# Patient Record
Sex: FEMALE | Race: WHITE | NOT HISPANIC OR LATINO | Employment: OTHER | URBAN - METROPOLITAN AREA
[De-identification: names, ages, dates, MRNs, and addresses within clinical notes are randomized per-mention and may not be internally consistent; named-entity substitution may affect disease eponyms.]

---

## 2017-01-04 ENCOUNTER — OFFICE VISIT - HEALTHEAST (OUTPATIENT)
Dept: FAMILY MEDICINE | Facility: CLINIC | Age: 67
End: 2017-01-04

## 2017-01-04 DIAGNOSIS — Z78.0 POST-MENOPAUSE: ICD-10-CM

## 2017-01-04 DIAGNOSIS — F41.1 ANXIETY, GENERALIZED: ICD-10-CM

## 2017-01-04 DIAGNOSIS — F31.9 BIPOLAR 1 DISORDER (H): ICD-10-CM

## 2017-01-04 DIAGNOSIS — I10 ESSENTIAL HYPERTENSION WITH GOAL BLOOD PRESSURE LESS THAN 130/85: ICD-10-CM

## 2017-01-04 DIAGNOSIS — R21 RASH AND NONSPECIFIC SKIN ERUPTION: ICD-10-CM

## 2017-01-04 DIAGNOSIS — Z12.11 ENCOUNTER FOR SCREENING COLONOSCOPY: ICD-10-CM

## 2017-01-04 DIAGNOSIS — R20.0 NUMBNESS OF RIGHT HAND: ICD-10-CM

## 2017-01-04 DIAGNOSIS — Z00.00 ROUTINE GENERAL MEDICAL EXAMINATION AT A HEALTH CARE FACILITY: ICD-10-CM

## 2017-01-04 DIAGNOSIS — Z12.4 CERVICAL CANCER SCREENING: ICD-10-CM

## 2017-01-04 DIAGNOSIS — E78.2 MIXED HYPERLIPIDEMIA: ICD-10-CM

## 2017-01-04 DIAGNOSIS — Z11.59 NEED FOR HEPATITIS C SCREENING TEST: ICD-10-CM

## 2017-01-04 DIAGNOSIS — F06.30 MOOD DISORDER IN CONDITIONS CLASSIFIED ELSEWHERE: ICD-10-CM

## 2017-01-04 DIAGNOSIS — I25.10 CORONARY ARTERY DISEASE: ICD-10-CM

## 2017-01-04 LAB — HBA1C MFR BLD: 11 % (ref 3.5–6)

## 2017-01-04 ASSESSMENT — MIFFLIN-ST. JEOR: SCORE: 1252.51

## 2017-01-05 LAB
CHOLEST SERPL-MCNC: 201 MG/DL
FASTING STATUS PATIENT QL REPORTED: ABNORMAL
HDLC SERPL-MCNC: 48 MG/DL
LDLC SERPL CALC-MCNC: 118 MG/DL
TRIGL SERPL-MCNC: 173 MG/DL

## 2017-01-06 ENCOUNTER — COMMUNICATION - HEALTHEAST (OUTPATIENT)
Dept: CARDIOLOGY | Facility: CLINIC | Age: 67
End: 2017-01-06

## 2017-01-06 ENCOUNTER — COMMUNICATION - HEALTHEAST (OUTPATIENT)
Dept: FAMILY MEDICINE | Facility: CLINIC | Age: 67
End: 2017-01-06

## 2017-01-06 DIAGNOSIS — Z79.4 TYPE 2 DIABETES MELLITUS WITH HYPERGLYCEMIA, WITH LONG-TERM CURRENT USE OF INSULIN (H): ICD-10-CM

## 2017-01-06 DIAGNOSIS — E11.65 TYPE 2 DIABETES MELLITUS WITH HYPERGLYCEMIA, WITH LONG-TERM CURRENT USE OF INSULIN (H): ICD-10-CM

## 2017-01-06 DIAGNOSIS — R21 RASH AND NONSPECIFIC SKIN ERUPTION: ICD-10-CM

## 2017-01-06 LAB — HCV AB SERPL QL IA: NEGATIVE

## 2017-01-10 LAB
HPV INTERPRETATION - HISTORICAL: NORMAL
HPV INTERPRETER - HISTORICAL: NORMAL

## 2017-01-11 ENCOUNTER — OFFICE VISIT - HEALTHEAST (OUTPATIENT)
Dept: FAMILY MEDICINE | Facility: CLINIC | Age: 67
End: 2017-01-11

## 2017-01-11 DIAGNOSIS — E11.65 TYPE 2 DIABETES MELLITUS WITH HYPERGLYCEMIA, WITH LONG-TERM CURRENT USE OF INSULIN (H): ICD-10-CM

## 2017-01-11 DIAGNOSIS — Z23 NEED FOR IMMUNIZATION AGAINST INFLUENZA: ICD-10-CM

## 2017-01-11 DIAGNOSIS — I25.10 CORONARY ARTERY DISEASE INVOLVING NATIVE CORONARY ARTERY OF NATIVE HEART WITHOUT ANGINA PECTORIS: ICD-10-CM

## 2017-01-11 DIAGNOSIS — E78.2 MIXED HYPERLIPIDEMIA: ICD-10-CM

## 2017-01-11 DIAGNOSIS — Z79.4 TYPE 2 DIABETES MELLITUS WITH HYPERGLYCEMIA, WITH LONG-TERM CURRENT USE OF INSULIN (H): ICD-10-CM

## 2017-01-11 DIAGNOSIS — I10 ESSENTIAL HYPERTENSION WITH GOAL BLOOD PRESSURE LESS THAN 130/85: ICD-10-CM

## 2017-01-15 ENCOUNTER — COMMUNICATION - HEALTHEAST (OUTPATIENT)
Dept: FAMILY MEDICINE | Facility: CLINIC | Age: 67
End: 2017-01-15

## 2017-01-15 DIAGNOSIS — Z79.4 TYPE 2 DIABETES MELLITUS WITH HYPERGLYCEMIA, WITH LONG-TERM CURRENT USE OF INSULIN (H): ICD-10-CM

## 2017-01-15 DIAGNOSIS — E11.65 TYPE 2 DIABETES MELLITUS WITH HYPERGLYCEMIA, WITH LONG-TERM CURRENT USE OF INSULIN (H): ICD-10-CM

## 2017-01-20 ENCOUNTER — RECORDS - HEALTHEAST (OUTPATIENT)
Dept: ADMINISTRATIVE | Facility: OTHER | Age: 67
End: 2017-01-20

## 2017-01-25 ENCOUNTER — RECORDS - HEALTHEAST (OUTPATIENT)
Dept: ADMINISTRATIVE | Facility: OTHER | Age: 67
End: 2017-01-25

## 2017-01-30 ENCOUNTER — RECORDS - HEALTHEAST (OUTPATIENT)
Dept: ADMINISTRATIVE | Facility: OTHER | Age: 67
End: 2017-01-30

## 2017-02-01 ENCOUNTER — HOSPITAL ENCOUNTER (OUTPATIENT)
Dept: MRI IMAGING | Facility: CLINIC | Age: 67
Discharge: HOME | End: 2017-02-01

## 2017-02-01 DIAGNOSIS — M79.641 PAIN IN RIGHT HAND: ICD-10-CM

## 2017-02-01 DIAGNOSIS — R20.0 NUMBNESS OF RIGHT HAND: ICD-10-CM

## 2017-02-01 DIAGNOSIS — I63.9 STROKE (H): ICD-10-CM

## 2017-02-01 DIAGNOSIS — R29.898 RIGHT HAND WEAKNESS: ICD-10-CM

## 2017-02-04 ENCOUNTER — COMMUNICATION - HEALTHEAST (OUTPATIENT)
Dept: FAMILY MEDICINE | Facility: CLINIC | Age: 67
End: 2017-02-04

## 2017-02-16 ENCOUNTER — HOSPITAL ENCOUNTER (OUTPATIENT)
Dept: NEUROLOGY | Facility: CLINIC | Age: 67
Setting detail: THERAPIES SERIES
Discharge: STILL ADMITTED OR ADMINISTRATIVE | End: 2017-02-16
Attending: NURSE PRACTITIONER

## 2017-02-16 DIAGNOSIS — G31.84 MCI (MILD COGNITIVE IMPAIRMENT): ICD-10-CM

## 2017-02-16 DIAGNOSIS — F41.9 ANXIETY DISORDER: ICD-10-CM

## 2017-02-17 ENCOUNTER — COMMUNICATION - HEALTHEAST (OUTPATIENT)
Dept: NURSING | Facility: CLINIC | Age: 67
End: 2017-02-17

## 2017-03-17 ENCOUNTER — AMBULATORY - HEALTHEAST (OUTPATIENT)
Dept: CARDIOLOGY | Facility: CLINIC | Age: 67
End: 2017-03-17

## 2017-03-17 DIAGNOSIS — Z00.6 RESEARCH EXAM: ICD-10-CM

## 2017-03-17 ASSESSMENT — MIFFLIN-ST. JEOR: SCORE: 1252.51

## 2017-03-20 ENCOUNTER — COMMUNICATION - HEALTHEAST (OUTPATIENT)
Dept: CARDIOLOGY | Facility: CLINIC | Age: 67
End: 2017-03-20

## 2017-03-20 ENCOUNTER — AMBULATORY - HEALTHEAST (OUTPATIENT)
Dept: CARDIOLOGY | Facility: CLINIC | Age: 67
End: 2017-03-20

## 2017-03-20 DIAGNOSIS — Z00.6 RESEARCH EXAM: ICD-10-CM

## 2017-03-27 ENCOUNTER — OFFICE VISIT - HEALTHEAST (OUTPATIENT)
Dept: ENDOCRINOLOGY | Facility: CLINIC | Age: 67
End: 2017-03-27

## 2017-03-27 DIAGNOSIS — E11.65 TYPE 2 DIABETES MELLITUS WITH HYPERGLYCEMIA, WITH LONG-TERM CURRENT USE OF INSULIN (H): ICD-10-CM

## 2017-03-27 DIAGNOSIS — Z79.4 TYPE 2 DIABETES MELLITUS WITH HYPERGLYCEMIA, WITH LONG-TERM CURRENT USE OF INSULIN (H): ICD-10-CM

## 2017-03-28 ENCOUNTER — RECORDS - HEALTHEAST (OUTPATIENT)
Dept: ADMINISTRATIVE | Facility: OTHER | Age: 67
End: 2017-03-28

## 2017-04-10 ENCOUNTER — COMMUNICATION - HEALTHEAST (OUTPATIENT)
Dept: FAMILY MEDICINE | Facility: CLINIC | Age: 67
End: 2017-04-10

## 2017-05-01 ENCOUNTER — COMMUNICATION - HEALTHEAST (OUTPATIENT)
Dept: FAMILY MEDICINE | Facility: CLINIC | Age: 67
End: 2017-05-01

## 2017-05-01 DIAGNOSIS — E11.65 TYPE 2 DIABETES MELLITUS WITH HYPERGLYCEMIA, WITH LONG-TERM CURRENT USE OF INSULIN (H): ICD-10-CM

## 2017-05-01 DIAGNOSIS — Z79.4 TYPE 2 DIABETES MELLITUS WITH HYPERGLYCEMIA, WITH LONG-TERM CURRENT USE OF INSULIN (H): ICD-10-CM

## 2017-05-02 ENCOUNTER — COMMUNICATION - HEALTHEAST (OUTPATIENT)
Dept: FAMILY MEDICINE | Facility: CLINIC | Age: 67
End: 2017-05-02

## 2017-05-02 DIAGNOSIS — M79.643: ICD-10-CM

## 2017-05-02 DIAGNOSIS — M79.673: ICD-10-CM

## 2017-05-04 ENCOUNTER — COMMUNICATION - HEALTHEAST (OUTPATIENT)
Dept: FAMILY MEDICINE | Facility: CLINIC | Age: 67
End: 2017-05-04

## 2017-05-04 ENCOUNTER — COMMUNICATION - HEALTHEAST (OUTPATIENT)
Dept: ENDOCRINOLOGY | Facility: CLINIC | Age: 67
End: 2017-05-04

## 2017-05-04 DIAGNOSIS — Z79.4 TYPE 2 DIABETES MELLITUS WITH HYPERGLYCEMIA, WITH LONG-TERM CURRENT USE OF INSULIN (H): ICD-10-CM

## 2017-05-04 DIAGNOSIS — M79.673: ICD-10-CM

## 2017-05-04 DIAGNOSIS — E11.65 TYPE 2 DIABETES MELLITUS WITH HYPERGLYCEMIA, WITH LONG-TERM CURRENT USE OF INSULIN (H): ICD-10-CM

## 2017-05-04 DIAGNOSIS — M79.643: ICD-10-CM

## 2017-05-11 ENCOUNTER — OFFICE VISIT - HEALTHEAST (OUTPATIENT)
Dept: FAMILY MEDICINE | Facility: CLINIC | Age: 67
End: 2017-05-11

## 2017-05-11 ENCOUNTER — COMMUNICATION - HEALTHEAST (OUTPATIENT)
Dept: NURSING | Facility: CLINIC | Age: 67
End: 2017-05-11

## 2017-05-11 DIAGNOSIS — Z01.818 PREOP EXAMINATION: ICD-10-CM

## 2017-05-11 DIAGNOSIS — F31.81 BIPOLAR 2 DISORDER (H): ICD-10-CM

## 2017-05-11 DIAGNOSIS — Z79.4 TYPE 2 DIABETES MELLITUS WITH HYPERGLYCEMIA, WITH LONG-TERM CURRENT USE OF INSULIN (H): ICD-10-CM

## 2017-05-11 DIAGNOSIS — H26.9 CATARACTS, BILATERAL: ICD-10-CM

## 2017-05-11 DIAGNOSIS — E11.65 TYPE 2 DIABETES MELLITUS WITH HYPERGLYCEMIA, WITH LONG-TERM CURRENT USE OF INSULIN (H): ICD-10-CM

## 2017-05-11 DIAGNOSIS — E78.2 MIXED HYPERLIPIDEMIA: ICD-10-CM

## 2017-05-11 DIAGNOSIS — I10 ESSENTIAL HYPERTENSION WITH GOAL BLOOD PRESSURE LESS THAN 130/85: ICD-10-CM

## 2017-05-11 DIAGNOSIS — I25.10 CORONARY ARTERY DISEASE INVOLVING NATIVE CORONARY ARTERY OF NATIVE HEART WITHOUT ANGINA PECTORIS: ICD-10-CM

## 2017-05-11 DIAGNOSIS — Z86.79 HISTORY OF ATRIAL FIBRILLATION: ICD-10-CM

## 2017-05-11 LAB
ATRIAL RATE - MUSE: 62 BPM
CHOLEST SERPL-MCNC: 193 MG/DL
DIASTOLIC BLOOD PRESSURE - MUSE: NORMAL MMHG
FASTING STATUS PATIENT QL REPORTED: NO
HBA1C MFR BLD: 8.8 % (ref 3.5–6)
HDLC SERPL-MCNC: 48 MG/DL
INTERPRETATION ECG - MUSE: NORMAL
LDLC SERPL CALC-MCNC: 112 MG/DL
P AXIS - MUSE: 1 DEGREES
PR INTERVAL - MUSE: 156 MS
QRS DURATION - MUSE: 90 MS
QT - MUSE: 392 MS
QTC - MUSE: 397 MS
R AXIS - MUSE: 54 DEGREES
SYSTOLIC BLOOD PRESSURE - MUSE: NORMAL MMHG
T AXIS - MUSE: 68 DEGREES
TRIGL SERPL-MCNC: 165 MG/DL
VENTRICULAR RATE- MUSE: 62 BPM

## 2017-05-11 ASSESSMENT — MIFFLIN-ST. JEOR: SCORE: 1259.03

## 2017-05-15 ENCOUNTER — COMMUNICATION - HEALTHEAST (OUTPATIENT)
Dept: FAMILY MEDICINE | Facility: CLINIC | Age: 67
End: 2017-05-15

## 2017-05-24 ENCOUNTER — HOSPITAL ENCOUNTER (OUTPATIENT)
Dept: NEUROLOGY | Facility: CLINIC | Age: 67
Setting detail: THERAPIES SERIES
Discharge: STILL ADMITTED OR ADMINISTRATIVE | End: 2017-05-24
Attending: NURSE PRACTITIONER

## 2017-05-24 DIAGNOSIS — F31.81 BIPOLAR 2 DISORDER (H): ICD-10-CM

## 2017-05-24 DIAGNOSIS — F41.9 ANXIETY DISORDER: ICD-10-CM

## 2017-05-24 DIAGNOSIS — F32.A CLINICAL DEPRESSION: ICD-10-CM

## 2017-05-24 DIAGNOSIS — G31.84 MCI (MILD COGNITIVE IMPAIRMENT): ICD-10-CM

## 2017-05-26 ENCOUNTER — COMMUNICATION - HEALTHEAST (OUTPATIENT)
Dept: ADMINISTRATIVE | Facility: CLINIC | Age: 67
End: 2017-05-26

## 2017-06-05 ENCOUNTER — COMMUNICATION - HEALTHEAST (OUTPATIENT)
Dept: SCHEDULING | Facility: CLINIC | Age: 67
End: 2017-06-05

## 2017-06-05 ENCOUNTER — OFFICE VISIT - HEALTHEAST (OUTPATIENT)
Dept: FAMILY MEDICINE | Facility: CLINIC | Age: 67
End: 2017-06-05

## 2017-06-05 DIAGNOSIS — R07.0 THROAT PAIN: ICD-10-CM

## 2017-06-05 DIAGNOSIS — R06.2 WHEEZING: ICD-10-CM

## 2017-06-05 DIAGNOSIS — R05.9 COUGH: ICD-10-CM

## 2017-06-05 DIAGNOSIS — J06.9 VIRAL URI: ICD-10-CM

## 2017-06-06 ENCOUNTER — AMBULATORY - HEALTHEAST (OUTPATIENT)
Dept: NEUROLOGY | Facility: CLINIC | Age: 67
End: 2017-06-06

## 2017-06-12 ENCOUNTER — AMBULATORY - HEALTHEAST (OUTPATIENT)
Dept: NEUROLOGY | Facility: CLINIC | Age: 67
End: 2017-06-12

## 2017-06-14 ENCOUNTER — OFFICE VISIT - HEALTHEAST (OUTPATIENT)
Dept: FAMILY MEDICINE | Facility: CLINIC | Age: 67
End: 2017-06-14

## 2017-06-14 ENCOUNTER — COMMUNICATION - HEALTHEAST (OUTPATIENT)
Dept: FAMILY MEDICINE | Facility: CLINIC | Age: 67
End: 2017-06-14

## 2017-06-14 DIAGNOSIS — E11.65 TYPE 2 DIABETES MELLITUS WITH HYPERGLYCEMIA (H): ICD-10-CM

## 2017-06-14 DIAGNOSIS — Z01.810 PREOP CARDIOVASCULAR EXAM: ICD-10-CM

## 2017-06-14 DIAGNOSIS — I25.10 CORONARY ARTERY DISEASE: ICD-10-CM

## 2017-06-14 DIAGNOSIS — H26.9 CATARACT: ICD-10-CM

## 2017-06-14 DIAGNOSIS — E78.2 MIXED HYPERLIPIDEMIA: ICD-10-CM

## 2017-06-14 DIAGNOSIS — I10 ESSENTIAL HYPERTENSION: ICD-10-CM

## 2017-06-14 LAB — HBA1C MFR BLD: 8.8 % (ref 3.5–6)

## 2017-06-14 ASSESSMENT — MIFFLIN-ST. JEOR: SCORE: 1275.42

## 2017-06-19 ENCOUNTER — RECORDS - HEALTHEAST (OUTPATIENT)
Dept: ADMINISTRATIVE | Facility: OTHER | Age: 67
End: 2017-06-19

## 2017-06-22 ENCOUNTER — COMMUNICATION - HEALTHEAST (OUTPATIENT)
Dept: CARDIOLOGY | Facility: CLINIC | Age: 67
End: 2017-06-22

## 2017-06-22 DIAGNOSIS — I25.10 CAD (CORONARY ARTERY DISEASE): ICD-10-CM

## 2017-07-11 ENCOUNTER — OFFICE VISIT - HEALTHEAST (OUTPATIENT)
Dept: CARDIOLOGY | Facility: CLINIC | Age: 67
End: 2017-07-11

## 2017-07-11 DIAGNOSIS — E78.5 HYPERLIPIDEMIA LDL GOAL <70: ICD-10-CM

## 2017-07-11 DIAGNOSIS — Z95.1 S/P CABG (CORONARY ARTERY BYPASS GRAFT): ICD-10-CM

## 2017-07-11 DIAGNOSIS — Z91.148 NONCOMPLIANCE WITH MEDICATIONS: ICD-10-CM

## 2017-07-11 DIAGNOSIS — I25.10 CORONARY ARTERY DISEASE INVOLVING NATIVE CORONARY ARTERY OF NATIVE HEART WITHOUT ANGINA PECTORIS: ICD-10-CM

## 2017-07-11 ASSESSMENT — MIFFLIN-ST. JEOR: SCORE: 1284.94

## 2017-07-18 ENCOUNTER — AMBULATORY - HEALTHEAST (OUTPATIENT)
Dept: LAB | Facility: CLINIC | Age: 67
End: 2017-07-18

## 2017-07-18 DIAGNOSIS — E78.5 HYPERLIPIDEMIA LDL GOAL <70: ICD-10-CM

## 2017-07-18 LAB
CHOLEST SERPL-MCNC: 150 MG/DL
FASTING STATUS PATIENT QL REPORTED: YES
HDLC SERPL-MCNC: 44 MG/DL
LDLC SERPL CALC-MCNC: 81 MG/DL
LDLC SERPL CALC-MCNC: 83 MG/DL
TRIGL SERPL-MCNC: 123 MG/DL

## 2017-07-27 ENCOUNTER — COMMUNICATION - HEALTHEAST (OUTPATIENT)
Dept: CARDIOLOGY | Facility: CLINIC | Age: 67
End: 2017-07-27

## 2017-07-28 ENCOUNTER — AMBULATORY - HEALTHEAST (OUTPATIENT)
Dept: CARDIOLOGY | Facility: CLINIC | Age: 67
End: 2017-07-28

## 2017-07-28 ENCOUNTER — COMMUNICATION - HEALTHEAST (OUTPATIENT)
Dept: CARDIOLOGY | Facility: CLINIC | Age: 67
End: 2017-07-28

## 2017-07-28 DIAGNOSIS — E78.5 HYPERLIPIDEMIA: ICD-10-CM

## 2017-08-03 ENCOUNTER — OFFICE VISIT - HEALTHEAST (OUTPATIENT)
Dept: CARDIOLOGY | Facility: CLINIC | Age: 67
End: 2017-08-03

## 2017-08-03 ENCOUNTER — AMBULATORY - HEALTHEAST (OUTPATIENT)
Dept: CARDIOLOGY | Facility: CLINIC | Age: 67
End: 2017-08-03

## 2017-08-03 DIAGNOSIS — Z79.4 TYPE 2 DIABETES MELLITUS WITH HYPERGLYCEMIA, WITH LONG-TERM CURRENT USE OF INSULIN (H): ICD-10-CM

## 2017-08-03 DIAGNOSIS — Z00.6 RESEARCH EXAM: ICD-10-CM

## 2017-08-03 DIAGNOSIS — E11.65 TYPE 2 DIABETES MELLITUS WITH HYPERGLYCEMIA, WITH LONG-TERM CURRENT USE OF INSULIN (H): ICD-10-CM

## 2017-08-03 ASSESSMENT — MIFFLIN-ST. JEOR: SCORE: 1270.65

## 2017-08-08 ENCOUNTER — AMBULATORY - HEALTHEAST (OUTPATIENT)
Dept: CARDIOLOGY | Facility: CLINIC | Age: 67
End: 2017-08-08

## 2017-08-08 DIAGNOSIS — Z00.6 RESEARCH EXAM: ICD-10-CM

## 2017-08-09 ENCOUNTER — COMMUNICATION - HEALTHEAST (OUTPATIENT)
Dept: CARDIOLOGY | Facility: CLINIC | Age: 67
End: 2017-08-09

## 2017-08-10 ENCOUNTER — AMBULATORY - HEALTHEAST (OUTPATIENT)
Dept: CARDIOLOGY | Facility: CLINIC | Age: 67
End: 2017-08-10

## 2017-08-30 ENCOUNTER — HOSPITAL ENCOUNTER (OUTPATIENT)
Dept: NEUROLOGY | Facility: CLINIC | Age: 67
Setting detail: THERAPIES SERIES
Discharge: STILL ADMITTED OR ADMINISTRATIVE | End: 2017-08-30
Attending: NURSE PRACTITIONER

## 2017-08-30 DIAGNOSIS — F41.9 ANXIETY DISORDER: ICD-10-CM

## 2017-08-30 DIAGNOSIS — F31.9 BIPOLAR 1 DISORDER (H): ICD-10-CM

## 2017-09-12 ENCOUNTER — COMMUNICATION - HEALTHEAST (OUTPATIENT)
Dept: FAMILY MEDICINE | Facility: CLINIC | Age: 67
End: 2017-09-12

## 2017-09-12 DIAGNOSIS — F41.1 ANXIETY, GENERALIZED: ICD-10-CM

## 2017-09-13 ENCOUNTER — COMMUNICATION - HEALTHEAST (OUTPATIENT)
Dept: NEUROLOGY | Facility: CLINIC | Age: 67
End: 2017-09-13

## 2017-09-13 DIAGNOSIS — F41.1 ANXIETY, GENERALIZED: ICD-10-CM

## 2017-10-26 ENCOUNTER — COMMUNICATION - HEALTHEAST (OUTPATIENT)
Dept: CARDIOLOGY | Facility: CLINIC | Age: 67
End: 2017-10-26

## 2017-10-26 ENCOUNTER — COMMUNICATION - HEALTHEAST (OUTPATIENT)
Dept: FAMILY MEDICINE | Facility: CLINIC | Age: 67
End: 2017-10-26

## 2017-10-26 DIAGNOSIS — E78.2 MIXED HYPERLIPIDEMIA: ICD-10-CM

## 2017-10-26 DIAGNOSIS — I25.10 CAD (CORONARY ARTERY DISEASE): ICD-10-CM

## 2017-10-27 ENCOUNTER — COMMUNICATION - HEALTHEAST (OUTPATIENT)
Dept: FAMILY MEDICINE | Facility: CLINIC | Age: 67
End: 2017-10-27

## 2017-12-01 ENCOUNTER — AMBULATORY - HEALTHEAST (OUTPATIENT)
Dept: CARDIOLOGY | Facility: CLINIC | Age: 67
End: 2017-12-01

## 2017-12-01 ENCOUNTER — OFFICE VISIT - HEALTHEAST (OUTPATIENT)
Dept: CARDIOLOGY | Facility: CLINIC | Age: 67
End: 2017-12-01

## 2017-12-01 DIAGNOSIS — Z00.6 RESEARCH EXAM: ICD-10-CM

## 2017-12-01 DIAGNOSIS — I25.10 CORONARY ARTERY DISEASE INVOLVING NATIVE CORONARY ARTERY OF NATIVE HEART WITHOUT ANGINA PECTORIS: ICD-10-CM

## 2017-12-01 DIAGNOSIS — Z79.4 TYPE 2 DIABETES MELLITUS WITH HYPERGLYCEMIA, WITH LONG-TERM CURRENT USE OF INSULIN (H): ICD-10-CM

## 2017-12-01 DIAGNOSIS — E11.65 TYPE 2 DIABETES MELLITUS WITH HYPERGLYCEMIA, WITH LONG-TERM CURRENT USE OF INSULIN (H): ICD-10-CM

## 2017-12-01 ASSESSMENT — MIFFLIN-ST. JEOR: SCORE: 1266.12

## 2017-12-06 ENCOUNTER — AMBULATORY - HEALTHEAST (OUTPATIENT)
Dept: CARDIOLOGY | Facility: CLINIC | Age: 67
End: 2017-12-06

## 2017-12-06 DIAGNOSIS — Z00.6 RESEARCH EXAM: ICD-10-CM

## 2017-12-13 ENCOUNTER — COMMUNICATION - HEALTHEAST (OUTPATIENT)
Dept: CARDIOLOGY | Facility: CLINIC | Age: 67
End: 2017-12-13

## 2017-12-14 ENCOUNTER — HOSPITAL ENCOUNTER (OUTPATIENT)
Dept: NEUROLOGY | Facility: CLINIC | Age: 67
Setting detail: THERAPIES SERIES
Discharge: STILL ADMITTED OR ADMINISTRATIVE | End: 2017-12-14
Attending: NURSE PRACTITIONER

## 2017-12-14 DIAGNOSIS — F41.1 ANXIETY, GENERALIZED: ICD-10-CM

## 2017-12-14 DIAGNOSIS — F31.9 BIPOLAR 1 DISORDER (H): ICD-10-CM

## 2017-12-14 DIAGNOSIS — F06.30 MOOD DISORDER IN CONDITIONS CLASSIFIED ELSEWHERE: ICD-10-CM

## 2017-12-14 DIAGNOSIS — F41.9 ANXIETY DISORDER: ICD-10-CM

## 2017-12-15 ENCOUNTER — COMMUNICATION - HEALTHEAST (OUTPATIENT)
Dept: CARDIOLOGY | Facility: CLINIC | Age: 67
End: 2017-12-15

## 2017-12-18 ENCOUNTER — OFFICE VISIT - HEALTHEAST (OUTPATIENT)
Dept: FAMILY MEDICINE | Facility: CLINIC | Age: 67
End: 2017-12-18

## 2017-12-18 DIAGNOSIS — E11.65 TYPE 2 DIABETES MELLITUS WITH HYPERGLYCEMIA (H): ICD-10-CM

## 2017-12-18 DIAGNOSIS — E78.2 MIXED HYPERLIPIDEMIA: ICD-10-CM

## 2017-12-18 DIAGNOSIS — I25.10 CORONARY ARTERY DISEASE: ICD-10-CM

## 2017-12-18 DIAGNOSIS — M25.559 HIP PAIN: ICD-10-CM

## 2017-12-18 DIAGNOSIS — I10 ESSENTIAL HYPERTENSION: ICD-10-CM

## 2017-12-18 LAB — HBA1C MFR BLD: 9 % (ref 3.5–6)

## 2017-12-18 ASSESSMENT — MIFFLIN-ST. JEOR: SCORE: 1275.19

## 2017-12-19 LAB — LDLC SERPL CALC-MCNC: 144 MG/DL

## 2017-12-20 ENCOUNTER — COMMUNICATION - HEALTHEAST (OUTPATIENT)
Dept: FAMILY MEDICINE | Facility: CLINIC | Age: 67
End: 2017-12-20

## 2018-01-08 ENCOUNTER — OFFICE VISIT - HEALTHEAST (OUTPATIENT)
Dept: FAMILY MEDICINE | Facility: CLINIC | Age: 68
End: 2018-01-08

## 2018-01-08 DIAGNOSIS — E11.65 TYPE 2 DIABETES MELLITUS WITH HYPERGLYCEMIA, WITH LONG-TERM CURRENT USE OF INSULIN (H): ICD-10-CM

## 2018-01-08 DIAGNOSIS — R51.9 HEADACHE: ICD-10-CM

## 2018-01-08 DIAGNOSIS — I10 ESSENTIAL HYPERTENSION: ICD-10-CM

## 2018-01-08 DIAGNOSIS — I25.10 CORONARY ARTERY DISEASE INVOLVING NATIVE CORONARY ARTERY OF NATIVE HEART WITHOUT ANGINA PECTORIS: ICD-10-CM

## 2018-01-08 DIAGNOSIS — R21 RASH AND NONSPECIFIC SKIN ERUPTION: ICD-10-CM

## 2018-01-08 DIAGNOSIS — F31.81 BIPOLAR 2 DISORDER (H): ICD-10-CM

## 2018-01-08 DIAGNOSIS — Z79.4 TYPE 2 DIABETES MELLITUS WITH HYPERGLYCEMIA, WITH LONG-TERM CURRENT USE OF INSULIN (H): ICD-10-CM

## 2018-01-08 ASSESSMENT — MIFFLIN-ST. JEOR: SCORE: 1271.79

## 2018-01-09 ENCOUNTER — COMMUNICATION - HEALTHEAST (OUTPATIENT)
Dept: FAMILY MEDICINE | Facility: CLINIC | Age: 68
End: 2018-01-09

## 2018-01-17 ENCOUNTER — AMBULATORY - HEALTHEAST (OUTPATIENT)
Dept: CARDIOLOGY | Facility: CLINIC | Age: 68
End: 2018-01-17

## 2018-01-22 ENCOUNTER — COMMUNICATION - HEALTHEAST (OUTPATIENT)
Dept: FAMILY MEDICINE | Facility: CLINIC | Age: 68
End: 2018-01-22

## 2018-02-19 ENCOUNTER — COMMUNICATION - HEALTHEAST (OUTPATIENT)
Dept: FAMILY MEDICINE | Facility: CLINIC | Age: 68
End: 2018-02-19

## 2018-02-19 ENCOUNTER — OFFICE VISIT - HEALTHEAST (OUTPATIENT)
Dept: FAMILY MEDICINE | Facility: CLINIC | Age: 68
End: 2018-02-19

## 2018-02-19 DIAGNOSIS — H10.9 BACTERIAL CONJUNCTIVITIS OF BOTH EYES: ICD-10-CM

## 2018-02-19 DIAGNOSIS — J01.90 SINUSITIS, ACUTE: ICD-10-CM

## 2018-02-19 DIAGNOSIS — M25.559 HIP PAIN: ICD-10-CM

## 2018-02-19 DIAGNOSIS — B96.89 BACTERIAL CONJUNCTIVITIS OF BOTH EYES: ICD-10-CM

## 2018-02-21 ENCOUNTER — COMMUNICATION - HEALTHEAST (OUTPATIENT)
Dept: FAMILY MEDICINE | Facility: CLINIC | Age: 68
End: 2018-02-21

## 2018-02-21 DIAGNOSIS — M79.643: ICD-10-CM

## 2018-02-21 DIAGNOSIS — M79.673: ICD-10-CM

## 2018-04-19 ENCOUNTER — OFFICE VISIT - HEALTHEAST (OUTPATIENT)
Dept: CARDIOLOGY | Facility: CLINIC | Age: 68
End: 2018-04-19

## 2018-04-19 ENCOUNTER — HOSPITAL ENCOUNTER (OUTPATIENT)
Dept: NEUROLOGY | Facility: CLINIC | Age: 68
Setting detail: THERAPIES SERIES
Discharge: STILL ADMITTED OR ADMINISTRATIVE | End: 2018-04-19
Attending: NURSE PRACTITIONER

## 2018-04-19 DIAGNOSIS — I25.10 CAD (CORONARY ARTERY DISEASE): ICD-10-CM

## 2018-04-19 DIAGNOSIS — F31.81 BIPOLAR 2 DISORDER (H): ICD-10-CM

## 2018-04-19 DIAGNOSIS — I25.10 CORONARY ARTERY DISEASE: ICD-10-CM

## 2018-04-19 DIAGNOSIS — R00.2 PALPITATIONS: ICD-10-CM

## 2018-04-19 DIAGNOSIS — F41.1 ANXIETY, GENERALIZED: ICD-10-CM

## 2018-04-19 DIAGNOSIS — F41.9 ANXIETY DISORDER: ICD-10-CM

## 2018-04-19 ASSESSMENT — MIFFLIN-ST. JEOR: SCORE: 1275.19

## 2018-04-24 ENCOUNTER — RECORDS - HEALTHEAST (OUTPATIENT)
Dept: ADMINISTRATIVE | Facility: OTHER | Age: 68
End: 2018-04-24

## 2018-04-24 ENCOUNTER — OFFICE VISIT - HEALTHEAST (OUTPATIENT)
Dept: FAMILY MEDICINE | Facility: CLINIC | Age: 68
End: 2018-04-24

## 2018-04-24 ENCOUNTER — COMMUNICATION - HEALTHEAST (OUTPATIENT)
Dept: FAMILY MEDICINE | Facility: CLINIC | Age: 68
End: 2018-04-24

## 2018-04-24 DIAGNOSIS — E78.2 MIXED HYPERLIPIDEMIA: ICD-10-CM

## 2018-04-24 DIAGNOSIS — J01.00 ACUTE MAXILLARY SINUSITIS: ICD-10-CM

## 2018-04-24 DIAGNOSIS — Z79.4 TYPE 2 DIABETES MELLITUS WITH HYPERGLYCEMIA, WITH LONG-TERM CURRENT USE OF INSULIN (H): ICD-10-CM

## 2018-04-24 DIAGNOSIS — Z12.11 SCREEN FOR COLON CANCER: ICD-10-CM

## 2018-04-24 DIAGNOSIS — E11.65 TYPE 2 DIABETES MELLITUS WITH HYPERGLYCEMIA, WITH LONG-TERM CURRENT USE OF INSULIN (H): ICD-10-CM

## 2018-04-24 ASSESSMENT — MIFFLIN-ST. JEOR: SCORE: 1270.65

## 2018-04-25 ENCOUNTER — COMMUNICATION - HEALTHEAST (OUTPATIENT)
Dept: SCHEDULING | Facility: CLINIC | Age: 68
End: 2018-04-25

## 2018-04-27 ENCOUNTER — AMBULATORY - HEALTHEAST (OUTPATIENT)
Dept: LAB | Facility: CLINIC | Age: 68
End: 2018-04-27

## 2018-04-27 DIAGNOSIS — E11.65 TYPE 2 DIABETES MELLITUS WITH HYPERGLYCEMIA, WITH LONG-TERM CURRENT USE OF INSULIN (H): ICD-10-CM

## 2018-04-27 DIAGNOSIS — I25.10 CAD (CORONARY ARTERY DISEASE): ICD-10-CM

## 2018-04-27 DIAGNOSIS — E78.2 MIXED HYPERLIPIDEMIA: ICD-10-CM

## 2018-04-27 DIAGNOSIS — Z79.4 TYPE 2 DIABETES MELLITUS WITH HYPERGLYCEMIA, WITH LONG-TERM CURRENT USE OF INSULIN (H): ICD-10-CM

## 2018-04-27 LAB
ALBUMIN SERPL-MCNC: 3.2 G/DL (ref 3.5–5)
ALP SERPL-CCNC: 108 U/L (ref 45–120)
ALT SERPL W P-5'-P-CCNC: 14 U/L (ref 0–45)
ANION GAP SERPL CALCULATED.3IONS-SCNC: 11 MMOL/L (ref 5–18)
AST SERPL W P-5'-P-CCNC: 13 U/L (ref 0–40)
BILIRUB SERPL-MCNC: 0.5 MG/DL (ref 0–1)
BUN SERPL-MCNC: 15 MG/DL (ref 8–22)
CALCIUM SERPL-MCNC: 9.3 MG/DL (ref 8.5–10.5)
CHLORIDE BLD-SCNC: 104 MMOL/L (ref 98–107)
CHOLEST SERPL-MCNC: 133 MG/DL
CHOLEST SERPL-MCNC: 133 MG/DL
CO2 SERPL-SCNC: 27 MMOL/L (ref 22–31)
CREAT SERPL-MCNC: 0.79 MG/DL (ref 0.6–1.1)
FASTING STATUS PATIENT QL REPORTED: YES
FASTING STATUS PATIENT QL REPORTED: YES
GFR SERPL CREATININE-BSD FRML MDRD: >60 ML/MIN/1.73M2
GLUCOSE BLD-MCNC: 160 MG/DL (ref 70–125)
HBA1C MFR BLD: 9.1 % (ref 3.5–6)
HDLC SERPL-MCNC: 41 MG/DL
HDLC SERPL-MCNC: 41 MG/DL
LDLC SERPL CALC-MCNC: 72 MG/DL
LDLC SERPL CALC-MCNC: 72 MG/DL
POTASSIUM BLD-SCNC: 4.6 MMOL/L (ref 3.5–5)
PROT SERPL-MCNC: 6.6 G/DL (ref 6–8)
SODIUM SERPL-SCNC: 142 MMOL/L (ref 136–145)
TRIGL SERPL-MCNC: 101 MG/DL
TRIGL SERPL-MCNC: 101 MG/DL

## 2018-05-04 ENCOUNTER — COMMUNICATION - HEALTHEAST (OUTPATIENT)
Dept: FAMILY MEDICINE | Facility: CLINIC | Age: 68
End: 2018-05-04

## 2018-05-10 ENCOUNTER — RECORDS - HEALTHEAST (OUTPATIENT)
Dept: ADMINISTRATIVE | Facility: OTHER | Age: 68
End: 2018-05-10

## 2018-05-11 ENCOUNTER — AMBULATORY - HEALTHEAST (OUTPATIENT)
Dept: FAMILY MEDICINE | Facility: CLINIC | Age: 68
End: 2018-05-11

## 2018-05-11 DIAGNOSIS — E11.65 TYPE 2 DIABETES MELLITUS WITH HYPERGLYCEMIA, WITH LONG-TERM CURRENT USE OF INSULIN (H): ICD-10-CM

## 2018-05-11 DIAGNOSIS — Z79.4 TYPE 2 DIABETES MELLITUS WITH HYPERGLYCEMIA, WITH LONG-TERM CURRENT USE OF INSULIN (H): ICD-10-CM

## 2018-05-17 ENCOUNTER — COMMUNICATION - HEALTHEAST (OUTPATIENT)
Dept: CARDIOLOGY | Facility: CLINIC | Age: 68
End: 2018-05-17

## 2018-05-17 ENCOUNTER — COMMUNICATION - HEALTHEAST (OUTPATIENT)
Dept: FAMILY MEDICINE | Facility: CLINIC | Age: 68
End: 2018-05-17

## 2018-05-17 DIAGNOSIS — R19.5 POSITIVE COLORECTAL CANCER SCREENING USING DNA-BASED STOOL TEST: ICD-10-CM

## 2018-05-17 DIAGNOSIS — Z12.11 SCREENING FOR COLON CANCER: ICD-10-CM

## 2018-05-17 DIAGNOSIS — D12.6 BENIGN ADENOMATOUS POLYP OF LARGE INTESTINE: ICD-10-CM

## 2018-05-18 ENCOUNTER — HOSPITAL ENCOUNTER (OUTPATIENT)
Dept: CARDIOLOGY | Facility: CLINIC | Age: 68
Discharge: HOME | End: 2018-05-18
Attending: INTERNAL MEDICINE

## 2018-05-18 DIAGNOSIS — I25.10 CAD (CORONARY ARTERY DISEASE): ICD-10-CM

## 2018-05-18 DIAGNOSIS — R00.2 PALPITATIONS: ICD-10-CM

## 2018-05-18 LAB
CV STRESS CURRENT BP HE: NORMAL
CV STRESS CURRENT HR HE: 106
CV STRESS CURRENT HR HE: 107
CV STRESS CURRENT HR HE: 109
CV STRESS CURRENT HR HE: 110
CV STRESS CURRENT HR HE: 116
CV STRESS CURRENT HR HE: 127
CV STRESS CURRENT HR HE: 127
CV STRESS CURRENT HR HE: 128
CV STRESS CURRENT HR HE: 129
CV STRESS CURRENT HR HE: 129
CV STRESS CURRENT HR HE: 68
CV STRESS CURRENT HR HE: 73
CV STRESS CURRENT HR HE: 74
CV STRESS CURRENT HR HE: 78
CV STRESS CURRENT HR HE: 79
CV STRESS CURRENT HR HE: 81
CV STRESS CURRENT HR HE: 82
CV STRESS CURRENT HR HE: 82
CV STRESS CURRENT HR HE: 84
CV STRESS CURRENT HR HE: 86
CV STRESS CURRENT HR HE: 86
CV STRESS CURRENT HR HE: 88
CV STRESS CURRENT HR HE: 94
CV STRESS CURRENT HR HE: 96
CV STRESS CURRENT HR HE: 97
CV STRESS DEVIATION TIME HE: NORMAL
CV STRESS ECHO PERCENT HR HE: NORMAL
CV STRESS EXERCISE STAGE HE: NORMAL
CV STRESS FINAL RESTING BP HE: NORMAL
CV STRESS FINAL RESTING HR HE: 84
CV STRESS MAX HR HE: 129
CV STRESS MAX TREADMILL GRADE HE: 12
CV STRESS MAX TREADMILL SPEED HE: 2.5
CV STRESS PEAK DIA BP HE: NORMAL
CV STRESS PEAK SYS BP HE: NORMAL
CV STRESS PHASE HE: NORMAL
CV STRESS PROTOCOL HE: NORMAL
CV STRESS RESTING PT POSITION HE: NORMAL
CV STRESS RESTING PT POSITION HE: NORMAL
CV STRESS ST DEVIATION AMOUNT HE: NORMAL
CV STRESS ST DEVIATION ELEVATION HE: NORMAL
CV STRESS ST EVELATION AMOUNT HE: NORMAL
CV STRESS TEST TYPE HE: NORMAL
CV STRESS TOTAL STAGE TIME MIN 1 HE: NORMAL
STRESS ECHO BASELINE BP: NORMAL
STRESS ECHO BASELINE HR: 88
STRESS ECHO CALCULATED PERCENT HR: 85 %
STRESS ECHO LAST STRESS BP: NORMAL
STRESS ECHO LAST STRESS HR: 129
STRESS ECHO POST ESTIMATED WORKLOAD: 7.1
STRESS ECHO POST EXERCISE DUR MIN: 5
STRESS ECHO POST EXERCISE DUR SEC: 50
STRESS ECHO TARGET HR: 128

## 2018-05-21 ENCOUNTER — COMMUNICATION - HEALTHEAST (OUTPATIENT)
Dept: FAMILY MEDICINE | Facility: CLINIC | Age: 68
End: 2018-05-21

## 2018-05-21 DIAGNOSIS — M25.559 HIP PAIN: ICD-10-CM

## 2018-05-22 ENCOUNTER — COMMUNICATION - HEALTHEAST (OUTPATIENT)
Dept: CARDIOLOGY | Facility: CLINIC | Age: 68
End: 2018-05-22

## 2018-05-23 ENCOUNTER — COMMUNICATION - HEALTHEAST (OUTPATIENT)
Dept: CARDIOLOGY | Facility: CLINIC | Age: 68
End: 2018-05-23

## 2018-05-29 ENCOUNTER — OFFICE VISIT - HEALTHEAST (OUTPATIENT)
Dept: CARDIOLOGY | Facility: CLINIC | Age: 68
End: 2018-05-29

## 2018-05-29 ENCOUNTER — COMMUNICATION - HEALTHEAST (OUTPATIENT)
Dept: ADMINISTRATIVE | Facility: CLINIC | Age: 68
End: 2018-05-29

## 2018-05-29 DIAGNOSIS — I25.10 CORONARY ARTERY DISEASE INVOLVING NATIVE CORONARY ARTERY OF NATIVE HEART WITHOUT ANGINA PECTORIS: ICD-10-CM

## 2018-05-29 DIAGNOSIS — I25.10 CAD (CORONARY ARTERY DISEASE): ICD-10-CM

## 2018-05-29 DIAGNOSIS — I10 ESSENTIAL HYPERTENSION: ICD-10-CM

## 2018-05-29 DIAGNOSIS — Z95.1 S/P CABG (CORONARY ARTERY BYPASS GRAFT): ICD-10-CM

## 2018-05-29 ASSESSMENT — MIFFLIN-ST. JEOR: SCORE: 1277

## 2018-06-25 ENCOUNTER — COMMUNICATION - HEALTHEAST (OUTPATIENT)
Dept: FAMILY MEDICINE | Facility: CLINIC | Age: 68
End: 2018-06-25

## 2018-06-26 ENCOUNTER — RECORDS - HEALTHEAST (OUTPATIENT)
Dept: ADMINISTRATIVE | Facility: OTHER | Age: 68
End: 2018-06-26

## 2018-07-03 ENCOUNTER — RECORDS - HEALTHEAST (OUTPATIENT)
Dept: ADMINISTRATIVE | Facility: OTHER | Age: 68
End: 2018-07-03

## 2018-07-05 ENCOUNTER — RECORDS - HEALTHEAST (OUTPATIENT)
Dept: ADMINISTRATIVE | Facility: OTHER | Age: 68
End: 2018-07-05

## 2018-07-10 ENCOUNTER — AMBULATORY - HEALTHEAST (OUTPATIENT)
Dept: EDUCATION SERVICES | Facility: CLINIC | Age: 68
End: 2018-07-10

## 2018-07-10 DIAGNOSIS — E11.65 TYPE 2 DIABETES MELLITUS WITH HYPERGLYCEMIA, WITH LONG-TERM CURRENT USE OF INSULIN (H): ICD-10-CM

## 2018-07-10 DIAGNOSIS — H91.93 BILATERAL HEARING LOSS: ICD-10-CM

## 2018-07-10 DIAGNOSIS — Z79.4 TYPE 2 DIABETES MELLITUS WITH HYPERGLYCEMIA, WITH LONG-TERM CURRENT USE OF INSULIN (H): ICD-10-CM

## 2018-07-11 ENCOUNTER — COMMUNICATION - HEALTHEAST (OUTPATIENT)
Dept: FAMILY MEDICINE | Facility: CLINIC | Age: 68
End: 2018-07-11

## 2018-07-11 DIAGNOSIS — M25.559 HIP PAIN: ICD-10-CM

## 2018-07-22 ENCOUNTER — COMMUNICATION - HEALTHEAST (OUTPATIENT)
Dept: CARDIOLOGY | Facility: CLINIC | Age: 68
End: 2018-07-22

## 2018-07-22 ENCOUNTER — COMMUNICATION - HEALTHEAST (OUTPATIENT)
Dept: FAMILY MEDICINE | Facility: CLINIC | Age: 68
End: 2018-07-22

## 2018-07-22 DIAGNOSIS — E78.2 MIXED HYPERLIPIDEMIA: ICD-10-CM

## 2018-07-22 DIAGNOSIS — I25.10 CAD (CORONARY ARTERY DISEASE): ICD-10-CM

## 2018-07-26 ENCOUNTER — OFFICE VISIT - HEALTHEAST (OUTPATIENT)
Dept: FAMILY MEDICINE | Facility: CLINIC | Age: 68
End: 2018-07-26

## 2018-07-26 DIAGNOSIS — M79.673: ICD-10-CM

## 2018-07-26 DIAGNOSIS — M79.643: ICD-10-CM

## 2018-07-26 DIAGNOSIS — Z79.4 TYPE 2 DIABETES MELLITUS WITH HYPERGLYCEMIA, WITH LONG-TERM CURRENT USE OF INSULIN (H): ICD-10-CM

## 2018-07-26 DIAGNOSIS — E78.2 MIXED HYPERLIPIDEMIA: ICD-10-CM

## 2018-07-26 DIAGNOSIS — I10 ESSENTIAL HYPERTENSION: ICD-10-CM

## 2018-07-26 DIAGNOSIS — E11.42: ICD-10-CM

## 2018-07-26 DIAGNOSIS — I25.10 CORONARY ARTERY DISEASE INVOLVING NATIVE CORONARY ARTERY OF NATIVE HEART WITHOUT ANGINA PECTORIS: ICD-10-CM

## 2018-07-26 DIAGNOSIS — E11.65 TYPE 2 DIABETES MELLITUS WITH HYPERGLYCEMIA, WITH LONG-TERM CURRENT USE OF INSULIN (H): ICD-10-CM

## 2018-07-26 LAB — HBA1C MFR BLD: 8.8 % (ref 3.5–6)

## 2018-07-26 ASSESSMENT — MIFFLIN-ST. JEOR: SCORE: 1284.26

## 2018-07-27 LAB
ALBUMIN SERPL-MCNC: 3.6 G/DL (ref 3.5–5)
ALP SERPL-CCNC: 95 U/L (ref 45–120)
ALT SERPL W P-5'-P-CCNC: 16 U/L (ref 0–45)
ANION GAP SERPL CALCULATED.3IONS-SCNC: 12 MMOL/L (ref 5–18)
AST SERPL W P-5'-P-CCNC: 17 U/L (ref 0–40)
BILIRUB SERPL-MCNC: 0.3 MG/DL (ref 0–1)
BUN SERPL-MCNC: 18 MG/DL (ref 8–22)
CALCIUM SERPL-MCNC: 9.1 MG/DL (ref 8.5–10.5)
CHLORIDE BLD-SCNC: 103 MMOL/L (ref 98–107)
CHOLEST SERPL-MCNC: 146 MG/DL
CO2 SERPL-SCNC: 22 MMOL/L (ref 22–31)
CREAT SERPL-MCNC: 0.85 MG/DL (ref 0.6–1.1)
FASTING STATUS PATIENT QL REPORTED: NO
GFR SERPL CREATININE-BSD FRML MDRD: >60 ML/MIN/1.73M2
GLUCOSE BLD-MCNC: 282 MG/DL (ref 70–125)
HDLC SERPL-MCNC: 45 MG/DL
LDLC SERPL CALC-MCNC: 63 MG/DL
POTASSIUM BLD-SCNC: 3.9 MMOL/L (ref 3.5–5)
PROT SERPL-MCNC: 6.6 G/DL (ref 6–8)
SODIUM SERPL-SCNC: 137 MMOL/L (ref 136–145)
TRIGL SERPL-MCNC: 191 MG/DL

## 2018-07-30 ENCOUNTER — HOSPITAL ENCOUNTER (OUTPATIENT)
Dept: NUCLEAR MEDICINE | Facility: CLINIC | Age: 68
Discharge: HOME | End: 2018-07-30
Attending: INTERNAL MEDICINE

## 2018-07-30 ENCOUNTER — AMBULATORY - HEALTHEAST (OUTPATIENT)
Dept: CARDIOLOGY | Facility: CLINIC | Age: 68
End: 2018-07-30

## 2018-07-30 ENCOUNTER — HOSPITAL ENCOUNTER (OUTPATIENT)
Dept: CARDIOLOGY | Facility: CLINIC | Age: 68
Discharge: HOME | End: 2018-07-30
Attending: INTERNAL MEDICINE

## 2018-07-30 DIAGNOSIS — I25.10 CAD (CORONARY ARTERY DISEASE): ICD-10-CM

## 2018-07-30 LAB
CV STRESS CURRENT BP HE: NORMAL
CV STRESS CURRENT HR HE: 101
CV STRESS CURRENT HR HE: 103
CV STRESS CURRENT HR HE: 106
CV STRESS CURRENT HR HE: 113
CV STRESS CURRENT HR HE: 113
CV STRESS CURRENT HR HE: 125
CV STRESS CURRENT HR HE: 127
CV STRESS CURRENT HR HE: 127
CV STRESS CURRENT HR HE: 128
CV STRESS CURRENT HR HE: 64
CV STRESS CURRENT HR HE: 64
CV STRESS CURRENT HR HE: 66
CV STRESS CURRENT HR HE: 78
CV STRESS CURRENT HR HE: 78
CV STRESS CURRENT HR HE: 79
CV STRESS CURRENT HR HE: 80
CV STRESS CURRENT HR HE: 82
CV STRESS CURRENT HR HE: 82
CV STRESS CURRENT HR HE: 86
CV STRESS CURRENT HR HE: 88
CV STRESS CURRENT HR HE: 91
CV STRESS CURRENT HR HE: 93
CV STRESS CURRENT HR HE: 94
CV STRESS CURRENT HR HE: 95
CV STRESS CURRENT HR HE: 97
CV STRESS DEVIATION TIME HE: NORMAL
CV STRESS ECHO PERCENT HR HE: NORMAL
CV STRESS EXERCISE STAGE HE: NORMAL
CV STRESS FINAL RESTING BP HE: NORMAL
CV STRESS FINAL RESTING HR HE: 79
CV STRESS MAX HR HE: 128
CV STRESS MAX TREADMILL GRADE HE: 12
CV STRESS MAX TREADMILL SPEED HE: 2.5
CV STRESS PEAK DIA BP HE: NORMAL
CV STRESS PEAK SYS BP HE: NORMAL
CV STRESS PHASE HE: NORMAL
CV STRESS PROTOCOL HE: NORMAL
CV STRESS RESTING PT POSITION HE: NORMAL
CV STRESS RESTING PT POSITION HE: NORMAL
CV STRESS ST DEVIATION AMOUNT HE: NORMAL
CV STRESS ST DEVIATION ELEVATION HE: NORMAL
CV STRESS ST EVELATION AMOUNT HE: NORMAL
CV STRESS TEST TYPE HE: NORMAL
CV STRESS TOTAL STAGE TIME MIN 1 HE: NORMAL
NUC STRESS EJECTION FRACTION: 59 %
STRESS ECHO BASELINE BP: NORMAL
STRESS ECHO BASELINE HR: 63
STRESS ECHO CALCULATED PERCENT HR: 85 %
STRESS ECHO LAST STRESS BP: NORMAL
STRESS ECHO LAST STRESS HR: 127
STRESS ECHO POST ESTIMATED WORKLOAD: 7.1
STRESS ECHO POST EXERCISE DUR MIN: 6
STRESS ECHO POST EXERCISE DUR SEC: 0
STRESS ECHO TARGET HR: 128

## 2018-07-31 ENCOUNTER — COMMUNICATION - HEALTHEAST (OUTPATIENT)
Dept: CARDIOLOGY | Facility: CLINIC | Age: 68
End: 2018-07-31

## 2018-07-31 ENCOUNTER — AMBULATORY - HEALTHEAST (OUTPATIENT)
Dept: CARDIOLOGY | Facility: CLINIC | Age: 68
End: 2018-07-31

## 2018-08-01 ENCOUNTER — COMMUNICATION - HEALTHEAST (OUTPATIENT)
Dept: FAMILY MEDICINE | Facility: CLINIC | Age: 68
End: 2018-08-01

## 2018-08-01 ENCOUNTER — SURGERY - HEALTHEAST (OUTPATIENT)
Dept: CARDIOLOGY | Facility: CLINIC | Age: 68
End: 2018-08-01

## 2018-08-01 ASSESSMENT — MIFFLIN-ST. JEOR: SCORE: 1294.69

## 2018-08-02 ENCOUNTER — COMMUNICATION - HEALTHEAST (OUTPATIENT)
Dept: CARE COORDINATION | Facility: CLINIC | Age: 68
End: 2018-08-02

## 2018-08-03 ENCOUNTER — AMBULATORY - HEALTHEAST (OUTPATIENT)
Dept: EDUCATION SERVICES | Facility: CLINIC | Age: 68
End: 2018-08-03

## 2018-08-03 ENCOUNTER — COMMUNICATION - HEALTHEAST (OUTPATIENT)
Dept: CARDIOLOGY | Facility: CLINIC | Age: 68
End: 2018-08-03

## 2018-08-03 DIAGNOSIS — I25.10 CAD (CORONARY ARTERY DISEASE): ICD-10-CM

## 2018-08-03 DIAGNOSIS — E11.65 TYPE 2 DIABETES MELLITUS WITH HYPERGLYCEMIA, WITH LONG-TERM CURRENT USE OF INSULIN (H): ICD-10-CM

## 2018-08-03 DIAGNOSIS — Z79.4 TYPE 2 DIABETES MELLITUS WITH HYPERGLYCEMIA, WITH LONG-TERM CURRENT USE OF INSULIN (H): ICD-10-CM

## 2018-08-09 ENCOUNTER — COMMUNICATION - HEALTHEAST (OUTPATIENT)
Dept: CARDIOLOGY | Facility: CLINIC | Age: 68
End: 2018-08-09

## 2018-08-13 ENCOUNTER — SURGERY - HEALTHEAST (OUTPATIENT)
Dept: CARDIOLOGY | Facility: CLINIC | Age: 68
End: 2018-08-13

## 2018-08-13 ASSESSMENT — MIFFLIN-ST. JEOR: SCORE: 1275.19

## 2018-08-14 ASSESSMENT — MIFFLIN-ST. JEOR: SCORE: 1268.38

## 2018-08-20 ENCOUNTER — OFFICE VISIT - HEALTHEAST (OUTPATIENT)
Dept: FAMILY MEDICINE | Facility: CLINIC | Age: 68
End: 2018-08-20

## 2018-08-20 DIAGNOSIS — I25.118 CORONARY ARTERY DISEASE OF NATIVE ARTERY OF NATIVE HEART WITH STABLE ANGINA PECTORIS (H): ICD-10-CM

## 2018-08-20 DIAGNOSIS — I10 ESSENTIAL HYPERTENSION: ICD-10-CM

## 2018-08-20 DIAGNOSIS — E11.65 TYPE 2 DIABETES MELLITUS WITH HYPERGLYCEMIA, WITH LONG-TERM CURRENT USE OF INSULIN (H): ICD-10-CM

## 2018-08-20 DIAGNOSIS — Z79.4 TYPE 2 DIABETES MELLITUS WITH HYPERGLYCEMIA, WITH LONG-TERM CURRENT USE OF INSULIN (H): ICD-10-CM

## 2018-08-20 DIAGNOSIS — F31.81 BIPOLAR 2 DISORDER (H): ICD-10-CM

## 2018-08-20 DIAGNOSIS — E11.42 DIABETIC POLYNEUROPATHY ASSOCIATED WITH TYPE 2 DIABETES MELLITUS (H): ICD-10-CM

## 2018-08-20 DIAGNOSIS — E78.2 MIXED HYPERLIPIDEMIA: ICD-10-CM

## 2018-08-20 ASSESSMENT — MIFFLIN-ST. JEOR: SCORE: 1281.14

## 2018-08-21 ENCOUNTER — AMBULATORY - HEALTHEAST (OUTPATIENT)
Dept: CARDIAC REHAB | Facility: CLINIC | Age: 68
End: 2018-08-21

## 2018-08-21 DIAGNOSIS — Z95.5 S/P CORONARY ARTERY STENT PLACEMENT: ICD-10-CM

## 2018-08-21 DIAGNOSIS — I25.10 CAD (CORONARY ARTERY DISEASE): ICD-10-CM

## 2018-08-27 ENCOUNTER — OFFICE VISIT - HEALTHEAST (OUTPATIENT)
Dept: CARDIOLOGY | Facility: CLINIC | Age: 68
End: 2018-08-27

## 2018-08-27 DIAGNOSIS — E78.2 MIXED HYPERLIPIDEMIA: ICD-10-CM

## 2018-08-27 DIAGNOSIS — I25.118 CORONARY ARTERY DISEASE OF NATIVE ARTERY OF NATIVE HEART WITH STABLE ANGINA PECTORIS (H): ICD-10-CM

## 2018-08-27 DIAGNOSIS — E11.65 TYPE 2 DIABETES MELLITUS WITH HYPERGLYCEMIA, WITH LONG-TERM CURRENT USE OF INSULIN (H): ICD-10-CM

## 2018-08-27 DIAGNOSIS — I10 ESSENTIAL HYPERTENSION: ICD-10-CM

## 2018-08-27 DIAGNOSIS — Z79.4 TYPE 2 DIABETES MELLITUS WITH HYPERGLYCEMIA, WITH LONG-TERM CURRENT USE OF INSULIN (H): ICD-10-CM

## 2018-08-27 DIAGNOSIS — Z91.148 NONCOMPLIANCE WITH MEDICATIONS: ICD-10-CM

## 2018-08-27 DIAGNOSIS — Z95.1 S/P CABG (CORONARY ARTERY BYPASS GRAFT): ICD-10-CM

## 2018-08-27 ASSESSMENT — MIFFLIN-ST. JEOR: SCORE: 1279.72

## 2018-08-30 ENCOUNTER — AMBULATORY - HEALTHEAST (OUTPATIENT)
Dept: CARDIAC REHAB | Facility: CLINIC | Age: 68
End: 2018-08-30

## 2018-08-30 DIAGNOSIS — Z95.5 S/P CORONARY ARTERY STENT PLACEMENT: ICD-10-CM

## 2018-09-04 ENCOUNTER — AMBULATORY - HEALTHEAST (OUTPATIENT)
Dept: CARDIAC REHAB | Facility: CLINIC | Age: 68
End: 2018-09-04

## 2018-09-04 DIAGNOSIS — Z95.5 S/P CORONARY ARTERY STENT PLACEMENT: ICD-10-CM

## 2018-09-11 ENCOUNTER — AMBULATORY - HEALTHEAST (OUTPATIENT)
Dept: CARDIAC REHAB | Facility: CLINIC | Age: 68
End: 2018-09-11

## 2018-09-11 DIAGNOSIS — Z95.5 S/P CORONARY ARTERY STENT PLACEMENT: ICD-10-CM

## 2018-09-13 ENCOUNTER — AMBULATORY - HEALTHEAST (OUTPATIENT)
Dept: CARDIAC REHAB | Facility: CLINIC | Age: 68
End: 2018-09-13

## 2018-09-13 DIAGNOSIS — Z95.5 S/P CORONARY ARTERY STENT PLACEMENT: ICD-10-CM

## 2018-09-18 ENCOUNTER — AMBULATORY - HEALTHEAST (OUTPATIENT)
Dept: CARDIAC REHAB | Facility: CLINIC | Age: 68
End: 2018-09-18

## 2018-09-18 DIAGNOSIS — Z95.5 S/P CORONARY ARTERY STENT PLACEMENT: ICD-10-CM

## 2018-09-20 ENCOUNTER — AMBULATORY - HEALTHEAST (OUTPATIENT)
Dept: CARDIAC REHAB | Facility: CLINIC | Age: 68
End: 2018-09-20

## 2018-09-20 DIAGNOSIS — Z95.5 S/P CORONARY ARTERY STENT PLACEMENT: ICD-10-CM

## 2018-09-25 ENCOUNTER — AMBULATORY - HEALTHEAST (OUTPATIENT)
Dept: CARDIAC REHAB | Facility: CLINIC | Age: 68
End: 2018-09-25

## 2018-09-25 DIAGNOSIS — Z95.5 S/P CORONARY ARTERY STENT PLACEMENT: ICD-10-CM

## 2018-09-27 ENCOUNTER — AMBULATORY - HEALTHEAST (OUTPATIENT)
Dept: CARDIAC REHAB | Facility: CLINIC | Age: 68
End: 2018-09-27

## 2018-09-27 DIAGNOSIS — Z95.5 S/P CORONARY ARTERY STENT PLACEMENT: ICD-10-CM

## 2018-10-04 ENCOUNTER — AMBULATORY - HEALTHEAST (OUTPATIENT)
Dept: CARDIAC REHAB | Facility: CLINIC | Age: 68
End: 2018-10-04

## 2018-10-04 DIAGNOSIS — Z95.5 S/P CORONARY ARTERY STENT PLACEMENT: ICD-10-CM

## 2018-10-09 ENCOUNTER — OFFICE VISIT - HEALTHEAST (OUTPATIENT)
Dept: CARDIOLOGY | Facility: CLINIC | Age: 68
End: 2018-10-09

## 2018-10-09 ENCOUNTER — AMBULATORY - HEALTHEAST (OUTPATIENT)
Dept: CARDIAC REHAB | Facility: CLINIC | Age: 68
End: 2018-10-09

## 2018-10-09 DIAGNOSIS — I10 ESSENTIAL HYPERTENSION: ICD-10-CM

## 2018-10-09 DIAGNOSIS — Z86.73 HISTORY OF TIA (TRANSIENT ISCHEMIC ATTACK) AND STROKE: ICD-10-CM

## 2018-10-09 DIAGNOSIS — E78.2 MIXED HYPERLIPIDEMIA: ICD-10-CM

## 2018-10-09 DIAGNOSIS — I25.10 CORONARY ARTERY DISEASE INVOLVING NATIVE CORONARY ARTERY OF NATIVE HEART WITHOUT ANGINA PECTORIS: ICD-10-CM

## 2018-10-09 DIAGNOSIS — Z95.5 S/P CORONARY ARTERY STENT PLACEMENT: ICD-10-CM

## 2018-10-09 DIAGNOSIS — M79.604 PAIN IN BOTH LOWER EXTREMITIES: ICD-10-CM

## 2018-10-09 DIAGNOSIS — M79.605 PAIN IN BOTH LOWER EXTREMITIES: ICD-10-CM

## 2018-10-09 ASSESSMENT — MIFFLIN-ST. JEOR: SCORE: 1302.4

## 2018-10-10 ENCOUNTER — COMMUNICATION - HEALTHEAST (OUTPATIENT)
Dept: FAMILY MEDICINE | Facility: CLINIC | Age: 68
End: 2018-10-10

## 2018-10-10 ENCOUNTER — COMMUNICATION - HEALTHEAST (OUTPATIENT)
Dept: CARDIOLOGY | Facility: CLINIC | Age: 68
End: 2018-10-10

## 2018-10-10 DIAGNOSIS — M25.559 HIP PAIN: ICD-10-CM

## 2018-10-12 ENCOUNTER — OFFICE VISIT - HEALTHEAST (OUTPATIENT)
Dept: EDUCATION SERVICES | Facility: CLINIC | Age: 68
End: 2018-10-12

## 2018-10-12 ENCOUNTER — AMBULATORY - HEALTHEAST (OUTPATIENT)
Dept: CARDIOLOGY | Facility: CLINIC | Age: 68
End: 2018-10-12

## 2018-10-12 DIAGNOSIS — Z79.4 TYPE 2 DIABETES MELLITUS WITH HYPERGLYCEMIA, WITH LONG-TERM CURRENT USE OF INSULIN (H): ICD-10-CM

## 2018-10-12 DIAGNOSIS — E11.65 TYPE 2 DIABETES MELLITUS WITH HYPERGLYCEMIA, WITH LONG-TERM CURRENT USE OF INSULIN (H): ICD-10-CM

## 2018-10-12 DIAGNOSIS — R09.89 OTHER SPECIFIED SYMPTOMS AND SIGNS INVOLVING THE CIRCULATORY AND RESPIRATORY SYSTEMS: ICD-10-CM

## 2018-10-12 DIAGNOSIS — R20.0 NUMBNESS OF LOWER EXTREMITY: ICD-10-CM

## 2018-10-18 ENCOUNTER — AMBULATORY - HEALTHEAST (OUTPATIENT)
Dept: CARDIAC REHAB | Facility: CLINIC | Age: 68
End: 2018-10-18

## 2018-10-18 DIAGNOSIS — Z95.5 S/P CORONARY ARTERY STENT PLACEMENT: ICD-10-CM

## 2018-10-23 ENCOUNTER — OFFICE VISIT - HEALTHEAST (OUTPATIENT)
Dept: FAMILY MEDICINE | Facility: CLINIC | Age: 68
End: 2018-10-23

## 2018-10-23 DIAGNOSIS — E78.2 MIXED HYPERLIPIDEMIA: ICD-10-CM

## 2018-10-23 DIAGNOSIS — Z12.31 VISIT FOR SCREENING MAMMOGRAM: ICD-10-CM

## 2018-10-23 DIAGNOSIS — G62.9 PERIPHERAL NEUROPATHY: ICD-10-CM

## 2018-10-23 DIAGNOSIS — I10 ESSENTIAL HYPERTENSION: ICD-10-CM

## 2018-10-23 LAB
ALBUMIN SERPL-MCNC: 3.5 G/DL (ref 3.5–5)
ALP SERPL-CCNC: 95 U/L (ref 45–120)
ALT SERPL W P-5'-P-CCNC: 12 U/L (ref 0–45)
ANION GAP SERPL CALCULATED.3IONS-SCNC: 11 MMOL/L (ref 5–18)
AST SERPL W P-5'-P-CCNC: 13 U/L (ref 0–40)
BILIRUB SERPL-MCNC: 0.4 MG/DL (ref 0–1)
BUN SERPL-MCNC: 20 MG/DL (ref 8–22)
CALCIUM SERPL-MCNC: 9.5 MG/DL (ref 8.5–10.5)
CHLORIDE BLD-SCNC: 103 MMOL/L (ref 98–107)
CO2 SERPL-SCNC: 26 MMOL/L (ref 22–31)
CREAT SERPL-MCNC: 0.94 MG/DL (ref 0.6–1.1)
GFR SERPL CREATININE-BSD FRML MDRD: 59 ML/MIN/1.73M2
GLUCOSE BLD-MCNC: 266 MG/DL (ref 70–125)
HBA1C MFR BLD: 9 % (ref 3.5–6)
POTASSIUM BLD-SCNC: 4.8 MMOL/L (ref 3.5–5)
PROT SERPL-MCNC: 6.3 G/DL (ref 6–8)
SODIUM SERPL-SCNC: 140 MMOL/L (ref 136–145)

## 2018-10-23 ASSESSMENT — MIFFLIN-ST. JEOR: SCORE: 1285.11

## 2018-10-25 ENCOUNTER — AMBULATORY - HEALTHEAST (OUTPATIENT)
Dept: CARDIAC REHAB | Facility: CLINIC | Age: 68
End: 2018-10-25

## 2018-10-25 ENCOUNTER — HOSPITAL ENCOUNTER (OUTPATIENT)
Dept: ULTRASOUND IMAGING | Facility: CLINIC | Age: 68
Discharge: HOME | End: 2018-10-25
Attending: INTERNAL MEDICINE

## 2018-10-25 DIAGNOSIS — R20.0 NUMBNESS OF LOWER EXTREMITY: ICD-10-CM

## 2018-10-25 DIAGNOSIS — R09.89 OTHER SPECIFIED SYMPTOMS AND SIGNS INVOLVING THE CIRCULATORY AND RESPIRATORY SYSTEMS: ICD-10-CM

## 2018-10-25 DIAGNOSIS — Z95.5 S/P CORONARY ARTERY STENT PLACEMENT: ICD-10-CM

## 2018-10-26 ENCOUNTER — COMMUNICATION - HEALTHEAST (OUTPATIENT)
Dept: FAMILY MEDICINE | Facility: CLINIC | Age: 68
End: 2018-10-26

## 2018-10-29 ENCOUNTER — AMBULATORY - HEALTHEAST (OUTPATIENT)
Dept: CARDIOLOGY | Facility: CLINIC | Age: 68
End: 2018-10-29

## 2018-10-29 DIAGNOSIS — R93.89 ABNORMAL FINDINGS ON IMAGING TEST: ICD-10-CM

## 2018-10-29 DIAGNOSIS — R20.0 NUMBNESS OF BOTH LOWER EXTREMITIES: ICD-10-CM

## 2018-10-30 ENCOUNTER — AMBULATORY - HEALTHEAST (OUTPATIENT)
Dept: CARDIAC REHAB | Facility: CLINIC | Age: 68
End: 2018-10-30

## 2018-10-30 DIAGNOSIS — Z95.5 S/P CORONARY ARTERY STENT PLACEMENT: ICD-10-CM

## 2018-10-31 ENCOUNTER — OFFICE VISIT - HEALTHEAST (OUTPATIENT)
Dept: VASCULAR SURGERY | Facility: CLINIC | Age: 68
End: 2018-10-31

## 2018-10-31 DIAGNOSIS — R93.89 ABNORMAL FINDINGS ON IMAGING TEST: ICD-10-CM

## 2018-10-31 DIAGNOSIS — R20.0 NUMBNESS OF BOTH LOWER EXTREMITIES: ICD-10-CM

## 2018-11-06 ENCOUNTER — AMBULATORY - HEALTHEAST (OUTPATIENT)
Dept: CARDIAC REHAB | Facility: CLINIC | Age: 68
End: 2018-11-06

## 2018-11-06 DIAGNOSIS — Z95.5 S/P CORONARY ARTERY STENT PLACEMENT: ICD-10-CM

## 2018-11-06 LAB
GLUCOSE BLDC GLUCOMTR-MCNC: 186 MG/DL
GLUCOSE BLDC GLUCOMTR-MCNC: 204 MG/DL

## 2018-11-08 ENCOUNTER — AMBULATORY - HEALTHEAST (OUTPATIENT)
Dept: CARDIAC REHAB | Facility: CLINIC | Age: 68
End: 2018-11-08

## 2018-11-08 DIAGNOSIS — Z95.5 S/P CORONARY ARTERY STENT PLACEMENT: ICD-10-CM

## 2018-11-08 LAB
GLUCOSE BLDC GLUCOMTR-MCNC: 150 MG/DL
GLUCOSE BLDC GLUCOMTR-MCNC: 205 MG/DL

## 2018-11-13 ENCOUNTER — AMBULATORY - HEALTHEAST (OUTPATIENT)
Dept: CARDIAC REHAB | Facility: CLINIC | Age: 68
End: 2018-11-13

## 2018-11-13 DIAGNOSIS — Z95.5 S/P CORONARY ARTERY STENT PLACEMENT: ICD-10-CM

## 2018-11-13 LAB
GLUCOSE BLDC GLUCOMTR-MCNC: 257 MG/DL
GLUCOSE BLDC GLUCOMTR-MCNC: 327 MG/DL

## 2018-11-15 ENCOUNTER — AMBULATORY - HEALTHEAST (OUTPATIENT)
Dept: CARDIAC REHAB | Facility: CLINIC | Age: 68
End: 2018-11-15

## 2018-11-15 DIAGNOSIS — Z95.5 S/P CORONARY ARTERY STENT PLACEMENT: ICD-10-CM

## 2018-11-15 LAB
GLUCOSE BLDC GLUCOMTR-MCNC: 144 MG/DL
GLUCOSE BLDC GLUCOMTR-MCNC: 187 MG/DL

## 2018-11-27 ENCOUNTER — AMBULATORY - HEALTHEAST (OUTPATIENT)
Dept: CARDIAC REHAB | Facility: CLINIC | Age: 68
End: 2018-11-27

## 2018-11-27 DIAGNOSIS — Z95.5 S/P CORONARY ARTERY STENT PLACEMENT: ICD-10-CM

## 2018-12-04 ENCOUNTER — AMBULATORY - HEALTHEAST (OUTPATIENT)
Dept: CARDIAC REHAB | Facility: CLINIC | Age: 68
End: 2018-12-04

## 2018-12-04 DIAGNOSIS — Z95.5 S/P CORONARY ARTERY STENT PLACEMENT: ICD-10-CM

## 2018-12-06 ENCOUNTER — AMBULATORY - HEALTHEAST (OUTPATIENT)
Dept: CARDIAC REHAB | Facility: CLINIC | Age: 68
End: 2018-12-06

## 2018-12-06 ENCOUNTER — RECORDS - HEALTHEAST (OUTPATIENT)
Dept: VASCULAR ULTRASOUND | Facility: CLINIC | Age: 68
End: 2018-12-06

## 2018-12-06 DIAGNOSIS — Z95.5 S/P CORONARY ARTERY STENT PLACEMENT: ICD-10-CM

## 2018-12-06 DIAGNOSIS — R20.0 ANESTHESIA OF SKIN: ICD-10-CM

## 2018-12-06 DIAGNOSIS — R93.89 ABNORMAL FINDINGS ON DIAGNOSTIC IMAGING OF OTHER SPECIFIED BODY STRUCTURES: ICD-10-CM

## 2018-12-06 LAB
GLUCOSE BLDC GLUCOMTR-MCNC: 113 MG/DL
GLUCOSE BLDC GLUCOMTR-MCNC: 152 MG/DL

## 2018-12-11 ENCOUNTER — AMBULATORY - HEALTHEAST (OUTPATIENT)
Dept: CARDIAC REHAB | Facility: CLINIC | Age: 68
End: 2018-12-11

## 2018-12-11 DIAGNOSIS — Z95.5 S/P CORONARY ARTERY STENT PLACEMENT: ICD-10-CM

## 2018-12-17 ENCOUNTER — COMMUNICATION - HEALTHEAST (OUTPATIENT)
Dept: FAMILY MEDICINE | Facility: CLINIC | Age: 68
End: 2018-12-17

## 2018-12-17 DIAGNOSIS — M25.559 HIP PAIN: ICD-10-CM

## 2019-02-01 ENCOUNTER — OFFICE VISIT - HEALTHEAST (OUTPATIENT)
Dept: FAMILY MEDICINE | Facility: CLINIC | Age: 69
End: 2019-02-01

## 2019-02-01 DIAGNOSIS — S60.221A PAROXYSMAL HEMATOMA OF RIGHT HAND, INITIAL ENCOUNTER: ICD-10-CM

## 2019-02-01 DIAGNOSIS — E11.65 TYPE 2 DIABETES MELLITUS WITH HYPERGLYCEMIA, WITH LONG-TERM CURRENT USE OF INSULIN (H): ICD-10-CM

## 2019-02-01 DIAGNOSIS — Z79.4 TYPE 2 DIABETES MELLITUS WITH HYPERGLYCEMIA, WITH LONG-TERM CURRENT USE OF INSULIN (H): ICD-10-CM

## 2019-02-01 DIAGNOSIS — I25.118 CORONARY ARTERY DISEASE OF NATIVE ARTERY OF NATIVE HEART WITH STABLE ANGINA PECTORIS (H): ICD-10-CM

## 2019-02-01 LAB
CREAT UR-MCNC: 69.8 MG/DL
HBA1C MFR BLD: 8.7 % (ref 3.5–6)
MICROALBUMIN UR-MCNC: 6.29 MG/DL (ref 0–1.99)
MICROALBUMIN/CREAT UR: 90.1 MG/G

## 2019-02-04 ENCOUNTER — COMMUNICATION - HEALTHEAST (OUTPATIENT)
Dept: FAMILY MEDICINE | Facility: CLINIC | Age: 69
End: 2019-02-04

## 2019-02-05 ENCOUNTER — COMMUNICATION - HEALTHEAST (OUTPATIENT)
Dept: FAMILY MEDICINE | Facility: CLINIC | Age: 69
End: 2019-02-05

## 2019-02-05 ENCOUNTER — AMBULATORY - HEALTHEAST (OUTPATIENT)
Dept: FAMILY MEDICINE | Facility: CLINIC | Age: 69
End: 2019-02-05

## 2019-02-05 DIAGNOSIS — Z79.4 TYPE 2 DIABETES MELLITUS WITH HYPERGLYCEMIA, WITH LONG-TERM CURRENT USE OF INSULIN (H): ICD-10-CM

## 2019-02-05 DIAGNOSIS — E11.65 TYPE 2 DIABETES MELLITUS WITH HYPERGLYCEMIA, WITH LONG-TERM CURRENT USE OF INSULIN (H): ICD-10-CM

## 2019-02-06 ENCOUNTER — OFFICE VISIT - HEALTHEAST (OUTPATIENT)
Dept: CARDIOLOGY | Facility: CLINIC | Age: 69
End: 2019-02-06

## 2019-02-06 DIAGNOSIS — E78.5 HYPERLIPIDEMIA WITH TARGET LDL LESS THAN 70: ICD-10-CM

## 2019-02-06 DIAGNOSIS — I25.118 CORONARY ARTERY DISEASE OF NATIVE ARTERY OF NATIVE HEART WITH STABLE ANGINA PECTORIS (H): ICD-10-CM

## 2019-02-06 DIAGNOSIS — I10 ESSENTIAL HYPERTENSION: ICD-10-CM

## 2019-02-06 ASSESSMENT — MIFFLIN-ST. JEOR: SCORE: 1275.19

## 2019-02-20 ENCOUNTER — COMMUNICATION - HEALTHEAST (OUTPATIENT)
Dept: FAMILY MEDICINE | Facility: CLINIC | Age: 69
End: 2019-02-20

## 2019-02-20 DIAGNOSIS — M25.559 HIP PAIN: ICD-10-CM

## 2019-03-07 ENCOUNTER — COMMUNICATION - HEALTHEAST (OUTPATIENT)
Dept: CARDIOLOGY | Facility: CLINIC | Age: 69
End: 2019-03-07

## 2019-03-07 ENCOUNTER — COMMUNICATION - HEALTHEAST (OUTPATIENT)
Dept: NEUROLOGY | Facility: CLINIC | Age: 69
End: 2019-03-07

## 2019-03-07 DIAGNOSIS — I25.10 CAD (CORONARY ARTERY DISEASE): ICD-10-CM

## 2019-03-07 DIAGNOSIS — F06.30 MOOD DISORDER IN CONDITIONS CLASSIFIED ELSEWHERE: ICD-10-CM

## 2019-03-12 ENCOUNTER — COMMUNICATION - HEALTHEAST (OUTPATIENT)
Dept: NEUROLOGY | Facility: CLINIC | Age: 69
End: 2019-03-12

## 2019-03-12 DIAGNOSIS — F06.30 MOOD DISORDER IN CONDITIONS CLASSIFIED ELSEWHERE: ICD-10-CM

## 2019-03-14 ENCOUNTER — COMMUNICATION - HEALTHEAST (OUTPATIENT)
Dept: FAMILY MEDICINE | Facility: CLINIC | Age: 69
End: 2019-03-14

## 2019-03-14 DIAGNOSIS — F41.9 ANXIETY DISORDER, UNSPECIFIED TYPE: ICD-10-CM

## 2019-03-27 ENCOUNTER — AMBULATORY - HEALTHEAST (OUTPATIENT)
Dept: CARDIOLOGY | Facility: CLINIC | Age: 69
End: 2019-03-27

## 2019-04-09 ENCOUNTER — COMMUNICATION - HEALTHEAST (OUTPATIENT)
Dept: FAMILY MEDICINE | Facility: CLINIC | Age: 69
End: 2019-04-09

## 2019-04-16 ENCOUNTER — AMBULATORY - HEALTHEAST (OUTPATIENT)
Dept: NURSING | Facility: CLINIC | Age: 69
End: 2019-04-16

## 2019-04-16 DIAGNOSIS — I10 ESSENTIAL HYPERTENSION: ICD-10-CM

## 2019-04-30 ENCOUNTER — HOSPITAL ENCOUNTER (OUTPATIENT)
Dept: NEUROLOGY | Facility: CLINIC | Age: 69
Setting detail: THERAPIES SERIES
Discharge: STILL ADMITTED OR ADMINISTRATIVE | End: 2019-04-30
Attending: PSYCHIATRY & NEUROLOGY

## 2019-04-30 DIAGNOSIS — F31.81 BIPOLAR 2 DISORDER (H): ICD-10-CM

## 2019-05-03 ENCOUNTER — COMMUNICATION - HEALTHEAST (OUTPATIENT)
Dept: FAMILY MEDICINE | Facility: CLINIC | Age: 69
End: 2019-05-03

## 2019-05-03 ENCOUNTER — COMMUNICATION - HEALTHEAST (OUTPATIENT)
Dept: NEUROLOGY | Facility: CLINIC | Age: 69
End: 2019-05-03

## 2019-05-03 DIAGNOSIS — F41.1 ANXIETY, GENERALIZED: ICD-10-CM

## 2019-05-03 DIAGNOSIS — F41.9 ANXIETY DISORDER, UNSPECIFIED TYPE: ICD-10-CM

## 2019-05-06 ENCOUNTER — COMMUNICATION - HEALTHEAST (OUTPATIENT)
Dept: FAMILY MEDICINE | Facility: CLINIC | Age: 69
End: 2019-05-06

## 2019-05-06 ENCOUNTER — OFFICE VISIT - HEALTHEAST (OUTPATIENT)
Dept: FAMILY MEDICINE | Facility: CLINIC | Age: 69
End: 2019-05-06

## 2019-05-06 ENCOUNTER — COMMUNICATION - HEALTHEAST (OUTPATIENT)
Dept: NEUROLOGY | Facility: CLINIC | Age: 69
End: 2019-05-06

## 2019-05-06 DIAGNOSIS — E11.9 DIABETES MELLITUS TYPE 2, INSULIN DEPENDENT (H): ICD-10-CM

## 2019-05-06 DIAGNOSIS — F41.1 ANXIETY, GENERALIZED: ICD-10-CM

## 2019-05-06 DIAGNOSIS — M79.642 PAIN IN BOTH HANDS: ICD-10-CM

## 2019-05-06 DIAGNOSIS — F41.9 ANXIETY DISORDER, UNSPECIFIED TYPE: ICD-10-CM

## 2019-05-06 DIAGNOSIS — E78.2 MIXED HYPERLIPIDEMIA: ICD-10-CM

## 2019-05-06 DIAGNOSIS — Z79.4 TYPE 2 DIABETES MELLITUS WITH HYPERGLYCEMIA, WITH LONG-TERM CURRENT USE OF INSULIN (H): ICD-10-CM

## 2019-05-06 DIAGNOSIS — E11.65 TYPE 2 DIABETES MELLITUS WITH HYPERGLYCEMIA, WITH LONG-TERM CURRENT USE OF INSULIN (H): ICD-10-CM

## 2019-05-06 DIAGNOSIS — I10 ESSENTIAL HYPERTENSION: ICD-10-CM

## 2019-05-06 DIAGNOSIS — M25.559 HIP PAIN: ICD-10-CM

## 2019-05-06 DIAGNOSIS — R20.0 BILATERAL HAND NUMBNESS: ICD-10-CM

## 2019-05-06 DIAGNOSIS — I25.10 CORONARY ARTERY DISEASE: ICD-10-CM

## 2019-05-06 DIAGNOSIS — Z79.4 DIABETES MELLITUS TYPE 2, INSULIN DEPENDENT (H): ICD-10-CM

## 2019-05-06 DIAGNOSIS — R51.9 CHRONIC INTRACTABLE HEADACHE, UNSPECIFIED HEADACHE TYPE: ICD-10-CM

## 2019-05-06 DIAGNOSIS — G89.29 CHRONIC INTRACTABLE HEADACHE, UNSPECIFIED HEADACHE TYPE: ICD-10-CM

## 2019-05-06 DIAGNOSIS — M79.641 PAIN IN BOTH HANDS: ICD-10-CM

## 2019-05-06 DIAGNOSIS — M79.673: ICD-10-CM

## 2019-05-06 DIAGNOSIS — M79.643: ICD-10-CM

## 2019-05-06 DIAGNOSIS — L40.4 PSORIASIS, GUTTATE: ICD-10-CM

## 2019-05-06 LAB
ALBUMIN SERPL-MCNC: 3.6 G/DL (ref 3.5–5)
ALP SERPL-CCNC: 96 U/L (ref 45–120)
ALT SERPL W P-5'-P-CCNC: 12 U/L (ref 0–45)
ANION GAP SERPL CALCULATED.3IONS-SCNC: 11 MMOL/L (ref 5–18)
AST SERPL W P-5'-P-CCNC: 10 U/L (ref 0–40)
BILIRUB DIRECT SERPL-MCNC: 0.1 MG/DL
BILIRUB SERPL-MCNC: 0.4 MG/DL (ref 0–1)
BUN SERPL-MCNC: 17 MG/DL (ref 8–28)
CALCIUM SERPL-MCNC: 9.7 MG/DL (ref 8.5–10.5)
CHLORIDE BLD-SCNC: 102 MMOL/L (ref 98–107)
CO2 SERPL-SCNC: 27 MMOL/L (ref 22–31)
CREAT SERPL-MCNC: 0.84 MG/DL (ref 0.6–1.1)
ERYTHROCYTE [DISTWIDTH] IN BLOOD BY AUTOMATED COUNT: 12.7 % (ref 11–14.5)
GFR SERPL CREATININE-BSD FRML MDRD: >60 ML/MIN/1.73M2
GLUCOSE BLD-MCNC: 241 MG/DL (ref 70–125)
HBA1C MFR BLD: 9.1 % (ref 3.5–6)
HCT VFR BLD AUTO: 39.4 % (ref 35–47)
HGB BLD-MCNC: 13.2 G/DL (ref 12–16)
LDLC SERPL CALC-MCNC: 97 MG/DL
MCH RBC QN AUTO: 30 PG (ref 27–34)
MCHC RBC AUTO-ENTMCNC: 33.5 G/DL (ref 32–36)
MCV RBC AUTO: 90 FL (ref 80–100)
PLATELET # BLD AUTO: 390 THOU/UL (ref 140–440)
PMV BLD AUTO: 7.9 FL (ref 7–10)
POTASSIUM BLD-SCNC: 4.5 MMOL/L (ref 3.5–5)
PROT SERPL-MCNC: 6.6 G/DL (ref 6–8)
RBC # BLD AUTO: 4.4 MILL/UL (ref 3.8–5.4)
SODIUM SERPL-SCNC: 140 MMOL/L (ref 136–145)
WBC: 6.4 THOU/UL (ref 4–11)

## 2019-05-06 ASSESSMENT — MIFFLIN-ST. JEOR: SCORE: 1279.16

## 2019-05-08 ENCOUNTER — COMMUNICATION - HEALTHEAST (OUTPATIENT)
Dept: FAMILY MEDICINE | Facility: CLINIC | Age: 69
End: 2019-05-08

## 2019-05-13 ENCOUNTER — COMMUNICATION - HEALTHEAST (OUTPATIENT)
Dept: FAMILY MEDICINE | Facility: CLINIC | Age: 69
End: 2019-05-13

## 2019-05-22 ENCOUNTER — HOSPITAL ENCOUNTER (OUTPATIENT)
Dept: PHYSICAL MEDICINE AND REHAB | Facility: CLINIC | Age: 69
Discharge: HOME | End: 2019-05-22
Attending: FAMILY MEDICINE

## 2019-05-22 DIAGNOSIS — M79.642 PAIN IN BOTH HANDS: ICD-10-CM

## 2019-05-22 DIAGNOSIS — M79.641 PAIN IN BOTH HANDS: ICD-10-CM

## 2019-05-22 DIAGNOSIS — R20.0 BILATERAL HAND NUMBNESS: ICD-10-CM

## 2019-05-23 ENCOUNTER — RECORDS - HEALTHEAST (OUTPATIENT)
Dept: ADMINISTRATIVE | Facility: OTHER | Age: 69
End: 2019-05-23

## 2019-05-24 ENCOUNTER — RECORDS - HEALTHEAST (OUTPATIENT)
Dept: ADMINISTRATIVE | Facility: OTHER | Age: 69
End: 2019-05-24

## 2019-05-29 ENCOUNTER — COMMUNICATION - HEALTHEAST (OUTPATIENT)
Dept: FAMILY MEDICINE | Facility: CLINIC | Age: 69
End: 2019-05-29

## 2019-06-03 ENCOUNTER — OFFICE VISIT - HEALTHEAST (OUTPATIENT)
Dept: FAMILY MEDICINE | Facility: CLINIC | Age: 69
End: 2019-06-03

## 2019-06-03 DIAGNOSIS — G89.29 CHRONIC INTRACTABLE HEADACHE, UNSPECIFIED HEADACHE TYPE: ICD-10-CM

## 2019-06-03 DIAGNOSIS — R51.9 NONINTRACTABLE HEADACHE, UNSPECIFIED CHRONICITY PATTERN, UNSPECIFIED HEADACHE TYPE: ICD-10-CM

## 2019-06-03 DIAGNOSIS — M25.559 HIP PAIN: ICD-10-CM

## 2019-06-03 DIAGNOSIS — R51.9 CHRONIC INTRACTABLE HEADACHE, UNSPECIFIED HEADACHE TYPE: ICD-10-CM

## 2019-06-26 ENCOUNTER — OFFICE VISIT - HEALTHEAST (OUTPATIENT)
Dept: OTOLARYNGOLOGY | Facility: CLINIC | Age: 69
End: 2019-06-26

## 2019-06-26 DIAGNOSIS — G50.1 ATYPICAL FACIAL PAIN: ICD-10-CM

## 2019-06-26 DIAGNOSIS — G44.229 CHRONIC TENSION-TYPE HEADACHE, NOT INTRACTABLE: ICD-10-CM

## 2019-06-26 DIAGNOSIS — J32.0 CHRONIC MAXILLARY SINUSITIS: ICD-10-CM

## 2019-06-27 ENCOUNTER — COMMUNICATION - HEALTHEAST (OUTPATIENT)
Dept: FAMILY MEDICINE | Facility: CLINIC | Age: 69
End: 2019-06-27

## 2019-07-01 ENCOUNTER — OFFICE VISIT - HEALTHEAST (OUTPATIENT)
Dept: FAMILY MEDICINE | Facility: CLINIC | Age: 69
End: 2019-07-01

## 2019-07-01 DIAGNOSIS — I25.10 CAD (CORONARY ARTERY DISEASE): ICD-10-CM

## 2019-07-01 DIAGNOSIS — Z79.4 TYPE 2 DIABETES MELLITUS WITH HYPERGLYCEMIA, WITH LONG-TERM CURRENT USE OF INSULIN (H): ICD-10-CM

## 2019-07-01 DIAGNOSIS — M79.645 PAIN OF FINGER OF LEFT HAND: ICD-10-CM

## 2019-07-01 DIAGNOSIS — Z86.73 HISTORY OF TIA (TRANSIENT ISCHEMIC ATTACK) AND STROKE: ICD-10-CM

## 2019-07-01 DIAGNOSIS — E11.65 TYPE 2 DIABETES MELLITUS WITH HYPERGLYCEMIA, WITH LONG-TERM CURRENT USE OF INSULIN (H): ICD-10-CM

## 2019-07-01 DIAGNOSIS — G89.29 CHRONIC INTRACTABLE HEADACHE, UNSPECIFIED HEADACHE TYPE: ICD-10-CM

## 2019-07-01 DIAGNOSIS — M25.559 HIP PAIN: ICD-10-CM

## 2019-07-01 DIAGNOSIS — E78.2 MIXED HYPERLIPIDEMIA: ICD-10-CM

## 2019-07-01 DIAGNOSIS — I10 ESSENTIAL HYPERTENSION: ICD-10-CM

## 2019-07-01 DIAGNOSIS — R51.9 CHRONIC INTRACTABLE HEADACHE, UNSPECIFIED HEADACHE TYPE: ICD-10-CM

## 2019-07-01 LAB
CHOLEST SERPL-MCNC: 170 MG/DL
FASTING STATUS PATIENT QL REPORTED: YES
HBA1C MFR BLD: 8.9 % (ref 3.5–6)
HDLC SERPL-MCNC: 47 MG/DL
LDLC SERPL CALC-MCNC: 95 MG/DL
TRIGL SERPL-MCNC: 142 MG/DL

## 2019-07-03 ENCOUNTER — COMMUNICATION - HEALTHEAST (OUTPATIENT)
Dept: FAMILY MEDICINE | Facility: CLINIC | Age: 69
End: 2019-07-03

## 2019-07-10 ENCOUNTER — COMMUNICATION - HEALTHEAST (OUTPATIENT)
Dept: OTOLARYNGOLOGY | Facility: CLINIC | Age: 69
End: 2019-07-10

## 2019-07-11 ENCOUNTER — RECORDS - HEALTHEAST (OUTPATIENT)
Dept: ADMINISTRATIVE | Facility: OTHER | Age: 69
End: 2019-07-11

## 2019-07-24 ENCOUNTER — COMMUNICATION - HEALTHEAST (OUTPATIENT)
Dept: OTOLARYNGOLOGY | Facility: CLINIC | Age: 69
End: 2019-07-24

## 2019-07-30 ENCOUNTER — HOSPITAL ENCOUNTER (OUTPATIENT)
Dept: NEUROLOGY | Facility: CLINIC | Age: 69
Setting detail: THERAPIES SERIES
Discharge: STILL ADMITTED OR ADMINISTRATIVE | End: 2019-07-30
Attending: PSYCHIATRY & NEUROLOGY

## 2019-07-30 DIAGNOSIS — F41.1 ANXIETY, GENERALIZED: ICD-10-CM

## 2019-07-31 ENCOUNTER — COMMUNICATION - HEALTHEAST (OUTPATIENT)
Dept: OTOLARYNGOLOGY | Facility: CLINIC | Age: 69
End: 2019-07-31

## 2019-08-05 ENCOUNTER — COMMUNICATION - HEALTHEAST (OUTPATIENT)
Dept: FAMILY MEDICINE | Facility: CLINIC | Age: 69
End: 2019-08-05

## 2019-08-12 ENCOUNTER — OFFICE VISIT - HEALTHEAST (OUTPATIENT)
Dept: FAMILY MEDICINE | Facility: CLINIC | Age: 69
End: 2019-08-12

## 2019-08-12 DIAGNOSIS — Z79.4 TYPE 2 DIABETES MELLITUS WITH HYPERGLYCEMIA, WITH LONG-TERM CURRENT USE OF INSULIN (H): ICD-10-CM

## 2019-08-12 DIAGNOSIS — E11.65 TYPE 2 DIABETES MELLITUS WITH HYPERGLYCEMIA, WITH LONG-TERM CURRENT USE OF INSULIN (H): ICD-10-CM

## 2019-08-12 LAB — HBA1C MFR BLD: 8.6 % (ref 3.5–6)

## 2019-08-26 ENCOUNTER — RECORDS - HEALTHEAST (OUTPATIENT)
Dept: ADMINISTRATIVE | Facility: OTHER | Age: 69
End: 2019-08-26

## 2019-09-16 ENCOUNTER — COMMUNICATION - HEALTHEAST (OUTPATIENT)
Dept: FAMILY MEDICINE | Facility: CLINIC | Age: 69
End: 2019-09-16

## 2019-09-16 DIAGNOSIS — G89.29 CHRONIC INTRACTABLE HEADACHE, UNSPECIFIED HEADACHE TYPE: ICD-10-CM

## 2019-09-16 DIAGNOSIS — M25.559 HIP PAIN: ICD-10-CM

## 2019-09-16 DIAGNOSIS — R51.9 CHRONIC INTRACTABLE HEADACHE, UNSPECIFIED HEADACHE TYPE: ICD-10-CM

## 2019-09-22 ENCOUNTER — COMMUNICATION - HEALTHEAST (OUTPATIENT)
Dept: FAMILY MEDICINE | Facility: CLINIC | Age: 69
End: 2019-09-22

## 2019-09-22 DIAGNOSIS — E11.65 TYPE 2 DIABETES MELLITUS WITH HYPERGLYCEMIA, WITH LONG-TERM CURRENT USE OF INSULIN (H): ICD-10-CM

## 2019-09-22 DIAGNOSIS — Z79.4 TYPE 2 DIABETES MELLITUS WITH HYPERGLYCEMIA, WITH LONG-TERM CURRENT USE OF INSULIN (H): ICD-10-CM

## 2019-11-06 ENCOUNTER — COMMUNICATION - HEALTHEAST (OUTPATIENT)
Dept: FAMILY MEDICINE | Facility: CLINIC | Age: 69
End: 2019-11-06

## 2019-11-06 DIAGNOSIS — G89.29 CHRONIC INTRACTABLE HEADACHE, UNSPECIFIED HEADACHE TYPE: ICD-10-CM

## 2019-11-06 DIAGNOSIS — R51.9 CHRONIC INTRACTABLE HEADACHE, UNSPECIFIED HEADACHE TYPE: ICD-10-CM

## 2019-11-06 DIAGNOSIS — M25.559 HIP PAIN: ICD-10-CM

## 2019-11-15 ENCOUNTER — OFFICE VISIT - HEALTHEAST (OUTPATIENT)
Dept: FAMILY MEDICINE | Facility: CLINIC | Age: 69
End: 2019-11-15

## 2019-11-15 DIAGNOSIS — R51.9 CHRONIC INTRACTABLE HEADACHE, UNSPECIFIED HEADACHE TYPE: ICD-10-CM

## 2019-11-15 DIAGNOSIS — Z12.31 VISIT FOR SCREENING MAMMOGRAM: ICD-10-CM

## 2019-11-15 DIAGNOSIS — E11.65 TYPE 2 DIABETES MELLITUS WITH HYPERGLYCEMIA, WITH LONG-TERM CURRENT USE OF INSULIN (H): ICD-10-CM

## 2019-11-15 DIAGNOSIS — Z79.4 TYPE 2 DIABETES MELLITUS WITH HYPERGLYCEMIA, WITH LONG-TERM CURRENT USE OF INSULIN (H): ICD-10-CM

## 2019-11-15 DIAGNOSIS — Z95.1 S/P CABG (CORONARY ARTERY BYPASS GRAFT): ICD-10-CM

## 2019-11-15 DIAGNOSIS — G89.29 CHRONIC INTRACTABLE HEADACHE, UNSPECIFIED HEADACHE TYPE: ICD-10-CM

## 2019-11-15 LAB
ALBUMIN SERPL-MCNC: 3.5 G/DL (ref 3.5–5)
ALP SERPL-CCNC: 105 U/L (ref 45–120)
ALT SERPL W P-5'-P-CCNC: 13 U/L (ref 0–45)
ANION GAP SERPL CALCULATED.3IONS-SCNC: 11 MMOL/L (ref 5–18)
AST SERPL W P-5'-P-CCNC: 13 U/L (ref 0–40)
BILIRUB SERPL-MCNC: 0.5 MG/DL (ref 0–1)
BUN SERPL-MCNC: 22 MG/DL (ref 8–28)
CALCIUM SERPL-MCNC: 9.1 MG/DL (ref 8.5–10.5)
CHLORIDE BLD-SCNC: 102 MMOL/L (ref 98–107)
CO2 SERPL-SCNC: 25 MMOL/L (ref 22–31)
CREAT SERPL-MCNC: 1.03 MG/DL (ref 0.6–1.1)
GFR SERPL CREATININE-BSD FRML MDRD: 53 ML/MIN/1.73M2
GLUCOSE BLD-MCNC: 173 MG/DL (ref 70–125)
HBA1C MFR BLD: 8.5 % (ref 3.5–6)
POTASSIUM BLD-SCNC: 4.3 MMOL/L (ref 3.5–5)
PROT SERPL-MCNC: 6.4 G/DL (ref 6–8)
SODIUM SERPL-SCNC: 138 MMOL/L (ref 136–145)

## 2019-11-15 ASSESSMENT — MIFFLIN-ST. JEOR: SCORE: 1279.16

## 2019-11-19 ENCOUNTER — COMMUNICATION - HEALTHEAST (OUTPATIENT)
Dept: FAMILY MEDICINE | Facility: CLINIC | Age: 69
End: 2019-11-19

## 2019-12-02 ENCOUNTER — COMMUNICATION - HEALTHEAST (OUTPATIENT)
Dept: ADMINISTRATIVE | Facility: CLINIC | Age: 69
End: 2019-12-02

## 2019-12-03 ENCOUNTER — HOSPITAL ENCOUNTER (OUTPATIENT)
Dept: NEUROLOGY | Facility: CLINIC | Age: 69
Setting detail: THERAPIES SERIES
Discharge: STILL ADMITTED OR ADMINISTRATIVE | End: 2019-12-03
Attending: PSYCHIATRY & NEUROLOGY

## 2019-12-03 DIAGNOSIS — F41.1 ANXIETY, GENERALIZED: ICD-10-CM

## 2019-12-12 ENCOUNTER — AMBULATORY - HEALTHEAST (OUTPATIENT)
Dept: EDUCATION SERVICES | Facility: CLINIC | Age: 69
End: 2019-12-12

## 2019-12-12 DIAGNOSIS — Z79.4 DIABETES MELLITUS TYPE 2, INSULIN DEPENDENT (H): ICD-10-CM

## 2019-12-12 DIAGNOSIS — E11.65 TYPE 2 DIABETES MELLITUS WITH HYPERGLYCEMIA, WITH LONG-TERM CURRENT USE OF INSULIN (H): ICD-10-CM

## 2019-12-12 DIAGNOSIS — E11.9 DIABETES MELLITUS TYPE 2, INSULIN DEPENDENT (H): ICD-10-CM

## 2019-12-12 DIAGNOSIS — Z79.4 TYPE 2 DIABETES MELLITUS WITH HYPERGLYCEMIA, WITH LONG-TERM CURRENT USE OF INSULIN (H): ICD-10-CM

## 2019-12-13 ENCOUNTER — COMMUNICATION - HEALTHEAST (OUTPATIENT)
Dept: FAMILY MEDICINE | Facility: CLINIC | Age: 69
End: 2019-12-13

## 2019-12-13 DIAGNOSIS — G89.29 CHRONIC INTRACTABLE HEADACHE, UNSPECIFIED HEADACHE TYPE: ICD-10-CM

## 2019-12-13 DIAGNOSIS — M25.559 HIP PAIN: ICD-10-CM

## 2019-12-13 DIAGNOSIS — R51.9 CHRONIC INTRACTABLE HEADACHE, UNSPECIFIED HEADACHE TYPE: ICD-10-CM

## 2019-12-23 ENCOUNTER — COMMUNICATION - HEALTHEAST (OUTPATIENT)
Dept: SCHEDULING | Facility: CLINIC | Age: 69
End: 2019-12-23

## 2019-12-23 DIAGNOSIS — E11.65 TYPE 2 DIABETES MELLITUS WITH HYPERGLYCEMIA, WITH LONG-TERM CURRENT USE OF INSULIN (H): ICD-10-CM

## 2019-12-23 DIAGNOSIS — Z79.4 TYPE 2 DIABETES MELLITUS WITH HYPERGLYCEMIA, WITH LONG-TERM CURRENT USE OF INSULIN (H): ICD-10-CM

## 2020-01-08 ENCOUNTER — COMMUNICATION - HEALTHEAST (OUTPATIENT)
Dept: FAMILY MEDICINE | Facility: CLINIC | Age: 70
End: 2020-01-08

## 2020-01-09 ENCOUNTER — OFFICE VISIT - HEALTHEAST (OUTPATIENT)
Dept: FAMILY MEDICINE | Facility: CLINIC | Age: 70
End: 2020-01-09

## 2020-01-09 ENCOUNTER — COMMUNICATION - HEALTHEAST (OUTPATIENT)
Dept: ADMINISTRATIVE | Facility: CLINIC | Age: 70
End: 2020-01-09

## 2020-01-09 DIAGNOSIS — I25.10 CORONARY ARTERY DISEASE INVOLVING NATIVE CORONARY ARTERY OF NATIVE HEART WITHOUT ANGINA PECTORIS: ICD-10-CM

## 2020-01-09 DIAGNOSIS — J01.90 ACUTE NON-RECURRENT SINUSITIS, UNSPECIFIED LOCATION: ICD-10-CM

## 2020-01-09 DIAGNOSIS — E78.2 MIXED HYPERLIPIDEMIA: ICD-10-CM

## 2020-01-09 DIAGNOSIS — I10 ESSENTIAL HYPERTENSION: ICD-10-CM

## 2020-01-09 DIAGNOSIS — Z86.73 HISTORY OF TIA (TRANSIENT ISCHEMIC ATTACK) AND STROKE: ICD-10-CM

## 2020-01-12 ENCOUNTER — COMMUNICATION - HEALTHEAST (OUTPATIENT)
Dept: FAMILY MEDICINE | Facility: CLINIC | Age: 70
End: 2020-01-12

## 2020-01-12 DIAGNOSIS — F41.9 ANXIETY DISORDER, UNSPECIFIED TYPE: ICD-10-CM

## 2020-01-22 ENCOUNTER — OFFICE VISIT - HEALTHEAST (OUTPATIENT)
Dept: FAMILY MEDICINE | Facility: CLINIC | Age: 70
End: 2020-01-22

## 2020-01-22 DIAGNOSIS — Z79.4 TYPE 2 DIABETES MELLITUS WITH HYPERGLYCEMIA, WITH LONG-TERM CURRENT USE OF INSULIN (H): ICD-10-CM

## 2020-01-22 DIAGNOSIS — I10 ESSENTIAL HYPERTENSION: ICD-10-CM

## 2020-01-22 DIAGNOSIS — I25.118 CORONARY ARTERY DISEASE OF NATIVE ARTERY OF NATIVE HEART WITH STABLE ANGINA PECTORIS (H): ICD-10-CM

## 2020-01-22 DIAGNOSIS — E11.65 TYPE 2 DIABETES MELLITUS WITH HYPERGLYCEMIA, WITH LONG-TERM CURRENT USE OF INSULIN (H): ICD-10-CM

## 2020-01-22 LAB — HBA1C MFR BLD: 9.1 % (ref 3.5–6)

## 2020-01-23 ENCOUNTER — COMMUNICATION - HEALTHEAST (OUTPATIENT)
Dept: SCHEDULING | Facility: CLINIC | Age: 70
End: 2020-01-23

## 2020-01-23 LAB
ALBUMIN SERPL-MCNC: 3.5 G/DL (ref 3.5–5)
ALP SERPL-CCNC: 102 U/L (ref 45–120)
ALT SERPL W P-5'-P-CCNC: 14 U/L (ref 0–45)
ANION GAP SERPL CALCULATED.3IONS-SCNC: 8 MMOL/L (ref 5–18)
AST SERPL W P-5'-P-CCNC: 13 U/L (ref 0–40)
BILIRUB DIRECT SERPL-MCNC: 0.1 MG/DL
BILIRUB SERPL-MCNC: 0.4 MG/DL (ref 0–1)
BUN SERPL-MCNC: 20 MG/DL (ref 8–28)
CALCIUM SERPL-MCNC: 9.4 MG/DL (ref 8.5–10.5)
CHLORIDE BLD-SCNC: 99 MMOL/L (ref 98–107)
CO2 SERPL-SCNC: 30 MMOL/L (ref 22–31)
CREAT SERPL-MCNC: 1.2 MG/DL (ref 0.6–1.1)
GFR SERPL CREATININE-BSD FRML MDRD: 44 ML/MIN/1.73M2
GLUCOSE BLD-MCNC: 518 MG/DL (ref 70–125)
POTASSIUM BLD-SCNC: 4.5 MMOL/L (ref 3.5–5)
PROT SERPL-MCNC: 6.2 G/DL (ref 6–8)
SODIUM SERPL-SCNC: 137 MMOL/L (ref 136–145)

## 2020-01-24 ENCOUNTER — COMMUNICATION - HEALTHEAST (OUTPATIENT)
Dept: FAMILY MEDICINE | Facility: CLINIC | Age: 70
End: 2020-01-24

## 2020-01-26 ENCOUNTER — COMMUNICATION - HEALTHEAST (OUTPATIENT)
Dept: FAMILY MEDICINE | Facility: CLINIC | Age: 70
End: 2020-01-26

## 2020-01-26 DIAGNOSIS — M79.673: ICD-10-CM

## 2020-01-26 DIAGNOSIS — R51.9 CHRONIC INTRACTABLE HEADACHE, UNSPECIFIED HEADACHE TYPE: ICD-10-CM

## 2020-01-26 DIAGNOSIS — M25.559 HIP PAIN: ICD-10-CM

## 2020-01-26 DIAGNOSIS — G89.29 CHRONIC INTRACTABLE HEADACHE, UNSPECIFIED HEADACHE TYPE: ICD-10-CM

## 2020-01-26 DIAGNOSIS — M79.643: ICD-10-CM

## 2020-01-30 ENCOUNTER — AMBULATORY - HEALTHEAST (OUTPATIENT)
Dept: EDUCATION SERVICES | Facility: CLINIC | Age: 70
End: 2020-01-30

## 2020-01-30 ENCOUNTER — COMMUNICATION - HEALTHEAST (OUTPATIENT)
Dept: ADMINISTRATIVE | Facility: CLINIC | Age: 70
End: 2020-01-30

## 2020-01-30 DIAGNOSIS — Z79.4 TYPE 2 DIABETES MELLITUS WITH HYPERGLYCEMIA, WITH LONG-TERM CURRENT USE OF INSULIN (H): ICD-10-CM

## 2020-01-30 DIAGNOSIS — E11.65 TYPE 2 DIABETES MELLITUS WITH HYPERGLYCEMIA, WITH LONG-TERM CURRENT USE OF INSULIN (H): ICD-10-CM

## 2020-01-31 ENCOUNTER — COMMUNICATION - HEALTHEAST (OUTPATIENT)
Dept: FAMILY MEDICINE | Facility: CLINIC | Age: 70
End: 2020-01-31

## 2020-02-06 ENCOUNTER — COMMUNICATION - HEALTHEAST (OUTPATIENT)
Dept: ADMINISTRATIVE | Facility: CLINIC | Age: 70
End: 2020-02-06

## 2020-02-06 ENCOUNTER — COMMUNICATION - HEALTHEAST (OUTPATIENT)
Dept: FAMILY MEDICINE | Facility: CLINIC | Age: 70
End: 2020-02-06

## 2020-02-06 DIAGNOSIS — E11.9 DIABETES MELLITUS, TYPE 2 (H): ICD-10-CM

## 2020-03-03 ENCOUNTER — COMMUNICATION - HEALTHEAST (OUTPATIENT)
Dept: FAMILY MEDICINE | Facility: CLINIC | Age: 70
End: 2020-03-03

## 2020-03-03 DIAGNOSIS — I10 ESSENTIAL HYPERTENSION: ICD-10-CM

## 2020-03-03 DIAGNOSIS — M25.559 HIP PAIN: ICD-10-CM

## 2020-03-03 DIAGNOSIS — G89.29 CHRONIC INTRACTABLE HEADACHE, UNSPECIFIED HEADACHE TYPE: ICD-10-CM

## 2020-03-03 DIAGNOSIS — E78.2 MIXED HYPERLIPIDEMIA: ICD-10-CM

## 2020-03-03 DIAGNOSIS — Z86.73 HISTORY OF TIA (TRANSIENT ISCHEMIC ATTACK) AND STROKE: ICD-10-CM

## 2020-03-03 DIAGNOSIS — R51.9 CHRONIC INTRACTABLE HEADACHE, UNSPECIFIED HEADACHE TYPE: ICD-10-CM

## 2020-03-03 DIAGNOSIS — I25.10 CORONARY ARTERY DISEASE INVOLVING NATIVE CORONARY ARTERY OF NATIVE HEART WITHOUT ANGINA PECTORIS: ICD-10-CM

## 2020-03-16 ENCOUNTER — OFFICE VISIT - HEALTHEAST (OUTPATIENT)
Dept: FAMILY MEDICINE | Facility: CLINIC | Age: 70
End: 2020-03-16

## 2020-03-16 DIAGNOSIS — I10 ESSENTIAL HYPERTENSION: ICD-10-CM

## 2020-03-16 DIAGNOSIS — E11.65 TYPE 2 DIABETES MELLITUS WITH HYPERGLYCEMIA, WITH LONG-TERM CURRENT USE OF INSULIN (H): ICD-10-CM

## 2020-03-16 DIAGNOSIS — R94.4 ABNORMAL RENAL FUNCTION TEST: ICD-10-CM

## 2020-03-16 DIAGNOSIS — Z79.4 TYPE 2 DIABETES MELLITUS WITH HYPERGLYCEMIA, WITH LONG-TERM CURRENT USE OF INSULIN (H): ICD-10-CM

## 2020-03-16 LAB
ANION GAP SERPL CALCULATED.3IONS-SCNC: 11 MMOL/L (ref 5–18)
BUN SERPL-MCNC: 17 MG/DL (ref 8–28)
CALCIUM SERPL-MCNC: 9.4 MG/DL (ref 8.5–10.5)
CHLORIDE BLD-SCNC: 102 MMOL/L (ref 98–107)
CO2 SERPL-SCNC: 27 MMOL/L (ref 22–31)
CREAT SERPL-MCNC: 0.82 MG/DL (ref 0.6–1.1)
GFR SERPL CREATININE-BSD FRML MDRD: >60 ML/MIN/1.73M2
GLUCOSE BLD-MCNC: 211 MG/DL (ref 70–125)
HBA1C MFR BLD: 8.7 %
POTASSIUM BLD-SCNC: 4.8 MMOL/L (ref 3.5–5)
SODIUM SERPL-SCNC: 140 MMOL/L (ref 136–145)

## 2020-03-19 ENCOUNTER — COMMUNICATION - HEALTHEAST (OUTPATIENT)
Dept: CARDIOLOGY | Facility: CLINIC | Age: 70
End: 2020-03-19

## 2020-03-19 ENCOUNTER — OFFICE VISIT - HEALTHEAST (OUTPATIENT)
Dept: CARDIOLOGY | Facility: CLINIC | Age: 70
End: 2020-03-19

## 2020-03-19 DIAGNOSIS — E78.5 HYPERLIPIDEMIA WITH TARGET LDL LESS THAN 70: ICD-10-CM

## 2020-03-19 DIAGNOSIS — E78.2 MIXED HYPERLIPIDEMIA: ICD-10-CM

## 2020-03-19 DIAGNOSIS — I25.118 CORONARY ARTERY DISEASE OF NATIVE ARTERY OF NATIVE HEART WITH STABLE ANGINA PECTORIS (H): ICD-10-CM

## 2020-03-19 DIAGNOSIS — Z95.1 S/P CABG (CORONARY ARTERY BYPASS GRAFT): ICD-10-CM

## 2020-04-22 ENCOUNTER — COMMUNICATION - HEALTHEAST (OUTPATIENT)
Dept: FAMILY MEDICINE | Facility: CLINIC | Age: 70
End: 2020-04-22

## 2020-04-22 DIAGNOSIS — M25.559 HIP PAIN: ICD-10-CM

## 2020-04-22 DIAGNOSIS — R51.9 CHRONIC INTRACTABLE HEADACHE, UNSPECIFIED HEADACHE TYPE: ICD-10-CM

## 2020-04-22 DIAGNOSIS — G89.29 CHRONIC INTRACTABLE HEADACHE, UNSPECIFIED HEADACHE TYPE: ICD-10-CM

## 2020-05-13 ENCOUNTER — COMMUNICATION - HEALTHEAST (OUTPATIENT)
Dept: FAMILY MEDICINE | Facility: CLINIC | Age: 70
End: 2020-05-13

## 2020-05-13 DIAGNOSIS — E78.2 MIXED HYPERLIPIDEMIA: ICD-10-CM

## 2020-06-02 ENCOUNTER — COMMUNICATION - HEALTHEAST (OUTPATIENT)
Dept: FAMILY MEDICINE | Facility: CLINIC | Age: 70
End: 2020-06-02

## 2020-06-02 DIAGNOSIS — R51.9 CHRONIC INTRACTABLE HEADACHE, UNSPECIFIED HEADACHE TYPE: ICD-10-CM

## 2020-06-02 DIAGNOSIS — G89.29 CHRONIC INTRACTABLE HEADACHE, UNSPECIFIED HEADACHE TYPE: ICD-10-CM

## 2020-06-02 DIAGNOSIS — M25.559 HIP PAIN: ICD-10-CM

## 2020-06-10 ENCOUNTER — OFFICE VISIT - HEALTHEAST (OUTPATIENT)
Dept: FAMILY MEDICINE | Facility: CLINIC | Age: 70
End: 2020-06-10

## 2020-06-10 DIAGNOSIS — I10 ESSENTIAL HYPERTENSION: ICD-10-CM

## 2020-06-10 DIAGNOSIS — E78.2 MIXED HYPERLIPIDEMIA: ICD-10-CM

## 2020-06-10 DIAGNOSIS — Z95.1 S/P CABG (CORONARY ARTERY BYPASS GRAFT): ICD-10-CM

## 2020-06-10 DIAGNOSIS — Z91.148 NONCOMPLIANCE WITH MEDICATIONS: ICD-10-CM

## 2020-06-10 DIAGNOSIS — Z79.4 TYPE 2 DIABETES MELLITUS WITH HYPERGLYCEMIA, WITH LONG-TERM CURRENT USE OF INSULIN (H): ICD-10-CM

## 2020-06-10 DIAGNOSIS — E78.5 HYPERLIPIDEMIA WITH TARGET LDL LESS THAN 70: ICD-10-CM

## 2020-06-10 DIAGNOSIS — I25.118 CORONARY ARTERY DISEASE OF NATIVE ARTERY OF NATIVE HEART WITH STABLE ANGINA PECTORIS (H): ICD-10-CM

## 2020-06-10 DIAGNOSIS — E11.65 TYPE 2 DIABETES MELLITUS WITH HYPERGLYCEMIA, WITH LONG-TERM CURRENT USE OF INSULIN (H): ICD-10-CM

## 2020-06-23 ENCOUNTER — COMMUNICATION - HEALTHEAST (OUTPATIENT)
Dept: FAMILY MEDICINE | Facility: CLINIC | Age: 70
End: 2020-06-23

## 2020-06-23 DIAGNOSIS — R51.9 CHRONIC INTRACTABLE HEADACHE, UNSPECIFIED HEADACHE TYPE: ICD-10-CM

## 2020-06-23 DIAGNOSIS — G89.29 CHRONIC INTRACTABLE HEADACHE, UNSPECIFIED HEADACHE TYPE: ICD-10-CM

## 2020-06-23 DIAGNOSIS — M25.559 HIP PAIN: ICD-10-CM

## 2020-07-23 ENCOUNTER — AMBULATORY - HEALTHEAST (OUTPATIENT)
Dept: LAB | Facility: CLINIC | Age: 70
End: 2020-07-23

## 2020-07-23 DIAGNOSIS — E78.2 MIXED HYPERLIPIDEMIA: ICD-10-CM

## 2020-07-23 DIAGNOSIS — Z79.4 TYPE 2 DIABETES MELLITUS WITH HYPERGLYCEMIA, WITH LONG-TERM CURRENT USE OF INSULIN (H): ICD-10-CM

## 2020-07-23 DIAGNOSIS — I10 ESSENTIAL HYPERTENSION: ICD-10-CM

## 2020-07-23 DIAGNOSIS — E11.65 TYPE 2 DIABETES MELLITUS WITH HYPERGLYCEMIA, WITH LONG-TERM CURRENT USE OF INSULIN (H): ICD-10-CM

## 2020-07-23 DIAGNOSIS — E78.5 HYPERLIPIDEMIA WITH TARGET LDL LESS THAN 70: ICD-10-CM

## 2020-07-23 LAB
ALBUMIN SERPL-MCNC: 3.7 G/DL (ref 3.5–5)
ALP SERPL-CCNC: 88 U/L (ref 45–120)
ALT SERPL W P-5'-P-CCNC: 20 U/L (ref 0–45)
ANION GAP SERPL CALCULATED.3IONS-SCNC: 9 MMOL/L (ref 5–18)
AST SERPL W P-5'-P-CCNC: 14 U/L (ref 0–40)
BILIRUB DIRECT SERPL-MCNC: <0.1 MG/DL
BILIRUB SERPL-MCNC: 0.3 MG/DL (ref 0–1)
BUN SERPL-MCNC: 16 MG/DL (ref 8–28)
CALCIUM SERPL-MCNC: 9.1 MG/DL (ref 8.5–10.5)
CHLORIDE BLD-SCNC: 104 MMOL/L (ref 98–107)
CHOLEST SERPL-MCNC: 134 MG/DL
CO2 SERPL-SCNC: 26 MMOL/L (ref 22–31)
CREAT SERPL-MCNC: 0.78 MG/DL (ref 0.6–1.1)
FASTING STATUS PATIENT QL REPORTED: NO
GFR SERPL CREATININE-BSD FRML MDRD: >60 ML/MIN/1.73M2
GLUCOSE BLD-MCNC: 83 MG/DL (ref 70–125)
HBA1C MFR BLD: 8.3 % (ref 3.5–6)
HDLC SERPL-MCNC: 37 MG/DL
LDLC SERPL CALC-MCNC: 72 MG/DL
POTASSIUM BLD-SCNC: 3.8 MMOL/L (ref 3.5–5)
PROT SERPL-MCNC: 6.5 G/DL (ref 6–8)
SODIUM SERPL-SCNC: 139 MMOL/L (ref 136–145)
TRIGL SERPL-MCNC: 124 MG/DL

## 2020-08-10 ENCOUNTER — COMMUNICATION - HEALTHEAST (OUTPATIENT)
Dept: TELEHEALTH | Facility: CLINIC | Age: 70
End: 2020-08-10

## 2020-08-10 ENCOUNTER — OFFICE VISIT - HEALTHEAST (OUTPATIENT)
Dept: FAMILY MEDICINE | Facility: CLINIC | Age: 70
End: 2020-08-10

## 2020-08-10 DIAGNOSIS — G62.9 PERIPHERAL POLYNEUROPATHY: ICD-10-CM

## 2020-08-10 DIAGNOSIS — H91.93 BILATERAL HEARING LOSS, UNSPECIFIED HEARING LOSS TYPE: ICD-10-CM

## 2020-08-10 DIAGNOSIS — Z79.4 TYPE 2 DIABETES MELLITUS WITH HYPERGLYCEMIA, WITH LONG-TERM CURRENT USE OF INSULIN (H): ICD-10-CM

## 2020-08-10 DIAGNOSIS — E11.65 TYPE 2 DIABETES MELLITUS WITH HYPERGLYCEMIA, WITH LONG-TERM CURRENT USE OF INSULIN (H): ICD-10-CM

## 2020-08-10 DIAGNOSIS — Z23 NEED FOR VACCINATION: ICD-10-CM

## 2020-08-10 DIAGNOSIS — I25.10 CORONARY ARTERY DISEASE INVOLVING NATIVE CORONARY ARTERY OF NATIVE HEART WITHOUT ANGINA PECTORIS: ICD-10-CM

## 2020-08-10 DIAGNOSIS — I10 ESSENTIAL HYPERTENSION: ICD-10-CM

## 2020-08-10 DIAGNOSIS — Z12.31 ENCOUNTER FOR SCREENING MAMMOGRAM FOR BREAST CANCER: ICD-10-CM

## 2020-08-10 DIAGNOSIS — I25.10 CAD (CORONARY ARTERY DISEASE): ICD-10-CM

## 2020-08-10 DIAGNOSIS — L28.0 NEURODERMATITIS: ICD-10-CM

## 2020-08-10 DIAGNOSIS — E78.2 MIXED HYPERLIPIDEMIA: ICD-10-CM

## 2020-08-10 LAB
CREAT UR-MCNC: 99.7 MG/DL
MICROALBUMIN UR-MCNC: 7.9 MG/DL (ref 0–1.99)
MICROALBUMIN/CREAT UR: 79.2 MG/G

## 2020-08-10 ASSESSMENT — MIFFLIN-ST. JEOR: SCORE: 1263.28

## 2020-08-13 ENCOUNTER — COMMUNICATION - HEALTHEAST (OUTPATIENT)
Dept: FAMILY MEDICINE | Facility: CLINIC | Age: 70
End: 2020-08-13

## 2020-08-14 ENCOUNTER — COMMUNICATION - HEALTHEAST (OUTPATIENT)
Dept: FAMILY MEDICINE | Facility: CLINIC | Age: 70
End: 2020-08-14

## 2020-08-14 DIAGNOSIS — R51.9 CHRONIC INTRACTABLE HEADACHE, UNSPECIFIED HEADACHE TYPE: ICD-10-CM

## 2020-08-14 DIAGNOSIS — G89.29 CHRONIC INTRACTABLE HEADACHE, UNSPECIFIED HEADACHE TYPE: ICD-10-CM

## 2020-08-14 DIAGNOSIS — M25.559 HIP PAIN: ICD-10-CM

## 2020-09-04 ENCOUNTER — COMMUNICATION - HEALTHEAST (OUTPATIENT)
Dept: NEUROLOGY | Facility: CLINIC | Age: 70
End: 2020-09-04

## 2020-09-04 DIAGNOSIS — F41.1 ANXIETY, GENERALIZED: ICD-10-CM

## 2020-10-23 ENCOUNTER — COMMUNICATION - HEALTHEAST (OUTPATIENT)
Dept: FAMILY MEDICINE | Facility: CLINIC | Age: 70
End: 2020-10-23

## 2020-10-23 ENCOUNTER — COMMUNICATION - HEALTHEAST (OUTPATIENT)
Dept: NEUROLOGY | Facility: CLINIC | Age: 70
End: 2020-10-23

## 2020-10-23 DIAGNOSIS — I10 ESSENTIAL HYPERTENSION: ICD-10-CM

## 2020-10-23 DIAGNOSIS — F41.1 ANXIETY, GENERALIZED: ICD-10-CM

## 2020-10-26 ENCOUNTER — COMMUNICATION - HEALTHEAST (OUTPATIENT)
Dept: FAMILY MEDICINE | Facility: CLINIC | Age: 70
End: 2020-10-26

## 2020-10-26 DIAGNOSIS — G89.29 CHRONIC INTRACTABLE HEADACHE, UNSPECIFIED HEADACHE TYPE: ICD-10-CM

## 2020-10-26 DIAGNOSIS — M25.559 HIP PAIN: ICD-10-CM

## 2020-10-26 DIAGNOSIS — R51.9 CHRONIC INTRACTABLE HEADACHE, UNSPECIFIED HEADACHE TYPE: ICD-10-CM

## 2020-11-04 ENCOUNTER — OFFICE VISIT - HEALTHEAST (OUTPATIENT)
Dept: CARDIOLOGY | Facility: CLINIC | Age: 70
End: 2020-11-04

## 2020-11-04 DIAGNOSIS — Z86.79 HISTORY OF ATRIAL FIBRILLATION: ICD-10-CM

## 2020-11-04 DIAGNOSIS — I25.10 CORONARY ARTERY DISEASE: ICD-10-CM

## 2020-11-04 DIAGNOSIS — R00.0 TACHYCARDIA: ICD-10-CM

## 2020-11-04 DIAGNOSIS — E78.2 MIXED HYPERLIPIDEMIA: ICD-10-CM

## 2020-11-04 DIAGNOSIS — I25.118 CORONARY ARTERY DISEASE OF NATIVE ARTERY OF NATIVE HEART WITH STABLE ANGINA PECTORIS (H): ICD-10-CM

## 2020-11-04 DIAGNOSIS — I10 ESSENTIAL HYPERTENSION: ICD-10-CM

## 2020-11-04 LAB
ATRIAL RATE - MUSE: 302 BPM
DIASTOLIC BLOOD PRESSURE - MUSE: NORMAL
INTERPRETATION ECG - MUSE: NORMAL
P AXIS - MUSE: 254 DEGREES
PR INTERVAL - MUSE: NORMAL
QRS DURATION - MUSE: 88 MS
QT - MUSE: 330 MS
QTC - MUSE: 523 MS
R AXIS - MUSE: 43 DEGREES
SYSTOLIC BLOOD PRESSURE - MUSE: NORMAL
T AXIS - MUSE: 248 DEGREES
VENTRICULAR RATE- MUSE: 151 BPM

## 2020-11-04 ASSESSMENT — MIFFLIN-ST. JEOR: SCORE: 1256.48

## 2020-11-06 ENCOUNTER — COMMUNICATION - HEALTHEAST (OUTPATIENT)
Dept: SCHEDULING | Facility: CLINIC | Age: 70
End: 2020-11-06

## 2020-11-09 ENCOUNTER — COMMUNICATION - HEALTHEAST (OUTPATIENT)
Dept: CARDIOLOGY | Facility: CLINIC | Age: 70
End: 2020-11-09

## 2020-11-09 ENCOUNTER — COMMUNICATION - HEALTHEAST (OUTPATIENT)
Dept: CARE COORDINATION | Facility: CLINIC | Age: 70
End: 2020-11-09

## 2020-11-09 ENCOUNTER — AMBULATORY - HEALTHEAST (OUTPATIENT)
Dept: CARE COORDINATION | Facility: CLINIC | Age: 70
End: 2020-11-09

## 2020-11-09 ENCOUNTER — AMBULATORY - HEALTHEAST (OUTPATIENT)
Dept: CARDIOLOGY | Facility: CLINIC | Age: 70
End: 2020-11-09

## 2020-11-09 DIAGNOSIS — I48.92 ATRIAL FLUTTER (H): ICD-10-CM

## 2020-11-09 DIAGNOSIS — Z86.79 HISTORY OF ATRIAL FIBRILLATION: ICD-10-CM

## 2020-11-09 LAB
ATRIAL RATE - MUSE: 58 BPM
DIASTOLIC BLOOD PRESSURE - MUSE: NORMAL
INTERPRETATION ECG - MUSE: NORMAL
P AXIS - MUSE: 4 DEGREES
PR INTERVAL - MUSE: 162 MS
QRS DURATION - MUSE: 92 MS
QT - MUSE: 428 MS
QTC - MUSE: 420 MS
R AXIS - MUSE: 52 DEGREES
SYSTOLIC BLOOD PRESSURE - MUSE: NORMAL
T AXIS - MUSE: 82 DEGREES
VENTRICULAR RATE- MUSE: 58 BPM

## 2020-11-09 ASSESSMENT — MIFFLIN-ST. JEOR: SCORE: 1257.04

## 2020-11-10 ENCOUNTER — OFFICE VISIT - HEALTHEAST (OUTPATIENT)
Dept: CARDIOLOGY | Facility: CLINIC | Age: 70
End: 2020-11-10

## 2020-11-10 DIAGNOSIS — I48.3 TYPICAL ATRIAL FLUTTER (H): ICD-10-CM

## 2020-11-13 ENCOUNTER — OFFICE VISIT - HEALTHEAST (OUTPATIENT)
Dept: FAMILY MEDICINE | Facility: CLINIC | Age: 70
End: 2020-11-13

## 2020-11-13 DIAGNOSIS — I25.10 CORONARY ARTERY DISEASE INVOLVING NATIVE CORONARY ARTERY OF NATIVE HEART WITHOUT ANGINA PECTORIS: ICD-10-CM

## 2020-11-13 DIAGNOSIS — E11.65 TYPE 2 DIABETES MELLITUS WITH HYPERGLYCEMIA, WITH LONG-TERM CURRENT USE OF INSULIN (H): ICD-10-CM

## 2020-11-13 DIAGNOSIS — Z79.4 TYPE 2 DIABETES MELLITUS WITH HYPERGLYCEMIA, WITH LONG-TERM CURRENT USE OF INSULIN (H): ICD-10-CM

## 2020-11-13 DIAGNOSIS — F41.1 ANXIETY, GENERALIZED: ICD-10-CM

## 2020-11-13 DIAGNOSIS — I48.3 TYPICAL ATRIAL FLUTTER (H): ICD-10-CM

## 2020-11-13 DIAGNOSIS — I10 ESSENTIAL HYPERTENSION: ICD-10-CM

## 2020-11-15 ENCOUNTER — COMMUNICATION - HEALTHEAST (OUTPATIENT)
Dept: FAMILY MEDICINE | Facility: CLINIC | Age: 70
End: 2020-11-15

## 2020-11-27 ENCOUNTER — COMMUNICATION - HEALTHEAST (OUTPATIENT)
Dept: FAMILY MEDICINE | Facility: CLINIC | Age: 70
End: 2020-11-27

## 2020-11-27 DIAGNOSIS — E11.65 TYPE 2 DIABETES MELLITUS WITH HYPERGLYCEMIA, WITH LONG-TERM CURRENT USE OF INSULIN (H): ICD-10-CM

## 2020-11-27 DIAGNOSIS — Z79.4 TYPE 2 DIABETES MELLITUS WITH HYPERGLYCEMIA, WITH LONG-TERM CURRENT USE OF INSULIN (H): ICD-10-CM

## 2020-12-08 ENCOUNTER — COMMUNICATION - HEALTHEAST (OUTPATIENT)
Dept: SCHEDULING | Facility: CLINIC | Age: 70
End: 2020-12-08

## 2020-12-08 DIAGNOSIS — I10 ESSENTIAL HYPERTENSION, BENIGN: ICD-10-CM

## 2021-01-07 ENCOUNTER — COMMUNICATION - HEALTHEAST (OUTPATIENT)
Dept: FAMILY MEDICINE | Facility: CLINIC | Age: 71
End: 2021-01-07

## 2021-01-07 DIAGNOSIS — E11.65 TYPE 2 DIABETES MELLITUS WITH HYPERGLYCEMIA, WITH LONG-TERM CURRENT USE OF INSULIN (H): ICD-10-CM

## 2021-01-07 DIAGNOSIS — I25.10 CORONARY ARTERY DISEASE INVOLVING NATIVE CORONARY ARTERY OF NATIVE HEART WITHOUT ANGINA PECTORIS: ICD-10-CM

## 2021-01-07 DIAGNOSIS — Z79.4 TYPE 2 DIABETES MELLITUS WITH HYPERGLYCEMIA, WITH LONG-TERM CURRENT USE OF INSULIN (H): ICD-10-CM

## 2021-04-11 ENCOUNTER — COMMUNICATION - HEALTHEAST (OUTPATIENT)
Dept: FAMILY MEDICINE | Facility: CLINIC | Age: 71
End: 2021-04-11

## 2021-04-11 DIAGNOSIS — G89.29 CHRONIC INTRACTABLE HEADACHE, UNSPECIFIED HEADACHE TYPE: ICD-10-CM

## 2021-04-11 DIAGNOSIS — F41.1 ANXIETY, GENERALIZED: ICD-10-CM

## 2021-04-11 DIAGNOSIS — R51.9 CHRONIC INTRACTABLE HEADACHE, UNSPECIFIED HEADACHE TYPE: ICD-10-CM

## 2021-04-13 ENCOUNTER — COMMUNICATION - HEALTHEAST (OUTPATIENT)
Dept: FAMILY MEDICINE | Facility: CLINIC | Age: 71
End: 2021-04-13

## 2021-04-13 DIAGNOSIS — F41.1 ANXIETY, GENERALIZED: ICD-10-CM

## 2021-04-14 ENCOUNTER — COMMUNICATION - HEALTHEAST (OUTPATIENT)
Dept: FAMILY MEDICINE | Facility: CLINIC | Age: 71
End: 2021-04-14

## 2021-04-14 DIAGNOSIS — F41.1 ANXIETY, GENERALIZED: ICD-10-CM

## 2021-04-14 DIAGNOSIS — I48.3 TYPICAL ATRIAL FLUTTER (H): ICD-10-CM

## 2021-04-16 ENCOUNTER — AMBULATORY - HEALTHEAST (OUTPATIENT)
Dept: FAMILY MEDICINE | Facility: CLINIC | Age: 71
End: 2021-04-16

## 2021-04-16 ENCOUNTER — COMMUNICATION - HEALTHEAST (OUTPATIENT)
Dept: FAMILY MEDICINE | Facility: CLINIC | Age: 71
End: 2021-04-16

## 2021-04-16 DIAGNOSIS — F41.1 ANXIETY, GENERALIZED: ICD-10-CM

## 2021-04-16 DIAGNOSIS — G62.9 PERIPHERAL POLYNEUROPATHY: ICD-10-CM

## 2021-04-27 ENCOUNTER — COMMUNICATION - HEALTHEAST (OUTPATIENT)
Dept: FAMILY MEDICINE | Facility: CLINIC | Age: 71
End: 2021-04-27

## 2021-04-27 DIAGNOSIS — I25.10 CORONARY ARTERY DISEASE INVOLVING NATIVE CORONARY ARTERY OF NATIVE HEART WITHOUT ANGINA PECTORIS: ICD-10-CM

## 2021-04-27 DIAGNOSIS — E11.65 TYPE 2 DIABETES MELLITUS WITH HYPERGLYCEMIA, WITH LONG-TERM CURRENT USE OF INSULIN (H): ICD-10-CM

## 2021-04-27 DIAGNOSIS — Z79.4 TYPE 2 DIABETES MELLITUS WITH HYPERGLYCEMIA, WITH LONG-TERM CURRENT USE OF INSULIN (H): ICD-10-CM

## 2021-05-05 ENCOUNTER — OFFICE VISIT - HEALTHEAST (OUTPATIENT)
Dept: FAMILY MEDICINE | Facility: CLINIC | Age: 71
End: 2021-05-05

## 2021-05-05 DIAGNOSIS — I48.3 TYPICAL ATRIAL FLUTTER (H): ICD-10-CM

## 2021-05-05 DIAGNOSIS — Z12.11 SCREEN FOR COLON CANCER: ICD-10-CM

## 2021-05-05 DIAGNOSIS — M25.512 ACUTE PAIN OF LEFT SHOULDER: ICD-10-CM

## 2021-05-05 DIAGNOSIS — E08.40 DIABETES MELLITUS DUE TO UNDERLYING CONDITION WITH DIABETIC NEUROPATHY, WITH LONG-TERM CURRENT USE OF INSULIN (H): ICD-10-CM

## 2021-05-05 DIAGNOSIS — E78.2 MIXED HYPERLIPIDEMIA: ICD-10-CM

## 2021-05-05 DIAGNOSIS — Z12.31 ENCOUNTER FOR SCREENING MAMMOGRAM FOR BREAST CANCER: ICD-10-CM

## 2021-05-05 DIAGNOSIS — Z79.4 TYPE 2 DIABETES MELLITUS WITH HYPERGLYCEMIA, WITH LONG-TERM CURRENT USE OF INSULIN (H): ICD-10-CM

## 2021-05-05 DIAGNOSIS — Z79.4 DIABETES MELLITUS DUE TO UNDERLYING CONDITION WITH DIABETIC NEUROPATHY, WITH LONG-TERM CURRENT USE OF INSULIN (H): ICD-10-CM

## 2021-05-05 DIAGNOSIS — Z79.01 CHRONIC ANTICOAGULATION: ICD-10-CM

## 2021-05-05 DIAGNOSIS — I25.10 CORONARY ARTERY DISEASE INVOLVING NATIVE CORONARY ARTERY OF NATIVE HEART WITHOUT ANGINA PECTORIS: ICD-10-CM

## 2021-05-05 DIAGNOSIS — E11.65 TYPE 2 DIABETES MELLITUS WITH HYPERGLYCEMIA, WITH LONG-TERM CURRENT USE OF INSULIN (H): ICD-10-CM

## 2021-05-05 LAB
ALBUMIN SERPL-MCNC: 3.6 G/DL (ref 3.5–5)
ALP SERPL-CCNC: 96 U/L (ref 45–120)
ALT SERPL W P-5'-P-CCNC: 20 U/L (ref 0–45)
ANION GAP SERPL CALCULATED.3IONS-SCNC: 8 MMOL/L (ref 5–18)
AST SERPL W P-5'-P-CCNC: 18 U/L (ref 0–40)
BASOPHILS # BLD AUTO: 0 THOU/UL (ref 0–0.2)
BASOPHILS NFR BLD AUTO: 0 % (ref 0–2)
BILIRUB SERPL-MCNC: 0.6 MG/DL (ref 0–1)
BUN SERPL-MCNC: 19 MG/DL (ref 8–28)
CALCIUM SERPL-MCNC: 9.3 MG/DL (ref 8.5–10.5)
CHLORIDE BLD-SCNC: 101 MMOL/L (ref 98–107)
CHOLEST SERPL-MCNC: 174 MG/DL
CO2 SERPL-SCNC: 30 MMOL/L (ref 22–31)
CREAT SERPL-MCNC: 0.93 MG/DL (ref 0.6–1.1)
EOSINOPHIL # BLD AUTO: 0.1 THOU/UL (ref 0–0.4)
EOSINOPHIL NFR BLD AUTO: 1 % (ref 0–6)
ERYTHROCYTE [DISTWIDTH] IN BLOOD BY AUTOMATED COUNT: 12.3 % (ref 11–14.5)
FASTING STATUS PATIENT QL REPORTED: NO
GFR SERPL CREATININE-BSD FRML MDRD: 59 ML/MIN/1.73M2
GLUCOSE BLD-MCNC: 236 MG/DL (ref 70–125)
HCT VFR BLD AUTO: 40.2 % (ref 35–47)
HDLC SERPL-MCNC: 48 MG/DL
HGB BLD-MCNC: 13 G/DL (ref 12–16)
IMM GRANULOCYTES # BLD: 0 THOU/UL
IMM GRANULOCYTES NFR BLD: 0 %
LDLC SERPL CALC-MCNC: 79 MG/DL
LYMPHOCYTES # BLD AUTO: 1.7 THOU/UL (ref 0.8–4.4)
LYMPHOCYTES NFR BLD AUTO: 22 % (ref 20–40)
MCH RBC QN AUTO: 29.5 PG (ref 27–34)
MCHC RBC AUTO-ENTMCNC: 32.3 G/DL (ref 32–36)
MCV RBC AUTO: 91 FL (ref 80–100)
MONOCYTES # BLD AUTO: 0.4 THOU/UL (ref 0–0.9)
MONOCYTES NFR BLD AUTO: 5 % (ref 2–10)
NEUTROPHILS # BLD AUTO: 5.7 THOU/UL (ref 2–7.7)
NEUTROPHILS NFR BLD AUTO: 72 % (ref 50–70)
PLATELET # BLD AUTO: 357 THOU/UL (ref 140–440)
PMV BLD AUTO: 9.4 FL (ref 7–10)
POTASSIUM BLD-SCNC: 4.7 MMOL/L (ref 3.5–5)
PROT SERPL-MCNC: 6.6 G/DL (ref 6–8)
RBC # BLD AUTO: 4.4 MILL/UL (ref 3.8–5.4)
SODIUM SERPL-SCNC: 139 MMOL/L (ref 136–145)
TRIGL SERPL-MCNC: 235 MG/DL
TSH SERPL DL<=0.005 MIU/L-ACNC: 0.77 UIU/ML (ref 0.3–5)
WBC: 7.9 THOU/UL (ref 4–11)

## 2021-05-05 ASSESSMENT — MIFFLIN-ST. JEOR: SCORE: 1238.9

## 2021-05-07 ENCOUNTER — COMMUNICATION - HEALTHEAST (OUTPATIENT)
Dept: FAMILY MEDICINE | Facility: CLINIC | Age: 71
End: 2021-05-07

## 2021-05-12 ENCOUNTER — AMBULATORY - HEALTHEAST (OUTPATIENT)
Dept: FAMILY MEDICINE | Facility: CLINIC | Age: 71
End: 2021-05-12

## 2021-05-12 DIAGNOSIS — M75.42 IMPINGEMENT SYNDROME OF SHOULDER REGION, LEFT: ICD-10-CM

## 2021-05-19 ENCOUNTER — RECORDS - HEALTHEAST (OUTPATIENT)
Dept: ADMINISTRATIVE | Facility: OTHER | Age: 71
End: 2021-05-19

## 2021-05-19 ENCOUNTER — OFFICE VISIT - HEALTHEAST (OUTPATIENT)
Dept: CARDIOLOGY | Facility: CLINIC | Age: 71
End: 2021-05-19

## 2021-05-19 DIAGNOSIS — I25.10 CORONARY ARTERY DISEASE INVOLVING NATIVE CORONARY ARTERY OF NATIVE HEART WITHOUT ANGINA PECTORIS: ICD-10-CM

## 2021-05-19 DIAGNOSIS — I48.0 PAROXYSMAL ATRIAL FIBRILLATION (H): ICD-10-CM

## 2021-05-19 DIAGNOSIS — I48.91 ATRIAL FIBRILLATION WITH RVR (H): ICD-10-CM

## 2021-05-19 DIAGNOSIS — I48.3 TYPICAL ATRIAL FLUTTER (H): ICD-10-CM

## 2021-05-19 DIAGNOSIS — Z79.01 CHRONIC ANTICOAGULATION: ICD-10-CM

## 2021-05-19 DIAGNOSIS — E78.2 MIXED HYPERLIPIDEMIA: ICD-10-CM

## 2021-05-19 LAB
ATRIAL RATE - MUSE: 69 BPM
DIASTOLIC BLOOD PRESSURE - MUSE: NORMAL
INTERPRETATION ECG - MUSE: NORMAL
P AXIS - MUSE: 0 DEGREES
PR INTERVAL - MUSE: 162 MS
QRS DURATION - MUSE: 90 MS
QT - MUSE: 390 MS
QTC - MUSE: 417 MS
R AXIS - MUSE: 41 DEGREES
SYSTOLIC BLOOD PRESSURE - MUSE: NORMAL
T AXIS - MUSE: 99 DEGREES
VENTRICULAR RATE- MUSE: 69 BPM

## 2021-05-27 VITALS — SYSTOLIC BLOOD PRESSURE: 122 MMHG | DIASTOLIC BLOOD PRESSURE: 72 MMHG | OXYGEN SATURATION: 98 % | HEART RATE: 60 BPM

## 2021-05-27 VITALS — BODY MASS INDEX: 31.09 KG/M2 | WEIGHT: 170 LBS

## 2021-05-27 VITALS
HEART RATE: 71 BPM | RESPIRATION RATE: 16 BRPM | DIASTOLIC BLOOD PRESSURE: 54 MMHG | OXYGEN SATURATION: 97 % | SYSTOLIC BLOOD PRESSURE: 134 MMHG

## 2021-05-27 VITALS
RESPIRATION RATE: 16 BRPM | OXYGEN SATURATION: 97 % | WEIGHT: 175.5 LBS | HEART RATE: 71 BPM | DIASTOLIC BLOOD PRESSURE: 62 MMHG | SYSTOLIC BLOOD PRESSURE: 130 MMHG | BODY MASS INDEX: 32.3 KG/M2 | HEIGHT: 62 IN

## 2021-05-27 VITALS
SYSTOLIC BLOOD PRESSURE: 138 MMHG | HEIGHT: 62 IN | HEART RATE: 64 BPM | OXYGEN SATURATION: 98 % | WEIGHT: 171 LBS | DIASTOLIC BLOOD PRESSURE: 74 MMHG | BODY MASS INDEX: 31.47 KG/M2 | RESPIRATION RATE: 19 BRPM

## 2021-05-27 NOTE — TELEPHONE ENCOUNTER
Due for BP check. BP elevated at last visit per quality outreach report.     Pt contacted and scheduled for nurse only BP check next week. F/u in May for DM as requested at 2/1/19 visit. Both appts scheduled Pt looking to establish with DR Olivas.

## 2021-05-27 NOTE — PROGRESS NOTES
I met with Kemi Soto at the request of Dr. Hampton to recheck her blood pressure.  Blood pressure medications on the MAR were reviewed with patient.    Patient has taken all medications as per usual regimen: Yes  Patient reports tolerating them without any issues or concerns: Yes    Vitals:    04/16/19 1410 04/16/19 1420   BP: 144/60 150/62       After 5 minutes, the patient's blood pressure remained greater than or equal to 140/90.    Is the patient currently having any chest pain?  No  Does the patient currently have a headache?   No  Does the patient currently have any vision changes? No  Does the patient currently have any nausea? No  Does the patient currently have any abdominal pain? No    Patient was instructed to continue current plan.  Has appointment in May..

## 2021-05-28 NOTE — TELEPHONE ENCOUNTER
----- Message from Wander Olivas MD sent at 5/7/2019  4:17 PM CDT -----  Reasonable control of LDL cholesterol, normal kidney and liver function, blood sugar elevated. Do check blood sugars 3 times per day and get me readings from the next week.

## 2021-05-28 NOTE — TELEPHONE ENCOUNTER
Refill Approved    Rx renewed per Medication Renewal Policy. Medication was last renewed on 3/14/19, last OV 2/1/19.    Kianna Hurtado, [facility] Triage/Med Refill 5/4/2019     Requested Prescriptions   Pending Prescriptions Disp Refills     busPIRone (BUSPAR) 15 MG tablet [Pharmacy Med Name: BusPIRone 15MG      TAB] 50 tablet 0     Sig: TAKE 1 TABLET BY MOUTH ONCE DAILY       Tricyclics/Misc Antidepressant/Antianxiety Meds Refill Protocol Passed - 5/3/2019  2:07 PM        Passed - PCP or prescribing provider visit in last year     Last office visit with prescriber/PCP: 2/1/2019 Mayco Zapata MD OR same dept: 2/1/2019 Mayco Zapata MD OR same specialty: 2/1/2019 Mayco Zapata MD  Last physical: Visit date not found Last MTM visit: Visit date not found   Next visit within 3 mo: Visit date not found  Next physical within 3 mo: Visit date not found  Prescriber OR PCP: Mayco Zapata MD  Last diagnosis associated with med order: 1. Anxiety disorder, unspecified type  - busPIRone (BUSPAR) 15 MG tablet [Pharmacy Med Name: BusPIRone 15MG      TAB]; TAKE 1 TABLET BY MOUTH ONCE DAILY  Dispense: 50 tablet; Refill: 0    If protocol passes may refill for 12 months if within 3 months of last provider visit (or a total of 15 months).

## 2021-05-28 NOTE — TELEPHONE ENCOUNTER
Patient Returning Call  Reason for call:  Message from clinic  Information relayed to patient:  Message from Wander Olivas MD sent at 5/7/2019  4:17 PM CDT -----  Reasonable control of LDL cholesterol, normal kidney and liver function, blood sugar elevated. Do check blood sugars 3 times per day and get me readings from the next week.      Patient has additional questions:  No  If YES, what are your questions/concerns:  n/a  Okay to leave a detailed message?: No call back needed

## 2021-05-28 NOTE — TELEPHONE ENCOUNTER
Who is calling:  PATIENT  Reason for Call:  STATES WRONG TEST STRIPS WERE SENT TO PHARMACY. NEEDS THE CONTOUR NEXT TEST STRIPS NOT OTHERS  Date of last appointment with primary care: 05/06  Okay to leave a detailed message: Yes

## 2021-05-28 NOTE — TELEPHONE ENCOUNTER
Form signed.  She uses the Lantus Solostar pen.  She does not use infusion pump.  Please fax back.

## 2021-05-28 NOTE — TELEPHONE ENCOUNTER
Fax came in on patient from [facility] about her insulin how its administered form in Dr Adkins inbox.

## 2021-05-28 NOTE — TELEPHONE ENCOUNTER
Pt in clinic now. Asking for clarification on what's going on with her DM supplies and why she can't get a call back directly from clinic staff.     Pharmacist contacted and notified that pt got a new meter from clinic during her appt 5/6/19 and she needs supplies to match- Contour Next. She hd used accuchek in the past which is what the pharmacist gave her.  Since she opened the box already it usually can't be returned but the pharmacist stated that they would work with her. Pt redirected back to the pharmacy. H.DeGree, CMA

## 2021-05-28 NOTE — PROGRESS NOTES
Outpatient Followup Psychiatric Evaluation      Pertinent History:   Patient is known to me from distant contact.  I last saw her in this clinic in 2016.  I saw the patient when she was an inpatient at [facility] where I was a psychiatric consultant.  She carries a diagnosis of bipolar affective disorder type II by history with possible major depressive disorder.  When I first saw her she was encephalopathic. I saw her for an intake on October 28 of 2015 and I will refer the reader to that note for additional details.  The patient had been admitted medically for UTI with a CT of the pelvis showing a fluid collection requiring  More extensive management.  She developed an epidural abscess that travel to the cervical spine.  She underwent a bilateral L1-L5 laminectomy with decompression and I and D.  During the time I saw her she was struggling with depression as well as anxiety.  She apparently had been on BuSpar and Seroquel for quite some time prior to my contact with the patient.at the time of this visit she is on BuSpar 15 mg twice a day and Seroquel.  When I saw the patient back in 2016 we change the patient's Seroquel 200 mg twice a day.  The patient had been off Zoloft for a number of months at that point and we were considering starting Cymbalta for her chronic pain.  She also had been on Klonopin but we did not know her current dosage.  We did order a chemical dependency evaluation.  The patient was scheduled for a re-appointment with us 3 months after that.  There apparently was not follow-up at that time.  Patient apparently followed up with the nurse practitioner a later time who followed the patient after that point.  I do see that the nurse practitioner saw the patient approximately 1 year ago and added low-dose Seroquel at that time and refilled Zoloft which apparently had been restarted by another provider.  The patient reestablish contact with me in April 2019.    Current Symptoms:   On my interview  with patient she was somewhat vague and disorganized.  She tended to ramble a bit in her speech.  She stated that her primary MD had refilled her medications because nobody called her back from this clinic.  There is had a long history of poor compliance and apparently has not had much contact with us in quite some time in any case she states she is doing fairly well.  She states she never followed up on her chemical dependency assessment request that we made because she has not drank since 2016.  She reports her mood is relatively good but states she has some difficulty with her dogs and cats being sick as well as losing a dear friend recently.  Apparently her sister  a couple years ago as well.  Despite this the patient denies having any suicidality.  She states she still has some chronic anxiety that gets worse with stress.  She denied any racing thoughts but then told me that her family would disagree and believes she does have racing thoughts.  She reports she often stays up late at night reading books.  Typically till 2 or 3 in the morning.  She is not been on Seroquel for a couple of years despite the fact that it appears the nurse practitioner had restarted that last year and states that she did better when she was on that medication.  She denies having any psychosis.  She denies change in her cognition.  No change in appetite.  She states she often has to schedule events later in the day because she does not get up in the morning because she stays up too late at night.  We did discuss sleep hygiene.  No other new medical issues.  She denies side effects to the medication.    Current Medications:  Current medications were reviewed.  Please see the chart for additional information.    Medication Compliance: Chronically has had difficulty with medication compliance    Side Effects to Medications: None apparent or reported      Vitals:  Wt Readings from Last 3 Encounters:   19 179 lb (81.2 kg)    02/01/19 180 lb 5 oz (81.8 kg)   12/11/18 182 lb (82.6 kg)     Temp Readings from Last 3 Encounters:   08/27/18 98.2  F (36.8  C)   08/14/18 98.2  F (36.8  C) (Oral)   08/01/18 97.7  F (36.5  C) (Oral)     BP Readings from Last 3 Encounters:   04/16/19 150/62   02/06/19 148/70   02/01/19 130/70     Pulse Readings from Last 3 Encounters:   02/06/19 80   02/01/19 73   10/31/18 74       Problem List (Please see medical records):  Active Problems:    * No active hospital problems. *        Mental Status Exam:   Appearance:  The patient was alert, comfortable and calm. No agitation. Not currently in any pain. No evidence of any shortness of breath.  Behavior:  The patient is calm, cooperative, with no agitation or obvious distress. The patient does participate. No restlessness. No reports of any recent behavioral dyscontrol.  Speech: Patient is quite talkative.  Somewhat rapid.  She is a bit disorganized and at other times perseverative.  She needs cues for redirection back to the topic.  Mood/Affect:  Patient appears alert. No obvious depression or anxiety.  She is smiling.  She does describe chronic anxiety.  No irritability. No lability.  Thought Content:  No evidence of any psychosis. No reports of any recent psychosis.  Suicidal or Homicidal Thoughts:  None apparent or reported. The patient denies any suicidal or homicidal ideation.   Thought Process/Formulation:  Able to track and follow.  The patient does need prompts and structure.  Thoughts are a bit rapid.  Vague.  Somewhat concrete.  Somewhat vague.  Associations:  Fair.  No recent change.  Able to process information.  Fund of Knowledge: She is quite vague.  No reports of any significant recent change by the patient.   Attention/Concentration: A bit distractible.  A bit perseverative.  Concentration appears somewhat impaired.  She is a bit disorganized.  Insight: Fair.  Judgement: Fair.  No reports of any significant behavioral dyscontrol.  Memory:  Grossly  fair.  Needing prompts and structure.    Motor Status:  No recent change reported. No current tremor.   Orientation: No reports of any recent change.  Grossly oriented.    Diagnosis managed and treated at today's visit :    Bipolar affective disorder, type II by history      Plan:  Medication Adjustment:  I am going to add back Seroquel at 25 to 50 mg nightly as needed.  We discussed sleep hygiene.  We discussed the importance of compliance.    Other:   Patient will return to this clinic in 3 months for medication check.  She agrees to call or return sooner with any questions or concerns.    Continue with the support of the clinic, reassurance, and redirection. Staff monitoring and ongoing assessments per team plan. Current psychotropic medication appears to represent the minimum effective dosage and appears medically necessary. We will continue to monitor and reassess. This team will utilize appropriate emergency services if necessary. I will make myself available if concerns or problems arise.    Pro Huynh MD

## 2021-05-28 NOTE — PROGRESS NOTES
Assessment:  1.  Chronic intractable headaches.  2.  Bilateral hand numbness, right greater than left.  3.  Bilateral hand pain, at least an element of osteoarthritis.  4.  Non-insulin-dependent diabetes mellitus, on insulin for control, last A1c showed it was not controlled.  5.  Hypertension with prolonged control.  6.  Hyperlipidemia checking status.  7.  Guttate psoriasis.    Plan: Because of the worsening hand numbness and the headaches, she needs an MRI of the brain to rule out any structural defect contributing to her symptoms.  Also recommend bilateral upper extremity EMG to better evaluate potential carpal tunnel syndrome versus brachial plexopathy versus central nervous system etc.  Check A1c, basic and hepatic profiles, CBC and direct LDL for the above.  Prescribed fluocinonide ointment 0.05% - 60 g-apply twice daily as needed to the areas of psoriasis.  Renewed clopidogrel 75 mg-#90-refill x1-1 p.o. daily, renew gabapentin 300 mg- #2 70-3 p.o. nightly refillable x1, renewed tramadol 50 mg-1 p.o. every 6 hours as needed #30 and she signed controlled substance agreement, follow-up in 1 month for recheck on everything, prescribe new glucometer for her, asked her to be checking blood sugars at least twice a day, explained that Medicare should cover 3 times a day given that she has need for insulin for control of her diabetes.  Asked her to bring in record of her blood sugars for us to review and look at options for better control.  Renewed atorvastatin 80 mg-1 p.o. every afternoon-#90 refillable x1.  She follows with Dr. Huynh for her psychiatric issues.  Spent in excess of 40 minutes with at least 50% in counseling.    Subjective: 69-year-old female presenting for evaluation and follow-up on multiple issues.  She has had persisting headaches actually for over 3 years.  While she did have a MRI in early 2017, she has had worsening and increased frequency of headaches since then and also now he is having  difficulty with bilateral hand numbness worse on the right than on the left.  She notes she had a history of a stroke in the past.  She denies any fever or any current nausea or vomiting but describes the pain as being on the top of the head, somewhat bilateral, but is intractable and is there when she wakes up in the morning and they are throughout the day despite taking medication etc.  In the past she taken higher dose of gabapentin but that had not helped and she is currently taking 900 mg at bedtime every day.  She needs refill on her atorvastatin, ointment for her psoriasis, clopidogrel, gabapentin, and tramadol.  She has history of heart disease and has had stents as well as bypass grafting.  Patient Active Problem List   Diagnosis     History of TIA (transient ischemic attack) and stroke     Essential hypertension     Mixed hyperlipidemia     Chalastodermia     Type 2 diabetes mellitus with hyperglycemia, with long-term current use of insulin (H)     S/P CABG (coronary artery bypass graft)     Bipolar 2 disorder (H)     Anxiety disorder     BMI 36.0-36.9,adult     Noncompliance with medications     Psoriasis, guttate     History of atrial fibrillation     Benign adenomatous polyp of large intestine     Bilateral hearing loss     Coronary artery disease of native artery of native heart with stable angina pectoris (H)     Peripheral neuropathy     Hyperlipidemia with target LDL less than 70     Past Medical History:   Diagnosis Date     Coronary artery disease      Diabetes mellitus (H)      Discitis of thoracolumbar region 10/10/2015     Encephalopathy 10/14/2015     Epidural abscess 10/10/2015     Hip pain, right      Hyperlipidemia      Sepsis (H) 10/8/2015     Stroke (H) 06/23/2015    Neurology felt that episode was C/W subacute ischemic stroke     TIA (transient ischemic attack) 6/23/2015     UTI (urinary tract infection)     admitted to [facility] with UTI     Allergies   Allergen Reactions      Oxycontin [Oxycodone] Nausea Only     Current Outpatient Medications   Medication Sig Dispense Refill     ascorbic acid, vitamin C, (ASCORBIC ACID WITH BARBIE HIPS) 500 MG tablet Take 500 mg by mouth daily.       aspirin 81 mg chewable tablet Chew 1 tablet (81 mg total) daily.  0     atorvastatin (LIPITOR) 80 MG tablet Take 1 tablet (80 mg total) by mouth at bedtime. 90 tablet 1     b complex vitamins tablet Take 1 tablet by mouth daily.       busPIRone (BUSPAR) 15 MG tablet Take 1 tablet (15 mg total) by mouth daily. 90 tablet 1     clobetasol (TEMOVATE) 0.05 % ointment Apply topically 2 (two) times a day. 30 g 5     clopidogrel (PLAVIX) 75 mg tablet Take 1 tablet (75 mg total) by mouth daily. 90 tablet 1     gabapentin (NEURONTIN) 300 MG capsule Take 3 capsules (900 mg total) by mouth at bedtime. 270 capsule 1     ibuprofen (ADVIL,MOTRIN) 200 MG tablet Take 600 mg by mouth every 8 (eight) hours as needed for pain.       isosorbide mononitrate (IMDUR) 60 MG 24 hr tablet Take 1 tablet (60 mg total) by mouth daily. 90 tablet 3     LANTUS SOLOSTAR U-100 INSULIN 100 unit/mL (3 mL) pen INJECT 28 UNITS SUBCUTANEOUSLY TWICE DAILY 15 mL 5     metFORMIN (GLUCOPHAGE XR) 500 MG 24 hr tablet Take 2 tablets (1,000 mg total) by mouth 2 (two) times a day with meals. 120 tablet 3     metoprolol succinate (TOPROL-XL) 25 MG TAKE 1 TABLET BY MOUTH ONCE DAILY 90 tablet 2     QUEtiapine (SEROQUEL) 25 MG tablet Take 1-2 tablets (25-50 mg total) by mouth at bedtime as needed. 60 tablet 3     sertraline (ZOLOFT) 100 MG tablet TAKE 1 TABLET BY MOUTH ONCE DAILY 90 tablet 3     blood glucose test (ACCU-CHEK SMARTVIEW TEST STRIP) strips Check blood sugar 3 times per day. Dispense brand per patient's insurance at pharmacy discretion. 100 strip 3     fluocinonide (LIDEX) 0.05 % ointment Apply twice daily as needed to psoriasis 60 g 2     generic lancets (ACCU-CHEK FASTCLIX) Check blood sugar 3 times per day. Dispense brand per patient's insurance  "at pharmacy discretion. 102 each 3     multivitamin with minerals tablet Take by mouth daily.        nitroglycerin (NITROSTAT) 0.4 MG SL tablet Place 1 tablet (0.4 mg total) under the tongue every 5 (five) minutes as needed for chest pain. 25 tablet 12     pen needle, diabetic (ULTICARE PEN NEEDLE) 32 gauge x /32\" Ndle Use 1 Device As Directed 2 (two) times a day. 200 each 3     traMADol (ULTRAM) 50 mg tablet Take 1 tablet (50 mg total) by mouth every 6 (six) hours as needed for pain. 30 tablet 0     No current facility-administered medications for this visit.      She notes that her glucometer got run over and she needs a new one.  She notes her sister  in 2017.  Her friend Aydee Loyola  earlier this year and both of them had bleeding troubles and and both of them were on blood thinning medication.  She is not currently checking blood sugars because of not having the glucometer.    Objective:/84   Pulse 74   Ht 5' 2.25\" (1.581 m)   Wt 179 lb (81.2 kg)   SpO2 98%   BMI 32.48 kg/m    HEENT examination shows external ears and TMs normal.  Eyes show pupils equal round and reactive.  Nose no longo negative.  Neck supple without adenopathy or thyromegaly.  Lungs clear.  Heart regular rate and rhythm and no murmur heard.  Abdomen shows no masses tenderness or hepatospleno megaly.  No pedal edema.  Romberg test is negative.  Gait appears within normal range.  Her speech is clear.  Her skin does show scattered areas of psoriasis.  "

## 2021-05-28 NOTE — TELEPHONE ENCOUNTER
pharmacist contacted. Pt has picked up her supplies. No further action needed.     Tried to call patient.   Left message to call back for: patient  Information to relay to patient:  Does she still need assistance with diabetic testing supplies?

## 2021-05-28 NOTE — TELEPHONE ENCOUNTER
Medication Question or Clarification  Who is calling: Patient  What medication are you calling about? (include dose and sig)   blood glucose test strips 100 strip 5 5/6/2019     Sig - Route: Use 1 each As Directed 3 (three) times a day. Dispense brand per patient's insurance at pharmacy discretion. - Miscellaneous        Who prescribed the medication?: Dr Olivas  What is your question/concern?: Patient states this is the wrong test strips. She needs the Contour Next test strips to match her meter she has now.  Please send new Rx to pharmacy as she is out.  Pharmacy: [facility].  Okay to leave a detailed message?: Yes  Site CMT - Please call the pharmacy to obtain any additional needed information.

## 2021-05-28 NOTE — TELEPHONE ENCOUNTER
Left message to call back for: pt  Information to relay to patient:  Per earlier message from today pt picked up her supplies. CMT called pharmacy & verified. Is there still an issue we need to look into? CMT tried to contact pt

## 2021-05-28 NOTE — TELEPHONE ENCOUNTER
Please check with the pharmacy that filled the prescription.  I sent the prescription as generic blood glucose test strips and should have been able to be filled as the contour next strips for the meter she got.  I can send another prescription but that should not be needed in this situation.

## 2021-05-29 NOTE — PROGRESS NOTES
Patient presents at the request of Dr. Wander Olivas for bilateral upper extremity EMG.  She has bilateral hand pain all fingers involved with some catching of the ring fingers bilaterally right typically equal to left but it varies from side to side.  History of carpal tunnel release bilaterally.    EMG/NCS  results: Please see scanned full report    Comment NCS: Normal study  1.  Normal nerve conduction studies bilateral upper extremities.    Comment EMG: Normal study  1.  Normal needle EMG bilateral upper extremities.    Interpretation: Normal study    1. There is no electrodiagnostic evidence of cervical radiculopathy, brachial plexopathy, or focal neuropathy in the bilateral upper extremities.  Specifically, there is no electrodiagnostic evidence of median neuropathy at the wrist in the bilateral upper extremities.    Note: She does describe some symptoms of catching or locking in her ring fingers and this could be related to a trigger finger.  Consideration for orthopedic hand evaluation versus hand therapy if clinically indicated.    The testing was completed in its entirety by the physician.       It was our pleasure caring for your patient today, if there any questions or concerns please do not hesitate to contact us.

## 2021-05-29 NOTE — PROGRESS NOTES
Assessment:  1.  Chronic headaches.  With MRI showing complete opacification of the right maxillary sinus.   2.  Diabetes mellitus, on insulin, overall poor control.  3.  Bilateral hand pain and numbness but negative EMG bilateral recently.    Plan: Refer to ENT for consideration of options with regard to the right maxillary sinus.  Given her diabetes need to make sure there is not any chronic infection in that area.  Asked her to start keeping track of her blood sugars by recording and a log book with account for all the morning readings, account for the readings before noon, before supper, and at bedtime etc. and follow-up in 1 month and we will recheck A1c at that time.  Renewed prescription for the tramadol 50 mg-#30-1 every 6 hours as needed.  She can use Tylenol 1000 mg up to 3 times a day as needed.  Explained that best control of the diabetes has a chance of helping the hand numbness if there is any element of diabetic neuropathy there.  For her probable element of degenerative arthritis, she can use heat, use the Tylenol, she has the tramadol for back-up as needed etc.    Subjective: 69-year-old female presenting for follow-up on the above.  She had the MRI of the brain.  She has the diabetes and does bring in her glucometer which is an Accu-Chek Sadia type.  Her reading this morning was 97.  On May 31 her morning reading was 79.  Then in the evening she can have higher readings such as 248 at 8:30 PM on May 31.  She notes that sometimes she stays up late at night reading and she will then also snack at times.  She continues to have daily headaches that are variable.  She has a dull one right now.  She denies any fever or vomiting.  She has the bilateral hand pain and states she has numbness, but states that the doctor who did the EMG told her that she was fine.  I explained that they were just saying that the EMG did not show any specific abnormality.  Past Medical History:   Diagnosis Date     Coronary  artery disease      Diabetes mellitus (H)      Discitis of thoracolumbar region 10/10/2015     Encephalopathy 10/14/2015     Epidural abscess 10/10/2015     Hip pain, right      Hyperlipidemia      Sepsis (H) 10/8/2015     Stroke (H) 06/23/2015    Neurology felt that episode was C/W subacute ischemic stroke     TIA (transient ischemic attack) 6/23/2015     UTI (urinary tract infection)     admitted to [facility] with UTI     Patient Active Problem List   Diagnosis     History of TIA (transient ischemic attack) and stroke     Essential hypertension     Mixed hyperlipidemia     Chalastodermia     Type 2 diabetes mellitus with hyperglycemia, with long-term current use of insulin (H)     S/P CABG (coronary artery bypass graft)     Bipolar 2 disorder (H)     Anxiety disorder     BMI 36.0-36.9,adult     Noncompliance with medications     Psoriasis, guttate     History of atrial fibrillation     Benign adenomatous polyp of large intestine     Bilateral hearing loss     Coronary artery disease of native artery of native heart with stable angina pectoris (H)     Peripheral neuropathy     Hyperlipidemia with target LDL less than 70     Allergies   Allergen Reactions     Oxycontin [Oxycodone] Nausea Only     Current Outpatient Medications   Medication Sig Dispense Refill     acetaminophen (TYLENOL) 500 MG tablet Take 500 mg by mouth every 6 (six) hours as needed for pain.       ascorbic acid, vitamin C, (ASCORBIC ACID WITH BARBIE HIPS) 500 MG tablet Take 500 mg by mouth daily.       aspirin 81 mg chewable tablet Chew 1 tablet (81 mg total) daily.  0     atorvastatin (LIPITOR) 80 MG tablet Take 1 tablet (80 mg total) by mouth at bedtime. 90 tablet 1     b complex vitamins tablet Take 1 tablet by mouth daily.       blood glucose test strips Use 1 each As Directed 3 (three) times a day. Dispense brand per patient's insurance at pharmacy discretion. 100 strip 5     busPIRone (BUSPAR) 15 MG tablet Take 1 tablet (15 mg total)  "by mouth daily. 90 tablet 1     clobetasol (TEMOVATE) 0.05 % ointment Apply topically 2 (two) times a day. 30 g 5     clopidogrel (PLAVIX) 75 mg tablet Take 1 tablet (75 mg total) by mouth daily. 90 tablet 1     fluocinonide (LIDEX) 0.05 % ointment Apply twice daily as needed to psoriasis 60 g 2     gabapentin (NEURONTIN) 300 MG capsule Take 3 capsules (900 mg total) by mouth at bedtime. 270 capsule 1     generic lancets (FINGERSTIX LANCETS) Dispense brand per patient's insurance at pharmacy discretion. Use to test blood sugar 3 x daily 100 each 5     isosorbide mononitrate (IMDUR) 60 MG 24 hr tablet Take 1 tablet (60 mg total) by mouth daily. 90 tablet 3     LANTUS SOLOSTAR U-100 INSULIN 100 unit/mL (3 mL) pen INJECT 28 UNITS SUBCUTANEOUSLY TWICE DAILY 15 mL 5     metFORMIN (GLUCOPHAGE XR) 500 MG 24 hr tablet Take 2 tablets (1,000 mg total) by mouth 2 (two) times a day with meals. 120 tablet 3     metoprolol succinate (TOPROL-XL) 25 MG TAKE 1 TABLET BY MOUTH ONCE DAILY 90 tablet 2     pen needle, diabetic (ULTICARE PEN NEEDLE) 32 gauge x 5/32\" Ndle Use 1 Device As Directed 2 (two) times a day. 200 each 3     QUEtiapine (SEROQUEL) 25 MG tablet Take 1-2 tablets (25-50 mg total) by mouth at bedtime as needed. 60 tablet 3     sertraline (ZOLOFT) 100 MG tablet TAKE 1 TABLET BY MOUTH ONCE DAILY 90 tablet 3     traMADol (ULTRAM) 50 mg tablet Take 1 tablet (50 mg total) by mouth every 6 (six) hours as needed for pain. 30 tablet 0     ibuprofen (ADVIL,MOTRIN) 200 MG tablet Take 600 mg by mouth every 8 (eight) hours as needed for pain.       multivitamin with minerals tablet Take by mouth daily.        nitroglycerin (NITROSTAT) 0.4 MG SL tablet Place 1 tablet (0.4 mg total) under the tongue every 5 (five) minutes as needed for chest pain. 25 tablet 12     No current facility-administered medications for this visit.      Objective:/60   Pulse 77   Resp 22   Wt 178 lb (80.7 kg)   SpO2 98%   BMI 32.30 kg/m    HEENT " shows no acute change.  Bilateral hand exam shows some degenerative changes but no specific synovial thickening that I can see.  No erythema of any of the joints.    Reviewed with her the MRI report showing the complete maxillary opacification of the right maxillary sinus on the MRI of the brain.

## 2021-05-29 NOTE — PATIENT INSTRUCTIONS - HE
Thank you for choosing the [facility] for your EMG testing.    The ordering provider will receive your final EMG results within the next few days.  Please follow up with your provider for the results and further treatment recommendations.

## 2021-05-29 NOTE — TELEPHONE ENCOUNTER
MRI of the brain did not show any sign of brain cancer etc., but the right maxillary sinus does not have a normal air in it but filled with fluid and thickened lining.  Do follow-up on Jessica 3 as scheduled to discuss options as well as bringing in glucometer readings to look at options for improving diabetic control.

## 2021-05-30 VITALS — HEIGHT: 62 IN | BODY MASS INDEX: 32.02 KG/M2 | WEIGHT: 174 LBS

## 2021-05-30 VITALS — BODY MASS INDEX: 32.29 KG/M2 | WEIGHT: 175.44 LBS | HEIGHT: 62 IN

## 2021-05-30 VITALS — WEIGHT: 174 LBS | HEIGHT: 62 IN | BODY MASS INDEX: 32.02 KG/M2

## 2021-05-30 VITALS — BODY MASS INDEX: 32.01 KG/M2 | WEIGHT: 175 LBS

## 2021-05-30 NOTE — TELEPHONE ENCOUNTER
Pts Care Provider: Dr. Danielle    Caller: Ciarra    Phone Number: [phone]  OK to leave message: Yes    Reason for Call: Pt would like to discuss CT Results with Dr. Danielle only

## 2021-05-30 NOTE — PROGRESS NOTES
Assessment:  1.  Diabetes mellitus, not controlled well.  2.  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  3.  Hyperlipidemia.  4.  Hypertension.  5.  Chronic right maxillary sinusitis.  Scans and appearing to have significant osteitis from right second molar, question here of infection given that she is diabetic  6.  Chronic recurrent headaches not felt to be related to the maxillary sinus per Dr. Danielle and possibly related to TMJ.    Plan: Recommend that she take the metformin extended release in the dose of the 2000 mg before her main meal of the day to see if she can be more compliant in taking it regular and then she may need to reduce the dose of Lantus insulin and initially can drop that by 10 units in the evening and follow her blood sugars.  And explained we may need to add a short acting insulin to be taken before her main meal of the day to try to get her better control since the A1c today is still at 8.9% despite the blood sugar readings that she has.  Did refill her metoprolol and tramadol.  Recommend follow-up in 1 month.  Refer to orthopedics regarding possibility of injections in the left hand as she requests.  Check fasting lipid profile today as well.  Did spend 25 minutes release 50% in counseling.    Subjective: 69-year-old female presenting for follow-up and/or evaluation of the above.  See consultation by Dr. Danielle.  The patient did have a CT of the sinuses that was done on June 26 and she notes she has not yet heard that result.  She brings in blood sugar readings and is checking mostly twice a day.  Many of the morning readings are in the 90s to 110 but some are in the upper 100s.  Later in the day especially in the evening the readings can be in the low to mid 200 range.  But sometimes they are in the 110 range.  She is not having any pain in the face.  She did not have a headache this morning but she notes she is getting one now which she attributes to being fasting.  She is seen in the  late morning.  Patient Active Problem List   Diagnosis     History of TIA (transient ischemic attack) and stroke     Essential hypertension     Mixed hyperlipidemia     Chalastodermia     Type 2 diabetes mellitus with hyperglycemia, with long-term current use of insulin (H)     S/P CABG (coronary artery bypass graft)     Bipolar 2 disorder (H)     Anxiety disorder     BMI 36.0-36.9,adult     Noncompliance with medications     Psoriasis, guttate     History of atrial fibrillation     Benign adenomatous polyp of large intestine     Bilateral hearing loss     Coronary artery disease of native artery of native heart with stable angina pectoris (H)     Peripheral neuropathy     Hyperlipidemia with target LDL less than 70     Past Medical History:   Diagnosis Date     Coronary artery disease      Diabetes mellitus (H)      Discitis of thoracolumbar region 10/10/2015     Encephalopathy 10/14/2015     Epidural abscess 10/10/2015     Hip pain, right      Hyperlipidemia      Sepsis (H) 10/8/2015     Stroke (H) 06/23/2015    Neurology felt that episode was C/W subacute ischemic stroke     TIA (transient ischemic attack) 6/23/2015     UTI (urinary tract infection)     admitted to [facility] with UTI     Allergies   Allergen Reactions     Oxycontin [Oxycodone] Nausea Only     Current Outpatient Medications   Medication Sig Dispense Refill     acetaminophen (TYLENOL) 500 MG tablet Take 500 mg by mouth every 6 (six) hours as needed for pain.       ascorbic acid, vitamin C, (ASCORBIC ACID WITH BARBIE HIPS) 500 MG tablet Take 500 mg by mouth daily.       aspirin 81 mg chewable tablet Chew 1 tablet (81 mg total) daily.  0     atorvastatin (LIPITOR) 80 MG tablet Take 1 tablet (80 mg total) by mouth at bedtime. 90 tablet 1     b complex vitamins tablet Take 1 tablet by mouth daily.       blood glucose test strips Use 1 each As Directed 3 (three) times a day. Dispense brand per patient's insurance at pharmacy discretion. 100 strip  "5     busPIRone (BUSPAR) 15 MG tablet Take 1 tablet (15 mg total) by mouth daily. 90 tablet 1     clopidogrel (PLAVIX) 75 mg tablet Take 1 tablet (75 mg total) by mouth daily. 90 tablet 1     fluocinonide (LIDEX) 0.05 % ointment Apply twice daily as needed to psoriasis 60 g 2     gabapentin (NEURONTIN) 300 MG capsule Take 3 capsules (900 mg total) by mouth at bedtime. 270 capsule 1     generic lancets (FINGERSTIX LANCETS) Dispense brand per patient's insurance at pharmacy discretion. Use to test blood sugar 3 x daily 100 each 5     ibuprofen (ADVIL,MOTRIN) 200 MG tablet Take 600 mg by mouth every 8 (eight) hours as needed for pain.       isosorbide mononitrate (IMDUR) 60 MG 24 hr tablet Take 1 tablet (60 mg total) by mouth daily. 90 tablet 3     LANTUS SOLOSTAR U-100 INSULIN 100 unit/mL (3 mL) pen INJECT 28 UNITS SUBCUTANEOUSLY TWICE DAILY 15 mL 5     metoprolol succinate (TOPROL-XL) 25 MG Take 1 tablet (25 mg total) by mouth daily. 90 tablet 1     nitroglycerin (NITROSTAT) 0.4 MG SL tablet Place 1 tablet (0.4 mg total) under the tongue every 5 (five) minutes as needed for chest pain. 25 tablet 12     pen needle, diabetic (ULTICARE PEN NEEDLE) 32 gauge x 5/32\" Ndle Use 1 Device As Directed 2 (two) times a day. 200 each 3     QUEtiapine (SEROQUEL) 25 MG tablet Take 1-2 tablets (25-50 mg total) by mouth at bedtime as needed. 60 tablet 3     sertraline (ZOLOFT) 100 MG tablet TAKE 1 TABLET BY MOUTH ONCE DAILY 90 tablet 3     traMADol (ULTRAM) 50 mg tablet Take 1 tablet (50 mg total) by mouth every 6 (six) hours as needed for pain. 30 tablet 0     metFORMIN (GLUCOPHAGE XR) 500 MG 24 hr tablet Take 2 tablets (1,000 mg total) by mouth 2 (two) times a day with meals. 120 tablet 3     multivitamin with minerals tablet Take by mouth daily.        No current facility-administered medications for this visit.      She notes she is actually taking her metformin only 2 pills i.e. 1000 mg before supper and often forgets to take it " in the morning.  She is concerned that if she takes the full dose that she will have trouble with her blood sugar going too low in the morning.  She is having pain in the left hand in various joints and wonders about the possibility of cortisone injections because of the pain that she is having.  She does use the tramadol for the various pains and occasional Tylenol.    Objective:/80   Pulse 66   Wt 181 lb (82.1 kg)   SpO2 98%   BMI 32.84 kg/m    Recent Results (from the past 240 hour(s))   Glycosylated Hemoglobin A1c   Result Value Ref Range    Hemoglobin A1c 8.9 (H) 3.5 - 6.0 %   See the report of the CT scan of the sinuses from June 26.    Left hand shows arthritic change in multiple joints in that left hand.  I see no definite inflammatory arthritis.  She is alert with clear speech.

## 2021-05-30 NOTE — PATIENT INSTRUCTIONS - HE
Do contact Dr. Danielle regarding her interpretation of the CT of the sinus.  It looks to me like you need to see dentist and/or oral surgeon regarding the probability of the right second maxillary molar causing the apparent chronic inflammation in that right maxillary sinus and the possibility of chronic infection there.  Even though your headaches are not felt to be related to the maxillary sinus according to Dr. Danielle, if there is infection there, it can certainly be affecting her diabetic control and needs to be addressed.  Recommend you obtain a copy of the CT sinus scan so that you will be able to take that to your dental providers.  Referral to orthopedics has been done for consideration of injection in the left hand.  Do take the Metformin extended release total of 2000 mg just before supper and then reduce dose of Lantus as we discussed, initially by 10 units and adjust as needed.  Then depending on blood sugar results as well as what is recommended regarding the above, we can look at whether to start short acting insulin to use prior to your main meal of the day.  Do plan on following up in 1 month due to the multiple issues going on here.

## 2021-05-30 NOTE — TELEPHONE ENCOUNTER
Left a message for Ciarra with information regarding her CT scan. The sinus issue on the right is odontogenic and will not be solved by addressing the sinus. Her unilateral maxillary sinus lining thickening is also not responsible for her bilateral headaches as discussed in clinic.

## 2021-05-30 NOTE — PROGRESS NOTES
Patient's impression of how medication is working? Pt is doing okay.     Compliant with Medication? Yes     Side Effects: None    Current Symptoms : No    Pain (0-10) No  Appetite change No  Sleep disturbance Yes  Change in energy Yes  Change in interest No  Change in concentration No  Psychosis/Hallucinations No  Negative thoughts No  Mood swings No  Alcohol use No  Drug use No  Anxiety minimal  Sad/depressed mood minimal

## 2021-05-30 NOTE — TELEPHONE ENCOUNTER
----- Message from Wander Olivas MD sent at 7/2/2019  2:17 PM CDT -----  Adequate control of cholesterol although not as good a last check- do continue atorvastatin 80 mg on daily basis- this is maximum dose.

## 2021-05-30 NOTE — PROGRESS NOTES
HPI: This patient is a 70yo F who presents for evaluation of the sinuses at the request of Dr. Olivas. The patient reports headaches. She had a recent MRI that showed opacification of right maxillary sinus so was sent here for evaluation.  History of sinus infections in her 30's, but hasn't had one in many years.  There have not really been episodes of high fever, localized sinus pain, tooth pain, and pururlent drainage. Has a prescription for nasal steroids, but not using them on a routine basis; not using any nasal saline. Denies dental grinding/clenching.    Past medical history, surgical history, social history, family history, medications, and allergies have been reviewed with the patient and are documented above.    Review of Systems: a 10-system review was performed. Pertinent positives are noted in the HPI and on a separate scanned document in the chart.    PHYSICAL EXAMINATION:  GEN: no acute distress, normocephalic  EYES: extraocular movements are intact, pupils are equal and round. Sclera clear.   EARS: auricles are normally formed. The external auditory canals are clear with minimal to no cerumen. Tympanic membranes are intact bilaterally with no signs of infection, effusion, retractions, or perforations.  NOSE: anterior nares are patent. There are no masses or lesions. The septum is non-obstructing. No purulence, polyps, or percussive tenderness.  OC/OP: clear, dentition is in good repair but with some flattening of the occlusal surfaces c/w grinding. The tongue and palate are fully mobile and symmetric. No masses or lesions.  NECK: soft and supple. No lymphadenopathy or masses. Airway is midline.  Bilateral tenderness over TMJ.  No clicking or crepitus.  NEURO: CN VII and XII symmetric. alert and oriented. No spontaneous nystagmus. Gait is normal.  PULM: breathing comfortably on room air, normal chest expansion with respiration  CARDS: no cyanosis or clubbing. Normal carotid pulses.    MRI Brain:  opacification of the right maxillary sinus    MEDICAL DECISION-MAKING: This patient is a 70yo with bilateral tension-type headaches. This is likely from from TMD or headache, not the unilateral finding of opacification in her maxillary sinus only. Will refer to the TMJ clinic to pursue this as a probable cause for her headaches in addition to getting a sinus CT scan to further evaluate if something needs to be done for that as a separate issue from her headache, though sinus thickening may be odontogenic. Will call her with the results.

## 2021-05-30 NOTE — PROGRESS NOTES
Outpatient Followup Psychiatric Evaluation      Pertinent History:   Patient is known to me from distant contact.  I last saw her in this clinic in 2016.  I saw the patient when she was an inpatient at [facility] where I was a psychiatric consultant.  She carries a diagnosis of bipolar affective disorder type II by history with possible major depressive disorder.  When I first saw her she was encephalopathic. I saw her for an intake on October 28 of 2015 and I will refer the reader to that note for additional details.  The patient had been admitted medically for UTI with a CT of the pelvis showing a fluid collection requiring  More extensive management.  She developed an epidural abscess that travel to the cervical spine.  She underwent a bilateral L1-L5 laminectomy with decompression and I and D.  During the time I saw her she was struggling with depression as well as anxiety.  She apparently had been on BuSpar and Seroquel for quite some time prior to my contact with the patient.at the time of this visit she is on BuSpar 15 mg twice a day and Seroquel.  When I saw the patient back in 2016 we change the patient's Seroquel 200 mg twice a day.  The patient had been off Zoloft for a number of months at that point and we were considering starting Cymbalta for her chronic pain.  She also had been on Klonopin but we did not know her current dosage.  We did order a chemical dependency evaluation.  The patient was scheduled for a re-appointment with us 3 months after that.  There apparently was not follow-up at that time.  Patient apparently followed up with the nurse practitioner a later time who followed the patient after that point.  I do see that the nurse practitioner saw the patient approximately 1 year ago and added low-dose Seroquel at that time and refilled Zoloft which apparently had been restarted by another provider.  The patient reestablish contact with me in April 2019.      At the patient's reestablishment of  clinic contact in April 2019 she was vague and disorganized.  She was still struggling with some depression and anxiety as well as insomnia and we did add back the low-dose as needed Seroquel at night.    Current Symptoms:   Again is a vague historian and tends to ramble a bit.  She tells me however her mood is pretty good.  She denies having any panic attacks but describes ongoing generalized anxiety.  She went into quite a bit of detail about how she is refinancing her house so she can get a mobile home and may move down to [place].  She apparently has a granddaughter that is at the [facility] and she is going down there to visit her and then will visit some friends at [facility].  Apparently that is where she is looking at moving.  She discussed various options with her mobile home as far as either buying the land or renting the land.  All this decision making has been somewhat stressful.    Patient denies having any desire to be dead or thoughts of suicide or psychosis.  She reports no change in her cognition.  Her sleep is a bit better with the Seroquel although she often chooses to stay up late at night and does not take the medication.  We did talk about utilizing the Seroquel during the day if she is anxious but she reports that it makes her too sleepy.  We did talk about the possibility of increasing the Zoloft and she would like to do that.    Patient denies having any new medical issues or diagnoses.  No new allergies.  No side effects to the medication.  She has had ongoing sinus issues related to a tooth root which apparently is lodged in the sinus.  This apparently is an old issue but they may try different treatment strategies to address this.    She offers no other questions or concerns at this time.    Current Medications:  Current medications were reviewed.  Please see the chart for additional information.    Medication Compliance: Chronically has had difficulty with medication  compliance    Side Effects to Medications: None apparent or reported      Vitals:  Wt Readings from Last 3 Encounters:   07/01/19 181 lb (82.1 kg)   06/03/19 178 lb (80.7 kg)   05/06/19 179 lb (81.2 kg)     Temp Readings from Last 3 Encounters:   08/27/18 98.2  F (36.8  C)   08/14/18 98.2  F (36.8  C) (Oral)   08/01/18 97.7  F (36.5  C) (Oral)     BP Readings from Last 3 Encounters:   07/01/19 164/80   06/03/19 136/60   05/06/19 146/84     Pulse Readings from Last 3 Encounters:   07/01/19 66   06/03/19 77   05/06/19 74       Problem List (Please see medical records):  Active Problems:    * No active hospital problems. *        Mental Status Exam:   Appearance: No obvious pain or distress.  No agitation.  No shortness of breath.  Patient appears relatively comfortable.  Adequately groomed and dressed.  Behavior: The patient is under fairly good behavioral control during the interview.  No agitation.  No reports of any significant recent behavioral dyscontrol.  Speech: She tends to ramble.  Sentence structure is intact.  A bit superficial and at times overly inclusive.  She is however able to dialogue and is redirectable.  Mood/Affect: Not significantly depressed or anxious.  Smiling and bright.  No irritability.  No lability.  No evidence of any mathew.  Thought Content: No obvious apparent psychosis.  No reports of any psychotic symptoms recently.  Suicidal or Homicidal Thoughts: No evidence or reports of any suicidal or homicidal ideation  Thought Process/Formulation: Somewhat concrete.  Somewhat vague.  Does needs some structure and occasional prompts.  Associations: No obvious recent change.  Patient is somewhat concrete but is processing some information.  Fund of Knowledge: Continues impaired with no recent change.  Attention/Concentration: Concentration and attention continue to be somewhat impaired.  She is a bit disorganized and overly inclusive.  No obvious significant change.  Insight: Continues fair.  No  recent change.  Judgement:  Grossly unchanged.  Continues fair.  Memory: No obvious recent change.  Needing prompts and structure.    Motor Status:  No recent change reported. No current tremor.   Orientation: Mostly oriented.  No reports of any recent change..    Diagnosis managed and treated at today's visit :    Bipolar affective disorder, type II by history      Plan:  Medication Adjustment:  I am going to increase the patient's Zoloft to 150 mg a day.  Patient will monitor for any increase in anxiety or irritability.  Risks and benefits were discussed.    Other:   Patient will return to this clinic in 4 months for medication check.  She agrees to call or return sooner with any questions or concerns.    Continue with the support of the clinic, reassurance, and redirection. Staff monitoring and ongoing assessments per team plan. Current psychotropic medication appears to represent the minimum effective dosage and appears medically necessary. We will continue to monitor and reassess. This team will utilize appropriate emergency services if necessary. I will make myself available if concerns or problems arise.    Pro Huynh MD

## 2021-05-31 VITALS — HEIGHT: 62 IN | WEIGHT: 178.25 LBS | BODY MASS INDEX: 32.8 KG/M2

## 2021-05-31 VITALS — WEIGHT: 179 LBS | HEIGHT: 62 IN | BODY MASS INDEX: 32.94 KG/M2

## 2021-05-31 VITALS — HEIGHT: 63 IN | WEIGHT: 177.3 LBS | BODY MASS INDEX: 31.41 KG/M2

## 2021-05-31 VITALS — BODY MASS INDEX: 32.57 KG/M2 | HEIGHT: 62 IN | WEIGHT: 177 LBS

## 2021-05-31 VITALS — BODY MASS INDEX: 31.54 KG/M2 | WEIGHT: 178 LBS | HEIGHT: 63 IN

## 2021-05-31 VITALS — WEIGHT: 178 LBS | HEIGHT: 62 IN | BODY MASS INDEX: 32.76 KG/M2

## 2021-05-31 VITALS — BODY MASS INDEX: 32.25 KG/M2 | WEIGHT: 176.3 LBS

## 2021-05-31 NOTE — TELEPHONE ENCOUNTER
Called Kemi to try to discuss next steps as she requested. No answer on her phone and the mailbox is full.     When she calls back, the net step for her is to see an oral surgeon. The sinus issue is odontogenic and cleaning out the sinus without addressing the dental issue causing it does not improve the overall situation. Also, as discussed in clinic, her unilateral odontogenic sinus issue is not causing her bilateral tension-type headaches.

## 2021-05-31 NOTE — PROGRESS NOTES
Assessment:  1.  Diabetes mellitus, with hyperglycemia, checking status.  2.  Right maxillary chronic sinusitis felt to be odontogenic in origin.  3.  Chronic headaches that are more like tension headaches that could be from eyestrain.    Plan: Check A1c today, refilled metformin 500 mg extended release-#120-refill x3-and she does take the 4 pills once a day when she remembers instead of the 2 pills twice a day.  Encouraged her to be as faithful as possible.  Continue checking blood sugars twice daily but recommend that when she checks in the evening after supper that she checked 2 hours after supper and not earlier.  Recommend she get copy of the CD of the CAT scan of the sinuses for her dentist to review and likely needs to see an oral surgeon for consideration of removal of the right second molar tooth.  Explained that even though she does not have any fever or localized pain, it is possible that chronic infection there could be affecting her diabetic control and in any event given the severity of the CAT scan findings it is appropriate to have dental consultation on that issue.  Encouraged her to be careful with diet, be as faithful as possible with her medication etc. and asked her to check blood sugars regularly and record them and bring those then every time that she comes in to see me.  She certainly could pursue seeing ophthalmology and getting prescription eyeglasses to see if that would give her better control of headaches.  Spent in excess of 25 minutes release 50% counseling.  Also reminded her to get scheduled for cardiology follow-up with Dr. Hernandez which is overdue.    Subjective: 69-year-old female presenting for follow-up on the above.  See the notes.  She states she has not heard from Dr. Danielle.  I explained that Dr. Danielle did try to call her and did not get an answer and that her voice box on her cell phone was full.  See the documented notes.  I explained regarding her need to see her dentist  regarding this issue of the right second molar tooth etc.  She denies any fever or localized right cheek pain.  Regarding diabetic control she states she is checking blood sugars twice a day but again did not bring any readings in with her.  She thinks that her readings are better than they were.  She states the morning is often 100 115 but that 2 hours after supper can be 200.  She states that she does not always check 2 hours after but it can be sooner than that.  Patient Active Problem List   Diagnosis     History of TIA (transient ischemic attack) and stroke     Essential hypertension     Mixed hyperlipidemia     Chalastodermia     Type 2 diabetes mellitus with hyperglycemia, with long-term current use of insulin (H)     S/P CABG (coronary artery bypass graft)     Bipolar 2 disorder (H)     Anxiety disorder     BMI 36.0-36.9,adult     Noncompliance with medications     Psoriasis, guttate     History of atrial fibrillation     Benign adenomatous polyp of large intestine     Bilateral hearing loss     Coronary artery disease of native artery of native heart with stable angina pectoris (H)     Peripheral neuropathy     Hyperlipidemia with target LDL less than 70     Past Medical History:   Diagnosis Date     Coronary artery disease      Diabetes mellitus (H)      Discitis of thoracolumbar region 10/10/2015     Encephalopathy 10/14/2015     Epidural abscess 10/10/2015     Hip pain, right      Hyperlipidemia      Sepsis (H) 10/8/2015     Stroke (H) 06/23/2015    Neurology felt that episode was C/W subacute ischemic stroke     TIA (transient ischemic attack) 6/23/2015     UTI (urinary tract infection)     admitted to [facility] with UTI     Allergies   Allergen Reactions     Oxycontin [Oxycodone] Nausea Only     Current Outpatient Medications   Medication Sig Dispense Refill     acetaminophen (TYLENOL) 500 MG tablet Take 500 mg by mouth every 6 (six) hours as needed for pain.       ascorbic acid, vitamin C,  "(ASCORBIC ACID WITH BARBIE HIPS) 500 MG tablet Take 500 mg by mouth daily.       aspirin 81 mg chewable tablet Chew 1 tablet (81 mg total) daily.  0     atorvastatin (LIPITOR) 80 MG tablet Take 1 tablet (80 mg total) by mouth at bedtime. 90 tablet 1     b complex vitamins tablet Take 1 tablet by mouth daily.       blood glucose test strips Use 1 each As Directed 3 (three) times a day. Dispense brand per patient's insurance at pharmacy discretion. 100 strip 5     busPIRone (BUSPAR) 15 MG tablet Take 1 tablet (15 mg total) by mouth daily. 90 tablet 1     clopidogrel (PLAVIX) 75 mg tablet Take 1 tablet (75 mg total) by mouth daily. 90 tablet 1     fluocinonide (LIDEX) 0.05 % ointment Apply twice daily as needed to psoriasis 60 g 2     gabapentin (NEURONTIN) 300 MG capsule Take 3 capsules (900 mg total) by mouth at bedtime. 270 capsule 1     generic lancets (FINGERSTIX LANCETS) Dispense brand per patient's insurance at pharmacy discretion. Use to test blood sugar 3 x daily 100 each 5     ibuprofen (ADVIL,MOTRIN) 200 MG tablet Take 600 mg by mouth every 8 (eight) hours as needed for pain.       isosorbide mononitrate (IMDUR) 60 MG 24 hr tablet Take 1 tablet (60 mg total) by mouth daily. 90 tablet 3     LANTUS SOLOSTAR U-100 INSULIN 100 unit/mL (3 mL) pen INJECT 28 UNITS SUBCUTANEOUSLY TWICE DAILY 15 mL 5     metoprolol succinate (TOPROL-XL) 25 MG Take 1 tablet (25 mg total) by mouth daily. 90 tablet 1     multivitamin with minerals tablet Take by mouth daily.        nitroglycerin (NITROSTAT) 0.4 MG SL tablet Place 1 tablet (0.4 mg total) under the tongue every 5 (five) minutes as needed for chest pain. 25 tablet 12     pen needle, diabetic (ULTICARE PEN NEEDLE) 32 gauge x 5/32\" Ndle Use 1 Device As Directed 2 (two) times a day. 200 each 3     QUEtiapine (SEROQUEL) 25 MG tablet Take 1-2 tablets (25-50 mg total) by mouth at bedtime as needed. 60 tablet 3     sertraline (ZOLOFT) 100 MG tablet Take 1.5 tablets (150 mg total) " by mouth daily. TAKE 1 TABLET BY MOUTH ONCE DAILY 45 tablet 6     traMADol (ULTRAM) 50 mg tablet Take 1 tablet (50 mg total) by mouth every 6 (six) hours as needed for pain. 30 tablet 0     metFORMIN (GLUCOPHAGE XR) 500 MG 24 hr tablet Take 2 tablets (1,000 mg total) by mouth 2 (two) times a day with meals. 120 tablet 3     No current facility-administered medications for this visit.      She has the ongoing bilateral headaches but does state that she wonders if it could be related to her reading glasses noting that she only uses an expensive cheaters and are not prescription glasses.    Objective:/74   Pulse 70   Wt 178 lb (80.7 kg)   SpO2 98%   BMI 32.30 kg/m    Examination is unchanged from before.  She is alert with clear speech.  She is in no acute distress at this time.  She states that she is going to [place] soon and that it may be a while before she gets in to see the dentist.  I explained that it is worthwhile for her to pursue the dental consultation on this tooth issue in a timely fashion.  I printed off a copy of the CT report of the sinuses for her and explained that she would need to get a copy of the actual scan from [facility] who did the scan for her.  Return to continue efforts at diabetic control at at minimum follow-up in no later than 3 months.

## 2021-06-01 VITALS — HEIGHT: 62 IN | BODY MASS INDEX: 33.31 KG/M2 | WEIGHT: 181 LBS

## 2021-06-01 VITALS — BODY MASS INDEX: 33.18 KG/M2 | HEIGHT: 62 IN | WEIGHT: 180.31 LBS

## 2021-06-01 VITALS — WEIGHT: 178 LBS | BODY MASS INDEX: 32.76 KG/M2 | HEIGHT: 62 IN

## 2021-06-01 VITALS — BODY MASS INDEX: 33.07 KG/M2 | WEIGHT: 180.8 LBS

## 2021-06-01 VITALS — HEIGHT: 62 IN | WEIGHT: 179 LBS | BODY MASS INDEX: 32.94 KG/M2

## 2021-06-01 VITALS — BODY MASS INDEX: 32.66 KG/M2 | WEIGHT: 177.5 LBS | HEIGHT: 62 IN

## 2021-06-01 VITALS — HEIGHT: 62 IN | BODY MASS INDEX: 33.73 KG/M2 | WEIGHT: 183.3 LBS

## 2021-06-01 VITALS — WEIGHT: 180 LBS | HEIGHT: 62 IN | BODY MASS INDEX: 33.13 KG/M2

## 2021-06-01 VITALS — WEIGHT: 179.4 LBS | BODY MASS INDEX: 32.81 KG/M2

## 2021-06-01 VITALS — WEIGHT: 180 LBS | BODY MASS INDEX: 32.92 KG/M2

## 2021-06-01 VITALS — BODY MASS INDEX: 32.74 KG/M2 | WEIGHT: 179 LBS

## 2021-06-01 VITALS — WEIGHT: 179.4 LBS | BODY MASS INDEX: 33.01 KG/M2 | HEIGHT: 62 IN

## 2021-06-01 VITALS — WEIGHT: 180.6 LBS | BODY MASS INDEX: 33.03 KG/M2

## 2021-06-01 NOTE — TELEPHONE ENCOUNTER
Controlled Substance Refill Request  Medication:   Requested Prescriptions     Pending Prescriptions Disp Refills     traMADol (ULTRAM) 50 mg tablet [Pharmacy Med Name: TRAMADOL 50MG TAB] 30 tablet 0     Sig: TAKE 1 TABLET BY MOUTH EVERY 6 HOURS AS NEEDED FOR PAIN     Date Last Fill: 7/1/19  Pharmacy: [id number]   Submit electronically to pharmacy  Controlled Substance Agreement on File:   Encounter-Level CSA Scan Date:    There are no encounter-level csa scan date.       Last office visit: Last office visit pertaining to requested medication was 8/12/19.

## 2021-06-02 VITALS — WEIGHT: 181 LBS | BODY MASS INDEX: 33.11 KG/M2

## 2021-06-02 VITALS — WEIGHT: 182 LBS | BODY MASS INDEX: 33.29 KG/M2

## 2021-06-02 VITALS — WEIGHT: 185 LBS | HEIGHT: 62 IN | BODY MASS INDEX: 34.04 KG/M2

## 2021-06-02 VITALS — BODY MASS INDEX: 33.29 KG/M2 | WEIGHT: 182 LBS

## 2021-06-02 VITALS — WEIGHT: 181.4 LBS | BODY MASS INDEX: 33.18 KG/M2

## 2021-06-02 VITALS — BODY MASS INDEX: 33.11 KG/M2 | WEIGHT: 181 LBS

## 2021-06-02 VITALS — HEIGHT: 62 IN | BODY MASS INDEX: 33.34 KG/M2 | WEIGHT: 181.19 LBS

## 2021-06-02 VITALS — BODY MASS INDEX: 33.47 KG/M2 | WEIGHT: 183 LBS

## 2021-06-02 VITALS — WEIGHT: 180.31 LBS | BODY MASS INDEX: 32.98 KG/M2

## 2021-06-02 VITALS — BODY MASS INDEX: 33.22 KG/M2 | WEIGHT: 181.6 LBS

## 2021-06-02 VITALS — BODY MASS INDEX: 32.94 KG/M2 | HEIGHT: 62 IN | WEIGHT: 179 LBS

## 2021-06-03 VITALS
OXYGEN SATURATION: 98 % | TEMPERATURE: 98 F | HEART RATE: 72 BPM | DIASTOLIC BLOOD PRESSURE: 56 MMHG | HEIGHT: 62 IN | BODY MASS INDEX: 32.94 KG/M2 | WEIGHT: 179 LBS | SYSTOLIC BLOOD PRESSURE: 106 MMHG

## 2021-06-03 VITALS — BODY MASS INDEX: 32.3 KG/M2 | WEIGHT: 178 LBS

## 2021-06-03 VITALS — BODY MASS INDEX: 32.94 KG/M2 | HEIGHT: 62 IN | WEIGHT: 179 LBS

## 2021-06-03 VITALS — WEIGHT: 181 LBS | BODY MASS INDEX: 32.84 KG/M2

## 2021-06-03 VITALS — WEIGHT: 178 LBS | BODY MASS INDEX: 32.3 KG/M2

## 2021-06-03 NOTE — TELEPHONE ENCOUNTER
Controlled Substance Refill Request  Medication:   Requested Prescriptions     Pending Prescriptions Disp Refills     traMADol (ULTRAM) 50 mg tablet [Pharmacy Med Name: TRAMADOL 50MG TAB] 30 tablet 0     Sig: TAKE 1 TABLET BY MOUTH EVERY 6 HOURS AS NEEDED FOR PAIN     Date Last Fill: 9/16/19  Pharmacy: [id number]   Submit electronically to pharmacy  Controlled Substance Agreement on File:   Encounter-Level CSA Scan Date:    There are no encounter-level csa scan date.       Last office visit: Last office visit pertaining to requested medication was 8/12/19.

## 2021-06-03 NOTE — TELEPHONE ENCOUNTER
She is overdue for a recheck on diabetes as recommended.  Her A1c was elevated in August and had asked her to check blood sugars more often and follow-up within 6 weeks to look at options for improving diabetic control.  Have her get scheduled, and then I can do refill on the tramadol.

## 2021-06-03 NOTE — TELEPHONE ENCOUNTER
Patient calling in to retrieve a message. Patient was given a partial message from the provider. She was upset and did not want to hear the rest of the message. She wanted to schedule an appointment and RN attempted to warm transfer to scheduling and she disconnected the call.     Joann Marrufo RN, BSN [facility] Triage Nurse

## 2021-06-03 NOTE — PROGRESS NOTES
Outpatient Followup Psychiatric Evaluation      Pertinent History:   Patient is known to me from distant contact.  I last saw her in this clinic in 2016.  I saw the patient when she was an inpatient at [facility] where I was a psychiatric consultant.  She carries a diagnosis of bipolar affective disorder type II by history with possible major depressive disorder.  When I first saw her she was encephalopathic. I saw her for an intake on October 28 of 2015 and I will refer the reader to that note for additional details.  The patient had been admitted medically for UTI with a CT of the pelvis showing a fluid collection requiring  More extensive management.  She developed an epidural abscess that travel to the cervical spine.  She underwent a bilateral L1-L5 laminectomy with decompression and I and D.  During the time I saw her she was struggling with depression as well as anxiety.  She apparently had been on BuSpar and Seroquel for quite some time prior to my contact with the patient.at the time of this visit she is on BuSpar 15 mg twice a day and Seroquel.  When I saw the patient back in 2016 we change the patient's Seroquel 200 mg twice a day.  The patient had been off Zoloft for a number of months at that point and we were considering starting Cymbalta for her chronic pain.  She also had been on Klonopin but we did not know her current dosage.  We did order a chemical dependency evaluation.  The patient was scheduled for a re-appointment with us 3 months after that.  There apparently was not follow-up at that time.  Patient apparently followed up with the nurse practitioner a later time who followed the patient after that point.  I do see that the nurse practitioner saw the patient approximately 1 year ago and added low-dose Seroquel at that time and refilled Zoloft which apparently had been restarted by another provider.  The patient reestablish contact with me in April 2019.      At the patient's reestablishment of  "clinic contact in April 2019 she was vague and disorganized.  She was still struggling with some depression and anxiety as well as insomnia and we did add back the low-dose as needed Seroquel at night.    I saw the patient in July 2019.  The patient was a vague historian and however did describe some ongoing anxiety.  She was talking about possibly moving down to [place].  We did increase her Zoloft to 150 mg a day at that time.    Current Symptoms:   Patient again is a vague historian frequently rambling and laughing throughout the interview.  She wants to talk about collateral issues but she tells me she is doing \"pretty good all in all\".  She states however that she feels she needs to talk to her therapist because she does not spend enough time with her daughter who is out east and has a high-powered job and she would like to talk about that\".  She also states \"the state of the world is a mess\".  She states that she never increased the Zoloft.  She reports the pharmacy stated they never received an order.  It is unclear why she did not call here to clarify that stating she just forgot.  She reports she is still often anxious but she was extremely vague on details even the frequency as to whether it happened 5 times a day or once a month she could not tell me she states \"it is just if I am stressed out\".  She admits she is somewhat depressed but denies having any desire to be dead or thoughts of suicide.  She states she is having no hallucinations or delusions.  Regarding her sleep she chooses to stay up till 3 in the morning.  She states she occasionally uses the as needed Seroquel when she knows she has to fall asleep earlier and get up earlier for an appointment.  We talked about the possibility of using a small dose of Seroquel during the day for anxiety but she was not interested in that stating she thought it might make her tired.  She denies having any new medical issues or diagnoses.  No new allergies.  No " side effects to the medications.    Current Medications:  Current medications were reviewed.  Please see the chart for additional information.    Medication Compliance: Chronically has had difficulty with medication compliance    Side Effects to Medications: None apparent or reported      Vitals:  Wt Readings from Last 3 Encounters:   11/15/19 179 lb (81.2 kg)   08/12/19 178 lb (80.7 kg)   07/01/19 181 lb (82.1 kg)     Temp Readings from Last 3 Encounters:   11/15/19 98  F (36.7  C) (Oral)   08/27/18 98.2  F (36.8  C)   08/14/18 98.2  F (36.8  C) (Oral)     BP Readings from Last 3 Encounters:   11/15/19 106/56   08/12/19 152/74   07/01/19 164/80     Pulse Readings from Last 3 Encounters:   11/15/19 72   08/12/19 70   07/01/19 66       Problem List (Please see medical records):  Active Problems:    * No active hospital problems. *        Mental Status Exam:   Appearance: Patient presents appearing relatively comfortable.  No obvious distress.  Not short of breath.  No obvious pain.  Behavior: Patient maintains good behavioral control.  She is bright and smiling but she is controlling in the interview rambling in her speech.  Frequently laughing.  He is currently not restless or agitated.  Speech: No obvious recent change.  Simple answers.  Fairly concrete.  He continues to need structure and prompts.  Mood/Affect: Not significantly depressed or anxious.  He does not appear to be anxious but rather a bit disinhibited.  She does not appear to be obviously manic.  No obvious change in her presentation.  No lability or agitation.  Thought Content: No reports of any recent psychosis.  No evidence of any psychosis.  Suicidal or Homicidal Thoughts:  None apparent or reported. The patient denies any suicidal or homicidal ideation.   Thought Process/Formulation: Slow.  Somewhat concrete and vague.  He is able to track and follow some conversation.  Does need occasional redirection and prompting.  No racing  thoughts.  Associations: No obvious recent change.  Somewhat concrete.  Able to process some simple information.  Fund of Knowledge: No significant recent change.  He continues somewhat impaired.  Attention/Concentration: Slow.  Somewhat concrete.  Still with impaired concentration.  Insight:  Grossly unchanged.  Continues impaired  Judgement:  Grossly unchanged.  Continues impaired  Memory:  Grossly fair.  Needing prompts and structure.    Motor Status:  No recent change reported. No current tremor.   Orientation: No reports of any recent change.    Diagnosis managed and treated at today's visit :    Bipolar affective disorder, type II by history      Plan:  Medication Adjustment:  I have reordered the Zoloft and increase that to 150 mg a day.  Again the patient was educated on monitoring for any side effects to the medication including emergence of a mathew.    Other:   Patient will return to this clinic in 4 months for medication check.  She agrees to call or return sooner with any questions or concerns.    Continue with the support of the clinic, reassurance, and redirection. Staff monitoring and ongoing assessments per team plan. Current psychotropic medication appears to represent the minimum effective dosage and appears medically necessary. We will continue to monitor and reassess. This team will utilize appropriate emergency services if necessary. I will make myself available if concerns or problems arise.    Pro Huynh MD

## 2021-06-03 NOTE — PROGRESS NOTES
Chief Complaint   Patient presents with     Diabetes     Headache     She states that she still has constant headaches.         HPI: Patient presents for a diabetic check and also to follow-up on a chronic headache for which she has been experiencing the last several months.  Regarding the headache, this was discussed with her PCP earlier this year and an MRI was performed.  MRI was essentially unremarkable outside of a complete opacification of the right maxillary sinus.  Patient followed up with ENT who did not think this was the cause of the headache but rather TMJ and so a referral to the TMJ clinic was placed.  Unfortunately the patient does not answer her phone and the voicemail is chronically full and so she is not gotten in with them yet.  Today she reports that she did not even know that she was referred to the TMJ clinic.    Patient has planned dental work coming up in January of this year which will also hopefully help with her sinus issues.  She is holding off due to cost limitations.    Regarding the patient's diabetes, she is not checking her blood sugars.  She says that the meter that she received just randomly stopped working.  She thinks that the batteries may have diet but she has also not tried new batteries.  No hyper/hypo-symptoms.  Previous A1c was uncontrolled at 8.6%.  Her diet is indiscriminate.  She continues to struggle with obesity.  It appears also that she is overdue for an eye exam.    Patient is also status post CABG and is overdue for follow-up with cardiology.  No cardiac symptoms.    ROS:Review of Systems - negative except for what's listed in the HPI    SH: The Patient's  reports that she quit smoking about 12 years ago. She has never used smokeless tobacco. She reports that she does not drink alcohol or use drugs.      FH: The Patient's family history includes Diabetes in her brother, father, and sister; Stroke in her brother and mother; Stroke (age of onset: 81) in her sister.      Meds:    Current Outpatient Medications on File Prior to Visit   Medication Sig Dispense Refill     acetaminophen (TYLENOL) 500 MG tablet Take 500 mg by mouth every 6 (six) hours as needed for pain.       ascorbic acid, vitamin C, (ASCORBIC ACID WITH BARBIE HIPS) 500 MG tablet Take 500 mg by mouth daily.       aspirin 81 mg chewable tablet Chew 1 tablet (81 mg total) daily.  0     atorvastatin (LIPITOR) 80 MG tablet Take 1 tablet (80 mg total) by mouth at bedtime. 90 tablet 1     b complex vitamins tablet Take 1 tablet by mouth daily.       busPIRone (BUSPAR) 15 MG tablet Take 1 tablet (15 mg total) by mouth daily. 90 tablet 1     clopidogrel (PLAVIX) 75 mg tablet Take 1 tablet (75 mg total) by mouth daily. 90 tablet 1     gabapentin (NEURONTIN) 300 MG capsule Take 3 capsules (900 mg total) by mouth at bedtime. 270 capsule 1     ibuprofen (ADVIL,MOTRIN) 200 MG tablet Take 600 mg by mouth every 8 (eight) hours as needed for pain.       isosorbide mononitrate (IMDUR) 60 MG 24 hr tablet Take 1 tablet (60 mg total) by mouth daily. 90 tablet 3     LANTUS SOLOSTAR U-100 INSULIN 100 unit/mL (3 mL) pen INJECT 28 UNITS SUBCUTANEOUSLY TWICE DAILY 15 adj dose pen 5     metFORMIN (GLUCOPHAGE XR) 500 MG 24 hr tablet Take 2 tablets (1,000 mg total) by mouth 2 (two) times a day with meals. 120 tablet 3     metoprolol succinate (TOPROL-XL) 25 MG Take 1 tablet (25 mg total) by mouth daily. 90 tablet 1     multivitamin with minerals tablet Take by mouth daily.        QUEtiapine (SEROQUEL) 25 MG tablet Take 1-2 tablets (25-50 mg total) by mouth at bedtime as needed. 60 tablet 3     sertraline (ZOLOFT) 100 MG tablet Take 1.5 tablets (150 mg total) by mouth daily. TAKE 1 TABLET BY MOUTH ONCE DAILY 45 tablet 6     traMADol (ULTRAM) 50 mg tablet TAKE 1 TABLET BY MOUTH EVERY 6 HOURS AS NEEDED FOR PAIN 30 tablet 0     blood glucose test strips Use 1 each As Directed 3 (three) times a day. Dispense brand per patient's insurance at pharmacy  "discretion. 100 strip 5     fluocinonide (LIDEX) 0.05 % ointment Apply twice daily as needed to psoriasis 60 g 2     generic lancets (FINGERSTIX LANCETS) Dispense brand per patient's insurance at pharmacy discretion. Use to test blood sugar 3 x daily 100 each 5     nitroglycerin (NITROSTAT) 0.4 MG SL tablet Place 1 tablet (0.4 mg total) under the tongue every 5 (five) minutes as needed for chest pain. 25 tablet 12     pen needle, diabetic (ULTICARE PEN NEEDLE) 32 gauge x 5/32\" Ndle Use 1 Device As Directed 2 (two) times a day. 200 each 3     No current facility-administered medications on file prior to visit.        O:  /56   Pulse 72   Temp 98  F (36.7  C) (Oral)   Ht 5' 2.25\" (1.581 m)   Wt 179 lb (81.2 kg)   SpO2 98%   BMI 32.48 kg/m      Physical Examination:   General appearance - alert, well appearing, and in no distress  Mental status - alert, oriented to person, place, and time  Eyes -PERRLA, conjunctiva pink  Mouth -poor dentition.  Membranes moist.  Neck - no significant adenopathy  Chest - clear to auscultation, no wheezes, rales or rhonchi, symmetric air entry  Heart - normal rate and regular rhythm, S1 and S2 normal, no murmurs noted  Abdomen - soft, nontender, nondistended, no masses or organomegaly  Neurological -feet grossly intact to touch.  Extremities -+1 bilateral lower extremity edema.  Skin - normal coloration and turgor.      A/P:     Problem List Items Addressed This Visit        ENT/CARD/PULM/ENDO Problems    Type 2 diabetes mellitus with hyperglycemia, with long-term current use of insulin (H)    Relevant Orders    Comprehensive Metabolic Panel (Completed)    Glycosylated Hemoglobin A1c (Completed)    Ambulatory referral to Ophthalmology       Other    S/P CABG (coronary artery bypass graft)      Other Visit Diagnoses     Visit for screening mammogram    -  Primary    Relevant Orders    Mammo Screening Bilateral    Chronic intractable headache, unspecified headache type        "         1. Type 2 diabetes mellitus with hyperglycemia, with long-term current use of insulin (H)  Diabetic labs ordered.  Time to update her diabetic eye exam 2.    - Comprehensive Metabolic Panel  - Glycosylated Hemoglobin A1c  - Ambulatory referral to Ophthalmology    2. Chronic intractable headache, unspecified headache type  ENT thought this was not related to her sinuses, but rather TMJ.  Unfortunately patient started to become upset after her appointment due to some communication issues with staff and left prior to meeting with our specialty .  Specialty  did reach out to the patient who was not interested in scheduling with the TMJ clinic at this time. Referral by ENT still in the chart.    3. S/P CABG (coronary artery bypass graft)  Encouraged follow-up with cardiology for surveillance.    4. Visit for screening mammogram  - Mammo Screening Bilateral; Future        Jefferson Martel, CNP

## 2021-06-03 NOTE — TELEPHONE ENCOUNTER
Patient Returning Call  Reason for call:  Retuning VM   Information relayed to patient:  Relayed message below   Patient has additional questions:  No  If YES, what are your questions/concerns:  no  Okay to leave a detailed message?: No call back needed

## 2021-06-03 NOTE — PATIENT INSTRUCTIONS - HE
Try changing out the batteries on the meter to make sure it's working! If not, let me know.    You're overdue for following up with cardiology.    Flu shot today for the season.    We'll help connect you with that TMJ clinic.    I usually comment on labs and imaging after they are all resulted within 2 business days. If you haven't heard your results within a week, please contact the office.    Thank you for coming in today!    If you receive a survey from [employer] about your experience today, it would be very helpful if you could fill it out to let us know what went well and what we can improve!    General Information:    Today you had your appointment with Jefferson Martel NP    My hours are:    Monday : Out of clinic  Tuesday : 8:00AM - 5:00 PM  Wednesday: 8:00AM - 5:00 PM  Thursday: 8:00AM - 5:00 PM  Friday: 8:00AM - 5:00 PM    I am not in the office Mondays. Therefore non-urgent calls and medical messages received on Monday will be addressed when I am back in the office on Tuesday. Urgent matters will be reviewed and addressed by one of my partners in the office as needed.    If lab work was done today as part of your evaluation you will generally be contacted via [email or web address], mail, or phone with the results within 1-5 days. If there is an alarming result we will contact you by phone. Lab results come back at varying times, I generally wait until all lab results are available before making comments on the results.     If you need refills please contact your pharmacy. They will send a refill request to me to review. Please allow 3-5 business days for us to process all refill requests.     My Clinical Assistant is Esther. Please call us at [phone] or send a medical message with any questions or concerns.

## 2021-06-03 NOTE — TELEPHONE ENCOUNTER
Last  Office Visit: 8/12/19.    Last Med Check: 7/1/19.    CSA on File: 5/6/19.    Future Appointment: Due 11/2019.    Esther Wang [facility]

## 2021-06-03 NOTE — TELEPHONE ENCOUNTER
Refill done.  Have her schedule for appointment, she is overdue for diabetic recheck.  Be fasting.

## 2021-06-03 NOTE — PROGRESS NOTES
Patient's impression of how medication is working? Doing pretty good, not too bad.     Compliant with Medication? Yes     Side Effects: None    Pain (0-10) No  Appetite change No  Sleep disturbance No  Change in energy No  Change in interest No  Change in concentration No  Psychosis/Hallucinations No  Negative thoughts Yes  Mood swings Yes

## 2021-06-04 NOTE — TELEPHONE ENCOUNTER
RN Assessment/Reason for Call:   Okay to leave Detailed Message  Ciarra calling in, She is in [place]; visiting forgot 2 pens of insulin, not leaving until 12/26/19  [facility].They are closed now.  She will check vacation override.    RN Action/Disposition:  She will call tomorrow.  Agrees to plan.     Pricilla Crane RN    Care Connection Triage/med refill  12/23/2019  8:09 PM

## 2021-06-04 NOTE — TELEPHONE ENCOUNTER
Who is calling:  patient  Reason for Call:  Med refill  Date of last appointment with primary care: 12/03/19  Okay to leave a detailed message: Yes    Patient states PCP wasn't available so she saw another provider on his care team.  She is going to [place] for holiday and is in a lot of pain and feels she been complaint with cares.  Requesting a reconsideration of medication due to recent visit 12/03/19.  Please send Rx and advise patient of outcome.

## 2021-06-04 NOTE — TELEPHONE ENCOUNTER
She should know the rules of the controlled substance agreement.  She is responsible for making the regular visits with her primary care physician for that issue.  She is responsible for making the visits ahead of time.  While I am not under any obligation to provide a refill, I will do so on this 1 occasion but she must be seen then within the next month.  Otherwise she can pursue an alternative primary care for the issue of the controlled substance.

## 2021-06-04 NOTE — TELEPHONE ENCOUNTER
Left message to call back for: patient  Information to relay to patient:  See below and help schedule

## 2021-06-04 NOTE — PATIENT INSTRUCTIONS - HE
1. Check blood sugar 3 times per day (before breakfast, before dinner and bedtime).  2. Continue current diabetes medications.  3. Try adding 2 teaspoons of cinnamon into your food every day.  4. When blood sugar is above 200 drink a lot of water.  5. Bring meter to next visit in January 2020.    Blood sugar goals:   Before meals:   1-2 hours after meals: less 200  Bedtime: 100-160    A1c goal of less than 8.0%

## 2021-06-04 NOTE — PROGRESS NOTES
DIABETES EDUCATION CARE PLAN    Assessment/Plan:     Initial visit for diabetes education and insulin adjustment. Ciarra is not checking her blood sugars. She did not buy new batteries yet. Provided 2 batteries for the Accu-Chek Sadia meter. Discussed blood sugar and A1c goals. Mentioned her A1c has been above target for at least 3 years. Assessed eating and exercise habits. Ciarra does not seem willing to improve her lifestyle to manage her diabetes and weight. She is currently in [employer] and does not want to add in any medications . In January her supplemental health insurance will change (to [employer] [id number]). Called [employer] to check coverage on diabetes medications for 2020. If her blood sugars are elevated recommend adding Novolog before meals and decreasing Lantus.    Current diabetes medications:   Lantus Solostar 28-0-0-28 (forgets evening dose of Lantus often)  Metformin Extended Release 8098-7-5050-0 (forgets evening dose often)    Plan:  1. Check blood sugar 3 times per day (before breakfast, before dinner and bedtime).  2. Continue current diabetes medications.  3. Try adding 2 teaspoons of cinnamon into your food every day.  4. When blood sugar is above 200 drink a lot of water.  5. Bring meter to next visit in January 2020.    Subjective/Objective:      Kemi Soto is a 69 y.o. female referred by Wander Olivas MD.  Accompanied by unaccompaniedLives with dogs and cats. Diagnosed with diabetes about 20 years ago  In the past was in a study where she got Tanzeum and her BG were at goal    Declined weight today  Wt Readings from Last 3 Encounters:   11/15/19 179 lb (81.2 kg)   08/12/19 178 lb (80.7 kg)   07/01/19 181 lb (82.1 kg)     Lab Results   Component Value Date    HGBA1C 8.5 (H) 11/15/2019     Diet/Eating Habits: 2 meals per day and lots of snacks  10:00 am- noon Breakfast: peanut butter toast on 2 wheat bread and banana and coffee with milk  5:00 or after starts  snacking  6-8:00 pm Chicken, vegetable, white rice or noodles or Mac and cheese with tuna  Snacks:candy, monster trail mix, pretzels with white chocolate  Goes to bed at 2-3:00 am. Watches TV and reads into the night    Physical Activity: sedentary    SMBG pattern/BG ranges: Does not test BG  Uses Accu-Chek Sadia meter    Hypoglycemia: none    EDUCATION RECORD     Monitoring   Meter (per above goals): Competent  Monitoring: Competent  BG goals: Assessed, Discussed and Competent. Modified BG goals due to heart history. A1c goal less 8.0%    Nutrition Management  Nutrition Management: Assessed and Discussed  Weight: Discussed  Portions/Balance: Assessed  Carb ID/Count: Not addressed  Label Reading: Not addressed  Heart Healthy Fats: Not addressed  Physical Activity: Assessed and Discussed    Medication  2020 coverage for medications: Novolog flexpen, Fargixa and Invokana. If GFR falls below 45 SGLT 2 inhibitors are contraindicated  Orals: Assessed and Discussed  Injected Medications: Assessed, Discussed and Literature provided   Storage/Exp:Not addressed   Site Rotation: Not addressed   Disposal of Sharps: Not addressed    Diabetes Disease Process Discussed    Acute Complications: Prevent, Detect, Treat:  Hypoglycemia: Assessed, Discussed and Competent  Hyperglycemia: Assessed, Discussed and Competent  Sick Days: Not addressed    Chronic Complications  Foot Care:Not addressed  Skin Care: Not addressed  Eye: Not addressed  ABC: Assessed, Discussed and Competent  Teeth:Not addressed  Goal Setting and Problem Solving: Assessed and Discussed  Barriers: Assessed and 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 Memory issues per patient  Psychosocial Adjustments: Assessed      Time spent with the patient: 60 minutes   Previous Education: yes  Visit Type:Diabetes Self-Management Training ()  Hours Remaining: DSMT 1 and MNT 2 for 2019  Diagnosis per  referral:Type 2 diabetes with hyperglycemia with long-term use of insulin (E11.65, Z79.4)        Liv Contreras, BERNABE, LD, CDE  Diabetes Educator  12/12/2019

## 2021-06-04 NOTE — TELEPHONE ENCOUNTER
She does have controlled substance agreement with me that was signed in May 2019.  However she has not followed up to see me since August.  So I cannot renew the tramadol prescription today.

## 2021-06-04 NOTE — TELEPHONE ENCOUNTER
Controlled Substance Refill Request  Medication:   Requested Prescriptions     Pending Prescriptions Disp Refills     traMADol (ULTRAM) 50 mg tablet [Pharmacy Med Name: TRAMADOL 50MG TAB] 30 tablet 0     Sig: TAKE 1 TABLET BY MOUTH EVERY 6 HOURS AS NEEDED FOR PAIN     Date Last Fill: 11/11/19  [id number]   Submit electronically to pharmacy  Controlled Substance Agreement on File:   Encounter-Level CSA Scan Date:    There are no encounter-level csa scan date.       Last office visit: Last office visit pertaining to requested medication was 11/15/19.

## 2021-06-05 NOTE — TELEPHONE ENCOUNTER
Liv-patient called. She said that she checked with My Chart support and it is showing that her My Chart is not active.   She's not sure why you are seeing it as active when you look at her My Chart status.   She doesn't want My Chart. She wants to be able to get hard copies of her test results.    Ciarra @ [phone]

## 2021-06-05 NOTE — TELEPHONE ENCOUNTER
Medication Question or Clarification  Who is calling:   Patient  What medication are you calling about (include dose and sig)?:   insulin aspart U-100 (NOVOLOG FLEXPEN U-100 INSULIN) 100 unit/mL (3 mL) injection pen 15 mL 3 1/30/2020     Sig: Inject 5 units before breakfast, 10 units before lunch and dinner. Total daily dose including priming the pen=30 units/day    Sent to pharmacy as: insulin aspart (U-100) 100 unit/mL (3 mL) subcutaneous pen (NovoLOG Flexpen U-100 Insulin)    Cosign for Ordering: Accepted by Wander Olivas MD on 1/31/2020  8:06 AM    E-Prescribing Status: Receipt confirmed by pharmacy (1/30/2020  2:54 PM CST)        Who prescribed the medication?:   Wander Olivas MD  What is your question/concern?:   Patient states [facility] will be contacting Provider by phone or fax regarding above medication and why patient does not need to go thru the step plan for above medication.  Requested Pharmacy: [id number]  Okay to leave a detailed message?: Yes

## 2021-06-05 NOTE — PROGRESS NOTES
DIABETES EDUCATION CARE PLAN    Assessment/Plan:     Follow-up visit for diabetes education and insulin adjustment. Ciarra is checking blood sugar twice per day. The 30 day blood sugar average is 200. The fasting blood sugars are within target more than 50% of the time. Blood sugars after dinner are 200-300. Patient continues to eat a lot of sweets at night and does not want to increase activity. Total daily dose of Lantus 56 units which is above 0.6 units/kg. Per medication protocol started insulin at meals. Doses calculated based on typical carbohydrate intake or 2 units per 15 gm carb. Instructed how rapid acting works in the body. Provided a daily schedule detailing when to test blood sugar, eat and take Lantus and Novolog with doses. Reviewed symptoms and treatment of high and low blood sugar. Reviewed the importance of rotating insulin injection sites, sharps disposal and storage of insulin. Currently Ciarra uses the same pen needle for the entire Lantus pen.     Current diabetes medications:   Metformin Extended Release 2000 mg/day  Lantus Solostar 28 units twice per day    Plan:  1. Check blood sugar 3 times per day (before breakfast, before dinner and bedtime).  2. Decrease Lantus to 30 units once per day. Friday skip the morning Lantus dose. Start 30 units of Lantus in the evening once daily.  3. Saturday start Novolog before meals. Inject 5 units before breakfast; 10 units before lunch and dinner. If you skip a meal do NOT take Novolog.  4. Continue Metformin Extended Release 1000 mg twice per day.  5. Only use the insulin pen needle once. Do not store the insulin pen with the needle on it. That increases the pressure inside the pen and you may not be able to push in the plunger.  6. Always carry glucose tablets or a form of sugar with you and in your car to treat a low blood sugar (less 70).  7. When blood sugar is above 200 drink water and move more.  8. Bring meter to next visit in 2  "weeks.    Subjective/Objective:      Kemi Soto is a 70 y.o. female referred by Wander Olivas MD.  Accompanied by unaccompanied   Lives with 3 dogs and cats. Diagnosed with diabetes about 20 years ago.     Wt Readings from Last 3 Encounters:   01/30/20 181 lb (82.1 kg)   11/15/19 179 lb (81.2 kg)   08/12/19 178 lb (80.7 kg)     Lab Results   Component Value Date    HGBA1C 9.1 (H) 01/22/2020     Diet/Eating Habits: 2-3 meals per day and sweets as snacks  Wakes up at 10:00-12:00 noon  10-12:00 Breakfast: 2 slices of peanut butter toast, coffee with 2% milk or a banana (30 gm carbohydrate)  Only eats \"brunch\" if dining out. Larger meal  5-7:00 Dinner: chicken, rice or potato or pasta, 12 oz milk or Chicken pot pie (60-75 gm carb)  Evening snacking: candy, yogurt covered pretzels, lots of Trail Mix with M&M in it  Middle of the night: may eat a banana  Goes to bed at 2:00-3:00 am. Watches TV and reads into the night    Physical Activity: sedentary    SMBG pattern/BG ranges: Uses Accu-Chek Sadia meter  Fasting 86, 123, 82, 126, 121, 90, 105, 181, 191, 105, 223, 122  Postprandial 229, 302, 309, 242, 252, 274, 382, 316    Hypoglycemia: none    EDUCATION RECORD     Monitoring   Meter (per above goals): Competent  Monitoring: Competent  BG goals: Assessed, Discussed and Competent    Nutrition Management  Nutrition Management: Discussed  Weight: Discussed  Portions/Balance: Competent  Carb ID/Count: Competent  Label Reading: Competent  Heart Healthy Fats: Competent  Physical Activity: Competent    Medication  Orals: Competent  Injected Medications: Assessed, Discussed, Literature provided and Competent   Storage/Exp:Discussed, Literature provided and Competent   Site Rotation: Assessed, Discussed, Literature provided and Competent   Disposal of Sharps: Assessed, Discussed and Competent    Diabetes Disease Process Competent    Acute Complications: Prevent, Detect, Treat:  Hypoglycemia: Assessed, Discussed and " Competent  Hyperglycemia: Assessed, Discussed and Competent  Sick Days: Not addressed    Chronic Complications  Foot Care:Not addressed  Skin Care: Not addressed  Eye: Not addressed  ABC: Competent  Teeth:Not addressed  Goal Setting and Problem Solving: Assessed, Discussed and Literature provided  Barriers: Assessed and 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, history of anxiety, bipolar disease, alcohol abuse, history of stroke and TIA, memory issues per patient  Psychosocial Adjustments: Assessed      Time spent with the patient: 60 minutes   Previous Education: yes  Visit Type:Diabetes Self-Management Training ()  Hours Remaining: DSMT none and MNT 2 hours for 2020  Diagnosis per referral:Type 2 Diabetes with hyperglycemia E11.65 with long-term use of insulin (Z79.4)        Liv Contreras RD, LD, CDE  Diabetes Educator  1/30/2020    Any diabetes medication dose changes were made via the CDE Protocol and Collaborative Practice Agreement with the patient's provider. A copy of this encounter was shared with the provider for co-signing.

## 2021-06-05 NOTE — PROGRESS NOTES
Chief Complaint   Patient presents with     Cough     sore throat, headache, nasal congestion, fatigue sx x 1.5 weeks       HPI: Complicated 69-year-old female complains of sore throat for 10 days in duration with cough and green mucus.  Review of old records shows that on 6/26/2019 she had a CT scan of the sinuses which showed market sinus disease.  She notes that she has had no fever vomiting or diarrhea but has copious amounts of thick green mucus from mother and throat and nares.    In addition she would like a refill of her isosorbide.  She has not been able to get into see Dr. Olivas.    ROS: No fever.  No rashes.  No dyspnea.    SH:    reports that she quit smoking about 12 years ago. She has never used smokeless tobacco. She reports that she does not drink alcohol or use drugs.      FH: The Patient's family history includes Diabetes in her brother, father, and sister; Stroke in her brother and mother; Stroke (age of onset: 81) in her sister.     Meds: Kemi has a current medication list which includes the following prescription(s): acetaminophen, ascorbic acid (vitamin c), aspirin, atorvastatin, b complex vitamins, buspirone, clopidogrel, fluocinonide, gabapentin, ibuprofen, insulin glargine, isosorbide mononitrate, metoprolol succinate, multivitamin with minerals, quetiapine, sertraline, tramadol, amoxicillin-clavulanate, blood glucose test, generic lancets, metformin, nitroglycerin, and pen needle, diabetic.    O:  /78   Pulse 76   Temp 98.2  F (36.8  C) (Oral)   Resp 20   SpO2 98%     Constitutional:    --Vitals as above  --No acute distress  Eyes-  --Sclera noninjected  --Lids and conjunctiva normal  ENT-  --TMs clear  --Sclera noninjected  --Pharynx moderate erythematous with mild sinus pain to palpation  Neck-  --Neck supple    Lymph-  --No cervical lymphadenopathy  Lungs-  --Clear to Auscultation  Heart-  --Regular rate and rhythm  Skin-  --Pink and dry    A/P:   1. Acute non-recurrent  sinusitis, unspecified location  Patient is high risk sinus infection recurrence due to her sinus disease in the past.  She also has multiple comorbidities including diabetes and hypertension and obesity.  Will treat with Augmentin, recommended sinus rinses and follow-up with Dr. Olivas  - amoxicillin-clavulanate (AUGMENTIN) 875-125 mg per tablet; Take 1 tablet by mouth 2 (two) times a day for 14 days.  Dispense: 28 tablet; Refill: 0    2. Coronary artery disease involving native coronary artery of native heart without angina pectoris  Follow-up with Dr. Olivas.  Refilled isosorbide    3.  Essential hypertension  Continue isosorbide and see Dr. Olivas within 30 days

## 2021-06-05 NOTE — TELEPHONE ENCOUNTER
Who is calling:  Patient   Reason for Call:  Patient was calling to check on her message from last week regarding new prescription for insulin.   Patient states she was not able to get this medication as her co-pay was over $200. Patient then states she received a letter from [employer] regarding coverage exception or PA stating the request was denied as the insurance did not receive supporting documentation from provider's office.    Patient was upset and frustrated that she has not hear back from providers office and would like to appeal the denial of this medication  Date of last appointment with primary care: 02/22/20  Okay to leave a detailed message: No - please speak with patient on issue

## 2021-06-05 NOTE — TELEPHONE ENCOUNTER
Novolog is likely not preferred. PA has not been started.     Humalog sent in replace of Novolog. Called pt to inform. No answer. LM informing and asked her to call back if she is not able to  humalog.

## 2021-06-05 NOTE — TELEPHONE ENCOUNTER
----- Message from Wander Olivas MD sent at 1/24/2020  8:21 AM CST -----  A1C at 9.1%- higher than last check. Do check blood sugars 3 x daily- in am, and 2 hours after lunch and supper. Do restart metformin as we discussed.  Then I think you should schedule appointment with diabetic nurse educator as you will likely need to start short acting insulin to cover your meals as well  As continuing the metformin and using the long acting insulin for baseline needs.   Then follow up to see me in 6 weeks.

## 2021-06-05 NOTE — TELEPHONE ENCOUNTER
Controlled Substance Refill Request  Medication Name:   Requested Prescriptions     Pending Prescriptions Disp Refills     traMADol (ULTRAM) 50 mg tablet [Pharmacy Med Name: traMADol HCl 50 MG Oral Tablet] 30 tablet 0     Sig: TAKE 1 TABLET BY MOUTH EVERY 6 HOURS AS NEEDED FOR PAIN     Signed Prescriptions Disp Refills     gabapentin (NEURONTIN) 300 MG capsule 270 capsule 3     Sig: TAKE 3 CAPSULES BY MOUTH AT BEDTIME     Authorizing Provider: RISHI SCHMITT     Ordering User: DARCY CASTREJON     Date Last Fill: 12/16/19  Requested Pharmacy: [facility]  Submit electronically to pharmacy  Controlled Substance Agreement on file:   Encounter-Level CSA Scan Date:    There are no encounter-level csa scan date.        Last office visit:  1/22/20

## 2021-06-05 NOTE — TELEPHONE ENCOUNTER
Refill Approved    Rx renewed per Medication Renewal Policy. Medication was last renewed on 5/6/19.    Josie Argueta, [facility] Triage/Med Refill 1/14/2020     Requested Prescriptions   Pending Prescriptions Disp Refills     busPIRone (BUSPAR) 15 MG tablet [Pharmacy Med Name: busPIRone HCl 15 MG Oral Tablet] 90 tablet 0     Sig: TAKE 1 TABLET BY MOUTH ONCE DAILY       Tricyclics/Misc Antidepressant/Antianxiety Meds Refill Protocol Passed - 1/12/2020  3:29 PM        Passed - PCP or prescribing provider visit in last year     Last office visit with prescriber/PCP: 2/1/2019 Mayco Zapata MD OR same dept: 1/9/2020 Kwesi Tovar MD OR same specialty: 1/9/2020 Kwesi Tovar MD  Last physical: Visit date not found Last MTM visit: Visit date not found   Next visit within 3 mo: Visit date not found  Next physical within 3 mo: Visit date not found  Prescriber OR PCP: Mayco Zapata MD  Last diagnosis associated with med order: 1. Anxiety disorder, unspecified type  - busPIRone (BUSPAR) 15 MG tablet [Pharmacy Med Name: busPIRone HCl 15 MG Oral Tablet]; TAKE 1 TABLET BY MOUTH ONCE DAILY  Dispense: 90 tablet; Refill: 0    If protocol passes may refill for 12 months if within 3 months of last provider visit (or a total of 15 months).

## 2021-06-05 NOTE — TELEPHONE ENCOUNTER
Prior Authorization Request  Who s requesting:  Patient  Pharmacy Name and Location: [facility]  Medication Name: Novolog  Insurance Plan:   Medicare [id number]   [employer] [id number]  Insurance Member ID Number:  See above  CoverMyMeds Key:   Informed patient that prior authorizations can take up to 10 business days for response:     Okay to leave a detailed message:         Pt reports she has been prescribed novolog because she has tried oral medication and Lantus without success in lowering her glucose readings.

## 2021-06-05 NOTE — TELEPHONE ENCOUNTER
Spoke with patient. She will drop off some paperwork tomorrow that she has from the insurance company.     PA started today. Hopefully paperwork she has will help find out what is covered for her.

## 2021-06-05 NOTE — PATIENT INSTRUCTIONS - HE
1. Check blood sugar 3 times per day (before breakfast, before dinner and bedtime).  2. Decrease Lantus to 30 units once per day. Friday skip the morning Lantus dose and start the 30 units of Lantus in the evening.  3. Saturday start Novolog before meals. Inject 5 units before breakfast; 10 units before lunch and dinner. If you skip a meal do NOT take Novolog.  4. Continue Metformin Extended Release 1000 mg twice per day.  5. Only use the insulin pen needle once. Do not store the insulin pen with the needle on it. That increases the pressure inside the pen and you may not be able to push in the plunger.  6. Always carry glucose tablets or a form of sugar with you and in your car to treat a low blood sugar (less 70).  7. When blood sugar is above 200 drink water and move more.  8. Bring meter to next visit in 2 weeks.    Blood sugar goals:   Before meals: 100-150  1-2 hours after meals: less 200  Bedtime: 100-160    A1c goal of less than 8.0%

## 2021-06-05 NOTE — TELEPHONE ENCOUNTER
RN Triage:    alix Barrios is calling this afternoon with critical lab value.  Glucose: 518 from yesterday  A1C: >9.1    Triage nurse tried to call patient, but received voice mail.  Did not leave message.    Sophie Zacarias, RN  [facility]

## 2021-06-05 NOTE — TELEPHONE ENCOUNTER
Patient cancelled her appt because the Novolog isn't covered by her insurance.  She is working in a appeal.

## 2021-06-05 NOTE — TELEPHONE ENCOUNTER
Called pt and notified on message below. Pt states that she has not started metformin yet. She believes her sugar was high from yesterday due to not have taken her insulin. Current glucose is at 252.     FYI: Best time to reach pt would be in the afternoon.     Esther Wang, CMA

## 2021-06-05 NOTE — TELEPHONE ENCOUNTER
Refill Approved--Gabapentin    Rx renewed per Medication Renewal Policy. Medication was last renewed on 5/6/19.    Last OV: 1/22/20    Beverly Alvarez, [facility] Triage/Med Refill 1/26/2020     Requested Prescriptions   Pending Prescriptions Disp Refills     traMADol (ULTRAM) 50 mg tablet [Pharmacy Med Name: traMADol HCl 50 MG Oral Tablet] 30 tablet 0     Sig: TAKE 1 TABLET BY MOUTH EVERY 6 HOURS AS NEEDED FOR PAIN       Controlled Substances Refill Protocol Failed - 1/26/2020  3:10 PM        Failed - Route all Controlled Substance Requests to Provider        Passed - Patient has controlled substance agreement in past 12 months     Encounter-Level CSA Scan Date:    There are no encounter-level csa scan date.               Passed - Visit with PCP or prescribing provider visit in past 12 months      Last office visit with prescriber/PCP: 1/22/2020 Wander Olivas MD OR same dept: 1/22/2020 Wander Olivas MD OR same specialty: 1/22/2020 Wander Olivas MD Last physical: 6/14/2017 Last MTM visit: Visit date not found    Next visit within 3 mo: Visit date not found  Next physical within 3 mo: Visit date not found  Prescriber OR PCP: Wander Olivas MD  Last diagnosis associated with med order: 1. Hip pain  - traMADol (ULTRAM) 50 mg tablet [Pharmacy Med Name: traMADol HCl 50 MG Oral Tablet]; TAKE 1 TABLET BY MOUTH EVERY 6 HOURS AS NEEDED FOR PAIN  Dispense: 30 tablet; Refill: 0    2. Chronic intractable headache, unspecified headache type  - traMADol (ULTRAM) 50 mg tablet [Pharmacy Med Name: traMADol HCl 50 MG Oral Tablet]; TAKE 1 TABLET BY MOUTH EVERY 6 HOURS AS NEEDED FOR PAIN  Dispense: 30 tablet; Refill: 0    3. Hand and foot pain  - gabapentin (NEURONTIN) 300 MG capsule [Pharmacy Med Name: Gabapentin 300 MG Oral Capsule]; TAKE 3 CAPSULES BY MOUTH AT BEDTIME  Dispense: 270 capsule; Refill: 0               gabapentin (NEURONTIN) 300 MG capsule [Pharmacy Med Name: Gabapentin 300 MG Oral Capsule] 270  capsule 0     Sig: TAKE 3 CAPSULES BY MOUTH AT BEDTIME       Gabapentin/Levetiracetam/Tiagabine Refill Protocol  Passed - 1/26/2020  3:10 PM        Passed - PCP or prescribing provider visit in past 12 months or next 3 months     Last office visit with prescriber/PCP: 1/22/2020 Wander Olivas MD OR same dept: 1/22/2020 Wander Olivas MD OR same specialty: 1/22/2020 Wander Olivas MD  Last physical: 6/14/2017 Last MTM visit: Visit date not found   Next visit within 3 mo: Visit date not found  Next physical within 3 mo: Visit date not found  Prescriber OR PCP: Wander Olivas MD  Last diagnosis associated with med order: 1. Hip pain  - traMADol (ULTRAM) 50 mg tablet [Pharmacy Med Name: traMADol HCl 50 MG Oral Tablet]; TAKE 1 TABLET BY MOUTH EVERY 6 HOURS AS NEEDED FOR PAIN  Dispense: 30 tablet; Refill: 0    2. Chronic intractable headache, unspecified headache type  - traMADol (ULTRAM) 50 mg tablet [Pharmacy Med Name: traMADol HCl 50 MG Oral Tablet]; TAKE 1 TABLET BY MOUTH EVERY 6 HOURS AS NEEDED FOR PAIN  Dispense: 30 tablet; Refill: 0    3. Hand and foot pain  - gabapentin (NEURONTIN) 300 MG capsule [Pharmacy Med Name: Gabapentin 300 MG Oral Capsule]; TAKE 3 CAPSULES BY MOUTH AT BEDTIME  Dispense: 270 capsule; Refill: 0    If protocol passes may refill for 12 months if within 3 months of last provider visit (or a total of 15 months).

## 2021-06-05 NOTE — TELEPHONE ENCOUNTER
Please call patient. Her sugars are too high. Make sure she restarted her metformin. Please have her check her sugar while on the phone and tell us the #'s. Tell her to drink ample water.      Jefferson Martel, CNP

## 2021-06-05 NOTE — PROGRESS NOTES
Assessment:  1.  Non-insulin-dependent diabetes mellitus, on insulin, likely poor control.  2.  Intermittent nausea.  3.  Hypertension, borderline but not at goal.  4.  Recent sinusitis, substantially improved.  5.  Coronary artery disease history of coronary artery bypass grafting.    Plan: Check A1c, basic and hepatic profiles which are not fasting.  Recommend she restart metformin and prescribed the 500 mg extended release-2 p.o. twice daily with meals-#120 refillable x3.  Regarding the intermittent nausea, recommend that she try holding the aspirin and using liquid antacid when she gets that symptoms i.e. 1 ounce of liquid antacid.  Follow-up if that is not improving.  I explained that naproxen also could be aggravating the stomach and if she gets adequate control with Tylenol she would be better off avoiding the naproxen but she thinks that Tylenol does not work for her.  Recommend she try to check blood sugars 3 times a day, including morning and then 2 hours after lunch and 2 hours after supper as we still need to know what her pattern he has and trying to look at improving control.  By having the sinus infection cleared up and by getting back on metformin I would expect that she has improvement in her blood sugars.  If her A1c is significantly over 8% then it would be better to see her back within 6 weeks or so but if that is improved then we could see her back in 3 months.  Then at follow-up can decide whether or not to further adjust medicine for blood pressure.    Subjective: 69-year-old female presenting for follow-up on the above.  Regarding diabetes, she checks blood sugars occasionally, does not have her glucometer with her, states that she is frustrated because she had left a message for the diabetic educator to look at her glucometer readings and had not heard back.  She states her blood sugar in the morning is usually 91-1 10, she states that usually high at night and occasionally over 200.  She is  actually not been on the metformin in the last 3 to 4 weeks, she states she just does not like to take it.  She has not had any specific side effects from taking it.  She had the sinus infection and is just finishing the amoxicillin clavulanic acid.  She does have occasional headaches and occasional other joint pains which she feels are likely related to arthritis and she does tend to take naproxen many times at night.  She does have intermittent nausea.  She states it often happens in the morning after she has had something to eat.  No vomiting.  No fever.  No blood in the stool.  Patient Active Problem List   Diagnosis     History of TIA (transient ischemic attack) and stroke     Essential hypertension     Mixed hyperlipidemia     Chalastodermia     Type 2 diabetes mellitus with hyperglycemia, with long-term current use of insulin (H)     S/P CABG (coronary artery bypass graft)     Bipolar 2 disorder (H)     Anxiety disorder     BMI 36.0-36.9,adult     Noncompliance with medications     Psoriasis, guttate     History of atrial fibrillation     Benign adenomatous polyp of large intestine     Bilateral hearing loss     Coronary artery disease of native artery of native heart with stable angina pectoris (H)     Peripheral neuropathy     Hyperlipidemia with target LDL less than 70     Past Medical History:   Diagnosis Date     Coronary artery disease      Diabetes mellitus (H)      Discitis of thoracolumbar region 10/10/2015     Encephalopathy 10/14/2015     Epidural abscess 10/10/2015     Hip pain, right      Hyperlipidemia      Sepsis (H) 10/8/2015     Stroke (H) 06/23/2015    Neurology felt that episode was C/W subacute ischemic stroke     TIA (transient ischemic attack) 6/23/2015     UTI (urinary tract infection)     admitted to [facility] with UTI     Allergies   Allergen Reactions     Oxycontin [Oxycodone] Nausea Only     Current Outpatient Medications   Medication Sig Dispense Refill     acetaminophen  (TYLENOL) 500 MG tablet Take 500 mg by mouth every 6 (six) hours as needed for pain.       amoxicillin-clavulanate (AUGMENTIN) 875-125 mg per tablet Take 1 tablet by mouth 2 (two) times a day for 14 days. 28 tablet 0     ascorbic acid, vitamin C, (ASCORBIC ACID WITH BARBIE HIPS) 500 MG tablet Take 500 mg by mouth daily.       aspirin 81 mg chewable tablet Chew 1 tablet (81 mg total) daily.  0     atorvastatin (LIPITOR) 80 MG tablet Take 1 tablet (80 mg total) by mouth at bedtime. 90 tablet 1     b complex vitamins tablet Take 1 tablet by mouth daily.       busPIRone (BUSPAR) 15 MG tablet TAKE 1 TABLET BY MOUTH ONCE DAILY 90 tablet 3     clopidogrel (PLAVIX) 75 mg tablet Take 1 tablet (75 mg total) by mouth daily. 90 tablet 1     fluocinonide (LIDEX) 0.05 % ointment Apply twice daily as needed to psoriasis 60 g 2     gabapentin (NEURONTIN) 300 MG capsule Take 3 capsules (900 mg total) by mouth at bedtime. 270 capsule 1     ibuprofen (ADVIL,MOTRIN) 200 MG tablet Take 600 mg by mouth every 8 (eight) hours as needed for pain.       insulin glargine (LANTUS SOLOSTAR U-100 INSULIN) 100 unit/mL (3 mL) pen INJECT 28 UNITS SUBCUTANEOUSLY TWICE DAILY 60 adj dose pen 0     isosorbide mononitrate (IMDUR) 60 MG 24 hr tablet Take 1 tablet (60 mg total) by mouth daily. 30 tablet 0     metoprolol succinate (TOPROL-XL) 25 MG Take 1 tablet (25 mg total) by mouth daily. 90 tablet 1     multivitamin with minerals tablet Take by mouth daily.        QUEtiapine (SEROQUEL) 25 MG tablet Take 1-2 tablets (25-50 mg total) by mouth at bedtime as needed. 60 tablet 3     sertraline (ZOLOFT) 100 MG tablet Take 1.5 tablets (150 mg total) by mouth daily. TAKE 1 TABLET BY MOUTH ONCE DAILY 45 tablet 6     traMADol (ULTRAM) 50 mg tablet Take 1 tablet (50 mg total) by mouth every 6 (six) hours as needed for pain. 30 tablet 0     blood glucose test strips Smartview test strips. Check blood sugar 3 times per day. 100 strip 5     generic lancets (FINGERSTIX  "LANCETS) Check blood sugar 3 times per day. Fastclix lancets. 100 each 5     metFORMIN (GLUCOPHAGE XR) 500 MG 24 hr tablet Take 2 tablets (1,000 mg total) by mouth 2 (two) times a day with meals. 120 tablet 3     nitroglycerin (NITROSTAT) 0.4 MG SL tablet Place 1 tablet (0.4 mg total) under the tongue every 5 (five) minutes as needed for chest pain. 25 tablet 12     pen needle, diabetic (ULTICARE PEN NEEDLE) 32 gauge x 5/32\" Ndle Use 1 Device As Directed 2 (two) times a day. 200 each 3     No current facility-administered medications for this visit.      All other review of systems are negative for any other changes.    Objective:/78   Pulse 89   Resp 22   SpO2 98%   HEENT examination is negative.  Neck supple without adenopathy.  Lungs clear.  Heart regular rate and rhythm without murmur.  Abdomen rotund but no masses tenderness or hepatosplenomegaly.  No pedal edema.  She is alert with clear speech.  "

## 2021-06-06 NOTE — TELEPHONE ENCOUNTER
Last Med Check: 7/1/19.    Next med check due on: 4/2020.    CSA on File: 5/6/19.    Future Appointment Scheduled ? Yes.     Esther Wang, CMA

## 2021-06-06 NOTE — TELEPHONE ENCOUNTER
Central PA team  [phone]  Pool: HE PA MED ([id number])          PA has been initiated.       PA form completed and faxed insurance via Cover My Meds     Key:  TIM HOLLINGSWORTH ([place]: [id number])        Medication:  NovoLOG FlexPen 100UNIT/ML pen-injectors      Insurance:  [employer]        Response will be received via fax and may take up to 5-10 business days depending on plan

## 2021-06-06 NOTE — TELEPHONE ENCOUNTER
Controlled Substance Refill Request  Medication Name:   Requested Prescriptions     Pending Prescriptions Disp Refills     traMADoL (ULTRAM) 50 mg tablet [Pharmacy Med Name: traMADol HCl 50 MG Oral Tablet] 30 tablet 0     Sig: TAKE 1 TABLET BY MOUTH EVERY 6 HOURS AS NEEDED FOR PAIN     Date Last Fill: 1/27/20  Requested Pharmacy: [facility]  Submit electronically to pharmacy  Controlled Substance Agreement on file:   Encounter-Level CSA Scan Date:    There are no encounter-level csa scan date.        Last office visit:  1/22/20

## 2021-06-06 NOTE — PROGRESS NOTES
Assessment:  1.  Diabetes mellitus, on insulin, appearing to have improved control.  2.  Short-term memory issues but no dementia.  3.  Frequent headaches, chronic.  4.  Follow-up abnormal kidney function    Plan: Check A1c and basic profile.  She is not fasting.  Renewed metformin 500 mg extended release-2 p.o. twice daily-#360 refillable x1.  Renewed Lantus insulin-inject subcutaneously 28 units twice a day-60 mL- refill x1.  I.e. both of those are 3-month supplies.  If the local [facility] pharmacy cannot do 3-month supplies because of insurance restriction, then can do refills to cover till she is seen in 3 months.  Explained she should not take ibuprofen to protect the kidney function.  Okay to use Tylenol.  She uses the tramadol infrequently for the headache pain.  She received refill on that earlier this month.  Encouraged her regarding further dietary efforts and regular exercise especially for helping the elevated blood sugar in the afternoon or evening.  Continue to monitor blood sugars as she is doing.  I did explain that she would be at higher risk of complications from the coronavirus if she was to contract that.  I spent in excess of 25 minutes release 50% counseling.    Subjective: 70-year-old female presenting for follow-up on multiple issues.  See note in January.  She did restart the metformin and is taking that 2 pills twice a day most of the time.  She states she is tolerating that.  She is using the Lantus insulin currently by using 28 units twice a day.  She states she went back to the regimen she had done previously.  Although she got a month supply of the NovoLog insulin, her insurance will not cover that and it was expensive if she was to stay on that and so she has not been using that.  Her blood sugars in the morning have mostly been about 100.  The readings later in the day have been anywhere from 1 40-2 78.  She wonders whether or not it would be okay to take the over-the-counter substance  Prevagen for short-term memory issues.  She wonders if there are any other over-the-counter supplements that would help with that.  Patient Active Problem List   Diagnosis     History of TIA (transient ischemic attack) and stroke     Essential hypertension     Mixed hyperlipidemia     Chalastodermia     Type 2 diabetes mellitus with hyperglycemia, with long-term current use of insulin (H)     S/P CABG (coronary artery bypass graft)     Bipolar 2 disorder (H)     Anxiety disorder     BMI 36.0-36.9,adult     Noncompliance with medications     Psoriasis, guttate     History of atrial fibrillation     Benign adenomatous polyp of large intestine     Bilateral hearing loss     Coronary artery disease of native artery of native heart with stable angina pectoris (H)     Peripheral neuropathy     Hyperlipidemia with target LDL less than 70     Past Medical History:   Diagnosis Date     Coronary artery disease      Diabetes mellitus (H)      Discitis of thoracolumbar region 10/10/2015     Encephalopathy 10/14/2015     Epidural abscess 10/10/2015     Hip pain, right      Hyperlipidemia      Sepsis (H) 10/8/2015     Stroke (H) 06/23/2015    Neurology felt that episode was C/W subacute ischemic stroke     TIA (transient ischemic attack) 6/23/2015     UTI (urinary tract infection)     admitted to [facility] with UTI     Allergies   Allergen Reactions     Oxycontin [Oxycodone] Nausea Only     Current Outpatient Medications   Medication Sig Dispense Refill     acetaminophen (TYLENOL) 500 MG tablet Take 500 mg by mouth every 6 (six) hours as needed for pain.       ascorbic acid, vitamin C, (ASCORBIC ACID WITH BARBIE HIPS) 500 MG tablet Take 500 mg by mouth daily.       aspirin 81 mg chewable tablet Chew 1 tablet (81 mg total) daily.  0     atorvastatin (LIPITOR) 80 MG tablet Take 1 tablet (80 mg total) by mouth at bedtime. 90 tablet 1     b complex vitamins tablet Take 1 tablet by mouth daily.       busPIRone (BUSPAR) 15 MG  tablet TAKE 1 TABLET BY MOUTH ONCE DAILY 90 tablet 3     clopidogrel (PLAVIX) 75 mg tablet Take 1 tablet (75 mg total) by mouth daily. 90 tablet 1     fluocinonide (LIDEX) 0.05 % ointment Apply twice daily as needed to psoriasis 60 g 2     gabapentin (NEURONTIN) 300 MG capsule TAKE 3 CAPSULES BY MOUTH AT BEDTIME 270 capsule 3     generic lancets (FINGERSTIX LANCETS) Check blood sugar 3 times per day. Fastclix lancets. 100 each 5     insulin aspart U-100 (NOVOLOG FLEXPEN U-100 INSULIN) 100 unit/mL (3 mL) injection pen Inject 5 units before breakfast, 10 units before lunch and dinner. Total daily dose including priming the pen=30 units/day 15 mL 3     insulin glargine (LANTUS SOLOSTAR U-100 INSULIN) 100 unit/mL (3 mL) pen Inject 28 Units under the skin 2 (two) times a day. 60 mL 1     insulin lispro (HUMALOG KWIKPEN INSULIN) 100 unit/mL pen Inject 5 units before breakfast, 10 units before lunch and dinner. Total daily dose including priming the pen=30 units/day 5 adj dose pen 1     isosorbide mononitrate (IMDUR) 60 MG 24 hr tablet TAKE 1 TABLET BY MOUTH ONCE DAILY .  FOR  FURTHER  REFILLS  SEE  DR. SCHMITT 90 tablet 1     metFORMIN (GLUCOPHAGE XR) 500 MG 24 hr tablet Take 2 tablets (1,000 mg total) by mouth 2 (two) times a day with meals. 360 tablet 1     metoprolol succinate (TOPROL-XL) 25 MG Take 1 tablet (25 mg total) by mouth daily. 90 tablet 1     multivitamin with minerals tablet Take by mouth daily.        nitroglycerin (NITROSTAT) 0.4 MG SL tablet Place 1 tablet (0.4 mg total) under the tongue every 5 (five) minutes as needed for chest pain. 25 tablet 12     QUEtiapine (SEROQUEL) 25 MG tablet Take 1-2 tablets (25-50 mg total) by mouth at bedtime as needed. 60 tablet 3     sertraline (ZOLOFT) 100 MG tablet Take 1.5 tablets (150 mg total) by mouth daily. TAKE 1 TABLET BY MOUTH ONCE DAILY 45 tablet 6     traMADoL (ULTRAM) 50 mg tablet TAKE 1 TABLET BY MOUTH EVERY 6 HOURS AS NEEDED FOR PAIN 30 tablet 0     blood  "glucose test strips Smartview test strips. Check blood sugar 3 times per day. 100 strip 5     pen needle, diabetic (ULTICARE PEN NEEDLE) 32 gauge x 5/32\" Ndle Use 1 each As Directed 4 (four) times a day. 200 each 3     No current facility-administered medications for this visit.      She does not smoke.  She does not drink any alcohol.  All other review of systems are currently negative.  She wonders if it is okay for her to work as a .    Objective:/70   Pulse 88   Wt 183 lb (83 kg)   SpO2 98%   BMI 33.20 kg/m    Examination is unchanged from January.  She is alert with clear speech.  She relays her history well and shows no sign of any dementia on exam.  "

## 2021-06-06 NOTE — TELEPHONE ENCOUNTER
Refill Approved    Rx renewed per Medication Renewal Policy. Medication was last renewed on 1/9/20.    Josie Argueta, [facility] Triage/Med Refill 3/4/2020     Requested Prescriptions   Pending Prescriptions Disp Refills     isosorbide mononitrate (IMDUR) 60 MG 24 hr tablet [Pharmacy Med Name: Isosorbide Mononitrate ER 60 MG Oral Tablet Extended Release 24 Hour] 30 tablet 0     Sig: TAKE 1 TABLET BY MOUTH ONCE DAILY .  FOR  FURTHER  REFILLS  SEE  DR. OLIVAS       Isosorbide Refill Protocol Passed - 3/3/2020  7:45 PM        Passed - Visit with PCP or prescribing provider visit in last 6 months or next 3 months     Last office visit with prescriber/PCP: 1/9/2020 OR same dept: 1/22/2020 Wander Olivas MD OR same specialty: 1/22/2020 Wander Olivas MD Last physical: Visit date not found Last MTM visit: Visit date not found     Next appt within 3 mo: Visit date not found  Next physical within 3 mo: Visit date not found  Prescriber OR PCP: Kwesi Tovar MD  Last diagnosis associated with med order: 1. Coronary artery disease involving native coronary artery of native heart without angina pectoris  - isosorbide mononitrate (IMDUR) 60 MG 24 hr tablet [Pharmacy Med Name: Isosorbide Mononitrate ER 60 MG Oral Tablet Extended Release 24 Hour]; TAKE 1 TABLET BY MOUTH ONCE DAILY .  FOR  FURTHER  REFILLS  SEE  DR. OLIVAS  Dispense: 30 tablet; Refill: 0    2. History of TIA (transient ischemic attack) and stroke  - isosorbide mononitrate (IMDUR) 60 MG 24 hr tablet [Pharmacy Med Name: Isosorbide Mononitrate ER 60 MG Oral Tablet Extended Release 24 Hour]; TAKE 1 TABLET BY MOUTH ONCE DAILY .  FOR  FURTHER  REFILLS  SEE  DR. OLIVAS  Dispense: 30 tablet; Refill: 0    3. Essential hypertension  - isosorbide mononitrate (IMDUR) 60 MG 24 hr tablet [Pharmacy Med Name: Isosorbide Mononitrate ER 60 MG Oral Tablet Extended Release 24 Hour]; TAKE 1 TABLET BY MOUTH ONCE DAILY .  FOR  FURTHER  REFILLS  SEE  DR. OLIVAS   Dispense: 30 tablet; Refill: 0    4. Mixed hyperlipidemia  - isosorbide mononitrate (IMDUR) 60 MG 24 hr tablet [Pharmacy Med Name: Isosorbide Mononitrate ER 60 MG Oral Tablet Extended Release 24 Hour]; TAKE 1 TABLET BY MOUTH ONCE DAILY .  FOR  FURTHER  REFILLS  SEE  DR. SCHMITT  Dispense: 30 tablet; Refill: 0    If protocol passes may refill for 6 months if within 3 months of last provider visit (or a total of 9 months).              Passed - Blood pressure filed in past 12 months     BP Readings from Last 1 Encounters:   01/22/20 146/78

## 2021-06-07 ENCOUNTER — HOSPITAL ENCOUNTER (OUTPATIENT)
Dept: CARDIOLOGY | Facility: CLINIC | Age: 71
Discharge: HOME | End: 2021-06-07
Attending: INTERNAL MEDICINE
Payer: COMMERCIAL

## 2021-06-07 DIAGNOSIS — I48.0 PAROXYSMAL ATRIAL FIBRILLATION (H): ICD-10-CM

## 2021-06-07 NOTE — TELEPHONE ENCOUNTER
Controlled Substance Refill Request  Medication Name:   Requested Prescriptions     Pending Prescriptions Disp Refills     traMADoL (ULTRAM) 50 mg tablet [Pharmacy Med Name: traMADol HCl 50 MG Oral Tablet] 30 tablet 0     Sig: TAKE 1 TABLET BY MOUTH EVERY 6 HOURS AS NEEDED FOR PAIN     Date Last Fill: 3/5/20  Requested Pharmacy: [facility]  Submit electronically to pharmacy  Controlled Substance Agreement on file:   Encounter-Level CSA Scan Date:    There are no encounter-level csa scan date.        Last office visit:  3/16/20

## 2021-06-07 NOTE — TELEPHONE ENCOUNTER
Last Med Check: 3/16/20    Next med check due on: 6/16/20    CSA on File: 5/7/19    Future Appointment Scheduled ?no    Last Med Refill? 3/5/20    Rhona Mathew, [facility]

## 2021-06-07 NOTE — PATIENT INSTRUCTIONS - HE
It was nice to visit with you today on the telephone.  You told me that you were feeling well without complaints of chest pain, shortness of breath or other heart related symptoms.  We talked about the multiple stents that you had and whether we would discontinue Plavix but your preference was to continue the current combination of aspirin and Plavix and then follow-up with me once the virus runs its course.  Please call my nurse Priscila with any questions or concerns as a relates to your heart.  We do want to follow-up on your cholesterol numbers as when I reviewed your chart record the numbers were a little higher in July 2019 than they were in July 2018. Her number is [phone]

## 2021-06-08 NOTE — PROGRESS NOTES
".  Assessment / Impression     Bipolar affective disorder; type II, stable.  Cognitive disorder; NOS.    Sleep disorder.  Follow-up medication evaluation.  Patient's impression of how medication is working? good  Compliant with medication? yes    Side effects:None       Plan:     Reviewed medications.  No changes made today.  We'll have patient follow up in 3 months.    Subjective:      HPI: Kemi Soto is a 67 y.o. female with  bipolar affective disorder and cognitive disorder.  She is here for follow-up medication evaluation.  Brianna states everything is stable.  She denies any depressive symptoms.  She just got back from a trip to [place] along with her family and that went very well.  She was a little fatigued after going to [employer].  No new medical issues or change in medications.  Mood is been stable, she continues with her Bible study and she has her friends as well as her dogs.  Her daughter will be moving in the next few months and that she states \"we'll be hard\".    Kemi  has a past medical history of Discitis of thoracolumbar region (10/10/2015); Encephalopathy (10/14/2015); Epidural abscess (10/10/2015); Hip pain, right; Sepsis (10/8/2015); Stroke (06/23/2015); TIA (transient ischemic attack) (6/23/2015); and UTI (urinary tract infection).  Kemi  has a past surgical history that includes Lumbar discectomy (N/A, 10/11/2015); PICC (10/19/2015); Insert Midline (10/31/2015); Coronary artery bypass graft (12/11/2007); Coronary angioplasty with stent (03/30/2016); Coronary stent placement (2008); and Carpal tunnel release (Bilateral).  Oxycontin [oxycodone]  Current Outpatient Prescriptions   Medication Sig Dispense Refill     acetaminophen (TYLENOL EXTRA STRENGTH) 500 MG tablet Take 500 mg by mouth every 6 (six) hours as needed for pain.       aspirin 81 MG EC tablet Take 81 mg by mouth daily.       atorvastatin (LIPITOR) 80 MG tablet Take 1 tablet (80 mg total) by mouth bedtime. 90 tablet 1 " "    blood glucose test (CONTOUR NEXT STRIPS) strips Use 1 each As Directed 3 (three) times a day. 100 each 2     busPIRone (BUSPAR) 15 MG tablet Take 1 tablet (15 mg total) by mouth 2 (two) times a day. 180 tablet 1     clopidogrel (PLAVIX) 75 mg tablet Take 1 tablet (75 mg total) by mouth daily. 90 tablet 3     fluocinonide (LIDEX) 0.05 % ointment Apply twice a day sparingly to sores as needed for itching. 30 g 2     gabapentin (NEURONTIN) 300 MG capsule Take 3 capsules (900 mg total) by mouth bedtime. 270 capsule 3     generic lancets (MICROLET LANCET) Use 1 each As Directed 3 (three) times a day. 100 each 2     LANTUS SOLOSTAR 100 unit/mL (3 mL) pen Inject 28 Units under the skin 2 (two) times a day. 11.65 Type 2 with hyperglycemia 3 mL PRN     metFORMIN (GLUCOPHAGE) 500 MG tablet Take 1 tablet (500 mg total) by mouth 2 (two) times a day with meals. 180 tablet 3     metoprolol succinate (TOPROL XL) 25 MG Take 1 tablet (25 mg total) by mouth daily. 90 tablet 5     nitroglycerin (NITROSTAT) 0.4 MG SL tablet Place 1 tablet (0.4 mg total) under the tongue every 5 (five) minutes as needed for chest pain. 25 tablet 12     pen needle, diabetic (ULTICARE PEN NEEDLE) 32 gauge x 5/32\" Ndle Use 1 Device As Directed 2 (two) times a day. 200 each 3     sertraline (ZOLOFT) 100 MG tablet Take 1 tablet (100 mg total) by mouth daily. 90 tablet 1     Current Facility-Administered Medications   Medication Dose Route Frequency Provider Last Rate Last Dose     Study Drug albiglutide 30 mg/placebo injector pen 1 Syringe (HARMONY)  1 Syringe Subcutaneous Q7 Days Terri Christine MD         Family History   Problem Relation Age of Onset     Stroke Mother      Diabetes Father      Stroke Sister      Diabetes Sister      Stroke Brother      Diabetes Brother      Social History     Social History     Marital status: Single     Spouse name: N/A     Number of children: 1     Years of education: N/A     Occupational History     Retired      " Former [employer]      Social History Main Topics     Smoking status: Former Smoker     Quit date: 6/23/2007     Smokeless tobacco: Never Used     Alcohol use No      Comment: stopped drinking 9/2015     Drug use: No     Sexual activity: No     Other Topics Concern     Not on file     Social History Narrative    Lives alone with cats and dogs. , one daughter, Tory Lofton.        The following portions of the patient's history were reviewed and updated as appropriate: allergies, current medications, past family history, past medical history, past social history, past surgical history and problem list.    Review of Systems  Constitutional: negative  Neurological: negative for memory problems  Behavioral/Psych: negative for anxiety, depression, fatigue, irritability, loss of interest in favorite activities, mood swings, sleep disturbance and racing thoughts       Objective:   @VS    Mental Status Exam:     Appearance: Patient is well groomed, pleasant and cooperative.  Good eye contact, no    Speech / Language : Rapid and Loud    Associations: appropriate    Alert and oriented X 3:yes    Mood: Euthymic  Affect: Congruent w/content of speech    Suicidal / Homicidal ideation:none  If yes, document safety plan:    Fund of knowledge: good    Thought process:Within normal    Judgement / insight: No Evidence of Impairment    Recent and remote memory: WNL    Attention: Within normal  Concentration: Within normal    Motor status: Gait normal, no involuntary movements.    Scores: NA    Change in medication? No    Education    Reviewed risks/benefits of medication      Physical Exam: General appearance: alert, appears stated age, cooperative and no distress  Neurologic: Mental status: Alert, oriented, thought content appropriate, affect: mood-congruent, thought content exhibits logical connections, when questioned about suicide, the patient expresses no suicidal ideation, no homicidal ideation

## 2021-06-08 NOTE — PROGRESS NOTES
ASSESSMENT:     Healthy female exam.     Kemi Soto presents at my request because she is markedly overdue for follow-up of her diabetes and other medical conditions.  Today's visit was scheduled as a routine annual well woman screening exam.  It has been more than 5 years since her last Pap and she needs to get caught up on other screening measures.  She wanted to discuss all the issues related to her medical problems but there was simply not sufficient time today to review everything in addition to doing her screening exam.  We nevertheless did spend at least 20 minutes discussing her other medical problems and I ordered labs and medications.  Note that I gave her limited refills of her psychiatric medications but these have been prescribed and managed by a psychiatric provider and I do not intend to continue to prescribe long-term and advised her of that.  I have requested that she schedule a separate appointment to review her uncontrolled diabetes and other concerns.       1. Routine general medical examination at a health care facility     2. Diabetes type 2, uncontrolled  Glycosylated Hemoglobin A1c    Microalbumin, Random Urine    DISCONTINUED: LANTUS 100 unit/mL injection   3. Essential hypertension with goal blood pressure less than 130/85  Comprehensive Metabolic Panel   4. Mixed hyperlipidemia  Lipid Cascade    atorvastatin (LIPITOR) 80 MG tablet   5. Encounter for screening colonoscopy  Ambulatory referral for Colonoscopy   6. Post-menopause  DXA Bone Density Scan   7. Need for hepatitis C screening test  Hepatitis C Antibody (Anti-HCV)   8. Cervical cancer screening  Gynecologic Cytology (PAP Smear)    HPV Cascade (PCR)   9. Numbness of right hand  Ambulatory referral to Neurology   10. Anxiety, generalized  sertraline (ZOLOFT) 100 MG tablet   11. Bipolar 1 disorder     12. Mood disorder in conditions classified elsewhere  busPIRone (BUSPAR) 15 MG tablet   13. Coronary artery disease  clopidogrel  (PLAVIX) 75 mg tablet   14. Rash and nonspecific skin eruption  DISCONTINUED: fluocinonide (LIDEX) 0.05 % ointment        PLAN:    Pap and HPV were done today.  Per current guidelines, she should have annual Pap and HPV tests ×3 and if all are normal she could discontinue Paps.  She has a history of an abnormal Pap were than 30 years ago but it was not high grade.  I have referred her to neurology for evaluation of leg numbness and weakness in her right hand.  She did get a mammogram on December 14.  I have referred her for colonoscopy.  According to caregiver everywhere, she had an attempted colonoscopy on 6/28/12 but an extremely poor prep preventing inadequate exam.  She was supposed to return the following day after a double prep but I do not see any record that she did so.  They did note numerous small polyps so it is very important that she get in for colonoscopy now.  She is up-to-date on pneumonia and Tdap immunizations but refused a flu shot today though I explained that diabetics are more susceptible to complications of influenza which sometimes even causes death.  They've referred her for a bone density test.  I have recommended that she get a diabetic eye exam.  Encouraged her to return in 1 week for evaluation and management of her poorly controlled diabetes and other medical problems.    I have had an Advance Directives discussion with the patient.  The following high BMI interventions were performed this visit: encouragement to exercise and lifestyle education regarding diet    SUBJECTIVE:      Kemi Soto is a 67 y.o. female who presents for an annual exam.  The patient is not sexually active. The patient reports that there is not domestic violence in her life.     Healthy Habits:   Regular Exercise: No  Sunscreen Use: Yes  Healthy Diet: Yes  Seat Belt: Yes  Self Breast Exam Monthly:No  Colonoscopy: Yes  Lipid Profile: Yes  Glucose Screen: Yes  Prevention of Osteoporosis: No  Last Dexa: No  Guns  at Home:  No     Gynecologic History  LMP: post menopausal  Contraception: post menopausal status  Last Pap: over 5 years . Results were: normal  Last mammogram: 12/14/2016. Results were: normal  History of abnormal Pap: +30 years ago, presumed low grade since it was treated with cryotherapy.    Current Outpatient Prescriptions   Medication Sig Dispense Refill     acetaminophen (TYLENOL EXTRA STRENGTH) 500 MG tablet Take 500 mg by mouth every 6 (six) hours as needed for pain.       aspirin 81 MG EC tablet Take 81 mg by mouth daily.       atorvastatin (LIPITOR) 80 MG tablet Take 1 tablet (80 mg total) by mouth bedtime. 90 tablet 1     blood glucose test (CONTOUR NEXT STRIPS) strips Use 1 each As Directed 3 (three) times a day. 100 each 2     busPIRone (BUSPAR) 15 MG tablet Take 1 tablet (15 mg total) by mouth 2 (two) times a day. 180 tablet 1     clopidogrel (PLAVIX) 75 mg tablet Take 1 tablet (75 mg total) by mouth daily. 90 tablet 3     generic lancets (MICROLET LANCET) Use 1 each As Directed 3 (three) times a day. 100 each 2     metoprolol succinate (TOPROL XL) 25 MG Take 1 tablet (25 mg total) by mouth daily. 90 tablet 5     nitroglycerin (NITROSTAT) 0.4 MG SL tablet Place 1 tablet (0.4 mg total) under the tongue every 5 (five) minutes as needed for chest pain. 25 tablet 12     sertraline (ZOLOFT) 100 MG tablet Take 1 tablet (100 mg total) by mouth daily. 90 tablet 1     aspirin 325 MG tablet 1/2 tablet daily       fluocinonide (LIDEX) 0.05 % ointment Apply twice a day sparingly to sores as needed for itching. 30 g 2     gabapentin (NEURONTIN) 300 MG capsule Take 3 capsules (900 mg total) by mouth bedtime. 270 capsule 3     LANTUS SOLOSTAR 100 unit/mL (3 mL) pen Inject 28 Units under the skin 2 (two) times a day. 11.65 Type 2 with hyperglycemia 3 mL PRN     metFORMIN (GLUCOPHAGE) 500 MG tablet Take 1 tablet (500 mg total) by mouth 2 (two) times a day with meals. 180 tablet 3     pen needle, diabetic (ULTICARE PEN  "NEEDLE) 32 gauge x 5/32\" Ndle Use 1 Device As Directed 2 (two) times a day. 200 each 3     Current Facility-Administered Medications   Medication Dose Route Frequency Provider Last Rate Last Dose     Study Drug albiglutide 30 mg/placebo injector pen 1 Syringe (HARMONY)  1 Syringe Subcutaneous Q7 Days Terri Christine MD         Allergies:  Oxycontin [oxycodone]    Patient Active Problem List   Diagnosis     History of TIA (transient ischemic attack) and stroke     Essential hypertension     Mixed hyperlipidemia     Coronary artery disease     Back pain     Clinical depression     Chalastodermia     Diabetes mellitus with polyneuropathy     Type 2 diabetes mellitus with hyperglycemia     Bipolar 1 disorder     S/P CABG (coronary artery bypass graft)     Bipolar II disorder in partial or unspecified remission     Anxiety disorder     BMI 36.0-36.9,adult     MCI (mild cognitive impairment)     Physical deconditioning     Constipation     Alcohol abuse, episodic     Noncompliance with medications     Psoriasis, guttate       Past Medical History:   Diagnosis Date     Discitis of thoracolumbar region 10/10/2015     Encephalopathy 10/14/2015     Epidural abscess 10/10/2015     Hip pain, right      Sepsis 10/8/2015     Stroke 2015    Neurology felt that episode was C/W subacute ischemic stroke     TIA (transient ischemic attack) 2015     UTI (urinary tract infection)     admitted to [facility] with UTI       OB History      Para Term  AB TAB SAB Ectopic Multiple Living    1 1 1       1          Past Surgical History:   Procedure Laterality Date     CARPAL TUNNEL RELEASE Bilateral      CORONARY ANGIOPLASTY WITH STENT PLACEMENT  2016    Drug eluting stent mid LAD, cutting balloon to 1st diagonal     CORONARY ARTERY BYPASS GRAFT  12/11/2007    X 3 vessels, LIMA to LAD, saph vein graft to distal RCA, saphvein graft to marginal, mini circuit bypass.  [facility].     CORONARY STENT " PLACEMENT  2008    Left main, left circumflex, RCA     INSERT MIDLINE HE  10/31/2015          LUMBAR DISCECTOMY N/A 10/11/2015    Procedure: BILATERAL L1-L5 DECOMPRESSIVE LAMINECTOMY WITH EVACUATION OF EPIDURAL ABSCESS IRRIGATION & DEBRIDEMENT;  Surgeon: Luli Chan MD;  Location: [facility];  Service:      [facility]  10/19/2015            Family History   Problem Relation Age of Onset     Stroke Mother      Diabetes Father      Stroke Sister      Diabetes Sister      Stroke Brother      Diabetes Brother        Social History     Social History     Marital status: Single     Spouse name: N/A     Number of children: 1     Years of education: N/A     Occupational History     Retired      Former [employer]      Social History Main Topics     Smoking status: Former Smoker     Quit date: 6/23/2007     Smokeless tobacco: Never Used     Alcohol use No      Comment: stopped drinking 9/2015     Drug use: No     Sexual activity: No     Other Topics Concern     Not on file     Social History Narrative    Lives alone with cats and dogs. , one daughter, Tory Lofton.        Review of Systems  A 12 point comprehensive review of systems was negative except as noted:    Her primary concern, and mine, is her poorly controlled diabetes.  I will summarize that history in more detail when she returns next week.  She has been on Levemir insulin for a little less than a year but does not like it and does not feel it works as well for her.  She consulted with an insurance expert and believes that her new insurance will cover the cost of Lantus insulin.  She has been enrolled in the [employer] Outcomes Trial and is either on the trial drug, albiglutide (Tanzeum), or placebo.  She does believes that she is in the placebo arm of the study because she does not feel that her diabetes has shown any improvement at all since starting the drug at the end of November.  She also complains of numbness in her  "right hand and has been dropping things.  She has a history of carpal tunnel surgeries bilaterally.  When she had her stroke in June 2015 it gave her numbness in her right hand and right face.       OBJECTIVE:        Visit Vitals     /66 (Patient Site: Left Arm, Patient Position: Sitting, Cuff Size: Adult Large)     Pulse 68     Temp 98.4  F (36.9  C) (Oral)     Ht 5' 2\" (1.575 m)     Wt 174 lb (78.9 kg)     LMP  (Within Years)     BMI 31.83 kg/m2     History   Smoking Status     Former Smoker     Quit date: 6/23/2007   Smokeless Tobacco     Never Used       Physical Exam:  General appearance - alert, well appearing, and in no distress  Eyes - pupils equal and reactive, extraocular eye movements intact  Ears - bilateral TM's and external ear canals normal  Nose - normal and patent, no erythema, discharge or polyps  Mouth - mucous membranes moist, pharynx normal without lesions  Neck - supple, no significant adenopathy. Thyroid normal and without palpable nodules or masses  Lymphatics - no palpable lymphadenopathy  Chest - clear to auscultation, no wheezes, rales or rhonchi, symmetric air entry  Heart - normal rate, regular rhythm, normal S1, S2, no murmurs, rubs, clicks or gallops  Abdomen - soft, nontender, nondistended, no masses or organomegaly  Breasts - breasts appear normal, no suspicious masses, no skin or nipple changes or axillary nodes  Pelvic - normal external genitalia, vulva, vagina, cervix, uterus and adnexa.    Back exam - full range of motion, no tenderness, palpable spasm or pain on motion  Neurological - alert, oriented, normal speech, no focal findings or movement disorder noted, cranial nerves II through XII intact  Musculoskeletal - no joint tenderness, deformity or swelling, full range of motion without pain  Extremities - peripheral pulses normal, no pedal edema, no clubbing or cyanosis  Skin - normal coloration and turgor, no rashes, no suspicious skin lesions noted           Results " for orders placed or performed in visit on 01/04/17   Glycosylated Hemoglobin A1c   Result Value Ref Range    Hemoglobin A1c 11.0 (H) 3.5 - 6.0 %   Hepatitis C Antibody (Anti-HCV)   Result Value Ref Range    Hepatitis C Ab Negative Negative   Comprehensive Metabolic Panel   Result Value Ref Range    Sodium 139 136 - 145 mmol/L    Potassium 4.3 3.5 - 5.0 mmol/L    Chloride 103 98 - 107 mmol/L    CO2 26 22 - 31 mmol/L    Anion Gap, Calculation 10 5 - 18 mmol/L    Glucose 212 (H) 70 - 125 mg/dL    BUN 16 8 - 22 mg/dL    Creatinine 0.82 0.60 - 1.10 mg/dL    GFR MDRD Af Amer >60 >60 mL/min/1.73m2    GFR MDRD Non Af Amer >60 >60 mL/min/1.73m2    Bilirubin, Total 0.5 0.0 - 1.0 mg/dL    Calcium 9.3 8.5 - 10.5 mg/dL    Protein, Total 6.6 6.0 - 8.0 g/dL    Albumin 3.7 3.5 - 5.0 g/dL    Alkaline Phosphatase 128 (H) 45 - 120 U/L    AST 13 0 - 40 U/L    ALT 10 0 - 45 U/L   Lipid Cascade   Result Value Ref Range    Cholesterol 201 (H) <=199 mg/dL    Triglycerides 173 (H) <=149 mg/dL    HDL Cholesterol 48 (L) >=50 mg/dL    LDL Calculated 118 <=129 mg/dL    Patient Fasting > 8hrs? Unknown    Microalbumin, Random Urine   Result Value Ref Range    Microalbumin, Random Urine 8.90 (H) 0.00 - 1.99 mg/dL    Creatinine, Urine 118.8 mg/dL    Microalbumin/Creatinine Ratio Random Urine 74.9 (H) <=19.9 mg/g   Gynecologic Cytology (PAP Smear)   Result Value Ref Range    Case Report       Gynecologic Cytology Report                       Case: [id number]                                   Authorizing Provider:  Parvin Tovar MD          Collected:           01/04/2017 1535              Ordering Location:     [facility]       Received:            01/05/2017 0718                                     Family Medicine/OB                                                           First Screen:          HYUN Carrillo                                                                            (ASCP)                                                                        Specimen:    SUREPATH PAP, SCREENING, Endocervical/cervical                                             Interpretation       Negative for squamous intraepithelial lesion or malignancy    Result Flag Normal Normal    Specimen Adequacy       Satisfactory for evaluation, endocervical/transformation zone component present    HPV Reflex? Yes regardless of result     HIGH RISK No     LMP/Menopause Date Postmenopausal     Abnormal Bleeding No     Pt Status Not applicable     Birth Control/Hormones None     Previous Normal/Date More than 5 years ago     Prev Abn Date/Dx Cervical cryotherapy more than 30 years ago     Cervical Appearance Normal    HPV Cascade (PCR)   Result Value Ref Range    Interpretation No HPV Type(s) Detected No HPV Type(s) Detected, No High Risk HPV Type(s) Detected, DNA Quantity Not Sufficient     Flakito Thapa MD, [facility]          Parvin Tovar M.D.

## 2021-06-08 NOTE — TELEPHONE ENCOUNTER
Refill Approved    Rx renewed per Medication Renewal Policy. Medication was last renewed on 5/6/19.    Josie Argueta, [facility] Triage/Med Refill 5/14/2020     Requested Prescriptions   Pending Prescriptions Disp Refills     atorvastatin (LIPITOR) 80 MG tablet [Pharmacy Med Name: Atorvastatin Calcium 80 MG Oral Tablet] 90 tablet 0     Sig: TAKE 1 TABLET BY MOUTH AT BEDTIME       Statins Refill Protocol (Hmg CoA Reductase Inhibitors) Passed - 5/13/2020  1:10 PM        Passed - PCP or prescribing provider visit in past 12 months      Last office visit with prescriber/PCP: 3/16/2020 Wander Olivas MD OR same dept: 3/16/2020 Wander Olivas MD OR same specialty: 3/16/2020 Wander Olivas MD  Last physical: 6/14/2017 Last MTM visit: Visit date not found   Next visit within 3 mo: Visit date not found  Next physical within 3 mo: Visit date not found  Prescriber OR PCP: Wander Olivas MD  Last diagnosis associated with med order: 1. Mixed hyperlipidemia  - atorvastatin (LIPITOR) 80 MG tablet [Pharmacy Med Name: Atorvastatin Calcium 80 MG Oral Tablet]; TAKE 1 TABLET BY MOUTH AT BEDTIME  Dispense: 90 tablet; Refill: 0    If protocol passes may refill for 12 months if within 3 months of last provider visit (or a total of 15 months).

## 2021-06-08 NOTE — PROGRESS NOTES
Patient's impression of how medication is working? ok    Compliant with Medication? yes    Side Effects: None    Current Symptoms : Yes    Pain (0-10) No  Appetite change No  Negative thoughts No  Alcohol use No  Drug use No  Mood swings No  Sleep disturbance No  Change in interest No  Change in energy No  Change in concentration No  Psychosis/Hallucinations No  Anxiety low  Sad/depressed mood minimal

## 2021-06-08 NOTE — PROGRESS NOTES
"Kemi Soto is a 70 y.o. female who is being evaluated via a billable telephone visit.      The patient has been notified of following:     \"This telephone visit will be conducted via a call between you and your physician/provider. We have found that certain health care needs can be provided without the need for a physical exam.  This service lets us provide the care you need with a short phone conversation.  If a prescription is necessary we can send it directly to your pharmacy.  If lab work is needed we can place an order for that and you can then stop by our lab to have the test done at a later time.    Telephone visits are billed at different rates depending on your insurance coverage. During this emergency period, for some insurers they may be billed the same as an in-person visit.  Please reach out to your insurance provider with any questions.    If during the course of the call the physician/provider feels a telephone visit is not appropriate, you will not be charged for this service.\"    Patient has given verbal consent to a Telephone visit? Yes    What phone number would you like to be contacted at? [phone]    Patient would like to receive their AVS by AVS Preference: Ashley.    Additional provider notes:     Assessment/Plan:  1. Type 2 diabetes mellitus with hyperglycemia, with long-term current use of insulin (H)  Glycosylated Hemoglobin A1c    Basic Metabolic Panel    Hepatic Profile   2. Noncompliance with medications     3. S/P CABG (coronary artery bypass graft)     4. Coronary artery disease of native artery of native heart with stable angina pectoris (H)     5. Hyperlipidemia with target LDL less than 70  Lipid Cascade    Hepatic Profile   6. Essential hypertension  losartan (COZAAR) 50 MG tablet    Basic Metabolic Panel   7. Mixed hyperlipidemia  Lipid Cascade       Plan: I recommend she start losartan 50 mg daily for treatment of the high blood pressure.  She has been on that in the " past and tolerated it.  I also recommend that she schedule lab and preferably be fasting to check the above A1c, lipid basic and hepatic profiles.  Then if the lab is doing okay and her blood pressure is improved, she can follow-up to see Janee in September or October for getting established and ongoing care.  I explained that I was retiring later this summer and she understands and agrees.    Subjective: 70-year-old female presenting for follow-up on multiple issues as above.  This is a phone visit because of the COVID-19 pandemic and with her elevated risk for complication should she acquire that infection etc.  She states that she is feeling okay.  She notes that she did have improvement in her headaches after she had the tooth pulled earlier this year.  Regarding diabetes she admits that she is not checking blood sugars.  She has been encouraged to do that in the past.  Regarding the heart she denies any chest pain or trouble breathing or leg swelling.  She denies any diarrhea constipation or muscle aching or muscle weakness but does state that over the years she just has more aching in her body than what was the case when she was younger.    She also notes that she bought a [employer] in [place] and may be spending a significant part of the year down there but at this time she is still unsure because of the coronavirus pandemic etc.    Patient Active Problem List   Diagnosis     History of TIA (transient ischemic attack) and stroke     Essential hypertension     Mixed hyperlipidemia     Chalastodermia     Type 2 diabetes mellitus with hyperglycemia, with long-term current use of insulin (H)     S/P CABG (coronary artery bypass graft)     Bipolar 2 disorder (H)     Anxiety disorder     BMI 36.0-36.9,adult     Noncompliance with medications     Psoriasis, guttate     History of atrial fibrillation     Benign adenomatous polyp of large intestine     Bilateral hearing loss     Coronary artery disease of native  artery of native heart with stable angina pectoris (H)     Peripheral neuropathy     Hyperlipidemia with target LDL less than 70     Past Medical History:   Diagnosis Date     Coronary artery disease      Diabetes mellitus (H)      Discitis of thoracolumbar region 10/10/2015     Encephalopathy 10/14/2015     Epidural abscess 10/10/2015     Hip pain, right      Hyperlipidemia      Sepsis (H) 10/8/2015     Stroke (H) 06/23/2015    Neurology felt that episode was C/W subacute ischemic stroke     TIA (transient ischemic attack) 6/23/2015     UTI (urinary tract infection)     admitted to [facility] with UTI     Allergies   Allergen Reactions     Oxycontin [Oxycodone] Nausea Only     Current Outpatient Medications on File Prior to Visit   Medication Sig Dispense Refill     acetaminophen (TYLENOL) 500 MG tablet Take 500 mg by mouth every 6 (six) hours as needed for pain.       ascorbic acid, vitamin C, (ASCORBIC ACID WITH BARBIE HIPS) 500 MG tablet Take 500 mg by mouth daily.       aspirin 81 mg chewable tablet Chew 1 tablet (81 mg total) daily.  0     atorvastatin (LIPITOR) 80 MG tablet TAKE 1 TABLET BY MOUTH AT BEDTIME 90 tablet 3     b complex vitamins tablet Take 1 tablet by mouth daily.       B,C/folic/zinc/copper ox/vit E (STRESS B-COMPLEX ORAL) Take 1 tablet by mouth daily.       busPIRone (BUSPAR) 15 MG tablet TAKE 1 TABLET BY MOUTH ONCE DAILY 90 tablet 3     clopidogrel (PLAVIX) 75 mg tablet Take 1 tablet (75 mg total) by mouth daily. 90 tablet 1     fluocinonide (LIDEX) 0.05 % ointment Apply twice daily as needed to psoriasis 60 g 2     gabapentin (NEURONTIN) 300 MG capsule TAKE 3 CAPSULES BY MOUTH AT BEDTIME 270 capsule 3     generic lancets (FINGERSTIX LANCETS) Check blood sugar 3 times per day. Fastclix lancets. 100 each 5     insulin glargine (LANTUS SOLOSTAR U-100 INSULIN) 100 unit/mL (3 mL) pen Inject 28 Units under the skin 2 (two) times a day. 60 mL 1     isosorbide mononitrate (IMDUR) 60 MG 24 hr  "tablet TAKE 1 TABLET BY MOUTH ONCE DAILY .  FOR  FURTHER  REFILLS  SEE  DR. SCHMITT 90 tablet 1     metFORMIN (GLUCOPHAGE XR) 500 MG 24 hr tablet Take 2 tablets (1,000 mg total) by mouth 2 (two) times a day with meals. 360 tablet 1     metoprolol succinate (TOPROL-XL) 25 MG Take 1 tablet (25 mg total) by mouth daily. 90 tablet 1     pen needle, diabetic (ULTICARE PEN NEEDLE) 32 gauge x 5/32\" Ndle Use 1 each As Directed 4 (four) times a day. 200 each 3     QUEtiapine (SEROQUEL) 25 MG tablet Take 1-2 tablets (25-50 mg total) by mouth at bedtime as needed. 60 tablet 3     sertraline (ZOLOFT) 100 MG tablet Take 1.5 tablets (150 mg total) by mouth daily. TAKE 1 TABLET BY MOUTH ONCE DAILY (Patient taking differently: Take 100 mg by mouth daily. TAKE 1 TABLET BY MOUTH ONCE DAILY) 45 tablet 6     traMADoL (ULTRAM) 50 mg tablet Take 1 tablet (50 mg total) by mouth every 6 (six) hours as needed for pain. 30 tablet 0     blood glucose test strips Smartview test strips. Check blood sugar 3 times per day. 100 strip 5     cyanocobalamin, vitamin B-12, (VITAMIN B-12) 500 mcg Lozg Take 1,000 mcg by mouth daily.       insulin aspart U-100 (NOVOLOG FLEXPEN U-100 INSULIN) 100 unit/mL (3 mL) injection pen Inject 5 units before breakfast, 10 units before lunch and dinner. Total daily dose including priming the pen=30 units/day 15 mL 3     insulin lispro (HUMALOG KWIKPEN INSULIN) 100 unit/mL pen Inject 5 units before breakfast, 10 units before lunch and dinner. Total daily dose including priming the pen=30 units/day 5 adj dose pen 1     multivitamin with minerals tablet Take by mouth daily.        nitroglycerin (NITROSTAT) 0.4 MG SL tablet Place 1 tablet (0.4 mg total) under the tongue every 5 (five) minutes as needed for chest pain. 25 tablet 12     omega-3s/dha/epa/fish oil (OMEGA 3 ORAL) Take 1 capsule by mouth daily.       No current facility-administered medications on file prior to visit.      She does not smoke and she does not " drink any alcohol.    Objective:/80     Phone call duration:  21 minutes    Wander Olivas MD

## 2021-06-08 NOTE — PROGRESS NOTES
Assessment:  This is a 67 y.o.female who presents at my request to address to her poorly controlled diabetes and other chronic medical problems.      1. Type 2 diabetes mellitus with hyperglycemia, with long-term current use of insulin  metFORMIN (GLUCOPHAGE) 500 MG tablet    Ambulatory referral to Endocrinology   2. Need for immunization against influenza  Influenza High Dose, Seasonal 65+ yrs   3. Essential hypertension with goal blood pressure less than 130/85     4. Coronary artery disease involving native coronary artery of native heart without angina pectoris     5. Mixed hyperlipidemia           Plan:   I have discontinued the Levemir at her request and restarted the Lantus.  She has been taking 28 units twice a day, at 11 AM and 5 or 6 PM.  Since Levemir and Lantus are essentially equivalent in terms of efficacy, I think she will need to restart the NovoLog that she has used in the past to gain better control of her blood sugars.  I have referred her to endocrinology to help her adjust her medications and she agreed to this plan.  I also advised her to restart her metformin 1000 mg twice a day (two 500 mg tablets twice a day) since she has normal renal function.  However, she cannot expect this to get her hemoglobin A1c all the way down to goal from its current 11%.  She does not exercise at all and needs to get some kind of regular exercise such as walking.  She can make circuits inside her house if she is afraid of slipping outside, or join the [employer].  She has been referred last week to neurology for the numbness and weakness of her right hand.  We cannot prescribe any GLP-1 receptor agonist medications, which includes Byetta, Trulicity, Bydureon, and Victoza, because she is enrolled in the [place] Outcomes Trial in which participants are either on albiglutide or placebo.  She is considering dropping out of the trial because she does not believe she is on the trial medication.  However, we are not  limited in what other medications we can use to get her diabetes under better control.  Note that while she was hospitalized a year and a half ago they had given her sliding scale guidelines for using NovoLog.  They advised her to check her blood sugars 3 times a day and give 1 unit for blood sugars between 141 and 180, 2 units between 181 and 220, 3 units between 221 and 260, 4 units between 261 and 300, and 5 units between 301 and 340.  Since last week she had a friend who got influenza and, having refused the shot last week she is now requesting it, so it was given today.  We should see her back in no longer than 3 months for reevaluation and adjustment of her medication.         Subjective:  This is a 67 y.o.female who presents for follow-up of her poorly controlled diabetes and multiple other medications.  In addition to the poorly controlled type 2 insulin-dependent diabetes she has hypertension, hyperlipidemia, significant coronary artery disease post coronary artery bypass graft surgery and 6 stents, the most recent a drug-eluting stent placed on 3/30/2016.  She had a subacute mild ischemic stroke in June 2015.  She has bipolar 2 disorder and anxiety.  She has obesity and a history of alcohol abuse.  She also has a history of medication noncompliance.  Until last week I had seen her only twice before.  The first visit was to follow-up a very prolonged inpatient hospitalization for a stroke in June 2015 which affected her right hand and face, and subsequently an epidural abscess at the base of her spine which they concluded was due to scratching at her psoriasis with dirty fingernails.  She had to undergo a lumbar fusion and decompression surgery in October 2015 to drain the abscess, then underwent a prolonged rehabilitation.  I then met her for the first time 11 months ago after that admission.  Following that visit she had her sixth coronary stent placed on March 30, 2016.  She met with diabetes education  on April 6 per my referral.  They had wanted her to get some more blood sugar data and then she never followed up with them.  About a year ago she was changed from Lantus to Levemir and she does not like it and does not feel that it treats her diabetes as well.  In the past she had been on mealtime NovoLog but has not been on it for over a year.  She also was on metformin in the past and has not been on that for over a year either.  She had had a hemoglobin A1c of 9.9% on April 6, 2016 and then was lost to follow-up until I received a fax dated 11/7/16 showing a hemoglobin A1c of 10.4%.  I initially had no idea who had checked it or whether she was receiving any diabetic care.  I subsequently received information to the effect that she had enrolled in the [place] Outcomes Trial, a trial of patients with type 2 diabetes who were placed on either albiglutide (Tanzeum) or placebo to see whether cardiovascular events were reduced.  She did not start the trial medication until November 30.  It is a once a week injection.  She has not noticed any change in her blood sugars and does not believe that she is on the trial medication.  She says her blood sugars have all been quite poorly controlled, in the 200s to 300s.  She met with an  who recommended her current insurance which she says should cover the cost of Lantus instead of Levemir.  When we reviewed her medications today she said the tramadol had been prescribed for a headache after Thanksgiving but is not a chronic medication.  She has not seen her psychiatric provider for some time, and I agreed to refill her medications last week short-term until she can get back in with her provider.  She is on atorvastatin 80 mg daily for hyperlipidemia.  She previously was on Crestor but could not afford it.  Her labs from last week are scanned into the note below.  Note that her A1c is now up to 11.0%.  She has significant microalbuminuria.    Objective:       Visit Vitals     /70     Pulse 76     Temp 98  F (36.7  C)     LMP  (Within Years)     History   Smoking Status     Former Smoker     Quit date: 6/23/2007   Smokeless Tobacco     Never Used       Physical Exam:   She is alert, oriented ×2, a little irritable but cooperative with the interview.  Thyroid is normal, not enlarged, and without palpable masses.  No other cervical masses or adenopathy, no carotid bruits.  Heart has a regular rate and rhythm without murmur, rub, or gallop.  Lungs are clear with good airflow, no wheezing, rales, or rhonchi.  Abdomen is obese, soft, nondistended, nontender, without palpable masses or hepatosplenomegaly.  Extremities are without edema, normal peripheral pulses.  Monofilament testing of sensation in her feet is intact bilaterally.  Pedal pulses are reduced but palpable.    Labs:  Results for orders placed or performed in visit on 01/04/17   Glycosylated Hemoglobin A1c   Result Value Ref Range    Hemoglobin A1c 11.0 (H) 3.5 - 6.0 %   Hepatitis C Antibody (Anti-HCV)   Result Value Ref Range    Hepatitis C Ab Negative Negative   Comprehensive Metabolic Panel   Result Value Ref Range    Sodium 139 136 - 145 mmol/L    Potassium 4.3 3.5 - 5.0 mmol/L    Chloride 103 98 - 107 mmol/L    CO2 26 22 - 31 mmol/L    Anion Gap, Calculation 10 5 - 18 mmol/L    Glucose 212 (H) 70 - 125 mg/dL    BUN 16 8 - 22 mg/dL    Creatinine 0.82 0.60 - 1.10 mg/dL    GFR MDRD Af Amer >60 >60 mL/min/1.73m2    GFR MDRD Non Af Amer >60 >60 mL/min/1.73m2    Bilirubin, Total 0.5 0.0 - 1.0 mg/dL    Calcium 9.3 8.5 - 10.5 mg/dL    Protein, Total 6.6 6.0 - 8.0 g/dL    Albumin 3.7 3.5 - 5.0 g/dL    Alkaline Phosphatase 128 (H) 45 - 120 U/L    AST 13 0 - 40 U/L    ALT 10 0 - 45 U/L   Lipid Cascade   Result Value Ref Range    Cholesterol 201 (H) <=199 mg/dL    Triglycerides 173 (H) <=149 mg/dL    HDL Cholesterol 48 (L) >=50 mg/dL    LDL Calculated 118 <=129 mg/dL    Patient Fasting > 8hrs? Unknown     Microalbumin, Random Urine   Result Value Ref Range    Microalbumin, Random Urine 8.90 (H) 0.00 - 1.99 mg/dL    Creatinine, Urine 118.8 mg/dL    Microalbumin/Creatinine Ratio Random Urine 74.9 (H) <=19.9 mg/g         Current Outpatient Prescriptions on File Prior to Visit   Medication Sig Dispense Refill     acetaminophen (TYLENOL EXTRA STRENGTH) 500 MG tablet Take 500 mg by mouth every 6 (six) hours as needed for pain.       aspirin 325 MG tablet 1/2 tablet daily       aspirin 81 MG EC tablet Take 81 mg by mouth daily.       atorvastatin (LIPITOR) 80 MG tablet Take 1 tablet (80 mg total) by mouth bedtime. 90 tablet 1     blood glucose test (CONTOUR NEXT STRIPS) strips Use 1 each As Directed 3 (three) times a day. 100 each 2     busPIRone (BUSPAR) 15 MG tablet Take 1 tablet (15 mg total) by mouth 2 (two) times a day. 180 tablet 1     clopidogrel (PLAVIX) 75 mg tablet Take 1 tablet (75 mg total) by mouth daily. 90 tablet 3     gabapentin (NEURONTIN) 300 MG capsule Take 3 capsules (900 mg total) by mouth bedtime. 270 capsule 3     generic lancets (MICROLET LANCET) Use 1 each As Directed 3 (three) times a day. 100 each 2     metoprolol succinate (TOPROL XL) 25 MG Take 1 tablet (25 mg total) by mouth daily. 90 tablet 5     nitroglycerin (NITROSTAT) 0.4 MG SL tablet Place 1 tablet (0.4 mg total) under the tongue every 5 (five) minutes as needed for chest pain. 25 tablet 12     sertraline (ZOLOFT) 100 MG tablet Take 1 tablet (100 mg total) by mouth daily. 90 tablet 1     fluocinonide (LIDEX) 0.05 % ointment Apply twice a day sparingly to sores as needed for itching. 30 g 2     Current Facility-Administered Medications on File Prior to Visit   Medication Dose Route Frequency Provider Last Rate Last Dose     Study Drug albiglutide 30 mg/placebo injector pen 1 Syringe (HARMONY)  1 Syringe Subcutaneous Q7 Days MD Parvin Mendoza M.D.      This note has been dictated using voice recognition  software.  Any grammatical, typographical, or context distortions are unintentional and inherent to the software.

## 2021-06-08 NOTE — TELEPHONE ENCOUNTER
Controlled Substance Refill Request  Medication Name:   Requested Prescriptions     Pending Prescriptions Disp Refills     traMADoL (ULTRAM) 50 mg tablet 30 tablet 0     Sig: Take 1 tablet (50 mg total) by mouth every 6 (six) hours as needed for pain.     Date Last Fill: 3/16/20  Requested Pharmacy: [facility]  Submit electronically to pharmacy  Controlled Substance Agreement on file:   Encounter-Level CSA Scan Date:    There are no encounter-level csa scan date.        Last office visit:  3/16/20    CSA 5/7/2019  No future appt scheduled.

## 2021-06-09 NOTE — TELEPHONE ENCOUNTER
I called [facility] pharmacy as we sent this rx electronically on June 2nd. I spoke with the pharmacy tech & pharmacist, [facility] did not receive the rx electronically. The last rx [facility] has on file was from March. I called in the refill per script that was sent in on 6/2/20 that [facility] did not receive on their end. Please cancel prepped script.

## 2021-06-09 NOTE — TELEPHONE ENCOUNTER
The pharmacy is going to replace her current supply however they didn't tell Ciarra if her bottles were specifically effected or not- instead they're just replacing all metformin with a different .. She would like to know if she was taking medication that was apart of this recall. I will call the pharmacy when they open.

## 2021-06-09 NOTE — TELEPHONE ENCOUNTER
Medication Question or Clarification  Who is calling:   Patient  What medication are you calling about (include dose and sig)?:   metFORMIN (GLUCOPHAGE XR) 500 MG 24 hr tablet  360 tablet  1  3/16/2020  7/14/2020     Sig - Route: Take 2 tablets (1,000 mg total) by mouth 2 (two) times a day with meals. - Oral     Sent to pharmacy as: metFORMIN  mg tablet,extended release 24 hr (Glucophage XR)     E-Prescribing Status: Receipt confirmed by pharmacy (3/16/2020  2:24 PM CDT)         Who prescribed the medication?:   Wander Olivas MD  What is your question/concern?:   Patient received a letter from Pharmacy stating above medication is on recall.  Patient is requesting an alternate for above medication.  Patient is questioning if Provider knows where she can get above medication tested for contaminates. This nurse let patient know that the Pharmacy or  would know that answer not the Provider.  Patient got very angry and requested to leave this message with Provider.  Please reach out to patient and advise.  Requested Pharmacy: [id number]  Okay to leave a detailed message?: Yes

## 2021-06-09 NOTE — TELEPHONE ENCOUNTER
Pharmacist notified. All lot numbers for brand Saran is recalled. Patient's medication that was dispensed was that brand so she received a letter. She will recieve an alternative brand. This was for Metformin extended release ONLY.

## 2021-06-09 NOTE — TELEPHONE ENCOUNTER
Controlled Substance Refill Request  Medication Name:   Requested Prescriptions     Pending Prescriptions Disp Refills     traMADoL (ULTRAM) 50 mg tablet [Pharmacy Med Name: traMADol HCl 50 MG Oral Tablet] 30 tablet 0     Sig: TAKE 1 TABLET BY MOUTH EVERY 6 HOURS AS NEEDED FOR PAIN     Date Last Fill: 6/2/20  Requested Pharmacy: [facility]  Submit electronically to pharmacy  Controlled Substance Agreement on file:   Encounter-Level CSA Scan Date:    There are no encounter-level csa scan date.        Last office visit:  6/10/20

## 2021-06-09 NOTE — PROGRESS NOTES
Progress Note    Reason for Visit:  Chief Complaint     Diabetes Mellitus          Progress Note:    HPI:     This patient is increased sedation at the request of the primary care physician because of uncontrolled type 2 diabetes.    Thank you for referring this pleasant 67-year-old female patient who has been diabetic for 16 years.    She is currently on Lantus 28 units twice a day metformin 500 mg once a day    A1c is 11.  She has participated in a study taking G the 1 and a low one injection once a week and her last A1c is 8.8.    She does have peripheral neuropathy.  Detailed food exam showing intact skin    Review of Systems:    Nervous System: No headache, dizziness, fainting or memory loss. No tingling sensation of hand or feet.  Ears: No hearing loss or ringing in the ears  Eyes: No blurring of vision, redness, itching or dryness.  Nose: No nosebleed or loss of smell  Mouth: No mouth sores or loss of taste  Throat: No hoarseness or difficulty swallowing  Neck: No enlarged thyroid or lymph nodes.  Heart: No chest pain, palpitation or irregular heartbeat. No swelling of hands or feet  Lungs: No shortness of breath, cough, night sweats, wheezing or hemoptysis.  Gastrointestinal: No nausea or vomiting, constipation or diarrhea.  No acid reflux, abdominal pain or blood in stools.  Kidney/Bladdr: No polyuria, polydipsia, nocturia or hematuria.  Genital/Sexual: No loss of libido  Skin: No rash, hair loss or hirsutism.  No abnormal striae  Muscles/Joints/Bones: No morning stiffness, muscle aches and pain or loss of height.    Current Medications:  Current Outpatient Prescriptions   Medication Sig     acetaminophen (TYLENOL EXTRA STRENGTH) 500 MG tablet Take 500 mg by mouth every 6 (six) hours as needed for pain.     aspirin 81 MG EC tablet Take 81 mg by mouth daily.     atorvastatin (LIPITOR) 80 MG tablet Take 1 tablet (80 mg total) by mouth bedtime.     blood glucose test (CONTOUR NEXT STRIPS) strips Use 1 each As  "Directed 3 (three) times a day.     busPIRone (BUSPAR) 15 MG tablet Take 1 tablet (15 mg total) by mouth 2 (two) times a day. (Patient taking differently: Take 15 mg by mouth daily. )     clopidogrel (PLAVIX) 75 mg tablet Take 1 tablet (75 mg total) by mouth daily.     fluocinonide (LIDEX) 0.05 % ointment Apply twice a day sparingly to sores as needed for itching.     gabapentin (NEURONTIN) 300 MG capsule Take 3 capsules (900 mg total) by mouth bedtime.     generic lancets (MICROLET LANCET) Use 1 each As Directed 3 (three) times a day.     LANTUS SOLOSTAR 100 unit/mL (3 mL) pen Inject 28 Units under the skin 2 (two) times a day. 11.65 Type 2 with hyperglycemia     metFORMIN (GLUCOPHAGE) 500 MG tablet Take 1 tablet (500 mg total) by mouth 2 (two) times a day with meals. (Patient taking differently: Take 500 mg by mouth daily. )     metoprolol succinate (TOPROL XL) 25 MG Take 1 tablet (25 mg total) by mouth daily.     nitroglycerin (NITROSTAT) 0.4 MG SL tablet Place 1 tablet (0.4 mg total) under the tongue every 5 (five) minutes as needed for chest pain.     pen needle, diabetic (ULTICARE PEN NEEDLE) 32 gauge x 5/32\" Ndle Use 1 Device As Directed 2 (two) times a day.     sertraline (ZOLOFT) 100 MG tablet Take 1 tablet (100 mg total) by mouth daily.       Patients Active Problems:  Patient Active Problem List   Diagnosis     History of TIA (transient ischemic attack) and stroke     Essential hypertension     Mixed hyperlipidemia     Coronary artery disease     Back pain     Clinical depression     Chalastodermia     Diabetes mellitus with polyneuropathy     Type 2 diabetes mellitus with hyperglycemia     Bipolar 1 disorder     S/P CABG (coronary artery bypass graft)     Bipolar II disorder in partial or unspecified remission     Anxiety disorder     BMI 36.0-36.9,adult     MCI (mild cognitive impairment)     Physical deconditioning     Constipation     Alcohol abuse, episodic     Noncompliance with medications     " Psoriasis, guttate       History:   reports that she quit smoking about 9 years ago. She has never used smokeless tobacco. She reports that she does not drink alcohol or use illicit drugs.   reports that she quit smoking about 9 years ago. She has never used smokeless tobacco. She reports that she does not drink alcohol or use illicit drugs.  History   Smoking Status     Former Smoker     Quit date: 6/23/2007   Smokeless Tobacco     Never Used      reports that she quit smoking about 9 years ago. She has never used smokeless tobacco. She reports that she does not drink alcohol or use illicit drugs.  History   Sexual Activity     Sexual activity: No     Past Medical History:   Diagnosis Date     Discitis of thoracolumbar region 10/10/2015     Encephalopathy 10/14/2015     Epidural abscess 10/10/2015     Hip pain, right      Sepsis 10/8/2015     Stroke 06/23/2015    Neurology felt that episode was C/W subacute ischemic stroke     TIA (transient ischemic attack) 6/23/2015     UTI (urinary tract infection)     admitted to [facility] with UTI     Family History   Problem Relation Age of Onset     Stroke Mother      Diabetes Father      Stroke Sister      Diabetes Sister      Stroke Brother      Diabetes Brother      Past Medical History:   Diagnosis Date     Discitis of thoracolumbar region 10/10/2015     Encephalopathy 10/14/2015     Epidural abscess 10/10/2015     Hip pain, right      Sepsis 10/8/2015     Stroke 06/23/2015    Neurology felt that episode was C/W subacute ischemic stroke     TIA (transient ischemic attack) 6/23/2015     UTI (urinary tract infection)     admitted to [facility] with UTI     Past Surgical History:   Procedure Laterality Date     CARPAL TUNNEL RELEASE Bilateral      CORONARY ANGIOPLASTY WITH STENT PLACEMENT  03/30/2016    Drug eluting stent mid LAD, cutting balloon to 1st diagonal     CORONARY ARTERY BYPASS GRAFT  12/11/2007    X 3 vessels, LIMA to LAD, saph vein graft to  distal RCA, saphvein graft to marginal, mini circuit bypass.  [facility].     CORONARY STENT PLACEMENT  2008    Left main, left circumflex, RCA     INSERT MIDLINE HE  10/31/2015          LUMBAR DISCECTOMY N/A 10/11/2015    Procedure: BILATERAL L1-L5 DECOMPRESSIVE LAMINECTOMY WITH EVACUATION OF EPIDURAL ABSCESS IRRIGATION & DEBRIDEMENT;  Surgeon: Luli Chan MD;  Location: [facility];  Service:      PICC  10/19/2015            Vitals   weight is 175 lb (79.4 kg). Her blood pressure is 134/72 and her pulse is 80.         Exam  General appearance: The patient looked well, not in acute distress.  Eyes: no evidence of thyroid eye disease.   Retinal exam: No evidence of diabetic retinopathy.  Mouth and Throat: Normal  Neck: No evidence of thyromegaly, enlarged lymph node or tenderness  Chest: Trachea is central. Chest is clear to auscultation and percussion. Breat sounds are normal.  Cardiovascular exam: JVP is not raised. Heart sounds are normal, no murmurs or rub  Peripheral pulses are palpable.   Abdomen: No masses or tenderness.    Back: No vertebral tenderness or kyphosis.  Extremities: No evidence of leg edema.   Skin: Normal to touch.  No abnormal striae  Neurologic exam:  Visual fields are intact by confrontation, grossly intact. No evidence of peripheral neuropathy.  Detailed foot exam normal.        Diagnosis:  No diagnosis found.    Orders:   No orders of the defined types were placed in this encounter.        Assessment and Plan:  Uncontrolled type 2 diabetes.    She has not been checking her blood sugar I will give him a glucometer on a house together to check her blood sugar regularly at least 3 times a day.    We will give her metformin extended release and I auscultated her 20s at gradually 500 mg until she is taking 2 twice a day.    I will also put her on invokana 100 mg once a day.    She does have microalbuminuria would put her on losartan 25 mg once a day and creatinine is  normal at 0.82.    Hyperlipidemia she is taking Lipitor 80 mg once a day.    She has strong for history of coronary artery disease she had coronary artery bypass graft +6 stents.    She is taking BuSpar and Plavix 75 mg.    She also takes Zoloft 100 mg metoprolol 25 mg.    Blood pressure is 134/72 pulse is 80.    Patient will return to clinic in 6 months ID spent 60 minutes with the patient more than 50% was spent on counseling and managing her care.

## 2021-06-09 NOTE — TELEPHONE ENCOUNTER
Unfortunately I am not aware of any where that she can get her individual pills tested for any contaminants.    Please check with pharmacy to find out whether the current problem is with all of the generic manufacturers of metformin extended release or whether it is a short-term issue with one .  (I tried to call the pharmacy but they are closed till later today.)    There is no simple substitution for Metformin in her current medication regimen.  The metformin helps her be more sensitive to the insulin that she is using.  However if that is not available for now, then she can be very careful with diet and monitor her blood sugars and if they are not being adequately controlled, she can let us know the readings and we can advise her on adjusting the dose of her insulin.

## 2021-06-10 NOTE — TELEPHONE ENCOUNTER
----- Message from Mayco Zapata MD sent at 8/13/2020  4:14 PM CDT -----  Please inform the patient that her urine test showed some improvement in her kidneys.  There is less microalbumin in her urine but it still elevated.  This improvement is compared to a year ago.  What helps prevent the kidney function from getting worse would be good blood pressure control, good blood sugar control which would mean getting the A1c down below 8.  Also a low-salt diet and making sure that she will avoid nonsteroidal anti-inflammatory medications such as ibuprofen and naproxen which are commonly used over-the-counter for pain.  Acetaminophen is fine to use for pain is that does not harm the kidneys.    Work hard to try to get your blood sugars down and will need to see you back in the clinic in about 3 months.    Mayco Zapata MD

## 2021-06-10 NOTE — PROGRESS NOTES
Assessment/Plan:      Visit for Preoperative Exam.  1. Preop examination    2. Cataracts, bilateral    3. Type 2 diabetes mellitus with hyperglycemia, with long-term current use of insulin    4. Coronary artery disease involving native coronary artery of native heart without angina pectoris    5. Essential hypertension with goal blood pressure less than 130/85    6. Mixed hyperlipidemia    7. History of atrial fibrillation    8. Bipolar 2 disorder        Patient approved for surgery with general or local anesthesia. Labs drawn as indicated below. EKG shows sinus, rate 62 without acute changes - formal reading noted below. We reviewed holding NSAIDs at least 7 days prior to surgery and she will take the metoprolol and losartan with a sip of water the morning of surgery. She will hold her lantus the morning of surgery and take 1/2 dose the evening prior. Copy of the pre-op will be given to the patient to bring along on the day of surgery. We reviewed indications for re-evaluation, and she will follow up as needed.    On review of her medications, I encouraged her to resume the losartan and consider the metformin for better glycemic control. We reviewed the importance of good glycemic control, BP and lipid control in decreasing risk of further CV events, and the importance of regular monitoring. She should return to clinic for repeat labs and med check in 3-4 mos.    Patient is approved for surgery and is considered to be low-mod anesthetic risk.      >50% of 40 min visit spent on counseling and coordination of care.       Subjective:     Scheduled Procedure: Cataract Surgery Left Eye  Surgery Date:  05/17/17  Surgery Location:  [facility]   Surgeon:  Dr Sherron Werner    History of Present Illness     Kemi Soto is a 67 y.o. year-old female who presents for a pre-op exam prior to undergoing cataract surgery.     Recent Health  Fever: no  Chills: no  Fatigue: no  Chest Pain: no. No  recent NTG use.  Cough: no  Dyspnea: no  Urinary Frequency: no  Nausea: no  Vomiting: no  Diarrhea: no  Abdominal Pain: no  Easy Bruising: yes, on plavix long-term  Lower Extremity Swelling: no  Poor Exercise Tolerance: no    Pertinent History  Prior Anesthesia: yes  Previous Anesthesia Reaction:  no  Diabetes: yes, Type 2 Diabetes with poor, but improving control. A1c today 8.8. On study medication weekly.     Not taking metformin or invokana as prescribed. Taking lantus bid.  Cardiovascular Disease: yes, s/p CABG , multiple stents - last 2016. Hx Atrial Fib.      Pulmonary Disease: no  Renal Disease: no  GI Disease: no  Neurologic: Hx TIA, CVA on plavix long-term. Hx epidural abscess, discitis .   Sleep Apnea: no  Thromboembolic Problems: no  Clotting Disorder: no  Bleeding Disorder: no  Transfusion Reaction: n/a  Impaired Immunity: no  Steroid use in the last 6 months: no  Frequent Aspirin use: yes, daily asa    Family history negative for anesthesia reaction, MI, Aneurysm, sudden death, clotting disorder and bleeding disorder    Mother, brother had CVA. Father  age 56 from diabetes complications.    Social history of patient does not wear denture or partial plates, there is no transfusion refusal, NSAID use and there are no concerns regarding care after surgery    Baby ASA, naproxen 1-2x daily.    After surgery, the patient plans to recover at home with family.    Current Outpatient Prescriptions   Medication Sig Dispense Refill     aspirin 81 MG EC tablet Take 81 mg by mouth daily.       atorvastatin (LIPITOR) 80 MG tablet Take 1 tablet (80 mg total) by mouth bedtime. 90 tablet 1     blood glucose meter (GLUCOMETER) Use 1 each As Directed as needed. Dispense glucometer brand per patient's insurance at pharmacy discretion. 1 each 0     blood glucose test strips Use 1 each As Directed as needed. Dispense brand per patient's insurance at pharmacy discretion. 200 each 2     busPIRone (BUSPAR) 15 MG  "tablet Take 1 tablet (15 mg total) by mouth 2 (two) times a day. (Patient taking differently: Take 15 mg by mouth daily. ) 180 tablet 1     clopidogrel (PLAVIX) 75 mg tablet Take 1 tablet (75 mg total) by mouth daily. 90 tablet 3     fluocinonide (LIDEX) 0.05 % ointment Apply twice a day sparingly to sores as needed for itching. 30 g 2     gabapentin (NEURONTIN) 300 MG capsule Take 3 capsules (900 mg total) by mouth at bedtime. 270 capsule 3     generic lancets (MICROLET LANCET) Use 1 each As Directed 3 (three) times a day. 100 each 2     LANTUS SOLOSTAR 100 unit/mL (3 mL) pen Inject 28 Units under the skin 2 (two) times a day. 11.65 Type 2 with hyperglycemia 3 mL PRN     metoprolol succinate (TOPROL XL) 25 MG Take 1 tablet (25 mg total) by mouth daily. 90 tablet 5     nitroglycerin (NITROSTAT) 0.4 MG SL tablet Place 1 tablet (0.4 mg total) under the tongue every 5 (five) minutes as needed for chest pain. 25 tablet 12     pen needle, diabetic (ULTICARE PEN NEEDLE) 32 gauge x 5/32\" Ndle Use 1 Device As Directed 2 (two) times a day. 200 each 3     sertraline (ZOLOFT) 100 MG tablet Take 1 tablet (100 mg total) by mouth daily. 90 tablet 1     losartan (COZAAR) 25 MG tablet Take 1 tablet (25 mg total) by mouth daily. 90 tablet 1     metFORMIN (GLUCOPHAGE XR) 500 MG 24 hr tablet Take 2 tablets twice daily 360 tablet 1     Current Facility-Administered Medications   Medication Dose Route Frequency Provider Last Rate Last Dose     Study Drug albiglutide 30 mg/placebo injector pen 1 Syringe (HARMONY)  1 Syringe Subcutaneous Q7 Days Terri Christine MD         Study Drug albiglutide 30 mg/placebo injector pen 1 Syringe (HARMONY)  1 Syringe Subcutaneous Q7 Days Terri Christine MD           Allergies   Allergen Reactions     Oxycontin [Oxycodone] Nausea Only       Immunization History   Administered Date(s) Administered     Hep B, historic 12/13/1999, 01/11/2000, 06/26/2000     Influenza high dose, seasonal 10/25/2015, " 01/11/2017     Influenza, inj, historic 10/18/1990, 10/29/1999, 10/13/2003, 11/17/2004, 12/19/2005, 10/03/2007, 09/03/2009, 10/18/2010, 10/26/2012, 09/20/2013, 12/05/2014     Pneumo Conj 13-V (2010&after) 05/10/2016     Pneumo Polysac 23-V 05/09/2011     Td, historic 05/11/1988, 12/11/1998     Tdap 03/23/2010       Patient Active Problem List   Diagnosis     History of TIA (transient ischemic attack) and stroke     Essential hypertension     Mixed hyperlipidemia     Coronary artery disease     Back pain     Clinical depression     Chalastodermia     Diabetes mellitus with polyneuropathy     Type 2 diabetes mellitus with hyperglycemia     Bipolar 1 disorder     S/P CABG (coronary artery bypass graft)     Bipolar 2 disorder     Anxiety disorder     BMI 36.0-36.9,adult     MCI (mild cognitive impairment)     Constipation     Alcohol abuse, episodic     Noncompliance with medications     Psoriasis, guttate     History of atrial fibrillation       Past Medical History:   Diagnosis Date     Discitis of thoracolumbar region 10/10/2015     Encephalopathy 10/14/2015     Epidural abscess 10/10/2015     Hip pain, right      Sepsis 10/8/2015     Stroke 06/23/2015    Neurology felt that episode was C/W subacute ischemic stroke     TIA (transient ischemic attack) 6/23/2015     UTI (urinary tract infection)     admitted to [facility] with UTI       Past Surgical History:   Procedure Laterality Date     CARPAL TUNNEL RELEASE Bilateral      CORONARY ANGIOPLASTY WITH STENT PLACEMENT  03/30/2016    Drug eluting stent mid LAD, cutting balloon to 1st diagonal     CORONARY ARTERY BYPASS GRAFT  12/11/2007    X 3 vessels, LIMA to LAD, saph vein graft to distal RCA, saphvein graft to marginal, mini circuit bypass.  [facility].     CORONARY STENT PLACEMENT  2008    Left main, left circumflex, RCA     INSERT MIDLINE HE  10/31/2015          LUMBAR DISCECTOMY N/A 10/11/2015    Procedure: BILATERAL L1-L5 DECOMPRESSIVE LAMINECTOMY  "WITH EVACUATION OF EPIDURAL ABSCESS IRRIGATION & DEBRIDEMENT;  Surgeon: Luli Chan MD;  Location: [facility] OR;  Service:      PICC  10/19/2015            Family History   Problem Relation Age of Onset     Stroke Mother      Diabetes Father      Stroke Sister      Diabetes Sister      Stroke Brother      Diabetes Brother        Social History     Social History     Marital status: Single     Spouse name: N/A     Number of children: 1     Years of education: N/A     Occupational History     Retired      Former [employer]      Social History Main Topics     Smoking status: Former Smoker     Quit date: 6/23/2007     Smokeless tobacco: Never Used     Alcohol use No      Comment: stopped drinking 9/2015     Drug use: No     Sexual activity: No     Other Topics Concern     Not on file     Social History Narrative    Lives alone with cats and dogs. , one daughter, Tory Lofton.      Review of Systems  A 12 point comprehensive review of systems was negative except as noted  On study medication for diabetes - A1c has been improving - not taking metformin or invokana.   Off novolog > 1yr since TCU discharge.   Recently returned from cruise to [facility]    .   Objective:       Vitals:    05/11/17 0916   BP: 140/70   Pulse: 64   Weight: 175 lb 7 oz (79.6 kg)   Height: 5' 2\" (1.575 m)     Physical Exam:  General: Alert, cooperative, no distress  Head: Normocephalic, without obvious abnormality, atraumatic  Eyes: PERRL, conjunctiva/corneas clear, EOM's intact  Ears: Normal TM's and external ear canals, both ears  Nose: Nares normal, septum midline,mucosa normal, no drainage  Throat: Lips, mucosa, and tongue normal; teeth and gums normal  Neck: Supple, symmetrical, trachea midline, no adenopathy;  thyroid normal  Back: Symmetric, no curvature, ROM normal, no CVA tenderness  Lungs: Clear to auscultation bilaterally, respirations unlabored  Heart: Regular rate and rhythm, S1 and " S2 normal, no murmur, rub, or gallop  Abdomen: Soft, non-tender, no masses, no organomegaly  Extremities: Extremities normal, atraumatic, no cyanosis or edema  Skin: Skin color, texture, turgor normal, no rashes or lesions  Lymph nodes: Cervical, supraclavicular, and axillary nodes normal  Neurologic: Normal        Results for orders placed or performed in visit on 05/11/17   Comprehensive Metabolic Panel   Result Value Ref Range    Sodium 141 136 - 145 mmol/L    Potassium 4.6 3.5 - 5.0 mmol/L    Chloride 104 98 - 107 mmol/L    CO2 27 22 - 31 mmol/L    Anion Gap, Calculation 10 5 - 18 mmol/L    Glucose 202 (H) 70 - 125 mg/dL    BUN 16 8 - 22 mg/dL    Creatinine 0.84 0.60 - 1.10 mg/dL    GFR MDRD Af Amer >60 >60 mL/min/1.73m2    GFR MDRD Non Af Amer >60 >60 mL/min/1.73m2    Bilirubin, Total 0.5 0.0 - 1.0 mg/dL    Calcium 9.3 8.5 - 10.5 mg/dL    Protein, Total 6.3 6.0 - 8.0 g/dL    Albumin 3.5 3.5 - 5.0 g/dL    Alkaline Phosphatase 99 45 - 120 U/L    AST 15 0 - 40 U/L    ALT 15 0 - 45 U/L   Glycosylated Hemoglobin A1c   Result Value Ref Range    Hemoglobin A1c 8.8 (H) 3.5 - 6.0 %   Hemoglobin   Result Value Ref Range    Hemoglobin 12.8 12.0 - 16.0 g/dL   Lipid Cascade   Result Value Ref Range    Cholesterol 193 <=199 mg/dL    Triglycerides 165 (H) <=149 mg/dL    HDL Cholesterol 48 (L) >=50 mg/dL    LDL Calculated 112 <=129 mg/dL    Patient Fasting > 8hrs? No    Electrocardiogram Perform - Clinic   Result Value Ref Range    SYSTOLIC BLOOD PRESSURE  mmHg    DIASTOLIC BLOOD PRESSURE  mmHg    VENTRICULAR RATE 62 BPM    ATRIAL RATE 62 BPM    P-R INTERVAL 156 ms    QRS DURATION 90 ms    Q-T INTERVAL 392 ms    QTC CALCULATION (BEZET) 397 ms    P Axis 1 degrees    R AXIS 54 degrees    T AXIS 68 degrees    MUSE DIAGNOSIS       ** Poor data quality, interpretation may be adversely affected  Sinus rhythm  Possible Septal infarct , age undetermined  Abnormal ECG  When compared with ECG of 21-JUL-2016 11:47,  No significant change  was found  Confirmed by SERINA GALINDO MD LOC:[facility] ([id number]) on 5/11/2017 12:28:03 PM

## 2021-06-10 NOTE — PROGRESS NOTES
Briefly discussed CCC with patient, she was taking her sick dog to the vet but is interested in enrollment. Will call her back on 5/12 to schedule appointments.

## 2021-06-10 NOTE — PROGRESS NOTES
Please inform the patient that her urine test showed some improvement in her kidneys.  There is less microalbumin in her urine but it still elevated.  This improvement is compared to a year ago.  What helps prevent the kidney function from getting worse would be good blood pressure control, good blood sugar control which would mean getting the A1c down below 8.  Also a low-salt diet and making sure that she will avoid nonsteroidal anti-inflammatory medications such as ibuprofen and naproxen which are commonly used over-the-counter for pain.  Acetaminophen is fine to use for pain is that does not harm the kidneys.    Work hard to try to get your blood sugars down and will need to see you back in the clinic in about 3 months.    Mayco Zapata MD

## 2021-06-10 NOTE — TELEPHONE ENCOUNTER
Controlled Substance Refill Request  Medication Name:   Requested Prescriptions     Pending Prescriptions Disp Refills     traMADoL (ULTRAM) 50 mg tablet [Pharmacy Med Name: traMADol HCl 50 MG Oral Tablet] 30 tablet 0     Sig: TAKE 1 TABLET BY MOUTH EVERY 6 HOURS AS NEEDED FOR PAIN     Date Last Fill: 6/2/20  Requested Pharmacy: [facility]  Submit electronically to pharmacy  Controlled Substance Agreement on file:   Encounter-Level CSA Scan Date:    There are no encounter-level csa scan date.        Last office visit:  8/10/20

## 2021-06-10 NOTE — PROGRESS NOTES
".  Assessment / Impression     Bipolar affective disorder; type II.  Cognitive disorder; NOS.  Grief reaction; recent loss of her dog.  Follow-up medication evaluation.  Patient's impression of how medication is working? good  Compliant with medication? yes    Side effects:None       Plan:     Psychology consult.  OT cognitive evaluation.  Discussed neuropsych testing, patient had refused in the past and she still does not want to complete that.  Reviewed medications, no changes made at this time.  We will patient follow-up in 3 months.    Subjective:      HPI: Kemi Soto is a 67 y.o. female with bipolar disorder, cognitive disorder and history of sleep disturbance.  She is here for follow-up medication evaluation.  Overall, Brianna states her mood is been stable, she feels the medications are working.  No racing thoughts or sleep disturbance.  She just got back from a trip to [place] with her daughter and son-in-law.  She said it was exhausting but very enjoyable.  Unfortunately when she got back, her dog Tracy was not doing well.  She took her into the vet, they found out she was anemic and gave her a blood transfusion.  During the blood transfusion, her dog had a stroke.  She need to be put down.  Brianna is had her dog for many years and it was a traumatic loss.  It has only been a few days and she continues to grieve.  Her daughter also will be moving soon and she has been contemplating that loss as well.  No new medical issues.  He just had cataract surgery on one eye and that went well.  Patient states her memory is still bad, she remembers having neuropsych ordered but did not want it completed for our tests.  She states she \"passed the test from the [employer] at the state fair\".  She is interested in getting cognitive screen through OT feels she can handle 1 hour assessment.    Kemi  has a past medical history of Discitis of thoracolumbar region (10/10/2015); Encephalopathy " (10/14/2015); Epidural abscess (10/10/2015); Hip pain, right; Sepsis (10/8/2015); Stroke (06/23/2015); TIA (transient ischemic attack) (6/23/2015); and UTI (urinary tract infection).  Kemi  has a past surgical history that includes Lumbar discectomy (N/A, 10/11/2015); PICC (10/19/2015); Insert Midline (10/31/2015); Coronary artery bypass graft (12/11/2007); Coronary angioplasty with stent (03/30/2016); Coronary stent placement (2008); and Carpal tunnel release (Bilateral).  Oxycontin [oxycodone]  Current Outpatient Prescriptions   Medication Sig Dispense Refill     aspirin 81 MG EC tablet Take 81 mg by mouth daily.       atorvastatin (LIPITOR) 80 MG tablet Take 1 tablet (80 mg total) by mouth bedtime. 90 tablet 1     blood glucose meter (GLUCOMETER) Use 1 each As Directed as needed. Dispense glucometer brand per patient's insurance at pharmacy discretion. 1 each 0     blood glucose test strips Use 1 each As Directed as needed. Dispense brand per patient's insurance at pharmacy discretion. 200 each 2     busPIRone (BUSPAR) 15 MG tablet Take 1 tablet (15 mg total) by mouth 2 (two) times a day. (Patient taking differently: Take 15 mg by mouth daily. ) 180 tablet 1     clopidogrel (PLAVIX) 75 mg tablet Take 1 tablet (75 mg total) by mouth daily. 90 tablet 3     fluocinonide (LIDEX) 0.05 % ointment Apply twice a day sparingly to sores as needed for itching. 30 g 2     gabapentin (NEURONTIN) 300 MG capsule Take 3 capsules (900 mg total) by mouth at bedtime. 270 capsule 3     generic lancets (MICROLET LANCET) Use 1 each As Directed 3 (three) times a day. 100 each 2     LANTUS SOLOSTAR 100 unit/mL (3 mL) pen Inject 28 Units under the skin 2 (two) times a day. 11.65 Type 2 with hyperglycemia 3 mL PRN     losartan (COZAAR) 25 MG tablet Take 1 tablet (25 mg total) by mouth daily. 90 tablet 1     metFORMIN (GLUCOPHAGE XR) 500 MG 24 hr tablet Take 2 tablets twice daily 360 tablet 1     metoprolol succinate (TOPROL XL) 25 MG  "Take 1 tablet (25 mg total) by mouth daily. 90 tablet 5     nitroglycerin (NITROSTAT) 0.4 MG SL tablet Place 1 tablet (0.4 mg total) under the tongue every 5 (five) minutes as needed for chest pain. 25 tablet 12     pen needle, diabetic (ULTICARE PEN NEEDLE) 32 gauge x 5/32\" Ndle Use 1 Device As Directed 2 (two) times a day. 200 each 3     sertraline (ZOLOFT) 100 MG tablet Take 1 tablet (100 mg total) by mouth daily. 90 tablet 1     Current Facility-Administered Medications   Medication Dose Route Frequency Provider Last Rate Last Dose     Study Drug albiglutide 30 mg/placebo injector pen 1 Syringe (HARMONY)  1 Syringe Subcutaneous Q7 Days Terri Christine MD         Study Drug albiglutide 30 mg/placebo injector pen 1 Syringe (HARMONY)  1 Syringe Subcutaneous Q7 Days Terri Christine MD         Family History   Problem Relation Age of Onset     Stroke Mother      Diabetes Father      Stroke Sister      Diabetes Sister      Stroke Brother      Diabetes Brother      Social History     Social History     Marital status: Single     Spouse name: N/A     Number of children: 1     Years of education: N/A     Occupational History     Retired      Former [employer]      Social History Main Topics     Smoking status: Former Smoker     Quit date: 6/23/2007     Smokeless tobacco: Never Used     Alcohol use No      Comment: stopped drinking 9/2015     Drug use: No     Sexual activity: No     Other Topics Concern     Not on file     Social History Narrative    Lives alone with cats and dogs. , one daughter, Tory Lofton.        The following portions of the patient's history were reviewed and updated as appropriate: allergies, current medications, past family history, past medical history, past social history, past surgical history and problem list.    Review of Systems  Constitutional: negative  Neurological: positive for memory problems  Behavioral/Psych: positive for anxiety and depression, " negative for irritability, mood swings and sleep disturbance       Objective:   @VS    Mental Status Exam:     Appearance: Patient is well-groomed, pleasant and cooperative.  Good eye contact.  Patient highly animated per her usual demeanor.    Speech / Language : Excessive, Rapid and non pressured    Associations: appropriate    Alert and oriented X 3:yes    Mood: Euthymic  Affect: Congruent w/content of speech    Suicidal / Homicidal ideation:none  If yes, document safety plan:    Fund of knowledge: good    Thought process:Circumstantial and Flight of ideas    Judgement / insight: No Evidence of Impairment    Recent and remote memory: Baseline impaired.    Attention: Within normal  Concentration: Within normal    Motor status: Gait normal, no involuntary movements.    Scores: NA    Change in medication? No    Education    Reviewed risks/benefits of medication      Physical Exam: General appearance: alert, appears stated age, cooperative and no distress  Neurologic: Mental status: Alert, oriented, thought content appropriate, affect: mood-congruent, thought content exhibits logical connections, when questioned about suicide, the patient expresses no suicidal ideation, no homicidal ideation

## 2021-06-10 NOTE — PROGRESS NOTES
ASSESSMENT/PLAN:       1. Type 2 diabetes mellitus with hyperglycemia, with long-term current use of insulin (H)  Encouraged the patient to get a eye exam and to have the optometrist share the results with this  - Microalbumin, Random Urine    2. Essential hypertension  Blood pressure is well controlled and will continue with losartan 50 mg daily and metoprolol XL 25 mg daily    3. Mixed hyperlipidemia  The patient will continue on atorvastatin 80 mg daily    4. Need for vaccination     - Td, Preservative Free (green label)  - Pneumococcal polysaccharide vaccine 23-valent 1 yo or older, subq/IM    5. Encounter for screening mammogram for breast cancer     - Mammo Screening Bilateral; Future    6. Bilateral hearing loss, unspecified hearing loss type     - Ambulatory referral to Audiology    7. CAD (coronary artery disease)  Patient will continue on aspirin 81 mg daily and Plavix 75 mg daily    - metoprolol succinate (TOPROL-XL) 25 MG; Take 1 tablet (25 mg total) by mouth daily.  Dispense: 90 tablet; Refill: 1     - isosorbide mononitrate (IMDUR) 60 MG 24 hr tablet; TAKE 1 TABLET BY MOUTH ONCE DAILY .  Dispense: 90 tablet; Refill: 1    8. Neurodermatitis  Suggested the use of good moisturizing cream and lotion such as CeraVe  or VanaCream.  Along with that could use a over-the-counter antihistamine such as Zyrtec 10 mg daily for itching.  What I see today certainly does not look like psoriasis    9. Peripheral polyneuropathy  Renewed tramadol and CSA completed  The patient will also continue on gabapentin 900 mg at bedtime  The patient does take vitamin B12 thousand micrograms daily    Future goals would include DEXA scan  3-year follow-up for colon cancer screening needed in 2021  Note weight down 8 pounds  Call patient with test results        Mayco Zapata MD      PROGRESS NOTE   8/10/2020    SUBJECTIVE:  Keim Soto is a 70 y.o. female  who presents for   Chief Complaint   Patient presents with      Establish Care     Pain     hands, shoulders, feet and hips     Skin issue     itching, red patches on back and arms     Referral     Audiologist     The patient was seen today to establish care.  The patient is troubled with chronic pain in her hands and feet as well as hips and shoulders.  The patient needs a follow-up on her diabetes  The patient has a chronic skin condition this been called  psoriasis.    1. Type 2 diabetes mellitus with hyperglycemia, with long-term current use of insulin (H)  The patient needs to have her feet examined and also a microalbumin.  The patient's last hemoglobin A1c was 8.3 in July 2020.  The patient is on Metformin XR thousand milligrams twice a day but has some difficulty with adhering to that regimen.  Lantus insulin 28 units twice daily  NovoLog 5-10-10- 0    2. Essential hypertension  The patient's blood pressure is satisfactory.  Blood pressure medication includes losartan 50 mg daily as well as metoprolol XL 25 mg daily    3. Mixed hyperlipidemia  The patient takes atorvastatin 80 mg daily    4. Need for vaccination  The patient is due for a Pneumovax booster as well as a tetanus booster    5. Encounter for screening mammogram for breast cancer  Patient is overdue on breast cancer screening    6. Bilateral hearing loss, unspecified hearing loss type  Patient has noticed increasing difficulty with hearing which she feels is both ears and would like to have that evaluated to see if she would be a candidate for hearing aids.    7. CAD (coronary artery disease)  The patient has had a history of coronary artery bypass graft and is on aspirin 81 mg daily along with Plavix 75 mg daily and Imdur 60 mg daily    8. Peripheral polyneuropathy  The peripheral neuropathy involves primarily her hands and feet.  The patient takes gabapentin 900 mg at bedtime along with tramadol on average about 1 tablet - 4 tablets weekly    9. Neurodermatitis  The patient has a rash primarily on her upper  back that itches and she tends to scratch it.    Patient Active Problem List   Diagnosis     History of TIA (transient ischemic attack) and stroke     Essential hypertension     Mixed hyperlipidemia     Chalastodermia     Type 2 diabetes mellitus with hyperglycemia, with long-term current use of insulin (H)     S/P CABG (coronary artery bypass graft)     Bipolar 2 disorder (H)     Anxiety disorder     BMI 36.0-36.9,adult     Noncompliance with medications     Psoriasis, guttate     History of atrial fibrillation     Benign adenomatous polyp of large intestine     Bilateral hearing loss     Coronary artery disease of native artery of native heart with stable angina pectoris (H)     Peripheral neuropathy     Hyperlipidemia with target LDL less than 70       Current Outpatient Medications   Medication Sig Dispense Refill     acetaminophen (TYLENOL) 500 MG tablet Take 500 mg by mouth every 6 (six) hours as needed for pain.       ascorbic acid, vitamin C, (ASCORBIC ACID WITH BARBIE HIPS) 500 MG tablet Take 500 mg by mouth daily.       aspirin 81 mg chewable tablet Chew 1 tablet (81 mg total) daily.  0     atorvastatin (LIPITOR) 80 MG tablet TAKE 1 TABLET BY MOUTH AT BEDTIME 90 tablet 3     b complex vitamins tablet Take 1 tablet by mouth daily.       B,C/folic/zinc/copper ox/vit E (STRESS B-COMPLEX ORAL) Take 1 tablet by mouth daily.       blood glucose test strips Smartview test strips. Check blood sugar 3 times per day. 100 strip 5     busPIRone (BUSPAR) 15 MG tablet TAKE 1 TABLET BY MOUTH ONCE DAILY 90 tablet 3     clopidogrel (PLAVIX) 75 mg tablet Take 1 tablet (75 mg total) by mouth daily. 90 tablet 1     cyanocobalamin, vitamin B-12, (VITAMIN B-12) 500 mcg Lozg Take 1,000 mcg by mouth daily.       fluocinonide (LIDEX) 0.05 % ointment Apply twice daily as needed to psoriasis 60 g 2     gabapentin (NEURONTIN) 300 MG capsule TAKE 3 CAPSULES BY MOUTH AT BEDTIME 270 capsule 3     generic lancets (FINGERSTIX LANCETS) Check  "blood sugar 3 times per day. Fastclix lancets. 100 each 5     insulin aspart U-100 (NOVOLOG FLEXPEN U-100 INSULIN) 100 unit/mL (3 mL) injection pen Inject 5 units before breakfast, 10 units before lunch and dinner. Total daily dose including priming the pen=30 units/day 15 mL 3     insulin glargine (LANTUS SOLOSTAR U-100 INSULIN) 100 unit/mL (3 mL) pen Inject 28 Units under the skin 2 (two) times a day. 60 mL 1     insulin lispro (HUMALOG KWIKPEN INSULIN) 100 unit/mL pen Inject 5 units before breakfast, 10 units before lunch and dinner. Total daily dose including priming the pen=30 units/day 5 adj dose pen 1     isosorbide mononitrate (IMDUR) 60 MG 24 hr tablet TAKE 1 TABLET BY MOUTH ONCE DAILY . 90 tablet 1     losartan (COZAAR) 50 MG tablet Take 1 tablet (50 mg total) by mouth daily. 90 tablet 0     metFORMIN (GLUCOPHAGE XR) 500 MG 24 hr tablet Take 2 tablets (1,000 mg total) by mouth 2 (two) times a day with meals. 360 tablet 1     metoprolol succinate (TOPROL-XL) 25 MG Take 1 tablet (25 mg total) by mouth daily. 90 tablet 1     multivitamin with minerals tablet Take by mouth daily.        nitroglycerin (NITROSTAT) 0.4 MG SL tablet Place 1 tablet (0.4 mg total) under the tongue every 5 (five) minutes as needed for chest pain. 25 tablet 12     omega-3s/dha/epa/fish oil (OMEGA 3 ORAL) Take 1 capsule by mouth daily.       pen needle, diabetic (ULTICARE PEN NEEDLE) 32 gauge x 5/32\" Ndle Use 1 each As Directed 4 (four) times a day. 200 each 3     QUEtiapine (SEROQUEL) 25 MG tablet Take 1-2 tablets (25-50 mg total) by mouth at bedtime as needed. 60 tablet 3     sertraline (ZOLOFT) 100 MG tablet Take 1.5 tablets (150 mg total) by mouth daily. TAKE 1 TABLET BY MOUTH ONCE DAILY (Patient taking differently: Take 100 mg by mouth daily. TAKE 1 TABLET BY MOUTH ONCE DAILY) 45 tablet 6     traMADoL (ULTRAM) 50 mg tablet Take 1 tablet (50 mg total) by mouth every 6 (six) hours as needed for pain. 30 tablet 0     No current " "facility-administered medications for this visit.        Social History     Tobacco Use   Smoking Status Former Smoker     Last attempt to quit: 2007     Years since quittin.1   Smokeless Tobacco Never Used           OBJECTIVE:        Recent Results (from the past 240 hour(s))   Microalbumin, Random Urine   Result Value Ref Range    Microalbumin, Random Urine 7.90 (H) 0.00 - 1.99 mg/dL    Creatinine, Urine 99.7 mg/dL    Microalbumin/Creatinine Ratio Random Urine 79.2 (H) <=19.9 mg/g       Vitals:    08/10/20 1442   BP: 130/62   Pulse: 71   Resp: 16   SpO2: 97%   Weight: 175 lb 8 oz (79.6 kg)   Height: 5' 2.25\" (1.581 m)     Weight: 175 lb 8 oz (79.6 kg)          Physical Exam:  GENERAL APPEARANCE: 70-year-old female very pleasant, NAD, well hydrated, well nourished  SKIN:  Normal skin turgor, the patient has multiple excoriated areas on her upper back and chest none of which look infected.  There is no thick plaques or significant erythema to the rash.  HEENT: moist mucous membranes, no rhinorrhea  NECK: Normal without adenopathy or masses  CV: RRR, no M/G/R   LUNGS: CTAB  ABDOMEN: S&NT, no masses or enlarged organs   EXTREMITY: no edema and full ROM of all joints  NEURO: no focal findings, 5.0 7 monofilament used as a sensation is 4 out of 4 on the dorsal and plantar surface of both feet.    "

## 2021-06-10 NOTE — PROGRESS NOTES
Patient's impression of how medication is working? Pt feels that they are going okay.  She's been going through a lot of stressors lately and she just lost her dog last week which made her feel sad.     Compliant with Medication? yes    Side Effects: None    Current Symptoms : Yes    Pain (0-10) Yes  Appetite change No  Negative thoughts Yes  Alcohol use No  Drug use No  Mood swings Yes  Sleep disturbance Yes  Change in interest No  Change in energy No  Change in concentration Yes  Psychosis/Hallucinations No  Anxiety high  Sad/depressed mood high

## 2021-06-10 NOTE — PROGRESS NOTES
Patient was identified as a potential candidate for the [facility] Care Coordination program and has declined participating. I have reached out to this patient multiple times over the last year and the patient has continued to decline participation. I will discontinue outreach to the patient at this time. I have notified the patient s physician by task. If the provider feels this patient is a good fit in the future I have asked them to resend to the [facility] referral bucket. I have also provided the patient with my contact information should they change their mind.

## 2021-06-10 NOTE — TELEPHONE ENCOUNTER
Left message to call back for: No voicemail box set up  Information to relay to patient:  If patient calls back please relay message below

## 2021-06-10 NOTE — TELEPHONE ENCOUNTER
Left message to call back for: pt  Information to relay to patient:  Unable to leave a voicemail as voicemail is not set up. If pt calls back, please relay Dr. Richey message regarding her results.

## 2021-06-11 NOTE — PROGRESS NOTES
Patient did not show for scheduled initial psychology consultation on this date. Patient will be contacted to assess her interest in rescheduling appointment.      Provider Name: Jen Dennison PsyD, NIKOS  Date: 6/6/2017  Time: 3:22 PM

## 2021-06-11 NOTE — PROGRESS NOTES
[facility] Progress Note    Assessment:  1.  Coronary artery disease with prior bypass surgery and coronary intervention most recently March 2016 is reviewed.  We discussed her medications in detail and discussed reasons for taking her medications.  She is in agreement with resuming the Plavix on a daily basis but is not interested in resuming losartan at this time.  We discussed the potential benefits of losartan and I suggested that she establish care with Dr. Hampton and additional discussions can be had over time.    2.  Hyperlipidemia.  LDL cholesterol in March 2016 was 71 but most recently LDL was 112.  She is on atorvastatin 80 mg daily and reports taking it regularly.  We are going to plan follow-up lipid examination as well as direct LDL and talked about the potential addition of Zetia.  Further discussion pending the outcome of the laboratory data.  Carotid ultrasound June 2015 demonstrated moderate disease in the left internal carotid artery.        Plan: As outlined above with follow-up in 5 or 6 months    1. Hyperlipidemia LDL goal <70  Lipid Profile    LDL cholesterol, direct   2. Coronary artery disease involving native coronary artery of native heart without angina pectoris     3. Noncompliance with medications     4. S/P CABG (coronary artery bypass graft)           An After Visit Summary was printed and given to the patient.    Subjective:    Kemi Soto is a 67 y.o. female who returned for a planned  follow up visit.  She is overdue today for follow-up we last visited in September 2016.  He has a history of coronary artery disease with prior bypass surgery and subsequent stenting.  We met 2016 and she underwent coronary angiography to demonstrated disease in the first diagonal and mid LAD and she had cutting balloon angioplasty the first diagonal and stenting to the mid LAD with a drug-coated stent.  LIMA to the LAD was patent with moderate disease beyond the LIMA,  moderate disease in the circumflex and mild in-stent restenosis of the right coronary artery.  We pursued follow-up stress echocardiogram in July 2016 that was negative for inducible ischemia by echocardiographic criteria.  She tells me she has not had any specific complaints of chest discomfort.  She traveled recently and noted that when she walked briskly she had a little bit more breathlessness but has not been exercising as regularly.  She has not been as diligent with respect to taking her medications telling me that she takes Plavix every third day and we discussed the importance of Plavix.  I suggested that given her complex coronary artery disease that we consider continuing Plavix and aspirin to at least March 2018.  She noted some mild superficial bleeding but no significant bleeding with the combination of medications.  In addition she self discontinued the losartan because of lightheadedness.  Dr. Hampton in May 2017 told her it would be best for her to go back on losartan but she has chosen not to go back on losartan.  I discussed with her the benefits of losartan but at this time she is deciding not to reinitiate losartan.  She tells me she has been taking her atorvastatin regularly despite having a higher LDL than in March 2016.    Review of Systems:   General: WNL  Eyes: WNL  Ears/Nose/Throat: WNL  Lungs: WNL  Heart: WNL  Stomach: WNL  Bladder: WNL  Muscle/Joints: WNL  Skin: WNL  Nervous System: WNL  Mental Health: WNL     Blood: WNL      Problem List:    Patient Active Problem List   Diagnosis     History of TIA (transient ischemic attack) and stroke     Essential hypertension     Mixed hyperlipidemia     Coronary artery disease     Back pain     Chalastodermia     Diabetes mellitus with polyneuropathy     Type 2 diabetes mellitus with hyperglycemia     Bipolar 1 disorder     S/P CABG (coronary artery bypass graft)     Bipolar 2 disorder     Anxiety disorder     BMI 36.0-36.9,adult      Noncompliance with medications     Psoriasis, guttate     History of atrial fibrillation     History of alcohol abuse       Social History     Social History     Marital status: Single     Spouse name: N/A     Number of children: 1     Years of education: N/A     Occupational History     Retired      Former [employer]      Social History Main Topics     Smoking status: Former Smoker     Quit date: 6/23/2007     Smokeless tobacco: Never Used     Alcohol use No      Comment: stopped drinking 9/2015     Drug use: No     Sexual activity: No     Other Topics Concern     Not on file     Social History Narrative    Lives alone with cats and dogs. , one daughter, Tory Lofton.        Family History   Problem Relation Age of Onset     Stroke Mother      Diabetes Father      Stroke Sister      Diabetes Sister      Stroke Brother      Diabetes Brother        Current Outpatient Prescriptions   Medication Sig Dispense Refill     albuterol (PROAIR HFA;PROVENTIL HFA;VENTOLIN HFA) 90 mcg/actuation inhaler Inhale 2 puffs every 6 (six) hours as needed for wheezing. 1 each 0     atorvastatin (LIPITOR) 80 MG tablet Take 1 tablet (80 mg total) by mouth bedtime. 90 tablet 1     blood glucose meter (GLUCOMETER) Use 1 each As Directed as needed. Dispense glucometer brand per patient's insurance at pharmacy discretion. 1 each 0     blood glucose test strips Use 1 each As Directed as needed. Dispense brand per patient's insurance at pharmacy discretion. 200 each 2     busPIRone (BUSPAR) 15 MG tablet Take 1 tablet (15 mg total) by mouth 2 (two) times a day. (Patient taking differently: Take 15 mg by mouth daily. ) 180 tablet 1     clopidogrel (PLAVIX) 75 mg tablet Take 1 tablet (75 mg total) by mouth daily. (Patient taking differently: Take 75 mg by mouth every 3 (three) days. ) 90 tablet 3     fluocinonide (LIDEX) 0.05 % ointment Apply twice a day sparingly to sores as needed for itching. 30 g 2     gabapentin  "(NEURONTIN) 300 MG capsule Take 3 capsules (900 mg total) by mouth at bedtime. 270 capsule 3     generic lancets (MICROLET LANCET) Use 1 each As Directed 3 (three) times a day. 100 each 2     LANTUS SOLOSTAR 100 unit/mL (3 mL) pen Inject 28 Units under the skin 2 (two) times a day. 11.65 Type 2 with hyperglycemia 3 mL PRN     metFORMIN (GLUCOPHAGE XR) 500 MG 24 hr tablet Take 2 tablets twice daily (Patient taking differently: 500 mg daily with breakfast. Take 2 tablets twice daily) 360 tablet 1     metoprolol succinate (TOPROL-XL) 25 MG TAKE ONE TABLET BY MOUTH ONCE DAILY 90 tablet 0     nitroglycerin (NITROSTAT) 0.4 MG SL tablet Place 1 tablet (0.4 mg total) under the tongue every 5 (five) minutes as needed for chest pain. 25 tablet 12     pen needle, diabetic (ULTICARE PEN NEEDLE) 32 gauge x 5/32\" Ndle Use 1 Device As Directed 2 (two) times a day. 200 each 3     sertraline (ZOLOFT) 100 MG tablet Take 1 tablet (100 mg total) by mouth daily. 90 tablet 1     Current Facility-Administered Medications   Medication Dose Route Frequency Provider Last Rate Last Dose     Study Drug albiglutide 30 mg/placebo injector pen 1 Syringe (HARMONY)  1 Syringe Subcutaneous Q7 Days Terri Christine MD         Study Drug albiglutide 30 mg/placebo injector pen 1 Syringe (HARMONY)  1 Syringe Subcutaneous Q7 Days Terri Christine MD           Objective:     /72 (Patient Site: Right Arm, Patient Position: Sitting, Cuff Size: Adult Large)  Pulse 76  Resp 18  Ht 5' 2.9\" (1.598 m)  Wt 178 lb (80.7 kg)  LMP  (Within Years)  BMI 31.63 kg/m2  178 lb (80.7 kg)       Physical Exam:    GENERAL APPEARANCE: alert, no apparent distress  HEENT: no scleral icterus or xanthelasma  NECK: jugular venous pressure   CHEST: symmetric, the lungs are clear to auscultation  CARDIOVASCULAR: regular rhythm without murmur, click, or gallop; no carotid bruits  Abdomen: No Organomegaly, masses, bruits, or tenderness. Bowels sounds are present    "   EXTREMITIES: no cyanosis, clubbing or edema    Cardiac Testing:  Conclusions       Procedure Summary   - Dyspnea and fatigue with abnormal stress test. Diabetic with   CABG in 2007 with angiogram in 2008 reporting both SVG (PDA   and diagonal) were occluded. Extensive, complex stenting at   that time of Left main/LAD/LCx and subsequent RCA stenting.   - 6 Fr right groin access   - Co-dominant arterial systems   - Patent LAD stent   - Severe ostial first diagonal (large artery) disease followed   by severe mid LAD disease that feeds a good sized second   diagonal. The first diagonal was treated with cutting balloon   angioplasty and the mid LAD was stented with a Promus STEVENSON with   good results. There is a LIMA to the mid/distal LAD and there   is moderate LAD disease beyond the LIMA in the distal LAD.   - The circumflex has moderate proximal and distal instent   restenosis   - The RCA has mild instent restenosis in the mid section. The   distal artery is very small in caliber and is diffusely   diseased.   - LV EDP 10-12 mmHg    Procedure Date  Date: 07/27/2016 Start: 02:41 PM     Study Location: [facility]  Technical Quality: Adequate visualization     Patient Status: Routine     Height: 62 inches Weight: 171 pounds BSA: 1.79 m^2 BMI: 31.28 kg/m^2     HR: 82 bpm BP: 110/62 mmHg     Allergies    - oxycodone.     Indications  Coronary artery disease.      Conclusions       Summary   No chest pain with exercise.   Exercise capacity is good.   Stress ECG: Non-diagnostic exercise EKG due to baseline ST changes.   Normal exercise stress echocardiogram.   Recommendations   - Continue clopidogrel for 12 months without interruption   - Aspirin 81mg daily and high dose atorvastatin indefinitely   - Cardiac rehab phase II   - Diet and diabetes optimization   - FU NP [facility] in 2 weeks and Dr. Hernandez in 6 weeks   - Consider long acting nitrate for small vessel disease       Signatures       Electronically signed by ALLISON  MAXIMO RODRIGUEZ   (Interventional Physician) on 03/30/2016 at 01:22 PM    Study Result      US CAROTID BILATERAL  6/23/2015 8:56 PM     INDICATION: stroke left hemisphere  TECHNIQUE: Duplex exam performed utilizing 2D gray-scale imaging, Doppler interrogation with color-flow and spectral waveform analysis.  COMPARISON: None.     FINDINGS:  RIGHT: There is mild atheromatous plaque. Normal waveforms with no significant stenosis.     LEFT: There is moderate atheromatous plaque. Elevated internal carotid peak systolic velocity of 163 cm/s.     Both vertebral arteries and subclavian artery waveforms are normal.     VELOCITY CHART:   The following velocities were obtained in the RIGHT carotid system.  CCA: 58/10 cm/s  ICA: 103/25 cm/s  ECA: 190/14 cm/s  ICA/CCA: PS 1.8     The following velocities were obtained in the LEFT carotid system.  CCA: 69/17 cm/s  ICA: 163/23 cm/s  ECA: 198/16 cm/s  ICA/CCA: PS 2.4      IMPRESSION:   CONCLUSION:  1.  Bilateral carotid atherosclerosis.  2.  Left internal carotid 50-69% diameter stenosis.  3.  Antegrade vertebral flow bilaterally.     Evaluation based on velocities and NASCET criteria         Lab Results:    Lab Results   Component Value Date     06/14/2017    K 4.0 06/14/2017     06/14/2017    CO2 30 06/14/2017    BUN 17 06/14/2017    CREATININE 0.88 06/14/2017    CALCIUM 9.4 06/14/2017     Lab Results   Component Value Date    CHOL 193 05/11/2017    TRIG 165 (H) 05/11/2017    HDL 48 (L) 05/11/2017     No results found for: BNP  Creatinine (mg/dL)   Date Value   06/14/2017 0.88   05/11/2017 0.84   01/04/2017 0.82   03/31/2016 0.94     LDL Calculated (mg/dL)   Date Value   05/11/2017 112   01/04/2017 118   03/31/2016 71     Lab Results   Component Value Date    WBC 7.4 06/05/2017    WBC 6.1 06/23/2015    HGB 13.0 06/05/2017    HCT 38.2 06/05/2017    MCV 86 06/05/2017     06/05/2017               This note has been dictated using voice recognition software. Any  grammatical or context distortions are unintentional and inherent to the software.      Chuck Hernandez M.D., F.A.C.C.  [facility]  [phone]

## 2021-06-11 NOTE — PROGRESS NOTES
Patient did not show for scheduled initial psychology consultation on this date.  This is patient's second no-show with this writer.  Patient will be contacted to assess her interest in rescheduling appointment and reminded of 3 no-show policy.      Provider Name: Jen Dennison PsyD, LP  Date: 6/12/2017  Time: 10:31 AM

## 2021-06-11 NOTE — PROGRESS NOTES
Assessment:     1. Viral URI     2. Wheezing  albuterol (PROAIR HFA;PROVENTIL HFA;VENTOLIN HFA) 90 mcg/actuation inhaler   3. Throat pain  Rapid Strep A Screen-Throat    Group A Strep, RNA Direct Detection, Throat   4. Cough  HM1(CBC and Differential)    XR Chest PA and Lateral    HM1 (CBC with Diff)          Plan:     Symptoms consistent with a viral URI.  Strep screen negative and no evidence of pneumonia noted on chest x-ray.  Labs reviewed and are normal.  Prescription given for an albuterol inhaler to help as needed for wheezing.  Follow-up if symptoms are getting worse or not improving.    Subjective:       67 y.o. female presents for evaluation of a one-week history of nasal congestion and cough.  She is also had a bit of a sore throat.  She suspects this is likely from drainage but her granddaughter did have strep throat and wants to make sure she does not have it herself.  She has had a bit of headache.  Her cough has been very productive and at times feels some heaviness in her chest.  Sometimes she feels wheezy.  No previous history of asthma.  She has not had any fevers and generally has been feeling rundown.  She has not really tried anything for her symptoms.    The following portions of the patient's history were reviewed and updated as appropriate: allergies, current medications, past family history, past medical history, past social history, past surgical history and problem list.    Review of Systems  A 12 point comprehensive review of systems was negative except as noted.     Objective:        Vitals:    06/05/17 1653   BP: 134/52   Pulse: 74   Resp: 14   Temp: 97.3  F (36.3  C)   SpO2: 98%     General Appearance:    Alert, pleasant, cooperative, mildly ill-appearing but in no no distress, appears stated age   Head:    Normocephalic, without obvious abnormality, atraumatic   Eyes:    Conjunctiva/corneas clear   Ears:    Normal TM's without erythema or bulging. Susan external ear canals, both ears    Nose:   Nares normal, septum midline, mucosa normal, no drainage    or sinus tenderness   Throat:   Lips, mucosa, and tongue normal; teeth and gums normal.  No tonsilar hypertrophy or exudate.   Neck:    Cardiovascular:   Supple, symmetrical, trachea midline, no adenopathy;     thyroid:  no enlargement/tenderness/nodules  Regular rate and rhythm, no murmurs, rubs, or gallops.   Lungs:    rhonchorous breath sounds heard in the left lower lung field.  Few scattered wheezes heard bilaterally.  Overall good aeration noted.      X-ray ordered and personally reviewed by myself.  No evidence of infiltrate or other significant abnormality.    Recent Results (from the past 24 hour(s))   Rapid Strep A Screen-Throat   Result Value Ref Range    Rapid Strep A Antigen No Group A Strep detected, presumptive negative No Group A Strep detected, presumptive negative   HM1 (CBC with Diff)   Result Value Ref Range    WBC 7.4 4.0 - 11.0 thou/uL    RBC 4.45 3.80 - 5.40 mill/uL    Hemoglobin 13.0 12.0 - 16.0 g/dL    Hematocrit 38.2 35.0 - 47.0 %    MCV 86 80 - 100 fL    MCH 29.2 27.0 - 34.0 pg    MCHC 34.0 32.0 - 36.0 g/dL    RDW 12.3 11.0 - 14.5 %    Platelets 323 140 - 440 thou/uL    MPV 6.9 (L) 7.0 - 10.0 fL    Neutrophils % 69 50 - 70 %    Lymphocytes % 24 20 - 40 %    Monocytes % 5 2 - 10 %    Eosinophils % 2 0 - 6 %    Basophils % 0 0 - 2 %    Neutrophils Absolute 5.1 2.0 - 7.7 thou/uL    Lymphocytes Absolute 1.8 0.8 - 4.4 thou/uL    Monocytes Absolute 0.4 0.0 - 0.9 thou/uL    Eosinophils Absolute 0.1 0.0 - 0.4 thou/uL    Basophils Absolute 0.0 0.0 - 0.2 thou/uL     Xr Chest Pa And Lateral    Result Date: 6/5/2017  XR CHEST PA AND LATERAL 6/5/2017 5:17 PM INDICATION: Cough COMPARISON: 10/10/2015 FINDINGS: There are median sternotomy wires in the heart size is in the upper range of normal with mediastinal surgical clips present. The lungs are clear. The pulmonary vasculature and pleural spaces appear normal. This report was  electronically interpreted by: Dr. Moriah Bay MD ON 06/05/2017 at 18:02                              This note has been dictated using voice recognition software. Any grammatical or context distortions are unintentional and inherent to the software

## 2021-06-11 NOTE — PROGRESS NOTES
Assessment/Plan:      Visit for Preoperative Exam.    1. Preop cardiovascular exam     2. Cataract     3. Type 2 diabetes mellitus with hyperglycemia  Glycosylated Hemoglobin A1c    Basic Metabolic Panel    Hepatic Profile   4. Coronary artery disease     5. Essential hypertension  Basic Metabolic Panel   6. Mixed hyperlipidemia  Hepatic Profile       Labs will be done as indicated. Low Risk Surgery. No active cardiac conditions.   We will check A1c, basic and hepatic profiles.  The EKG from May 11 does still suffice.  She is strongly encouraged to be checking her blood sugars on regular basis both for trying to improve her diabetic control as well as being aware of if she would have the chance of low blood sugar and avoiding difficulties from that.  But if labs are okay then she would be cleared for surgery as scheduled.  She would need to hold her metformin for 24 hours before the procedure.  She could then resume it the day after the procedure.  She will need to get established with a different primary care physician at our clinic as she understands that Dr. Tovar will not be returning.    Addendum: A1c is 8.8%.  Patient left before he could return to her room with that information.  It is the same as it was in May, and she is cleared for her surgery as scheduled, but I certainly advised that she be checking her blood sugars on a regular basis both to improve her diabetic control as well as to watch for any times of low blood sugar as she improves her control.      Subjective:     Scheduled Procedure: Right Eye Cataract  Surgery Date:  6/19/17  Surgery Location:  [facility]  Surgeon:  Dr. Sherron Werner    Has some blurred vision in eye,  Had left eye cataract done about May 16th or so.  Had to delay having the right eye done due to death of her dog on May 20th.  The left eye surgery did go well.    Current Outpatient Prescriptions   Medication Sig Dispense Refill     albuterol (PROAIR  "HFA;PROVENTIL HFA;VENTOLIN HFA) 90 mcg/actuation inhaler Inhale 2 puffs every 6 (six) hours as needed for wheezing. 1 each 0     atorvastatin (LIPITOR) 80 MG tablet Take 1 tablet (80 mg total) by mouth bedtime. 90 tablet 1     blood glucose meter (GLUCOMETER) Use 1 each As Directed as needed. Dispense glucometer brand per patient's insurance at pharmacy discretion. 1 each 0     blood glucose test strips Use 1 each As Directed as needed. Dispense brand per patient's insurance at pharmacy discretion. 200 each 2     busPIRone (BUSPAR) 15 MG tablet Take 1 tablet (15 mg total) by mouth 2 (two) times a day. (Patient taking differently: Take 15 mg by mouth daily. ) 180 tablet 1     clopidogrel (PLAVIX) 75 mg tablet Take 1 tablet (75 mg total) by mouth daily. (Patient taking differently: Take 75 mg by mouth every 3 (three) days. ) 90 tablet 3     fluocinonide (LIDEX) 0.05 % ointment Apply twice a day sparingly to sores as needed for itching. 30 g 2     gabapentin (NEURONTIN) 300 MG capsule Take 3 capsules (900 mg total) by mouth at bedtime. 270 capsule 3     generic lancets (MICROLET LANCET) Use 1 each As Directed 3 (three) times a day. 100 each 2     LANTUS SOLOSTAR 100 unit/mL (3 mL) pen Inject 28 Units under the skin 2 (two) times a day. 11.65 Type 2 with hyperglycemia 3 mL PRN     metFORMIN (GLUCOPHAGE XR) 500 MG 24 hr tablet Take 2 tablets twice daily (Patient taking differently: 500 mg daily with breakfast. Take 2 tablets twice daily) 360 tablet 1     metoprolol succinate (TOPROL XL) 25 MG Take 1 tablet (25 mg total) by mouth daily. 90 tablet 5     pen needle, diabetic (ULTICARE PEN NEEDLE) 32 gauge x 5/32\" Ndle Use 1 Device As Directed 2 (two) times a day. 200 each 3     sertraline (ZOLOFT) 100 MG tablet Take 1 tablet (100 mg total) by mouth daily. 90 tablet 1     nitroglycerin (NITROSTAT) 0.4 MG SL tablet Place 1 tablet (0.4 mg total) under the tongue every 5 (five) minutes as needed for chest pain. 25 tablet 12 "     Current Facility-Administered Medications   Medication Dose Route Frequency Provider Last Rate Last Dose     Study Drug albiglutide 30 mg/placebo injector pen 1 Syringe (HARMONY)  1 Syringe Subcutaneous Q7 Days Terri Christine MD         Study Drug albiglutide 30 mg/placebo injector pen 1 Syringe (HARMONY)  1 Syringe Subcutaneous Q7 Days Terri Christine MD           Allergies   Allergen Reactions     Oxycontin [Oxycodone] Nausea Only       Immunization History   Administered Date(s) Administered     Hep B, historic 12/13/1999, 01/11/2000, 06/26/2000     Influenza high dose, seasonal 10/25/2015, 01/11/2017     Influenza, inj, historic 10/18/1990, 10/29/1999, 10/13/2003, 11/17/2004, 12/19/2005, 10/03/2007, 09/03/2009, 10/18/2010, 10/26/2012, 09/20/2013, 12/05/2014     Pneumo Conj 13-V (2010&after) 05/10/2016     Pneumo Polysac 23-V 05/09/2011     Td, historic 05/11/1988, 12/11/1998     Tdap 03/23/2010       Patient Active Problem List   Diagnosis     History of TIA (transient ischemic attack) and stroke     Essential hypertension     Mixed hyperlipidemia     Coronary artery disease     Back pain     Chalastodermia     Diabetes mellitus with polyneuropathy     Type 2 diabetes mellitus with hyperglycemia     Bipolar 1 disorder     S/P CABG (coronary artery bypass graft)     Bipolar 2 disorder     Anxiety disorder     BMI 36.0-36.9,adult     Noncompliance with medications     Psoriasis, guttate     History of atrial fibrillation     History of alcohol abuse       Past Medical History:   Diagnosis Date     Discitis of thoracolumbar region 10/10/2015     Encephalopathy 10/14/2015     Epidural abscess 10/10/2015     Hip pain, right      Sepsis 10/8/2015     Stroke 06/23/2015    Neurology felt that episode was C/W subacute ischemic stroke     TIA (transient ischemic attack) 6/23/2015     UTI (urinary tract infection)     admitted to [facility] with UTI       Social History     Social History     Marital  status: Single     Spouse name: N/A     Number of children: 1     Years of education: N/A     Occupational History     Retired      Former [employer]      Social History Main Topics     Smoking status: Former Smoker     Quit date: 6/23/2007     Smokeless tobacco: Never Used     Alcohol use No      Comment: stopped drinking 9/2015     Drug use: No     Sexual activity: No     Other Topics Concern     Not on file     Social History Narrative    Lives alone with cats and dogs. , one daughter, Tory Lofton.        Past Surgical History:   Procedure Laterality Date     CARPAL TUNNEL RELEASE Bilateral      CORONARY ANGIOPLASTY WITH STENT PLACEMENT  03/30/2016    Drug eluting stent mid LAD, cutting balloon to 1st diagonal     CORONARY ARTERY BYPASS GRAFT  12/11/2007    X 3 vessels, LIMA to LAD, saph vein graft to distal RCA, saphvein graft to marginal, mini circuit bypass.  [facility].     CORONARY STENT PLACEMENT  2008    Left main, left circumflex, RCA     INSERT MIDLINE HE  10/31/2015          LUMBAR DISCECTOMY N/A 10/11/2015    Procedure: BILATERAL L1-L5 DECOMPRESSIVE LAMINECTOMY WITH EVACUATION OF EPIDURAL ABSCESS IRRIGATION & DEBRIDEMENT;  Surgeon: Luli Chan MD;  Location: [facility];  Service:      [facility]  10/19/2015            History of Present Illness  Recent Health  Fever: had recent low grade fever, is much better than she was  Chills: no  Fatigue: yes, only since recently ill  Chest Pain: no  Cough: yes  Dyspnea: no  Urinary Frequency: yes, a long time,  Nausea: no  Vomiting: no  Diarrhea: no  Abdominal Pain: no  Easy Bruising: yes, due to med- clopidogrel  Lower Extremity Swelling: no  Poor Exercise Tolerance: yes, has been for some time- had cardio rehab last year-  Recently did walk a lot  On her trip to [place]    Most recent Health Maintenance Visit:      Pertinent History  Prior Anesthesia: yes  Previous Anesthesia Reaction:  no  Diabetes:  "yes  Cardiovascular Disease: yes  Pulmonary Disease: no  Renal Disease: no  GI Disease: no  Sleep Apnea: no  Thromboembolic Problems: no  Clotting Disorder: no  Bleeding Disorder: no  Transfusion Reaction: no  Impaired Immunity: no  Steroid use in the last 6 months: no  Frequent Aspirin use: occcasional aspirin use    Family history of no anesthetic reaction    Social history of patient does not wear denture or partial plates, there is no transfusion refusal and there are no concerns regarding care after surgery    After surgery, the patient plans to recover at home with a friend.    Review of Systems  Review of Systems   Recent cough and cold is improved- still some cough. Sputum has decreased much- now only in am and color is clear.          Objective:         Vitals:    06/14/17 1009   BP: 100/50   Pulse: 60   Temp: 99.5  F (37.5  C)   Weight: 177 lb 4.8 oz (80.4 kg)   Height: 5' 2.5\" (1.588 m)       Physical Exam:  Physical Exam     Head normocephalic.  External ears and TMs normal.  Pupils equal round and react to light and accommodation.  As the right eye cataract.  Nose and oropharynx are not remarkable.  Neck is supple without adenopathy or thyromegaly.  Lungs are clear with no wheezes rales or rhonchi.  Heart regular rate and rhythm without murmur.  Abdomen shows no masses tenderness or hepatosplenomegaly.  Breast exam deferred.  Pelvic examination deferred  Extremities show no edema.  A few scattered skin lesions consistent with treated psoriasis.  She is alert with clear speech.  No coughing during the exam.    EKG from May 11, 2017 is still sufficient, and was unchanged from her previous EKG in 2016.  "

## 2021-06-12 NOTE — TELEPHONE ENCOUNTER
Pt has apt with EAC tomorrow.  Will prep and order procedure appropriately pending this visit.  GEGE

## 2021-06-12 NOTE — PATIENT INSTRUCTIONS - HE
Please call my nurse Priscila with any heart related questions.Her number is [phone].Please call with any heart related questions.We discussed stopping the aspirin and continuing just plavix and call with any concerns.Please check your pulse at least once a day.

## 2021-06-12 NOTE — TELEPHONE ENCOUNTER
----- Message from Rachael Szymanski sent at 11/9/2020  8:11 AM CST -----  General phone call:  PLEASE ADD AN EKG ORDER, PT COMING IN TODAY' THANKS, YANDEL    Caller: PATIENT  Primary cardiologist: DR PRITCHETT  Detailed reason for call: SEE ABOVE  New or active symptoms? NO  Best phone number:  NOT NEEDED  Best time to contact: ANY TIME  Ok to leave a detailed message? NO  Device? NO    Additional Info:

## 2021-06-12 NOTE — TELEPHONE ENCOUNTER
----- Message from Debbie Boucher MD sent at 11/9/2020  7:44 AM CST -----  Chart reviewed, ECGs consistent with  typical atrial flutter.   Will ask our EP clinic staff to schedule for clinic visit and catheter ablation.  Thank you  ----- Message -----  From: Chuck Hernandez MD  Sent: 11/6/2020   2:46 PM CST  To: Debbie Boucher MD    Hi this is the woman we spoke about with a flutter that was identified in routine follow-up in the office and subsequently she converted back to sinus rhythm.  I did go ahead and place her on low-dose sotalol.  She is interested in following up to hear more about catheter ablation but she is anxious to go to [place] as well.  I kept her today to watch on the sotalol for a few doses.  I could not to be certain that this is typical flutter and wondering your thoughts.  Thank you Ron

## 2021-06-12 NOTE — PROGRESS NOTES
"Patient present to the clinic today for follow up Qt check after recent start of Sotalol. Patient was hospitalized with atrial flutter on 11/4/20. Patient has had 6 doses of sotalol prior to EKG. Her current sotalol dose is 40 mg two times a day since 11/06/20.    Patient states she is doing \"well\" and denies lightheadedness, dizziness or fatigue. She does note she is tired, but said the whole experience was emotionally draining and she will to continue to monitor.   Patient has follow up on 11/10/20 with Dr Boucher.    EKG reveals sinus bradycardia  HR 58   ms  QRS 92 ms  QT/QTc  428/420 ms    Patient was instructed provider will review and if new orders are given a return call will be made. Patient verbalizes understanding and agrees with plan. Patient will follow up as planned. No further questions or concerns.    Dr. Hernandez,  Please review EKG.   Any further recommendations or instructions?  -rah    "

## 2021-06-12 NOTE — PROGRESS NOTES
Notes Recorded by Chuck Hernandez MD on 7/18/2017 at 10:08 PM  ldl is near goal better than 2 months ago ideally would like it less than 70,suggest continue 80mg atorvasatin, options include ongoing diet and nina in 6 months or add a new medicine zetia. Usually well tolerated and if started teck lipid, ast and alt 2 months  mdg    Order placed for f/u lab work in 6 months.

## 2021-06-12 NOTE — PROGRESS NOTES
Patient's impression of how medication is working? Good    Compliant with Medication? yes    Side Effects: Dry mouth    Current Symptoms : No    Pain (0-10) No  Appetite change Yes  Negative thoughts Yes  Alcohol use No  Drug use No  Mood swings Yes  Sleep disturbance No  Change in interest No  Change in energy No  Change in concentration No  Psychosis/Hallucinations No  Anxiety mild  Sad/depressed mood none

## 2021-06-12 NOTE — TELEPHONE ENCOUNTER
Refill Approved    Rx renewed per Medication Renewal Policy. Medication was last renewed on 6/10/20, last OV 8/10/20.    Kianna Hurtado, [facility] Triage/Med Refill 10/24/2020     Requested Prescriptions   Pending Prescriptions Disp Refills     losartan (COZAAR) 50 MG tablet [Pharmacy Med Name: Losartan Potassium 50 MG Oral Tablet] 90 tablet 0     Sig: Take 1 tablet by mouth once daily       Angiotensin Receptor Blocker Protocol Passed - 10/23/2020  1:04 PM        Passed - PCP or prescribing provider visit in past 12 months       Last office visit with prescriber/PCP: 3/16/2020 Wander Olivas MD OR same dept: 8/10/2020 Mayco Zapata MD OR same specialty: 8/10/2020 Mayco Zapata MD  Last physical: 6/14/2017 Last MTM visit: Visit date not found   Next visit within 3 mo: Visit date not found  Next physical within 3 mo: Visit date not found  Prescriber OR PCP: Wander Olivas MD  Last diagnosis associated with med order: 1. Essential hypertension  - losartan (COZAAR) 50 MG tablet [Pharmacy Med Name: Losartan Potassium 50 MG Oral Tablet]; Take 1 tablet by mouth once daily  Dispense: 90 tablet; Refill: 0    If protocol passes may refill for 12 months if within 3 months of last provider visit (or a total of 15 months).             Passed - Serum potassium within the past 12 months     Lab Results   Component Value Date    Potassium 3.8 07/23/2020             Passed - Blood pressure filed in past 12 months     BP Readings from Last 1 Encounters:   08/10/20 130/62             Passed - Serum creatinine within the past 12 months     Creatinine   Date Value Ref Range Status   07/23/2020 0.78 0.60 - 1.10 mg/dL Final

## 2021-06-12 NOTE — TELEPHONE ENCOUNTER
Controlled Substance Refill Request  Medication Name:   Requested Prescriptions     Pending Prescriptions Disp Refills     traMADoL (ULTRAM) 50 mg tablet [Pharmacy Med Name: traMADol HCl 50 MG Oral Tablet] 30 tablet 0     Sig: TAKE 1 TABLET BY MOUTH EVERY 6 HOURS AS NEEDED FOR PAIN     Date Last Fill: 8/15/20  Requested Pharmacy: [facility]  Submit electronically to pharmacy  Controlled Substance Agreement on file:   Encounter-Level CSA Scan Date:    There are no encounter-level csa scan date.        Last office visit:  8/10/20

## 2021-06-12 NOTE — PROGRESS NOTES
.  Assessment / Impression     Bipolar affective disorder; type II  Cognitive disorder; NOS.  Follow-up medication evaluation.  Patient's impression of how medication is working? good  Compliant with medication? yes    Side effects:None       Plan:     Discussed medications for anxiety, at this time I would not recommend as needed Klonopin as it can worsen cognition and depression.  Recommended patient restart therapy, we gave her the number for [facility] in [place], [place].  Discussed slight increase in the Zoloft, perhaps 150 mg but we would watch for increased manic behavior.  No changes made today.  We will have patient follow-up in 3 months.    Subjective:      HPI: Kemi Soto is a 67 y.o. female with bipolar affective disorder, anxiety and cognitive disorder.  She is here for follow-up medication evaluation.  Brianna states she has been going through a lot of changes.  Her daughter  earlier this year, her daughter moved and so she sees less and less of her grandchildren.  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  Her sister is declining, it is harder to spend time with her.  She feels she is going through a lot of changes and has anxiety.  Patient states she had Klonopin when she was in the hospital and would like to use that for anxiety.  Currently not seen a therapist, we had her follow-up with psychology here and she never made the appointment.  She feels it is too difficult to get to this office.  She is trying to get more active in her [employer] and do some volunteer work.  She is also trying to get her house ready to sell and that has been stressful.    Kemi  has a past medical history of Discitis of thoracolumbar region (10/10/2015); Encephalopathy (10/14/2015); Epidural abscess (10/10/2015); Hip pain, right; Sepsis (10/8/2015); Stroke (2015); TIA (transient ischemic attack) (2015); and UTI (urinary tract  "infection).  Kemi  has a past surgical history that includes Lumbar discectomy (N/A, 10/11/2015); PICC (10/19/2015); Insert Midline (10/31/2015); Coronary artery bypass graft (12/11/2007); Coronary angioplasty with stent (03/30/2016); Coronary stent placement (2008); and Carpal tunnel release (Bilateral).  Oxycontin [oxycodone]  Current Outpatient Prescriptions   Medication Sig Dispense Refill     atorvastatin (LIPITOR) 80 MG tablet Take 1 tablet (80 mg total) by mouth bedtime. 90 tablet 1     blood glucose meter (GLUCOMETER) Use 1 each As Directed as needed. Dispense glucometer brand per patient's insurance at pharmacy discretion. 1 each 0     blood glucose test strips Use 1 each As Directed as needed. Dispense brand per patient's insurance at pharmacy discretion. 200 each 2     busPIRone (BUSPAR) 15 MG tablet Take 1 tablet (15 mg total) by mouth 2 (two) times a day. (Patient taking differently: Take 15 mg by mouth daily. ) 180 tablet 1     clopidogrel (PLAVIX) 75 mg tablet Take 1 tablet (75 mg total) by mouth daily. (Patient taking differently: Take 75 mg by mouth every other day. ) 90 tablet 3     fluocinonide (LIDEX) 0.05 % ointment Apply twice a day sparingly to sores as needed for itching. 30 g 2     gabapentin (NEURONTIN) 300 MG capsule Take 3 capsules (900 mg total) by mouth at bedtime. 270 capsule 3     generic lancets (MICROLET LANCET) Use 1 each As Directed 3 (three) times a day. 100 each 2     LANTUS SOLOSTAR 100 unit/mL (3 mL) pen Inject 28 Units under the skin 2 (two) times a day. 11.65 Type 2 with hyperglycemia 3 mL PRN     metFORMIN (GLUCOPHAGE XR) 500 MG 24 hr tablet Take 2 tablets twice daily (Patient taking differently: Take 500 mg by mouth at bedtime. ) 360 tablet 1     metoprolol succinate (TOPROL-XL) 25 MG TAKE ONE TABLET BY MOUTH ONCE DAILY 90 tablet 0     pen needle, diabetic (ULTICARE PEN NEEDLE) 32 gauge x 5/32\" Ndle Use 1 Device As Directed 2 (two) times a day. 200 each 3     sertraline " (ZOLOFT) 100 MG tablet Take 1 tablet (100 mg total) by mouth daily. 90 tablet 1     nitroglycerin (NITROSTAT) 0.4 MG SL tablet Place 1 tablet (0.4 mg total) under the tongue every 5 (five) minutes as needed for chest pain. 25 tablet 12     Current Facility-Administered Medications   Medication Dose Route Frequency Provider Last Rate Last Dose     Study Drug albiglutide 30 mg/placebo injector pen 1 Syringe (HARMONY)  1 Syringe Subcutaneous Q7 Days Terri Christine MD         Study Drug albiglutide 30 mg/placebo injector pen 1 Syringe (HARMONY)  1 Syringe Subcutaneous Q7 Days Terri Christine MD         Study Drug albiglutide 30 mg/placebo injector pen 1 Syringe (HARMONY)  1 Syringe Subcutaneous Q7 Days Terri Christine MD         Study Drug albiglutide 50 mg/placebo injector pen 1 Syringe (HARMONY)  1 Syringe Subcutaneous Q7 Days Terri Christine MD         Family History   Problem Relation Age of Onset     Stroke Mother      Diabetes Father      Stroke Sister      Diabetes Sister      Stroke Brother      Diabetes Brother      Social History     Social History     Marital status: Single     Spouse name: N/A     Number of children: 1     Years of education: N/A     Occupational History     Retired      Former [employer]     Social History Main Topics     Smoking status: Former Smoker     Quit date: 6/23/2007     Smokeless tobacco: Never Used     Alcohol use No      Comment: stopped drinking 9/2015     Drug use: No     Sexual activity: No     Other Topics Concern     Not on file     Social History Narrative    Lives alone with cats and dogs. , one daughter, Tory Lofton.        The following portions of the patient's history were reviewed and updated as appropriate: allergies, current medications, past family history, past medical history, past social history, past surgical history and problem list.    Review of Systems  Constitutional: negative  Neurological:  negative  Behavioral/Psych: positive for anxiety and depression       Objective:   @VS    Mental Status Exam:     Appearance: Patient is well-groomed, pleasant and cooperative.  Good eye contact.  No psychomotor agitation.    Speech / Language : Rapid, loud and excessive per usual.    Associations: appropriate    Alert and oriented X 3:WNL    Mood: Euthymic  Affect: Congruent w/content of speech    Suicidal / Homicidal ideation:none  If yes, document safety plan:    Fund of knowledge: good    Thought process:Flight of ideas and Tangential    Judgement / insight: No Evidence of Impairment    Recent and remote memory: WNL    Attention: Within normal  Concentration: Within normal    Motor status: Gait normal, no involuntary movements.    Scores: NA    Change in medication? No    Education    Reviewed risks/benefits of medication      Physical Exam: General appearance: alert, appears stated age, cooperative and no distress  Neurologic: Mental status: Alert, oriented, thought content appropriate, affect: mood-congruent, thought content exhibits logical connections.Thought content devoid of any delusions, suicidal, homicidal or obsesional content.

## 2021-06-12 NOTE — TELEPHONE ENCOUNTER
----- Message from Chuck Hernandez MD sent at 11/7/2020  8:32 AM CST -----  Can we please arrange an EKG for Monday starting on sotalol.  Patient needs to be seen in [facility] or with [facility] this coming week, please et me know what we can arrange.  Waiting to hear back about appointment with Dr.Charaf barker

## 2021-06-13 NOTE — TELEPHONE ENCOUNTER
Last visit: 11/13/2020    Labs: 11/87/2020    Next apt due: ?    Apt made?  no    Last refill: 3/16/2020

## 2021-06-13 NOTE — PROGRESS NOTES
ASSESSMENT/PLAN:       1. Typical atrial flutter (H)  Next week the patient plans to travel to [place] for the winter months and will be back in the spring  She will make contact with the cardiologist that a family or friend from [place] has recommended.  She signed release of medical information for us to send records to the cardiologist.  Encouraged her to contact the cardiologist as soon as possible when she gets to [place] to set up an appointment    - apixaban ANTICOAGULANT (ELIQUIS) 5 mg Tab tablet; Take 1 tablet (5 mg total) by mouth 2 (two) times a day.  Dispense: 60 tablet; Refill: 3  - sotaloL (BETAPACE) 80 MG tablet; Take 0.5 tablets (40 mg total) by mouth every 12 (twelve) hours. Do not take with seroquel.  Dispense: 90 tablet; Refill: 1  The patient uses [facility] locally and can do that in [place] as well.  Try to renew her prescription so she does not run out of that medications including the new medications that were started    2. Type 2 diabetes mellitus with hyperglycemia, with long-term current use of insulin (H)  On the discharge summary it was listed that the patient is taking 4000 mg of Metformin and that should be 2000 mg.  Tried to contact the patient today after she had left but voicemail is full.  We will try to make sure that she is taking her Metformin correctly    3. Essential hypertension  The patient will continue with losartan 100 mg daily    4. Coronary artery disease involving native coronary artery of native heart without angina pectoris  The patient will continue with Imdur 60 mg daily    5. Anxiety, generalized    - sertraline (ZOLOFT) 100 MG tablet; Take 1 tablet (100 mg total) by mouth daily.  Dispense: 90 tablet; Refill: 1  - busPIRone (BUSPAR) 15 MG tablet; Take 1 tablet (15 mg total) by mouth daily.  Dispense: 90 tablet; Refill: 1  The patient should put on hold Seroquel while using sotalol    The patient returns to [place] in the spring should have an appointment  with cardiology to consider ablation.      Post Discharge Medication Reconciliation Status: discharge medications reconciled and changed, per note/orders    Mayco Zapata MD      PROGRESS NOTE   11/15/2020    SUBJECTIVE:  Kemi Soto is a 70 y.o. female  who presents for   Chief Complaint   Patient presents with     Hospital Visit Follow Up     Feeling better since leaving [facility], EKG and follow up has been fine      The patient is seen today for a hospital follow-up.  The patient was hospitalized  through 2020 with atrial flutter.  The patient was seen for routine visit by her cardiologist and was noted to have a rapid heartbeat and had very minimal symptoms.  The patient was sent to the hospital and admitted.  Spontaneous cardioversion to sinus rhythm.    1. Typical atrial flutter (H)  [id number] Scorin  Patient was started on apixaban 5 mg twice daily  Sotalol 40 mg twice daily  Losartan was increased from 50 mg daily to 100 mg daily    Medication that was stopped:  Aspirin 81 mg daily  Plavix 75 mg daily  Metoprolol XL 25 mg daily  Seroquel 25 mg 1 to 2 tablets at bedtime for sleep and anxiety      2. Type 2 diabetes mellitus with hyperglycemia, with long-term current use of insulin (H)  The patient's last hemoglobin A1c was 8.3  Medication used is Lantus insulin 28 units twice daily morning and bedtime  Metformin XR 2000 mg daily at breakfast    3. Essential hypertension  The patient's blood pressure was elevated in the hospital thus the losartan was increased to 100 mg daily  Initially blood pressure was elevated when seen today but after sitting for a while it came down to 140/60      4. Coronary artery disease involving native coronary artery of native heart without angina pectoris  In the hospital the patient had a echocardiogram which showed an ejection fraction of 58% with a moderately enlarged left atrium  The patient also had a nuclear medicine pharmacologic stress  test which was negative for ischemia    5. Anxiety, generalized  The patient has a history of anxiety, insomnia and in her problem list bipolar disorder but has not been seen for some time by a psychiatrist.  She has been taking Seroquel 25 mg 1 to 2 tablets at bedtime which she says is primarily for sleep and anxiety.  This is had to be stopped because of the risk of prolonging the QT interval.  The patient also takes sertraline 100 mg daily and BuSpar 15 mg daily for anxiety    Patient Active Problem List   Diagnosis     History of TIA (transient ischemic attack) and stroke     Essential hypertension, benign     Mixed hyperlipidemia     Chalastodermia     Type 2 diabetes mellitus with hyperglycemia, with long-term current use of insulin (H)     S/P CABG (coronary artery bypass graft)     Bipolar 2 disorder (H)     Anxiety disorder     BMI 36.0-36.9,adult     Noncompliance with medications     Psoriasis, guttate     History of atrial fibrillation     Benign adenomatous polyp of large intestine     Bilateral hearing loss     Coronary artery disease of native artery of native heart with stable angina pectoris (H)     Peripheral neuropathy     Hyperlipidemia with target LDL less than 70     Atrial flutter (H)     Atrial fibrillation with RVR (H)     Coronary artery disease involving native coronary artery of native heart without angina pectoris     Type 2 myocardial infarction due to arrhythmia (H)       Current Outpatient Medications   Medication Sig Dispense Refill     acetaminophen (TYLENOL) 500 MG tablet Take 500 mg by mouth 2 (two) times a day.        apixaban ANTICOAGULANT (ELIQUIS) 5 mg Tab tablet Take 1 tablet (5 mg total) by mouth 2 (two) times a day. 60 tablet 3     atorvastatin (LIPITOR) 80 MG tablet Take 80 mg by mouth at bedtime.       blood glucose test strips Smartview test strips. Check blood sugar 3 times per day. 100 strip 5     busPIRone (BUSPAR) 15 MG tablet Take 1 tablet (15 mg total) by mouth  "daily. 90 tablet 1     gabapentin (NEURONTIN) 300 MG capsule Take 900 mg by mouth at bedtime.       generic lancets (FINGERSTIX LANCETS) Check blood sugar 3 times per day. Fastclix lancets. 100 each 5     insulin glargine (LANTUS SOLOSTAR U-100 INSULIN) 100 unit/mL (3 mL) pen Inject 28 Units under the skin 2 (two) times a day. 60 mL 1     isosorbide mononitrate (IMDUR) 60 MG 24 hr tablet Take 60 mg by mouth daily.       losartan (COZAAR) 100 MG tablet Take 1 tablet (100 mg total) by mouth daily. 30 tablet 0     metFORMIN (GLUCOPHAGE-XR) 500 MG 24 hr tablet Take 2,000 mg by mouth daily with breakfast.       pen needle, diabetic (ULTICARE PEN NEEDLE) 32 gauge x 5/32\" Ndle Use 1 each As Directed 4 (four) times a day. 200 each 3     sertraline (ZOLOFT) 100 MG tablet Take 1 tablet (100 mg total) by mouth daily. 90 tablet 1     sotaloL (BETAPACE) 80 MG tablet Take 0.5 tablets (40 mg total) by mouth every 12 (twelve) hours. Do not take with seroquel. 90 tablet 1     traMADoL (ULTRAM) 50 mg tablet Take 50 mg by mouth every 6 (six) hours as needed for pain.       multivitamin with minerals tablet Take by mouth daily.        nitroglycerin (NITROSTAT) 0.4 MG SL tablet Place 1 tablet (0.4 mg total) under the tongue every 5 (five) minutes as needed for chest pain. 25 tablet 12     No current facility-administered medications for this visit.        Social History     Tobacco Use   Smoking Status Former Smoker     Quit date: 2007     Years since quittin.4   Smokeless Tobacco Never Used           OBJECTIVE:        Recent Results (from the past 240 hour(s))   POCT Glucose    Specimen: Blood   Result Value Ref Range    Glucose 187 (H) 70 - 139 mg/dL   POCT Glucose    Specimen: Blood   Result Value Ref Range    Glucose 180 (H) 70 - 139 mg/dL   Magnesium   Result Value Ref Range    Magnesium 2.0 1.8 - 2.6 mg/dL   Basic Metabolic Panel   Result Value Ref Range    Sodium 144 136 - 145 mmol/L    Potassium 4.1 3.5 - 5.0 mmol/L    " Chloride 110 (H) 98 - 107 mmol/L    CO2 28 22 - 31 mmol/L    Anion Gap, Calculation 6 5 - 18 mmol/L    Glucose 107 70 - 125 mg/dL    Calcium 8.5 8.5 - 10.5 mg/dL    BUN 17 8 - 28 mg/dL    Creatinine 0.76 0.60 - 1.10 mg/dL    GFR MDRD Af Amer >60 >60 mL/min/1.73m2    GFR MDRD Non Af Amer >60 >60 mL/min/1.73m2   Platelet Count - every other day x 3   Result Value Ref Range    Platelets 309 140 - 440 thou/uL   Troponin I   Result Value Ref Range    Troponin I 0.18 0.00 - 0.29 ng/mL   POCT Glucose    Specimen: Blood   Result Value Ref Range    Glucose 96 70 - 139 mg/dL   ECG 12 lead with MUSE   Result Value Ref Range    SYSTOLIC BLOOD PRESSURE      DIASTOLIC BLOOD PRESSURE      VENTRICULAR RATE 73 BPM    ATRIAL RATE 73 BPM    P-R INTERVAL 162 ms    QRS DURATION 94 ms    Q-T INTERVAL 416 ms    QTC CALCULATION (BEZET) 458 ms    P Axis 67 degrees    R AXIS 49 degrees    T AXIS 89 degrees    MUSE DIAGNOSIS       Normal sinus rhythm  Nonspecific ST and T wave abnormality  Abnormal ECG  When compared with ECG of 04-NOV-2020 17:22,  Sinus rhythm has replaced Atrial flutter  Criteria for Septal infarct are no longer Present  Nonspecific T wave abnormality no longer evident in Inferior leads  Confirmed by MELISSA MEDEIROS MD LOC:WW ([id number]) on 11/6/2020 4:35:21 PM     NM MPI with Lexiscan   Result Value Ref Range    Pharmacologic Protocol  Lexiscan     Test Type Pharmacological     Baseline HR 64     Baseline Systolic      Baseline Diastolic BP 70     Last Stress HR 92     Last Stress Systolic      Last Stress Diastolic BP 73     Target      PERCENT HR 85%     ST Deviation Elevation II 0.2mm     Deviation Time V2 -0.5mm     ST Elevation Amount aVR 0.6mm     ST Deviation Amount he II -1.3mm     Final Resting /79     Final Resting HR 81     Max Treadmill Speed 0.0     Max Treadmill Grade 0.0     Peak Systolic /79     Peak Diastolic /79     Max HR 94     Stress Phase Resting     Stress Resting  Pt Position SUPINE     Current HR 65     Current /70     Stress Phase Resting     Stress Resting Pt Position MANUAL EVENT     Current HR 65     Current /70     Stress Phase Stress     Stage Minute EXE 00:00     Exercise Stage STAGE 2     Current HR 60     Current /70     Stress Phase Stress     Stage Minute EXE 01:00     Exercise Stage STAGE 3     Current HR 68     Current /70     Stress Phase Stress     Stage Minute EXE 01:54     Exercise Stage STAGE 3     Current HR 92     Current /64     Stress Phase Stress     Stage Minute EXE 02:00     Exercise Stage STAGE 4     Current HR 92     Current /64     Stress Phase Stress     Stage Minute EXE 03:00     Exercise Stage STAGE 5     Current HR 93     Current /64     Stress Phase Stress     Stage Minute EXE 03:16     Exercise Stage STAGE 5     Current HR 93     Current /73     Stress Phase Stress     Stage Minute EXE 04:00     Exercise Stage STAGE 6     Current HR 92     Current /73     Stress Phase Stress     Stage Minute EXE 04:00     Exercise Stage STAGE 6     Current HR 92     Current /73     Stress Phase Recovery     Stage Minute REC 00:35     Exercise Stage Recovery     Current HR 87     Current /74     Stress Phase Recovery     Stage Minute REC 00:59     Exercise Stage Recovery     Current HR 83     Current /74     Stress Phase Recovery     Stage Minute REC 01:56     Exercise Stage Recovery     Current HR 82     Current /79     Stress Phase Recovery     Stage Minute REC 01:59     Exercise Stage Recovery     Current HR 82     Current /79     Stress Phase Recovery     Stage Minute REC 02:59     Exercise Stage Recovery     Current HR 84     Current /79     Stress Phase Recovery     Stage Minute REC 03:59     Exercise Stage Recovery     Current HR 80     Current /79     Stress Phase Recovery     Stage Minute REC 04:01     Exercise Stage Recovery     Current HR 81     Current  /79     Calculated Percent HR 63 %    Rate Pressure Product 16,168.0     Nuc Rest EF 64 %   POCT Glucose    Specimen: Blood   Result Value Ref Range    Glucose 172 (H) 70 - 139 mg/dL   POCT Glucose    Specimen: Blood   Result Value Ref Range    Glucose 150 (H) 70 - 139 mg/dL   POCT Glucose    Specimen: Blood   Result Value Ref Range    Glucose 185 (H) 70 - 139 mg/dL   Potassium   Result Value Ref Range    Potassium 4.0 3.5 - 5.0 mmol/L   Magnesium   Result Value Ref Range    Magnesium 2.0 1.8 - 2.6 mg/dL   POCT Glucose    Specimen: Blood   Result Value Ref Range    Glucose 110 70 - 139 mg/dL   ECG 12 lead MUSE   Result Value Ref Range    SYSTOLIC BLOOD PRESSURE      DIASTOLIC BLOOD PRESSURE      VENTRICULAR RATE 57 BPM    ATRIAL RATE 57 BPM    P-R INTERVAL 158 ms    QRS DURATION 92 ms    Q-T INTERVAL 444 ms    QTC CALCULATION (BEZET) 432 ms    P Axis 7 degrees    R AXIS 49 degrees    T AXIS 75 degrees    MUSE DIAGNOSIS       Sinus bradycardia  Nonspecific ST and T wave abnormality  Abnormal ECG  When compared with ECG of 06-NOV-2020 09:48,  No significant change was found  Confirmed by OTIS PRITCHETT MD LOC: ([id number]) on 11/7/2020 4:56:42 PM     ECG Clinic Future   Result Value Ref Range    SYSTOLIC BLOOD PRESSURE      DIASTOLIC BLOOD PRESSURE      VENTRICULAR RATE 58 BPM    ATRIAL RATE 58 BPM    P-R INTERVAL 162 ms    QRS DURATION 92 ms    Q-T INTERVAL 428 ms    QTC CALCULATION (BEZET) 420 ms    P Axis 4 degrees    R AXIS 52 degrees    T AXIS 82 degrees    MUSE DIAGNOSIS       Sinus bradycardia  Septal infarct , age undetermined  Abnormal ECG  When compared with ECG of 07-NOV-2020 07:08,  Septal infarct is now Present  Confirmed by RADHA KNAPP MD LOC:JN ([id number]) on 11/9/2020 4:00:45 PM         Vitals:    11/13/20 1515 11/13/20 1518 11/13/20 1523 11/13/20 1556   BP: (!) 199/87 (!) 198/85 (!) 198/78 140/60   Patient Site:    Right Arm   Patient Position:    Sitting   Cuff Size:    Adult Large   Pulse: (!) 58       SpO2: 98%      Weight: 174 lb 11.2 oz (79.2 kg)        Weight: 174 lb 11.2 oz (79.2 kg)          Physical Exam:  GENERAL APPEARANCE: 70-year-old female, NAD, well hydrated, well nourished  SKIN:  Normal skin turgor, no lesions/rashes   HEENT: moist mucous membranes, no rhinorrhea  NECK: Normal without adenopathy or masses  CV: RRR, no M/G/R   LUNGS: CTAB  ABDOMEN: S&NT, no masses or enlarged organs   EXTREMITY: no edema and full ROM of all joints  NEURO: no focal findings

## 2021-06-13 NOTE — TELEPHONE ENCOUNTER
RN cannot approve Refill Request    RN can NOT refill this medication Prescriber no longer with the clinic. Needs new script from currentl provider . Last office visit: 11/13/2020 Mayco Zapata MD Last Physical: Visit date not found Last MTM visit: Visit date not found Last visit same specialty: 11/13/2020 Mayco Zapata MD.  Next visit within 3 mo: Visit date not found  Next physical within 3 mo: Visit date not found      Meggan Jules, [facility] Triage/Med Refill 11/27/2020    Requested Prescriptions   Pending Prescriptions Disp Refills     insulin glargine (LANTUS SOLOSTAR U-100 INSULIN) 100 unit/mL (3 mL) pen 60 mL 1     Sig: Inject 28 Units under the skin 2 (two) times a day.       Insulin/GLP-1 Refill Protocol Passed - 11/27/2020  3:22 PM        Passed - Visit with PCP or prescribing provider visit in last 6 months     Last office visit with prescriber/PCP: 11/13/2020 OR same dept: 11/13/2020 Mayco Zapata MD OR same specialty: 11/13/2020 Mayco Zapata MD Last physical: Visit date not found Last MTM visit: Visit date not found     Next appt within 3 mo: Visit date not found  Next physical within 3 mo: Visit date not found  Prescriber OR PCP: Mayco Zapata MD  Last diagnosis associated with med order: 1. Type 2 diabetes mellitus with hyperglycemia, with long-term current use of insulin (H)  - insulin glargine (LANTUS SOLOSTAR U-100 INSULIN) 100 unit/mL (3 mL) pen; Inject 28 Units under the skin 2 (two) times a day.  Dispense: 60 mL; Refill: 1    If protocol passes may refill for 6 months if within 3 months of last provider visit (or a total of 9 months).              Passed - A1C in last 6 months     Hemoglobin A1c   Date Value Ref Range Status   07/23/2020 8.3 (H) 3.5 - 6.0 % Final               Passed - Microalbumin in last year     Microalbumin, Random Urine   Date Value Ref Range Status   08/10/2020 7.90 (H) 0.00 - 1.99 mg/dL Final                  Passed - Blood pressure in last year      BP Readings from Last 1 Encounters:   11/13/20 140/60             Passed - Creatinine done in last year     Creatinine   Date Value Ref Range Status   11/06/2020 0.76 0.60 - 1.10 mg/dL Final

## 2021-06-13 NOTE — TELEPHONE ENCOUNTER
Patient is requesting ASAP please as has 2 days remaining, it also asking if can increase to 90 day supply please.      Refill Request  Did you contact pharmacy: Yes  Medication name:   Requested Prescriptions     Pending Prescriptions Disp Refills     losartan (COZAAR) 100 MG tablet 30 tablet 0     Sig: Take 1 tablet (100 mg total) by mouth daily.     Who prescribed the medication: Mayco Zapata MD  Requested Pharmacy: [id number]  Is patient out of medication: No.  2 days left  Patient notified refills processed in 3 business days:  yes  Okay to leave a detailed message: yes

## 2021-06-13 NOTE — PROGRESS NOTES
Second attempt to reach patient for hospital discharge follow up.  Left VM message reminding pt of appt on 11/13. RN has made two unsuccessful attempts to reach patient.   Encounter closed.

## 2021-06-13 NOTE — TELEPHONE ENCOUNTER
Refill Approved    Rx renewed per Medication Renewal Policy. Medication was last renewed on 11/8/20.    Josie Argueta, [facility] Triage/Med Refill 12/8/2020     Requested Prescriptions   Pending Prescriptions Disp Refills     losartan (COZAAR) 100 MG tablet 30 tablet 0     Sig: Take 1 tablet (100 mg total) by mouth daily.       Angiotensin Receptor Blocker Protocol Passed - 12/8/2020  3:07 PM        Passed - PCP or prescribing provider visit in past 12 months       Last office visit with prescriber/PCP: 11/13/2020 Mayco Zapata MD OR same dept: Visit date not found OR same specialty: Visit date not found  Last physical: Visit date not found Last MTM visit: Visit date not found   Next visit within 3 mo: Visit date not found  Next physical within 3 mo: Visit date not found  Prescriber OR PCP: Mayco Zapata MD  Last diagnosis associated with med order: 1. Essential hypertension, benign  - losartan (COZAAR) 100 MG tablet; Take 1 tablet (100 mg total) by mouth daily.  Dispense: 30 tablet; Refill: 0    If protocol passes may refill for 12 months if within 3 months of last provider visit (or a total of 15 months).             Passed - Serum potassium within the past 12 months     Lab Results   Component Value Date    Potassium 4.0 11/07/2020             Passed - Blood pressure filed in past 12 months     BP Readings from Last 1 Encounters:   11/13/20 140/60             Passed - Serum creatinine within the past 12 months     Creatinine   Date Value Ref Range Status   11/06/2020 0.76 0.60 - 1.10 mg/dL Final

## 2021-06-14 NOTE — PROGRESS NOTES
.  Assessment / Impression     Bipolar disorder;stable  Cognitive disorder; NOS.  Follow-up medication evaluation.  Patient's impression of how medication is working? good  Compliant with medication? yes    Side effects:Dry mouth and Headache       Plan:     Reviewed medications, no changes made today I did refill both her BuSpar and sertraline.  We will have patient follow-up in 4 months.    Subjective:      HPI: Kemi Soto is a 67 y.o. female with bipolar disorder here for follow-up medication evaluation.  Overall, patient feels the medications are working no adjustments needed.  She does need refills.  No new medical issues.  She is looking forward to spending the Christmas holiday with her daughter in [facility].  Continues to volunteer with therapy dogs and is excited because she will be volunteering at event sponsoring Dr. Cinthya Valdez.  She could not afford the tickets but now she will be able to see her for free.    Kemi  has a past medical history of Discitis of thoracolumbar region (10/10/2015); Encephalopathy (10/14/2015); Epidural abscess (10/10/2015); Hip pain, right; Sepsis (10/8/2015); Stroke (06/23/2015); TIA (transient ischemic attack) (6/23/2015); and UTI (urinary tract infection).  Kemi  has a past surgical history that includes Lumbar discectomy (N/A, 10/11/2015); PICC (10/19/2015); Insert Midline (10/31/2015); Coronary artery bypass graft (12/11/2007); Coronary angioplasty with stent (03/30/2016); Coronary stent placement (2008); and Carpal tunnel release (Bilateral).  Oxycontin [oxycodone]  Current Outpatient Prescriptions   Medication Sig Dispense Refill     atorvastatin (LIPITOR) 80 MG tablet TAKE ONE TABLET (80 MG) BY MOUTH AT BEDTIME 90 tablet 1     blood glucose meter (GLUCOMETER) Use 1 each As Directed as needed. Dispense glucometer brand per patient's insurance at pharmacy discretion. 1 each 0     blood glucose test strips Use 1 each As Directed as needed. Dispense  "brand per patient's insurance at pharmacy discretion. 200 each 2     busPIRone (BUSPAR) 15 MG tablet Take 1 tablet (15 mg total) by mouth 2 (two) times a day. (Patient taking differently: Take 15 mg by mouth daily. ) 180 tablet 1     clopidogrel (PLAVIX) 75 mg tablet Take 1 tablet (75 mg total) by mouth daily. (Patient taking differently: Take 75 mg by mouth every other day. ) 90 tablet 3     fluocinonide (LIDEX) 0.05 % ointment Apply twice a day sparingly to sores as needed for itching. 30 g 2     gabapentin (NEURONTIN) 300 MG capsule Take 3 capsules (900 mg total) by mouth at bedtime. 270 capsule 3     generic lancets (MICROLET LANCET) Use 1 each As Directed 3 (three) times a day. 100 each 2     LANTUS SOLOSTAR 100 unit/mL (3 mL) pen Inject 28 Units under the skin 2 (two) times a day. 11.65 Type 2 with hyperglycemia 3 mL PRN     metFORMIN (GLUCOPHAGE XR) 500 MG 24 hr tablet Take 2 tablets twice daily (Patient taking differently: Take 500 mg by mouth at bedtime. ) 360 tablet 1     metoprolol succinate (TOPROL-XL) 25 MG TAKE ONE TABLET BY MOUTH ONCE DAILY 90 tablet 1     nitroglycerin (NITROSTAT) 0.4 MG SL tablet Place 1 tablet (0.4 mg total) under the tongue every 5 (five) minutes as needed for chest pain. 25 tablet 12     OMEGA-3 FATTY ACIDS/FISH OIL (OMEGA 3 FISH OIL ORAL) Take by mouth as needed.       pen needle, diabetic (ULTICARE PEN NEEDLE) 32 gauge x 5/32\" Ndle Use 1 Device As Directed 2 (two) times a day. 200 each 3     sertraline (ZOLOFT) 100 MG tablet TAKE ONE TABLET (100 MG) BY MOUTH DAILY 90 tablet 0     No current facility-administered medications for this encounter.      Family History   Problem Relation Age of Onset     Stroke Mother      Diabetes Father      Stroke Sister      Diabetes Sister      Stroke Brother      Diabetes Brother      Social History     Social History     Marital status: Single     Spouse name: N/A     Number of children: 1     Years of education: N/A     Occupational History "     Retired      Former [employer]      Social History Main Topics     Smoking status: Former Smoker     Quit date: 6/23/2007     Smokeless tobacco: Never Used     Alcohol use No      Comment: stopped drinking 9/2015     Drug use: No     Sexual activity: No     Other Topics Concern     Not on file     Social History Narrative    Lives alone with cats and dogs. , one daughter, Tory Lofton.        The following portions of the patient's history were reviewed and updated as appropriate: allergies, current medications, past family history, past medical history, past social history, past surgical history and problem list.    Review of Systems  Constitutional: negative  Neurological: positive for headaches  Behavioral/Psych: positive for depression, negative for anxiety, fatigue, irritability, loss of interest in favorite activities, mood swings and sleep disturbance       Objective:   @VS    Mental Status Exam:     Appearance: Patient is well groomed, pleasant and cooperative.  Good eye contact.  She arrives to the appointment on time.  No psychomotor agitation.    Speech / Language : Within normal and Loud    Associations: appropriate    Alert and oriented X 3:yes    Mood: Euthymic  Affect: Congruent w/content of speech    Suicidal / Homicidal ideation:none  If yes, document safety plan:    Fund of knowledge: good    Thought process:Within normal    Judgement / insight: No Evidence of Impairment    Recent and remote memory: WNL    Attention: Within normal  Concentration: Within normal    Motor status: Gait normal, no involuntary movements.    Scores: NA    Change in medication? No    Education    Reviewed risks/benefits of medication      Physical Exam: General appearance: alert, appears stated age, cooperative and no distress  Neurologic: Mental status: Alert, oriented, thought content appropriate, affect: mood-congruent, thought content exhibits logical connections.Thought content devoid  of any delusions, suicidal, homicidal or obsesional content.

## 2021-06-14 NOTE — TELEPHONE ENCOUNTER
Request from [facility] pharmacy to refill patient's atorvastatin prescription.    1/7/2021

## 2021-06-14 NOTE — PROGRESS NOTES
Assessment:  1.  Type 2 diabetes, checking status.  2.  Hyperlipidemia, checking status.  3.  Hypertension controlled.  4.  Coronary artery disease, stable.  5.  Persistent headaches, hip and back pains.  6.  Some medication noncompliance.    Plan: Check A1c, hepatic and basic profiles and direct LDL.  Recommend she check blood sugars on regular basis and once she has 1 or 2 weeks worth of readings then follow-up with either myself or with Dr. Hampton or with diabetic nurse educator to look at options for further improvement in diabetic control.  Recommend she carefully taper off caffeine over the next week or so.  Did agree to prescribe small amount of tramadol to have a backup for pain management if Tylenol or nonsteroidal does not work.  Prescribed tramadol 50 mg-#30-1 p.o. every 6 hours as needed.  She is follow-up on diabetes at least every 3 months given her history of elevated A1c.  Empathized with her sister's death in September.  Recommend it would be very possible that she could have help with her chronic headaches by taking higher dose of the sertraline and asked her to check with her mental health provider who prescribes the sertraline for her and consider taking 150 mg a day.  And she told me that they actually want her to take 200 mg a day and that she only takes 100 mg a day.    Subjective: 67-year-old female presenting for follow-up on the above.  She is not fasting this afternoon.  She is not currently checking blood sugars.  She has history of noncompliance.  She states that her glucometer is not working.  I encouraged her since it is less than a year old to take it back to [facility] where she purchased it and either get a replacement etc.  Explained the importance of having blood sugar numbers in order to be able to try to improve her control.  Once we have those numbers then further options for blood sugar control can be considered.  Past Medical History:   Diagnosis Date     Discitis of  "thoracolumbar region 10/10/2015     Encephalopathy 10/14/2015     Epidural abscess 10/10/2015     Hip pain, right      Sepsis 10/8/2015     Stroke 06/23/2015    Neurology felt that episode was C/W subacute ischemic stroke     TIA (transient ischemic attack) 6/23/2015     UTI (urinary tract infection)     admitted to [facility] with UTI     Allergies   Allergen Reactions     Oxycontin [Oxycodone] Nausea Only     Current Outpatient Prescriptions   Medication Sig Dispense Refill     atorvastatin (LIPITOR) 80 MG tablet TAKE ONE TABLET (80 MG) BY MOUTH AT BEDTIME 90 tablet 1     blood glucose test strips Use 1 each As Directed as needed. Dispense brand per patient's insurance at pharmacy discretion. 200 each 2     busPIRone (BUSPAR) 15 MG tablet Take 1 tablet (15 mg total) by mouth 2 (two) times a day. 180 tablet 3     clopidogrel (PLAVIX) 75 mg tablet Take 1 tablet (75 mg total) by mouth daily. (Patient taking differently: Take 75 mg by mouth every other day. ) 90 tablet 3     gabapentin (NEURONTIN) 300 MG capsule Take 3 capsules (900 mg total) by mouth at bedtime. 270 capsule 3     generic lancets (MICROLET LANCET) Use 1 each As Directed 3 (three) times a day. 100 each 2     LANTUS SOLOSTAR 100 unit/mL (3 mL) pen Inject 28 Units under the skin 2 (two) times a day. 11.65 Type 2 with hyperglycemia 3 mL PRN     metFORMIN (GLUCOPHAGE XR) 500 MG 24 hr tablet Take 2 tablets twice daily (Patient taking differently: Take 500 mg by mouth at bedtime. ) 360 tablet 1     metoprolol succinate (TOPROL-XL) 25 MG TAKE ONE TABLET BY MOUTH ONCE DAILY 90 tablet 1     nitroglycerin (NITROSTAT) 0.4 MG SL tablet Place 1 tablet (0.4 mg total) under the tongue every 5 (five) minutes as needed for chest pain. 25 tablet 12     pen needle, diabetic (ULTICARE PEN NEEDLE) 32 gauge x 5/32\" Ndle Use 1 Device As Directed 2 (two) times a day. 200 each 3     sertraline (ZOLOFT) 100 MG tablet TAKE ONE TABLET (100 MG) BY MOUTH DAILY 90 tablet 3 " "    blood glucose meter (GLUCOMETER) Use 1 each As Directed as needed. Dispense glucometer brand per patient's insurance at pharmacy discretion. 1 each 0     fluocinonide (LIDEX) 0.05 % ointment Apply twice a day sparingly to sores as needed for itching. 30 g 2     traMADol (ULTRAM) 50 mg tablet Take 1 tablet (50 mg total) by mouth every 6 (six) hours as needed for pain. 30 tablet 0     No current facility-administered medications for this visit.      Note that the above listed medication is after the addition of the tramadol today.  She notes that she has been having frequent headaches since she was in the hospital 2 years ago.  States that they are bilateral, she can often get them daily, she does not have a headache right now.  She does have 2 cups of coffee every day.  She notes that sometimes the Tylenol or nonsteroidal does not clear the pain.  She has not had any neurologic symptoms develop.  She sees Dr. Hernandez in January.  She states she is seeing Dr. Hampton in January also.  She notes that her sister  in September and had Lantus left over and so she has been using her sister's Lantus.  Past Medical History:   Diagnosis Date     Discitis of thoracolumbar region 10/10/2015     Encephalopathy 10/14/2015     Epidural abscess 10/10/2015     Hip pain, right      Sepsis 10/8/2015     Stroke 2015    Neurology felt that episode was C/W subacute ischemic stroke     TIA (transient ischemic attack) 2015     UTI (urinary tract infection)     admitted to [facility] with UTI     All other review of systems currently negative for other changes.    Objective:/70  Pulse 78  Ht 5' 2\" (1.575 m)  Wt 179 lb (81.2 kg)  BMI 32.74 kg/m2  HEENT shows no acute change.  Neck supple.  Lungs clear.  Heart regular rate and rhythm without murmur.  Abdomen shows no masses tenderness or hepatosplenomegaly.  No pedal edema.  She is alert with clear speech.  No focal neurologic abnormalities noted.  "

## 2021-06-14 NOTE — PROGRESS NOTES
Assessment/Plan:     No changes to her medications today.  I recommended following up with her primary care provider since she is overdue. End of study visit for HARMONY today.     Subjective:     Kemi Soto is seen at [facility] today for HARMONY end of study visit.  She has a history of coronary bypass surgery, hypertension, dyslipidemia, diabetes, TIA, and bipolar disorder.  She has chronic headaches for the past 2 years.  She has been taking Tylenol with some relief.  She has not seen her primary care provider about this recently.  She continues to take Plavix every other day.  She will not increase to daily dosing as recommended.  She denies fatigue, lightheadedness, shortness of breath, dyspnea on exertion, chest pain and lower extremity edema.      Patient Active Problem List   Diagnosis     History of TIA (transient ischemic attack) and stroke     Essential hypertension     Mixed hyperlipidemia     Coronary artery disease     Back pain     Chalastodermia     Diabetes mellitus with polyneuropathy     Type 2 diabetes mellitus with hyperglycemia     Bipolar 1 disorder     S/P CABG (coronary artery bypass graft)     Bipolar 2 disorder     Anxiety disorder     BMI 36.0-36.9,adult     Noncompliance with medications     Psoriasis, guttate     History of atrial fibrillation     History of alcohol abuse       Past Medical History:   Diagnosis Date     Discitis of thoracolumbar region 10/10/2015     Encephalopathy 10/14/2015     Epidural abscess 10/10/2015     Hip pain, right      Sepsis 10/8/2015     Stroke 06/23/2015    Neurology felt that episode was C/W subacute ischemic stroke     TIA (transient ischemic attack) 6/23/2015     UTI (urinary tract infection)     admitted to [facility] with UTI       Past Surgical History:   Procedure Laterality Date     CARPAL TUNNEL RELEASE Bilateral      CORONARY ANGIOPLASTY WITH STENT PLACEMENT  03/30/2016    Drug eluting stent mid LAD, cutting balloon to  1st diagonal     CORONARY ARTERY BYPASS GRAFT  12/11/2007    X 3 vessels, LIMA to LAD, saph vein graft to distal RCA, saphvein graft to marginal, mini circuit bypass.  [facility].     CORONARY STENT PLACEMENT  2008    Left main, left circumflex, RCA     INSERT MIDLINE HE  10/31/2015          LUMBAR DISCECTOMY N/A 10/11/2015    Procedure: BILATERAL L1-L5 DECOMPRESSIVE LAMINECTOMY WITH EVACUATION OF EPIDURAL ABSCESS IRRIGATION & DEBRIDEMENT;  Surgeon: Luli Chan MD;  Location: [facility];  Service:      [facility]  10/19/2015            Family History   Problem Relation Age of Onset     Stroke Mother      Diabetes Father      Stroke Sister      Diabetes Sister      Stroke Brother      Diabetes Brother        Social History     Social History     Marital status: Single     Spouse name: N/A     Number of children: 1     Years of education: N/A     Occupational History     Retired      Former [employer]      Social History Main Topics     Smoking status: Former Smoker     Quit date: 6/23/2007     Smokeless tobacco: Never Used     Alcohol use No      Comment: stopped drinking 9/2015     Drug use: No     Sexual activity: No     Other Topics Concern     Not on file     Social History Narrative    Lives alone with cats and dogs. , one daughter, Tory Lofton.        Current Outpatient Prescriptions   Medication Sig Dispense Refill     atorvastatin (LIPITOR) 80 MG tablet TAKE ONE TABLET (80 MG) BY MOUTH AT BEDTIME 90 tablet 1     blood glucose meter (GLUCOMETER) Use 1 each As Directed as needed. Dispense glucometer brand per patient's insurance at pharmacy discretion. 1 each 0     blood glucose test strips Use 1 each As Directed as needed. Dispense brand per patient's insurance at pharmacy discretion. 200 each 2     busPIRone (BUSPAR) 15 MG tablet Take 1 tablet (15 mg total) by mouth 2 (two) times a day. (Patient taking differently: Take 15 mg by mouth daily. ) 180 tablet 1  "    clopidogrel (PLAVIX) 75 mg tablet Take 1 tablet (75 mg total) by mouth daily. (Patient taking differently: Take 75 mg by mouth every other day. ) 90 tablet 3     fluocinonide (LIDEX) 0.05 % ointment Apply twice a day sparingly to sores as needed for itching. 30 g 2     gabapentin (NEURONTIN) 300 MG capsule Take 3 capsules (900 mg total) by mouth at bedtime. 270 capsule 3     generic lancets (MICROLET LANCET) Use 1 each As Directed 3 (three) times a day. 100 each 2     LANTUS SOLOSTAR 100 unit/mL (3 mL) pen Inject 28 Units under the skin 2 (two) times a day. 11.65 Type 2 with hyperglycemia 3 mL PRN     metFORMIN (GLUCOPHAGE XR) 500 MG 24 hr tablet Take 2 tablets twice daily (Patient taking differently: Take 500 mg by mouth at bedtime. ) 360 tablet 1     metoprolol succinate (TOPROL-XL) 25 MG TAKE ONE TABLET BY MOUTH ONCE DAILY 90 tablet 1     nitroglycerin (NITROSTAT) 0.4 MG SL tablet Place 1 tablet (0.4 mg total) under the tongue every 5 (five) minutes as needed for chest pain. 25 tablet 12     OMEGA-3 FATTY ACIDS/FISH OIL (OMEGA 3 FISH OIL ORAL) Take by mouth as needed.       pen needle, diabetic (ULTICARE PEN NEEDLE) 32 gauge x 5/32\" Ndle Use 1 Device As Directed 2 (two) times a day. 200 each 3     sertraline (ZOLOFT) 100 MG tablet TAKE ONE TABLET (100 MG) BY MOUTH DAILY 90 tablet 0     No current facility-administered medications for this visit.        Allergies   Allergen Reactions     Oxycontin [Oxycodone] Nausea Only       Objective:     Vitals:    12/01/17 1341   BP: 130/60   Pulse:    Resp:      Wt Readings from Last 3 Encounters:   12/01/17 177 lb (80.3 kg)   08/03/17 178 lb (80.7 kg)   07/11/17 178 lb (80.7 kg)       General Appearance:   Alert, cooperative and in no acute distress.   HEENT:  No scleral icterus; the mucous membranes were pink and moist. Cervical lymph nodes nontender and nonpalpable.   Neck: JVP normal. No HJR.   Chest: The spine was straight. The chest was symmetric.   Lungs:   " Respirations unlabored; the lungs are clear to auscultation.   Cardiovascular:   Regular rhythm. S1 and S2 without murmur, clicks or rubs. Radial, carotid and posterior tibial pulses are intact and symmetrical.   Abdomen:  Soft, nontender, nondistended, bowel sounds present   Extremities: No cyanosis, clubbing, or edema.   Skin: No bruising or wounds   Neurologic: Mood and affect are appropriate.             Analia Tipton, CNP

## 2021-06-14 NOTE — TELEPHONE ENCOUNTER
RN cannot approve Refill Request    RN can NOT refill this medication historical medication requested. Last office visit: 11/13/2020 Mayco Zapata MD Last Physical: Visit date not found Last MTM visit: Visit date not found Last visit same specialty: 11/13/2020 Mayco Zapata MD.  Next visit within 3 mo: Visit date not found  Next physical within 3 mo: Visit date not found      Josie Argueta, [facility] Triage/Med Refill 1/8/2021    Requested Prescriptions   Pending Prescriptions Disp Refills     atorvastatin (LIPITOR) 80 MG tablet  0     Sig: Take 1 tablet (80 mg total) by mouth at bedtime.       Statins Refill Protocol (Hmg CoA Reductase Inhibitors) Passed - 1/7/2021  5:44 PM        Passed - PCP or prescribing provider visit in past 12 months      Last office visit with prescriber/PCP: 11/13/2020 Mayco Zapata MD OR same dept: 11/13/2020 Mayco Zapata MD OR same specialty: 11/13/2020 Mayco Zapata MD  Last physical: Visit date not found Last MTM visit: Visit date not found   Next visit within 3 mo: Visit date not found  Next physical within 3 mo: Visit date not found  Prescriber OR PCP: Mayco Zapata MD  Last diagnosis associated with med order: There are no diagnoses linked to this encounter.  If protocol passes may refill for 12 months if within 3 months of last provider visit (or a total of 15 months).

## 2021-06-14 NOTE — PROGRESS NOTES
Patient's impression of how medication is working?   Doing ok    Compliant with Medication? yes    Side Effects: Dry mouth and headaches    Current Symptoms : No    Any Concerns? Not really    Pain (0-10) Yes  Appetite change No  Sleep disturbance No  Change in energy No  Change in interest No  Change in concentration No  Psychosis/Hallucinations No  Negative thoughts No  Mood swings No  Alcohol use No  Drug use No  Anxiety none  Sad/depressed mood moderate

## 2021-06-15 NOTE — PROGRESS NOTES
Assessment:     1. Headache     2. Rash and nonspecific skin eruption  clobetasol (TEMOVATE) 0.05 % ointment   3. Type 2 diabetes mellitus with hyperglycemia, with long-term current use of insulin  blood glucose test strips    blood glucose meter (GLUCOMETER)    generic lancets (MICROLET LANCET)   4. Coronary artery disease involving native coronary artery of native heart without angina pectoris     5. Essential hypertension     6. Bipolar 2 disorder            Plan:         We reviewed the likely/potential etiology(ies) for her HA symptoms and she will continue her same medications at this time. We reviewed use of OTC analgesics as well as increased fluids and rest, and she will call or return to clinic with any ongoing or worsening symptoms. As far as her blood sugars, I stressed the importance of good glycemic control in reducing her CV risk as well as the risk of other long-term diabetes complications. I will send a new Rx for a monitor and she will begin more careful monitoring. We discussed that her current lantus dosing isn't controlling things adequately and I encouraged her to at least increase the metformin to 2 tabs as tolerated, but stressed the need to get her blood sugars down. She will plan to follow up in 2-3 mos for recheck of labs.              Subjective:         Yasir Soto is a 67 y.o. female who presents for follow-up of chronic headaches. Headaches are occurring several times per month. Generally, the headaches last a few hours. Home treatment has included ibuprofen, tylenol and occasionally tramadol with some improvement. She did get a new Rx for the tramadol from Dr Olivas last month.     Fasting today? No  Diabetes      Patient also presents for follow-up of Type 2 diabetes mellitus. Her last A1c was 9.0 on 12/18, up slightly from 8.8 in June. Current symptoms/problems include none. Patient denies hypoglycemia , paresthesia of the feet, polyuria, visual disturbances and  "vomiting. Home sugars: patient does not check sugars - she has been having difficulty getting her monitor to work.  Any episodes of hypoglycemia? no.  Last dilated eye exam:  6/2017.     Current diabetic medications include insulin injections: Lantus : 28 units bid and metformin 500 mg at hs. She had some lantus from when her sister passed away and is trying to use that up. Weight trend: fluctuating a bit. Prior visit with dietician: yes. Current diet: in general, a \"healthy\" diet  . Current exercise: no regular exercise.     Is She on ACE inhibitor or angiotensin II receptor blocker? No. She has a history of CAD and follows regularly with cardiology. She is s/p CABG 12/2007, s/p stent 5/2008, 3/2016     The following portions of the patient's history were reviewed and updated as appropriate: allergies, current medications, past family history, past medical history, past social history, past surgical history and problem list.    Review of Systems  Pertinent items are noted in HPI.    Travelling over holidays  Stays up too late reading frequently  Hasn't gotten glucose monitor figured out yet  Zoster vaccine - interested in getting the new vaccine  Pain in shoulders ? arthritis  Declines colonoscopy - doesn't have ride       Objective:        /76  Pulse 88  Ht 5' 2\" (1.575 m)  Wt 178 lb 4 oz (80.9 kg)  BMI 32.6 kg/m2  Physical Exam:  GEN: Alert and oriented, NAD,  well nourished  SKIN:  Normal skin turgor, no lesions/rashes   HEENT: moist mucous membranes, no rhinorrhea.    NECK: Normal.  No adenopathy or thyromegaly.  CV: Regular rate and rhythm, no murmurs.   LUNGS: Clear to auscultation bilaterally.    ABDOMEN: Soft, non-tender, non-distended, no masses   EXTREMITY: No edema, cyanosis  NEURO: Grossly normal.        "

## 2021-06-15 NOTE — PROGRESS NOTES
Late Entry; 01/08/2018.  [facility] Outcomes Trial: A long term, randomized, double-blinded, placebo-controlled study to determine the effect of albiglutide, when added to standard blood glucose lowering therapies, on major cardiovascular events in patients with Type 2 diabetes mellitus. Follow up phone call 5 weeks post End of study for the pt. No new adverse events. This will be considered the final phone call for the study.  I thanked her for her participation in the study.      Vickie Lozoya RN

## 2021-06-16 NOTE — TELEPHONE ENCOUNTER
Controlled Substance Refill Request  Medication Name:   Requested Prescriptions     Pending Prescriptions Disp Refills     traMADoL (ULTRAM) 50 mg tablet [Pharmacy Med Name: traMADol HCl 50 MG Oral Tablet] 30 tablet 0     Sig: TAKE 1 TABLET BY MOUTH EVERY 6 HOURS AS NEEDED FOR PAIN     Refused Prescriptions Disp Refills     sertraline (ZOLOFT) 100 MG tablet [Pharmacy Med Name: Sertraline HCl 100 MG Oral Tablet] 90 tablet 0     Sig: Take 1 tablet by mouth once daily     Refused By: AMBROSIO DE LA TORRE     Reason for Refusal: Patient has requested refill too soon     Date Last Fill: 11/4/20  Requested Pharmacy: [facility]  Submit electronically to pharmacy  Controlled Substance Agreement on file:   Encounter-Level CSA Scan Date:    There are no encounter-level csa scan date.        Last office visit:  11/13/20

## 2021-06-16 NOTE — TELEPHONE ENCOUNTER
Telephone Encounter by Karina Johnson at 2/10/2020  4:00 PM     Author: Karina Johnson Service: -- Author Type: --    Filed: 2/10/2020  4:05 PM Encounter Date: 2/6/2020 Status: Signed    : Karina Johnson       PRIOR AUTHORIZATION NOT NEEDED        A PA was already submitted by the patient/clinic per documentation in other telephone encounters dated: 01/31/2019 and 02/06/2020 that show the patient initiated PA. The first PA request was denied. Due to the documentation limitations and that there is a denial already on file for the requested medication. The Central PA Team will not able to send a new prior authorization request or appeal.

## 2021-06-16 NOTE — TELEPHONE ENCOUNTER
Reason for Call:  Medication or medication refill:    Do you use a [facility]?  Name of the pharmacy and phone number for the current request[facility]    Name of the medication requested: preet  States she is all out, called it in last week and is completley out and also wants to know why the sertralene was denied      Other request: please call pt when sent to [facility]- she needs today    Can we leave a detailed message on this number? Yes    Phone number patient can be reached at: Home number on file [phone] (home)    Best Time: asap    Call taken on 4/14/2021 at 4:17 PM by Jessica Argueta

## 2021-06-16 NOTE — PROGRESS NOTES
SUBJECTIVE: Kemi Soto is a 68 y.o. White or  female who presents today with a complaint of feeling weak and congested in the head with a sore throat she thinks is from postnasal drip.  Her eyes have been crusted over and with discharge for the last couple days and they hurt her.  She does not feel short of breath she does have a low-grade fever of 99.  She tells me she was in [place] a week ago at a cheerleading competition with her family.  Her 4-year-old grandkid had to go to the ER for high temperature and pinkeye.  She started feeling sick before leaving and she notes that there were lots of sick people in the plane home.  She is feeling hot at night and with chills as well.  She is just generally weak.  She has been using [place] plus.    OBJECTIVE: /54  Pulse (!) 108  Temp 99  F (37.2  C)  SpO2 99%  General: Mildly ill-appearing older female in no acute distress  HEENT: Eyes are reddened with yellowish discharge, nose without rhinorrhea, she is tender to palpation of her maxillary sinuses  Heart: Regular rhythm, mildly tacky  Lungs: Clear bilaterally  Abdomen: Soft    ASSESSMENT & PLAN:    1. Bacterial conjunctivitis of both eyes  ciprofloxacin HCl (CILOXAN) 0.3 % ophthalmic solution   2. Sinusitis, acute  azithromycin (ZITHROMAX Z-STEPHANIE) 250 MG tablet     I am going to treat her for sinusitis with Z-Stephanie.  I am also going to give her antibiotic eyedrops as this is been going on for longer than 4 days and is not improving.  She should follow-up on an as-needed basis or at her next usual interval with her primary.    Patient Active Problem List   Diagnosis     History of TIA (transient ischemic attack) and stroke     Essential hypertension     Mixed hyperlipidemia     Coronary artery disease     Back pain     Chalastodermia     Diabetes mellitus with polyneuropathy     Type 2 diabetes mellitus with hyperglycemia, with long-term current use of insulin     S/P CABG (coronary artery  "bypass graft)     Bipolar 2 disorder     Anxiety disorder     BMI 36.0-36.9,adult     Noncompliance with medications     Psoriasis, guttate     History of atrial fibrillation     History of alcohol abuse       Current Outpatient Prescriptions on File Prior to Visit   Medication Sig Dispense Refill     atorvastatin (LIPITOR) 80 MG tablet TAKE ONE TABLET (80 MG) BY MOUTH AT BEDTIME 90 tablet 1     blood glucose meter (GLUCOMETER) Use 1 each As Directed as needed. Dispense glucometer brand per patient's insurance at pharmacy discretion. Dx E11.69, Z79.4 1 each 0     clobetasol (TEMOVATE) 0.05 % ointment Apply topically 2 (two) times a day. 30 g 5     clopidogrel (PLAVIX) 75 mg tablet Take 1 tablet (75 mg total) by mouth daily. (Patient taking differently: Take 75 mg by mouth every other day. ) 90 tablet 3     generic lancets (MICROLET LANCET) Use 1 each As Directed 4 times a day at 7:30am, 11:30am, 4:30pm, and 9:00pm. Dx E11.69, Z79.4 200 each 3     insulin glargine (LANTUS SOLOSTAR) 100 unit/mL (3 mL) pen Inject 28 Units under the skin 2 (two) times a day. 11.65 Type 2 with hyperglycemia 15 mL 5     metFORMIN (GLUCOPHAGE XR) 500 MG 24 hr tablet Take 2 tablets twice daily (Patient taking differently: Take 500 mg by mouth at bedtime. ) 360 tablet 1     blood glucose test strips Use 1 each As Directed 4 times a day at 7:30am, 11:30am, 4:30pm, and 9:00pm. Brand per formulary. Dx E11.69, Z79.4 200 strip 4     busPIRone (BUSPAR) 15 MG tablet Take 1 tablet (15 mg total) by mouth 2 (two) times a day. 180 tablet 3     metoprolol succinate (TOPROL-XL) 25 MG TAKE ONE TABLET BY MOUTH ONCE DAILY 90 tablet 1     nitroglycerin (NITROSTAT) 0.4 MG SL tablet Place 1 tablet (0.4 mg total) under the tongue every 5 (five) minutes as needed for chest pain. 25 tablet 12     pen needle, diabetic (ULTICARE PEN NEEDLE) 32 gauge x 5/32\" Ndle Use 1 Device As Directed 2 (two) times a day. 200 each 3     sertraline (ZOLOFT) 100 MG tablet TAKE ONE " TABLET (100 MG) BY MOUTH DAILY 90 tablet 3     No current facility-administered medications on file prior to visit.

## 2021-06-16 NOTE — TELEPHONE ENCOUNTER
Last seen November 2020    Eliquis approved 11/13/20 . No refills remain.    Sertraline was denied because she should still have more refill at the pharmacy.    Spoke with pharmacist. Sertraline 11/13/21 wasn't received. Phoned in now as #[phone].       Pt needing Eliquis. Order pending

## 2021-06-16 NOTE — TELEPHONE ENCOUNTER
Pt notified refills sent. Patient was not in a TCU, she was out of [place]([facility]). She was reminded that she's due for f/u appointment now that she's back in [place]>

## 2021-06-16 NOTE — TELEPHONE ENCOUNTER
Patient should get a follow-up from TCU/Hosp with me next week.  40 min appt if possible.  Thanks,  Dr. Zapata

## 2021-06-16 NOTE — TELEPHONE ENCOUNTER
RN cannot approve Refill Request    RN can NOT refill this medication historical medication requested. Last office visit: 11/13/2020 Mayco Zapata MD Last Physical: Visit date not found Last MTM visit: Visit date not found Last visit same specialty: 11/13/2020 Mayco Zapata MD.  Next visit within 3 mo: Visit date not found  Next physical within 3 mo: Visit date not found      Meggan Jules, [facility] Triage/Med Refill 4/17/2021    Requested Prescriptions   Pending Prescriptions Disp Refills     gabapentin (NEURONTIN) 300 MG capsule  0     Sig: Take 3 capsules (900 mg total) by mouth at bedtime.       Gabapentin/Levetiracetam/Tiagabine Refill Protocol  Passed - 4/16/2021  8:48 AM        Passed - PCP or prescribing provider visit in past 12 months or next 3 months     Last office visit with prescriber/PCP: 11/13/2020 Mayco Zapata MD OR same dept: 11/13/2020 Mayco Zapata MD OR same specialty: 11/13/2020 Mayco Zapata MD  Last physical: Visit date not found Last MTM visit: Visit date not found   Next visit within 3 mo: Visit date not found  Next physical within 3 mo: Visit date not found  Prescriber OR PCP: Mayco Zapata MD  Last diagnosis associated with med order: There are no diagnoses linked to this encounter.  If protocol passes may refill for 12 months if within 3 months of last provider visit (or a total of 15 months).

## 2021-06-17 NOTE — PROGRESS NOTES
ASSESSMENT/PLAN:       1. Impingement syndrome of shoulder region, left     - methylPREDNISolone acetate injection 40 mg (DEPO-MEDROL)  The patient is aware that the Depo-Medrol will likely raise her blood sugar for a few days  I reviewed some exercises for her to do including the pendulum exercises and walking up the wall with her hand.  If not improving will need to arrange for physical therapy  Precautions discussed.    Mayco Zapata MD      PROGRESS NOTE   5/14/2021    SUBJECTIVE:  Kemi Soto is a 71 y.o. female  who presents for   Chief Complaint   Patient presents with     Injections     left shoulder cortisone injection      The patient is seen today for an injection in her left shoulder.  She injured it about a month ago as she was preparing to pack her things up and drive back to [place] from [place].  She spends the winter months in [place].  The shoulder is improving slightly but still has pain if she lies on her left side.  Is fairly good range of motion now but has pain with extremes of motion above her head.  She has had steroid injections in the past with no complications.  Her allergies are such that no contraindications to proceeding.  The patient is on Eliquis 5 mg twice a day    Patient Active Problem List   Diagnosis     History of TIA (transient ischemic attack) and stroke     Essential hypertension, benign     Mixed hyperlipidemia     Chalastodermia     Type 2 diabetes mellitus with hyperglycemia, with long-term current use of insulin (H)     S/P CABG (coronary artery bypass graft)     Bipolar 2 disorder (H)     Anxiety disorder     BMI 36.0-36.9,adult     Noncompliance with medications     Psoriasis, guttate     History of atrial fibrillation     Benign adenomatous polyp of large intestine     Bilateral hearing loss     Peripheral neuropathy     Hyperlipidemia with target LDL less than 70     Atrial flutter (H)     Atrial fibrillation with RVR (H)     Coronary artery disease involving  "native coronary artery of native heart without angina pectoris     Type 2 myocardial infarction due to arrhythmia (H)     Chronic anticoagulation       Current Outpatient Medications   Medication Sig Dispense Refill     acetaminophen (TYLENOL) 500 MG tablet Take 500 mg by mouth 2 (two) times a day.        apixaban ANTICOAGULANT (ELIQUIS) 5 mg Tab tablet Take 1 tablet (5 mg total) by mouth 2 (two) times a day. 60 tablet 3     atorvastatin (LIPITOR) 80 MG tablet Take 1 tablet (80 mg total) by mouth at bedtime. 90 tablet 1     blood glucose test strips Smartview test strips. Check blood sugar 3 times per day. 100 strip 5     busPIRone (BUSPAR) 15 MG tablet Take 1 tablet (15 mg total) by mouth daily. 90 tablet 1     gabapentin (NEURONTIN) 300 MG capsule Take 3 capsules (900 mg total) by mouth at bedtime. 270 capsule 3     generic lancets (FINGERSTIX LANCETS) Check blood sugar 3 times per day. Fastclix lancets. 100 each 5     insulin glargine (LANTUS SOLOSTAR U-100 INSULIN) 100 unit/mL (3 mL) pen Inject 28 Units under the skin 2 (two) times a day. 60 mL 1     isosorbide mononitrate (IMDUR) 60 MG 24 hr tablet Take 1 tablet (60 mg total) by mouth daily. 90 tablet 0     metFORMIN (GLUCOPHAGE-XR) 500 MG 24 hr tablet Take 4 tablets (2,000 mg total) by mouth daily with breakfast. 360 tablet 0     pen needle, diabetic (ULTICARE PEN NEEDLE) 32 gauge x 5/32\" Ndle Use 1 each As Directed 4 (four) times a day. 200 each 3     sertraline (ZOLOFT) 100 MG tablet Take 1 tablet (100 mg total) by mouth daily. 90 tablet 3     sotaloL (BETAPACE) 80 MG tablet Take 0.5 tablets (40 mg total) by mouth every 12 (twelve) hours. Do not take with seroquel. 90 tablet 1     traMADoL (ULTRAM) 50 mg tablet Take 1 tablet (50 mg total) by mouth every 8 (eight) hours as needed for pain or severe pain (7-10). 30 tablet 0     losartan (COZAAR) 100 MG tablet Take 1 tablet (100 mg total) by mouth daily. 90 tablet 3     multivitamin with minerals tablet Take by " mouth daily.        nitroglycerin (NITROSTAT) 0.4 MG SL tablet Place 1 tablet (0.4 mg total) under the tongue every 5 (five) minutes as needed for chest pain. 25 tablet 12     No current facility-administered medications for this visit.        Social History     Tobacco Use   Smoking Status Former Smoker     Quit date: 2007     Years since quittin.9   Smokeless Tobacco Never Used           OBJECTIVE:        Recent Results (from the past 240 hour(s))   Lipid Cascade RANDOM   Result Value Ref Range    Cholesterol 174 <=199 mg/dL    Triglycerides 235 (H) <=149 mg/dL    HDL Cholesterol 48 (L) >=50 mg/dL    LDL Calculated 79 <=129 mg/dL    Patient Fasting > 8hrs? No    Comprehensive Metabolic Panel   Result Value Ref Range    Sodium 139 136 - 145 mmol/L    Potassium 4.7 3.5 - 5.0 mmol/L    Chloride 101 98 - 107 mmol/L    CO2 30 22 - 31 mmol/L    Anion Gap, Calculation 8 5 - 18 mmol/L    Glucose 236 (H) 70 - 125 mg/dL    BUN 19 8 - 28 mg/dL    Creatinine 0.93 0.60 - 1.10 mg/dL    GFR MDRD Af Amer >60 >60 mL/min/1.73m2    GFR MDRD Non Af Amer 59 (L) >60 mL/min/1.73m2    Bilirubin, Total 0.6 0.0 - 1.0 mg/dL    Calcium 9.3 8.5 - 10.5 mg/dL    Protein, Total 6.6 6.0 - 8.0 g/dL    Albumin 3.6 3.5 - 5.0 g/dL    Alkaline Phosphatase 96 45 - 120 U/L    AST 18 0 - 40 U/L    ALT 20 0 - 45 U/L   Thyroid Stimulating Hormone (TSH)   Result Value Ref Range    TSH 0.77 0.30 - 5.00 uIU/mL   HM1 (CBC with Diff)   Result Value Ref Range    WBC 7.9 4.0 - 11.0 thou/uL    RBC 4.40 3.80 - 5.40 mill/uL    Hemoglobin 13.0 12.0 - 16.0 g/dL    Hematocrit 40.2 35.0 - 47.0 %    MCV 91 80 - 100 fL    MCH 29.5 27.0 - 34.0 pg    MCHC 32.3 32.0 - 36.0 g/dL    RDW 12.3 11.0 - 14.5 %    Platelets 357 140 - 440 thou/uL    MPV 9.4 7.0 - 10.0 fL    Neutrophils % 72 (H) 50 - 70 %    Lymphocytes % 22 20 - 40 %    Monocytes % 5 2 - 10 %    Eosinophils % 1 0 - 6 %    Basophils % 0 0 - 2 %    Immature Granulocyte % 0 <=0 %    Neutrophils Absolute 5.7  2.0 - 7.7 thou/uL    Lymphocytes Absolute 1.7 0.8 - 4.4 thou/uL    Monocytes Absolute 0.4 0.0 - 0.9 thou/uL    Eosinophils Absolute 0.1 0.0 - 0.4 thou/uL    Basophils Absolute 0.0 0.0 - 0.2 thou/uL    Immature Granulocyte Absolute 0.0 <=0.0 thou/uL       Vitals:    05/12/21 1352   BP: 122/72   Pulse: 60   SpO2: 98%     Weight: 171 lb (77.6 kg)          Physical Exam:  GENERAL APPEARANCE: 71-year-old female very pleasant NAD, well hydrated, well nourished  SKIN:  Normal skin turgor, no lesions/rashes, note that the patient has a lot of scars on her trunk and upper extremities with no evidence for acute infection.  Note on the patient's problem list is a history of guttate  psoriasis.  HEENT: moist mucous membranes, no rhinorrhea  NECK: Normal without adenopathy or masses  CV: RRR, no M/G/R   LUNGS: CTAB  ABDOMEN: S&NT, no masses or enlarged organs   EXTREMITY: Left shoulder inspection normal except for scars from previous rash and possibly a history of psoriasis  The patient has 120 degrees of forward elevation and 90 degrees of abduction with decreased internal and external rotation.  The patient has a positive Dhalwial test left shoulder.  There is no weakness with testing the rotator cuff muscles.  NEURO: no focal findings    PROCEDURE NOTE: Steroid injection left shoulder posterior approach subacromial space with diagnosis of impingement syndrome.  Injection material included Depo-Medrol 40 mg combined with 2 cc of 0.25% Marcaine injection done with a 1-1/2 inch 25-gauge needle  Patient tolerated the procedure without complication and had some improvement in her range of motion and decrease in pain with motion.

## 2021-06-17 NOTE — PROGRESS NOTES
".  Assessment / Impression     Bipolar disorder; mildly depressed  Anxiety disorder; increased  Follow up medication evaluation.  Patient's impression of how medication is working?  Feels she needs to adjust her medication.  Compliant with medication?  Yes    Side effects:None       Plan:     We will add Seroquel 25 mg nightly.  I did refill her Zoloft.  Encourage patient to talk to her cardiologist about the herbal medicine she is taking and the fact that she stopped the Plavix.  We will have patient follow-up in 3 months.    Subjective:      HPI: Kemi Soto is a 68 y.o. female with bipolar disorder and anxiety.  She is here for follow-up medication evaluation.  Patient states she has been more anxious and irritable lately.  She cannot decide whether she is depressed or not.  She has been sick most of the winter and that has been tiresome for her.  She has been traveling to see her family and that gets to be expensive as she also has 3 dogs to care for.  She had a couple instances of being irritable with her family and her daughter told her to \"check her medicine\".  Patient states she is on a new Chinese herb which is full of \"antioxidants\".  The name is ganderima.  Patient states it works for everything and she actually stopped taking her Plavix.  I only encouraged her to tell her cardiologist that and she plans to see him later today.  She denies any racing thoughts, change in sleep patterns.    Kemi  has a past medical history of Discitis of thoracolumbar region (10/10/2015); Encephalopathy (10/14/2015); Epidural abscess (10/10/2015); Hip pain, right; Sepsis (10/8/2015); Stroke (06/23/2015); TIA (transient ischemic attack) (6/23/2015); and UTI (urinary tract infection).  Kemi  has a past surgical history that includes Lumbar discectomy (N/A, 10/11/2015); PICC (10/19/2015); Insert Midline (10/31/2015); Coronary artery bypass graft (12/11/2007); Coronary angioplasty with stent (03/30/2016); Coronary " "stent placement (2008); and Carpal tunnel release (Bilateral).  Oxycontin [oxycodone]  Current Outpatient Prescriptions   Medication Sig Dispense Refill     atorvastatin (LIPITOR) 80 MG tablet TAKE ONE TABLET (80 MG) BY MOUTH AT BEDTIME 90 tablet 1     azithromycin (ZITHROMAX Z-STEPHANIE) 250 MG tablet Take 2 tabs day 1, then 1 tab for next 4 days 6 tablet 0     blood glucose meter (GLUCOMETER) Use 1 each As Directed as needed. Dispense glucometer brand per patient's insurance at pharmacy discretion. Dx E11.69, Z79.4 1 each 0     blood glucose test strips Use 1 each As Directed 4 times a day at 7:30am, 11:30am, 4:30pm, and 9:00pm. Brand per formulary. Dx E11.69, Z79.4 200 strip 4     busPIRone (BUSPAR) 15 MG tablet Take 1 tablet (15 mg total) by mouth 2 (two) times a day. 180 tablet 3     clobetasol (TEMOVATE) 0.05 % ointment Apply topically 2 (two) times a day. 30 g 5     clopidogrel (PLAVIX) 75 mg tablet Take 1 tablet (75 mg total) by mouth daily. (Patient taking differently: Take 75 mg by mouth every other day. ) 90 tablet 3     gabapentin (NEURONTIN) 300 MG capsule Take 3 capsules (900 mg total) by mouth at bedtime. 270 capsule 3     generic lancets (MICROLET LANCET) Use 1 each As Directed 4 times a day at 7:30am, 11:30am, 4:30pm, and 9:00pm. Dx E11.69, Z79.4 200 each 3     insulin glargine (LANTUS SOLOSTAR) 100 unit/mL (3 mL) pen Inject 28 Units under the skin 2 (two) times a day. 11.65 Type 2 with hyperglycemia 15 mL 5     metFORMIN (GLUCOPHAGE XR) 500 MG 24 hr tablet Take 2 tablets twice daily (Patient taking differently: Take 500 mg by mouth at bedtime. ) 360 tablet 1     metoprolol succinate (TOPROL-XL) 25 MG TAKE ONE TABLET BY MOUTH ONCE DAILY 90 tablet 1     nitroglycerin (NITROSTAT) 0.4 MG SL tablet Place 1 tablet (0.4 mg total) under the tongue every 5 (five) minutes as needed for chest pain. 25 tablet 12     pen needle, diabetic (ULTICARE PEN NEEDLE) 32 gauge x 5/32\" Ndle Use 1 Device As Directed 2 (two) " times a day. 200 each 3     sertraline (ZOLOFT) 100 MG tablet TAKE ONE TABLET (100 MG) BY MOUTH DAILY 90 tablet 3     traMADol (ULTRAM) 50 mg tablet Take 1 tablet (50 mg total) by mouth every 6 (six) hours as needed for pain. 30 tablet 0     No current facility-administered medications for this encounter.      Family History   Problem Relation Age of Onset     Stroke Mother      Diabetes Father      Diabetes Sister      Stroke Sister 81     Stroke Brother      Diabetes Brother      Social History     Social History     Marital status: Single     Spouse name: N/A     Number of children: 1     Years of education: N/A     Occupational History     Retired      Former [employer]      Social History Main Topics     Smoking status: Former Smoker     Quit date: 6/23/2007     Smokeless tobacco: Never Used     Alcohol use No      Comment: stopped drinking 9/2015     Drug use: No     Sexual activity: No     Other Topics Concern     Not on file     Social History Narrative    Lives alone with cats and dogs. , one daughter, Tory Lofton.        The following portions of the patient's history were reviewed and updated as appropriate: allergies, current medications, past family history, past medical history, past social history, past surgical history and problem list.    Review of Systems  Constitutional: negative  Respiratory: positive for cough  Neurological: negative  Behavioral/Psych: positive for anxiety and irritability       Objective:   @VS    Mental Status Exam:     Appearance: Patient is casually dressed, pleasant cooperative good eye contact.    Speech / Language : Within normal, Dramatic and Loud    Associations: appropriate    Alert and oriented X 3:yes    Mood: Sad  Affect: Congruent w/content of speech    Suicidal / Homicidal ideation:none  If yes, document safety plan:    Fund of knowledge: good    Thought process:Circumstantial and Flight of ideas    Judgement / insight: No Evidence of  Impairment    Recent and remote memory: WNL    Attention: Within normal  Concentration: Within normal    Motor status: Gait normal, no involuntary movements.    Scores: NA    Change in medication? Yes    Education    Reviewed risks/benefits of medication      Physical Exam: General appearance: alert, appears stated age, cooperative and mild distress  Neurologic: Mental status: Alert, oriented, thought content appropriate, affect: mood-congruent, thought content exhibits logical connections.Thought content devoid of any delusions, suicidal, homicidal or obsesional content.

## 2021-06-17 NOTE — TELEPHONE ENCOUNTER
Pt called and notified of message below. Pt verifies understanding and appointment scheduled for 5/5/21 with pcp. Pt states that she needs a refill of metformin. Last time she took this was December.     Esther Wang CMA

## 2021-06-17 NOTE — PROGRESS NOTES
OV   4/24/2018  Assessment:     1. Acute maxillary sinusitis  cefdinir (OMNICEF) 300 MG capsule   2. Type 2 diabetes mellitus with hyperglycemia, with long-term current use of insulin  Glycosylated Hemoglobin A1c    Comprehensive Metabolic Panel   3. Mixed hyperlipidemia  Lipid [place]    Comprehensive Metabolic Panel   4. Screen for colon cancer          Plan:      We will cover with Omnicef as directed for acute maxillary sinusitis. We reviewed use of OTC analgesics, decongestants, nasal steroids, and/or mucinex, as well as increased fluids, rest and humidity, etc. She will call or return to clinic with any ongoing or worsening symptoms. As far as her diabetes, blood sugars are poorly controlled, up slightly from last December. We reviewed options for treatment and she would like to work on diet and exercise at this time and repeat labs in 3-4 mos. I suggested that she meet with diabetes ed and she will let me know if she is willing to do that. I stressed the importance of good glycemic control and control of BP and lipids to reduce CV risk and she will call with any concerns. Again, she will return to clinic in 3-4 mos for repeat labs and med check.      Subjective:             Kemi Soto presents for evaluation of chest congestion and productive cough. Symptoms began several months ago. Symptoms have been intermittent since that time. The most recent bout began a few weeks ago. Cough is described as productive of green/yellow sputum. Symptoms are associated with facial pain, headaches and sinus pressure, and decrease in energy level. She denies chills, fever, hemoptysis and shortness of breath. She reports reclining position aggravates the cough.       Past history is significant for type 2 diabetes, psoriasis. Has dogs and a cat - one with cushings, sheds a lot.     Fasting today? No  Diabetes      Patient also presents for follow-up of Type 2 diabetes mellitus.  Current symptoms/problems include none.  "Patient denies hypoglycemia , nausea, paresthesia of the feet and visual disturbances. Home sugars: BGs are high in the morning. Current monitoring regimen: home blood tests - 2-3 times daily. Any episodes of hypoglycemia? no.  Last dilated eye exam: 2017.     Known diabetic complications: none. Current diabetic medications include metformin and lantus insulin. She has been reluctant to change to any other treatment because she got some lantus supplies form her niece after her sister  and she didn't want to waste that. She has had an A1c in the 8.5-9 range for several years, and has had some unconventional ways of taking her meds in the past. Weight trend: stable. Prior visit with dietician: yes . Current diet: in general, a \"healthy\" diet  . Current exercise: walking.     Is She on ACE inhibitor or angiotensin II receptor blocker? No           Declines colonoscopy. Willing to complete cologuard. 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 Cardiology wants her on plavix.     The following portions of the patient's history were reviewed and updated as appropriate: allergies, current medications, past family history, past medical history, past social history, past surgical history and problem list.    Review of Systems  Pertinent items are noted in HPI.        Objective:        Vitals:    18 1600   BP: 152/50   Pulse: 68   Weight: 178 lb (80.7 kg)   Height: 5' 2\" (1.575 m)      General     Alert, cooperative, no distress   Head:    Normocephalic, without obvious abnormality, atraumatic   Eyes:    PERRL, conjunctiva/corneas clear, EOM's intact   Ears:    Normal TM's and external ear canals, both ears   Nose:   Nasal mucosa normal, no drainage, and mild-mod bilateral maxillary and frontal sinus tenderness   Throat:   Oropharynx is clear with moist mucous membranes     Neck:   Supple, mild anterior cervical adenopathy and supple, symmetrical, trachea " midline    Lungs:     clear to auscultation bilaterally   Heart :    Regular rate and rhythm, no murmur, rub or gallop   Abdomen:     Soft, non-tender, no masses   Skin:   Skin color, texture, turgor normal, no rashes or lesions            Results for orders placed or performed in visit on 12/18/17   LDL Cholesterol, Direct   Result Value Ref Range    Direct  (H) <=129 mg/dl   Glycosylated Hemoglobin A1c   Result Value Ref Range    Hemoglobin A1c 9.0 (H) 3.5 - 6.0 %   Hepatic Profile   Result Value Ref Range    Bilirubin, Total 0.3 0.0 - 1.0 mg/dL    Bilirubin, Direct <0.1 <=0.5 mg/dL    Protein, Total 6.9 6.0 - 8.0 g/dL    Albumin 3.5 3.5 - 5.0 g/dL    Alkaline Phosphatase 93 45 - 120 U/L    AST 13 0 - 40 U/L    ALT 13 0 - 45 U/L   Basic Metabolic Panel   Result Value Ref Range    Sodium 142 136 - 145 mmol/L    Potassium 4.6 3.5 - 5.0 mmol/L    Chloride 106 98 - 107 mmol/L    CO2 24 22 - 31 mmol/L    Anion Gap, Calculation 12 5 - 18 mmol/L    Glucose 133 (H) 70 - 125 mg/dL    Calcium 9.4 8.5 - 10.5 mg/dL    BUN 16 8 - 22 mg/dL    Creatinine 0.78 0.60 - 1.10 mg/dL    GFR MDRD Af Amer >60 >60 mL/min/1.73m2    GFR MDRD Non Af Amer >60 >60 mL/min/1.73m2

## 2021-06-17 NOTE — PROGRESS NOTES
Patient's impression of how medication is working? PT wants to talk about the medication    Compliant with Medication? yes    Side Effects: None    Current Symptoms : No    Any Concerns? Talk about medication    Pain (0-10) No  Appetite change No  Sleep disturbance No  Change in energy No  Change in interest No  Change in concentration No  Psychosis/Hallucinations No  Negative thoughts No  Mood swings No  Alcohol use No  Drug use No  Anxiety moderate  Sad/depressed mood low

## 2021-06-17 NOTE — TELEPHONE ENCOUNTER
RN cannot approve Refill Request    RN can NOT refill this medication historical medication requested. Last office visit: 11/13/2020 Mayco Zapata MD Last Physical: Visit date not found Last MTM visit: Visit date not found Last visit same specialty: 11/13/2020 Mayco Zapata MD.  Next visit within 3 mo: Visit date not found  Next physical within 3 mo: Visit date not found      Jean-Claude Adames, [facility] Connection Triage/Med Refill 4/29/2021    Requested Prescriptions   Pending Prescriptions Disp Refills     isosorbide mononitrate (IMDUR) 60 MG 24 hr tablet  0     Sig: Take 1 tablet (60 mg total) by mouth daily.       Isosorbide Refill Protocol Passed - 4/27/2021  3:38 PM        Passed - Visit with PCP or prescribing provider visit in last 6 months or next 3 months     Last office visit with prescriber/PCP: 11/13/2020 OR same dept: 11/13/2020 Mayco Zapata MD OR same specialty: 11/13/2020 Mayco Zapata MD Last physical: Visit date not found Last MTM visit: Visit date not found     Next appt within 3 mo: Visit date not found  Next physical within 3 mo: Visit date not found  Prescriber OR PCP: Mayco Zapata MD  Last diagnosis associated with med order: There are no diagnoses linked to this encounter.  If protocol passes may refill for 6 months if within 3 months of last provider visit (or a total of 9 months).              Passed - Blood pressure filed in past 12 months     BP Readings from Last 1 Encounters:   11/13/20 140/60

## 2021-06-17 NOTE — TELEPHONE ENCOUNTER
Patient was gone for the winter and now back so needs follow-up appointment with me next month.  Thanks,  Dr. Zapata

## 2021-06-17 NOTE — PATIENT INSTRUCTIONS - HE
We are going to plan a 2 week monitor and then discuss next steps.You told me that you would like to avoid the ablation procedure unless you are having a lot of atrial fibrillation.We will decide about the blood thinners after the results of the monitor.Please call my nurse Priscila or write on my chart with any questions.Her number is [phone]

## 2021-06-17 NOTE — PROGRESS NOTES
[facility] Progress Note    Assessment:  1.  Coronary artery disease.  Infrequent episodes of chest discomfort.  Coronary angiography and prior coronary intervention as described below.  She has a history of noncompliance in taking medications unconventional manner.  We discussed the importance of aspirin and Plavix as it relates to her stenting also had been on Plavix apparently for TIA in the past based on review of the chart records.  It was my strong recommendation that she not utilize the Chinese supplement and that she utilize aspirin and Plavix.  Might be reasonable to have her just on Plavix without aspirin pending the results of an exercise stress echocardiogram and will comment once this is available for review.    2.  Remote history of atrial fibrillation.  The details are somewhat limited.  This occurred prior to my following her cardiovascular history.  She does have an elevated chads vas score.  I have asked her to utilize a two-week patch monitor to evaluate for occult atrial fibrillation.  I had recommended an event monitor in the past but she did not follow through with this recommendation.  She has had a difficult time with respect to taking medications on a scheduled basis.    3.  Hyperlipidemia.  She is on atorvastatin 80 mg daily.  She told me that back in December when she had her laboratory studies checked by her primary care physician she was not taking the atorvastatin regularly.  She tells me she is now taking it regularly and we are going to plan a follow-up fasting lipid panel.        Plan: Stress echocardiogram            Two-week event monitor             Discussed with patient the importance of taking medications as directed and avoiding the supplements that she has been taking.    Addendum: Patient tells me she saw her psychiatrist this morning.  She did appear to have what manic speech pattern today.    1. CAD (coronary artery disease)  Echo Stress Exercise    Lipid  Profile   2. Coronary artery disease  clopidogrel (PLAVIX) 75 mg tablet   3. Palpitations  One-Lead Patch Monitor         An After Visit Summary was printed and given to the patient.    Subjective:    Kemi Soto is a 68 y.o. female who returned for a planned  follow up visit.  She has a history of prior bypass surgery and subsequent stenting in 2016.  She had coronary angiography at that time to demonstrate disease in the first diagonal and mid LAD with cutting balloon angioplasty to the first diagonal and stenting to the mid LAD with drug-coated stent.  Internal mammary artery to left anterior descending was patent with moderate disease beyond the left internal mammary artery, moderate disease in the circumflex and mild in-stent restenosis of the right coronary artery.  She had a exercise stress echocardiogram July 2016 that was negative for inducible ischemia by echocardiographic criteria.    She reports that she has been utilizing a Chinese supplement in place of Plavix.  I discussed with her that I did not think that this was a prudent decision and discussed the reasons for Plavix.  She had been on aspirin and Plavix related to her coronary stenting which she has been taking intermittently.  She also had been on Plavix for a apparent TIA a number of years ago.  She had a fair amount of difficulty focusing on the discussion today.  She did indicate however that she would follow my recommendations at this time.    I went back to the records and she had an episode of atrial fibrillation a few years ago when she was admitted to hospital with acute illness.  We talked about of a chads vas score in the past and again today.  She had been on Plavix for an apparent TIA as well.  He has had a difficult time with respect to being compliant with medications.  She has had significant issue with anticoagulants.    She reports some infrequent episodes of chest discomfort that are not predictable with exertion.  Review of  Systems:   General: WNL  Eyes: WNL  Ears/Nose/Throat: Hearing Loss  Lungs: Cough  Heart: WNL  Stomach: WNL  Bladder: Frequent Urination at Night  Muscle/Joints: Joint Pain  Skin: WNL  Nervous System: WNL  Mental Health: Anxiety     Blood: WNL      Problem List:    Patient Active Problem List   Diagnosis     History of TIA (transient ischemic attack) and stroke     Essential hypertension     Mixed hyperlipidemia     Coronary artery disease     Back pain     Chalastodermia     Diabetes mellitus with polyneuropathy     Type 2 diabetes mellitus with hyperglycemia, with long-term current use of insulin     S/P CABG (coronary artery bypass graft)     Bipolar 2 disorder     Anxiety disorder     BMI 36.0-36.9,adult     Noncompliance with medications     Psoriasis, guttate     History of atrial fibrillation     History of alcohol abuse       Social History     Social History     Marital status: Single     Spouse name: N/A     Number of children: 1     Years of education: N/A     Occupational History     Retired      Former [employer]      Social History Main Topics     Smoking status: Former Smoker     Quit date: 6/23/2007     Smokeless tobacco: Never Used     Alcohol use No      Comment: stopped drinking 9/2015     Drug use: No     Sexual activity: No     Other Topics Concern     Not on file     Social History Narrative    Lives alone with cats and dogs. , one daughter, Tory Lofton.        Family History   Problem Relation Age of Onset     Stroke Mother      Diabetes Father      Diabetes Sister      Stroke Sister 81     Stroke Brother      Diabetes Brother        Current Outpatient Prescriptions   Medication Sig Dispense Refill     atorvastatin (LIPITOR) 80 MG tablet TAKE ONE TABLET (80 MG) BY MOUTH AT BEDTIME 90 tablet 1     blood glucose meter (GLUCOMETER) Use 1 each As Directed as needed. Dispense glucometer brand per patient's insurance at pharmacy discretion. Dx E11.69, Z79.4 1 each 0      "blood glucose test strips Use 1 each As Directed 4 times a day at 7:30am, 11:30am, 4:30pm, and 9:00pm. Brand per formulary. Dx E11.69, Z79.4 200 strip 4     busPIRone (BUSPAR) 15 MG tablet Take 1 tablet (15 mg total) by mouth 2 (two) times a day. 180 tablet 3     clobetasol (TEMOVATE) 0.05 % ointment Apply topically 2 (two) times a day. 30 g 5     clopidogrel (PLAVIX) 75 mg tablet Take 1 tablet (75 mg total) by mouth daily. 90 tablet 3     gabapentin (NEURONTIN) 300 MG capsule Take 3 capsules (900 mg total) by mouth at bedtime. 270 capsule 3     generic lancets (MICROLET LANCET) Use 1 each As Directed 4 times a day at 7:30am, 11:30am, 4:30pm, and 9:00pm. Dx E11.69, Z79.4 200 each 3     insulin glargine (LANTUS SOLOSTAR) 100 unit/mL (3 mL) pen Inject 28 Units under the skin 2 (two) times a day. 11.65 Type 2 with hyperglycemia 15 mL 5     metoprolol succinate (TOPROL-XL) 25 MG TAKE ONE TABLET BY MOUTH ONCE DAILY 90 tablet 1     nitroglycerin (NITROSTAT) 0.4 MG SL tablet Place 1 tablet (0.4 mg total) under the tongue every 5 (five) minutes as needed for chest pain. 25 tablet 12     pen needle, diabetic (ULTICARE PEN NEEDLE) 32 gauge x 5/32\" Ndle Use 1 Device As Directed 2 (two) times a day. 200 each 3     QUEtiapine (SEROQUEL) 25 MG tablet Take 1 tablet (25 mg total) by mouth at bedtime. 30 tablet 3     sertraline (ZOLOFT) 100 MG tablet TAKE ONE TABLET (100 MG) BY MOUTH DAILY 90 tablet 3     traMADol (ULTRAM) 50 mg tablet Take 1 tablet (50 mg total) by mouth every 6 (six) hours as needed for pain. 30 tablet 0     azithromycin (ZITHROMAX Z-STEPHANIE) 250 MG tablet Take 2 tabs day 1, then 1 tab for next 4 days 6 tablet 0     metFORMIN (GLUCOPHAGE XR) 500 MG 24 hr tablet Take 2 tablets twice daily (Patient taking differently: Take 500 mg by mouth at bedtime. ) 360 tablet 1     No current facility-administered medications for this visit.        Objective:     /70 (Patient Site: Left Arm, Patient Position: Sitting, Cuff " "Size: Adult Large)  Pulse 76  Resp 18  Ht 5' 2\" (1.575 m)  Wt 179 lb (81.2 kg)  BMI 32.74 kg/m2  179 lb (81.2 kg)       Physical Exam:    GENERAL APPEARANCE: alert, no apparent distress  HEENT: no scleral icterus or xanthelasma  NECK: jugular venous pressure within normal limits.  CHEST: symmetric, the lungs are clear to auscultation  CARDIOVASCULAR: regular rhythm without significant murmur, click, or gallop; no carotid bruits  Abdomen: No Organomegaly, masses, bruits, or tenderness. Bowels sounds are present      EXTREMITIES: no cyanosis, clubbing or edema    Cardiac Testing:  Procedure Date  Date: 07/27/2016 Start: 02:41 PM     Study Location: [facility]  Technical Quality: Adequate visualization     Patient Status: Routine     Height: 62 inches Weight: 171 pounds BSA: 1.79 m^2 BMI: 31.28 kg/m^2     HR: 82 bpm BP: 110/62 mmHg     Allergies    - oxycodone.     Indications  Coronary artery disease.      Conclusions       Summary   No chest pain with exercise.   Exercise capacity is good.   Stress ECG: Non-diagnostic exercise EKG due to baseline ST changes.   Normal exercise stress echocardiogram.       Conclusions       Procedure Summary   - Dyspnea and fatigue with abnormal stress test. Diabetic with   CABG in 2007 with angiogram in 2008 reporting both SVG (PDA   and diagonal) were occluded. Extensive, complex stenting at   that time of Left main/LAD/LCx and subsequent RCA stenting.   - 6 Fr right groin access   - Co-dominant arterial systems   - Patent LAD stent   - Severe ostial first diagonal (large artery) disease followed   by severe mid LAD disease that feeds a good sized second   diagonal. The first diagonal was treated with cutting balloon   angioplasty and the mid LAD was stented with a Promus STEVENSON with   good results. There is a LIMA to the mid/distal LAD and there   is moderate LAD disease beyond the LIMA in the distal LAD.   - The circumflex has moderate proximal and distal instent   restenosis   - " The RCA has mild instent restenosis in the mid section. The   distal artery is very small in caliber and is diffusely   diseased.   - LV EDP 10-12 mmHg       Recommendations   - Continue clopidogrel for 12 months without interruption   - Aspirin 81mg daily and high dose atorvastatin indefinitely   - Cardiac rehab phase II   - Diet and diabetes optimization   - FU NP [facility] in 2 weeks and Dr. Hernandez in 6 weeks   - Consider long acting nitrate for small vessel disease       Signatures       Electronically signed by MAXIMO COOPER MD   (Interventional Physician) on 03/30/2016 at 01:22 PM    Lab Results:    Lab Results   Component Value Date     12/18/2017    K 4.6 12/18/2017     12/18/2017    CO2 24 12/18/2017    BUN 16 12/18/2017    CREATININE 0.78 12/18/2017    CALCIUM 9.4 12/18/2017     Lab Results   Component Value Date    CHOL 150 07/18/2017    TRIG 123 07/18/2017    HDL 44 (L) 07/18/2017    LDLDIRECT 144 (H) 12/18/2017     No results found for: BNP  Creatinine (mg/dL)   Date Value   12/18/2017 0.78   06/14/2017 0.88   05/11/2017 0.84   01/04/2017 0.82     LDL Calculated (mg/dL)   Date Value   07/18/2017 81   05/11/2017 112   01/04/2017 118     Lab Results   Component Value Date    WBC 7.4 06/05/2017    WBC 6.1 06/23/2015    HGB 13.0 06/05/2017    HCT 38.2 06/05/2017    MCV 86 06/05/2017     06/05/2017               This note has been dictated using voice recognition software. Any grammatical or context distortions are unintentional and inherent to the software.      Chuck Hernandez M.D., F.A.C.C.  [facility]  [phone]

## 2021-06-17 NOTE — PROGRESS NOTES
ASSESSMENT/PLAN:       1. Typical atrial flutter (H)  The patient states that she will call Dr. Hernandez and get an appointment but I also will place a referral to make sure he gets done    - HM1(CBC and Differential), normal white blood cell, red blood cell and platelet counts    - Thyroid Stimulating Hormone (TSH), normal 0.77      2. Type 2 diabetes mellitus with hyperglycemia, with long-term current use of insulin (H)  I did not check a hemoglobin A1c today because she has not been taking her medication as prescribed and will wait 3 months to check that.    - Lipid Cascade RANDOM, elevated triglycerides 235 and LDL 79    - Comprehensive Metabolic Panel, note that random blood sugar was 236 and GFR 59  We will get back on the Metformin XR 2000 mg daily    3. Diabetes mellitus due to underlying condition with diabetic neuropathy, with long-term current use of insulin (H)   The patient will continue to take gabapentin 900 mg at bedtime    - Thyroid Stimulating Hormone (TSH), normal    4. Coronary artery disease involving native coronary artery of native heart without angina pectoris  Continue with good blood pressure control and lipid management    - Lipid Cascade RANDOM    5. Mixed hyperlipidemia  Elevated triglycerides and low HDL cholesterol    6. Acute pain of left shoulder  The patient will return next week for a corticosteroid injection left shoulder  Physical therapy would also be beneficial    7. Chronic anticoagulation  Continue with apixaban 5 mg twice daily    8. Encounter for screening mammogram for breast cancer    - Mammo Screening Bilateral; Future  Encouraged her to call to set up an appointment for the mammogram    9. Screen for colon cancer  Encouraged her to follow through with colon cancer screening but she declined at this point.    Test results by letter  Follow-up 3 months diabetic eval with A1c  Level of medical decision making moderate  1 or more chronic conditions with exacerbation or not  controlled that being diabetes  Amount of data reviewed reviewed included 3 or more unique tests that were ordered, reviewed and interpreted  Moderate risk of complications in view of her chronic conditions that are not controlled primarily diabetes              Myaco Zapata MD      PROGRESS NOTE   5/7/2021    SUBJECTIVE:  Kemi Soto is a 71 y.o. female  who presents for   Chief Complaint   Patient presents with     Follow-up     Med/DM check     The patient is seen today for a medication checkup.  She was last seen just prior to going to [place] for the winter.  At that time she had been diagnosed with atrial flutter and was to follow-up with a cardiologist in [place] which never happened.    1. Typical atrial flutter (H)  The patient occasionally has symptoms of palpitations but not very frequent or lasting very long.  She is on sotalol 40 mg twice a day.  Her cardiologist is Dr. Hernandez and she would like to follow-up with him.    2. Type 2 diabetes mellitus with hyperglycemia, with long-term current use of insulin (H)  The patient's medication includes Lantus insulin 28 units twice a day and she also was supposed to be taking Metformin XR 2000 mg daily.  She ran out of her Metformin XR while in [place] and just started that back this week.  She has not been checking her blood sugars.    3. Diabetes mellitus due to underlying condition with diabetic neuropathy, with long-term current use of insulin (H)   She has a history of diabetic neuropathy which is treated with gabapentin 900 mg at bedtime as her symptoms are primarily at bedtime    4. Coronary artery disease involving native coronary artery of native heart without angina pectoris  The patient has had three-vessel bypass surgery and had a nuclear medicine stress test that was negative in July 2018.  Her ejection fraction is 58%  She is not noticed any chest pain or shortness of breath    5. Mixed hyperlipidemia  The patient is on atorvastatin 80 mg at  bedtime  Her LDL has been close to goal at 72-75.    6. Acute pain of left shoulder  When getting back up to come back to [place] for the summer months she hurt her left shoulder and continues to bother her.  Decreased range of motion and generalized left shoulder joint pain    7. Chronic anticoagulation  The patient is on apixaban 5 mg twice a day because of the atrial flutter  No bleeding complications    8. Encounter for screening mammogram for breast cancer  The patient is overdue for breast cancer screening and agrees to proceed    9. Screen for colon cancer  The patient is overdue for colon cancer screening but does not feel that she wants to do that right now.  States no change in her bowel habits.    Patient Active Problem List   Diagnosis     History of TIA (transient ischemic attack) and stroke     Essential hypertension, benign     Mixed hyperlipidemia     Chalastodermia     Type 2 diabetes mellitus with hyperglycemia, with long-term current use of insulin (H)     S/P CABG (coronary artery bypass graft)     Bipolar 2 disorder (H)     Anxiety disorder     BMI 36.0-36.9,adult     Noncompliance with medications     Psoriasis, guttate     History of atrial fibrillation     Benign adenomatous polyp of large intestine     Bilateral hearing loss     Peripheral neuropathy     Hyperlipidemia with target LDL less than 70     Atrial flutter (H)     Atrial fibrillation with RVR (H)     Coronary artery disease involving native coronary artery of native heart without angina pectoris     Type 2 myocardial infarction due to arrhythmia (H)     Chronic anticoagulation       Current Outpatient Medications   Medication Sig Dispense Refill     acetaminophen (TYLENOL) 500 MG tablet Take 500 mg by mouth 2 (two) times a day.        apixaban ANTICOAGULANT (ELIQUIS) 5 mg Tab tablet Take 1 tablet (5 mg total) by mouth 2 (two) times a day. 60 tablet 3     atorvastatin (LIPITOR) 80 MG tablet Take 1 tablet (80 mg total) by mouth at  "bedtime. 90 tablet 1     blood glucose test strips Smartview test strips. Check blood sugar 3 times per day. 100 strip 5     busPIRone (BUSPAR) 15 MG tablet Take 1 tablet (15 mg total) by mouth daily. 90 tablet 1     gabapentin (NEURONTIN) 300 MG capsule Take 3 capsules (900 mg total) by mouth at bedtime. 270 capsule 3     generic lancets (FINGERSTIX LANCETS) Check blood sugar 3 times per day. Fastclix lancets. 100 each 5     insulin glargine (LANTUS SOLOSTAR U-100 INSULIN) 100 unit/mL (3 mL) pen Inject 28 Units under the skin 2 (two) times a day. 60 mL 1     isosorbide mononitrate (IMDUR) 60 MG 24 hr tablet Take 1 tablet (60 mg total) by mouth daily. 90 tablet 0     metFORMIN (GLUCOPHAGE-XR) 500 MG 24 hr tablet Take 4 tablets (2,000 mg total) by mouth daily with breakfast. 360 tablet 0     multivitamin with minerals tablet Take by mouth daily.        nitroglycerin (NITROSTAT) 0.4 MG SL tablet Place 1 tablet (0.4 mg total) under the tongue every 5 (five) minutes as needed for chest pain. 25 tablet 12     pen needle, diabetic (ULTICARE PEN NEEDLE) 32 gauge x 5/32\" Ndle Use 1 each As Directed 4 (four) times a day. 200 each 3     sertraline (ZOLOFT) 100 MG tablet Take 1 tablet (100 mg total) by mouth daily. 90 tablet 3     sotaloL (BETAPACE) 80 MG tablet Take 0.5 tablets (40 mg total) by mouth every 12 (twelve) hours. Do not take with seroquel. 90 tablet 1     traMADoL (ULTRAM) 50 mg tablet Take 1 tablet (50 mg total) by mouth every 8 (eight) hours as needed for pain or severe pain (7-10). 30 tablet 0     losartan (COZAAR) 100 MG tablet Take 1 tablet (100 mg total) by mouth daily. 90 tablet 3     No current facility-administered medications for this visit.        Social History     Tobacco Use   Smoking Status Former Smoker     Quit date: 2007     Years since quittin.8   Smokeless Tobacco Never Used           OBJECTIVE:        Recent Results (from the past 240 hour(s))   Lipid Cascade RANDOM   Result Value " "Ref Range    Cholesterol 174 <=199 mg/dL    Triglycerides 235 (H) <=149 mg/dL    HDL Cholesterol 48 (L) >=50 mg/dL    LDL Calculated 79 <=129 mg/dL    Patient Fasting > 8hrs? No    Comprehensive Metabolic Panel   Result Value Ref Range    Sodium 139 136 - 145 mmol/L    Potassium 4.7 3.5 - 5.0 mmol/L    Chloride 101 98 - 107 mmol/L    CO2 30 22 - 31 mmol/L    Anion Gap, Calculation 8 5 - 18 mmol/L    Glucose 236 (H) 70 - 125 mg/dL    BUN 19 8 - 28 mg/dL    Creatinine 0.93 0.60 - 1.10 mg/dL    GFR MDRD Af Amer >60 >60 mL/min/1.73m2    GFR MDRD Non Af Amer 59 (L) >60 mL/min/1.73m2    Bilirubin, Total 0.6 0.0 - 1.0 mg/dL    Calcium 9.3 8.5 - 10.5 mg/dL    Protein, Total 6.6 6.0 - 8.0 g/dL    Albumin 3.6 3.5 - 5.0 g/dL    Alkaline Phosphatase 96 45 - 120 U/L    AST 18 0 - 40 U/L    ALT 20 0 - 45 U/L   Thyroid Stimulating Hormone (TSH)   Result Value Ref Range    TSH 0.77 0.30 - 5.00 uIU/mL   HM1 (CBC with Diff)   Result Value Ref Range    WBC 7.9 4.0 - 11.0 thou/uL    RBC 4.40 3.80 - 5.40 mill/uL    Hemoglobin 13.0 12.0 - 16.0 g/dL    Hematocrit 40.2 35.0 - 47.0 %    MCV 91 80 - 100 fL    MCH 29.5 27.0 - 34.0 pg    MCHC 32.3 32.0 - 36.0 g/dL    RDW 12.3 11.0 - 14.5 %    Platelets 357 140 - 440 thou/uL    MPV 9.4 7.0 - 10.0 fL    Neutrophils % 72 (H) 50 - 70 %    Lymphocytes % 22 20 - 40 %    Monocytes % 5 2 - 10 %    Eosinophils % 1 0 - 6 %    Basophils % 0 0 - 2 %    Immature Granulocyte % 0 <=0 %    Neutrophils Absolute 5.7 2.0 - 7.7 thou/uL    Lymphocytes Absolute 1.7 0.8 - 4.4 thou/uL    Monocytes Absolute 0.4 0.0 - 0.9 thou/uL    Eosinophils Absolute 0.1 0.0 - 0.4 thou/uL    Basophils Absolute 0.0 0.0 - 0.2 thou/uL    Immature Granulocyte Absolute 0.0 <=0.0 thou/uL       Vitals:    05/05/21 1319   BP: 138/74   Pulse: 64   Resp: 19   SpO2: 98%   Weight: 171 lb (77.6 kg)   Height: 5' 2\" (1.575 m)     Weight: 171 lb (77.6 kg)          Physical Exam:  GENERAL APPEARANCE: 71-year-old female pleasant, NAD, well hydrated, " well nourished  SKIN:  Normal skin turgor, no lesions/rashes   HEENT: moist mucous membranes, no rhinorrhea  NECK: Normal without adenopathy or masses, no carotid bruits  CV: Primarily regular rate with an occasional skipped beat, no M/G/R   LUNGS: CTAB  ABDOMEN: S&NT, no masses or enlarged organs   EXTREMITY: no edema and full ROM of all joints  NEURO: no focal findings

## 2021-06-18 NOTE — LETTER
Letter by Mayco Zapata MD at      Author: Mayco Zapata MD Service: -- Author Type: --    Filed:  Encounter Date: 2/5/2019 Status: (Other)       Kemi Soto  [address] [place] [id number]             February 5, 2019         Dear Ms. Soto,    Below are the results from your recent visit:    Resulted Orders   Glycosylated Hemoglobin A1c   Result Value Ref Range    Hemoglobin A1c 8.7 (H) 3.5 - 6.0 %   Microalbumin, Random Urine   Result Value Ref Range    Microalbumin, Random Urine 6.29 (H) 0.00 - 1.99 mg/dL    Creatinine, Urine 69.8 mg/dL    Microalbumin/Creatinine Ratio Random Urine 90.1 (H) <=19.9 mg/g    Narrative    Microalbumin, Random Urine  <2.0 mg/dL . . . . . . . . Normal  3.0-30.0 mg/dL . . . . . . Microalbuminuria  >30.0 mg/dL . . . . . .  . Clinical Proteinuria    Microalbumin/Creatinine Ratio, Random Urine  <20 mg/g . . . . .. . . . Normal   mg/g . . . . . . . Microalbuminuria  >300 mg/g . . . . . . . . Clinical Proteinuria         Ciarra, it was a pleasure to have seen you in the clinic last week.  The test results are listed above and I would like to go over those results and give some recommendations.  In regard to the hemoglobin A1c your blood sugar control is still not at goal.  The value of 8.7 is higher than our target which would be closer to 7.  We definitely wanted to be less than 8.  To improve your blood sugars I would suggest that you increase your metformin to 1000 mg twice a day instead of taking 500 mg twice a day.  Thus you would need to take 2 of your metformin tablets in the morning with breakfast and 2 of them at suppertime.  I would suggest that you follow-up in 3 months with Dr. Hampton to recheck your diabetes.    The urine test showed elevated microalbumin  which means that your kidneys are showing some signs of early disease primarily related to your diabetes.  Your blood pressure was satisfactory when seen in the clinic but typically when we  start seeing some increased protein or microalbumin in your urine we start a blood pressure medication called an ACE inhibitor to try to protect the function of your kidneys.  The other important aspect of keeping her kidneys as healthy as possible is controlling your blood sugars.    I would suggest that you discuss this issue with Dr. Hampton when you see her the next time.  Certainly that can be done at your next follow-up visit in 3 months or sooner if you would like to address that issue at an earlier date.  If you are using 4 tablets of metformin a day rather than 2 your run out of her prescription early and I am certainly more than happy to send a new prescription to your pharmacy so they are aware of your change in medication.    Please call with questions or contact us using [employer].    Sincerely,        Electronically signed by Mayco Zapata MD

## 2021-06-19 NOTE — LETTER
Letter by Wander Olivas MD at      Author: Wander Olivas MD Service: -- Author Type: --    Filed:  Encounter Date: 7/3/2019 Status: (Other)         Kemi Soto  [address] [place] [id number]             July 8, 2019         Dear Ms. Soto,    Below are the results from your recent visit:    Resulted Orders   Glycosylated Hemoglobin A1c   Result Value Ref Range    Hemoglobin A1c 8.9 (H) 3.5 - 6.0 %   Lipid Cascade   Result Value Ref Range    Cholesterol 170 <=199 mg/dL    Triglycerides 142 <=149 mg/dL    HDL Cholesterol 47 (L) >=50 mg/dL    LDL Calculated 95 <=129 mg/dL    Patient Fasting > 8hrs? Yes            Please call with questions or contact us using [employer].    Sincerely,        Electronically signed by Wander Olivas MD

## 2021-06-19 NOTE — PROGRESS NOTES
Case requested.  -[facility]    Notes Recorded by Chuck Hernandez MD on 7/30/2018 at 4:14 PM  I called her and reviewed the test results with her.  She has infrequent episodes of chest discomfort.  She has a history of bypass surgery and multiple stenting.  I reviewed the findings in detail and the concerning features of the stress test.  We talked about proceeding to coronary angiography, graft angiography and coronary intervention.  She is very much aware of the procedure she has had it before and states that she is in agreement.  She had previously been taking the Plavix intermittently and I asked her to take it on a daily basis.  We discussed the risks of the coronary angiogram procedure and asked her to seek more immediate attention if symptoms are worsening or progressive.  She is asked to be cautious with respect to her physical exercise pursuits.  Can we arrange this procedure in the near future.  As soon as possible, ty mdg

## 2021-06-19 NOTE — PROGRESS NOTES
DIABETES EDUCATION CARE PLAN    Assessment/Plan:     Follow-up visit for diabetes education and insulin adjustment. Kemi Soto is checking blood sugar 1-2 times per day. Her fasting blood sugars are reasonable and sometimes too low. Before dinner and bedtime blood sugars are 200-300. Discussed with Dr. Hampton and received a verbal order to start meal insulin. Doses calculated per [facility] protocol. Total daily dose of 50 units divided by 2=25 units of each type of insulin. Reviewed how the different insulin works. Discussed symptoms and treatment of high and low blood sugar.    Current diabetes medications:   Metformin XR 2000 mg/day  Lantus or Basaglar pen 30-0-0-20    Plan:  1. Check blood sugar 3 times per day (before breakfast, before dinner and bedtime).  2. Decrease Lantus/Basaglar Kwikpen to 25 units once daily in the morning.  3. Start 8 units of Novolog before meals. Wait 15 minutes before eating.   4. If you skip lunch do not take Novolog.  5. Always carry a form of sugar with you in case of low blood sugar (less 70).  6. Bring meter to next visit 2018 at 2:00 pm.    Subjective/Objective:      Kemi Soto is a 68 y.o. female referred by Ange Hampton MD.  Accompanied by unaccompanied   Lives alone. Has 1 cat and 3 dogs. 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 Loves to read and go out with friends. Diagnosed with diabetes at age 50 (17 years)    Wt Readings from Last 3 Encounters:   18 179 lb (81.2 kg)   18 183 lb 4.8 oz (83.1 kg)   18 181 lb (82.1 kg)     Lab Results   Component Value Date    HGBA1C 8.8 (H) 2018     Diet/Eating Habits: 2-3 meals per day and evening snacks. Patient states she is eating less sweets and eating more fruit and vegetables.     Physical Activity: Some walking    SMBG pattern/BG ranges: Uses Accu-Chek Sadia meter  Blood sugar log scanned    Hypoglycemia: 2 low blood sugars before breakfast  FB, 56    EDUCATION RECORD      Monitoring   Meter (per above goals): Competent  Monitoring: Competent  BG goals: Assessed, Discussed and Competent    Nutrition Management  Nutrition Management: Discussed  Weight: Discussed  Portions/Balance: Assessed and Discussed  Carb ID/Count: Competent  Label Reading: Competent  Heart Healthy Fats: Competent  Physical Activity: Assessed, Discussed and limited due to heart issues and hip pain  Orals: Assessed and Competent  Injected Medications: Assessed, Discussed and Literature provided.  Storage/Exp:Assessed, Discussed, Patient returned demonstration and Competent   Site Rotation: Discussed, Patient returned demonstration and Competent   Disposal of Sharps: Assessed, Discussed and Literature provided. Was recapping needles in disposing in the trash    Diabetes Disease Process: Competent    Acute Complications: Prevent, Detect, Treat:  Hypoglycemia: Assessed, Discussed and Competent  Hyperglycemia: Assessed, Discussed and Competent  Sick Days: Not addressed    Chronic Complications  Foot Care:Not addressed  Skin Care: Not addressed  Eye: Not addressed  ABC: Competent  Teeth:Not addressed  Goal Setting and Problem Solving: Assessed, Discussed and Literature provided  Barriers: Assessed and Hard of hearing, histor of anxiety and bipolar disease, history of alcohol abuse, history of heart disease and stroke  Psychosocial Adjustments: Assessed      Time spent with the patient: 60 minutes   Previous Education: yes  Visit Type:Diabetes Self-Management Training ()  Hours Remaining: DSMT 1 and MNT 1 for 2018  Diagnosis per referral: Type 2 diabetes with hyperglycemia, with long-term use of insulin (E11.65, Z79.4)        Liv Contreras RD, LD, CDE  8/3/2018  2:08 PM

## 2021-06-19 NOTE — LETTER
Letter by Wander Olivas MD at      Author: Wander Olivas MD Service: -- Author Type: --    Filed:  Encounter Date: 6/27/2019 Status: (Other)         Kemijamey Soto  @Address@      Dear Kemi Soto    Breast cancer is the most common type of cancer among American women. The earlier breast cancer is detected, the better the survival rate. Your best defense against breast cancer is having a screening mammogram on a regular basis. The American Cancer Society recommends that women with an average risk of breast cancer should undergo regular screening mammography starting at age 45 years.           Women aged 45 to 54 years should have a mammogram annually.    Women 55 years and older should have a mammogram at least every other year up until the age of 74.    There may be important reasons for you to follow a more frequent screening plan or an earlier start for breast cancer screening, so please discuss your health history and your family health history with your primary care provider.       We reviewed our records and we do not see that you had a mammogram within the past two years. If you have not had a mammogram in the past two years, we strongly encourage you to call for your appointment today. For your convenience, we have included a phone number below for you to schedule your mammogram at a nearby [facility] location.      If you have had a recent mammogram or have questions about why you are receiving this letter, please contact your primary care clinic at [phone] or send a [employer] message  [email or web address]). Your clinic will answer any questions or help obtain recent mammogram reports for your chart at [facility] so we can keep record of your preventive care.       Sincerely,    [facility] Care Team        [facility]  Schedule your mammogram today at one of our 7 locations  Please call  [phone]

## 2021-06-19 NOTE — PROGRESS NOTES
Assessment:         No diagnosis found.         Plan:          Hospital records were personally reviewed. Fasting labs were reviewed, and blood sugars have not been in the desired range for some time now. We discussed the importance of good glycemic control in minimizing CV and other diabetic complications. I encouraged her to  from her medications and I encouraged her to follow up with Liv to discuss alternatives to the 70/30, and I did suggest that she contact [facility] to see whether she qualifies for help. Blood pressure is under adequate control. We reviewed her current medications and she will continue the same pending additional lab results.  We reviewed dietary recommendations, including low salt and high fiber diet, and recommendations for regular exercise/activity. She will plan to follow up in 4-6 mos for repeat fasting labs and med check, sooner if any difficulties.         Subjective:        Fasting today? No  Coronary Artery Disease      Patient presents for follow-up of CAD and recent hospital admission for Coronary angiogram and PCI with 3 stents placed last week. She had an abnormal stress test which prompted the angio, and the stress test was done for intermittent chest pain in the setting of known CAD, s/p CABG x 3 and s/p multiple PCIs in the past. She has been inconsistent in her use of blood thinners in the past. [place] and wants to be up for the walking involved with that trip. Current symptoms: occasional chest pressure/discomfort. Aggravating factors: exertion. Alleviating factors: resting.        She also has type 2 diabetes and that has been under fairly poor control recently with her last A1c at 8.8. She has been holding steady at 8.8-9.1 for the last few years, with one 11 about 1&1/2 yrs ago. She has been limited in her treatment options by cost and most recently has been lantus that she was given by her niece after her sister  "passed away. She has been taking it two times a day at 28 units each dose. We had discussed starting novolog 70/30 but she had to pay $120 out of pocket and she is not willing to continue with that. She has not started it yet. Blood sugars have been ok recently with an avg of 111 for the last 7 days, and 162 for a 14 day avg. She denies sig lows recently.    The following portions of the patient's history were reviewed and updated as appropriate: allergies, current medications, past family history, past medical history, past social history, past surgical history and problem list.    Review of Systems  A 12 point comprehensive review of systems was negative except as noted.        Objective:        Vitals:    08/20/18 1006   BP: 130/62   Pulse: 60   Weight: 180 lb 5 oz (81.8 kg)   Height: 5' 2\" (1.575 m)          General:    Alert, cooperative, no distress   Head:    Normocephalic, without obvious abnormality, atraumatic   Eyes:    PERRL, conjunctiva/corneas clear, EOM's intact    Ears:    Normal TM's and external ear canals, both ears   Nose:   Nares normal, mucosa normal, no drainage or sinus tenderness   Throat:   Lips, mucosa, and tongue normal; teeth and gums normal   Neck:   Supple, symmetrical,  no adenopathy;  thyroid:  normal   Back:     Symmetric, ROM normal, no CVA tenderness   Lungs:     Clear to auscultation bilaterally, respirations unlabored   CV:    Regular rate and rhythm   Abdomen:     Soft, non-tender, no masses, no organomegaly   Extremities:   Extremities normal, atraumatic, no cyanosis or edema   Pulses:   2+ and symmetric all extremities   Skin:   Skin color, texture, turgor normal, no rashes or lesions   Neurologic:   normal strength and tone throughout            "

## 2021-06-19 NOTE — PROGRESS NOTES
Kemi Soto  [address] [place] [id number]  [phone] (home)     Primary cardiologist:  Dr Hernandez  PCP:  Ange Hampton MD  Procedure:  CA poss PCI  H&P completed by:    Case MD:  PTK or CJP  Admit date and time:  8/1/18 6:30am  Case start time:   Ordering MD:  Dr Hernandez  Diagnosis:  CAD, abn nuclear stress test  Anticoagulation: None  CPAP: No  Bypass Grafts: Yes  Renal Issues: No  Allergies:   Allergies   Allergen Reactions     Oxycontin [Oxycodone] Nausea Only     Diabetic?: Yes  Device?: No     Angiogram Teaching    Reason for Visit:  Telephone call to discuss pre-procedure education in preparation for: CA poss PCI    Procedure Prep:  Cardiologist note dated: 5/29/18  EKG results obtained, dated: on admission  Pertinent test results obtained - Viewable in Epic, dated: Nuclear stress test 7/30/18, CA 3/30/16, CAB 12/11/2007 (in pt archive and [facility])  Hemogram results obtained: on admission  Basic Metabolic Panel results obtained: on admission  Lipid Profile results obtained: 7/26/18    Patient Education  Explained indications/risks for diagnostic evaluation, including one or more of the following:  left heart catheterization, right heart catheterization, coronary angiogram, left ventriculogram, percutaneous arteritomy closure, less than 0.1% of acute myocardial infarction and CVA or death or any of the following to the degree that it could threaten life:  allergic reaction, arrhythmia, renal failure, hemorrhage, thrombosis and infection  Explained indications/risks for therapeutic interventions, including one or more of the following: PTCA, artherectomy, stent, intravascular ultrasound, valvuloplasty, intraaortic balloon pump, 2% or less risk of MI, less than 1% risk of CVA, emergency heart surgery, death, less than 4 % risk of vascular injury requiring surgical repair or blood transfusion, 20-30% risk of restonosis with a bare metal stent, less than 10% risk of restonosis with a drug-eluting  stent, 0.5-1% chance of stent thrombosis and may need clopidogrel (Plavix) form greater than 1 year.  These risks are in addition to baseline risks associated with a Diagnostic Evaluation.  Patient states understanding of procedure and risks and agrees to proceed.    Pre-procedure instructions  Patient instructed to be NPO after midnight.  Patient instructed to arrange for transportation home following procedure.  Patient instructed to have a responsible adult with them for 24 hours post-procedure.  Post-procedure follow up process.  Conscious sedation discussed.      Pre-procedure medication instructions  Patient instructed on antiplatelet medication.  Continue medications as scheduled, with a small amount of water on the day of the procedure unless indicated.  Patient instructed to take 325 mg of Aspirin am of procedure: Yes  Other medication: instructed to hold multivitamin and metformin a.m. of the procedure.  Instructed to take half usual lantus dose 7/31/18 evening, and 8/1/18 morning.    *PATIENTS RECORDS AVAILABLE IN [facility] UNLESS OTHERWISE INDICATED*    *Order set was entered on this date: 7/31/18      Patient Active Problem List   Diagnosis     History of TIA (transient ischemic attack) and stroke     Essential hypertension     Mixed hyperlipidemia     Coronary artery disease     Back pain     Chalastodermia     Diabetes mellitus with polyneuropathy (H)     Type 2 diabetes mellitus with hyperglycemia, with long-term current use of insulin (H)     S/P CABG (coronary artery bypass graft)     Bipolar 2 disorder (H)     Anxiety disorder     BMI 36.0-36.9,adult     Noncompliance with medications     Psoriasis, guttate     History of atrial fibrillation     History of alcohol abuse     Benign adenomatous polyp of large intestine     Bilateral hearing loss     CAD (coronary artery disease)       Current Outpatient Prescriptions   Medication Sig Dispense Refill     atorvastatin (LIPITOR) 80 MG tablet Take 1 tablet  "(80 mg total) by mouth at bedtime. 90 tablet 3     blood glucose meter (GLUCOMETER) Use 1 each As Directed as needed. Dispense glucometer brand per patient's insurance at pharmacy discretion. Dx E11.69, Z79.4 1 each 0     blood glucose test (ACCU-CHEK SMARTVIEW TEST STRIP) strips Check blood sugar 3 times per day. Dispense brand per patient's insurance at pharmacy discretion. 100 strip 3     buPROPion (WELLBUTRIN) 75 MG tablet Take by mouth.       clobetasol (TEMOVATE) 0.05 % ointment Apply topically 2 (two) times a day. 30 g 5     clobetasol-emollient 0.05 % Crea        clopidogrel (PLAVIX) 75 mg tablet Take 1 tablet (75 mg total) by mouth daily. 90 tablet 3     gabapentin (NEURONTIN) 300 MG capsule Increase to 300 mg in am, 300 mg midday and 900 mg at HS as tolerated 450 capsule 3     generic lancets (ACCU-CHEK FASTCLIX) Check blood sugar 3 times per day. Dispense brand per patient's insurance at pharmacy discretion. 102 each 3     insulin glargine (LANTUS; BASAGLAR) 100 unit/mL (3 mL) pen 30 units in am and 20 units pm 5 adj dose pen 3     metFORMIN (GLUCOPHAGE XR) 500 MG 24 hr tablet Take 2 tablets twice daily (Patient taking differently: Take 500 mg by mouth at bedtime. ) 360 tablet 1     metoprolol succinate (TOPROL-XL) 25 MG TAKE ONE TABLET BY MOUTH ONCE DAILY 90 tablet 1     metoprolol succinate (TOPROL-XL) 25 MG TAKE ONE TABLET BY MOUTH ONCE DAILY 90 tablet 1     multivitamin with minerals tablet Take by mouth.       nitroglycerin (NITROSTAT) 0.4 MG SL tablet Place 1 tablet (0.4 mg total) under the tongue every 5 (five) minutes as needed for chest pain. 25 tablet 12     pen needle, diabetic (ULTICARE PEN NEEDLE) 32 gauge x 5/32\" Ndle Use 1 Device As Directed 2 (two) times a day. 200 each 3     sertraline (ZOLOFT) 100 MG tablet TAKE ONE TABLET (100 MG) BY MOUTH DAILY 90 tablet 3     traMADol (ULTRAM) 50 mg tablet TAKE 1 TABLET BY MOUTH EVERY 6 HOURS AS NEEDED FOR PAIN 30 tablet 0     No current " facility-administered medications for this visit.        Allergies   Allergen Reactions     Oxycontin [Oxycodone] Nausea Only       Plan  [place] will be drivers to and from.  Patient ready for procedure    EAMON POLK RN

## 2021-06-19 NOTE — LETTER
Letter by Wander Olivas MD at      Author: Wander Olivas MD Service: -- Author Type: --    Filed:  Encounter Date: 12/2/2019 Status: Signed         Kemi Soto  [address] [place] [id number]               December 2, 2019    Dear Kemi:    Our records indicate that you are due for a mammogram.    In the [place], one in nine women will develop breast cancer during their lifetime. While there is no way to prevent breast cancer, early detection provides the best opportunity for curing it.    For women over the age of 40, the American Cancer Society recommends a yearly clinical breast exam and a yearly mammogram. These practices have saved thousands of lives. We need your help to ensure that you are receiving optimal medical care.    Please make an appointment for a mammogram at your earliest convenience.[phone]  Sincerely,        Wander Olivas MD

## 2021-06-19 NOTE — LETTER
Letter by Wander Olivas MD at      Author: Wander Olivas MD Service: -- Author Type: --    Filed:  Encounter Date: 5/6/2019 Status: (Other)         [facility]/OB  05/06/19    Patient: Kemi Soto  YOB: 1950  Medical Record Number: [id number]                                                                  Opioid / Opioid Plus Controlled Substance Agreement    I understand that my care provider has prescribed an opioid (narcotic) controlled substance to help manage my condition(s). I am taking this medicine to help me function or work. I know this is strong medicine, and that it can cause serious side effects. Opioid medicine can be sedating, addicting and may cause a dependency on the drug. They can affect my ability to drive or think, and cause depression. They need to be taken exactly as prescribed. Combining opioids with certain medicines or chemicals (such as cocaine, sedatives and tranquilizers, sleeping pills, meth) can be dangerous or even fatal. Also, if I stop opioids suddenly, I may have severe withdrawal symptoms. Last, I understand that opioids do not work for all types of pain nor for all patients. If not helpful, I may be asked to stop them.        The risks, benefits, and side effects of these medicine(s) were explained to me. I agree that:    1. I will take part in other treatments as advised by my care team. This may be psychiatry or counseling, physical therapy, behavioral therapy, group treatment or a referral to a pain clinic. I will reduce or stop my medicine when my care team tells me to do so.  2. I will take my medicines as prescribed. I will not change the dose or schedule unless my care team tells me to. There will be no refills if I run out early.  I may be contactedwithout warning and asked to complete a urine drug test or pill count at any time.   3. I will keep all my appointments, and understand this is part of the monitoring of  opioids. My care team may require an office visit for EVERY opioid/controlled substance refill. If I miss appointments or dont follow instructions, my care team may stop my medicine.  4. I will not ask other providers to prescribe controlled substances, and I will not accept controlled substances from other people. If I need another prescribed controlled substance for a new reason, I will tell my care team within 1 business day.  5. I will use one pharmacy to fill all of my controlled substance prescriptions, and it is up to me to make sure that I do not run out of my medicines on weekends or holidays. If my care team is willing to refill my opioid prescription without a visit, I must request refills only during office hours, refills may take up to 3 days to process, and it may take up to 5 to 7 days for my medicine to be mailed and ready at my pharmacy. Prescriptions will not be mailed anywhere except my pharmacy.        [id number]  Rev 12/18         Registration to scan to EHR                             Page 1 of 2               Controlled Substance Agreement Opioid        [facility]/OB  05/06/19  Patient: Kemi Soto  YOB: 1950  Medical Record Number: [id number]                                                                  6. I am responsible for my prescriptions. If the medicine/prescription is lost or stolen, it will not be replaced. I also agree not to share controlled substance medicines with anyone.  7. I agree to not use ANY illegal or recreational drugs. This includes marijuana, cocaine, bath salts or other drugs. I agree not to use alcohol unless my care team says I may.          I agree to give urine samples whenever asked. If I dont give a urine sample, the care team may stop my medicine.    8. If I enroll in the [place] Medical Marijuana program, I will tell my care team. I will also sign an agreement to share my medical records with my care team.   9. I will  bring in my list of medicines (or my medicine bottles) each time I come to the clinic.   10. I will tell my care team right away if I become pregnant or have a new medical problem treated outside of my regular clinic.  11. I understand that this medicine can affect my thinking and judgment. It may be unsafe for me to drive, use machinery and do dangerous tasks. I will not do any of these things until I know how the medicine affects me. If my dose changes, I will wait to see how it affects me. I will contact my care team if I have concerns about medicine side effects.    I understand that if I do not follow any of the conditions above, my prescriptions or treatment may be stopped.      I agree that my provider, clinic care team, and pharmacy may work with any city, state or federal law enforcement agency that investigates the misuse, sale, or other diversion of my controlled medicine. I will allow my provider to discuss my care with or share a copy of this agreement with any other treating provider, pharmacy or emergency room where I receive care. I agree to give up (waive) any right of privacy or confidentiality with respect to these consents.     I have read this agreement and have asked questions about anything I did not understand.      ________________________________________________________________________  Patient signature - Date/Time -  Kemi Soto                                      ________________________________________________________________________  Witness signature                                                            ________________________________________________________________________  Provider signature - Wander Olivas MD      [id number]  Rev 12/18         Registration to scan to EHR                         Page 2 of 2                   Controlled Substance Agreement Opioid           Page 1 of 2  Opioid Pain Medicines (also known as Narcotics)  What You Need to Know    What are  opioids?   Opioids are pain medicines that must be prescribed by a doctor.  They are also known as narcotics.    Examples are:     morphine (MS Contin, Patsy)    oxycodone (Oxycontin)    oxycodone and acetaminophen (Percocet)    hydrocodone and acetaminophen (Vicodin, Norco)     fentanyl patch (Duragesic)     hydromorphone (Dilaudid)     methadone     What do opioids do well?   Opioids are best for short-term pain after a surgery or injury. They also work well for cancer pain. Unlike other pain medicines, they do not cause liver or kidney failure or ulcers. They may help some people with long-lasting (chronic) pain.     What do opioids NOT do well?   Opioids never get rid of pain entirely, and they do not work well for most patients with chronic pain. Opioids do not reduce swelling, one of the causes of pain. They also dont work well for nerve pain.                           For informational purposes only.  Not to replace the advice of your care provider.  Copyright [address] [facility]. All right reserved. [employer] 887203-Eyq 02/18.      Page 2 of 2    Risks and side effects   Talk to your doctor before you start or decide to keep taking one of these medicines. Side effects include:    Lowering your breathing rate enough to cause death    Overdose, including death, especially if taking higher than prescribed doses    Long-term opioid use    Worse depression symptoms; less pleasure in things you usually enjoy    Feeling tired or sluggish    Slower thoughts or cloudy thinking    Being more sensitive to pain over time; pain is harder to control    Trouble sleeping or restless sleep    Changes in hormone levels (for example, less testosterone)    Changes in sex drive or ability to have sex    Constipation    Unsafe driving    Itching and sweating    Feeling dizzy    Nausea, vomiting and dry mouth    What else should I know about opioids?  When someone takes opioids for too long or too often, they become  dependent. This means that if you stop or reduce the medicine too quickly, you will have withdrawal symptoms.    Dependence is not the same as addiction. Addiction is when people keep using a substance that harms their body, their mind or their relations with others. If you have a history of drug or alcohol abuse, taking opioids can cause a relapse.    Over time, opioids dont work as well. Most people will need higher and higher doses. The higher the dose, the more serious the side effects. We dont know the long-term effects of opioids.      Prescribed opioids aren't the best way to manage chronic pain    Other ways to manage pain include:      Ibuprofen or acetaminophen.  You should always try this first.      Treat health problems that may be causing pain.      acupuncture or massage, deep breathing, meditation, visual imagery, aromatherapy.      Use heat or ice at the pain site      Physical therapy and exercise      Stop smoking      See a counselor or therapist                                                  People who have used opioids for a long time may have a lower quality of life, worse depression, higher levels of pain and more visits to doctors.    Never share your opioids with others. Be sure to store opioids in a secure place, locked if possible.Young children can easily swallow them and overdose.     You can overdose on opioids.  Signs of overdose include decrease or loss of consciousness, slowed breathing, trouble waking and blue lips.  If someone is worried about overdose, they should call 911.    If you are at risk for overdose, you may get naloxone (Narcan, a medicine that reverses the effects of opioids.  If you overdose, a friend or family member can give you Narcan while waiting for the ambulance.  They need to know the signs of overdose and how to give Narcan.    While you're taking opioids:    Don't use alcohol or street drugs. Taking them together can cause death.    Don't take any of these  medicines unless your doctor says its okay.  Taking these with opioids can cause death.    Benzodiazepines (such as lorazepam         or diazepam)    Muscle relaxers (such as cyclobenzaprine)    sleeping pills    other opioids    Safe disposal of opioids  Find your area drug take-back program, your pharmacy mail-back program, buy a special disposal bag (such as Deterra) from your pharmacy or flush them down the toilet.  Use the guidelines at:  www.fda.gov/drugs/resourcesforyou

## 2021-06-19 NOTE — LETTER
Letter by Wander Olivas MD at      Author: Wander Olivas MD Service: -- Author Type: --    Filed:  Encounter Date: 6/27/2019 Status: (Other)         Kemi Soto  [address] [place] [id number]    June 27, 2019      Dear Kemi,    Breast cancer is the most common type of cancer among American women. The earlier breast cancer is detected, the better the survival rate. Your best defense against breast cancer is having a screening mammogram on a regular basis. The American Cancer Society recommends that women with an average risk of breast cancer should undergo regular screening mammography starting at age 45 years.           Women aged 45 to 54 years should have a mammogram annually.    Women 55 years and older should have a mammogram at least every other year up until the age of 74.    There may be important reasons for you to follow a more frequent screening plan or an earlier start for breast cancer screening, so please discuss your health history and your family health history with your primary care provider.       We reviewed our records and we do not see that you had a mammogram within the past two years. If you have not had a mammogram in the past two years, we strongly encourage you to call for your appointment today. For your convenience, we have included a phone number below for you to schedule your mammogram at a nearby [facility] location.      If you have had a recent mammogram or have questions about why you are receiving this letter, please contact your primary care clinic at [phone] or send a [employer] message  ([email or web address]). Your clinic will answer any questions or help obtain recent mammogram reports for your chart at [facility] so we can keep record of your preventive care.       Sincerely,    [facility] Care Team        [facility]  Schedule your mammogram today at one of our 7 locations  Please call  [phone]

## 2021-06-19 NOTE — LETTER
Letter by Wander Olivas MD at      Author: Wander Olivas MD Service: -- Author Type: --    Filed:  Encounter Date: 6/27/2019 Status: (Other)       Dear Kemi Soto ,    Breast cancer is the most common type of cancer among American women. The earlier breast cancer is detected, the better the survival rate. Your best defense against breast cancer is having a screening mammogram on a regular basis. The American Cancer Society recommends that women with an average risk of breast cancer should undergo regular screening mammography starting at age 45 years.           Women aged 45 to 54 years should have a mammogram annually.    Women 55 years and older should have a mammogram at least every other year up until the age of 74.    There may be important reasons for you to follow a more frequent screening plan or an earlier start for breast cancer screening, so please discuss your health history and your family health history with your primary care provider.       We reviewed our records and we do not see that you had a mammogram within the past two years. If you have not had a mammogram in the past two years, we strongly encourage you to call for your appointment today. For your convenience, we have included a phone number below for you to schedule your mammogram at a nearby [facility] location.      If you have had a recent mammogram or have questions about why you are receiving this letter, please contact your primary care clinic at [phone] or send a [employer] message  ([email or web address]). Your clinic will answer any questions or help obtain recent mammogram reports for your chart at [facility] so we can keep record of your preventive care.       Sincerely,    [facility] Care Team        [facility]  Schedule your mammogram today at one of our 7 locations  Please call  [phone]

## 2021-06-19 NOTE — PROGRESS NOTES
OV     7/26/2018  Assessment:     1. Type 2 diabetes mellitus with hyperglycemia, with long-term current use of insulin (H)  Glycosylated Hemoglobin A1c    insulin glargine (LANTUS; BASAGLAR) 100 unit/mL (3 mL) pen   2. Diabetes mellitus with polyneuropathy (H)     3. Essential hypertension  Comprehensive Metabolic Panel   4. Mixed hyperlipidemia  Comprehensive Metabolic Panel    Lipid Cascade RANDOM   5. Hand and foot pain  gabapentin (NEURONTIN) 300 MG capsule   6. Coronary artery disease involving native coronary artery of native heart without angina pectoris          Plan:         Non-fasting labs were drawn. Blood sugars are under improving, but still sub-optimal control. We reviewed her current medications and we will send a new Rx for basaglar in place of the lantus due to cost and I would like her to try changing to 30 units in am and 20 units in pm and continue the metformin 500 mg each am. She will otherwise continue the same pending additional lab results. We reviewed dietary recommendations, including low salt and high fiber diet, and recommendations for regular exercise/activity. As far as her neuropathy symptoms, I would like her to try adding neurontin 300 mg in am for several days, then either 600 mg in am or 300 am, 300 midday and 900 mg at HS. I'd like her to give me an update in a few weeks and we discussed potentially trying lyrica if the symptoms persist. As far as the tramadol, she will continue with that at HS prn. We discussed the potential for abuse or harm from misuse for both the tramadol and gabapentin, and we had her sign a treatment agreement today.    She will plan to follow up in 3-4 mos for repeat fasting labs and med check, sooner if any difficulties.        Subjective:     Diabetes      Kemi Soto is a 68 y.o. female who presents for follow-up of Type 2 diabetes mellitus. Compliance with treatment has been good, and compliance with diet has been good. Current symptoms/problems  "include hypoglycemia  and paresthesia of the feet. Patient denies increased appetite, nausea, visual disturbances and vomiting. Home sugars: recent AM sugars have been 70-90, occ 50's, BGs are high at bedtime, in 200s. Current monitoring regimen: home blood tests - 1-2 times daily. Any episodes of hypoglycemia? yes - occ symptomatic lows in ams recently. Weight trend: stable. Last dilated eye exam 6/2017.     Current diabetic medications include lantus and metformin. She has been holding her evening metformin dose due to the am lows recently. Current side effects: none. Prior visit with dietician: yes. Current diet: in general, a \"healthy\" diet  .  Current exercise: walking. Is she on ACE inhibitor or angiotensin II receptor blocker? No.          For her neuropathy and chronic leg pain, she takes neurontin 900 mg at HS. She continues to have a lot of daytime symptoms. She has not tried any daytime doses of neurontin. She also takes tramadol 1 tab at HS prn for more severe leg pain, and has been doing well with this for several mos. She denies any significant side effects from either the tramadol or neurontin.       She's had a bit of a stressful week with her granddaughter moving to [place] for school.     Fasting today? No  Hyperlipidemia & Hypertension      Patient is here for follow-up of hypertension and dyslipidemia. A repeat non-fasting lipid profile was done. Compliance with treatment has been good. Patient denies muscle pain associated with her medications.  Current symptoms: intermittent chest pain. She is scheduled for an stress test next week per Dr Hernandez. Patient denies dyspnea, exertional chest pressure/discomfort, irregular heart beat, lower extremity edema and near-syncope. The patient exercises intermittently.      Previous history of cardiac disease includes: CABG, coronary artery disease, coronary artery stent and history atrial fibrillation.        The following portions of the patient's history " "were reviewed and updated as appropriate: allergies, current medications, past family history, past medical history, past social history, past surgical history and problem list.      Review of Systems  Pertinent items are noted in HPI.          Objective:          Vitals:    07/26/18 1647 07/26/18 1700   BP: 160/66 162/62   Pulse: 80    Weight: 181 lb (82.1 kg)    Height: 5' 2\" (1.575 m)       Body mass index is 33.11 kg/(m^2).   General:    Alert, cooperative, no distress   Head:    Normocephalic, without obvious abnormality, atraumatic   Eyes:    PERRL, conjunctiva/corneas clear, EOM's intact    Ears:    Normal TM's and external ear canals, both ears   Nose:   Nares normal, mucosa normal, no drainage or sinus tenderness   Throat:   Lips, mucosa, and tongue normal; teeth and gums normal   Neck:   Supple, symmetrical,  no adenopathy;  thyroid:  normal   Back:     Symmetric, ROM normal, no CVA tenderness   Lungs:     Clear to auscultation bilaterally, respirations unlabored   CV:    Regular rate and rhythm   Abdomen:     Soft, non-tender, no masses, no organomegaly   Extremities:   Extremities normal, atraumatic, no cyanosis or edema   Pulses:   2+ and symmetric all extremities   Skin:   Skin color, texture, turgor normal, no rashes or lesions   Neurologic:   normal strength and tone throughout       Laboratory:  Results for orders placed or performed in visit on 07/26/18   Comprehensive Metabolic Panel   Result Value Ref Range    Sodium 137 136 - 145 mmol/L    Potassium 3.9 3.5 - 5.0 mmol/L    Chloride 103 98 - 107 mmol/L    CO2 22 22 - 31 mmol/L    Anion Gap, Calculation 12 5 - 18 mmol/L    Glucose 282 (H) 70 - 125 mg/dL    BUN 18 8 - 22 mg/dL    Creatinine 0.85 0.60 - 1.10 mg/dL    GFR MDRD Af Amer >60 >60 mL/min/1.73m2    GFR MDRD Non Af Amer >60 >60 mL/min/1.73m2    Bilirubin, Total 0.3 0.0 - 1.0 mg/dL    Calcium 9.1 8.5 - 10.5 mg/dL    Protein, Total 6.6 6.0 - 8.0 g/dL    Albumin 3.6 3.5 - 5.0 g/dL    Alkaline " Phosphatase 95 45 - 120 U/L    AST 17 0 - 40 U/L    ALT 16 0 - 45 U/L   Glycosylated Hemoglobin A1c   Result Value Ref Range    Hemoglobin A1c 8.8 (H) 3.5 - 6.0 %   Lipid Cascade RANDOM   Result Value Ref Range    Cholesterol 146 <=199 mg/dL    Triglycerides 191 (H) <=149 mg/dL    HDL Cholesterol 45 (L) >=50 mg/dL    LDL Calculated 63 <=129 mg/dL    Patient Fasting > 8hrs? No

## 2021-06-19 NOTE — LETTER
Letter by Wander Olivas MD at      Author: Wander Olivas MD Service: -- Author Type: --    Filed:  Encounter Date: 5/6/2019 Status: (Other)         Kemi Soto  [address] [place] [id number]             May 6, 2019         Dear Ms. Soto,    Below are the results from your recent visit:    Resulted Orders   Glycosylated Hemoglobin A1c   Result Value Ref Range    Hemoglobin A1c 9.1 (H) 3.5 - 6.0 %   HM2(CBC w/o Differential)   Result Value Ref Range    WBC 6.4 4.0 - 11.0 thou/uL    RBC 4.40 3.80 - 5.40 mill/uL    Hemoglobin 13.2 12.0 - 16.0 g/dL    Hematocrit 39.4 35.0 - 47.0 %    MCV 90 80 - 100 fL    MCH 30.0 27.0 - 34.0 pg    MCHC 33.5 32.0 - 36.0 g/dL    RDW 12.7 11.0 - 14.5 %    Platelets 390 140 - 440 thou/uL    MPV 7.9 7.0 - 10.0 fL             Blood count normal.  A1c is higher at 9.1%.  Do try to check blood sugars 3 times a day and get me information in the next week about what readings are like.          Please call with questions or contact us using [employer].    Sincerely,        Electronically signed by Wander Olivas MD

## 2021-06-19 NOTE — PROGRESS NOTES
DISCHARGE FOLLOW UP CALL    Discharge Date:  8/1/2018  Reason for hospital stay (discharge diagnosis)::  CAD, angiogram  Are you feeling better, the same or worse since your discharge?:  Patient is feeling the same  Do you feel like you have a plan in the event of a health emergency?: Yes    As part of your discharge plan, did they discuss home care with you?: No    Did you receive any new medications, or was there a change to your medications?: Yes    Are you taking those medications, or do you have any established regiment?:  Isosorbide 30 mg daily   Do you have any follow up visits scheduled with your PCP or Specialist?:  Yes, with Specialist (Pt states she is supposd to get a call from [facility] to set up a f/u appt to discuss angiogram results/plan; appt w.Dr. Hernandez was scheduled prior to angiogram.)  Who are you seeing and when is it scheduled?:  Dr. Hernandez - [facility]    8/29/2018  I'm glad to hear you're doing well and we want you to continue to do well. Your PCP would like to see you for a follow-up visit. Can we help set that up for your today?: No    (RN) Provided patient the PCP's phone number to call if they have any questions or concerns?: Yes        Pricilla Devries RN Care Manager, Population Health

## 2021-06-19 NOTE — PROGRESS NOTES
DIABETES EDUCATION CARE PLAN    Assessment/Plan:     Initial visit for diabetes education and counseling. Ciarra has not been testing her blood sugar. She misplaced her glucometer. Patient did not  meter or test strips prescribed in January. Provided an AccuChek Sadia meter at the end of the visit. Reviewed A1c, Blood sugar goals, eating and exercise habits and insulin injection technique. Instructed on symptoms and treatment of high blood sugar (above 200).    Patient does not seem motivated to change her lifestyle. She snacks on sweets regularly and does not exercise. Discussed that adding insulin with meals is the best option to control her blood sugars. Per IDC guidelines if blood sugars are not controlled with 0.6 units per kg (81.8 kg x 0.6=49 units Lantus/day) consider meal insulin. Ciarra is upset that her Lantus costs $90. Encouraged her to call her insurance to determine the lowest co-pay for insulin. I talked to the pharmacist at [facility] and they cannot tell which insulin is the lowest co-pay.    Current diabetes medications:   Metformin Extended Release 1000 mg twice per day (patient only takes 500 mg dose some of the time)  Lantus Solostar 28 units twice per day (56 units total per day)    Plan:  1. Test blood sugar 3 times per day (before breakfast, before dinner and bedtime).  2. Take Metformin as directed (2 tablets twice per day with food=1000 mg twice per day).  3. Avoid sweets. See healthy snack list.  4. Continue current Lantus dose. Only use the pen needle once.   5. Call insurance to check coverage for long acting and short acting insulin.  6. Bring meter to next visit 8/3/2018 at 2 pm.    Subjective and Objective:     Kemi Soto is a 68 y.o. female referred by Ange Hampton MD.  Accompanied by:  unaccompanied   Lives alone. Has 1 cat and 3 dogs  Diagnosed with Diabetes at age 50 (17 years)    Wt Readings from Last 3 Encounters:   05/29/18 179 lb 6.4 oz (81.4 kg)   04/24/18  178 lb (80.7 kg)   04/19/18 179 lb (81.2 kg)     Lab Results   Component Value Date    HGBA1C 9.1 (H) 04/27/2018     Physical Activity: walking. Hips hurt. 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  Breakfast: Banana, peanut butter toast (2 slices of whole wheat), coffee  Lunch: skips  Dinner: chicken, rice, potato  Snacks:cookies, trail mix, chocolate, ice cream    SMBG pattern/BG ranges: Does not test BG    Hypoglycemia: no symptoms per patient    EDUCATION RECORD    Monitoring   Meter: Assessed and Discussed and Competent  Monitoring: Discussed and Literature provided  BG goals: Discussed and Literature provided    Nutrition Management  Nutrition Management: Discussed  Behavior Change: Discussed  Weight: Assessed and Discussed  Portions/Balance: Assessed and Discussed  Carb ID/Count: Assessed  Label Reading: Not addressed  Heart Healthy Fats: Not addressed  Physical Activity: Assessed and Discussed  Oral diabetes medications: Assessed, Discussed and Literature provided  Injected Medications: Discussed   Storage/Exp:Not addressed   Site Rotation: Assessed and Discussed   Disposal of Sharps:Not addressed    Diabetes Disease Process: Discussed and Literature provided    Acute Complications: Prevent, Detect, Treat:  Hypoglycemia: Assessed and Literature provided  Hyperglycemia: Assessed and Discussed and Literature provided  Sick Days: Not addressed    Chronic Complications  Foot Care:Not addressed  Skin Care: Not addressed  Eye: Not addressed  ABC: Assessed, Discussed, Literature provided and Competent  Teeth:Not addressed  Goal Setting and Problem Solving: Discussed and Literature provided  Barriers: Assessed and Hard of hearing, history of anxiety and bipolar disease, short-term memory loss per patient, history of heart disease (stroke), history of alcohol abuse  Psychosocial  Adjustments: Assessed      Time spent with the patient: 60 minutes   Previous Education: yes  Visit Type:Medical Nutrition Therapy, Initial (71757)  Hours Remaining: DSMT 2 and MNT 1 for 2018  Diagnosis per referral:Type 2 Diabetes, uncontrolled E11.65 with long-term use of insulin Z79.4      Liv Contreras RD, LD, CDE  7/10/2018  2:05 PM

## 2021-06-19 NOTE — LETTER
Letter by Jefferson Martel CNP at      Author: Jefferson Martel CNP Service: -- Author Type: --    Filed:  Encounter Date: 11/19/2019 Status: Signed         Kemi Soto  [address] [place] [id number]             November 19, 2019         Dear Ms. Charles,    Below are the results from your recent visit:    Resulted Orders   Comprehensive Metabolic Panel   Result Value Ref Range    Sodium 138 136 - 145 mmol/L    Potassium 4.3 3.5 - 5.0 mmol/L    Chloride 102 98 - 107 mmol/L    CO2 25 22 - 31 mmol/L    Anion Gap, Calculation 11 5 - 18 mmol/L    Glucose 173 (H) 70 - 125 mg/dL    BUN 22 8 - 28 mg/dL    Creatinine 1.03 0.60 - 1.10 mg/dL    GFR MDRD Af Amer >60 >60 mL/min/1.73m2    GFR MDRD Non Af Amer 53 (L) >60 mL/min/1.73m2    Bilirubin, Total 0.5 0.0 - 1.0 mg/dL    Calcium 9.1 8.5 - 10.5 mg/dL    Protein, Total 6.4 6.0 - 8.0 g/dL    Albumin 3.5 3.5 - 5.0 g/dL    Alkaline Phosphatase 105 45 - 120 U/L    AST 13 0 - 40 U/L    ALT 13 0 - 45 U/L    Narrative    Fasting Glucose reference range is 70-99 mg/dL per  American Diabetes Association (ADA) guidelines.   Glycosylated Hemoglobin A1c   Result Value Ref Range    Hemoglobin A1c 8.5 (H) 3.5 - 6.0 %       Liver function and electrolytes look okay.  Kidney function took a mild dip and so we will want to keep an eye on this and adjust your medications as indicated.      Your A1c took a very small decrease which, while good, shows that you are still not at goal.  I am hesitant to make changes to your diabetic regimen today since you were not able to get any updated blood sugars because your meter was not working.  What I am thinking would be a good next step is for you to continue with your current diabetic medication regimen and write down your blood sugars and when you took them in a diary.  Then I would like for you to meet with our diabetic educator so that you can review those numbers and we can make medication adjustments wisely.  The diabetic educator  should be calling out to you shortly to connect.    Please call with questions or contact us using [employer].    Sincerely,         Jefferson Martel CNP

## 2021-06-20 NOTE — LETTER
Letter by Wander Olivas MD at      Author: Wander Olivas MD Service: -- Author Type: --    Filed:  Encounter Date: 12/13/2019 Status: Signed         Kemi Soto  [address] [place] [id number]      December 24, 2019      Dear Kemi,    As a valued [facility] patient, your healthcare needs are our priority.  Your health care team has determined that you are due for an appointment regarding your controlled substance medication, Tramadol..    Please schedule a visit within the next 30 days with Dr Olivas to ensure further refills of your Tramadol.      We look forward to partnering with you to achieve optimal health and wellbeing.    Sincerely,  Your care team at [facility] and [facility]

## 2021-06-20 NOTE — PROGRESS NOTES
ITP ASSESSMENT   Assessment Day: Initial  Session Number: 1  Precautions: Standard cardiac, low back pain, neuropathy, bipolar disorder  Diagnosis: Stent  Risk Stratification: High  Referring Provider: Kit Bean MD  EXERCISE  Exercise Assessment: Initial     6 Minute Walk Test   Pre   Pre Exercise HR: 68                  Pre Exercise BP: 148/62    Peak  Peak HR: 68                 Peak BP: 176/60  Peak feet: 1225  Peak O2 SAT: 97  Peak RPE: 14  Peak MPH: 2.32    Symptoms:  Peak Symptoms: 5-6/10 L hip/leg, low back pain    5 mins. Post  5 Min Post HR: 73  5 Min Post BP: 136/60                         Exercise Plan  Goals Next 30 days  ADL'S: Resume grocery shopping.  Leisure: Walk 10-20 mins, 2-3x/week.  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      Education Goals: Patient can state cardiac s/s and appropriate emergency response.  Education Goals Met: Has system for taking medication.;Medication review.    Exercise Prescription  Exercise Mode: Treadmill;Bike;Nustep;Arm Erg.;Stairs  Frequency: 2x/week  Duration: 30-45 mins  Intensity / THR: 20-30 beats above resting heart rate  RPE 11-14  Progression / Met level: 3-3.5  Resistive Training?: Yes    Current Exercise (mins/week): 5    Interventions  Home Exercise:  Mode: Walking  Frequency: 10-20 mins  Duration: 2-3x/week    Education Material : Educational videos;Provide written material;Individual education and counseling;Offer educational classes    Education Completed  Exercise Education Completed: Cardiac Anatomy;Signs and Symptoms;Medication review;RPE;Emergency Plan;Home Exercise;Warm up/cool down;FITT Principles;BP/HR Reponse to exercise;Benefits of Exercise;End point of exercise            Exercise Follow-up/Discharge  Follow up/Discharge: Encouraged pt to walk regularly at home, as she is not currently exercising. NUTRITION  Nutrition Assessment: Initial    Nutrition Risk Factors:  Nutrition Risk Factors:  "Diabetes;Dyslipidemia;Overweight  HbA1c: 8.8  Monitors blood sugar at home: Yes  Frequency: 1-2x/day  Cholesterol: 146  LDL: 63  HDL: 45  Triglycerides: 191    Nutrition Plan  Interventions  Other Nutrition Intervention: Diet Class;Therapist/Pt Discussion;Educational Videos;Provide with Written Material      Education Completed  Nutrition Education Completed: Risk factor overview    Goals  Nutrition Goals (Next 30 days): Patient can identify their risk factors for CAD;Patient will follow a low sodium diet;Patient will follow a low saturated fat diet;Patient knows appropriate portion size;Patient will lose weight    Goals Met  Nutrition Goals Met: Completed Nutritional Risk Screen;Provided Rate your Plate Survey;Reviewed Dietitian schedule    Height, Weight, and  BMI  Weight: 180 lb 12.8 oz (82 kg)  Height: 5' 2\" (1.575 m)  BMI: 33.06    Nutrition Follow-up  Follow-up/Discharge: Diet survey given.  Pt has seen DM educator regularly in the past.       Other Risk Factors  Other Risk Factor Assessment: Initial    HTN Risk Factor: Hypertension    Pre Exercise BP: 148/62  Post Exercise BP: 136/60    Hypertension Plan  Goals  HTN Goals: Follow low sodium diet;Exercises regularly    Goals Met  HTN Goals Met: Take medication as prescribed    HTN Interventions  HTN Interventions: Diet consult;Therapist/patient discussion;Provide written material;Offer educational videos;Offer educational classes    HTN Education Completed  HTN Education Completed: Medication review;Risk factor overview    Tobacco Risk Factor: NA        Risk Factor Follow-up   Follow-up/Discharge: Pt states she drinks two cups of coffee daily, and is not interested in decreasing coffee consumption.   PSYCHOSOCIAL  Psychosocial Assessment: Initial     [facility] Q of L Summary Score: 25    RUPAL-D Score: 18    Psychosocial Risk Factor: Stress    Psychosocial Plan  Interventions  MD letter sent to pt's PMD Dr. Hampton.  Interventions: Offer educational " videos and classes;Provide written material;Individual education and counseling      Goals  Goals (Next 30 days): [facility];Practicing stress management skills    Goals Met  Goals Met: Identified Support system;Oriented to stress management classes;Identify stressors    Psychosocial Follow-up  Follow-up/Discharge: Pt has bipolar disorder, and has seen a psychiatrist in the past.  She is trying to see him again soon.  Pt takes two meds for depression/anxiety.  She identifies a support system including her daughter and her Bible study group.           Patient involved in Goal setting?: Yes    Signature: _____________________________________________________________    Date: __________________    Time: __________________

## 2021-06-20 NOTE — LETTER
Letter by Mayco Zapata MD at      Author: Mayco Zapata MD Service: -- Author Type: --    Filed:  Encounter Date: 8/10/2020 Status: (Other)         [facility] FAMILY MEDICINE/OB  08/10/20    Patient: Kemi Soto  YOB: 1950  Medical Record Number: [id number]                                                                  Opioid / Opioid Plus Controlled Substance Agreement    I understand that my care provider has prescribed an opioid (narcotic) controlled substance to help manage my condition(s). I am taking this medicine to help me function or work. I know this is strong medicine, and that it can cause serious side effects. Opioid medicine can be sedating, addicting and may cause a dependency on the drug. They can affect my ability to drive or think, and cause depression. They need to be taken exactly as prescribed. Combining opioids with certain medicines or chemicals (such as cocaine, sedatives and tranquilizers, sleeping pills, meth) can be dangerous or even fatal. Also, if I stop opioids suddenly, I may have severe withdrawal symptoms. Last, I understand that opioids do not work for all types of pain nor for all patients. If not helpful, I may be asked to stop them.        The risks, benefits, and side effects of these medicine(s) were explained to me. I agree that:    1. I will take part in other treatments as advised by my care team. This may be psychiatry or counseling, physical therapy, behavioral therapy, group treatment or a referral to a pain clinic. I will reduce or stop my medicine when my care team tells me to do so.  2. I will take my medicines as prescribed. I will not change the dose or schedule unless my care team tells me to. There will be no refills if I run out early.  I may be contactedwithout warning and asked to complete a urine drug test or pill count at any time.   3. I will keep all my appointments, and understand this is part of the monitoring of opioids.  My care team may require an office visit for EVERY opioid/controlled substance refill. If I miss appointments or dont follow instructions, my care team may stop my medicine.  4. I will not ask other providers to prescribe controlled substances, and I will not accept controlled substances from other people. If I need another prescribed controlled substance for a new reason, I will tell my care team within 1 business day.  5. I will use one pharmacy to fill all of my controlled substance prescriptions, and it is up to me to make sure that I do not run out of my medicines on weekends or holidays. If my care team is willing to refill my opioid prescription without a visit, I must request refills only during office hours, refills may take up to 3 days to process, and it may take up to 5 to 7 days for my medicine to be mailed and ready at my pharmacy. Prescriptions will not be mailed anywhere except my pharmacy.        [id number]  Rev 12/18         Registration to scan to EHR                             Page 1 of 2               Controlled Substance Agreement Opioid        [facility] FAMILY MEDICINE/OB  08/10/20  Patient: Kemi Soto  YOB: 1950  Medical Record Number: [id number]                                                                  6. I am responsible for my prescriptions. If the medicine/prescription is lost or stolen, it will not be replaced. I also agree not to share controlled substance medicines with anyone.  7. I agree to not use ANY illegal or recreational drugs. This includes marijuana, cocaine, bath salts or other drugs. I agree not to use alcohol unless my care team says I may.          I agree to give urine samples whenever asked. If I dont give a urine sample, the care team may stop my medicine.    8. If I enroll in the [place] Medical Marijuana program, I will tell my care team. I will also sign an agreement to share my medical records with my care team.   9. I will bring in  my list of medicines (or my medicine bottles) each time I come to the clinic.   10. I will tell my care team right away if I become pregnant or have a new medical problem treated outside of my regular clinic.  11. I understand that this medicine can affect my thinking and judgment. It may be unsafe for me to drive, use machinery and do dangerous tasks. I will not do any of these things until I know how the medicine affects me. If my dose changes, I will wait to see how it affects me. I will contact my care team if I have concerns about medicine side effects.    I understand that if I do not follow any of the conditions above, my prescriptions or treatment may be stopped.      I agree that my provider, clinic care team, and pharmacy may work with any city, state or federal law enforcement agency that investigates the misuse, sale, or other diversion of my controlled medicine. I will allow my provider to discuss my care with or share a copy of this agreement with any other treating provider, pharmacy or emergency room where I receive care. I agree to give up (waive) any right of privacy or confidentiality with respect to these consents.     I have read this agreement and have asked questions about anything I did not understand.      ________________________________________________________________________  Patient signature - Date/Time -  Kemi Soto                                      ________________________________________________________________________  Witness signature                                                            ________________________________________________________________________  Provider signature - Mayco Zapata MD      [id number]  Rev 12/18         Registration to scan to EHR                         Page 2 of 2                   Controlled Substance Agreement Opioid           Page 1 of 2  Opioid Pain Medicines (also known as Narcotics)  What You Need to Know    What are opioids?    Opioids are pain medicines that must be prescribed by a doctor.  They are also known as narcotics.    Examples are:     morphine (MS Contin, Patsy)    oxycodone (Oxycontin)    oxycodone and acetaminophen (Percocet)    hydrocodone and acetaminophen (Vicodin, Norco)     fentanyl patch (Duragesic)     hydromorphone (Dilaudid)     methadone     What do opioids do well?   Opioids are best for short-term pain after a surgery or injury. They also work well for cancer pain. Unlike other pain medicines, they do not cause liver or kidney failure or ulcers. They may help some people with long-lasting (chronic) pain.     What do opioids NOT do well?   Opioids never get rid of pain entirely, and they do not work well for most patients with chronic pain. Opioids do not reduce swelling, one of the causes of pain. They also dont work well for nerve pain.                           For informational purposes only.  Not to replace the advice of your care provider.  Copyright 201 [facility]. All right reserved. [employer] [id number] 02/18.      Page 2 of 2    Risks and side effects   Talk to your doctor before you start or decide to keep taking one of these medicines. Side effects include:    Lowering your breathing rate enough to cause death    Overdose, including death, especially if taking higher than prescribed doses    Long-term opioid use    Worse depression symptoms; less pleasure in things you usually enjoy    Feeling tired or sluggish    Slower thoughts or cloudy thinking    Being more sensitive to pain over time; pain is harder to control    Trouble sleeping or restless sleep    Changes in hormone levels (for example, less testosterone)    Changes in sex drive or ability to have sex    Constipation    Unsafe driving    Itching and sweating    Feeling dizzy    Nausea, vomiting and dry mouth    What else should I know about opioids?  When someone takes opioids for too long or too often, they become dependent.  This means that if you stop or reduce the medicine too quickly, you will have withdrawal symptoms.    Dependence is not the same as addiction. Addiction is when people keep using a substance that harms their body, their mind or their relations with others. If you have a history of drug or alcohol abuse, taking opioids can cause a relapse.    Over time, opioids dont work as well. Most people will need higher and higher doses. The higher the dose, the more serious the side effects. We dont know the long-term effects of opioids.      Prescribed opioids aren't the best way to manage chronic pain    Other ways to manage pain include:      Ibuprofen or acetaminophen.  You should always try this first.      Treat health problems that may be causing pain.      acupuncture or massage, deep breathing, meditation, visual imagery, aromatherapy.      Use heat or ice at the pain site      Physical therapy and exercise      Stop smoking      See a counselor or therapist                                                  People who have used opioids for a long time may have a lower quality of life, worse depression, higher levels of pain and more visits to doctors.    Never share your opioids with others. Be sure to store opioids in a secure place, locked if possible.Young children can easily swallow them and overdose.     You can overdose on opioids.  Signs of overdose include decrease or loss of consciousness, slowed breathing, trouble waking and blue lips.  If someone is worried about overdose, they should call 911.    If you are at risk for overdose, you may get naloxone (Narcan, a medicine that reverses the effects of opioids.  If you overdose, a friend or family member can give you Narcan while waiting for the ambulance.  They need to know the signs of overdose and how to give Narcan.    While you're taking opioids:    Don't use alcohol or street drugs. Taking them together can cause death.    Don't take any of these medicines  unless your doctor says its okay.  Taking these with opioids can cause death.    Benzodiazepines (such as lorazepam         or diazepam)    Muscle relaxers (such as cyclobenzaprine)    sleeping pills    other opioids    Safe disposal of opioids  Find your area drug take-back program, your pharmacy mail-back program, buy a special disposal bag (such as Deterra) from your pharmacy or flush them down the toilet.  Use the guidelines at:  www.fda.gov/drugs/resourcesforyou

## 2021-06-20 NOTE — LETTER
Letter by Mayco Zapata MD at      Author: Mayco Zapata MD Service: -- Author Type: --    Filed:  Encounter Date: 8/14/2020 Status: (Other)         Kemi Soto  [address] [place] [id number]         August 24, 2020         Dear Ms. Soto,    Below are the results from your recent visit:    Resulted Orders   Microalbumin, Random Urine   Result Value Ref Range    Microalbumin, Random Urine 7.90 (H) 0.00 - 1.99 mg/dL    Creatinine, Urine 99.7 mg/dL    Microalbumin/Creatinine Ratio Random Urine 79.2 (H) <=19.9 mg/g    Narrative    Microalbumin, Random Urine  <2.0 mg/dL . . . . . . . . Normal  3.0-30.0 mg/dL . . . . . . Microalbuminuria  >30.0 mg/dL . . . . . .  . Clinical Proteinuria    Microalbumin/Creatinine Ratio, Random Urine  <20 mg/g . . . . .. . . . Normal   mg/g . . . . . . . Microalbuminuria  >300 mg/g . . . . . . . . Clinical Proteinuria                      Please inform the patient that her urine test showed some improvement in her kidneys.  There is less                microalbumin in her urine but it still elevated.  This improvement is compared to a year ago.              What helps prevent the kidney function from getting worse would be good blood pressure control, good             blood sugar control which would mean getting the A1c down below 8.  Also a low-salt diet and making               sure that she will avoid nonsteroidal anti-inflammatory medications such as ibuprofen and naproxen                 which are commonly used over-the-counter for pain.  Acetaminophen is fine to use for pain is that does             not harm the kidneys.               Work hard to try to get your blood sugars down and will need to see you back in the clinic in about 3                  months.         Please call with questions or contact us using [employer].    Sincerely,        Electronically signed by Mayco Zapata MD

## 2021-06-20 NOTE — PROGRESS NOTES
[facility] Progress Note    Assessment:  1.  Coronary artery disease as outlined below.  Most recent coronary stenting to the circumflex vessel.  She does have diffuse multivessel moderate to severe disease.  She is not experiencing significant angina.  We spent time today reviewing her medications and the importance of taking aspirin and Plavix.  I renewed her isosorbide mononitrate.  She will continue to participate in cardiac rehabilitation and update us with specific symptoms.  Pleased to hear she is been feeling well today.    2.  Remote history of atrial fibrillation.  The details are somewhat limited.  I did have her recently wore a two-week monitor that demonstrated no evidence for occult atrial fibrillation.  Her preference is to monitor for any irregular rhythm and not take additional anticoagulant agents which we reviewed again today.    3.  Dyslipidemia.  She now indicates that she is been taking the atorvastatin on a regular basis.  She had been missing medications previously.  In July 2018 her LDL was 63, triglycerides were 191 with normal liver function tests.    4.  Intermittent leg numbness.  She has some difficulty describing the symptoms.  We are going to plan an ankle-brachial index.  Carotid ultrasound in 2015 that demonstrated 50-69% left internal carotid artery stenosis.    Plan: As outlined above with plan JUDITH and follow-up in 3 months.    1. Pain in both lower extremities  US Ankle Brachial Indices Pre and Post Exercise   2. Coronary artery disease involving native coronary artery of native heart without angina pectoris  isosorbide mononitrate (IMDUR) 60 MG 24 hr tablet   3. History of TIA (transient ischemic attack) and stroke  isosorbide mononitrate (IMDUR) 60 MG 24 hr tablet   4. Essential hypertension  isosorbide mononitrate (IMDUR) 60 MG 24 hr tablet   5. Mixed hyperlipidemia  isosorbide mononitrate (IMDUR) 60 MG 24 hr tablet         An After Visit Summary was printed  and given to the patient.    Subjective:    Kemi Soto is a 68 y.o. female who returned for a planned  follow up visit.  She has a history of coronary artery disease with prior bypass surgery.  He had subsequent stenting in 2016 with a coronary angiogram at that time did demonstrate disease in the first diagonal and mid LAD with a cutting balloon angioplasty to the first diagonal and stenting to the mid LAD and drug-coated stent.  Left internal mammary artery to the LAD was patent with moderate disease in the left internal mammary artery, disease in the circumflex and mild in-stent restenosis.    She underwent subsequent stress testing with an abnormal stress echocardiogram which was technically limited and the subsequent nuclear stress test that suggested significant ischemia.  Subsequently underwent coronary angiography August 1, 2018.  Right and circumflex lesion were noted with small caliber distal vessels.  After review she subsequently underwent repeat angiography with Dr. Jimenez as outlined below with stenting to the circumflex in both the proximal and distal segments.    She reports no complaints of chest discomfort or shortness of breath.  She is participating in cardiac rehabilitation.  She does tell me she takes occasional Motrin and Aleve.  Asked her not to take nonsteroidal anti-inflammatory medications or aspirin and to utilize Tylenol.  She dooes report some intermittent numbness of her legs.  She tells me she is been trying to take the Plavix on a daily basis but at times misses a dose and I informed her of the importance of taking this combination of medications and its relationship to stent patency.  Review of Systems:   General: Weight Gain  Eyes: WNL  Ears/Nose/Throat: WNL  Lungs: WNL  Heart: WNL  Stomach: WNL  Bladder: Frequent Urination at Night  Muscle/Joints: WNL  Skin: WNL  Nervous System: WNL  Mental Health: WNL     Blood: WNL      Problem List:    Patient Active Problem List    Diagnosis     History of TIA (transient ischemic attack) and stroke     Essential hypertension     Mixed hyperlipidemia     Chalastodermia     Type 2 diabetes mellitus with hyperglycemia, with long-term current use of insulin (H)     S/P CABG (coronary artery bypass graft)     Bipolar 2 disorder (H)     Anxiety disorder     BMI 36.0-36.9,adult     Noncompliance with medications     Psoriasis, guttate     History of atrial fibrillation     Benign adenomatous polyp of large intestine     Bilateral hearing loss     Coronary artery disease of native artery of native heart with stable angina pectoris (H)       Social History     Social History     Marital status: Single     Spouse name: N/A     Number of children: 1     Years of education: N/A     Occupational History     Retired      Former [employer]      Social History Main Topics     Smoking status: Former Smoker     Quit date: 6/23/2007     Smokeless tobacco: Never Used     Alcohol use No      Comment: stopped drinking 9/2015     Drug use: No     Sexual activity: No     Other Topics Concern     Not on file     Social History Narrative    Lives alone with cats and dogs. , one daughter, Tory Lofton.        Family History   Problem Relation Age of Onset     Stroke Mother      Diabetes Father      Diabetes Sister      Stroke Sister 81     Stroke Brother      Diabetes Brother        Current Outpatient Prescriptions   Medication Sig Dispense Refill     aspirin 81 mg chewable tablet Chew 1 tablet (81 mg total) daily.  0     atorvastatin (LIPITOR) 80 MG tablet Take 1 tablet (80 mg total) by mouth at bedtime. 90 tablet 3     blood glucose meter (GLUCOMETER) Use 1 each As Directed as needed. Dispense glucometer brand per patient's insurance at pharmacy discretion. Dx E11.69, Z79.4 1 each 0     blood glucose test (ACCU-CHEK SMARTVIEW TEST STRIP) strips Check blood sugar 3 times per day. Dispense brand per patient's insurance at pharmacy  "discretion. 100 strip 3     buPROPion (WELLBUTRIN) 75 MG tablet Take 75 mg by mouth daily.        clopidogrel (PLAVIX) 75 mg tablet Take 1 tablet (75 mg total) by mouth daily. 90 tablet 3     gabapentin (NEURONTIN) 300 MG capsule Increase to 300 mg in am, 300 mg midday and 900 mg at HS as tolerated (Patient taking differently: Increase to 600 mg in am, 300 mg midday and 900 mg at HS as tolerated) 450 capsule 3     generic lancets (ACCU-CHEK FASTCLIX) Check blood sugar 3 times per day. Dispense brand per patient's insurance at pharmacy discretion. 102 each 3     ibuprofen (ADVIL,MOTRIN) 200 MG tablet Take 600 mg by mouth every 8 (eight) hours as needed for pain.       insulin glargine (LANTUS; BASAGLAR) 100 unit/mL (3 mL) pen Inject 28 Units under the skin 2 (two) times a day. Pt only took 14 units last evening. 1/2 normal dose       isosorbide mononitrate (IMDUR) 60 MG 24 hr tablet Take 1 tablet (60 mg total) by mouth daily. 90 tablet 3     metFORMIN (GLUCOPHAGE XR) 500 MG 24 hr tablet Take 1 tablet twice daily 60 tablet 0     metoprolol succinate (TOPROL-XL) 25 MG TAKE ONE TABLET BY MOUTH ONCE DAILY 90 tablet 1     nitroglycerin (NITROSTAT) 0.4 MG SL tablet Place 1 tablet (0.4 mg total) under the tongue every 5 (five) minutes as needed for chest pain. 25 tablet 12     pen needle, diabetic (ULTICARE PEN NEEDLE) 32 gauge x 5/32\" Ndle Use 1 Device As Directed 2 (two) times a day. 200 each 3     sertraline (ZOLOFT) 100 MG tablet TAKE ONE TABLET (100 MG) BY MOUTH DAILY 90 tablet 3     traMADol (ULTRAM) 50 mg tablet TAKE 1 TABLET BY MOUTH EVERY 6 HOURS AS NEEDED FOR PAIN 30 tablet 0     clobetasol (TEMOVATE) 0.05 % ointment Apply topically 2 (two) times a day. 30 g 5     multivitamin with minerals tablet Take by mouth daily.        No current facility-administered medications for this visit.        Objective:     /56 (Patient Site: Right Arm, Patient Position: Sitting, Cuff Size: Adult Large)  Pulse 60  Resp 16  " "Ht 5' 2\" (1.575 m)  Wt 185 lb (83.9 kg)  BMI 33.84 kg/m2  185 lb (83.9 kg)       Physical Exam:    GENERAL APPEARANCE: alert, no apparent distress  HEENT: no scleral icterus or xanthelasma  NECK: jugular venous pressure within normal limits  CHEST: symmetric, the lungs are clear to auscultation  CARDIOVASCULAR: regular rhythm with soft systolic murmur.; no carotid bruits  Abdomen: No Organomegaly, masses, bruits, or tenderness. Bowels sounds are present      EXTREMITIES: no cyanosis, clubbing or edema    Cardiac Testing:  Indications      CAD (coronary artery disease)       Conclusion        The exercise nuclear stress test is abnormal.    There is a large area of ischemia in the anterior, lateral, apical, anterolateral and inferolateral segment(s) of the left ventricle.    The stress electrocardiogram was abnormal with 2 mm of downslopping ST segment depression in the II, III, aVF, V4, V5 and V6 leads, beginning at 3.39 minutes of stress, and returning to baseline after 8 minutes into the recovery phase    Increased stress to rest cavity ratio of 1.5 is consistent with diffuse subendocardial ischemia.    The patient's exercise capacity is mildly impaired.    The patient reported dyspnea during exercise.    The left ventricular ejection fraction is 59%.    There is no prior study available.    The patient is at a high risk of future cardiac ischemic events.    The findings of this examination were communicated to Dr. Hernandez.         Conclusion           Estimated blood loss was <20 ml.    A STENT SYNERGY MR 2.25X28 drug eluting stent was successfully placed.    Prox Cx lesion 90% stenosed.    Dist Cx lesion 80% stenosed.    A STENT SYNERGY MR 2.25X20 drug eluting stent was successfully placed.    A STENT SYNERGY MR 2.25X8 drug eluting stent was successfully placed.    LM lesion 60% stenosed.      Manual removal of RFA sheah  Unable to access right radial artery succesfully  Overnight observation AM discharge "     Conclusion           Estimated blood loss was <20 ml.    Prox Cx to Mid Cx lesion 90% stenosed.    Lat 1st Mrg-2 lesion 80% stenosed.    Lat 1st Mrg-1 lesion 90% stenosed.    Mid Cx lesion 80% stenosed.    Ost LAD to Prox LAD lesion 40% stenosed.    A drug eluting .    Ost Cx to Prox Cx lesion 35% stenosed.    A drug eluting .    Ost 1st Diag lesion 50% stenosed.    1st Mrg lesion 70% stenosed.    Mid LAD lesion 100% stenosed.    Ost RCA to Mid RCA lesion 30% stenosed.    Mid RCA lesion 70% stenosed.    Post Atrio lesion 40% stenosed.    Ost RPDA to RPDA lesion 40% stenosed.    The left ventricular size is normal. The left ventricular systolic function is normal. LV systolic pressure is normal. LV end diastolic pressure is normal. There are no wall motion abnormalities in the left ventricle.    Dist LAD-1 lesion 50% stenosed.    Dist LAD-2 lesion 70% stenosed.      Patient has 2 previous saphenous vein grafts known to be occluded.     Both right coronary and circumflex have significant lesions present.  Both precede small caliber distal vessels.  Options would include partial revascularization by stenting right coronary and circumflex with small caliber stents.  Circumflex has a relatively long segment of disease.  Alternative would be continued medical management.              Lab Results:    Lab Results   Component Value Date     08/14/2018    K 3.9 08/14/2018     (H) 08/14/2018    CO2 26 08/14/2018    BUN 18 08/14/2018    CREATININE 0.76 08/14/2018    CALCIUM 9.1 08/14/2018     Lab Results   Component Value Date    CHOL 146 07/26/2018    TRIG 191 (H) 07/26/2018    HDL 45 (L) 07/26/2018    LDLDIRECT 144 (H) 12/18/2017     No results found for: BNP  Creatinine (mg/dL)   Date Value   08/14/2018 0.76   08/13/2018 1.15 (H)   07/26/2018 0.85   04/27/2018 0.79     LDL Calculated (mg/dL)   Date Value   07/26/2018 63   04/27/2018 72   04/27/2018 72     Lab Results   Component Value Date    WBC 8.4  08/13/2018    WBC 6.1 06/23/2015    HGB 11.8 (L) 08/14/2018    HCT 37.5 08/13/2018    MCV 88 08/13/2018     08/13/2018               This note has been dictated using voice recognition software. Any grammatical or context distortions are unintentional and inherent to the software.      Chuck Hernandez M.D., F.A.C.C.  [facility]  [phone]

## 2021-06-20 NOTE — PROGRESS NOTES
Kemi Soto has participated in 10 sessions of Phase II Cardiac Rehab.    Progress Report:   Cardiac Rehab Treatment Progress Report 9/20/2018 9/25/2018 9/27/2018 10/4/2018 10/9/2018   Weight 181 lbs 181 lbs 181 lbs 181 lbs 182 lbs   Pre Exercise  HR 76 75 68 74 79   Pre Exercise /60 128/70 128/72 110/60 136/50   Pre Blood Sugar (mg/dl) 278 158 259 181 308   Treadmill Peak HR 99 98 93 93 -   Treadmill Peak Blood Pressure - - 160/72 202/70 -   Nustep Peak Heart Rate 83 89 83 86 87   Nustep Peak Blood Pressure 168/70 150/68 - 170/72 168/70   Heart Rate 74 80 70 71 75   Post Exercise /64 114/72 126/78 126/70 130/64   Post Blood Sugar (mg/dl) 198 305 233 150 241   ECG SR SR SR SR w/upslopping in inferior leads SR   Total Exercise Minutes 32 35 35 35 25         Current Status:  Therapists Comments: Patient was hypertensive on treadmill on 10/4.    If Physician recommends change in treatment plan, please place orders.        __________________________________________________      _____________  Signature                                                                                                  Date/Time

## 2021-06-20 NOTE — PROGRESS NOTES
ITP ASSESSMENT   Assessment Day: 30 Day  Session Number: 5  Precautions: Standard cardiac, low back pain, neuropathy, bipolar disorder  Diagnosis: Stent  Risk Stratification: High  Referring Provider: Ange Hampton,*   ITP:Dr. Christine  EXERCISE  Exercise Assessment: Reassessment                         Exercise Plan  Goals Next 30 days  ADL'S: Pt tolerating home activities. Pt to continue to work on her heart healthy eating habits.  Leisure: Tolerate walking 20-30 min 3x/week  Work: Tolerate yardwork-pulling weeds without symptoms    Education Goals: All goals in this section met  Education Goals Met: Patient can state cardiac s/s and appropriate emergency response.;Has system for taking medication.;Medication review.                        Goals Met  Initial ADL's goals met: Pt tolerating grocery shopping without symptoms  Initial Leisure goals met: Pt walking 2x/week for about 20 min.  Intial Work goals met: Pt tolerating taking therapy dog to nursing homes.  Initial Progression: Pt states she feels she is progressing well. Pt has reached 2.5 MET level    Exercise Prescription  Exercise Mode: Treadmill;Bike;Nustep;Arm Erg.;Stairs  Frequency: 2x/week  Duration: 30-45  Intensity / THR: 20-30 beats above resting heart rate  RPE 11-14  Progression / Met level: 3-3.5  Resistive Training?: Yes    Current Exercise (mins/week): 90    Interventions  Home Exercise:  Mode: walking  Frequency: 20-30 min  Duration: 2-3x/week    Education Material : Educational videos;Provide written material;Individual education and counseling;Offer educational classes    Education Completed  Exercise Education Completed: Cardiac Anatomy;Signs and Symptoms;Medication review;RPE;Emergency Plan;Home Exercise;Warm up/cool down;FITT Principles;BP/HR Reponse to exercise;Benefits of Exercise;End point of exercise            Exercise Follow-up/Discharge  Follow up/Discharge: Pt is walking 2x/week on non-rehab days          "NUTRITION  Nutrition Assessment: Reassessment    Nutrition Risk Factors:  Nutrition Risk Factors: Diabetes;Dyslipidemia;Overweight  HbA1c: 8.8  Monitors blood sugar at home: Yes  Frequency: 1-2x/day  Cholesterol: 146  LDL: 63  HDL: 45  Triglycerides: 191    Nutrition Plan  Interventions    Other Nutrition Intervention: Diet Class;Therapist/Pt Discussion;Provide with Written Material    Education Completed  Nutrition Education Completed: Low Saturated fat diet;Low sodium diet    Goals  Nutrition Goals (Next 30 days): Patient can identify their risk factors for CAD    Goals Met  Nutrition Goals Met: Completed Nutritional Risk Screen;Provided Rate your Plate Survey;Reviewed Dietitian schedule;Patient follows a low sodium diet    Height, Weight, and  BMI  Weight: 182 lb (82.6 kg)  Height: 5' 2\" (1.575 m)  BMI: 33.28    Nutrition Follow-up  Follow-up/Discharge: Pt states she is working on eating more heart healthy-less fat/chol choices.     Other Risk Factors  Other Risk Factor Assessment: Reassessment    HTN Risk Factor: Hypertension    Pre Exercise BP: 138/56  Post Exercise BP: 138/66    Hypertension Plan  Goals  HTN Goals: Patient demonstrates understanding of HTN, no goals identified for the next 30 days    Goals Met  HTN Goals Met: Take medication as prescribed;Exercises regularly;Follow low sodium diet    HTN Interventions  HTN Interventions: Diet consult;Therapist/patient discussion;Provide written material;Offer educational videos;Offer educational classes    HTN Education Completed  HTN Education Completed: Low sodium diet;Medication review;Risk factor overview    Tobacco Risk Factor: NA      Risk Factor Follow-up   Follow-up/Discharge: Pt states she drinks two cups of coffee daily, and is not interested in decreasing coffee consumption.       PSYCHOSOCIAL  Psychosocial Assessment: Reassessment     [place] COOP Q of L Summary Score: 25    RUPAL-D Score: 18    Psychosocial Risk Factor: Stress    Psychosocial " Plan  Interventions  Interventions: Offer educational videos and classes;Provide written material;Individual education and counseling    Education Completed  Education Completed: Relaxation/Coping Techniques    Goals  Goals (Next 30 days): Improvement in [place] CO[place] score    Goals Met  Goals Met: Practicing stress management skills;Identified Support system;Oriented to stress management classes;Identify stressors    Psychosocial Follow-up  Follow-up/Discharge: Pt has bipolar disorder, and has seen a psychiatrist in the past.  She is trying to see him again soon.  Pt takes two meds for depression/anxiety.  She identifies a support system including her daughter and her Bible study group.         Patient involved in Goal setting?: Yes    Signature: _____________________________________________________________    Date: __________________    Time: __________________

## 2021-06-20 NOTE — PROGRESS NOTES
Cardiac Rehab  Phase II Assessment    Assessment Date: 8/21/18    Diagnosis: CAD  Date of Onset: 2007  Procedure: PCI with STEVENSON x 3  Date of Onset: 8/13/18  ICD/Pacemaker: No Parameters: NA  Post-op Complications: none  ECG History: SR, PVCs, PAF EF%:59 per stress test 7/30/18  Past Medical History:  has a past medical history of Coronary artery disease; Diabetes mellitus (H); Discitis of thoracolumbar region (10/10/2015); Encephalopathy (10/14/2015); Epidural abscess (10/10/2015); Hip pain, right; Hyperlipidemia; Sepsis (H) (10/8/2015); Stroke (H) (06/23/2015); TIA (transient ischemic attack) (6/23/2015); and UTI (urinary tract infection).; quit smoking in 2007; neuropathy; arthritis; coffee- 2 cups/day     Past Surgical History:   Procedure Laterality Date     CARPAL TUNNEL RELEASE Bilateral      CORONARY ANGIOPLASTY WITH STENT PLACEMENT  03/30/2016    Drug eluting stent mid LAD, cutting balloon to 1st diagonal     CORONARY ANGIOPLASTY WITH STENT PLACEMENT  2008     CORONARY ARTERY BYPASS GRAFT  12/11/2007    X 3 vessels, LIMA to LAD, saph vein graft to distal RCA, saphvein graft to marginal, mini circuit bypass.  [facility].     CORONARY STENT PLACEMENT  2008    Left main, left circumflex, RCA     CORONARY STENT PLACEMENT  03/2016     CV CORONARY ANGIOGRAM N/A 8/1/2018    Procedure: Coronary Angiogram;  Surgeon: Kit Bean MD;  Location: [facility] Cath Lab;  Service:      CV LEFT HEART CATHETERIZATION WITH LEFT VENTRICULOGRAM N/A 8/1/2018    Procedure: Left Heart Catheterization with Left Ventriculogram;  Surgeon: Kit Bean MD;  Location: [facility] Cath Lab;  Service:      INSERT MIDLINE HE  10/31/2015          LUMBAR DISCECTOMY N/A 10/11/2015    Procedure: BILATERAL L1-L5 DECOMPRESSIVE LAMINECTOMY WITH EVACUATION OF EPIDURAL ABSCESS IRRIGATION & DEBRIDEMENT;  Surgeon: Luli Chan MD;  Location: [facility] OR;  Service:      PICC  10/19/2015            Physical  Assessment  Precautions/ Physical Limitations: Standard cardiac sx, low back pain, L hip pain,neuropathy, bipolar disorder  Oxygen: No  O2 Sats: 97 Lung Sounds: clear Edema: none  Incisions: none  Sleeping Pattern: fair   Appetite: good   Nutrition Risk Screen: Completed.    Pain  Location: B hands, low back  Characteristics:pain/tingling  Intensity: (0-10 scale) 6  Current Pain Management: meds  Intervention: Tylenol, gabapentin  Response: decreases to 1-3/10    Psychosocial/ Emotional Health  1. In the past 12 months, have you been in a relationship where you have been abused physically, emotionally, sexually or financially? No  notified: NA  2. Who do you turn to for emotional support?: daughter, friends  3. Do you have cultural or spiritual needs? No  4. Have there been any major life changes in the past 12 months? Yes- daughter and her family moved away; granddaughter started college     Referral Information  Primary Physician: Ange Hampton MD  Cardiologist: Dr. Chuck Hernandez  Surgeon: Dr. Kit Bean    Home exercise/Equipment: none    Patient's long-term goal(s): Resume regular exercise, improve strength and stamina    1. Living Accommodations: Home Steps: Yes      Support people at home: no   2. Marital Status: single  3. 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 Recreation/Hobbies: Walking, swimming, Bible study

## 2021-06-21 NOTE — PROGRESS NOTES
ITP ASSESSMENT   Assessment Day: 90 Day    Session Number: 16  Precautions: standard, LBP, neuropathy, bipolar disorder    Diagnosis: Stent    Risk Stratification: High    Referring Provider: Ange Hampton,*   ITP sent to Dr. Christine  EXERCISE  Exercise Assessment: Reassessment                         Exercise Plan  Goals Next 30 days  ADL'S: Independent    Leisure: Start attending yoga, start weights in cardiac rehab to increase upper body strength    Work: Independent      Education Goals: All goals in this section met    Education Goals Met: Patient can state cardiac s/s and appropriate emergency response.;Has system for taking medication.;Medication review.                          Goals Met  60 day ADL'S goals met: Tolerated putting away lawn furniture    60 day Leisure goals met: Has not attended yoga    60 day Work goals met: Tolerated going through household things and packing up things to get rid of    60 Day Progression: Patient progressed to 2.2-3.1 METs      30 day ADL'S goals met: Pt states she is tolerating her regular home activities and feels she is doing better with her heart healthy diet    30 day Leisure goals met: Pt is trying to walk 2x/week for 20 min    30 day Work goals met: Pt has been doing some light weed pulling and tolerating it.    30 Day Progression: Pt has reached 2.8 MET level and is exercising more consistently at home      Initial ADL's goals met: Pt tolerating grocery shopping without symptoms    Initial Leisure goals met: Pt walking 2x/week for about 20 min.    Intial Work goals met: Pt tolerating taking therapy dog to nursing homes.    Initial Progression: Pt states she feels she is progressing well. Pt has reached 2.5 MET level      Exercise Prescription  Exercise Mode: Treadmill;Bike;Nustep;Arm Erg.;Stairs    Frequency: 2x/week    Duration: 40-45 min.    Intensity / THR: 20-30 beats above resting heart rate    RPE 11-14  Progression / Met level: 3.2-4    Resistive  "Training?: Yes      Current Exercise (mins/week): 100      Interventions  Home Exercise:  Mode: walking, staairs    Frequency: 20-30 min.    Duration: 2-3x/week      Education Material : Educational videos;Provide written material;Individual education and counseling;Offer educational classes      Education Completed  Exercise Education Completed: Cardiac Anatomy;Signs and Symptoms;Medication review;RPE;Emergency Plan;Home Exercise;Warm up/cool down;FITT Principles;BP/HR Reponse to exercise;Benefits of Exercise;End point of exercise              Exercise Follow-up/Discharge  Follow up/Discharge: Patient has progressed to 3.1 METs on treadmill   NUTRITION  Nutrition Assessment: Reassessment      Nutrition Risk Factors:  Nutrition Risk Factors: Diabetes;Dyslipidemia;Overweight  HbA1c: 8.8  Monitors blood sugar at home: Yes  Frequency: 1-2x/day  Cholesterol: 146  LDL: 63  HDL: 45  Triglycerides: 191      Nutrition Plan  Interventions  Other Nutrition Intervention: Therapist/Pt Discussion    Education Completed  Nutrition Education Completed: Low Saturated fat diet;Low sodium diet      Goals  Nutrition Goals (Next 30 days): Patient knows appropriate portion size      Goals Met  Nutrition Goals Met: Patient follows a low sodium diet;Patient states following a low saturated fat diet      Height, Weight, and  BMI  Weight: 181 lb (82.1 kg)  Height: 5' 2\" (1.575 m)  BMI: 33.1      Nutrition Follow-up  Follow-up/Discharge: Pt recently met with diabetic educator, plan to meet with dietician 11/27         Other Risk Factors  Other Risk Factor Assessment: Reassessment      HTN Risk Factor: Hypertension      Pre Exercise BP: 112/48  Post Exercise BP: 136/68      Hypertension Plan  Goals  HTN Goals: Patient demonstrates understanding of HTN, no goals identified for the next 30 days      Goals Met  HTN Goals Met: Take medication as prescribed;Exercises regularly;Follow low sodium diet      HTN Interventions  HTN Interventions: Diet " consult;Therapist/patient discussion;Provide written material;Offer educational videos;Offer educational classes      HTN Education Completed  HTN Education Completed: Low sodium diet;Medication review;Risk factor overview      Tobacco Risk Factor: NA    Risk Factor Follow-up   Follow-up/Discharge: Pt out of rehab, will address risk factors next session.     PSYCHOSOCIAL  Psychosocial Assessment: Reassessment    Psychosocial Risk Factor: Stress      Psychosocial Plan  Interventions  Interventions: Offer educational videos and classes;Provide written material;Individual education and counseling      Education Completed  Education Completed: Relaxation/Coping Techniques      Goals  Goals (Next 30 days): Improvement in [employer] CO[employer] score      Goals Met  Goals Met: Identify stressors;Practicing stress management skills      Psychosocial Follow-up  Follow-up/Discharge: Patient is using prayer, meditation, bible study and volunteeering for stress management             Patient involved in Goal setting?: Yes      Signature: _____________________________________________________________    Date: __________________    Time: __________________

## 2021-06-21 NOTE — PROGRESS NOTES
Dr. Chuck Hernandez referring directly to Dr. Castro; US done on 10/25/18 which showed RLE Mildly decreased resting ankle-brachial index with persistently decreased post exercise ankle-brachial indices. LLE Decreased resting ankle-brachial indices with abnormal response to exercise. These findings can be seen in the setting of exercise-induced claudication. Numbness of both lower extremities. She noticed it when she was driving 6 hours and she had numbess. She has occasional pain at night.  HTN, DM2. ASA, statin, Plavix, insulin, metformin.

## 2021-06-21 NOTE — PROGRESS NOTES
VASCULAR SURGERY CLINIC CONSULTATION    VASCULAR SURGEON: Shelbie Castro MD    LOCATION:  [facility]    Kemi Soto   Medical Record #:  [id number]  YOB: 1950  Age:  68 y.o.     Date of Service: 10/31/2018    PRIMARY CARE PROVIDER: Ange Hampton MD      Reason for visit: Evaluation for numbness in her legs.    IMPRESSION: Patient with numbness that developed while she was driving fairly long distance.  Occasional similar symptoms in the right leg when in bed.  Clearly absolutely no symptoms induced by activity or walking and no calf discomfort.  ABIs suggest PAD, but palpable popliteal pulses suggest that this is a tibial vessel level.  No foot wounds or other clear indication to intervene for this.  Recently underwent coronary stenting for active coronary disease.  Feels that she still occasionally gets twinges in her chest but is overall doing fairly well.  Quit smoking 11 years ago.  On Plavix statin and aspirin.  Patient also has a history of epidural abscess, prior stroke, and psychiatric disease.    RECOMMENDATION/RISKS: This patient has diabetic distribution PAD but is essentially asymptomatic from it.  Her symptoms are most consistent with some form of neurogenic discomfort, perhaps intermittent radiculopathy or compression from sitting a long time.  Follow-up with me on a as needed basis.  Agree with plan for carotid duplex given her strong family history.    HPI:  Kemi Soto is a 68 y.o. female who was seen today in consultation for leg discomfort.  Her symptoms are actually numbness in her right entire leg that she noticed most significantly on a long drive to [place].  She was able to get out of the car and walk around suggesting that this was not acute limb ischemia.  Her main concern is that she has heard of others having this problem she has not ever had it before in her past and has driven long distances including to and from [place].  She has had more  minor episodes that are fairly similar when lying in bed occasionally.  She strongly denies the symptoms being brought on by walking any distance.  When she does walk she does not really get discomfort.    The patient has a family history of stroke in her mother and brother.  She is currently scheduled for a carotid duplex but she has not yet made the appointment.    Patient has a remote history of epidural abscess in 2015.  She does not not appear to recall much of this.  She also has a diagnosis of TIA around that time as well for which she has no contributing information.  Lastly she does have some psychiatric history.      Other than the coronary stents no new health issues.    REVIEW OF SYSTEMS:    A 12 point ROS was reviewed and except for what is listed in the HPI above, all others are negative    PHH:    Past Medical History:   Diagnosis Date     Coronary artery disease      Diabetes mellitus (H)      Discitis of thoracolumbar region 10/10/2015     Encephalopathy 10/14/2015     Epidural abscess 10/10/2015     Hip pain, right      Hyperlipidemia      Sepsis (H) 10/8/2015     Stroke (H) 06/23/2015    Neurology felt that episode was C/W subacute ischemic stroke     TIA (transient ischemic attack) 6/23/2015     UTI (urinary tract infection)     admitted to [facility] with UTI        Past Surgical History:   Procedure Laterality Date     CARPAL TUNNEL RELEASE Bilateral      CORONARY ANGIOPLASTY WITH STENT PLACEMENT  03/30/2016    Drug eluting stent mid LAD, cutting balloon to 1st diagonal     CORONARY ANGIOPLASTY WITH STENT PLACEMENT  2008     CORONARY ARTERY BYPASS GRAFT  12/11/2007    X 3 vessels, LIMA to LAD, saph vein graft to distal RCA, saphvein graft to marginal, mini circuit bypass.  [facility].     CORONARY STENT PLACEMENT  2008    Left main, left circumflex, RCA     CORONARY STENT PLACEMENT  03/2016     CV CORONARY ANGIOGRAM N/A 8/1/2018    Procedure: Coronary Angiogram;  Surgeon: Kit  ADRIANA Bean MD;  Location: [facility];  Service:      CV LEFT HEART CATHETERIZATION WITH LEFT VENTRICULOGRAM N/A 8/1/2018    Procedure: Left Heart Catheterization with Left Ventriculogram;  Surgeon: Kit Bean MD;  Location: [facility];  Service:      INSERT MIDLINE HE  10/31/2015          LUMBAR DISCECTOMY N/A 10/11/2015    Procedure: BILATERAL L1-L5 DECOMPRESSIVE LAMINECTOMY WITH EVACUATION OF EPIDURAL ABSCESS IRRIGATION & DEBRIDEMENT;  Surgeon: Luli Chan MD;  Location: [facility] OR;  Service:      PICC  10/19/2015            ALLERGIES:  Oxycontin [oxycodone]    MEDS:    Current Outpatient Prescriptions:      aspirin 81 mg chewable tablet, Chew 1 tablet (81 mg total) daily., Disp: , Rfl: 0     atorvastatin (LIPITOR) 80 MG tablet, Take 1 tablet (80 mg total) by mouth at bedtime., Disp: 90 tablet, Rfl: 3     blood glucose meter (GLUCOMETER), Use 1 each As Directed as needed. Dispense glucometer brand per patient's insurance at pharmacy discretion. Dx E11.69, Z79.4, Disp: 1 each, Rfl: 0     blood glucose test (ACCU-CHEK SMARTVIEW TEST STRIP) strips, Check blood sugar 3 times per day. Dispense brand per patient's insurance at pharmacy discretion., Disp: 100 strip, Rfl: 3     buPROPion (WELLBUTRIN) 75 MG tablet, Take 75 mg by mouth daily. , Disp: , Rfl:      clobetasol (TEMOVATE) 0.05 % ointment, Apply topically 2 (two) times a day., Disp: 30 g, Rfl: 5     clopidogrel (PLAVIX) 75 mg tablet, Take 1 tablet (75 mg total) by mouth daily., Disp: 90 tablet, Rfl: 3     gabapentin (NEURONTIN) 300 MG capsule, Increase to 300 mg in am, 300 mg midday and 900 mg at HS as tolerated (Patient taking differently: Increase to 600 mg in am, 300 mg midday and 900 mg at HS as tolerated), Disp: 450 capsule, Rfl: 3     generic lancets (ACCU-CHEK FASTCLIX), Check blood sugar 3 times per day. Dispense brand per patient's insurance at pharmacy discretion., Disp: 102 each, Rfl: 3     ibuprofen  "(ADVIL,MOTRIN) 200 MG tablet, Take 600 mg by mouth every 8 (eight) hours as needed for pain., Disp: , Rfl:      insulin glargine (LANTUS; BASAGLAR) 100 unit/mL (3 mL) pen, Inject 28 Units under the skin 2 (two) times a day. Pt only took 14 units last evening. 1/2 normal dose, Disp: , Rfl:      isosorbide mononitrate (IMDUR) 60 MG 24 hr tablet, Take 1 tablet (60 mg total) by mouth daily., Disp: 90 tablet, Rfl: 3     metFORMIN (GLUCOPHAGE XR) 500 MG 24 hr tablet, Take 1 tablet twice daily, Disp: 60 tablet, Rfl: 0     metoprolol succinate (TOPROL-XL) 25 MG, TAKE ONE TABLET BY MOUTH ONCE DAILY, Disp: 90 tablet, Rfl: 1     multivitamin with minerals tablet, Take by mouth daily. , Disp: , Rfl:      nitroglycerin (NITROSTAT) 0.4 MG SL tablet, Place 1 tablet (0.4 mg total) under the tongue every 5 (five) minutes as needed for chest pain., Disp: 25 tablet, Rfl: 12     pen needle, diabetic ([employer]ICARE PEN NEEDLE) 32 gauge x 5/32\" Ndle, Use 1 Device As Directed 2 (two) times a day., Disp: 200 each, Rfl: 3     sertraline (ZOLOFT) 100 MG tablet, TAKE ONE TABLET (100 MG) BY MOUTH DAILY, Disp: 90 tablet, Rfl: 3     traMADol (ULTRAM) 50 mg tablet, TAKE 1 TABLET BY MOUTH EVERY 6 HOURS AS NEEDED FOR PAIN, Disp: 30 tablet, Rfl: 0    SOCIAL HABITS:    History   Smoking Status     Former Smoker     Quit date: 6/23/2007   Smokeless Tobacco     Never Used       History   Alcohol Use No     Comment: stopped drinking 9/2015       History   Drug Use No       FAMILY HISTORY:    Family History   Problem Relation Age of Onset     Stroke Mother      Diabetes Father      Diabetes Sister      Stroke Sister 81     Stroke Brother      Diabetes Brother            PE:  /80  Pulse 74  Resp 16  Wt Readings from Last 1 Encounters:   10/30/18 181 lb 9.6 oz (82.4 kg)     There is no height or weight on file to calculate BMI.    EXAM:  GENERAL: This is a well-developed 68 y.o. female who appears her stated age  EYES: Grossly normal.  MOUTH: Buccal " mucosa normal   MUSCULOSKELETAL: Grossly normal and both lower extremities are intact.  HEME/LYMPH: No lymphedema  NEUROLOGIC: Focally intact, Alert and oriented x 3.   PSYCH: appropriate affect  INTEGUMENT: No open lesions or ulcers  Pulse Exam:   Carotid: No Bruit    Femoral: Left +2   Right  +2    Popliteal: Left +1   Right  +1    DP: Left 0   Right  +1     PT:   Left 0   Right  0          DIAGNOSTIC STUDIES:     Images:  Us Ankle Brachial Indices Pre And Post Exercise    Result Date: 10/26/2018  [facility] DATE: 10/25/2018 4:35 PM EXAM: RESTING AND POSTEXERCISE ANKLE-BRACHIAL INDICES (ABIs) WITH SEGMENTAL ARTERIAL PRESSURES OF THE LOWER EXTREMITIES BILATERALLY INDICATION: Intermittent numbness COMPARISON: None. JUDITH FINDINGS: SEGMENTAL BP RIGHT (mmHg) Brachial: 140 High Thigh: 165; Index 1.11 Low Thigh: 136; Index 0.92 Calf: 141; Index 0.95 Ankle (PT): 92; Index 0.62 Ankle (DP): 106; Index 0.72 Digit: 67; Index 0.45 LEFT (mmHg) Brachial: 148 High Thigh: 163; Index 1.10 Low Thigh: 183; Index 1.24 Calf: 114; Index 0.77 Ankle (PT): 83; Index 0.56 Ankle (DP): 97; Index 0.66 Digit: 70; Index 0.47 The patient was exercised on a treadmill at 1 mph at a 9% incline for 5 minutes total. After 2 minutes of walking the patient complained of bilateral calf pain, left slightly worse than right. After the entire 5 minutes pain remained slightly greater on left side when compared with right. Resting and immediate postexercise ABIs are 0.72 and 0.77 on the right. Resting and immediate postexercise ABIs are 0.66 and 0.48 on the left. SEGMENTAL ARTERIAL PRESSURE FINDINGS: EXERCISE PRESSURES (mmHg) RIGHT ANKLE (DP) Rest: 106 1 minute: 115 3 minutes: 110 5 minutes: 123 7 minutes: 107 LEFT ANKLE (DP) Rest: 97 1 minute: 72 3 minutes: 74 5 minutes: 80 7 minutes: 96 LEFT BRACHIAL Rest: 148 1 minute: 150 3 minutes: 140 5 minutes: 142 7 minutes: 142 RIGHT JUDITH Rest: 0.72 1 minute: 0.77 3 minutes: 0.79 5 minutes: 0.87 7 minutes: 0.75  LEFT JUDITH Rest: 0.66 1 minute: 0.48 3 minutes: 0.53 5 minutes: 0.56 7 minutes: 0.68     1. RIGHT LOWER EXTREMITY: Mildly decreased resting ankle-brachial index with persistently decreased post exercise ankle-brachial indices. 2. LEFT LOWER EXTREMITY: Decreased resting ankle-brachial indices with abnormal response to exercise. These findings can be seen in the setting of exercise-induced claudication.      I personally reviewed the images and my interpretation is that her ABIs in the claudication range..    LABS:      Sodium   Date Value Ref Range Status   10/23/2018 140 136 - 145 mmol/L Final   08/14/2018 142 136 - 145 mmol/L Final   08/13/2018 140 136 - 145 mmol/L Final     Potassium   Date Value Ref Range Status   10/23/2018 4.8 3.5 - 5.0 mmol/L Final   08/14/2018 3.9 3.5 - 5.0 mmol/L Final   08/13/2018 4.1 3.5 - 5.0 mmol/L Final     Chloride   Date Value Ref Range Status   10/23/2018 103 98 - 107 mmol/L Final   08/14/2018 108 (H) 98 - 107 mmol/L Final   08/13/2018 104 98 - 107 mmol/L Final     BUN   Date Value Ref Range Status   10/23/2018 20 8 - 22 mg/dL Final   08/14/2018 18 8 - 22 mg/dL Final   08/13/2018 22 8 - 22 mg/dL Final     Creatinine   Date Value Ref Range Status   10/23/2018 0.94 0.60 - 1.10 mg/dL Final   08/14/2018 0.76 0.60 - 1.10 mg/dL Final   08/13/2018 1.15 (H) 0.60 - 1.10 mg/dL Final     Hemoglobin   Date Value Ref Range Status   08/14/2018 11.8 (L) 12.0 - 16.0 g/dL Final   08/13/2018 12.1 12.0 - 16.0 g/dL Final   08/01/2018 11.7 (L) 12.0 - 16.0 g/dL Final     Platelets   Date Value Ref Range Status   08/13/2018 376 140 - 440 thou/uL Final   08/01/2018 325 140 - 440 thou/uL Final   06/05/2017 323 140 - 440 thou/uL Final     INR   Date Value Ref Range Status   10/20/2015 1.27 (H) 0.90 - 1.10 Final   10/11/2015 1.16 (H) 0.90 - 1.10 Final   10/10/2015 1.29 (H) 0.90 - 1.10 Final     Shelbie Castro MD  VASCULAR SURGERY

## 2021-06-21 NOTE — PROGRESS NOTES
OV     10/23/2018  Assessment:     1. Uncontrolled diabetes mellitus (H)  Glycosylated Hemoglobin A1c    Comprehensive Metabolic Panel   2. Essential hypertension     3. Mixed hyperlipidemia     4. Peripheral neuropathy     5. Visit for screening mammogram  Mammo Screening Bilateral        Plan:         Non-fasting labs were drawn. Blood sugars are under improving control per patient's monitoring, but we are awaiting A1c results. We reviewed her current medications and she will continue the same pending additional lab results. She will continue working with Liv, our diabetes educator. Cost has been a significant factor and I have encouraged her to contact the prescription assistance program through [employer] but she has not yet and is skeptical as to whether she will qualify. She is also resistant to changing her regimen. We reviewed dietary recommendations, including low salt and high fiber diet, and recommendations for regular exercise/activity. She will continue the prn tramadol and neuronitin as she has been.  We discussed the potential for abuse or harm from misuse for the tramadol, and she has a current treatment agreement and informed consent on file. She will plan to follow up in 3 mos for repeat fasting labs and med check, sooner if any difficulties.        Subjective:     Diabetes      Kemi Soto is a 68 y.o. female who presents for follow-up of Type 2 diabetes mellitus. Compliance with treatment has been good, and compliance with diet has been good. Current symptoms/problems include paresthesia of the feet. Patient denies hypoglycemia , nausea, visual disturbances and vomiting. Home sugars: BGs are running  consistent with Hgb A1C. They seem to be coming down. She has been working with Liv recently. Current monitoring regimen: home blood tests - 1-2 times daily. Any episodes of hypoglycemia? no. Weight trend: stable. Last dilated eye exam 6/2017.    Current diabetic medications include lantus and  "metformin. Current side effects: none. Prior visit with dietician: yes - Meeting with Liv currently.    Current diet: in general, a \"healthy\" diet  . Current exercise: walking and cardiac rehab. She was going to be going to the [place] but her companions backed out so she will not be going.       Hyperlipidemia & Hypertension      Patient is here for follow-up of hypertension and dyslipidemia. A repeat fasting lipid profile was done. Compliance with treatment has been good. Patient denies muscle pain associated with her medications. Current symptoms: none. Patient denies chest pain, dyspnea, exertional chest pressure/discomfort, lower extremity edema, near-syncope and palpitations. The patient exercises intermittently.      Previous history of cardiac disease includes: CABG, coronary artery disease, coronary artery stent and history atrial fibrillation.        She has neuropathy and chronic leg pain and her leg has been falling asleep when sitting recently. She has appt coming up for evaluation. She reports pain in hands - wakes her up at night - whole hand. She has been on neurontin 900 mg at HS and tramadol 1 tab at HS prn for more severe symptoms. She denies any side effects. She wonders if lyrica might be more effective.       The following portions of the patient's history were reviewed and updated as appropriate: allergies, current medications, past family history, past medical history, past social history, past surgical history and problem list.      Review of Systems  A 12 point comprehensive review of systems was negative except as noted.          Objective:          Vitals:    10/23/18 1428   BP: 130/84   Pulse: 72   Weight: 181 lb 3 oz (82.2 kg)   Height: 5' 2\" (1.575 m)      Body mass index is 33.14 kg/(m^2).   General:    Alert, cooperative, no distress   Head:    Normocephalic, without obvious abnormality, atraumatic   Eyes:    PERRL, conjunctiva/corneas clear, EOM's intact    Ears:    Normal TM's " and external ear canals, both ears   Nose:   Nares normal, mucosa normal, no drainage or sinus tenderness   Throat:   Lips, mucosa, and tongue normal; teeth and gums normal   Neck:   Supple, symmetrical,  no adenopathy;  thyroid:  normal   Back:     Symmetric, ROM normal, no CVA tenderness   Lungs:     Clear to auscultation bilaterally, respirations unlabored   CV:    Regular rate and rhythm   Abdomen:     Soft, non-tender, no masses, no organomegaly   Extremities:   Extremities normal, atraumatic, no cyanosis or edema   Pulses:   2+ and symmetric all extremities   Skin:   Skin color, texture, turgor normal, no rashes or lesions   Neurologic:   normal strength and tone throughout       Laboratory:        Results for orders placed or performed in visit on 10/23/18   Glycosylated Hemoglobin A1c   Result Value Ref Range    Hemoglobin A1c 9.0 (H) 3.5 - 6.0 %   Comprehensive Metabolic Panel   Result Value Ref Range    Sodium 140 136 - 145 mmol/L    Potassium 4.8 3.5 - 5.0 mmol/L    Chloride 103 98 - 107 mmol/L    CO2 26 22 - 31 mmol/L    Anion Gap, Calculation 11 5 - 18 mmol/L    Glucose 266 (H) 70 - 125 mg/dL    BUN 20 8 - 22 mg/dL    Creatinine 0.94 0.60 - 1.10 mg/dL    GFR MDRD Af Amer >60 >60 mL/min/1.73m2    GFR MDRD Non Af Amer 59 (L) >60 mL/min/1.73m2    Bilirubin, Total 0.4 0.0 - 1.0 mg/dL    Calcium 9.5 8.5 - 10.5 mg/dL    Protein, Total 6.3 6.0 - 8.0 g/dL    Albumin 3.5 3.5 - 5.0 g/dL    Alkaline Phosphatase 95 45 - 120 U/L    AST 13 0 - 40 U/L    ALT 12 0 - 45 U/L

## 2021-06-21 NOTE — PROGRESS NOTES
ITP ASSESSMENT   Assessment Day: 60 Day  Session Number: 10  Precautions: Standard, LBP, neuropathy, bipolar disorder  Diagnosis: Stent  Risk Stratification: High  Referring Provider: Kit Bean MD  EXERCISE  Exercise Assessment: Reassessment                         Exercise Plan  Goals Next 30 days  ADL'S: Pt tolerating home activities. Pt to continue to work on her heart healthy eating habits.  Leisure: Tolerate walking 20-30 min 3x/week  Work: Tolerate yardwork-pulling weeds without symptoms    Education Goals: All goals in this section met  Education Goals Met: Patient can state cardiac s/s and appropriate emergency response.;Has system for taking medication.;Medication review.                        Goals Met  30 Day Progression: Pt out of rehab, will address goals next session.    Initial ADL's goals met: Pt tolerating grocery shopping without symptoms  Initial Leisure goals met: Pt walking 2x/week for about 20 min.  Intial Work goals met: Pt tolerating taking therapy dog to nursing homes.  Initial Progression: Pt states she feels she is progressing well. Pt has reached 2.5 MET level    Exercise Prescription  Exercise Mode: Treadmill;Bike;Nustep;Arm Erg.;Stairs  Frequency: 2x/week  Duration: 30-45 min  Intensity / THR: 20-30 beats above resting heart rate  RPE 11-14  Progression / Met level: 3-3.5  Resistive Training?: Yes    Current Exercise (mins/week): 90    Interventions  Home Exercise:  Mode: walking  Frequency: 20-30 min  Duration: 2-3x/week    Education Material : Educational videos;Provide written material;Individual education and counseling;Offer educational classes    Education Completed  Exercise Education Completed: Cardiac Anatomy;Signs and Symptoms;Medication review;RPE;Emergency Plan;Home Exercise;Warm up/cool down;FITT Principles;BP/HR Reponse to exercise;Benefits of Exercise;End point of exercise            Exercise Follow-up/Discharge  Follow up/Discharge: Pt has progressed to 2.7 METs  "on the treadmill. NUTRITION  Nutrition Assessment: Reassessment    Nutrition Risk Factors:  Nutrition Risk Factors: Diabetes;Dyslipidemia;Overweight  HbA1c: 8.8  Monitors blood sugar at home: Yes  Frequency: 1-2x/day  Cholesterol: 146  LDL: 63  HDL: 45  Triglycerides: 191    Nutrition Plan  Interventions  Other Nutrition Intervention: Diet Class;Therapist/Pt Discussion;Provide with Written Material    Education Completed  Nutrition Education Completed: Low Saturated fat diet;Low sodium diet    Goals  Nutrition Goals (Next 30 days): Patient can identify their risk factors for CAD    Goals Met  Nutrition Goals Met: Completed Nutritional Risk Screen;Provided Rate your Plate Survey;Reviewed Dietitian schedule;Patient follows a low sodium diet    Height, Weight, and  BMI  Weight: 182 lb (82.6 kg)  Height: 5' 2\" (1.575 m)  BMI: 33.28    Nutrition Follow-up  Follow-up/Discharge: Pt out of rehab, will address diet next session.       Other Risk Factors  Other Risk Factor Assessment: Reassessment    HTN Risk Factor: Hypertension    Pre Exercise BP: 136/50  Post Exercise BP: 130/64    Hypertension Plan  Goals  HTN Goals: Patient demonstrates understanding of HTN, no goals identified for the next 30 days    Goals Met  HTN Goals Met: Take medication as prescribed;Exercises regularly;Follow low sodium diet    HTN Interventions  HTN Interventions: Diet consult;Therapist/patient discussion;Provide written material;Offer educational videos;Offer educational classes    HTN Education Completed  HTN Education Completed: Low sodium diet;Medication review;Risk factor overview    Tobacco Risk Factor: NA    Risk Factor Follow-up   Follow-up/Discharge: Pt out of rehab, will address risk factors next session. PSYCHOSOCIAL  Psychosocial Assessment: Reassessment     Psychosocial Risk Factor: Stress    Psychosocial Plan  Interventions  Interventions: Offer educational videos and classes;Provide written material;Individual education and " counseling    Education Completed  Education Completed: Relaxation/Coping Techniques    Goals  Goals (Next 30 days): Improvement in [employer] COOP score    Goals Met  Goals Met: Practicing stress management skills;Identified Support system;Oriented to stress management classes;Identify stressors    Psychosocial Follow-up  Follow-up/Discharge: Pt out of rehab, will address stress next session.           Patient involved in Goal setting?: No     Signature: _____________________________________________________________    Date: __________________    Time: __________________

## 2021-06-21 NOTE — PROGRESS NOTES
New order entered, pt reports she is not having pain, she had numbness and medicare is requesting the correct diagnosis.  -rah    Per 10/9/18 OV with MD.  Intermittent leg numbness.  She has some difficulty describing the symptoms.  We are going to plan an ankle-brachial index.  Carotid ultrasound in  that demonstrated 50-69% left internal carotid artery stenosis.   Plan: As outlined above with plan JUDITH and follow-up in 3 months.

## 2021-06-21 NOTE — PROGRESS NOTES
ITP ASSESSMENT   Assessment Day: 60 Day  Session Number: 11  Precautions: Standard, LBP, neuropathy, bipolar disorder  Diagnosis: Stent  Risk Stratification: High  Referring Provider: Ange Hampton,*  EXERCISE  Exercise Assessment: Reassessment                         Exercise Plan  Goals Next 30 days  ADL'S: Pt to tolerate putting away lawn furniture for the winter  Leisure: Start attending Yoga 1x/week  Work: Tolerate going through household things and packing up things to get rid of.    Education Goals: All goals in this section met  Education Goals Met: Patient can state cardiac s/s and appropriate emergency response.;Has system for taking medication.;Medication review.                        Goals Met  30 day ADL'S goals met: Pt states she is tolerating her regular home activities and feels she is doing better with her heart healthy diet  30 day Leisure goals met: Pt is trying to walk 2x/week for 20 min  30 day Work goals met: Pt has been doing some light weed pulling and tolerating it.  30 Day Progression: Pt has reached 2.8 MET level and is exercising more consistently at home    Initial ADL's goals met: Pt tolerating grocery shopping without symptoms  Initial Leisure goals met: Pt walking 2x/week for about 20 min.  Intial Work goals met: Pt tolerating taking therapy dog to nursing homes.  Initial Progression: Pt states she feels she is progressing well. Pt has reached 2.5 MET level    Exercise Prescription  Exercise Mode: Treadmill;Bike;Nustep;Arm Erg.;Stairs  Frequency: 2x/week  Duration: 40-45 min  Intensity / THR: 20-30 beats above resting heart rate  RPE 11-14  Progression / Met level: 3-3.5  Resistive Training?: Yes    Current Exercise (mins/week): 100    Interventions  Home Exercise:  Mode: walking   Frequency: 20-30 min   Duration: 2-3x/week    Education Material : Educational videos;Provide written material;Individual education and counseling;Offer educational classes    Education  "Completed  Exercise Education Completed: Cardiac Anatomy;Signs and Symptoms;Medication review;RPE;Emergency Plan;Home Exercise;Warm up/cool down;FITT Principles;BP/HR Reponse to exercise;Benefits of Exercise;End point of exercise            Exercise Follow-up/Discharge  Follow up/Discharge: Pt has progressed to 2.8 MET level NUTRITION  Nutrition Assessment: Reassessment    Nutrition Risk Factors:  Nutrition Risk Factors: Diabetes;Dyslipidemia;Overweight    Monitors blood sugar at home: Yes  Frequency: 1-2x/day    Nutrition Plan  Interventions  Other Nutrition Intervention: Therapist/Pt Discussion;Provide with Written Material    Education Completed  Nutrition Education Completed: Low Saturated fat diet;Low sodium diet    Goals  Nutrition Goals (Next 30 days): Patient knows appropriate portion size;Patient will lose weight    Goals Met  Nutrition Goals Met: Patient follows a low sodium diet;Patient states following a low saturated fat diet    Height, Weight, and  BMI  Weight: 181 lb 6.4 oz (82.3 kg)  Height: 5' 2\" (1.575 m)  BMI: 33.17    Nutrition Follow-up  Follow-up/Discharge: Pt recently met with diabetic educator       Other Risk Factors  Other Risk Factor Assessment: Reassessment    HTN Risk Factor: Hypertension    Pre Exercise BP: 126/60  Post Exercise BP: 132/60    Hypertension Plan  Goals  HTN Goals: Patient demonstrates understanding of HTN, no goals identified for the next 30 days    Goals Met  HTN Goals Met: Take medication as prescribed;Exercises regularly;Follow low sodium diet    HTN Interventions  HTN Interventions: Diet consult;Therapist/patient discussion;Provide written material;Offer educational videos;Offer educational classes    HTN Education Completed  HTN Education Completed: Low sodium diet;Medication review;Risk factor overview    Tobacco Risk Factor: NA     PSYCHOSOCIAL  Psychosocial Assessment: Reassessment       Psychosocial Risk Factor: Stress    Psychosocial " Plan  Interventions  Interventions: Offer educational videos and classes;Provide written material;Individual education and counseling    Education Completed  Education Completed: Relaxation/Coping Techniques    Goals  Goals (Next 30 days): Improvement in [employer] CO[employer] score    Goals Met  Goals Met: Identify stressors;Practicing stress management skills    Psychosocial Follow-up  Follow-up/Discharge: Pt using prayer, meditation and volunteering for stress mgmt           Patient involved in Goal setting?: Yes    Signature: _____________________________________________________________    Date: __________________    Time: __________________

## 2021-06-21 NOTE — PROGRESS NOTES
ITP ASSESSMENT   Assessment Day: 60 Day  Session Number: 11  Precautions: Standard, LBP, neuropathy, bipolar disorder  Diagnosis: Stent  Risk Stratification: High  Referring Provider: Ange Hampton,*  EXERCISE  Exercise Assessment: Reassessment                         Exercise Plan  Goals Next 30 days  ADL'S: Pt to tolerate putting away lawn furniture for the winter  Leisure: Start attending Yoga 1x/week  Work: Tolerate going through household things and packing up things to get rid of.    Education Goals: All goals in this section met  Education Goals Met: Patient can state cardiac s/s and appropriate emergency response.;Has system for taking medication.;Medication review.                        Goals Met  30 day ADL'S goals met: Pt states she is tolerating her regular home activities and feels she is doing better with her heart healthy diet  30 day Leisure goals met: Pt is trying to walk 2x/week for 20 min  30 day Work goals met: Pt has been doing some light weed pulling and tolerating it.  30 Day Progression: Pt has reached 2.8 MET level and is exercising more consistently at home    Initial ADL's goals met: Pt tolerating grocery shopping without symptoms  Initial Leisure goals met: Pt walking 2x/week for about 20 min.  Intial Work goals met: Pt tolerating taking therapy dog to nursing homes.  Initial Progression: Pt states she feels she is progressing well. Pt has reached 2.5 MET level    Exercise Prescription  Exercise Mode: Treadmill;Bike;Nustep;Arm Erg.;Stairs  Frequency: 2x/week  Duration: 40-45 min  Intensity / THR: 20-30 beats above resting heart rate  RPE 11-14  Progression / Met level: 3-3.5  Resistive Training?: Yes    Current Exercise (mins/week): 100    Interventions  Home Exercise:  Mode: walking   Frequency: 20-30 min   Duration: 2-3x/week    Education Material : Educational videos;Provide written material;Individual education and counseling;Offer educational classes    Education  "Completed  Exercise Education Completed: Cardiac Anatomy;Signs and Symptoms;Medication review;RPE;Emergency Plan;Home Exercise;Warm up/cool down;FITT Principles;BP/HR Reponse to exercise;Benefits of Exercise;End point of exercise            Exercise Follow-up/Discharge  Follow up/Discharge: Pt has progressed to 2.8 MET level NUTRITION  Nutrition Assessment: Reassessment    Nutrition Risk Factors:  Nutrition Risk Factors: Diabetes;Dyslipidemia;Overweight    Monitors blood sugar at home: Yes  Frequency: 1-2x/day    Nutrition Plan  Interventions  Other Nutrition Intervention: Therapist/Pt Discussion;Provide with Written Material    Education Completed  Nutrition Education Completed: Low Saturated fat diet;Low sodium diet    Goals  Nutrition Goals (Next 30 days): Patient knows appropriate portion size;Patient will lose weight    Goals Met  Nutrition Goals Met: Patient follows a low sodium diet;Patient states following a low saturated fat diet    Height, Weight, and  BMI  Weight: 181 lb 6.4 oz (82.3 kg)  Height: 5' 2\" (1.575 m)  BMI: 33.17    Nutrition Follow-up  Follow-up/Discharge: Pt recently met with diabetic educator       Other Risk Factors  Other Risk Factor Assessment: Reassessment    HTN Risk Factor: Hypertension    Pre Exercise BP: 120/64  Post Exercise BP: 114/52    Hypertension Plan  Goals  HTN Goals: Patient demonstrates understanding of HTN, no goals identified for the next 30 days    Goals Met  HTN Goals Met: Take medication as prescribed;Exercises regularly;Follow low sodium diet    HTN Interventions  HTN Interventions: Diet consult;Therapist/patient discussion;Provide written material;Offer educational videos;Offer educational classes    HTN Education Completed  HTN Education Completed: Low sodium diet;Medication review;Risk factor overview    Tobacco Risk Factor: NA     PSYCHOSOCIAL  Psychosocial Assessment: Reassessment       Psychosocial Risk Factor: Stress    Psychosocial " Plan  Interventions  Interventions: Offer educational videos and classes;Provide written material;Individual education and counseling    Education Completed  Education Completed: Relaxation/Coping Techniques    Goals  Goals (Next 30 days): Improvement in [employer] CO[employer] score    Goals Met  Goals Met: Identify stressors;Practicing stress management skills    Psychosocial Follow-up  Follow-up/Discharge: Pt using prayer, meditation and volunteering for stress mgmt           Patient involved in Goal setting?: Yes    Signature: _____________________________________________________________    Date: __________________    Time: __________________

## 2021-06-21 NOTE — PROGRESS NOTES
DIABETES EDUCATION CARE PLAN    Assessment/Plan:     Follow-up visit for diabetes education and insulin adjustment. Kemi Soto is checking blood sugar 2-3 times per day. The 30 day blood sugar average is 194 (35 tests). Her blood sugars vary a lot depending on what she eats. Reviewed food portions and suggestions to lower carbohydrate intake. When she eats well her blood sugars are controlled.     Ciarra picked up the Novolog last summer but never started it due to cost. She may change insurance after checking the cost of her medications. Patient stated she was in a study and was given Tanzeum and her blood sugars were good. Victoza may be an option as it has heart benefits and is usually covered better.     Current diabetes medications:   Metformin Extended Release 500 mg twice per day (usually only takes 500 mg once per day-forgets second dose)  Lantus Solostar 28 units twice per day    Plan:  1. Check blood sugar 3 times per day (before breakfast, before cardiac and after cardiac rehab or bedtime.  2. Continue Lantus Solostar 28 units twice per day.  3. Take Metformin Extended Release 1000 mg per day. It is ok to take it all at once.  4. When blood sugar is above 200 drink a lot of water and move more.  5. Limit carbohydrate in diet.  6. Stay active every day.  7. Schedule a diabetes check with Dr. Hampton for the beginning of November.   8. If your next A1c is above target discuss medication options. Follow-up with me after the doctor.    Subjective/Objective:      Kemi Soto is a 68 y.o. female referred by Ange Hampton MD.  Accompanied by unaccompanied    Wt Readings from Last 3 Encounters:   10/09/18 185 lb (83.9 kg)   10/09/18 182 lb (82.6 kg)   10/04/18 181 lb (82.1 kg)     Lab Results   Component Value Date    HGBA1C 8.8 (H) 07/26/2018     Diet/Eating Habits: 3 meals per day and limited snacks  Breakfast: 2 toast with peanut butter, banana and coffee with 2% milk and creamer  Lunch:  Chicken or pork chop or fish, sometimes potato or pasta, vegetables  Dinner: last night was 2 hot dogs, baked beans and 1 slice bread    Physical Activity: cardiac rehab    SMBG pattern/BG ranges: Uses Accu-chek Sadia meter  Tests 3 times per day  blood sugar log scanned    Hypoglycemia: none    EDUCATION RECORD     Monitoring   Meter (per above goals): Competent  Monitoring: Competent  BG goals: Assessed, Discussed and Competent    Nutrition Management  Nutrition Management: Discussed  Weight: Discussed  Portions/Balance: Assessed and Discussed  Carb ID/Count: Assessed and Discussed  Label Reading: Competent  Heart Healthy Fats: Discussed  Physical Activity: Assessed and Discussed  Orals: Assessed  Injected Medications: Assessed, Discussed and Literature provided   Storage/Exp:Not addressed   Site Rotation: Not addressed   Disposal of Sharps: Not addressed    Diabetes Disease Process: Discussed and Competent    Acute Complications: Prevent, Detect, Treat:  Hypoglycemia: Assessed, Discussed and Competent  Hyperglycemia: Assessed, Discussed and Competent  Sick Days: Not addressed    Chronic Complications  Foot Care:Not addressed  Skin Care: Not addressed  Eye: Not addressed  ABC: Assessed, Discussed and Competent  Teeth:Not addressed  Goal Setting and Problem Solving: Discussed and Literature provided  Barriers: Assessed and hard of hearing, history of anxiety and bipolar disease, history of alcohol abuse, history of stroke and TIA. Financial barrier to afford cost of medication  Psychosocial Adjustments: Assessed      Time spent with the patient: 60 minutes   Previous Education: yes  Visit Type:Medical Nutrition Therapy, Re-assessment (30742)  Hours Remaining: DSMT 1 and MNT none for 2018  Diagnosis per referral:Type 2 Diabetes, uncontrolled E11.65 with long-term use of insulin (Z79.4)        Liv Contreras, RD, LD, CDE  10/12/2018  2:02 PM

## 2021-06-21 NOTE — LETTER
Letter by Mayco Zapata MD at      Author: Mayco Zapata MD Service: -- Author Type: --    Filed:  Encounter Date: 5/7/2021 Status: (Other)         Kemi Soto  [address] [place] [id number]             May 7, 2021         Dear Ms. Soto,    Below are the results from your recent visit:    Resulted Orders   Lipid Cascade RANDOM   Result Value Ref Range    Cholesterol 174 <=199 mg/dL    Triglycerides 235 (H) <=149 mg/dL    HDL Cholesterol 48 (L) >=50 mg/dL    LDL Calculated 79 <=129 mg/dL    Patient Fasting > 8hrs? No    Comprehensive Metabolic Panel   Result Value Ref Range    Sodium 139 136 - 145 mmol/L    Potassium 4.7 3.5 - 5.0 mmol/L    Chloride 101 98 - 107 mmol/L    CO2 30 22 - 31 mmol/L    Anion Gap, Calculation 8 5 - 18 mmol/L    Glucose 236 (H) 70 - 125 mg/dL    BUN 19 8 - 28 mg/dL    Creatinine 0.93 0.60 - 1.10 mg/dL    GFR MDRD Af Amer >60 >60 mL/min/1.73m2    GFR MDRD Non Af Amer 59 (L) >60 mL/min/1.73m2    Bilirubin, Total 0.6 0.0 - 1.0 mg/dL    Calcium 9.3 8.5 - 10.5 mg/dL    Protein, Total 6.6 6.0 - 8.0 g/dL    Albumin 3.6 3.5 - 5.0 g/dL    Alkaline Phosphatase 96 45 - 120 U/L    AST 18 0 - 40 U/L    ALT 20 0 - 45 U/L    Narrative    Fasting Glucose reference range is 70-99 mg/dL per  American Diabetes Association (ADA) guidelines.   Thyroid Stimulating Hormone (TSH)   Result Value Ref Range    TSH 0.77 0.30 - 5.00 uIU/mL   HM1 (CBC with Diff)   Result Value Ref Range    WBC 7.9 4.0 - 11.0 thou/uL    RBC 4.40 3.80 - 5.40 mill/uL    Hemoglobin 13.0 12.0 - 16.0 g/dL    Hematocrit 40.2 35.0 - 47.0 %    MCV 91 80 - 100 fL    MCH 29.5 27.0 - 34.0 pg    MCHC 32.3 32.0 - 36.0 g/dL    RDW 12.3 11.0 - 14.5 %    Platelets 357 140 - 440 thou/uL    MPV 9.4 7.0 - 10.0 fL    Neutrophils % 72 (H) 50 - 70 %    Lymphocytes % 22 20 - 40 %    Monocytes % 5 2 - 10 %    Eosinophils % 1 0 - 6 %    Basophils % 0 0 - 2 %    Immature Granulocyte % 0 <=0 %    Neutrophils Absolute 5.7 2.0 - 7.7 thou/uL     Lymphocytes Absolute 1.7 0.8 - 4.4 thou/uL    Monocytes Absolute 0.4 0.0 - 0.9 thou/uL    Eosinophils Absolute 0.1 0.0 - 0.4 thou/uL    Basophils Absolute 0.0 0.0 - 0.2 thou/uL    Immature Granulocyte Absolute 0.0 <=0.0 thou/uL       Ciarra,  It was nice to see you in the clinic this past week.  I have reviewed your test results and they are listed above.  Significant findings include that your blood sugar was 236 which is quite high.  Make sure that you are taking your insulin which is the Lantus insulin 28 units twice a day and the Metformin 2000 mg daily.  I would encourage you to resume checking your blood sugar at home.  I would like to see you back in the clinic in 3 months for follow-up on your diabetes.    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  Controlling the blood sugars will help prevent your kidney disease from getting worse.    I checked your thyroid function which is the TSH and that was normal.  I checked your CBC and your red blood cells, white blood cells and platelets are normal.  No signs of anemia.    Your lipids show that your triglycerides are elevated at 235 and that should be less than 150.  Getting your blood sugars under control will help your triglycerides.  Also try to limit your carbohydrates and simple sugars as well as decreasing your caloric intake and losing 10 to 20 pounds will help your blood sugars.  The HDL cholesterol is the good cholesterol and that is on the low side.  The only thing that improves the HDL cholesterol is regular cardio type exercise such as a walking program for 30 minutes at least 5 times per week.    Looks like I will see you back next week for an injection to your left shoulder.      Please call with questions or contact us using [email or web address].    Sincerely,        Electronically signed by Mayco Zapata MD

## 2021-06-21 NOTE — PROGRESS NOTES
Orders placed.  -[facility]    Notes Recorded by Chuck Hernandez MD on 10/28/2018 at 9:26 AM  Some abnormal findings on the study suggesting blood flow limitations.Id like her seen in the vascular clinic [facility] for assessment and a repeat carotid us  ty mdg

## 2021-06-22 NOTE — PROGRESS NOTES
ITP ASSESSMENT   Assessment Day: 120 Day    Session Number: 21  Precautions: standard, LBP, neuropathy, bipolar disorder    Diagnosis: Stent    Risk Stratification: High    Referring Provider: Ange Hampton,*   ITP:Dr. Christine  EXERCISE  Exercise Assessment: Discharge       6 Minute Walk Test   Pre   Pre Exercise HR: 73                    Pre Exercise BP: 120/62      Peak  Peak HR: 100                   Peak BP: 172/60    Peak feet: 1820    Peak O2 SAT: 98    Peak RPE: 13    Peak MPH: 3.45      Symptoms:  Peak Symptoms: winded, tight calf muscles-bilateral      5 mins. Post  5 Min Post HR: 72    5 Min Post BP: 122/70                           Exercise Plan      Education Goals: All goals in this section met    Education Goals Met: Patient can state cardiac s/s and appropriate emergency response.;Has system for taking medication.;Medication review.                          Goals Met  90 day ADL'S goals met: Independent    90 day Leisure goals met: Pt has not started yoga yet, has not had time. Pt did wts in rehab today.    90 day Work goals met: Independent    90 Day Progress: Pt progressed well, ready for d/c      60 day ADL'S goals met: Tolerated putting away lawn furniture    60 day Leisure goals met: Has not attended yoga    60 day Work goals met: Tolerated going through household things and packing up things to get rid of    60 Day Progression: Patient progressed to 2.2-3.1 METs      30 day ADL'S goals met: Pt states she is tolerating her regular home activities and feels she is doing better with her heart healthy diet    30 day Leisure goals met: Pt is trying to walk 2x/week for 20 min    30 day Work goals met: Pt has been doing some light weed pulling and tolerating it.    30 Day Progression: Pt has reached 2.8 MET level and is exercising more consistently at home      Initial ADL's goals met: Pt tolerating grocery shopping without symptoms    Initial Leisure goals met: Pt walking 2x/week for about 20  "min.    Intial Work goals met: Pt tolerating taking therapy dog to nursing homes.    Initial Progression: Pt states she feels she is progressing well. Pt has reached 2.5 MET level      Exercise Prescription  Exercise Mode: Treadmill;Bike;Nustep;Arm Erg.;Stairs    Frequency: 2x/week    Duration: 40-45 min.    Intensity / THR: 20-30 beats above resting heart rate    RPE 11-14  Progression / Met level: 3.2-4    Resistive Training?: Yes      Current Exercise (mins/week): 100      Interventions  Home Exercise:  Mode: walking, stairs    Frequency: 20-30 min    Duration: 3-4x/week      Education Material : Educational videos;Provide written material;Individual education and counseling;Offer educational classes      Education Completed  Exercise Education Completed: Cardiac Anatomy;Signs and Symptoms;Medication review;RPE;Emergency Plan;Home Exercise;Warm up/cool down;FITT Principles;BP/HR Reponse to exercise;Benefits of Exercise;End point of exercise              Exercise Follow-up/Discharge  Follow up/Discharge: Discharge   NUTRITION  Nutrition Assessment: Discharge      Nutrition Risk Factors:  Nutrition Risk Factors: Diabetes;Dyslipidemia;Overweight  Monitors blood sugar at home: Yes  Frequency: 1-2x/day      Nutrition Plan  Interventions  Diet Consult: Completed    Other Nutrition Intervention: Diet Class;Therapist/Pt Discussion;Provide with Written Material      Education Completed  Nutrition Education Completed: Low Saturated fat diet;Low sodium diet;Weight management;Carbohydrate counting          Goals Met  Nutrition Goals Met: Patient follows a low sodium diet;Patient able to demonstrate carbohydrate counting;Patient states following a low saturated fat diet;Patient knows appropriate portion size      Height, Weight, and  BMI  Weight: 182 lb (82.6 kg)  Height: 5' 2\" (1.575 m)  BMI: 33.28      Nutrition Follow-up  Follow-up/Discharge: Diabetic / Cardiac diet review with RD         Other Risk Factors  Other Risk " Factor Assessment: Discharge      HTN Risk Factor: Hypertension      Pre Exercise BP: 120/62  Post Exercise BP: 178/84 (158/78)      Hypertension Plan    Goals Met  HTN Goals Met: Take medication as prescribed;Exercises regularly;Follow low sodium diet      HTN Interventions  HTN Interventions: Diet consult;Therapist/patient discussion;Provide written material;Offer educational videos;Offer educational classes      HTN Education Completed  HTN Education Completed: Low sodium diet;Medication review;Risk factor overview      Tobacco Risk Factor: NA      No Data Recorded    Tobacco Plan  Tobacco Goals  No Data Recorded    Goals Met  No Data Recorded    Tobacco Interventions  No Data Recorded    Tobacco Education Completed  No Data Recorded    Risk Factor Follow-up   No Data Recorded   PSYCHOSOCIAL  Psychosocial Assessment: Discharge       [facility] Q of L Summary Score: 25      RUPAL-D Score: 6      Psychosocial Risk Factor: Stress      Psychosocial Plan  Interventions  Interventions: Offer educational videos and classes;Provide written material;Individual education and counseling      Education Completed  Education Completed: Relaxation/Coping Techniques          Goals Met  Goals Met: Identify stressors;Practicing stress management skills      Psychosocial Follow-up  Follow-up/Discharge: Patient is using prayer, meditation, bible study and volunteeering for stress management             Patient involved in Goal setting?: Yes      Signature: _____________________________________________________________    Date: __________________    Time: __________________

## 2021-06-22 NOTE — PROGRESS NOTES
[facility] Home Exercise Program/Discharge Summary  You have reached a 3.1 MET level and have completed 21 sessions of Cardiac Rehab.   Exercise Goals:   4-6x/week for aerobic exercise 20-30 minutes and 2-3x/week for strength training.   Modality Duration Intensity/  Rate of Perceived Exertion  (RPE: 11-14)   Warm-up 5 minutes 8-10   Walk 20-30 min 11-14   Cool Down 5 minutes 8-10   Strength Training *every other day 10-15 minutes 11-14   Stretching 5 minutes 8-10     Special Recommendations:    Continue to follow low fat, low salt, heart healthy diet.    Continue to follow up with your doctors/providers as recommended (e.g cholesterol).    A well rounded exercise program will included aerobic/cardiovascular exercise (e.g like walking, biking, or swimming ), strength training (e.g. free weights, exercise bands, or weight machines) and stretching program.   Stop Exercise!!! If any of the following occur:    Angina/chest pain    Dizziness    Excessive perspiration/cold sweats    Abnormal shortness of breath    Changes in heart rate (slow, fast, irregular)    Sudden fatigue or numbness    Nausea  Also...    Avoid extreme temperatures - exercise indoors if necessary:   Temp+ Humidity >160, Temp-Wind Chill <20    Wait at least 1 hour after a meal before strenuous activity    Do not exercise if you have a fever or are ill    Wear comfortable, supportive athletic clothing and shoes.  You are now on your way to a heart healthy lifestyle on your own. You can do it!

## 2021-06-23 ENCOUNTER — COMMUNICATION - HEALTHEAST (OUTPATIENT)
Dept: CARDIOLOGY | Facility: CLINIC | Age: 71
End: 2021-06-23
Payer: COMMERCIAL

## 2021-06-23 NOTE — PROGRESS NOTES
ASSESSMENT/PLAN:       1. Type 2 diabetes mellitus with hyperglycemia, with long-term current use of insulin (H)    - Glycosylated Hemoglobin A1c  - Microalbumin, Random Urine  The patient would like to get a letter with the results of her tests  We certainly have room to go up on her Metformin if need be.  The patient has concerns about increased weight especially in her midsection.  She is aware that the goal A1c would be close to 7 but especially below 8.0.  Strongly encourage the patient to make an eye exam.    2. Coronary artery disease of native artery of native heart with stable angina pectoris (H)  No change in the patient's medication and will follow up with cardiology.    3. Paroxysmal hematoma of right hand, initial encounter  Can use ice gentle compression and try not to overuse the right hand.  Continue on Plavix.    4.  Healthcare maintenance  The patient has a mammogram has been ordered and has the card to call and make that appointment and strongly recommended that.            Mayco Zapata MD      PROGRESS NOTE   2/1/2019    SUBJECTIVE:  Kemi Soto is a 69 y.o. female  who presents for   Chief Complaint   Patient presents with     Follow-up     DM, bump on rigth hand x 6 days      1. Type 2 diabetes mellitus with hyperglycemia, with long-term current use of insulin (H)  .  The patient has not been monitoring her blood sugar and recently was on a cruise.  Last hemoglobin A1c was 9.0.  She is taking metformin  mg and takes 2 daily and also is on Lantus insulin 28 units twice a day.  The patient is overdue for an eye exam and is aware of that and will set up an appointment.  The patient is agreeable to doing A1c, microalbumin and foot exam today.    2. Coronary artery disease of native artery of native heart with stable angina pectoris (H)  The patient has a history of coronary artery disease status post stent placement and is on metoprolol XL 25 mg daily.  The patient is also on Imdur 60  mg daily.  She has not been having any chest pain and denies any change in her respiratory endurance with walking.  The patient is on Plavix 75 mg daily.    3. Paroxysmal hematoma of right hand, initial encounter  The patient recently was on a cruise and she spontaneously developed a hematoma on the dorsum of her right hand without noticing any trauma.  The swelling is going down but she is concerned as to why this happened and wants it checked.  The pain is improving.  Patient has not noticed any other bleeding complications.    Patient Active Problem List   Diagnosis     History of TIA (transient ischemic attack) and stroke     Essential hypertension     Mixed hyperlipidemia     Chalastodermia     Type 2 diabetes mellitus with hyperglycemia, with long-term current use of insulin (H)     S/P CABG (coronary artery bypass graft)     Bipolar 2 disorder (H)     Anxiety disorder     BMI 36.0-36.9,adult     Noncompliance with medications     Psoriasis, guttate     History of atrial fibrillation     Benign adenomatous polyp of large intestine     Bilateral hearing loss     Coronary artery disease of native artery of native heart with stable angina pectoris (H)     Peripheral neuropathy       Current Outpatient Medications   Medication Sig Dispense Refill     aspirin 81 mg chewable tablet Chew 1 tablet (81 mg total) daily.  0     atorvastatin (LIPITOR) 80 MG tablet Take 1 tablet (80 mg total) by mouth at bedtime. 90 tablet 3     blood glucose meter (GLUCOMETER) Use 1 each As Directed as needed. Dispense glucometer brand per patient's insurance at pharmacy discretion. Dx E11.69, Z79.4 1 each 0     blood glucose test (ACCU-CHEK SMARTVIEW TEST STRIP) strips Check blood sugar 3 times per day. Dispense brand per patient's insurance at pharmacy discretion. 100 strip 3     busPIRone (BUSPAR) 15 MG tablet Take 15 mg by mouth.       clobetasol (TEMOVATE) 0.05 % ointment Apply topically 2 (two) times a day. 30 g 5     clopidogrel  "(PLAVIX) 75 mg tablet Take 1 tablet (75 mg total) by mouth daily. 90 tablet 3     gabapentin (NEURONTIN) 300 MG capsule Increase to 300 mg in am, 300 mg midday and 900 mg at HS as tolerated (Patient taking differently: Increase to 600 mg in am, 300 mg midday and 900 mg at HS as tolerated) 450 capsule 3     generic lancets (ACCU-CHEK FASTCLIX) Check blood sugar 3 times per day. Dispense brand per patient's insurance at pharmacy discretion. 102 each 3     ibuprofen (ADVIL,MOTRIN) 200 MG tablet Take 600 mg by mouth every 8 (eight) hours as needed for pain.       insulin glargine (LANTUS; BASAGLAR) 100 unit/mL (3 mL) pen Inject 28 Units under the skin 2 (two) times a day. Pt only took 14 units last evening. 1/2 normal dose       isosorbide mononitrate (IMDUR) 60 MG 24 hr tablet Take 1 tablet (60 mg total) by mouth daily. 90 tablet 3     metFORMIN (GLUCOPHAGE XR) 500 MG 24 hr tablet Take 1 tablet twice daily 60 tablet 0     metoprolol succinate (TOPROL-XL) 25 MG TAKE ONE TABLET BY MOUTH ONCE DAILY 90 tablet 1     nitroglycerin (NITROSTAT) 0.4 MG SL tablet Place 1 tablet (0.4 mg total) under the tongue every 5 (five) minutes as needed for chest pain. 25 tablet 12     pen needle, diabetic ([employer]ICARE PEN NEEDLE) 32 gauge x 5/32\" Ndle Use 1 Device As Directed 2 (two) times a day. 200 each 3     sertraline (ZOLOFT) 100 MG tablet TAKE ONE TABLET (100 MG) BY MOUTH DAILY 90 tablet 3     traMADol (ULTRAM) 50 mg tablet TAKE 1 TABLET BY MOUTH EVERY 6 HOURS AS NEEDED FOR PAIN 30 tablet 0     buPROPion (WELLBUTRIN) 75 MG tablet Take 75 mg by mouth daily.        multivitamin with minerals tablet Take by mouth daily.        No current facility-administered medications for this visit.        Social History     Tobacco Use   Smoking Status Former Smoker     Last attempt to quit: 2007     Years since quittin.6   Smokeless Tobacco Never Used           OBJECTIVE:        Recent Results (from the past 240 hour(s))   Glycosylated " Hemoglobin A1c   Result Value Ref Range    Hemoglobin A1c 8.7 (H) 3.5 - 6.0 %       Vitals:    02/01/19 1453   BP: 130/70   Patient Position: Sitting   Cuff Size: Adult Regular   Pulse: 73   SpO2: 98%   Weight: 180 lb 5 oz (81.8 kg)     Weight: 180 lb 5 oz (81.8 kg)          Physical Exam:  GENERAL APPEARANCE: 69-year-old female NAD, well hydrated, well nourished  SKIN:  Normal skin turgor, no lesions/rashes   HEENT: moist mucous membranes, no rhinorrhea  NECK: Normal without adenopathy or masses, no jugular venous distention  CV: RRR, no M/G/R   LUNGS: CTAB  ABDOMEN: S&NT, no masses or enlarged organs   EXTREMITY: Right hand dorsum ecchymosis with a 2 cm soft lump over the ulnar aspect of the hand which is tender.  NEURO: 5.0 7 monofilament used and sensation on the dorsum and plantar surface of both feet is 4 out of 4.

## 2021-06-23 NOTE — PROGRESS NOTES
[facility] Progress Note    Assessment:  1.  Coronary artery disease as outlined below.  No complaints of ongoing angina.  She is going to continue with the aspirin and Plavix.  No change in her medication regimen.  She is asked to be more regular with respect to exercise.    2.  Remote history of atrial fibrillation with somewhat limited details.  She did wear a monitor  2 weeks May 2018 that did not demonstrate any atrial fibrillation during that time.  She is asked to check her pulse on a daily basis and to notify me if she notes any irregularity to her heartbeat.    3.  Dyslipidemia.  She is on 80 mg of atorvastatin and reports that she is been taking it regularly.  In July 2018 her LDL was 63 with triglycerides of 191.  Recent hemoglobin A1c of 8.7 and she is following with her primary care physician.    4.  Carotid disease.  Carotid ultrasound results are reviewed.  We discussed the importance of ongoing prudent diet, exercise and medical management.  In addition she was seen in vascular clinic in October that recommendation was to follow-up on an as-needed basis.    Plan: As outlined above with follow-up in 4-5 months.    1. Coronary artery disease of native artery of native heart with stable angina pectoris (H)     2. Essential hypertension     3. Hyperlipidemia with target LDL less than 70           An After Visit Summary was printed and given to the patient.    Subjective:    Kemi Soto is a 69 y.o. female who returned for a planned  follow up visit.  She reports that she said no specific complaints of angina.  She reports a month ago she may have had a brief twinge of chest discomfort lasting only seconds in duration.  She reports that she is been active and been traveling and has had no significant shortness of breath, orthopnea, or palpitations.  I reminded her that I thought it would be a good idea for her to check her pulse a few times per day.  I reviewed recent note by her  primary care physician where she had some hematoma of her right wrist which is improved.  She has been on aspirin and Plavix.  She tells me she is been taking her medications as prescribed.  Previously she did not take Plavix regularly.    She has a history of coronary artery disease with coronary stenting in 2016 the first diagonal and mid LAD with left internal mammary artery to the LAD was patent with moderate disease in the left internal mammary artery and mild in-stent restenosis of the circumflex.  She had subsequent stress test and repeat angiogram August 2018 with a right and circumflex lesions were noted to be small caliber distal vessels.  After review she did undergo stenting to the circumflex.    She tells me she is been avoiding Motrin and trying to take Tylenol instead.        Review of Systems:   General: WNL  Eyes: WNL  Ears/Nose/Throat: WNL  Lungs: WNL  Heart: WNL  Stomach: WNL  Bladder: WNL  Muscle/Joints: WNL  Skin: WNL  Nervous System: WNL  Mental Health: WNL     Blood: WNL      Problem List:    Patient Active Problem List   Diagnosis     History of TIA (transient ischemic attack) and stroke     Essential hypertension     Mixed hyperlipidemia     Chalastodermia     Type 2 diabetes mellitus with hyperglycemia, with long-term current use of insulin (H)     S/P CABG (coronary artery bypass graft)     Bipolar 2 disorder (H)     Anxiety disorder     BMI 36.0-36.9,adult     Noncompliance with medications     Psoriasis, guttate     History of atrial fibrillation     Benign adenomatous polyp of large intestine     Bilateral hearing loss     Coronary artery disease of native artery of native heart with stable angina pectoris (H)     Peripheral neuropathy     Hyperlipidemia with target LDL less than 70       Social History     Socioeconomic History     Marital status: Single     Spouse name: Not on file     Number of children: 1     Years of education: Not on file     Highest education level: Not on file    Social Needs     Financial resource strain: Not on file     Food insecurity - worry: Not on file     Food insecurity - inability: Not on file     Transportation needs - medical: Not on file     Transportation needs - non-medical: Not on file   Occupational History     Occupation: Retired     Comment: Former [employer]    Tobacco Use     Smoking status: Former Smoker     Last attempt to quit: 2007     Years since quittin.6     Smokeless tobacco: Never Used   Substance and Sexual Activity     Alcohol use: No     Comment: stopped drinking 2015     Drug use: No     Sexual activity: No   Other Topics Concern     Not on file   Social History Narrative    Lives alone with cats and dogs. , one daughter, Tory Lofton.        Family History   Problem Relation Age of Onset     Stroke Mother      Diabetes Father      Diabetes Sister      Stroke Sister 81     Stroke Brother      Diabetes Brother        Current Outpatient Medications   Medication Sig Dispense Refill     atorvastatin (LIPITOR) 80 MG tablet Take 1 tablet (80 mg total) by mouth at bedtime. 90 tablet 3     blood glucose meter (GLUCOMETER) Use 1 each As Directed as needed. Dispense glucometer brand per patient's insurance at pharmacy discretion. Dx E11.69, Z79.4 1 each 0     blood glucose test (ACCU-CHEK SMARTVIEW TEST STRIP) strips Check blood sugar 3 times per day. Dispense brand per patient's insurance at pharmacy discretion. 100 strip 3     buPROPion (WELLBUTRIN) 75 MG tablet Take 75 mg by mouth daily.        busPIRone (BUSPAR) 15 MG tablet Take 15 mg by mouth.       clobetasol (TEMOVATE) 0.05 % ointment Apply topically 2 (two) times a day. 30 g 5     clopidogrel (PLAVIX) 75 mg tablet Take 1 tablet (75 mg total) by mouth daily. 90 tablet 3     gabapentin (NEURONTIN) 300 MG capsule Increase to 300 mg in am, 300 mg midday and 900 mg at HS as tolerated (Patient taking differently: Increase to 600 mg in am, 300 mg midday and  "900 mg at HS as tolerated) 450 capsule 3     generic lancets (ACCU-CHEK FASTCLIX) Check blood sugar 3 times per day. Dispense brand per patient's insurance at pharmacy discretion. 102 each 3     ibuprofen (ADVIL,MOTRIN) 200 MG tablet Take 600 mg by mouth every 8 (eight) hours as needed for pain.       insulin glargine (LANTUS; BASAGLAR) 100 unit/mL (3 mL) pen Inject 28 Units under the skin 2 (two) times a day. Pt only took 14 units last evening. 1/2 normal dose       isosorbide mononitrate (IMDUR) 60 MG 24 hr tablet Take 1 tablet (60 mg total) by mouth daily. 90 tablet 3     metFORMIN (GLUCOPHAGE XR) 500 MG 24 hr tablet Take 2 tablets (1,000 mg total) by mouth 2 (two) times a day with meals. 120 tablet 3     metoprolol succinate (TOPROL-XL) 25 MG TAKE ONE TABLET BY MOUTH ONCE DAILY 90 tablet 1     multivitamin with minerals tablet Take by mouth daily.        nitroglycerin (NITROSTAT) 0.4 MG SL tablet Place 1 tablet (0.4 mg total) under the tongue every 5 (five) minutes as needed for chest pain. 25 tablet 12     pen needle, diabetic (ULTICARE PEN NEEDLE) 32 gauge x 5/32\" Ndle Use 1 Device As Directed 2 (two) times a day. 200 each 3     sertraline (ZOLOFT) 100 MG tablet TAKE ONE TABLET (100 MG) BY MOUTH DAILY 90 tablet 3     traMADol (ULTRAM) 50 mg tablet TAKE 1 TABLET BY MOUTH EVERY 6 HOURS AS NEEDED FOR PAIN 30 tablet 0     aspirin 81 mg chewable tablet Chew 1 tablet (81 mg total) daily.  0     No current facility-administered medications for this visit.        Objective:     /70 (Patient Site: Right Arm, Patient Position: Sitting, Cuff Size: Adult Large)   Pulse 80   Resp 16   Ht 5' 2\" (1.575 m)   Wt 179 lb (81.2 kg)   BMI 32.74 kg/m    179 lb (81.2 kg)     132/72    Physical Exam:    GENERAL APPEARANCE: alert, no apparent distress  HEENT: no scleral icterus or xanthelasma  NECK: jugular venous pressure is not elevated on today's examination  CHEST: symmetric, the lungs are clear to " auscultation  CARDIOVASCULAR: regular rhythm without difficult murmur, S4; no carotid bruits  Abdomen: No Organomegaly, masses, bruits, or tenderness. Bowels sounds are present      EXTREMITIES: no cyanosis, clubbing or edema  Mild bruising of the right.wrist  Cardiac Testing:  Indications     CAD (coronary artery disease)   Interpretation Summary       Patient stopped exercise secondary to fatigue. No reported chest discomfort.    The stress electrocardiogram was abnormal with 2 mm and 3 mm of horizontal ST segment depression in the II, III, aVF, V5 and V6 leads, beginning at 5.11 minutes of stress, and returning to baseline after 8 minutes into the recovery phase.    Left ventricle ejection fraction at rest is normal. Resting ejection fraction estimated 55-60%. No obvious resting wall motion abnormality although technically challenging.    Left ventricular hypertrophy.    Post exercise there appears to be augmentation of systolic contraction. The posterior wall post exercise is suboptimally visualized and cannot comment on the posterior wall function post exercise.    Increased premature ventricular complexes in recovery. Short runs of SVT in recovery.    Abnormal stress echocardiogram. Progressive ST-T segment changes with exercise in the setting of resting ST abnormality. Overall left ventricular systolic function appears to augment post exercise. Study is suboptimal secondary to challenging imaging of the posterior wall.    Suggest alternative modality stress test to further define     Conclusion          Estimated blood loss was <20 ml.    A STENT SYNERGY MR 2.25X28 drug eluting stent was successfully placed.    Prox Cx lesion 90% stenosed.    Dist Cx lesion 80% stenosed.    A STENT SYNERGY MR 2.25X20 drug eluting stent was successfully placed.    A STENT SYNERGY MR 2.25X8 drug eluting stent was successfully placed.    LM lesion 60% stenosed.     Manual removal of RFA sheah  Unable to access right radial  artery succesfully  Overnight observation AM discharge        US CAROTID BILATERAL  12/6/2018 3:48 PM     INDICATION: Bilateral carotid bruits.  TECHNIQUE: Duplex exam performed utilizing 2D gray-scale imaging, Doppler interrogation with color-flow and spectral waveform analysis.  COMPARISON: None.     FINDINGS:  RIGHT: There is mild atheromatous plaque. Normal waveforms with no significant stenosis.     LEFT: There is moderate atheromatous plaque. Normal waveforms with no significant stenosis.     Both vertebral arteries and subclavian artery waveforms are normal.     VELOCITY CHART:   The following velocities were obtained in the RIGHT carotid system.  CCA: 75/14 cm/s  ICA: 116/20 cm/s  ECA: 109/5 cm/s  ICA/CCA: PS 1.6     The following velocities were obtained in the LEFT carotid system.  CCA: 92/14 cm/s  ICA: 165/28 cm/s  ECA: 170/21 cm/s  ICA/CCA: PS 1.8                           IMPRESSION:   CONCLUSION:  1.  RIGHT: Less than 50% stenosis of the right ICA.  2.  LEFT: 50-69% stenosis of the left ICA     Evaluation based on velocities and NASCET criteria    Lab Results:    Lab Results   Component Value Date     10/23/2018    K 4.8 10/23/2018     10/23/2018    CO2 26 10/23/2018    BUN 20 10/23/2018    CREATININE 0.94 10/23/2018    CALCIUM 9.5 10/23/2018     Lab Results   Component Value Date    CHOL 146 07/26/2018    TRIG 191 (H) 07/26/2018    HDL 45 (L) 07/26/2018    LDLDIRECT 144 (H) 12/18/2017     No results found for: BNP  Creatinine (mg/dL)   Date Value   10/23/2018 0.94   08/14/2018 0.76   08/13/2018 1.15 (H)   07/26/2018 0.85     LDL Calculated (mg/dL)   Date Value   07/26/2018 63   04/27/2018 72   04/27/2018 72     Lab Results   Component Value Date    WBC 8.4 08/13/2018    WBC 6.1 06/23/2015    HGB 11.8 (L) 08/14/2018    HCT 37.5 08/13/2018    MCV 88 08/13/2018     08/13/2018               This note has been dictated using voice recognition software. Any grammatical or context  distortions are unintentional and inherent to the software.      Chuck Hernandez M.D., F.A.C.C.  [facility], [place]  [phone]

## 2021-06-23 NOTE — TELEPHONE ENCOUNTER
RN cannot approve Refill Request    RN can NOT refill this medication last prescription was written by a hospitalist     Ron Snyder RN BSN, [facility] Triage/Med Refill

## 2021-06-23 NOTE — PATIENT INSTRUCTIONS - HE
Please check your pulse twice a day and call if you feel its irregular.Call with chest discomfort or worsening shortness of breath.My nurse is Priscila and her number is [phone]

## 2021-06-24 NOTE — TELEPHONE ENCOUNTER
Medication Request  Medication name: Buspar 15 mg   Pharmacy Name and Location: [facility]  Reason for request: The order was sent to [facility] and they have not seen patient in over a year.  Is Dr Hampton willing to prescribe Buspar?  Does the patient need to be seen?  Please advise.  When did you use medication last?: unknonw  Patient offered appointment:  Patient not on phone.  Writer did cathyre if [facility] is the provider for this medication patient needs to be seen.   Okay to leave a detailed message: yes

## 2021-06-24 NOTE — TELEPHONE ENCOUNTER
Pt was to be seen in 7/18, patient has not been back to clinic in almost one year, and there is no appointment scheduled

## 2021-06-25 NOTE — PROGRESS NOTES
Progress Notes by Vickie Posey RN at 8/3/2017  1:00 PM     Author: Vickie Posey RN Service: -- Author Type: Registered Nurse    Filed: 8/10/2017  7:37 AM Encounter Date: 8/3/2017 Status: Signed    : Vickie Posey RN (Registered Nurse)         [place] Outcomes Trial: A long term, randomized, double-blinded, placebo-controlled study to determine the effect of albiglutide, when added to standard blood glucose lowering therapies, on major cardiovascular events in patients with Type 2 diabetes mellitus.    Q4 month visit/ Month 4, 8, 12, 16, 20, 24, 28 ,32, 36...EOS  Patient here in [facility] for Q 8month visit for [place] trial ref above.   Contact information checked. MACE event and micro vascular events assessment done. Lab collection done. Patient reported outcomes questionnaires done. KIMBERLY, AE of special interest checked(see below). Medication check done. PE done per provider. Please see source doc and/or EMR note for further detail. Reeducated pt on the injection process.    Study drug reminder, dispense/compliance check done. Dosing diary given. Total number containers dispensed 5, total number pens dispensed 20, total number unused pens returned is 1.

## 2021-06-25 NOTE — TELEPHONE ENCOUNTER
15mg one tablet two times a daily. Last filled August 2019 qty 180 for 3 months supply prescribed by Dr Chelo Sanchez    Message left for patient to call back. Please transfer to call to [facility] huddle when pt calls.

## 2021-06-25 NOTE — PROGRESS NOTES
"Progress Notes by Vickie Posey RN at 12/1/2017  1:00 PM     Author: Vickie Posey RN Service: -- Author Type: Registered Nurse    Filed: 12/4/2017 11:08 AM Encounter Date: 12/1/2017 Status: Signed    : Vickie Posey RN (Registered Nurse)           Q4 month visit/ Month EOS  Patient here in [facility] for Q End of Study visit for [place] trial ref above.   Contact information checked. MACE event and micro vascular events assessment done-None. Lab collection done. Patient reported outcomes questionnaires done. NO  KIMBERLY, or  AE of special interest noted.   Medication check done. Added Atorvastatin. PE done per provider. Please see source doc. The\" EHR Ancillary Study\" consent was explained to the subject. All questions were answered. The  Pt signed the consent. The \"Addendum to the Informed Consent form for Worsening Kidney Function\" was explained to the pt. All questions were answered and the pt signed the consent. The pt declined a copy of both of the consent.      Study drug reminder, dispense/compliance check done. Dosing diary returned Total number containers returned were 3( some from the last visit.). Total number unused pens returned is 0 Pt has 2 boxes at home and will bring them back. This is the last visit for the research study. The pt understands this. Per the protocol the pt will be called in 5 weeks for follow- up. I thanked her for his participation.  Vickie Lozoya RN         "

## 2021-06-25 NOTE — TELEPHONE ENCOUNTER
Please find out from the pharmacy how often she is taking the Buspar 15 mg and if they have it in the pharmacy?  Has been in short supply.    Dr. Zapata

## 2021-06-25 NOTE — PROGRESS NOTES
Progress Notes by Vickie Posey RN at 8/10/2017  1:00 PM     Author: Vickie Posey RN Service: -- Author Type: Registered Nurse    Filed: 8/10/2017  1:17 PM Encounter Date: 8/10/2017 Status: Signed    : Vickie Posey RN (Registered Nurse)         [place] Outcomes Trial: A long term, randomized, double-blinded, placebo-controlled study to determine the effect of albiglutide, when added to standard blood glucose lowering therapies, on major cardiovascular events in patients with Type 2 diabetes mellitus.  Pt came to  the study medication of the increase of 50 mg/ placebo per Dr. Christine's orders. Dispensed 5 cartons and 20 injections. See resupply worksheet for documentation of numbers.   PT returned the month 8 study medication.She is do to take a injection today and she will do this at home.  Instructed her to call of she has any questions.  Vickie Lozoya RN

## 2021-06-25 NOTE — TELEPHONE ENCOUNTER
Pt contacted. She is taking 15mg once daily and has appt with behavioral health/ Dr Huynh on 4/30/2019. She would like enough until then.

## 2021-06-26 NOTE — PROGRESS NOTES
Progress Notes by Chuck Hernandez MD at 5/29/2018 10:50 AM     Author: Chuck Hernandez MD Service: -- Author Type: Physician    Filed: 5/30/2018  1:20 PM Encounter Date: 5/29/2018 Status: Signed    : Chuck Hernandez MD (Physician)       [facility] Progress Note    Assessment:  1.  Coronary artery disease with infrequent episodes of chest discomfort.  Patient recently underwent an exercise stress echocardiogram that was positive by EKG criteria but no obvious evidence of echocardiographic wall motion abnormality although challenging study.  She said some issues with respect to compliance especially with Plavix.  We discussed these findings at length.  We talked about repeat coronary angiography but she is not having significant amount of angina and together we decided to pursue a stress nuclear examination to further define the question of ischemia.    2.  Remote history of atrial fibrillation.  The details are somewhat limited.  She is wearing a two-week event monitor to exclude occult atrial fibrillation.    3.  Hyperlipidemia.  She is on 80 mg of atorvastatin.  She reports that she is taking it regularly now.  Most recent LDL cholesterol is improved from April 2018 where his LDL was 72.  Liver function tests were within normal limits.    4.  Patient in need of colonoscopy secondary to polyps.  I told her today that I was fine with her scheduling the colonoscopy in the near future.        Plan: Stress nuclear exam.            Follow-up 3 months.            Results of event monitor.    1. CAD (coronary artery disease)  NM Exercise Stress Test   2. Essential hypertension     3. Coronary artery disease involving native coronary artery of native heart without angina pectoris     4. S/P CABG (coronary artery bypass graft)           An After Visit Summary was printed and given to the patient.    Subjective:    Kemi Soto is a 68 y.o. female who returned for a planned  follow up visit.  She  reports that she has not had complaints of anginal chest discomfort.  She reports one brief episode of chest discomfort when she awakened a few days ago.  We recently visited April 2018.  She does tell me that she has had some more difficulty with her bipolar symptoms as of late.  His noted previously she takes her medications at times intermittently.  This is specifically related to the Plavix which she states that she does not take every day and have been using a Chinese supplement which I told her that was probably not a good idea.  She indicates in the interim she is stop taking the Chinese supplement.  She has a history of bypass surgery and subsequent stenting in 2016.  She had coronary angiography to demonstrated disease in the first diagonal and mid LAD with a cutting balloon angioplasty to the first diagonal and stenting to the mid LAD and drug-coated stent.  The internal mammary artery to left anterior descending was patent with moderate disease on the left internal mammary artery, moderate disease in the circumflex and mild in-stent restenosis of the right coronary artery.    She recently underwent an exercise stress echocardiogram which was technically challenging.  She did not experience chest discomfort.  He did have progressive EKG changes with an echocardiogram that demonstrated normal systolic function no obvious wall motion abnormality although the posterior wall suboptimally visualized.  She is to be scheduled for colonoscopy.    I went back in the records and she had an episode of atrial fibrillation a few years ago when she was admitted to hospital with acute illness.  She is wearing an event monitor to exclude occult atrial fibrillation.    Review of Systems:   General: WNL  Eyes: WNL  Ears/Nose/Throat: Hearing Loss  Lungs: WNL  Heart: WNL  Stomach: WNL  Bladder: Frequent Urination at Night  Muscle/Joints: WNL  Skin: Poor Wound Healing  Nervous System: WNL  Mental Health: WNL     Blood:  WNL      Problem List:    Patient Active Problem List   Diagnosis   ? History of TIA (transient ischemic attack) and stroke   ? Essential hypertension   ? Mixed hyperlipidemia   ? Coronary artery disease   ? Back pain   ? Chalastodermia   ? Diabetes mellitus with polyneuropathy   ? Type 2 diabetes mellitus with hyperglycemia, with long-term current use of insulin   ? S/P CABG (coronary artery bypass graft)   ? Bipolar 2 disorder   ? Anxiety disorder   ? BMI 36.0-36.9,adult   ? Noncompliance with medications   ? Psoriasis, guttate   ? History of atrial fibrillation   ? History of alcohol abuse   ? Benign adenomatous polyp of large intestine       Social History     Social History   ? Marital status: Single     Spouse name: N/A   ? Number of children: 1   ? Years of education: N/A     Occupational History   ? Retired      Former [employer]     Social History Main Topics   ? Smoking status: Former Smoker     Quit date: 6/23/2007   ? Smokeless tobacco: Never Used   ? Alcohol use No      Comment: stopped drinking 9/2015   ? Drug use: No   ? Sexual activity: No     Other Topics Concern   ? Not on file     Social History Narrative    Lives alone with cats and dogs. , one daughter, Tory Lofton.        Family History   Problem Relation Age of Onset   ? Stroke Mother    ? Diabetes Father    ? Diabetes Sister    ? Stroke Sister 81   ? Stroke Brother    ? Diabetes Brother        Current Outpatient Prescriptions   Medication Sig Dispense Refill   ? atorvastatin (LIPITOR) 80 MG tablet TAKE ONE TABLET (80 MG) BY MOUTH AT BEDTIME 90 tablet 1   ? blood glucose meter (GLUCOMETER) Use 1 each As Directed as needed. Dispense glucometer brand per patient's insurance at pharmacy discretion. Dx E11.69, Z79.4 1 each 0   ? blood glucose test strips Use 1 each As Directed 4 times a day at 7:30am, 11:30am, 4:30pm, and 9:00pm. Brand per formulary. Dx E11.69, Z79.4 200 strip 4   ? buPROPion (WELLBUTRIN) 75 MG tablet  "Take by mouth.     ? clobetasol (TEMOVATE) 0.05 % ointment Apply topically 2 (two) times a day. 30 g 5   ? clobetasol-emollient 0.05 % Crea      ? clopidogrel (PLAVIX) 75 mg tablet Take 1 tablet (75 mg total) by mouth daily. 90 tablet 3   ? gabapentin (NEURONTIN) 300 MG capsule Take 3 capsules (900 mg total) by mouth at bedtime. 270 capsule 3   ? generic lancets (MICROLET LANCET) Use 1 each As Directed 4 times a day at 7:30am, 11:30am, 4:30pm, and 9:00pm. Dx E11.69, Z79.4 200 each 3   ? insulin glargine (LANTUS SOLOSTAR) 100 unit/mL (3 mL) pen Inject 28 Units under the skin 2 (two) times a day. 11.65 Type 2 with hyperglycemia 15 mL 5   ? metFORMIN (GLUCOPHAGE XR) 500 MG 24 hr tablet Take 2 tablets twice daily (Patient taking differently: Take 500 mg by mouth at bedtime. ) 360 tablet 1   ? metoprolol succinate (TOPROL-XL) 25 MG TAKE ONE TABLET BY MOUTH ONCE DAILY 90 tablet 1   ? multivitamin with minerals tablet Take by mouth.     ? nitroglycerin (NITROSTAT) 0.4 MG SL tablet Place 1 tablet (0.4 mg total) under the tongue every 5 (five) minutes as needed for chest pain. 25 tablet 12   ? pen needle, diabetic (ULTICARE PEN NEEDLE) 32 gauge x 5/32\" Ndle Use 1 Device As Directed 2 (two) times a day. 200 each 3   ? sertraline (ZOLOFT) 100 MG tablet TAKE ONE TABLET (100 MG) BY MOUTH DAILY 90 tablet 3   ? traMADol (ULTRAM) 50 mg tablet TAKE 1 TABLET BY MOUTH EVERY 6 HOURS AS NEEDED FOR PAIN 30 tablet 0     No current facility-administered medications for this visit.        Objective:     /72 (Patient Site: Right Arm, Patient Position: Sitting, Cuff Size: Adult Regular)  Pulse 80  Resp 20  Ht 5' 2\" (1.575 m)  Wt 179 lb 6.4 oz (81.4 kg)  BMI 32.81 kg/m2  179 lb 6.4 oz (81.4 kg)       Physical Exam:    GENERAL APPEARANCE: alert, no apparent distress  HEENT: no scleral icterus or xanthelasma  NECK: jugular venous pressure within normal limits  CHEST: symmetric, the lungs are clear to auscultation  CARDIOVASCULAR: " regular rhythm without murmur, S4; no carotid bruits  Abdomen: No Organomegaly, masses, bruits, or tenderness. Bowels sounds are present      EXTREMITIES: no cyanosis, clubbing or edema    Cardiac Testing:     Patient Information      Patient Name MRN Sex              Age     Kemi Soto [id number] Female 1950 (68 y.o.)       EKG Scan       Scan on: 18 2:31 PM by:       Indications      CAD (coronary artery disease)       Interpretation Summary        Patient stopped exercise secondary to fatigue. No reported chest discomfort.    The stress electrocardiogram was abnormal with 2 mm and 3 mm of horizontal ST segment depression in the II, III, aVF, V5 and V6 leads, beginning at 5.11 minutes of stress, and returning to baseline after 8 minutes into the recovery phase.    Left ventricle ejection fraction at rest is normal. Resting ejection fraction estimated 55-60%. No obvious resting wall motion abnormality although technically challenging.    Left ventricular hypertrophy.    Post exercise there appears to be augmentation of systolic contraction. The posterior wall post exercise is suboptimally visualized and cannot comment on the posterior wall function post exercise.    Increased premature ventricular complexes in recovery. Short runs of SVT in recovery.    Abnormal stress echocardiogram. Progressive ST-T segment changes with exercise in the setting of resting ST abnormality. Overall left ventricular systolic function appears to augment post exercise. Study is suboptimal secondary to challenging imaging of the posterior wall.    Suggest alternative modality stress test to further define.          Date of      2016 10:31   Study        AM      Procedure      Procedure Type       Diagnostic procedure:Left Heart Catheterization , Venous Graft   Catheterization, LIMA Graft Catheterization, Coronary   Angiogram, ACT       PCI procedure:Drug Eluting Coronary Stent       Conclusions       Procedure  Summary   - Dyspnea and fatigue with abnormal stress test. Diabetic with   CABG in 2007 with angiogram in 2008 reporting both SVG (PDA   and diagonal) were occluded. Extensive, complex stenting at   that time of Left main/LAD/LCx and subsequent RCA stenting.   - 6 Fr right groin access   - Co-dominant arterial systems   - Patent LAD stent   - Severe ostial first diagonal (large artery) disease followed   by severe mid LAD disease that feeds a good sized second   diagonal. The first diagonal was treated with cutting balloon   angioplasty and the mid LAD was stented with a Promus STEVENSON with   good results. There is a LIMA to the mid/distal LAD and there   is moderate LAD disease beyond the LIMA in the distal LAD.   - The circumflex has moderate proximal and distal instent   restenosis   - The RCA has mild instent restenosis in the mid section. The   distal artery is very small in caliber and is diffusely   diseased.   - LV EDP 10-12 mmHg       Recommendations   - Continue clopidogrel for 12 months without interruption   - Aspirin 81mg daily and high dose atorvastatin indefinitely   - Cardiac rehab phase II   - Diet and diabetes optimization   - FU NP [facility] in 2 weeks and Dr. Hernandez in 6 weeks   - Consider long acting nitrate for small vessel disease       Signatures       Electronically signed by MAXIMO COOPER MD   (Interventional Physician) on 03/30/2016 at 01:22 PM    Lab Results:    Lab Results   Component Value Date     04/27/2018    K 4.6 04/27/2018     04/27/2018    CO2 27 04/27/2018    BUN 15 04/27/2018    CREATININE 0.79 04/27/2018    CALCIUM 9.3 04/27/2018     Lab Results   Component Value Date    CHOL 133 04/27/2018    CHOL 133 04/27/2018    TRIG 101 04/27/2018    TRIG 101 04/27/2018    HDL 41 (L) 04/27/2018    HDL 41 (L) 04/27/2018    LDLDIRECT 144 (H) 12/18/2017     No results found for: BNP  Creatinine (mg/dL)   Date Value   04/27/2018 0.79   12/18/2017 0.78   06/14/2017 0.88   05/11/2017  0.84     LDL Calculated (mg/dL)   Date Value   04/27/2018 72   04/27/2018 72   07/18/2017 81     Lab Results   Component Value Date    WBC 7.4 06/05/2017    WBC 6.1 06/23/2015    HGB 13.0 06/05/2017    HCT 38.2 06/05/2017    MCV 86 06/05/2017     06/05/2017               This note has been dictated using voice recognition software. Any grammatical or context distortions are unintentional and inherent to the software.      Chuck Hernandez M.D., F.A.C.C.  [facility]  [phone]

## 2021-06-26 NOTE — PROGRESS NOTES
Progress Notes by Rafat Black NP at 8/27/2018  1:30 PM     Author: Rafat Black NP Service: -- Author Type: Nurse Practitioner    Filed: 8/27/2018  2:55 PM Encounter Date: 8/27/2018 Status: Signed    : Rafat Black NP (Nurse Practitioner)                 Click to link to [facility]       [facility] HEART CARE NOTE      Assessment/Recommendations   1. Coronary artery disease with s/p CABG X 3 in 12/2007, PCI stent in 2008 and 2016:  Patient has been having intermittent chest pain with recent abnormal stress test  Coronary angiogram on 8/13/2018 showed 90% stenosis in proximal circumflex, 80% stenosis in distal circumflex, and 60% stenosis in the left main.  Each of these lesions were successfully treated with a drug-eluting stents. Dual antiplatelet therapy is being used with aspirin indefinitely and clopidogrel for 1 year. We discussed the importance of antiplatelet therapy and talking with her cardiologist prior to stopping these medications for any reason.    Patient feels about 60-70 % improvement in her chest pain although ocasionally She gets chest pain that last for couple seconds with no other associated symptoms.Risk factor modification and lifestyle management topics were discussed including managing comorbidities, weight loss, heart healthy diet, exercise and stress reduction.  Cardiac rehab has been ordered and she is initiating tomorrow. She was encouraged to seek medical attention if recurrent progressive chest pain. She takes Ibuprofen for pain control. Recommended minimal use to prevent increase risk of bleeding risk, renal injury, and CV risk.    2.  Dyslipidemia: Kemi Soto is on high intensity statin therapy with Atorvastatin 80 mg daily. Most recent LDL is 62.   We discussed a diet low in saturated fat, weight loss, and exercise along with medication for better control of cholesterol.     3.  Hypertension: Her blood pressure is 160/80. Not taken her metoprolol this  morning. Per patient , she used to be on lisinopril but was stopped d/t low BP. She is going to participate in cardiac rehab and her BP will be monitored. She was encouraged to call dr. Hernandez if BP staying persistently >130/80-may consider restarting her on low dose lisinopril.    4.  Diabetes: Most recent A1C is 8.8.  We discussed the importance of tightly controlled blood sugars to minimize risk of worsening coronary artery disease. Defer to PCP for diabetes managment.    5. H/o non compliance with medications: Reports being compliant with her heart medications although she has not taken her morning medications today yet. She was encouraged to take the medications as soon as she gets home- verbalized understanding and agree with plan.    Follow up with Dr. Hernandez in October as scheduled.     History of Present Illness    Kemi Soto is 68 y.o. with a past medical history of coronary artery disease with status post CABG ×3 in December 2007, status post PCI stent in 2008 and 2016, history of TIA and stroke, type 2 diabetes, noncompliance with medication, and recent abnormal stress test who is seen at [facility] for post coronary intervention follow up.  Patient has been having intermittent chest pain.  Her stress test showed large area of ischemia in the anterior, lateral, apical, anterolateral, and inferolateral segment of the LV. Also noted ST segment depression in leads II, III, aVF, V4, V5 and V6.  Noted increased stress to rest cavity ratio 1.5 is consistent with diffuse subendocardial ischemia.  Patient underwent elective coronary angiogram on 8/13/2018 showed 90% stenosis in proximal circumflex, 80% stenosis in  distal circumflex, and 60% stenosis in the left main.  Each of these lesions were successfully treated with a drug-eluting stents. Dual antiplatelet therapy is being used with aspirin indefinitely and clopidogrel for 1 year. recent her recent stress echo showed an ejection fraction  59%.    Today, patient reports she feels about 60-70% improvement in her chest pain although occasionally she gets very brief chest pain that lasts for couple seconds.  She denies having any other associated symptoms.  She denies fatigue, lightheadedness, shortness of breath, dyspnea on exertion, orthopnea, PND, palpitations, abdominal fullness/bloating and lower extremity edema. She is chest pain-free during the visit.  She is going to initiate her cardiac rehab soon.  She reports following heart healthy diet at home.  Her blood pressure is elevated today systolic in 160s.  She has not taken her metoprolol yet.  Her blood pressure in the past has been in 130s-140s in the past. She is starting her cardiac rehab and her BP and symptoms will be monitored.    Patient expressed frustration about the care she she receives here at [facility] for the last couple years and she lost her belongings here in the hospital couple years ago. She was offered to contact patient advocate but she declined.     Coronary Angiogram on 8/13/2018  Conclusion        Estimated blood loss was <20 ml.    A STENT SYNERGY MR 2.25X28 drug eluting stent was successfully placed.    Prox Cx lesion 90% stenosed.    Dist Cx lesion 80% stenosed.    A STENT SYNERGY MR 2.25X20 drug eluting stent was successfully placed.    A STENT SYNERGY MR 2.25X8 drug eluting stent was successfully placed.    LM lesion 60% stenosed.      Manual removal of RFA sheah  Unable to access right radial artery succesfully  Overnight observation AM discharge      Recommendations   ? Patient given specific instructions regarding care of arteriotomy site, activity restrictions, signs and symptoms of cardiac or vascular complications and to seek immediate medical evaluation should they occur.  ? The patient will be observed in telemetry overnight and if stable, the patient will be allowed to return home tomorrow.  ? Continuation of dual antiplatelet therapy for 12 month(s).         Physical Examination Review of Systems   Vitals:    08/27/18 1351   BP: 160/80   Pulse: 68   Resp: 14   Temp: 98.2  F (36.8  C)     Body mass index is 32.92 kg/(m^2).  Wt Readings from Last 3 Encounters:   08/27/18 180 lb (81.6 kg)   08/21/18 180 lb 12.8 oz (82 kg)   08/20/18 180 lb 5 oz (81.8 kg)       General Appearance:     Alert, cooperative and in no acute distress.   ENT/Mouth: membranes moist, no oral lesions or bleeding gums.      EYES:  no scleral icterus, normal conjunctivae   Neck: no carotid bruits or thyromegaly   Chest/Lungs:   lungs are clear to auscultation, no rales or wheezing, respirations unlabored   Cardiovascular:    Heart rateRegular. Normal first and second heart sounds with no murmurs, rubs, or gallops; the carotid, radial and posterior tibial pulses are intact, Jugular venous pressure flat with no HJR, no edema bilateral lower extremities    Abdomen:  Soft, nontender, nondistended, bowel sounds present   Extremities: no cyanosis or clubbing   Skin:  Neurologic: warm, dry.  mood and affect are appropriate, alert and oriented x3     Puncture Site: Left radial and Right femoral site is soft with no bruising.Weak pulse on right radial but site intact with no hematoma and CMS+ with SaO2-98%.  Radial pulses and Pedal pulses intact and symmetrical.  CMS intact.      General: WNL  Eyes: WNL  Ears/Nose/Throat: WNL  Lungs: WNL  Heart: Chest Pain  Stomach: WNL  Bladder: WNL  Muscle/Joints: Joint Pain, Muscle Weakness  Skin: WNL  Nervous System: WNL  Mental Health: WNL     Blood: Easy Bruising     Medical History  Surgical History Family History Social History   Past Medical History:   Diagnosis Date   ? Coronary artery disease    ? Diabetes mellitus (H)    ? Discitis of thoracolumbar region 10/10/2015   ? Encephalopathy 10/14/2015   ? Epidural abscess 10/10/2015   ? Hip pain, right    ? Hyperlipidemia    ? Sepsis (H) 10/8/2015   ? Stroke (H) 06/23/2015    Neurology felt that episode was C/W  subacute ischemic stroke   ? TIA (transient ischemic attack) 6/23/2015   ? UTI (urinary tract infection)     admitted to [facility] with UTI    Past Surgical History:   Procedure Laterality Date   ? CARPAL TUNNEL RELEASE Bilateral    ? CORONARY ANGIOPLASTY WITH STENT PLACEMENT  03/30/2016    Drug eluting stent mid LAD, cutting balloon to 1st diagonal   ? CORONARY ANGIOPLASTY WITH STENT PLACEMENT  2008   ? CORONARY ARTERY BYPASS GRAFT  12/11/2007    X 3 vessels, LIMA to LAD, saph vein graft to distal RCA, saphvein graft to marginal, mini circuit bypass.  [facility].   ? CORONARY STENT PLACEMENT  2008    Left main, left circumflex, RCA   ? CORONARY STENT PLACEMENT  03/2016   ? CV CORONARY ANGIOGRAM N/A 8/1/2018    Procedure: Coronary Angiogram;  Surgeon: Kit Bean MD;  Location: [facility] Cath Lab;  Service:    ? CV LEFT HEART CATHETERIZATION WITH LEFT VENTRICULOGRAM N/A 8/1/2018    Procedure: Left Heart Catheterization with Left Ventriculogram;  Surgeon: Kit Bean MD;  Location: [facility] Cath Lab;  Service:    ? INSERT MIDLINE HE  10/31/2015        ? LUMBAR DISCECTOMY N/A 10/11/2015    Procedure: BILATERAL L1-L5 DECOMPRESSIVE LAMINECTOMY WITH EVACUATION OF EPIDURAL ABSCESS IRRIGATION & DEBRIDEMENT;  Surgeon: Luli Chan MD;  Location: [facility] OR;  Service:    ? PICC  10/19/2015         Family History   Problem Relation Age of Onset   ? Stroke Mother    ? Diabetes Father    ? Diabetes Sister    ? Stroke Sister 81   ? Stroke Brother    ? Diabetes Brother     Social History     Social History   ? Marital status: Single     Spouse name: N/A   ? Number of children: 1   ? Years of education: N/A     Occupational History   ? Retired      Former [employer]      Social History Main Topics   ? Smoking status: Former Smoker     Quit date: 6/23/2007   ? Smokeless tobacco: Never Used   ? Alcohol use No      Comment: stopped drinking 9/2015   ? Drug  use: No   ? Sexual activity: No     Other Topics Concern   ? Not on file     Social History Narrative    Lives alone with cats and dogs. , one daughter, Tory Lofton.           Medications  Allergies   Current Outpatient Prescriptions   Medication Sig Dispense Refill   ? aspirin 81 mg chewable tablet Chew 1 tablet (81 mg total) daily.  0   ? atorvastatin (LIPITOR) 80 MG tablet Take 1 tablet (80 mg total) by mouth at bedtime. 90 tablet 3   ? blood glucose meter (GLUCOMETER) Use 1 each As Directed as needed. Dispense glucometer brand per patient's insurance at pharmacy discretion. Dx E11.69, Z79.4 1 each 0   ? blood glucose test (ACCU-CHEK SMARTVIEW TEST STRIP) strips Check blood sugar 3 times per day. Dispense brand per patient's insurance at pharmacy discretion. 100 strip 3   ? buPROPion (WELLBUTRIN) 75 MG tablet Take 75 mg by mouth daily.      ? clobetasol (TEMOVATE) 0.05 % ointment Apply topically 2 (two) times a day. 30 g 5   ? clopidogrel (PLAVIX) 75 mg tablet Take 1 tablet (75 mg total) by mouth daily. 90 tablet 3   ? gabapentin (NEURONTIN) 300 MG capsule Increase to 300 mg in am, 300 mg midday and 900 mg at HS as tolerated 450 capsule 3   ? generic lancets (ACCU-CHEK FASTCLIX) Check blood sugar 3 times per day. Dispense brand per patient's insurance at pharmacy discretion. 102 each 3   ? ibuprofen (ADVIL,MOTRIN) 200 MG tablet Take 600 mg by mouth every 8 (eight) hours as needed for pain.     ? insulin glargine (LANTUS; BASAGLAR) 100 unit/mL (3 mL) pen Inject 28 Units under the skin 2 (two) times a day. Pt only took 14 units last evening. 1/2 normal dose     ? isosorbide mononitrate (IMDUR) 60 MG 24 hr tablet Take 1 tablet (60 mg total) by mouth daily. 60 tablet 3   ? metFORMIN (GLUCOPHAGE XR) 500 MG 24 hr tablet Take 1 tablet twice daily 60 tablet 0   ? metoprolol succinate (TOPROL-XL) 25 MG TAKE ONE TABLET BY MOUTH ONCE DAILY 90 tablet 1   ? multivitamin with minerals tablet Take by mouth daily.      ?  "nitroglycerin (NITROSTAT) 0.4 MG SL tablet Place 1 tablet (0.4 mg total) under the tongue every 5 (five) minutes as needed for chest pain. 25 tablet 12   ? pen needle, diabetic (ULTICARE PEN NEEDLE) 32 gauge x 5/32\" Ndle Use 1 Device As Directed 2 (two) times a day. 200 each 3   ? sertraline (ZOLOFT) 100 MG tablet TAKE ONE TABLET (100 MG) BY MOUTH DAILY 90 tablet 3   ? traMADol (ULTRAM) 50 mg tablet TAKE 1 TABLET BY MOUTH EVERY 6 HOURS AS NEEDED FOR PAIN 30 tablet 0     No current facility-administered medications for this visit.       Allergies   Allergen Reactions   ? Oxycontin [Oxycodone] Nausea Only         Lab Results    Chemistry CBC BNP   Lab Results   Component Value Date    CREATININE 0.76 08/14/2018    BUN 18 08/14/2018     08/14/2018    K 3.9 08/14/2018     (H) 08/14/2018    CO2 26 08/14/2018     Creatinine (mg/dL)   Date Value   08/14/2018 0.76   08/13/2018 1.15 (H)   07/26/2018 0.85   04/27/2018 0.79    Lab Results   Component Value Date    WBC 8.4 08/13/2018    HGB 11.8 (L) 08/14/2018    HCT 37.5 08/13/2018    MCV 88 08/13/2018     08/13/2018    No results found for: BNP  No results found for: BNP     Rafat Black CNP  [facility]      This note has been dictated using voice recognition software. Any grammatical, typographical, or context distortions are unintentional and inherent to the software       "

## 2021-06-28 NOTE — PROGRESS NOTES
"Progress Notes by Chuck Hernandez MD at 3/19/2020 11:30 AM     Author: Chuck Hernandez MD Service: -- Author Type: Physician    Filed: 3/19/2020 12:00 PM Encounter Date: 3/19/2020 Status: Signed    : Chuck Hernandez MD (Physician)       Kemi Soto is a 70 y.o. female who is being evaluated via a billable telephone visit.      The patient has been notified of following:     \"This telephone visit will be conducted via a call between you and your physician/provider. We have found that certain health care needs can be provided without the need for a physical exam.  This service lets us provide the care you need with a short phone conversation.  If a prescription is necessary we can send it directly to your pharmacy.  If lab work is needed we can place an order for that and you can then stop by our lab to have the test done at a later time.    If during the course of the call the physician/provider feels a telephone visit is not appropriate, you will not be charged for this service.\"     Kemi Soto complains of    Chief Complaint   Patient presents with   ? Follow-up       I have reviewed and updated the patient's Past Medical History, Social History, Family History and Medication List.    ALLERGIES  Oxycontin [oxycodone]    Additional provider notes: We visited by telephone because of the concerns related to the Covid virus.  She has a history of coronary artery disease with prior coronary stents and bypass surgery.  Most recent coronary intervention was involving the circumflex where she had 2 stents placed to the proximal and distal circumflex.  She does have diffuse disease.  Coronary angiogram in 2016 with stenting to the first diagonal and mid LAD with left internal mammary artery to the LAD with moderate disease in the left internal mammary artery and mild in-stent restenosis of the circumflex.  She did have stress testing in 2018 where left ventricular function was normal and subsequently she " underwent repeat coronary angiogram.  She tells me she has been monitoring for any symptoms.  We talked about discontinuing the Plavix but her preference was to stay on the current combination of aspirin and Plavix.    ? History of TIA (transient ischemic attack) and stroke   ? Essential hypertension   ? Mixed hyperlipidemia   ? Chalastodermia   ? Type 2 diabetes mellitus with hyperglycemia, with long-term current use of insulin (H)   ? S/P CABG (coronary artery bypass graft)   ? Bipolar 2 disorder (H)   ? Anxiety disorder   ? BMI 36.0-36.9,adult   ? Noncompliance with medications   ? Psoriasis, guttate   ? History of atrial fibrillation   ? Benign adenomatous polyp of large intestine   ? Bilateral hearing loss   ? Coronary artery disease of native artery of native heart with stable angina pectoris (H)   ? Peripheral neuropathy   ? Hyperlipidemia with target LDL less than 70                            Cardiac Catheterization   Order# [id number]   Reading physician: Kit Bean MD  Ordering physician: Kit Bean MD  Study date: 18    Patient Information     Patient Name   Kemi Soto  MRN   [id number]  Sex   Female   1   1950 (68 y.o.)    Physicians     Panel Physicians  Referring Physician  Case Authorizing Physician    Kit Bean MD (Primary)  Kit Bean MD Koller, Patrick T, MD     PCP     Primary Care Provider    Ange Hampton MD    Phone: [phone]       Procedures     Percutaneous Coronary Intervention    Panel Information     Panel 1     Provider  Role    Kit Bean MD  Primary     Procedure  Laterality  Anesthesia    Percutaneous Coronary Intervention  N/A  Conscious Sedation (RN)    8am admit, [place] ma, 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          Conclusion           Estimated blood loss was <20 ml.    A STENT SYNERGY MR 2.25X28 drug eluting stent was successfully placed.    Prox Cx lesion 90% stenosed.    Dist Cx lesion 80% stenosed.    A STENT SYNERGY MR 2.25X20 drug eluting stent was successfully placed.    A STENT SYNERGY MR 2.25X8 drug eluting stent was successfully placed.    LM lesion 60% stenosed.     Manual removal of RFA sheah  Unable to access right radial artery succesfully  Overnight observation AM discharge      Conclusion          Estimated blood loss was <20 ml.    Prox Cx to Mid Cx lesion 90% stenosed.    Lat 1st Mrg-2 lesion 80% stenosed.    Lat 1st Mrg-1 lesion 90% stenosed.    Mid Cx lesion 80% stenosed.    Ost LAD to Prox LAD lesion 40% stenosed.    A drug eluting .    Ost Cx to Prox Cx lesion 35% stenosed.    A drug eluting .    Ost 1st Diag lesion 50% stenosed.    1st Mrg lesion 70% stenosed.    Mid LAD lesion 100% stenosed.    Ost RCA to Mid RCA lesion 30% stenosed.    Mid RCA lesion 70% stenosed.    Post Atrio lesion 40% stenosed.    Ost RPDA to RPDA lesion 40% stenosed.    The left ventricular size is normal. The left ventricular systolic function is normal. LV systolic pressure is normal. LV end diastolic pressure is normal. There are no wall motion abnormalities in the left ventricle.    Dist LAD-1 lesion 50% stenosed.    Dist LAD-2 lesion 70% stenosed.     Patient has 2 previous saphenous vein grafts known to be occluded.     Both right coronary and circumflex have significant lesions present.  Both precede small caliber distal vessels.  Options would include partial revascularization by stenting right coronary and circumflex with small caliber stents.  Circumflex has a relatively long segment of disease.  Alternative would be continued medical management.            Assessment/Plan:  1.  Coronary artery disease.  No complaints of angina.  Multivessel disease with most recent stenting in 2018 to the circumflex.  He did talk about whether we  would discontinue Plavix at this point but her preference was to continue with the aspirin and Plavix and we will revisit once the virus runs its course.  She is asked to contact us with any specific questions or concerns.    2.  Dyslipidemia.  She has on 80 mg of atorvastatin.  In July 2018 her LDL was 63.  Subsequent to my last visit she had laboratory studies drawn through her primary care provider where the LDL was recorded higher at 95.  She tells me she has been taking her medications.  I would like to have a repeat lipid panel again once the virus runs its course so that we can document that the LDL is at goal at 70 or less and if not then would consider the addition of Zetia.  We discussed this today.    3.  Carotid disease.  Carotid ultrasound from December 2018 finds a right carotid less than 50% and left 50 to 69%.  Ongoing aggressive risk factor modification is recommended.    4.  History of remote atrial fibrillation.  She wore a monitor in 2018 that did not demonstrate atrial fibrillation.  I have asked her to monitor her pulse regularly.    Phone call duration: Phone call started 1139                                   Phone call completed [phone]    Chuck Hernandez MD

## 2021-06-29 NOTE — PROGRESS NOTES
Progress Notes by Chuck Hernandez MD at 11/4/2020  2:10 PM     Author: Chuck Hernandez MD Service: -- Author Type: Physician    Filed: 11/4/2020  3:23 PM Encounter Date: 11/4/2020 Status: Signed    : Chuck Hernandez MD (Physician)             [facility] Progress Note    Assessment:  1.  Coronary artery disease.  No complaints of angina or findings of congestive heart failure.  She has been persistently on a combination of aspirin and Plavix we talked about whether we would discontinue the aspirin in favor of just Plavix.  She does tell me she is going to winter in [place].    2.  Tachycardia on examination.  EKG is pending.  Remote history of atrial fibrillation with somewhat limited details.  I did have her wear a monitor for 2 weeks in 2018 and asked her to monitor her heart rhythm.  She is not aware of a fast heart rhythm currently.    3.  Dyslipidemia.  On 80 mg of atorvastatin.  Recent lipids are reviewed from October 2020 where the LDL was 72, renal function normal, liver function tests within normal limits.    4.  History of carotid stenosis with moderate on the left and mild on the right in 2018.    5.  Asymptomatic tachycardia.  She is noted to be tachycardic today and a subsequent EKG demonstrates atrial flutter with 2-1 conduction.  She really is not experiencing any symptoms.  I recommended however that we have her go to the ER for rate control and further assessment.  Her tentative plan before coming to the office today was to drive to [place] which we indicated needed to be postponed until further investigation of this issue.    Plan: ER secondary to atrial flutter with rapid ventricular response and further recommendations pending the additional work-up.    1. Tachycardia  ECG Clinic - Today   2. Coronary artery disease  clopidogreL (PLAVIX) 75 mg tablet    nitroglycerin (NITROSTAT) 0.4 MG SL tablet    ECG Clinic - Today   3. Essential hypertension     4. Mixed  hyperlipidemia     5. History of atrial fibrillation     6. Coronary artery disease of native artery of native heart with stable angina pectoris (H)           An After Visit Summary was printed and given to the patient.    Subjective:    Kemi Soto is a 70 y.o. female who returned for a planned  follow up visit.  She reports today that she has not been having any complaints of anginal chest discomfort or use of nitroglycerin.  She has been walking regularly until the weather became cold and tells me she is planning on driving to [place] tomorrow.  During my examination I did note that she was tachycardic and I asked her if she had been feeling any irregular heart rhythm and she stated that she just felt a little anxious today.    She has not been checking her pulse regularly which we have discussed previous.  She has a history of coronary stenting in 2016 after prior bypass surgery to a first diagonal and mid LAD with left internal mammary artery to the LAD patent with moderate disease in the left internal mammary artery and mild in-stent restenosis of the circumflex.  Repeat coronary angiogram August 2018 with right and circumflex lesions that were noted to be small caliber vessels with stenting to the circumflex.    Reviewing her chart record she had a remote history of atrial fibrillation and wore an event monitor a few years ago.  She has been somewhat noncompliant with medications in the past but tells me she has been taking her medications more regularly.    Review of Systems:   General: Weight Gain  Eyes: WNL  Ears/Nose/Throat: Hearing Loss  Lungs: WNL  Heart: WNL  Stomach: WNL  Bladder: WNL  Muscle/Joints: Joint Pain, Muscle Weakness, Muscle Pain  Skin: WNL  Nervous System: WNL  Mental Health: WNL     Blood: Easy Bruising      Problem List:    Patient Active Problem List   Diagnosis   ? History of TIA (transient ischemic attack) and stroke   ? Essential hypertension   ? Mixed hyperlipidemia   ?  Chalastodermia   ? Type 2 diabetes mellitus with hyperglycemia, with long-term current use of insulin (H)   ? S/P CABG (coronary artery bypass graft)   ? Bipolar 2 disorder (H)   ? Anxiety disorder   ? BMI 36.0-36.9,adult   ? Noncompliance with medications   ? Psoriasis, guttate   ? History of atrial fibrillation   ? Benign adenomatous polyp of large intestine   ? Bilateral hearing loss   ? Coronary artery disease of native artery of native heart with stable angina pectoris (H)   ? Peripheral neuropathy   ? Hyperlipidemia with target LDL less than 70       Social History     Socioeconomic History   ? Marital status: Single     Spouse name: Not on file   ? Number of children: 1   ? Years of education: Not on file   ? Highest education level: Not on file   Occupational History   ? Occupation: Retired     Comment: [employer]   Social Needs   ? Financial resource strain: Not on file   ? Food insecurity     Worry: Not on file     Inability: Not on file   ? Transportation needs     Medical: Not on file     Non-medical: Not on file   Tobacco Use   ? Smoking status: Former Smoker     Quit date: 2007     Years since quittin.3   ? Smokeless tobacco: Never Used   Substance and Sexual Activity   ? Alcohol use: No     Comment: stopped drinking 2015   ? Drug use: No   ? Sexual activity: Never   Lifestyle   ? Physical activity     Days per week: Not on file     Minutes per session: Not on file   ? Stress: Not on file   Relationships   ? Social connections     Talks on phone: Not on file     Gets together: Not on file     Attends Muslim service: Not on file     Active member of club or organization: Not on file     Attends meetings of clubs or organizations: Not on file     Relationship status: Not on file   ? Intimate partner violence     Fear of current or ex partner: Not on file     Emotionally abused: Not on file     Physically abused: Not on file     Forced sexual activity: Not on  file   Other Topics Concern   ? Not on file   Social History Narrative    Lives alone with cats and dogs. , one daughter, Tory Lofton.        Family History   Problem Relation Age of Onset   ? Stroke Mother    ? Diabetes Father    ? Diabetes Sister    ? Stroke Sister 81   ? Stroke Brother    ? Diabetes Brother        Current Outpatient Medications   Medication Sig Dispense Refill   ? acetaminophen (TYLENOL) 500 MG tablet Take 500 mg by mouth every 6 (six) hours as needed for pain.     ? ascorbic acid, vitamin C, (ASCORBIC ACID WITH BARBIE HIPS) 500 MG tablet Take 500 mg by mouth daily.     ? aspirin 81 mg chewable tablet Chew 1 tablet (81 mg total) daily.  0   ? atorvastatin (LIPITOR) 80 MG tablet TAKE 1 TABLET BY MOUTH AT BEDTIME 90 tablet 3   ? blood glucose test strips Smartview test strips. Check blood sugar 3 times per day. 100 strip 5   ? busPIRone (BUSPAR) 15 MG tablet TAKE 1 TABLET BY MOUTH ONCE DAILY 90 tablet 3   ? clopidogreL (PLAVIX) 75 mg tablet Take 1 tablet (75 mg total) by mouth daily. 90 tablet 4   ? gabapentin (NEURONTIN) 300 MG capsule TAKE 3 CAPSULES BY MOUTH AT BEDTIME 270 capsule 3   ? generic lancets (FINGERSTIX LANCETS) Check blood sugar 3 times per day. Fastclix lancets. 100 each 5   ? insulin glargine (LANTUS SOLOSTAR U-100 INSULIN) 100 unit/mL (3 mL) pen Inject 28 Units under the skin 2 (two) times a day. 60 mL 1   ? isosorbide mononitrate (IMDUR) 60 MG 24 hr tablet TAKE 1 TABLET BY MOUTH ONCE DAILY . 90 tablet 1   ? losartan (COZAAR) 50 MG tablet Take 1 tablet (50 mg total) by mouth daily. 90 tablet 2   ? metoprolol succinate (TOPROL-XL) 25 MG Take 1 tablet (25 mg total) by mouth daily. 90 tablet 1   ? multivitamin with minerals tablet Take by mouth daily.      ? nitroglycerin (NITROSTAT) 0.4 MG SL tablet Place 1 tablet (0.4 mg total) under the tongue every 5 (five) minutes as needed for chest pain. 25 tablet 12   ? omega-3s/dha/epa/fish oil (OMEGA 3 ORAL) Take 1 capsule by mouth  "daily.     ? pen needle, diabetic (ULTICARE PEN NEEDLE) 32 gauge x 5/32\" Ndle Use 1 each As Directed 4 (four) times a day. 200 each 3   ? QUEtiapine (SEROQUEL) 25 MG tablet Take 1-2 tablets (25-50 mg total) by mouth at bedtime as needed. 60 tablet 3   ? sertraline (ZOLOFT) 100 MG tablet Take 1 tablet by mouth once daily 30 tablet 0   ? traMADoL (ULTRAM) 50 mg tablet TAKE 1 TABLET BY MOUTH EVERY 6 HOURS AS NEEDED FOR PAIN 30 tablet 0   ? b complex vitamins tablet Take 1 tablet by mouth daily.     ? B,C/folic/zinc/copper ox/vit E (STRESS B-COMPLEX ORAL) Take 1 tablet by mouth daily.     ? cyanocobalamin, vitamin B-12, (VITAMIN B-12) 500 mcg Lozg Take 1,000 mcg by mouth daily.     ? fluocinonide (LIDEX) 0.05 % ointment Apply twice daily as needed to psoriasis 60 g 2   ? insulin aspart U-100 (NOVOLOG FLEXPEN U-100 INSULIN) 100 unit/mL (3 mL) injection pen Inject 5 units before breakfast, 10 units before lunch and dinner. Total daily dose including priming the pen=30 units/day 15 mL 3   ? insulin lispro (HUMALOG KWIKPEN INSULIN) 100 unit/mL pen Inject 5 units before breakfast, 10 units before lunch and dinner. Total daily dose including priming the pen=30 units/day 5 adj dose pen 1   ? metFORMIN (GLUCOPHAGE XR) 500 MG 24 hr tablet Take 2 tablets (1,000 mg total) by mouth 2 (two) times a day with meals. 360 tablet 1     No current facility-administered medications for this visit.        Objective:     /60 (Patient Site: Right Arm, Patient Position: Sitting, Cuff Size: Adult Large)   Pulse 68   Resp 14   Ht 5' 2.25\" (1.581 m)   Wt 174 lb (78.9 kg)   BMI 31.57 kg/m    174 lb (78.9 kg)   [unfilled]  Wt Readings from Last 3 Encounters:   11/04/20 174 lb (78.9 kg)   08/10/20 175 lb 8 oz (79.6 kg)   03/16/20 183 lb (83 kg)       Physical Exam:    GENERAL APPEARANCE: alert, no apparent distress  HEENT: no scleral icterus or xanthelasma  NECK: jugular venous pressure is not obviously increased.  CHEST: symmetric, " the lungs are clear to auscultation  CARDIOVASCULAR: Tachycardic, irregular, soft systolic murmur no carotid bruits  Abdomen: No Organomegaly, masses, bruits, or tenderness. Bowels sounds are present      EXTREMITIES: no cyanosis, clubbing or edema    Cardiac Testing:  Indications    CAD (coronary artery disease)   Interpretation Summary      Patient stopped exercise secondary to fatigue. No reported chest discomfort.    The stress electrocardiogram was abnormal with 2 mm and 3 mm of horizontal ST segment depression in the II, III, aVF, V5 and V6 leads, beginning at 5.11 minutes of stress, and returning to baseline after 8 minutes into the recovery phase.    Left ventricle ejection fraction at rest is normal. Resting ejection fraction estimated 55-60%. No obvious resting wall motion abnormality although technically challenging.    Left ventricular hypertrophy.    Post exercise there appears to be augmentation of systolic contraction. The posterior wall post exercise is suboptimally visualized and cannot comment on the posterior wall function post exercise.    Increased premature ventricular complexes in recovery. Short runs of SVT in recovery.    Abnormal stress echocardiogram. Progressive ST-T segment changes with exercise in the setting of resting ST abnormality. Overall left ventricular systolic function appears to augment post exercise. Study is suboptimal secondary to challenging imaging of the posterior wall.    Suggest alternative modality stress test to further define.        Physicians    Panel Physicians Referring Physician Case Authorizing Physician   Kit Bean MD (Primary) Kit Bean MD Koller, Patrick T, MD    PCP    Primary Care Provider   Ange Hampton MD   Phone: [phone]      Procedures    Percutaneous Coronary Intervention   Panel Information    Panel 1    Provider Role   Kit Bean MD Primary    Procedure Laterality Anesthesia   Percutaneous Coronary  Intervention N/A Conscious Sedation (RN)   8am admit, [place] only, h&p 8/1, teach-Dimple           Pre Procedure Diagnosis    CAD   Indications    CAD (coronary artery disease) [I25.10 (ICD-10-CM)]    Procedure Status    Procedure scheduled as Elective (Outpatient).         Conclusion         Estimated blood loss was <20 ml.    A STENT SYNERGY MR 2.25X28 drug eluting stent was successfully placed.    Prox Cx lesion 90% stenosed.    Dist Cx lesion 80% stenosed.    A STENT SYNERGY MR 2.25X20 drug eluting stent was successfully placed.    A STENT SYNERGY MR 2.25X8 drug eluting stent was successfully placed.    LM lesion 60% stenosed.     Manual removal of RFA sheah  Unable to access right radial artery succesfully  Overnight observation AM discharge      Recommendations    ?  Patient given specific instructions regarding care of arteriotomy site, activity restrictions, signs and symptoms of cardiac or vascular complications and to seek immediate medical evaluation should they occur.  ? The patient will be observed in telemetry overnight and if stable, the patient will be allowed to return home tomorrow.  ? Continuation of dual antiplatelet therapy for 12 month     US Carotid Bilateral (Order [id number])  Imaging  Date: 12/6/2018 Department: [facility] [place] Released By: Krystal Coronado Authorizing: Chuck Hernandez MD   Study Result    US CAROTID BILATERAL  12/6/2018 3:48 PM     INDICATION: Bilateral carotid bruits.  TECHNIQUE: Duplex exam performed utilizing 2D gray-scale imaging, Doppler interrogation with color-flow and spectral waveform analysis.  COMPARISON: None.     FINDINGS:  RIGHT: There is mild atheromatous plaque. Normal waveforms with no significant stenosis.     LEFT: There is moderate atheromatous plaque. Normal waveforms with no significant stenosis.     Both vertebral arteries and subclavian artery waveforms are normal.     VELOCITY CHART:   The following velocities  were obtained in the RIGHT carotid system.  CCA: 75/14 cm/s  ICA: 116/20 cm/s  ECA: 109/5 cm/s  ICA/CCA: PS 1.6     The following velocities were obtained in the LEFT carotid system.  CCA: 92/14 cm/s  ICA: 165/28 cm/s  ECA: 170/21 cm/s  ICA/CCA: PS 1.8                           IMPRESSION:   CONCLUSION:  1.  RIGHT: Less than 50% stenosis of the right ICA.  2.  LEFT: 50-69% stenosis of the left ICA     Evaluation based on velocities and NASCET criteria       ECG demonstrates atrial flutter with 2-1 conduction, ST-T changes secondary to the atrial flutter.  Atrial flutter is new when compared to EKG August 2018.    Lab Results:    Lab Results   Component Value Date     07/23/2020    K 3.8 07/23/2020     07/23/2020    CO2 26 07/23/2020    BUN 16 07/23/2020    CREATININE 0.78 07/23/2020    CALCIUM 9.1 07/23/2020     Lab Results   Component Value Date    CHOL 134 07/23/2020    TRIG 124 07/23/2020    HDL 37 (L) 07/23/2020    LDLDIRECT 97 05/06/2019     No results found for: BNP  Creatinine (mg/dL)   Date Value   07/23/2020 0.78   03/16/2020 0.82   01/22/2020 1.20 (H)   11/15/2019 1.03     LDL Calculated (mg/dL)   Date Value   07/23/2020 72   07/01/2019 95   07/26/2018 63     Lab Results   Component Value Date    WBC 6.4 05/06/2019    WBC 6.1 06/23/2015    HGB 13.2 05/06/2019    HCT 39.4 05/06/2019    MCV 90 05/06/2019     05/06/2019         This note has been dictated using voice recognition software. Any grammatical or context distortions are unintentional and inherent to the software.

## 2021-06-29 NOTE — PROGRESS NOTES
Progress Notes by Debbie Boucher MD at 11/10/2020  7:30 AM     Author: Debbie Boucher MD Service: -- Author Type: Physician    Filed: 11/12/2020 10:20 AM Encounter Date: 11/10/2020 Status: Signed    : Debbie Boucher MD (Physician)             Assessment/Recommendations   Typical cavotricuspid isthmus flutter and history of atrial fibrillation currently maintaining sinus rhythm with low-dose sotalol 40 mg twice daily.   Long-term anticoagulation for secondary prevention of stroke and thromboembolism with apixaban 5 mg twice daily, no bleeding complications.    The patient is good candidate for catheter ablation of atrial fibrillation and flutter, anticipate the need for left atrial ablation with pulmonary veins isolation, possible substrate isolation and linear ablation of cavotricuspid isthmus as well.    Discussed with Ciarra in great details the nature of her atrial arrhythmias, its causes and management options including  continued antiarrhythmic drug therapy with an estimated long-term efficacy around 50 to 60% compared to 75 to 80% chance of success with catheter ablation.   Reviewed the indications, nature, benefits and risks of catheter ablation and discussed the potential complications in great details.   Ciarra wishes to proceed with catheter ablation.  She understands the potential 2 to 3% risk of major complications mainly risk of stroke and thromboembolic complication, major bleeding, cardiac perforation, tamponade, esophageal injury, phrenic nerve injury and remote risk of death.      Because of her history of stroke and multiple thromboembolic risk factors, she will need lifelong anticoagulation and meets indication for preablation transesophageal echocardiogram as well.        Due to time constraints and plans for travel to [place] next week, Ms. Soto would like to consider scheduling her procedure in spring 2021.    For now, she will maintain current therapy and keep us updated with any  changes or events.       History of Present Illness/Subjective    MsRonal Soto is a 70 y.o. female with history of:     1. Significantly elevated stroke and thromboembolic risk with [id number] score of 7.   2. Multivessel CAD history of surgical revascularization in 2007, with LIMA to LAD and vein graft to obtuse marginal and RPDA vein graft found to be occluded on angiography in May 2008.   3. History of post-operative AF, and most recently with typical cavotricuspid isthmus flutter.  4. Type II Diabetes Mellitus.  5. History of TIA in 2015.  Patchy FLAIR hyperintensities predominantly in the brainstem consistent with chronic microvascular ischemic disease, MRI in 2017.  6. Bilateral carotid artery stenoses less than 50% by neck MRA 2017   7. Dyslipidemia.    8. Peripheral arterial disease, history of renal artery and carotid artery stenosis.    9. Hypertensive cardiovascular disease without heart failure.  Blood pressure today accelerated, initially 192/80 mmHg, but 160/80 on recheck 30 minutes later.  Patient did not take her  blood pressure meds yet     Current symptoms:  Feels better since discharge, no recurrence of palpitations, denies any chest pain, shortness of breath, bleeding or stroke-like symptoms.  Tolerating low-dose sotalol 40 mg twice daily without side effects.     Physical Examination Review of Systems   Vitals:    11/10/20 0733   BP: (!) 192/80   Pulse: 70   Resp: 16   SpO2: 97%     Body mass index is 32.56 kg/m .  Wt Readings from Last 3 Encounters:   11/10/20 178 lb (80.7 kg)   11/09/20 175 lb (79.4 kg)   11/06/20 175 lb (79.4 kg)     [unfilled]  General Appearance:   no distress, normal body habitus   ENT/Mouth: membranes moist, no oral lesions or bleeding gums.      EYES:  no scleral icterus, normal conjunctivae   Neck: no carotid bruits or thyromegaly   Chest/Lungs:   lungs are clear to auscultation, no rales or wheezing, healed  sternal scar, equal chest wall expansion     Cardiovascular:   Regular. Normal first and second heart sounds with no murmurs, rubs, or gallops; the carotid, radial and posterior tibial pulses are intact, Jugular venous pressure normal , no edema bilaterally    Abdomen:  no organomegaly, masses, bruits, or tenderness; bowel sounds are present   Extremities: no cyanosis or clubbing   Skin: no xanthelasma, warm.    Neurologic: normal  bilateral, no tremors     Psychiatric: alert and oriented x3, calm     General: WNL  Eyes: WNL  Ears/Nose/Throat: Hearing Loss  Lungs: WNL  Heart: WNL  Stomach: WNL  Bladder: Frequent Urination at Night  Muscle/Joints: Joint Pain, Muscle Weakness, Muscle Pain  Skin: WNL  Nervous System: WNL  Mental Health: WNL     Blood: WNL     Medical History  Surgical History Family History Social History   Past Medical History:   Diagnosis Date   ? Coronary artery disease    ? Diabetes mellitus (H)    ? Discitis of thoracolumbar region 10/10/2015   ? Encephalopathy 10/14/2015   ? Epidural abscess 10/10/2015   ? Hip pain, right    ? Hyperlipidemia    ? Sepsis (H) 10/8/2015   ? Stroke (H) 06/23/2015    Neurology felt that episode was C/W subacute ischemic stroke   ? TIA (transient ischemic attack) 6/23/2015   ? UTI (urinary tract infection)     admitted to [facility] with UTI    Past Surgical History:   Procedure Laterality Date   ? CARPAL TUNNEL RELEASE Bilateral    ? CORONARY ANGIOPLASTY WITH STENT PLACEMENT  03/30/2016    Drug eluting stent mid LAD, cutting balloon to 1st diagonal   ? CORONARY ANGIOPLASTY WITH STENT PLACEMENT  2008   ? CORONARY ARTERY BYPASS GRAFT  12/11/2007    X 3 vessels, LIMA to LAD, saph vein graft to distal RCA, saphvein graft to marginal, mini circuit bypass.  [facility].   ? CORONARY STENT PLACEMENT  2008    Left main, left circumflex, RCA   ? CORONARY STENT PLACEMENT  03/2016   ? CV CORONARY ANGIOGRAM N/A 8/1/2018    Procedure: Coronary Angiogram;  Surgeon: Kit Bean MD;  Location:   [facility] Cath Lab;  Service:    ? CV LEFT HEART CATHETERIZATION WITH LEFT VENTRICULOGRAM N/A 2018    Procedure: Left Heart Catheterization with Left Ventriculogram;  Surgeon: Kit Bean MD;  Location: [facility] Cath Lab;  Service:    ? INSERT MIDLINE HE  10/31/2015        ? LUMBAR DISCECTOMY N/A 10/11/2015    Procedure: BILATERAL L1-L5 DECOMPRESSIVE LAMINECTOMY WITH EVACUATION OF EPIDURAL ABSCESS IRRIGATION & DEBRIDEMENT;  Surgeon: Luli Chan MD;  Location: [facility] Main OR;  Service:    ? PICC  10/19/2015         Family History   Problem Relation Age of Onset   ? Stroke Mother    ? Diabetes Father    ? Diabetes Sister    ? Stroke Sister 81   ? Stroke Brother    ? Diabetes Brother     Social History     Socioeconomic History   ? Marital status: Single     Spouse name: Not on file   ? Number of children: 1   ? Years of education: Not on file   ? Highest education level: Not on file   Occupational History   ? Occupation: Retired     Comment: Former [employer]   Social Needs   ? Financial resource strain: Not on file   ? Food insecurity     Worry: Not on file     Inability: Not on file   ? Transportation needs     Medical: Not on file     Non-medical: Not on file   Tobacco Use   ? Smoking status: Former Smoker     Quit date: 2007     Years since quittin.3   ? Smokeless tobacco: Never Used   Substance and Sexual Activity   ? Alcohol use: No     Comment: stopped drinking 2015   ? Drug use: No   ? Sexual activity: Never   Lifestyle   ? Physical activity     Days per week: Not on file     Minutes per session: Not on file   ? Stress: Not on file   Relationships   ? Social connections     Talks on phone: Not on file     Gets together: Not on file     Attends Zoroastrianism service: Not on file     Active member of club or organization: Not on file     Attends meetings of clubs or organizations: Not on file     Relationship status: Not on file   ? Intimate partner  "violence     Fear of current or ex partner: Not on file     Emotionally abused: Not on file     Physically abused: Not on file     Forced sexual activity: Not on file   Other Topics Concern   ? Not on file   Social History Narrative    Lives alone with cats and dogs. , one daughter, Tory Lofton.           Medications  Allergies   Current Outpatient Medications   Medication Sig Dispense Refill   ? acetaminophen (TYLENOL) 500 MG tablet Take 500 mg by mouth 2 (two) times a day.      ? apixaban ANTICOAGULANT (ELIQUIS) 5 mg Tab tablet Take 1 tablet (5 mg total) by mouth 2 (two) times a day. 60 tablet 0   ? atorvastatin (LIPITOR) 80 MG tablet Take 80 mg by mouth at bedtime.     ? blood glucose test strips Smartview test strips. Check blood sugar 3 times per day. 100 strip 5   ? busPIRone (BUSPAR) 15 MG tablet Take 15 mg by mouth daily.     ? gabapentin (NEURONTIN) 300 MG capsule Take 900 mg by mouth at bedtime.     ? generic lancets (FINGERSTIX LANCETS) Check blood sugar 3 times per day. Fastclix lancets. 100 each 5   ? insulin glargine (LANTUS SOLOSTAR U-100 INSULIN) 100 unit/mL (3 mL) pen Inject 28 Units under the skin 2 (two) times a day. 60 mL 1   ? isosorbide mononitrate (IMDUR) 60 MG 24 hr tablet Take 60 mg by mouth daily.     ? losartan (COZAAR) 100 MG tablet Take 1 tablet (100 mg total) by mouth daily. 30 tablet 0   ? metFORMIN (GLUCOPHAGE-XR) 500 MG 24 hr tablet Take 4,000 mg by mouth daily with breakfast.     ? multivitamin with minerals tablet Take by mouth daily.      ? nitroglycerin (NITROSTAT) 0.4 MG SL tablet Place 1 tablet (0.4 mg total) under the tongue every 5 (five) minutes as needed for chest pain. 25 tablet 12   ? pen needle, diabetic (ULTICARE PEN NEEDLE) 32 gauge x 5/32\" Ndle Use 1 each As Directed 4 (four) times a day. 200 each 3   ? sertraline (ZOLOFT) 100 MG tablet Take 1 tablet by mouth once daily 30 tablet 0   ? sotaloL (BETAPACE) 80 MG tablet Take 0.5 tablets (40 mg total) by mouth " every 12 (twelve) hours. Do not take with seroquel. 60 tablet 0   ? traMADoL (ULTRAM) 50 mg tablet Take 50 mg by mouth every 6 (six) hours as needed for pain.       No current facility-administered medications for this visit.     Allergies   Allergen Reactions   ? Oxycontin [Oxycodone] Nausea Only         Lab Results    Chemistry/lipid CBC Cardiac Enzymes/BNP/TSH/INR   Lab Results   Component Value Date    CHOL 134 07/23/2020    HDL 37 (L) 07/23/2020    LDLCALC 72 07/23/2020    TRIG 124 07/23/2020    CREATININE 0.76 11/06/2020    BUN 17 11/06/2020    K 4.0 11/07/2020     11/06/2020     (H) 11/06/2020    CO2 28 11/06/2020    Lab Results   Component Value Date    WBC 7.0 11/05/2020    HGB 11.6 (L) 11/05/2020    HCT 35.2 11/05/2020    MCV 90 11/05/2020     11/06/2020    Lab Results   Component Value Date    CKMB 1 08/14/2018    TROPONINI 0.18 11/06/2020    TSH 0.62 11/04/2020    INR 1.06 11/04/2020

## 2021-06-30 ENCOUNTER — COMMUNICATION - HEALTHEAST (OUTPATIENT)
Dept: CARDIOLOGY | Facility: CLINIC | Age: 71
End: 2021-06-30

## 2021-06-30 DIAGNOSIS — I48.0 PAROXYSMAL ATRIAL FIBRILLATION (H): ICD-10-CM

## 2021-06-30 NOTE — PROGRESS NOTES
Progress Notes by Chuck Hernandez MD at 5/19/2021  1:30 PM     Author: Chuck Hernandez MD Service: -- Author Type: Physician    Filed: 5/20/2021  8:14 AM Encounter Date: 5/19/2021 Status: Signed    : Chuck Hernandez MD (Physician)             [facility] Progress Note    Assessment:  1.  Typical isthmus flutter and atrial fibrillation.  Patient noted to be in atrial flutter during a routine follow-up visit in November and was admitted to the hospital and started on sotalol and subsequently seen by my EP colleagues.  Patient wanted to go to [place] for the winter and has now returned from her winter holiday.  She has not had any specific symptoms although did not nearly identify atrial flutter when she was in it previous.  At this point she indicates that she is leaning away from proceeding to the ablation procedure.  Talked about documenting whether she is having silent episodes of atrial fibrillation she is going to wear a 2-week event monitor and then we can further determine next steps or recommendations.  Recent laboratory studies are reviewed.    2.  Anticoagulation strategy.  She does have an elevated chads 2 vascular score.  Her score is estimated to be 7.  He is on Eliquis but is now finding it to be costly and wonders whether she can switch to warfarin.  I recommended that we hold on switching her to warfarin until we decide about next steps as a relates to ablation.  She indicates that she has at least a month left with the current prescription.    3.  Coronary artery disease.  Multivessel invention with LIMA to the LAD and vein graft to an obtuse marginal and the right PDA which was subsequently found to be occluded on angiography from May 2008.  She has not had any complaints of angina.  Also recent angiogram is from August 2018 with right and circumflex lesions that were noted to be small caliber vessels with stenting to the circumflex.  Prior vein grafts were said to be  occluded.  JASPER to the LAD is patent with the angiogram from August 2018 in the chart record.  Echocardiogram completed November 2020 with normal left ventricular function and mild left ventricular hypertrophy and moderate left atrial enlargement.      4.  Dyslipidemia.  Most recent cholesterol panel finds a total cholesterol 174 with an LDL of 79 and triglycerides of 235.  Ideal LDL is set at less than 70.  Currently is on atorvastatin 80 mg daily and may benefit from addition of Zetia we can discuss further pending the above.  The LDL was 72 and Tober 2020.      Plan: Event monitor with further recommendation pending the outcome of the events.  Follow-up as planned.  We will arrange follow-up with me as well as atrial fibrillation clinic.    1. Paroxysmal atrial fibrillation (H)  ECG Clinic - Today    VAIBHAV Monitor Hook-Up   2. Typical atrial flutter (H)     3. Atrial fibrillation with RVR (H)     4. Chronic anticoagulation     5. Coronary artery disease involving native coronary artery of native heart without angina pectoris     6. Mixed hyperlipidemia           An After Visit Summary was printed and given to the patient.    Subjective:    Kemi Soto is a 71 y.o. female who returned for a planned  follow up visit.  She returns after her winter in [place].  As outlined she was noted to be tachycardic during our visit November 2020 and was noted to be in atrial flutter with 2-1 conduction.  She was scheduled to travel to [place] and was admitted to the hospital and she converted with low dose of sotalol and wanted to travel to [place].  She was seen by my EP colleague Dr Boucher discussion was had regarding ablation for both atrial flutter and atrial fibrillation.  At that time he was leaning towards having the ablation procedure.  She tells me today however that she has been feeling well.  She has not been aware of any clear-cut rapid heartbeats although not aware of the rapid heartbeat when we discovered that  November.  She states that since she came back from [place] she is aware of an infrequent awareness of sensation that lasts only a few minutes in duration.  She has not experienced chest discomfort, dizziness, lightheadedness, syncope or near syncope.  She does indicate that the Eliquis is becoming costly.    Review of Systems:   General: WNL  Eyes: WNL  Ears/Nose/Throat: WNL  Lungs: WNL  Heart: WNL  Stomach: WNL  Bladder: Frequent Urination at Night  Muscle/Joints: WNL  Skin: WNL  Nervous System: WNL  Mental Health: WNL     Blood: WNL      Problem List:    Patient Active Problem List   Diagnosis   ? History of TIA (transient ischemic attack) and stroke   ? Essential hypertension, benign   ? Mixed hyperlipidemia   ? Chalastodermia   ? Type 2 diabetes mellitus with hyperglycemia, with long-term current use of insulin (H)   ? S/P CABG (coronary artery bypass graft)   ? Bipolar 2 disorder (H)   ? Anxiety disorder   ? BMI 36.0-36.9,adult   ? Noncompliance with medications   ? Psoriasis, guttate   ? History of atrial fibrillation   ? Benign adenomatous polyp of large intestine   ? Bilateral hearing loss   ? Peripheral neuropathy   ? Hyperlipidemia with target LDL less than 70   ? Atrial flutter (H)   ? Atrial fibrillation with RVR (H)   ? Coronary artery disease involving native coronary artery of native heart without angina pectoris   ? Type 2 myocardial infarction due to arrhythmia (H)   ? Chronic anticoagulation       Social History     Socioeconomic History   ? Marital status: Single     Spouse name: Not on file   ? Number of children: 1   ? Years of education: Not on file   ? Highest education level: Not on file   Occupational History   ? Occupation: Retired     Comment: Former [employer]    Social Needs   ? Financial resource strain: Not on file   ? Food insecurity     Worry: Not on file     Inability: Not on file   ? Transportation needs     Medical: Not on file     Non-medical: Not on file    Tobacco Use   ? Smoking status: Former Smoker     Quit date: 2007     Years since quittin.9   ? Smokeless tobacco: Never Used   Substance and Sexual Activity   ? Alcohol use: No     Comment: stopped drinking 2015   ? Drug use: No   ? Sexual activity: Never   Lifestyle   ? Physical activity     Days per week: Not on file     Minutes per session: Not on file   ? Stress: Not on file   Relationships   ? Social connections     Talks on phone: Not on file     Gets together: Not on file     Attends Jewish service: Not on file     Active member of club or organization: Not on file     Attends meetings of clubs or organizations: Not on file     Relationship status: Not on file   ? Intimate partner violence     Fear of current or ex partner: Not on file     Emotionally abused: Not on file     Physically abused: Not on file     Forced sexual activity: Not on file   Other Topics Concern   ? Not on file   Social History Narrative    Lives alone with cats and dogs. , one daughter, Tory Lofton.        Family History   Problem Relation Age of Onset   ? Stroke Mother    ? Diabetes Father    ? Diabetes Sister    ? Stroke Sister 81   ? Stroke Brother    ? Diabetes Brother        Current Outpatient Medications   Medication Sig Dispense Refill   ? acetaminophen (TYLENOL) 500 MG tablet Take 500 mg by mouth 2 (two) times a day.      ? apixaban ANTICOAGULANT (ELIQUIS) 5 mg Tab tablet Take 1 tablet (5 mg total) by mouth 2 (two) times a day. 60 tablet 3   ? atorvastatin (LIPITOR) 80 MG tablet Take 1 tablet (80 mg total) by mouth at bedtime. 90 tablet 1   ? blood glucose test strips Smartview test strips. Check blood sugar 3 times per day. 100 strip 5   ? busPIRone (BUSPAR) 15 MG tablet Take 1 tablet (15 mg total) by mouth daily. 90 tablet 1   ? gabapentin (NEURONTIN) 300 MG capsule Take 3 capsules (900 mg total) by mouth at bedtime. 270 capsule 3   ? generic lancets (FINGERSTIX LANCETS) Check blood sugar 3 times per  "day. Fastclix lancets. 100 each 5   ? insulin glargine (LANTUS SOLOSTAR U-100 INSULIN) 100 unit/mL (3 mL) pen Inject 28 Units under the skin 2 (two) times a day. 60 mL 1   ? isosorbide mononitrate (IMDUR) 60 MG 24 hr tablet Take 1 tablet (60 mg total) by mouth daily. 90 tablet 0   ? losartan (COZAAR) 100 MG tablet Take 1 tablet (100 mg total) by mouth daily. 90 tablet 3   ? metFORMIN (GLUCOPHAGE-XR) 500 MG 24 hr tablet Take 4 tablets (2,000 mg total) by mouth daily with breakfast. 360 tablet 0   ? nitroglycerin (NITROSTAT) 0.4 MG SL tablet Place 1 tablet (0.4 mg total) under the tongue every 5 (five) minutes as needed for chest pain. 25 tablet 12   ? pen needle, diabetic (ULTICARE PEN NEEDLE) 32 gauge x 5/32\" Ndle Use 1 each As Directed 4 (four) times a day. 200 each 3   ? sertraline (ZOLOFT) 100 MG tablet Take 1 tablet (100 mg total) by mouth daily. 90 tablet 3   ? sotaloL (BETAPACE) 80 MG tablet Take 0.5 tablets (40 mg total) by mouth every 12 (twelve) hours. Do not take with seroquel. 90 tablet 1   ? traMADoL (ULTRAM) 50 mg tablet Take 1 tablet (50 mg total) by mouth every 8 (eight) hours as needed for pain or severe pain (7-10). 30 tablet 0   ? multivitamin with minerals tablet Take by mouth daily.        No current facility-administered medications for this visit.        Objective:     /54 (Patient Site: Right Arm, Patient Position: Sitting, Cuff Size: Adult Regular)   Pulse 71   Resp 16   Wt 170 lb (77.1 kg)   SpO2 97%   BMI 31.09 kg/m    170 lb (77.1 kg)   [unfilled]  Wt Readings from Last 3 Encounters:   05/19/21 170 lb (77.1 kg)   05/05/21 171 lb (77.6 kg)   11/13/20 174 lb 11.2 oz (79.2 kg)       Physical Exam:    GENERAL APPEARANCE: alert, no apparent distress  HEENT: no scleral icterus or xanthelasma  NECK: jugular venous pressure within normal limits  CHEST: symmetric, the lungs are clear to auscultation chest wall scar well-healed  CARDIOVASCULAR: regular rhythm without murmur, click, or " gallop; no carotid bruits  Abdomen: No Organomegaly, masses, bruits, or tenderness. Bowels sounds are present      EXTREMITIES: no cyanosis, clubbing or edema    Cardiac Testing:  Echo Complete  Order# [id number]  Reading physician: Janee Choudhary MD Ordering physician: Nahid Mathis MD Study date: 20   Performing Date Performing Department   2020  CARDIAC TESTING [[id number]]   Patient Information    Patient Name   Kemi Soto MRN   [id number] Sex   Female  1   1950 (70 y.o.)   Indications    Atrial fibrillation   Summary      No previous study for comparison.    Left ventricle ejection fraction is normal. The calculated left ventricular ejection fraction is 58%.    Normal left ventricular size and systolic function.    Normal right ventricular size and systolic function.    Mild concentric hypertrophy noted.    Left atrial volume is moderately increased.        Reading physician: Deejay Warren DO Ordering physician: Chuck Hernandez MD Study date: 18   Patient Information    Patient Name   Kemi Soto MRN   [id number] Sex   Female  1   1950 (68 y.o.)   EKG Scan     Stress Test Data - Scan on 18 10:15 AM by    Indications    CAD (coronary artery disease)   Conclusion      The exercise nuclear stress test is abnormal.    There is a large area of ischemia in the anterior, lateral, apical, anterolateral and inferolateral segment(s) of the left ventricle.    The stress electrocardiogram was abnormal with 2 mm of downslopping ST segment depression in the II, III, aVF, V4, V5 and V6 leads, beginning at 3.39 minutes of stress, and returning to baseline after 8 minutes into the recovery phase    Increased stress to rest cavity ratio of 1.5 is consistent with diffuse subendocardial ischemia.    The patient's exercise capacity is mildly impaired.    The patient reported dyspnea during exercise.    The left ventricular ejection fraction is 59%.    There  is no prior study available.    The patient is at a high risk of future cardiac ischemic events.    The findings of this examination were communicated to Dr.      Cardiac Catheterization  Order# [id number]  Reading physician: Kit Bean MD Ordering physician: Kit Bean MD Study date: 18   Patient Information    Patient Name   Kemi Soto MRN   [id number] Sex   Female  1   1950 (68 y.o.)   Physicians    Panel Physicians Referring Physician Case Authorizing Physician   Kit Bean MD (Primary) Kit Bean MD Koller, Patrick T, MD    PCP    Primary Care Provider   Ange Hampton MD   Phone: [phone]      Procedures    Percutaneous Coronary Intervention   Panel Information    Panel 1    Provider Role   Kit Bean MD Primary    Procedure Laterality Anesthesia   Percutaneous Coronary Intervention N/A Conscious Sedation (RN)   8am admit, [place] ma, h&p , teach-Dimple           Pre Procedure Diagnosis    CAD   Indications    CAD (coronary artery disease) [I25.10 (ICD-10-CM)]    Procedure Status    Procedure scheduled as Elective (Outpatient).         Conclusion         Estimated blood loss was <20 ml.    A STENT SYNERGY MR 2.25X28 drug eluting stent was successfully placed.    Prox Cx lesion 90% stenosed.    Dist Cx lesion 80% stenosed.    A STENT SYNERGY MR 2.25X20 drug eluting stent was successfully placed.    A STENT SYNERGY MR 2.25X8 drug eluting stent was successfully placed.    LM lesion 60% stenosed.     Manual removal of RFA sheah  Unable to access right radial artery succesfully  Overnight observation AM discharge      Recommendations    ?  Patient given specific instructions regarding care of arteriotomy site, activity restrictions, signs and symptoms of cardiac or vascular complications and to seek immediate medical evaluation should they occur.  ? The patient will be observed in telemetry overnight and if stable, the patient will be allowed  to return home tomorrow.  ? Continuation of dual antiplatelet therapy for 12 month             Lab Results:    Lab Results   Component Value Date     05/05/2021    K 4.7 05/05/2021     05/05/2021    CO2 30 05/05/2021    BUN 19 05/05/2021    CREATININE 0.93 05/05/2021    CALCIUM 9.3 05/05/2021     Lab Results   Component Value Date    CHOL 174 05/05/2021    TRIG 235 (H) 05/05/2021    HDL 48 (L) 05/05/2021    LDLDIRECT 97 05/06/2019     No results found for: BNP  Creatinine (mg/dL)   Date Value   05/05/2021 0.93   11/06/2020 0.76   11/05/2020 0.76   11/04/2020 0.94     LDL Calculated (mg/dL)   Date Value   05/05/2021 79   07/23/2020 72   07/01/2019 95     Lab Results   Component Value Date    WBC 7.9 05/05/2021    WBC 6.1 06/23/2015    HGB 13.0 05/05/2021    HCT 40.2 05/05/2021    MCV 91 05/05/2021     05/05/2021         This note has been dictated using voice recognition software. Any grammatical or context distortions are unintentional and inherent to the software.

## 2021-07-01 ENCOUNTER — COMMUNICATION - HEALTHEAST (OUTPATIENT)
Dept: ANTICOAGULATION | Facility: CLINIC | Age: 71
End: 2021-07-01

## 2021-07-01 DIAGNOSIS — I48.0 PAROXYSMAL ATRIAL FIBRILLATION (H): ICD-10-CM

## 2021-07-01 DIAGNOSIS — I48.3 TYPICAL ATRIAL FLUTTER (H): ICD-10-CM

## 2021-07-02 DIAGNOSIS — I48.0 PAROXYSMAL A-FIB (H): Primary | ICD-10-CM

## 2021-07-03 NOTE — ADDENDUM NOTE
Addendum Note by Lauren Wiley RN at 6/26/2019  1:15 PM     Author: Lauren Wiley RN Service: -- Author Type: Registered Nurse    Filed: 6/26/2019  1:58 PM Encounter Date: 6/26/2019 Status: Signed    : Lauren Wiley RN (Registered Nurse)    Addended by: LAUREN WILEY on: 6/26/2019 01:58 PM        Modules accepted: Orders

## 2021-07-03 NOTE — ADDENDUM NOTE
Addendum Note by Jacinda Merchant at 5/24/2017  2:10 PM     Author: Jacinda Merchant Service: -- Author Type: Certified Medical Assistant    Filed: 5/24/2017  2:10 PM Date of Service: 5/24/2017  2:10 PM Status: Signed    : Jacinda Merchant    Encounter addended by: Jacinda Merchant CMA on: 5/24/2017  2:10 PM<BR>     Actions taken: Charge Capture section accepted

## 2021-07-03 NOTE — ADDENDUM NOTE
Addendum Note by Nancy Martel CNP at 11/15/2019 11:00 AM     Author: Nancy Martel CNP Service: -- Author Type: Nurse Practitioner    Filed: 11/20/2019 10:23 AM Encounter Date: 11/15/2019 Status: Signed    : Nancy Martel CNP (Nurse Practitioner)    Addended by: NANCY MARTEL on: 11/20/2019 10:23 AM        Modules accepted: Orders

## 2021-07-03 NOTE — ADDENDUM NOTE
Addendum Note by Archana Hung RN at 12/14/2017 11:59 PM     Author: Archana Hung RN Service: -- Author Type: Registered Nurse    Filed: 12/19/2017  2:16 PM Date of Service: 12/14/2017 11:59 PM Status: Signed    : Archana Hung RN (Registered Nurse)    Encounter addended by: Archana Hung RN on: 12/19/2017  2:16 PM<BR>     Actions taken: Charge Capture section accepted

## 2021-07-03 NOTE — ADDENDUM NOTE
Addendum Note by Jacinda Merchant CMA at 7/30/2019 11:59 PM     Author: Jacinda Merchant CMA Service: -- Author Type: Certified Medical Assistant    Filed: 9/11/2019  8:23 AM Date of Service: 7/30/2019 11:59 PM Status: Signed    : Jacinda Merchant CMA (Certified Medical Assistant)    Encounter addended by: Jacinda Merchant CMA on: 9/11/2019  8:23 AM      Actions taken: Charge Capture section accepted

## 2021-07-03 NOTE — ADDENDUM NOTE
Addendum Note by Boris Aiken MD at 6/26/2019  1:15 PM     Author: Boris Aiken MD Service: -- Author Type: Physician    Filed: 6/26/2019  1:47 PM Encounter Date: 6/26/2019 Status: Signed    : Boris Aiken MD (Physician)    Addended by: BORIS AIKEN on: 6/26/2019 01:47 PM        Modules accepted: Orders

## 2021-07-03 NOTE — ADDENDUM NOTE
Addendum Note by Rishi Olivas MD at 5/6/2019  1:00 PM     Author: Rishi Olivas MD Service: -- Author Type: Physician    Filed: 5/6/2019  3:44 PM Encounter Date: 5/6/2019 Status: Signed    : Rishi Olivas MD (Physician)    Addended by: RISHI OLIVAS on: 5/6/2019 03:44 PM        Modules accepted: Orders

## 2021-07-03 NOTE — ADDENDUM NOTE
Addendum Note by Jacinda Merchant CMA at 8/30/2017 11:53 AM     Author: Jacinda Merchant CMA Service: -- Author Type: Certified Medical Assistant    Filed: 8/30/2017 11:53 AM Date of Service: 8/30/2017 11:53 AM Status: Signed    : Jacinda Merchant CMA (Certified Medical Assistant)    Encounter addended by: Jacinda Merchant CMA on: 8/30/2017 11:53 AM<BR>     Actions taken: Charge Capture section accepted

## 2021-07-04 NOTE — TELEPHONE ENCOUNTER
Telephone Encounter by Cony Noonan RN at 7/1/2021  2:28 PM     Author: Cony Noonan RN Service: -- Author Type: Registered Nurse    Filed: 7/1/2021  2:31 PM Encounter Date: 7/1/2021 Status: Signed    : Cony Noonan RN (Registered Nurse)       ACN called and left a message for the patient to call back .    Cony Noonan RN

## 2021-07-04 NOTE — TELEPHONE ENCOUNTER
Telephone Encounter by Chuck Hernandez MD at 6/30/2021  8:08 PM     Author: Chuck Hernandez MD Service: -- Author Type: Physician    Filed: 6/30/2021  8:08 PM Encounter Date: 6/30/2021 Status: Signed    : Chuck Hernandez MD (Physician)       If pre Coumadin inr is normal than 5mg daily. Coumadin clinic should direct how long to hold the Eliquis before starting. THEO Hernandez

## 2021-07-04 NOTE — TELEPHONE ENCOUNTER
Telephone Encounter by Priscila Yoon RN at 6/30/2021  9:13 AM     Author: Priscila Yoon RN Service: -- Author Type: Registered Nurse    Filed: 6/30/2021  9:13 AM Encounter Date: 6/30/2021 Status: Signed    : Priscila Yoon RN (Registered Nurse)       ----- Message from Chuck Hernandez MD sent at 6/29/2021  5:27 PM CDT -----  Patient is writing that she is finding Eliquis too expensive and is wanting to switch to warfarin.  Can we please get her into the warfarin clinic and transition her.  Much appreciated.  Thank you  THEO Hernandez

## 2021-07-04 NOTE — TELEPHONE ENCOUNTER
Telephone Encounter by Chelly Carvajal, RN at 7/1/2021 11:11 AM     Author: Chelly Carvajal, RN Service: -- Author Type: Registered Nurse    Filed: 7/1/2021 11:15 AM Encounter Date: 6/30/2021 Status: Signed    : Chelly Carvajal RN (Registered Nurse)             ----- Message -----  From: Priscila Yoon RN  Sent: 6/30/2021   9:16 AM CDT  To: Chuck Hernandez MD    ----- Message from Priscila Yoon RN sent at 6/30/2021  9:16 AM CDT -----  Dr Hernandez - what is goal INR and what dose would you like her to start on?  -moustapha        See below order from [facility] with goal INR. Ambulatory referral placed; [place] pool. [place]

## 2021-07-04 NOTE — TELEPHONE ENCOUNTER
Telephone Encounter by Priscila Yoon RN at 6/30/2021  9:13 AM     Author: Priscila Yoon RN Service: -- Author Type: Registered Nurse    Filed: 6/30/2021  9:16 AM Encounter Date: 6/30/2021 Status: Signed    : Priscila Yoon RN (Registered Nurse)       June 29, 2021  Chuck Hernandez MD to Ciarra Soto      5:26 PM  We will arrange to get you into the warfarin clinic so they can help you transition.THEO Hernandez    This MyChart message has not been read.    Ciarra Soto to Chuck Hernandez MD      5:18 PM  I feel pretty good.   Eliquis is now way too expensive.  I hit the gap in May for it.   Renewed it 6/17 so would like to switch to warfarin(sp) next month.  I dont want to but it is necessary considering the cost.   When should I schedule to see you again?  Happy about heart monitor results.  Thanks!    June 23, 2021  Chuck Hernandez MD to Ciarra Soto      4:21 PM  Wu Lafleur     2-week monitor did not show any reoccurrence of the irregular heart rhythm or the atrial flutter.  You had some premature beats but nothing noted from a heart rhythm standpoint.  How are you feeling?  How are you doing with respect to affordability of the Eliquis which I know you mentioned during our recent visit. mgarr    Last read by Kemi Soto at 5:12 PM on 6/29/2021.

## 2021-07-04 NOTE — TELEPHONE ENCOUNTER
Telephone Encounter by Chelly Carvajal RN at 7/1/2021  3:14 PM     Author: Chelly Carvajal RN Service: -- Author Type: Registered Nurse    Filed: 7/1/2021  3:14 PM Encounter Date: 6/30/2021 Status: Signed    : Chelly Carvajal RN (Registered Nurse)       ---- Message -----  From: Cony Noonan RN  Sent: 7/1/2021   2:39 PM CDT  To: Chelly Carvajal RN    Okay, I will follow up.  ----- Message -----  From: Chelly Carvajal RN  Sent: 7/1/2021  11:15 AM CDT  To: [facility]    ----- Message from Chelly Carvajal RN sent at 7/1/2021 11:15 AM CDT -----  I placed the referral under [facility]. Please let me know if you want me to place any INR or the initial dose of 5 mg. Pt will need instructions on transitioning from Eliquis. Thanks!  Mal

## 2021-07-04 NOTE — TELEPHONE ENCOUNTER
Telephone Encounter by Maria Luz Milner, PharmD at 7/2/2021  4:08 PM     Author: Maria Luz Milner, PharmD Service: -- Author Type: Pharmacist    Filed: 7/2/2021  4:19 PM Encounter Date: 7/1/2021 Status: Addendum    : Maria Luz Milner, PharmD (Pharmacist)    Related Notes: Original Note by Maria Luz Milner, PharmD (Pharmacist) filed at 7/2/2021  4:17 PM       Anticoagulation Management    Kemi Soto, 71 y.o., female is on Apixaban. Requested to review for conversion to warfarin due to cost    Indication for anticoagulation:  A.flutter  With Chadsvasc at least 7    Goal INR Range: 2-3    Wt Readings from Last 2 Encounters:   05/19/21 170 lb (77.1 kg)   05/05/21 171 lb (77.6 kg)     Lab Results   Component Value Date    HGB 13.0 05/05/2021    HCT 40.2 05/05/2021     05/05/2021      Lab Results   Component Value Date    CREATININE 0.93 05/05/2021    CREATININE 0.76 11/06/2020      Lab Results   Component Value Date    ALT 20 05/05/2021    ALT 13 11/04/2020       Signification medication Interactions:  Tramadol, sertraline      Recommendation     Refill for 2 weeks of Eliquis sent to allow patient to complete travel prior to transition after offering patient 3rd week to allow for overlap until therapeutic to minimize risk of stroke. Declined 3rd week due to cost when discussed with ACN. Will have her start warfarin 3-4 days prior to returning home/finishing eliquis and then check INR promptly to allow for partial overlap    Recommend Warfarin 5 mg daily and INR check after 3-4 doses.    Maria Luz Milner, PharmD

## 2021-07-04 NOTE — TELEPHONE ENCOUNTER
Telephone Encounter by Cony Noonan RN at 7/2/2021  2:55 PM     Author: Cony Noonan RN Service: -- Author Type: Registered Nurse    Filed: 7/2/2021  3:25 PM Encounter Date: 7/1/2021 Status: Signed    : Cony Noonan RN (Registered Nurse)       Patient called back and she is requesting for Eliquis refill for 2 weeks -( she will use starting 7/20 - until she gets back from her trip ) then she would like to  she transition to warfarin.        She will be back from her trip

## 2021-07-04 NOTE — TELEPHONE ENCOUNTER
Telephone Encounter by Lauren Stacy at 7/1/2021  4:17 PM     Author: Lauren Stacy Service: -- Author Type: Patient Access    Filed: 7/1/2021  4:19 PM Encounter Date: 7/1/2021 Status: Signed    : Lauren Stacy (Patient Access)       Who is calling:  patient  Reason for Call:  Patient was returning missed call from [facility] on new warfarin start  Date of last appointment with primary care:   Okay to leave a detailed message: Yes

## 2021-07-04 NOTE — TELEPHONE ENCOUNTER
Telephone Encounter by Cony Noonan RN at 7/1/2021  4:28 PM     Author: Cony Noonan RN Service: -- Author Type: Registered Nurse    Filed: 7/1/2021  4:31 PM Encounter Date: 7/1/2021 Status: Signed    : Cony Noonan RN (Registered Nurse)       ACN called and spoke with patient and she stated that she is still taking Eliquis at this time and she has 38 tablets left .  She states that she is planning to go to [place] on 7/79 - 8/2 and did not want to start warfarin until she comes back.    She will check with her pharmacy if she can get 2 weeks more eliquis supply and will update ACn tomorrow.

## 2021-07-04 NOTE — ADDENDUM NOTE
Addendum Note by Simone Coyle CMA at 4/29/2021  5:03 PM     Author: Simone Coyle CMA Service: -- Author Type: Certified Medical Assistant    Filed: 4/29/2021  5:03 PM Encounter Date: 4/27/2021 Status: Signed    : Simone Coyle CMA (Certified Medical Assistant)    Addended by: SIMONE COYLE on: 4/29/2021 05:03 PM        Modules accepted: Orders

## 2021-07-04 NOTE — TELEPHONE ENCOUNTER
Telephone Encounter by Chuck Hernandez MD at 6/30/2021 11:56 AM     Author: Chuck Hernandez MD Service: -- Author Type: Physician    Filed: 6/30/2021 11:56 AM Encounter Date: 6/30/2021 Status: Signed    : Chuck Hernandez MD (Physician)       2 to 3. Thanks.

## 2021-07-05 ENCOUNTER — DOCUMENTATION ONLY (OUTPATIENT)
Facility: CLINIC | Age: 71
End: 2021-07-05

## 2021-07-05 DIAGNOSIS — I48.0 PAROXYSMAL ATRIAL FIBRILLATION (H): Primary | ICD-10-CM

## 2021-07-05 NOTE — TELEPHONE ENCOUNTER
Telephone Encounter by Cony Noonan RN at 7/5/2021 11:45 AM     Author: Cony Noonan RN Service: -- Author Type: Registered Nurse    Filed: 7/5/2021 12:08 PM Encounter Date: 7/1/2021 Status: Signed    : Cony Noonan RN (Registered Nurse)       ANTICOAGULATION  MANAGEMENT: INITIAL CONSULT    Assessment     Kemi Soto, a 71 y.o. female  is newly referred to [facility] anticoagulation.    Currently on Eliquis and will transition to warfarin on 7/30/2021             Factors that may increase sensitivity to warfarin: Female gender and No factors     Factors expected to reduce sensitivity to warfarin: High greens/vitamin K intake       Home medication list reviewed for potential drug interactions. No new medication/supplements affecting INR    Educational needs identified: Limited knowledge/experience-full education recommended     Traveling to [place] on 7/29 and coming back on 8/2/2021    Plan:     Warfarin Dosing Instructions: Patient will overlap warfarin with Eliquis 4 days prior to completing Eliquis supply     Patient will start taking warfarin 5 mg on 7/20 with Eliquis overlap  Instructed patient to follow up no later than: 8/3 appointment made    Education provided: importance of consistent vitamin K intake, impact of vitamin K foods on INR, vitamin K content of foods, potential interaction between warfarin and alcohol, importance of therapeutic range, target INR goal and significance of current INR result, importance of following up for INR monitoring at instructed interval, importance of taking warfarin as instructed, importance of notifying clinic for changes in medications, monitoring for bleeding signs and symptoms, monitoring for clotting signs and symptoms, when to seek medical attention/emergency care and travel related clotting risk and prevention      Ciarra verbalizes understanding and agrees to warfarin dosing plan.     Instructed to call the [facility] for any  questions or concerns regarding warfarin dosing. (# [phone])   ?   Cony Noonan RN    Subjective/Objective:      Kemi Soto, a 71 y.o. female  is newly referred to [facility] anticoagulation for warfarin management    Kemi reports:    Anticoagulation:      Warfarin initiation date (approximate): 7/30/2021   Indication: Atrial Fibrillation    Goal INR: 2.0-3.0   On Bridge Therapy: No   Previously on warfarin:transitioning from Eliquis      Medication setup/administration: self     How often do you forget to take your medications?: rarely      Missed warfarin doses since last INR: not applicable     S/sx of bleeding, thromboembolism: No     Typical  vitamin K intake: moderate;  consistent     Typical  alcohol intake: none     Other dietary considerations: herbal supplements/teas: on occasion taking tumeric     New Injury or illness:  No     Upcoming surgery procedure or cardioversion: No    Patient weight:   Wt Readings from Last 1 Encounters:   05/19/21 170 lb (77.1 kg)       Recent lab results:       Lab Results   Component Value Date    INR 1.06 11/04/2020    INR 1.27 (H) 10/20/2015    INR 1.16 (H) 10/11/2015        Lab Results   Component Value Date    HGB 13.0 05/05/2021    HCT 40.2 05/05/2021     05/05/2021        Lab Results   Component Value Date    ALT 20 05/05/2021    AST 18 05/05/2021    ALBUMIN 3.6 05/05/2021        Lab Results   Component Value Date    CREATININE 0.93 05/05/2021       Medications:    Current Outpatient Medications on File Prior to Visit   Medication Sig   ? acetaminophen (TYLENOL) 500 MG tablet Take 500 mg by mouth 2 (two) times a day.    ? atorvastatin (LIPITOR) 80 MG tablet Take 1 tablet (80 mg total) by mouth at bedtime.   ? blood glucose test strips Smartview test strips. Check blood sugar 3 times per day.   ? busPIRone (BUSPAR) 15 MG tablet Take 1 tablet (15 mg total) by mouth daily.   ? gabapentin (NEURONTIN) 300 MG capsule Take 3 capsules (900 mg total) by  "mouth at bedtime.   ? generic lancets (FINGERSTIX LANCETS) Check blood sugar 3 times per day. Fastclix lancets.   ? insulin glargine (LANTUS SOLOSTAR U-100 INSULIN) 100 unit/mL (3 mL) pen Inject 28 Units under the skin 2 (two) times a day.   ? isosorbide mononitrate (IMDUR) 60 MG 24 hr tablet Take 1 tablet (60 mg total) by mouth daily.   ? losartan (COZAAR) 100 MG tablet Take 1 tablet (100 mg total) by mouth daily.   ? metFORMIN (GLUCOPHAGE-XR) 500 MG 24 hr tablet Take 4 tablets (2,000 mg total) by mouth daily with breakfast.   ? multivitamin with minerals tablet Take by mouth daily.    ? nitroglycerin (NITROSTAT) 0.4 MG SL tablet Place 1 tablet (0.4 mg total) under the tongue every 5 (five) minutes as needed for chest pain.   ? pen needle, diabetic (ULTICARE PEN NEEDLE) 32 gauge x 5/32\" Ndle Use 1 each As Directed 4 (four) times a day.   ? sertraline (ZOLOFT) 100 MG tablet Take 1 tablet (100 mg total) by mouth daily.   ? sotaloL (BETAPACE) 80 MG tablet Take 0.5 tablets (40 mg total) by mouth every 12 (twelve) hours. Do not take with seroquel.   ? traMADoL (ULTRAM) 50 mg tablet Take 1 tablet (50 mg total) by mouth every 8 (eight) hours as needed for pain or severe pain (7-10).     No current facility-administered medications on file prior to visit.        Past Medical History:  Patient Active Problem List   Diagnosis   ? History of TIA (transient ischemic attack) and stroke   ? Essential hypertension, benign   ? Mixed hyperlipidemia   ? Chalastodermia   ? Type 2 diabetes mellitus with hyperglycemia, with long-term current use of insulin (H)   ? S/P CABG (coronary artery bypass graft)   ? Bipolar 2 disorder (H)   ? Anxiety disorder   ? BMI 36.0-36.9,adult   ? Noncompliance with medications   ? Psoriasis, guttate   ? History of atrial fibrillation   ? Benign adenomatous polyp of large intestine   ? Bilateral hearing loss   ? Peripheral neuropathy   ? Hyperlipidemia with target LDL less than 70   ? Atrial flutter (H) "   ? Atrial fibrillation with RVR (H)   ? Coronary artery disease involving native coronary artery of native heart without angina pectoris   ? Type 2 myocardial infarction due to arrhythmia (H)   ? Chronic anticoagulation   ? Paroxysmal atrial fibrillation (H)    Past Medical History:   Diagnosis Date   ? Coronary artery disease    ? Diabetes mellitus (H)    ? Discitis of thoracolumbar region 10/10/2015   ? Encephalopathy 10/14/2015   ? Epidural abscess 10/10/2015   ? Hip pain, right    ? Hyperlipidemia    ? Sepsis (H) 10/8/2015   ? Stroke (H) 06/23/2015    Neurology felt that episode was C/W subacute ischemic stroke   ? TIA (transient ischemic attack) 6/23/2015   ? UTI (urinary tract infection)     admitted to [facility] with UTI

## 2021-07-07 ENCOUNTER — COMMUNICATION - HEALTHEAST (OUTPATIENT)
Dept: ANTICOAGULATION | Facility: CLINIC | Age: 71
End: 2021-07-07

## 2021-07-07 ENCOUNTER — COMMUNICATION - HEALTHEAST (OUTPATIENT)
Dept: FAMILY MEDICINE | Facility: CLINIC | Age: 71
End: 2021-07-07

## 2021-07-07 DIAGNOSIS — I48.3 TYPICAL ATRIAL FLUTTER (H): ICD-10-CM

## 2021-07-07 NOTE — TELEPHONE ENCOUNTER
Telephone Encounter by Nicole Lopez RN at 7/7/2021  4:42 PM     Author: Nicole Lopez RN Service: -- Author Type: Registered Nurse    Filed: 7/7/2021  4:47 PM Encounter Date: 7/7/2021 Status: Signed    : Nicole Lopez RN (Registered Nurse)       Spoke with pharmacist Edy, gave verbal and faxed rx for warfarin.  Talked with Ciarra who says she does have instruction on warfarin overlap with eliquis and inr appt in [place] 8/3

## 2021-07-07 NOTE — TELEPHONE ENCOUNTER
Telephone Encounter by Vero Soriano at 7/7/2021  4:31 PM     Author: Vero Soriano Service: -- Author Type: Patient Access    Filed: 7/7/2021  4:33 PM Encounter Date: 7/7/2021 Status: Signed    : Vero Soriano (Patient Access)       Who is calling:  Pharmacy  Reason for Call:  Needs to clarify directions for :    warfarin ANTICOAGULANT (COUMADIN/JANTOVEN) 5 MG tablet 30 tablet 0 7/2/2021     Sig: Adjust dose based on INR results as directed.    Sent to pharmacy as: warfarin 5 mg tablet (COUMADIN/JANTOVEN)    E-Prescribing Status: Receipt confirmed by pharmacy (7/2/2021  4:19 PM CDT)        Date of last appointment with primary care: na  Okay to leave a detailed message: Yes

## 2021-07-07 NOTE — TELEPHONE ENCOUNTER
Telephone Encounter by Nicole Lopez RN at 7/7/2021  4:47 PM     Author: Nicole Lopez RN Service: -- Author Type: Registered Nurse    Filed: 7/7/2021  4:47 PM Encounter Date: 7/7/2021 Status: Signed    : Nicole Lopez RN (Registered Nurse)       Done  pls see other encounter of today

## 2021-07-11 PROBLEM — I48.0 PAROXYSMAL ATRIAL FIBRILLATION (H): Status: ACTIVE | Noted: 2021-07-11

## 2021-07-14 PROBLEM — I25.118 CORONARY ARTERY DISEASE OF NATIVE ARTERY OF NATIVE HEART WITH STABLE ANGINA PECTORIS (H): Status: RESOLVED | Noted: 2018-07-30 | Resolved: 2021-05-07

## 2021-07-16 DIAGNOSIS — I25.10 CORONARY ARTERY DISEASE INVOLVING NATIVE CORONARY ARTERY OF NATIVE HEART WITHOUT ANGINA PECTORIS: ICD-10-CM

## 2021-07-16 DIAGNOSIS — I48.3 TYPICAL ATRIAL FLUTTER (H): Primary | ICD-10-CM

## 2021-07-16 RX ORDER — SOTALOL HYDROCHLORIDE 80 MG/1
TABLET ORAL
Qty: 90 TABLET | Refills: 1 | Status: SHIPPED | OUTPATIENT
Start: 2021-07-16 | End: 2021-11-10

## 2021-07-16 NOTE — TELEPHONE ENCOUNTER
Medication: Sotalol 80mg  Last Date Filled: 11/13/20.  Last appointment addressing medication: 5/5/21.  Last B/P:  BP Readings from Last 3 Encounters:   05/19/21 134/54   05/12/21 122/72   05/05/21 138/74     Last labs pertaining to refill:5/5/21.      Pend medication and associate diagnosis before routing to Provider for review.       If patient has not been seen in over 1 year, pend 30 day supply and notify patient they are due for an appointment before any further refills.       Esther Wang, CMA

## 2021-07-21 DIAGNOSIS — I48.3 TYPICAL ATRIAL FLUTTER (H): ICD-10-CM

## 2021-07-21 DIAGNOSIS — I25.10 CORONARY ARTERY DISEASE INVOLVING NATIVE CORONARY ARTERY OF NATIVE HEART WITHOUT ANGINA PECTORIS: ICD-10-CM

## 2021-07-21 NOTE — TELEPHONE ENCOUNTER
Request from [facility] to Refill:    sotalol (BETAPACE) 80 MG tablet    7/21/2021

## 2021-07-25 RX ORDER — SOTALOL HYDROCHLORIDE 80 MG/1
TABLET ORAL
Qty: 90 TABLET | Refills: 1 | OUTPATIENT
Start: 2021-07-25

## 2021-07-25 NOTE — TELEPHONE ENCOUNTER
Refill request already responded to.  Sotalol filled 7/16/2021 #90 tablets with 1 refill.    sotalol (BETAPACE) 80 MG tablet 90 tablet 1 7/16/2021  No   Sig: TAKE 1/2 (ONE-HALF) TABLET BY MOUTH EVERY 12 HOURS .  DO  NOT  TAKE  WITH  SEROQUEL   Sent to pharmacy as: Sotalol HCl 80 MG Oral Tablet (BETAPACE)   Class: E-Prescribe   Order: [id number]   E-Prescribing Status: Receipt confirmed by pharmacy (7/16/2021  2:42 PM CDT     Kamini Ramos, RN  Triage Nurse Advisor

## 2021-07-27 ENCOUNTER — HOSPITAL ENCOUNTER (OUTPATIENT)
Dept: MAMMOGRAPHY | Facility: CLINIC | Age: 71
Discharge: HOME | End: 2021-07-27
Attending: FAMILY MEDICINE | Admitting: FAMILY MEDICINE
Payer: COMMERCIAL

## 2021-07-27 DIAGNOSIS — Z12.31 ENCOUNTER FOR SCREENING MAMMOGRAM FOR BREAST CANCER: ICD-10-CM

## 2021-07-27 PROCEDURE — 77063 BREAST TOMOSYNTHESIS BI: CPT

## 2021-07-30 ENCOUNTER — TELEPHONE (OUTPATIENT)
Dept: FAMILY MEDICINE | Facility: CLINIC | Age: 71
End: 2021-07-30

## 2021-07-30 NOTE — TELEPHONE ENCOUNTER
Pt contacted, I asked what pharmacy she uses up here and she stated [facility], I then told her that Dr. Zapata could transfer to [facility] down there. She does not have a car to go and get script. I asked if she had family that has a vehicle and she yelled NO!    I tried explaining again that Dr. Zapata does not have a license to prescribe meds in [place] and she stated What I am I suppose to do die? I said no teresa you can go to a pharmacy and pay out of pocket (pt only needs 3 tabs) she again yelled at me stating she has no transportation.  I suggested that she go to her hotel  and ask if they could help get transportation. She stated no again and yelled that she is going to leave [facility] and to F#$k my self and she hung up    I gave this message to manager for review as well as spoke to Dr. Zapata in person,

## 2021-07-30 NOTE — TELEPHONE ENCOUNTER
Pt called back - name of medicine is her sotalol 80 mg  [facility]  [facility]# [phone]  Fax [phone]  Pt needs this rx today.  Christianne are at the [employer] in [place] and the phy is located there.,  Pt is scared and upset.  Please fill today

## 2021-07-30 NOTE — TELEPHONE ENCOUNTER
Reason for Call:  Medication or medication refill:    Do you use a [facility]?  Name of the pharmacy and phone number for the current request:      Name of the medication requested: a fib meds- [facility]. She is on a trip to [place], will call back with the name and the mcy later on.    Other request:     Can we leave a detailed message on this number? Not Applicable    Phone number patient can be reached at: Home number on file [phone] (home)    Best Time:     Call taken on 7/30/2021 at 12:55 PM by Jessica Argueta

## 2021-08-03 ENCOUNTER — ANTICOAGULATION THERAPY VISIT (OUTPATIENT)
Dept: ANTICOAGULATION | Facility: CLINIC | Age: 71
End: 2021-08-03

## 2021-08-03 ENCOUNTER — LAB (OUTPATIENT)
Dept: LAB | Facility: CLINIC | Age: 71
End: 2021-08-03
Payer: COMMERCIAL

## 2021-08-03 DIAGNOSIS — I48.0 PAROXYSMAL ATRIAL FIBRILLATION (H): ICD-10-CM

## 2021-08-03 DIAGNOSIS — I48.0 PAROXYSMAL ATRIAL FIBRILLATION (H): Primary | ICD-10-CM

## 2021-08-03 LAB — INR BLD: 2 (ref 0.9–1.1)

## 2021-08-03 PROCEDURE — 36416 COLLJ CAPILLARY BLOOD SPEC: CPT

## 2021-08-03 PROCEDURE — 85610 PROTHROMBIN TIME: CPT

## 2021-08-03 NOTE — PROGRESS NOTES
Anticoagulation Management    Unable to reach Ciarra today.    Today's INR result of 2.0 is therapeutic (goal INR of 2.0-3.0).  Result received from: Clinic Lab    Follow up required to discuss dosing instructions and confirm understanding of instructions new start    Left message to continue current dose of warfarin 5 mg tonight.      Anticoagulation clinic to follow up    Cony Noonan RN

## 2021-08-04 NOTE — PROGRESS NOTES
ANTICOAGULATION MANAGEMENT     Kemi Soto 71 year old female is on warfarin with therapeutic INR result. (Goal INR 2.0-3.0)    Recent labs: (last 7 days)     08/03/21  1613   INR 2.0*       ASSESSMENT     Source(s): Chart Review, Patient/Caregiver Call and Template       Warfarin doses taken: Warfarin taken as instructed    Diet: No new diet changes identified    New illness, injury, or hospitalization: No    Medication/supplement changes: None noted    Signs or symptoms of bleeding or clotting: No    Previous INR: no baseline INR    Additional findings: New start on day 5 of warfarin and Pateint confimred that she ovelapped with Eliquis and took her last dose yesterday     PLAN     Recommended plan for no diet, medication or health factor changes affecting INR     Dosing Instructions: Continue your current warfarin dose with next INR in 4 days       Summary  As of 8/3/2021    Full warfarin instructions:  5 mg every day   Next INR check:  8/6/2021             Telephone call with Kemi who verbalizes understanding and agrees to plan    Lab visit scheduled Per patient she will be going out of town right after her appointment. She gave permission to leave message regarding dosing instructions on 8/6/2021.    Education provided: Importance of therapeutic range, Importance of following up for INR monitoring at instructed interval and Importance of taking warfarin as instructed    Plan made per [facility] anticoagulation protocol    Cony Noonan RN  Anticoagulation Clinic  8/4/2021    _______________________________________________________________________     Anticoagulation Episode Summary     Current INR goal:  2.0-3.0   TTR:  --   Target end date:     Send INR reminders to:  [facility]    Indications    Paroxysmal atrial fibrillation (H) [I48.0]           Comments:

## 2021-08-06 ENCOUNTER — ANTICOAGULATION THERAPY VISIT (OUTPATIENT)
Dept: ANTICOAGULATION | Facility: CLINIC | Age: 71
End: 2021-08-06

## 2021-08-06 ENCOUNTER — LAB (OUTPATIENT)
Dept: LAB | Facility: CLINIC | Age: 71
End: 2021-08-06
Payer: COMMERCIAL

## 2021-08-06 DIAGNOSIS — I48.0 PAROXYSMAL ATRIAL FIBRILLATION (H): Primary | ICD-10-CM

## 2021-08-06 DIAGNOSIS — I48.0 PAROXYSMAL ATRIAL FIBRILLATION (H): ICD-10-CM

## 2021-08-06 LAB — INR BLD: 3.5 (ref 0.9–1.1)

## 2021-08-06 PROCEDURE — 36416 COLLJ CAPILLARY BLOOD SPEC: CPT

## 2021-08-06 PROCEDURE — 85610 PROTHROMBIN TIME: CPT

## 2021-08-06 NOTE — PROGRESS NOTES
ANTICOAGULATION MANAGEMENT     Kemi Soto 71 year old female is on warfarin with supratherapeutic INR result. (Goal INR 2.0-3.0)    Recent labs: (last 7 days)     08/06/21  1412   INR 3.5*       ASSESSMENT     Source(s): Chart Review and Template       Warfarin doses taken: Warfarin taken as instructed    Diet: No new diet changes identified    New illness, injury, or hospitalization: No    Medication/supplement changes: None noted    Signs or symptoms of bleeding or clotting: No    Previous INR: Therapeutic    Additional findings: New start on day 8 of warfarin     PLAN     Recommended plan for no diet, medication or health factor changes affecting INR     Dosing Instructions: Hold dose then Decrease your warfarin dose (14% change) with next INR in 5 days       Summary  As of 8/6/2021    Full warfarin instructions:  8/6: Hold; Otherwise 2.5 mg every Mon, Fri; 5 mg all other days   Next INR check:  8/10/2021             Detailed voice message left for Kemi with dosing instructions and follow up date.     Lab visit scheduled    Education provided: Importance of therapeutic range, Importance of following up for INR monitoring at instructed interval and Importance of taking warfarin as instructed    Plan made with [facility] Pharmacist Maria Luz Noonan, RN  Anticoagulation Clinic  8/6/2021    _______________________________________________________________________     Anticoagulation Episode Summary     Current INR goal:  2.0-3.0   TTR:  66.7 % (2 d)   Target end date:     Send INR reminders to:  [facility]    Indications    Paroxysmal atrial fibrillation (H) [I48.0]           Comments:

## 2021-08-06 NOTE — PROGRESS NOTES
Progress Notes by Analia Tipton CNP at 8/3/2017  1:30 PM     Author: Analia Tipton CNP Service: -- Author Type: Nurse Practitioner    Filed: 8/3/2017  2:00 PM Encounter Date: 8/3/2017 Status: Signed    : Analia Tipton CNP (Nurse Practitioner)         [place] Outcomes Trial: A long term, randomized, double-blinded, placebo-controlled study to determine the effect of albiglutide, when added to standard blood glucose lowering therapies, on major cardiovascular events in patients with Type 2 diabetes mellitus.    Assessment/Plan:     Continue with HARMONY research study.  She was encouraged to take her plavix daily but remains hesitant.     Subjective:     Kemi Soto is seen at [facility] today for HARMONY research study.  She has a history of coronary artery bypass surgery, hypertension, dyslipidemia, diabetes, TIA, and bipolar disorder.  She has no health concerns today and denies lightheadedness, shortness of breath, dyspnea on exertion, chest pain and lower extremity edema.  She is only taking her Plavix every other day.  This is an improvement from every third day.  She discussed this with Dr. Hernandez at her last appointment.  Dr. Hernandez discussed the importance of Plavix which I reiterated today.  She denies any bleeding problems.    Patient Active Problem List   Diagnosis   ? History of TIA (transient ischemic attack) and stroke   ? Essential hypertension   ? Mixed hyperlipidemia   ? Coronary artery disease   ? Back pain   ? Chalastodermia   ? Diabetes mellitus with polyneuropathy   ? Type 2 diabetes mellitus with hyperglycemia   ? Bipolar 1 disorder   ? S/P CABG (coronary artery bypass graft)   ? Bipolar 2 disorder   ? Anxiety disorder   ? BMI 36.0-36.9,adult   ? Noncompliance with medications   ? Psoriasis, guttate   ? History of atrial fibrillation   ? History of alcohol abuse       Past Medical History:   Diagnosis Date   ? Discitis of thoracolumbar region  10/10/2015   ? Encephalopathy 10/14/2015   ? Epidural abscess 10/10/2015   ? Hip pain, right    ? Sepsis 10/8/2015   ? Stroke 06/23/2015    Neurology felt that episode was C/W subacute ischemic stroke   ? TIA (transient ischemic attack) 6/23/2015   ? UTI (urinary tract infection)     admitted to [facility] with UTI       Past Surgical History:   Procedure Laterality Date   ? CARPAL TUNNEL RELEASE Bilateral    ? CORONARY ANGIOPLASTY WITH STENT PLACEMENT  03/30/2016    Drug eluting stent mid LAD, cutting balloon to 1st diagonal   ? CORONARY ARTERY BYPASS GRAFT  12/11/2007    X 3 vessels, LIMA to LAD, saph vein graft to distal RCA, saphvein graft to marginal, mini circuit bypass.  [facility].   ? CORONARY STENT PLACEMENT  2008    Left main, left circumflex, RCA   ? INSERT MIDLINE HE  10/31/2015        ? LUMBAR DISCECTOMY N/A 10/11/2015    Procedure: BILATERAL L1-L5 DECOMPRESSIVE LAMINECTOMY WITH EVACUATION OF EPIDURAL ABSCESS IRRIGATION & DEBRIDEMENT;  Surgeon: Luli Chan MD;  Location: [facility];  Service:    ? PICC  10/19/2015            Family History   Problem Relation Age of Onset   ? Stroke Mother    ? Diabetes Father    ? Stroke Sister    ? Diabetes Sister    ? Stroke Brother    ? Diabetes Brother        Social History     Social History   ? Marital status: Single     Spouse name: N/A   ? Number of children: 1   ? Years of education: N/A     Occupational History   ? Retired      Former [employer]      Social History Main Topics   ? Smoking status: Former Smoker     Quit date: 6/23/2007   ? Smokeless tobacco: Never Used   ? Alcohol use No      Comment: stopped drinking 9/2015   ? Drug use: No   ? Sexual activity: No     Other Topics Concern   ? Not on file     Social History Narrative    Lives alone with cats and dogs. , one daughter, Tory Lofton.        Current Outpatient Prescriptions   Medication Sig Dispense Refill   ? atorvastatin (LIPITOR)  "80 MG tablet Take 1 tablet (80 mg total) by mouth bedtime. 90 tablet 1   ? blood glucose meter (GLUCOMETER) Use 1 each As Directed as needed. Dispense glucometer brand per patient's insurance at pharmacy discretion. 1 each 0   ? blood glucose test strips Use 1 each As Directed as needed. Dispense brand per patient's insurance at pharmacy discretion. 200 each 2   ? busPIRone (BUSPAR) 15 MG tablet Take 1 tablet (15 mg total) by mouth 2 (two) times a day. (Patient taking differently: Take 15 mg by mouth daily. ) 180 tablet 1   ? clopidogrel (PLAVIX) 75 mg tablet Take 1 tablet (75 mg total) by mouth daily. (Patient taking differently: Take 75 mg by mouth every other day. ) 90 tablet 3   ? fluocinonide (LIDEX) 0.05 % ointment Apply twice a day sparingly to sores as needed for itching. 30 g 2   ? gabapentin (NEURONTIN) 300 MG capsule Take 3 capsules (900 mg total) by mouth at bedtime. 270 capsule 3   ? generic lancets (MICROLET LANCET) Use 1 each As Directed 3 (three) times a day. 100 each 2   ? LANTUS SOLOSTAR 100 unit/mL (3 mL) pen Inject 28 Units under the skin 2 (two) times a day. 11.65 Type 2 with hyperglycemia 3 mL PRN   ? metFORMIN (GLUCOPHAGE XR) 500 MG 24 hr tablet Take 2 tablets twice daily (Patient taking differently: Take 500 mg by mouth at bedtime. ) 360 tablet 1   ? metoprolol succinate (TOPROL-XL) 25 MG TAKE ONE TABLET BY MOUTH ONCE DAILY 90 tablet 0   ? nitroglycerin (NITROSTAT) 0.4 MG SL tablet Place 1 tablet (0.4 mg total) under the tongue every 5 (five) minutes as needed for chest pain. 25 tablet 12   ? pen needle, diabetic ([employer] PEN NEEDLE) 32 gauge x 5/32\" Ndle Use 1 Device As Directed 2 (two) times a day. 200 each 3   ? sertraline (ZOLOFT) 100 MG tablet Take 1 tablet (100 mg total) by mouth daily. 90 tablet 1     Current Facility-Administered Medications   Medication Dose Route Frequency Provider Last Rate Last Dose   ? Study Drug albiglutide 30 mg/placebo injector pen 1 Syringe ([employer])  1 " Syringe Subcutaneous Q7 Days Terri Christine MD       ? Study Drug albiglutide 30 mg/placebo injector pen 1 Syringe (HARMONY)  1 Syringe Subcutaneous Q7 Days Terri Christine MD       ? Study Drug albiglutide 30 mg/placebo injector pen 1 Syringe (HARMONY)  1 Syringe Subcutaneous Q7 Days Terri Christine MD           Allergies   Allergen Reactions   ? Oxycontin [Oxycodone] Nausea Only       Objective:     Vitals:    08/03/17 1315   BP: 136/70   Pulse: 60   Resp: 16     Wt Readings from Last 3 Encounters:   08/03/17 178 lb (80.7 kg)   07/11/17 178 lb (80.7 kg)   06/14/17 177 lb 4.8 oz (80.4 kg)       General Appearance:   Alert, cooperative and in no acute distress.   HEENT:  No scleral icterus; the mucous membranes were pink and moist.    Chest: The spine was straight. The chest was symmetric.   Lungs:   Respirations unlabored; the lungs are clear to auscultation.   Cardiovascular:   Regular rhythm. S1 and S2 without murmur, clicks or rubs. Radial, carotid and posterior tibial pulses are intact and symmetrical. No carotid bruits noted   Abdomen:  Soft, nontender, nondistended, bowel sounds present   Extremities: No cyanosis, clubbing, or edema.   Skin: No bruising or wounds   Neurologic: Mood and affect are appropriate.               Analia Tipton, CNP

## 2021-08-10 ENCOUNTER — LAB (OUTPATIENT)
Dept: LAB | Facility: CLINIC | Age: 71
End: 2021-08-10
Payer: COMMERCIAL

## 2021-08-10 ENCOUNTER — ANTICOAGULATION THERAPY VISIT (OUTPATIENT)
Dept: ANTICOAGULATION | Facility: CLINIC | Age: 71
End: 2021-08-10

## 2021-08-10 DIAGNOSIS — I48.0 PAROXYSMAL ATRIAL FIBRILLATION (H): ICD-10-CM

## 2021-08-10 DIAGNOSIS — I48.0 PAROXYSMAL ATRIAL FIBRILLATION (H): Primary | ICD-10-CM

## 2021-08-10 LAB — INR BLD: 2.9 (ref 0.9–1.1)

## 2021-08-10 PROCEDURE — 85610 PROTHROMBIN TIME: CPT

## 2021-08-10 PROCEDURE — 36416 COLLJ CAPILLARY BLOOD SPEC: CPT

## 2021-08-10 NOTE — PROGRESS NOTES
ANTICOAGULATION MANAGEMENT     Kemi LUIS F Charles 71 year old female is on warfarin with therapeutic INR result. (Goal INR 2.0-3.0)    Recent labs: (last 7 days)     08/10/21  1400   INR 2.9*       ASSESSMENT     Source(s): Chart Review and Template       Warfarin doses taken: Warfarin recently held for INR of 3.6 which may be affecting INR    Diet: No new diet changes identified    New illness, injury, or hospitalization: No    Medication/supplement changes: None noted    Signs or symptoms of bleeding or clotting: No    Previous INR: therapeutic    Additional findings: New start on day 12 of warfarin     PLAN     Recommended plan for no diet, medication or health factor changes affecting INR     Dosing Instructions: Continue your current warfarin dose with next INR in 4 days       Summary  As of 8/10/2021    Full warfarin instructions:  2.5 mg every Mon, Fri; 5 mg all other days   Next INR check:  8/13/2021             Telephone call with Kemi who verbalizes understanding and agrees to plan    Lab visit scheduled    Education provided: Importance of therapeutic range, Importance of following up for INR monitoring at instructed interval and Importance of taking warfarin as instructed    Plan made per [facility] anticoagulation protocol    Cony Noonan, RN  Anticoagulation Clinic  8/10/2021    _______________________________________________________________________     Anticoagulation Episode Summary     Current INR goal:  2.0-3.0   TTR:  37.8 % (6 d)   Target end date:     Send INR reminders to:  [facility]ART [facility]    Indications    Paroxysmal atrial fibrillation (H) [I48.0]           Comments:  trnasitioned from [employer]

## 2021-08-12 DIAGNOSIS — F41.1 ANXIETY, GENERALIZED: Primary | ICD-10-CM

## 2021-08-13 ENCOUNTER — LAB (OUTPATIENT)
Dept: LAB | Facility: CLINIC | Age: 71
End: 2021-08-13
Payer: COMMERCIAL

## 2021-08-13 ENCOUNTER — ANTICOAGULATION THERAPY VISIT (OUTPATIENT)
Dept: ANTICOAGULATION | Facility: CLINIC | Age: 71
End: 2021-08-13

## 2021-08-13 DIAGNOSIS — I48.0 PAROXYSMAL ATRIAL FIBRILLATION (H): ICD-10-CM

## 2021-08-13 DIAGNOSIS — I48.0 PAROXYSMAL ATRIAL FIBRILLATION (H): Primary | ICD-10-CM

## 2021-08-13 LAB — INR BLD: 4.1 (ref 0.9–1.1)

## 2021-08-13 PROCEDURE — 36415 COLL VENOUS BLD VENIPUNCTURE: CPT

## 2021-08-13 PROCEDURE — 85610 PROTHROMBIN TIME: CPT

## 2021-08-13 NOTE — PROGRESS NOTES
ANTICOAGULATION MANAGEMENT     Kemi Soto 71 year old female is on warfarin with supratherapeutic INR result. (Goal INR 2.0-3.0)    Recent labs: (last 7 days)     08/13/21  1419   INR 4.1*       ASSESSMENT     Source(s): Chart Review, Patient/Caregiver Call and Template       Warfarin doses taken: Warfarin taken as instructed    Diet: No new diet changes identified    New illness, injury, or hospitalization: No    Medication/supplement changes: None noted    Signs or symptoms of bleeding or clotting: No    Previous INR: Therapeutic last visit; previously outside of goal range    Additional findings: Per patient she is looking into if she qualify for cheaper Dibbzis price and will update ACN if she qualifies.     PLAN     Recommended plan for no diet, medication or health factor changes affecting INR     Dosing Instructions: Hold dose then Decrease your warfarin dose (17% change) with next INR in 4 days       Summary  As of 8/13/2021    Full warfarin instructions:  8/13: Hold; Otherwise 5 mg every Sun, Tue, Thu; 2.5 mg all other days   Next INR check:  8/17/2021             Telephone call with Kemi who verbalizes understanding and agrees to plan    Lab visit scheduled    Education provided: Importance of therapeutic range, Importance of following up for INR monitoring at instructed interval, Importance of taking warfarin as instructed, Monitoring for bleeding signs and symptoms and When to seek medical attention/emergency care    Plan made with [facility] Pharmacist Maria Luz Noonan, RN  Anticoagulation Clinic  8/13/2021    _______________________________________________________________________     Anticoagulation Episode Summary     Current INR goal:  2.0-3.0   TTR:  28.8 % (1.3 wk)   Target end date:     Send INR reminders to:  [facility]    Indications    Paroxysmal atrial fibrillation (H) [I48.0]           Comments:  trnasitioned from [employer]

## 2021-08-14 RX ORDER — BUSPIRONE HYDROCHLORIDE 15 MG/1
TABLET ORAL
Qty: 90 TABLET | Refills: 0 | Status: SHIPPED | OUTPATIENT
Start: 2021-08-14 | End: 2021-08-28

## 2021-08-14 RX ORDER — SERTRALINE HYDROCHLORIDE 100 MG/1
TABLET, FILM COATED ORAL
Qty: 90 TABLET | Refills: 0 | Status: SHIPPED | OUTPATIENT
Start: 2021-08-14 | End: 2022-03-07

## 2021-08-15 NOTE — TELEPHONE ENCOUNTER
"Routing refill request to provider for review/approval because:  Drug not active on patient's medication list      Last office visit provider:  5/5/21     Requested Prescriptions   Pending Prescriptions Disp Refills     sertraline (ZOLOFT) 100 MG tablet [Pharmacy Med Name: Sertraline HCl 100 MG Oral Tablet] 90 tablet 0     Sig: Take 1 tablet by mouth once daily       SSRIs Protocol Passed - 8/12/2021  1:59 PM        Passed - Recent (12 mo) or future (30 days) visit within the authorizing provider's specialty     Patient has had an office visit with the authorizing provider or a provider within the authorizing providers department within the previous 12 mos or has a future within next 30 days. See \"Patient Info\" tab in inbasket, or \"Choose Columns\" in Meds & Orders section of the refill encounter.              Passed - Medication is active on med list        Passed - Patient is age 18 or older        Passed - No active pregnancy on record        Passed - No positive pregnancy test in last 12 months           busPIRone (BUSPAR) 15 MG tablet [Pharmacy Med Name: busPIRone HCl 15 MG Oral Tablet] 90 tablet 0     Sig: Take 1 tablet by mouth once daily       Atypical Antidepressants Protocol Failed - 8/12/2021  1:59 PM        Failed - Medication active on med list        Passed - Recent (12 mo) or future (30 days) visit within the authorizing provider's specialty     Patient has had an office visit with the authorizing provider or a provider within the authorizing providers department within the previous 12 mos or has a future within next 30 days. See \"Patient Info\" tab in inbasket, or \"Choose Columns\" in Meds & Orders section of the refill encounter.              Passed - Patient is age 18 or older        Passed - No active pregnancy on record        Passed - No positive pregnancy test in past 12 mos             sahra beck RN 08/14/21 9:16 PM  "

## 2021-08-17 ENCOUNTER — LAB (OUTPATIENT)
Dept: LAB | Facility: CLINIC | Age: 71
End: 2021-08-17
Payer: COMMERCIAL

## 2021-08-17 ENCOUNTER — ANTICOAGULATION THERAPY VISIT (OUTPATIENT)
Dept: ANTICOAGULATION | Facility: CLINIC | Age: 71
End: 2021-08-17

## 2021-08-17 DIAGNOSIS — I48.0 PAROXYSMAL ATRIAL FIBRILLATION (H): Primary | ICD-10-CM

## 2021-08-17 DIAGNOSIS — I48.0 PAROXYSMAL ATRIAL FIBRILLATION (H): ICD-10-CM

## 2021-08-17 LAB — INR BLD: 1.5 (ref 0.9–1.1)

## 2021-08-17 PROCEDURE — 36415 COLL VENOUS BLD VENIPUNCTURE: CPT

## 2021-08-17 PROCEDURE — 85610 PROTHROMBIN TIME: CPT

## 2021-08-17 NOTE — PROGRESS NOTES
ANTICOAGULATION MANAGEMENT     Kemi Soto 71 year old female is on warfarin with subtherapeutic INR result. (Goal INR 2.0-3.0)    Recent labs: (last 7 days)     08/17/21  1536   INR 1.5*       ASSESSMENT     Source(s): Chart Review and Patient/Caregiver Call       Warfarin doses taken: Warfarin taken as instructed    Diet: No new diet changes identified    New illness, injury, or hospitalization: No    Medication/supplement changes: None noted    Signs or symptoms of bleeding or clotting: No    Previous INR: Supratherapeutic    Additional findings: None     PLAN     Recommended plan for no diet, medication or health factor changes affecting INR     Dosing Instructions:  Change your warfarin dose (10% change) with next INR in 1 week       Summary  As of 8/17/2021    Full warfarin instructions:  5 mg every Sun, Thu; 2.5 mg all other days   Next INR check:  8/24/2021             Telephone call with Kemi who verbalizes understanding and agrees to plan    Lab visit scheduled    Education provided: None required    Plan made per [facility] anticoagulation protocol    Nicole Lopez RN  Anticoagulation Clinic  8/17/2021    _______________________________________________________________________     Anticoagulation Episode Summary     Current INR goal:  2.0-3.0   TTR:  31.6 % (1.9 wk)   Target end date:     Send INR reminders to:  [facility] HEART [facility]    Indications    Paroxysmal atrial fibrillation (H) [I48.0]           Comments:  trnasitioned from [employer]

## 2021-08-24 ENCOUNTER — ANTICOAGULATION THERAPY VISIT (OUTPATIENT)
Dept: ANTICOAGULATION | Facility: CLINIC | Age: 71
End: 2021-08-24

## 2021-08-24 ENCOUNTER — LAB (OUTPATIENT)
Dept: LAB | Facility: CLINIC | Age: 71
End: 2021-08-24
Payer: COMMERCIAL

## 2021-08-24 DIAGNOSIS — E78.00 HYPERCHOLESTEROLEMIA: Primary | ICD-10-CM

## 2021-08-24 DIAGNOSIS — M75.42 IMPINGEMENT SYNDROME OF SHOULDER REGION, LEFT: ICD-10-CM

## 2021-08-24 DIAGNOSIS — F41.1 ANXIETY, GENERALIZED: ICD-10-CM

## 2021-08-24 DIAGNOSIS — I25.10 CORONARY ARTERY DISEASE INVOLVING NATIVE CORONARY ARTERY OF NATIVE HEART WITHOUT ANGINA PECTORIS: ICD-10-CM

## 2021-08-24 DIAGNOSIS — I48.0 PAROXYSMAL ATRIAL FIBRILLATION (H): Primary | ICD-10-CM

## 2021-08-24 DIAGNOSIS — I48.0 PAROXYSMAL ATRIAL FIBRILLATION (H): ICD-10-CM

## 2021-08-24 LAB — INR BLD: 1.5 (ref 0.9–1.1)

## 2021-08-24 PROCEDURE — 85610 PROTHROMBIN TIME: CPT

## 2021-08-24 PROCEDURE — 36416 COLLJ CAPILLARY BLOOD SPEC: CPT

## 2021-08-24 RX ORDER — WARFARIN SODIUM 5 MG/1
2.5-5 TABLET ORAL DAILY
Qty: 80 TABLET | Refills: 1 | Status: SHIPPED | OUTPATIENT
Start: 2021-08-24 | End: 2022-04-11

## 2021-08-24 NOTE — PROGRESS NOTES
ANTICOAGULATION MANAGEMENT     Kemi LUIS F Charles 71 year old female is on warfarin with subtherapeutic INR result. (Goal INR 2.0-3.0)    Recent labs: (last 7 days)     08/24/21  1407   INR 1.5*       ASSESSMENT     Source(s): Chart Review and Patient/Caregiver Call       Warfarin doses taken: Warfarin taken differently, but did not change total weekly dose    Diet: No new diet changes identified    New illness, injury, or hospitalization: No    Medication/supplement changes: None noted    Signs or symptoms of bleeding or clotting: No    Previous INR: Subtherapeutic    Additional findings: Per patient, she is still waiting for the [employer] application form  to find out if she qualifies for cheaper price     PLAN     Recommended plan for no diet, medication or health factor changes affecting INR     Dosing Instructions: Booster dose then Increase your warfarin dose (11% change) with next INR in 4 days       Summary  As of 8/24/2021    Full warfarin instructions:  8/24: 7.5 mg; Otherwise 5 mg every Sun, Tue, Thu; 2.5 mg all other days   Next INR check:  8/27/2021             Telephone call with Kemi who verbalizes understanding and agrees to plan    Lab visit scheduled    Education provided: Importance of consistent vitamin K intake, Impact of vitamin K foods on INR, Importance of therapeutic range, Importance of following up at instructed interval and Importance of taking warfarin as instructed    Plan made per [facility] anticoagulation protocol    Cony Noonan RN  Anticoagulation Clinic  8/24/2021    _______________________________________________________________________     Anticoagulation Episode Summary     Current INR goal:  2.0-3.0   TTR:  21.1 % (2.9 wk)   Target end date:     Send INR reminders to:  [facility] HEART [facility]    Indications    Paroxysmal atrial fibrillation (H) [I48.0]           Comments:  trnasitioned from [employer]

## 2021-08-27 ENCOUNTER — ANTICOAGULATION THERAPY VISIT (OUTPATIENT)
Dept: ANTICOAGULATION | Facility: CLINIC | Age: 71
End: 2021-08-27

## 2021-08-27 ENCOUNTER — LAB (OUTPATIENT)
Dept: LAB | Facility: CLINIC | Age: 71
End: 2021-08-27
Payer: COMMERCIAL

## 2021-08-27 DIAGNOSIS — I48.0 PAROXYSMAL ATRIAL FIBRILLATION (H): Primary | ICD-10-CM

## 2021-08-27 DIAGNOSIS — I48.0 PAROXYSMAL ATRIAL FIBRILLATION (H): ICD-10-CM

## 2021-08-27 LAB — INR BLD: 1.8 (ref 0.9–1.1)

## 2021-08-27 PROCEDURE — 85610 PROTHROMBIN TIME: CPT

## 2021-08-27 NOTE — PROGRESS NOTES
ANTICOAGULATION MANAGEMENT     Kemi Soto 71 year old female is on warfarin with therapeutic INR result. (Goal INR 2.0-3.0)    Recent labs: (last 7 days)     08/27/21  1157   INR 1.8*       ASSESSMENT     Source(s): Chart Review, Patient/Caregiver Call and Template       Warfarin doses taken: Warfarin taken as instructed    Diet: No new diet changes identified    New illness, injury, or hospitalization: No    Medication/supplement changes: None noted    Signs or symptoms of bleeding or clotting: No    Previous INR: Subtherapeutic    Additional findings: None     PLAN     Recommended plan for no diet, medication or health factor changes affecting INR     Dosing Instructions: increase dose ( 9 % ) with next INR in 5 days       Summary  As of 8/27/2021    Full warfarin instructions:  2.5 mg every Mon, Wed, Sat; 5 mg all other days   Next INR check:  8/31/2021             Telephone call with Kemi who verbalizes understanding and agrees to plan    Lab visit scheduled    Education provided: Importance of therapeutic range, Importance of following up at instructed interval and Importance of taking warfarin as instructed    Plan made per [facility] anticoagulation protocol    Cony Noonan RN  Anticoagulation Clinic  8/27/2021    _______________________________________________________________________     Anticoagulation Episode Summary     Current INR goal:  2.0-3.0   TTR:  18.6 % (3.3 wk)   Target end date:     Send INR reminders to:  [facility] HEART [facility]    Indications    Paroxysmal atrial fibrillation (H) [I48.0]           Comments:  trnasitioned from [employer]         Anticoagulation Care Providers     Provider Role Specialty Phone number    Chuck Hernandez MD Responsible Cardiovascular Disease [phone]

## 2021-08-28 RX ORDER — TRAMADOL HYDROCHLORIDE 50 MG/1
TABLET ORAL
Qty: 30 TABLET | Refills: 0 | Status: SHIPPED | OUTPATIENT
Start: 2021-08-28 | End: 2023-12-07

## 2021-08-28 RX ORDER — ATORVASTATIN CALCIUM 80 MG/1
TABLET, FILM COATED ORAL
Qty: 90 TABLET | Refills: 0 | Status: SHIPPED | OUTPATIENT
Start: 2021-08-28 | End: 2021-12-23

## 2021-08-28 RX ORDER — ISOSORBIDE MONONITRATE 60 MG/1
TABLET, EXTENDED RELEASE ORAL
Qty: 90 TABLET | Refills: 0 | Status: SHIPPED | OUTPATIENT
Start: 2021-08-28 | End: 2022-02-09

## 2021-08-28 RX ORDER — BUSPIRONE HYDROCHLORIDE 15 MG/1
TABLET ORAL
Qty: 90 TABLET | Refills: 0 | Status: SHIPPED | OUTPATIENT
Start: 2021-08-28 | End: 2022-05-20

## 2021-08-28 NOTE — TELEPHONE ENCOUNTER
Patient needs a medication, diabetes and laboratory follow-up visit in the next month with 30 minutes please  Prescriptions were renewed for 3 months.    Dr. Zapata

## 2021-08-28 NOTE — TELEPHONE ENCOUNTER
"Routing refill request to provider for review/approval because:  Allergy/contraindication    Last Written Prescription Date:  8/14/21  Last Fill Quantity: 90,  # refills: 0   Last office visit provider:  5/5/21     Requested Prescriptions   Pending Prescriptions Disp Refills     atorvastatin (LIPITOR) 80 MG tablet [Pharmacy Med Name: Atorvastatin Calcium 80 MG Oral Tablet] 90 tablet 0     Sig: TAKE 1 TABLET BY MOUTH ONCE DAILY AT BEDTIME       Statins Protocol Failed - 8/27/2021 10:00 AM        Failed - Medication is active on med list        Passed - LDL on file in past 12 months     Recent Labs   Lab Test 05/05/21  1400   LDL 79             Passed - No abnormal creatine kinase in past 12 months     No lab results found.             Passed - Recent (12 mo) or future (30 days) visit within the authorizing provider's specialty     Patient has had an office visit with the authorizing provider or a provider within the authorizing providers department within the previous 12 mos or has a future within next 30 days. See \"Patient Info\" tab in inbasket, or \"Choose Columns\" in Meds & Orders section of the refill encounter.              Passed - Patient is age 18 or older        Passed - No active pregnancy on record        Passed - No positive pregnancy test in past 12 months           isosorbide mononitrate (IMDUR) 60 MG 24 hr tablet [Pharmacy Med Name: Isosorbide Mononitrate ER 60 MG Oral Tablet Extended Release 24 Hour] 90 tablet 0     Sig: Take 1 tablet by mouth once daily       Nitrates Failed - 8/27/2021 10:00 AM        Failed - Medication is active on med list        Passed - Blood pressure under 140/90 in past 12 months     BP Readings from Last 3 Encounters:   05/19/21 134/54   05/12/21 122/72   05/05/21 138/74                 Passed - Pt is not on erectile dysfunction medications        Passed - Recent (12 mo) or future (30 days) visit within the authorizing provider's specialty     Patient has had an office visit " "with the authorizing provider or a provider within the authorizing providers department within the previous 12 mos or has a future within next 30 days. See \"Patient Info\" tab in inbasket, or \"Choose Columns\" in Meds & Orders section of the refill encounter.              Passed - Patient is age 18 or older           busPIRone (BUSPAR) 15 MG tablet [Pharmacy Med Name: busPIRone HCl 15 MG Oral Tablet] 90 tablet 0     Sig: Take 1 tablet by mouth once daily       Atypical Antidepressants Protocol Passed - 8/27/2021 10:00 AM        Passed - Recent (12 mo) or future (30 days) visit within the authorizing provider's specialty     Patient has had an office visit with the authorizing provider or a provider within the authorizing providers department within the previous 12 mos or has a future within next 30 days. See \"Patient Info\" tab in inbasket, or \"Choose Columns\" in Meds & Orders section of the refill encounter.              Passed - Medication active on med list        Passed - Patient is age 18 or older        Passed - No active pregnancy on record        Passed - No positive pregnancy test in past 12 mos           traMADol (ULTRAM) 50 MG tablet [Pharmacy Med Name: traMADol HCl 50 MG Oral Tablet] 30 tablet 0     Sig: TAKE 1 TABLET BY MOUTH EVERY 8 HOURS AS NEEDED FOR PAIN OR SEVERE PAIN ON SCALE (7-10)       There is no refill protocol information for this order          sahra beck RN 08/27/21 8:35 PM  "

## 2021-08-30 NOTE — TELEPHONE ENCOUNTER
Left message to call back for: Pt.   Information to relay to patient: Information below.     Esther Wang, CMA

## 2021-08-31 ENCOUNTER — TELEPHONE (OUTPATIENT)
Dept: FAMILY MEDICINE | Facility: CLINIC | Age: 71
End: 2021-08-31

## 2021-08-31 ENCOUNTER — ANTICOAGULATION THERAPY VISIT (OUTPATIENT)
Dept: ANTICOAGULATION | Facility: CLINIC | Age: 71
End: 2021-08-31

## 2021-08-31 ENCOUNTER — LAB (OUTPATIENT)
Dept: LAB | Facility: CLINIC | Age: 71
End: 2021-08-31
Payer: COMMERCIAL

## 2021-08-31 DIAGNOSIS — E78.00 HYPERCHOLESTEROLEMIA: ICD-10-CM

## 2021-08-31 DIAGNOSIS — I48.0 PAROXYSMAL ATRIAL FIBRILLATION (H): Primary | ICD-10-CM

## 2021-08-31 DIAGNOSIS — I48.0 PAROXYSMAL A-FIB (H): ICD-10-CM

## 2021-08-31 LAB — INR BLD: 1.9 (ref 0.9–1.1)

## 2021-08-31 PROCEDURE — 85610 PROTHROMBIN TIME: CPT

## 2021-08-31 PROCEDURE — 36415 COLL VENOUS BLD VENIPUNCTURE: CPT

## 2021-08-31 RX ORDER — ATORVASTATIN CALCIUM 80 MG/1
TABLET, FILM COATED ORAL
Qty: 90 TABLET | Refills: 0 | Status: CANCELLED | OUTPATIENT
Start: 2021-08-31

## 2021-08-31 NOTE — PROGRESS NOTES
ANTICOAGULATION MANAGEMENT     Kemi Soto 71 year old female is on warfarin with subtherapeutic INR result. (Goal INR 2.0-3.0)    Recent labs: (last 7 days)     08/31/21  1403   INR 1.9*       ASSESSMENT     Source(s): Chart Review and Patient/Caregiver Call       Warfarin doses taken: Warfarin taken as instructed and Template incorrect; verbally confirmed home dose with Tayla     Diet: No new diet changes identified    New illness, injury, or hospitalization: Yes: dog bite last night- scheduled for OV tomorrow.    Medication/supplement changes: None noted    Signs or symptoms of bleeding or clotting: No    Previous INR: Subtherapeutic    Additional findings: Will consult ACM Pharmacist due to INR is rising slow.     Patient getting frustrated with INR intervals- made aware that interval will get longer once INR is stable. She stated that she does not qualify for cheaper [employer] price.     PLAN     Recommended plan for no diet, medication or health factor changes affecting INR     Dosing Instructions:  Increase your warfarin dose (9% change ) with next INR in 5 days       Summary  As of 8/31/2021    Full warfarin instructions:  2.5 mg every Mon, Sat; 5 mg all other days   Next INR check:  9/3/2021             Telephone call with Kemi who verbalizes understanding and agrees to plan    Lab visit scheduled    Education provided: Importance of therapeutic range, Importance of following up at instructed interval, Importance of taking warfarin as instructed and Importance of notifying clinic for changes in medications; a sooner lab recheck maybe needed.    Plan made with [facility] Pharmacist Maria Luz Noonan, RN  Anticoagulation Clinic  8/31/2021    _______________________________________________________________________     Anticoagulation Episode Summary     Current INR goal:  2.0-3.0   TTR:  15.8 % (3.9 wk)   Target end date:     Send INR reminders to:  [facility]ART [facility]    Indications     Paroxysmal atrial fibrillation (H) [I48.0]           Comments:  trnasitioned from [facility]         Anticoagulation Care Providers     Provider Role Specialty Phone number    Chuck Hernandez MD Responsible Cardiovascular Disease [phone]

## 2021-09-01 ENCOUNTER — TELEPHONE (OUTPATIENT)
Dept: FAMILY MEDICINE | Facility: CLINIC | Age: 71
End: 2021-09-01

## 2021-09-01 ENCOUNTER — OFFICE VISIT (OUTPATIENT)
Dept: FAMILY MEDICINE | Facility: CLINIC | Age: 71
End: 2021-09-01
Payer: COMMERCIAL

## 2021-09-01 VITALS
WEIGHT: 178 LBS | BODY MASS INDEX: 32.56 KG/M2 | DIASTOLIC BLOOD PRESSURE: 72 MMHG | OXYGEN SATURATION: 97 % | HEART RATE: 65 BPM | SYSTOLIC BLOOD PRESSURE: 146 MMHG | TEMPERATURE: 98.8 F
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
## 2021-09-01 DIAGNOSIS — L08.9 INFECTED DOG BITE OF FINGER, INITIAL ENCOUNTER: Primary | ICD-10-CM

## 2021-09-01 DIAGNOSIS — Z79.4 TYPE 2 DIABETES MELLITUS WITH HYPERGLYCEMIA, WITH LONG-TERM CURRENT USE OF INSULIN (H): ICD-10-CM

## 2021-09-01 DIAGNOSIS — E11.65 TYPE 2 DIABETES MELLITUS WITH HYPERGLYCEMIA, WITH LONG-TERM CURRENT USE OF INSULIN (H): ICD-10-CM

## 2021-09-01 DIAGNOSIS — S61.259A INFECTED DOG BITE OF FINGER, INITIAL ENCOUNTER: Primary | ICD-10-CM

## 2021-09-01 PROCEDURE — 99213 OFFICE O/P EST LOW 20 MIN: CPT | Performed by: FAMILY MEDICINE

## 2021-09-01 NOTE — PROGRESS NOTES
Assessment / Plan    Kemi was seen today for dog bite.    Diagnoses and all orders for this visit:    Infected dog bite of finger, initial encounter  -     amoxicillin-clavulanate (AUGMENTIN) 875-125 MG tablet; Take 1 tablet by mouth 2 times daily for 10 days    Type 2 diabetes mellitus with hyperglycemia, with long-term current use of insulin (H)    patient was bit by one of her dogs 2 days ago, noting persistent redness and swelling and pain at the distal left 3rd finger.  History of type 2 diabetes.  Concern regarding infection, as symptoms have been worsening.  Recommend treatment with augmentin as per orders.  Patient is scheduled to have INR rechecked in 2 days and reviewed that Augmentin has potential to raise INR in people taking warfarin.     Patient is advised to treat finger with application of ice or submersion of the finger in cold water for 5 to 10 minutes 3-4 times daily for the next 2 days.  I would expect that within the next few days her pain should be improving, as well as the swelling and discoloration.  Patient is advised to return for recheck if not improving as expected.    Patient Instructions   I would recommend that you treat the dog bite with an antibiotic called Augmentin that you'll take twice daily for 10 days.  Please let your anticoagulation nurse know that you're taking it when you see her on Friday.      Please let me know if the pain/swelling is not improving by the end of the week.      25 minutes spent on the date of the encounter doing chart review, history and exam, documentation and further activities per the note.    Subjective   Kemi Soto is a 71 year old year old female who presents with the following concerns:     Dog bite - patient reports that she had sustained a bite to her left middle finger from her dog on 8/30/21.  The dog is up-to-date on its vaccines.  She is noting increased swelling and discoloration of the area and associated pain.  She noted  significant bleeding initially, but none since the day it occurred.  She is chronically anticoagulated with warfarin for atrial fibrillation, and is due to have her INR rechecked in 2 days..  No fevers/chills.  She reports that the affected finger is quite tender to touch.  She has tried taking some Tylenol to help with the pain which has been somewhat helpful.    Patient Active Problem List   Diagnosis     Obesity     Hypercholesterolemia     Essential hypertension     Asthma with exacerbation     Type 2 diabetes mellitus (H)     Benign neoplasm of choroid     Paroxysmal atrial fibrillation (H)     Past Surgical History:   Procedure Laterality Date     ANGIOPLASTY  2016    Drug eluting stent mid LAD, cutting balloon to 1st diagonal     ANGIOPLASTY       BYPASS GRAFT ARTERY CORONARY  12/11/2007    X 3 vessels, LIMA to LAD, saph vein graft to distal RCA, saphvein graft to marginal, mini circuit bypass.  [facility].     CORONARY STENT PLACEMENT      Left main, left circumflex, RCA     CORONARY STENT PLACEMENT  2016     CV CORONARY ANGIOGRAM N/A 2018    Procedure: Coronary Angiogram;  Surgeon: Kit Bean MD;  Location: [facility];  Service:      CV LEFT HEART CATHETERIZATION WITH LEFT VENTRICULOGRAM N/A 2018    Procedure: Left Heart Catheterization with Left Ventriculogram;  Surgeon: Kit Bean MD;  Location: [facility];  Service:      INSERT MIDLINE HE  10/31/2015          LUMBAR DISCECTOMY N/A 10/11/2015    Procedure: BILATERAL L1-L5 DECOMPRESSIVE LAMINECTOMY WITH EVACUATION OF EPIDURAL ABSCESS IRRIGATION & DEBRIDEMENT;  Surgeon: Luli Chan MD;  Location: [facility] OR;  Service:      PICC  10/19/2015          RELEASE CARPAL TUNNEL Bilateral        Social History     Tobacco Use     Smoking status: Former Smoker     Quit date: 2007     Years since quittin.2     Smokeless tobacco: Never Used   Substance Use Topics      Alcohol use: No     Comment: Alcoholic Drinks/day: stopped drinking 9/2015     Family History   Problem Relation Age of Onset     Cerebrovascular Disease Mother      Diabetes Father      Diabetes Sister      Cerebrovascular Disease Sister 81.00     Cerebrovascular Disease Brother      Diabetes Brother          Current Outpatient Medications   Medication Sig Dispense Refill     atorvastatin (LIPITOR) 80 MG tablet TAKE 1 TABLET BY MOUTH ONCE DAILY AT BEDTIME 90 tablet 0     buPROPion (WELLBUTRIN) 75 MG tablet Take  by mouth 2 times daily.       busPIRone (BUSPAR) 15 MG tablet Take 1 tablet by mouth once daily 90 tablet 0     insulin NPH (HUMULIN N VIAL 100 UNIT/ML KAYE SUSP) 100 UNIT/ML injection Inject  Subcutaneous See Admin Instructions.       isosorbide mononitrate (IMDUR) 60 MG 24 hr tablet Take 1 tablet by mouth once daily 90 tablet 0     metFORMIN (GLUCOPHAGE) 1000 MG tablet Take 1,000 mg by mouth 2 times daily (with meals).       Multiple Vitamin (MULTIVITAMIN OR) Take  by mouth daily.       sertraline (ZOLOFT) 100 MG tablet Take 1 tablet by mouth once daily 90 tablet 0     sertraline (ZOLOFT) 25 MG tablet Take  by mouth daily.       sotalol (BETAPACE) 80 MG tablet TAKE 1/2 (ONE-HALF) TABLET BY MOUTH EVERY 12 HOURS .  DO  NOT  TAKE  WITH  SEROQUEL 90 tablet 1     traMADol (ULTRAM) 50 MG tablet TAKE 1 TABLET BY MOUTH EVERY 8 HOURS AS NEEDED FOR PAIN OR SEVERE PAIN ON SCALE (7-10) 30 tablet 0     warfarin ANTICOAGULANT (COUMADIN) 5 MG tablet Take 0.5-1 tablets (2.5-5 mg) by mouth daily Adjust dose per INR results as instructed. 80 tablet 1     Allergies   Allergen Reactions     Oxycodone        ROS:   Negative except as noted above in HPI.     Objective  BP (!) 146/72   Pulse 65   Temp 98.8  F (37.1  C)   Wt 80.7 kg (178 lb)   SpO2 97%   BMI 32.56 kg/m       General: Alert, no acute distress.   Affect: pleasant, cooperative.  Skin: 3 mm laceration noted at the center of the pad of the distal left third  phalanx.  Moderate swelling, erythema, tenderness noted at the left distal third finger with a minimal amount of visible hematoma present under the proximal fingernail.  The swelling restricts patient's ability to completely flex at the left third DIP joint.  Skin is not warm to touch in this area. Sensation intact to light touch at the left 3rd fingertip.        Timi Nugent MD   Family medicine physician  [facility]

## 2021-09-01 NOTE — TELEPHONE ENCOUNTER
Reviewed chart, patient had an INR yesterday and is scheduled for next INR on Friday 9/3. Per provider OV note today patient was instructed to keep the INR appointment on Friday.     Called and left a detailed VM for patient. Advised that all abx have the potential to affect INR, and inflammation/infection can also have an effect. Recommended that she continue warfarin dose as instructed yesterday and start antibiotics tonight as planned. Keep INR appointment on Friday.     Asked patient to call back tomorrow if she has further questions.

## 2021-09-01 NOTE — TELEPHONE ENCOUNTER
Pt left  requesting call back from [facility] RN. She states she is starting an antibiotic (Augmentin per chart review) and is looking for guidance for her warfarin therapy.

## 2021-09-01 NOTE — PATIENT INSTRUCTIONS
I would recommend that you treat the dog bite with an antibiotic called Augmentin that you'll take twice daily for 10 days.  Please let your anticoagulation nurse know that you're taking it when you see her on Friday.      Please let me know if the pain/swelling is not improving by the end of the week.

## 2021-09-03 ENCOUNTER — LAB (OUTPATIENT)
Dept: LAB | Facility: CLINIC | Age: 71
End: 2021-09-03
Payer: COMMERCIAL

## 2021-09-03 ENCOUNTER — ANTICOAGULATION THERAPY VISIT (OUTPATIENT)
Dept: ANTICOAGULATION | Facility: CLINIC | Age: 71
End: 2021-09-03

## 2021-09-03 DIAGNOSIS — I48.0 PAROXYSMAL ATRIAL FIBRILLATION (H): Primary | ICD-10-CM

## 2021-09-03 DIAGNOSIS — I48.0 PAROXYSMAL ATRIAL FIBRILLATION (H): ICD-10-CM

## 2021-09-03 LAB — INR BLD: 2.9 (ref 0.9–1.1)

## 2021-09-03 PROCEDURE — 36415 COLL VENOUS BLD VENIPUNCTURE: CPT

## 2021-09-03 PROCEDURE — 85610 PROTHROMBIN TIME: CPT

## 2021-09-03 NOTE — PROGRESS NOTES
ANTICOAGULATION MANAGEMENT     Kemi Soto 71 year old female is on warfarin with therapeutic INR result. (Goal INR 2.0-3.0)    Recent labs: (last 7 days)     09/03/21  1201   INR 2.9*       ASSESSMENT     Source(s): Chart Review, Patient/Caregiver Call and Template       Warfarin doses taken: Warfarin taken as instructed    Diet: No new diet changes identified    New illness, injury, or hospitalization: Yes: Dog bite    Medication/supplement changes: Augmentin 10 day course (dates: 9/1-9/10) which has potential for interaction; increasing INR    Signs or symptoms of bleeding or clotting: No    Previous INR: Subtherapeutic    Additional findings: None     PLAN     Recommended plan for temporary change(s) affecting INR     Dosing Instructions: Continue your current warfarin dose (changed 2.5 mg days from Mon/Sat to Mon/Fri) with next INR in 5 days       Summary  As of 9/3/2021    Full warfarin instructions:  2.5 mg every Mon, Fri; 5 mg all other days   Next INR check:  9/7/2021             Telephone call with Kemi who agrees to plan and repeated back plan correctly    Patient elected to schedule next visit 9/8/21    Education provided: Goal range and significance of current result, Potential interaction between warfarin and Augmentin, Monitoring for bleeding signs and symptoms and When to seek medical attention/emergency care    Plan made with [facility] Pharmacist Maria Luz Hunt RN  Anticoagulation Clinic  9/3/2021    _______________________________________________________________________     Anticoagulation Episode Summary     Current INR goal:  2.0-3.0   TTR:  22.8 % (1 mo)   Target end date:     Send INR reminders to:  [facility] HEART [facility]    Indications    Paroxysmal atrial fibrillation (H) [I48.0]           Comments:  trnasitioned from [facility]         Anticoagulation Care Providers     Provider Role Specialty Phone number    Chuck Hernnadez MD Responsible Cardiovascular Disease  [phone]

## 2021-09-08 ENCOUNTER — ANTICOAGULATION THERAPY VISIT (OUTPATIENT)
Dept: ANTICOAGULATION | Facility: CLINIC | Age: 71
End: 2021-09-08

## 2021-09-08 ENCOUNTER — LAB (OUTPATIENT)
Dept: LAB | Facility: CLINIC | Age: 71
End: 2021-09-08
Payer: COMMERCIAL

## 2021-09-08 DIAGNOSIS — I48.0 PAROXYSMAL A-FIB (H): ICD-10-CM

## 2021-09-08 DIAGNOSIS — I48.0 PAROXYSMAL ATRIAL FIBRILLATION (H): Primary | ICD-10-CM

## 2021-09-08 LAB — INR BLD: 1.4 (ref 0.9–1.1)

## 2021-09-08 PROCEDURE — 85610 PROTHROMBIN TIME: CPT | Performed by: FAMILY MEDICINE

## 2021-09-08 NOTE — PROGRESS NOTES
ANTICOAGULATION MANAGEMENT     Kemi Soto 71 year old female is on warfarin with subtherapeutic INR result. (Goal INR 2.0-3.0)    Recent labs: (last 7 days)     09/08/21  1406   INR 1.4*       ASSESSMENT     Source(s): Chart Review, Patient/Caregiver Call and Template       Warfarin doses taken: Missed dose(s) may be affecting INR. Pt believes she missed dose on Saturday.     Diet: No new diet changes identified    New illness, injury, or hospitalization: No    Medication/supplement changes: Augmentin, 3 days left of course    Signs or symptoms of bleeding or clotting: No    Previous INR: Therapeutic last visit; previously outside of goal range    Additional findings: None     PLAN     Recommended plan for temporary change(s) affecting INR     Dosing Instructions: Booster dose then continue your current warfarin dose with next INR in 5 days       Summary  As of 9/8/2021    Full warfarin instructions:  9/8: 7.5 mg; Otherwise 2.5 mg every Mon, Fri; 5 mg all other days   Next INR check:  9/13/2021             Telephone call with Kemi who verbalizes understanding and agrees to plan    Patient elected to schedule next visit 9/13/21    Education provided: Goal range and significance of current result and No interaction anticipated between warfarin and augmentin    Plan made with [facility] Pharmacist Maria Luz Hunt RN  Anticoagulation Clinic  9/8/2021    _______________________________________________________________________     Anticoagulation Episode Summary     Current INR goal:  2.0-3.0   TTR:  28.1 % (1.2 mo)   Target end date:     Send INR reminders to:  [facility] HEART [facility]    Indications    Paroxysmal atrial fibrillation (H) [I48.0]           Comments:  trnasitioned from [facility]         Anticoagulation Care Providers     Provider Role Specialty Phone number    Chuck Hernandez MD Responsible Cardiovascular Disease [phone]

## 2021-09-12 ENCOUNTER — HEALTH MAINTENANCE LETTER (OUTPATIENT)
Age: 71
End: 2021-09-12

## 2021-09-13 ENCOUNTER — LAB (OUTPATIENT)
Dept: LAB | Facility: CLINIC | Age: 71
End: 2021-09-13
Payer: COMMERCIAL

## 2021-09-13 ENCOUNTER — ANTICOAGULATION THERAPY VISIT (OUTPATIENT)
Dept: ANTICOAGULATION | Facility: CLINIC | Age: 71
End: 2021-09-13

## 2021-09-13 DIAGNOSIS — I48.0 PAROXYSMAL ATRIAL FIBRILLATION (H): ICD-10-CM

## 2021-09-13 DIAGNOSIS — I48.0 PAROXYSMAL ATRIAL FIBRILLATION (H): Primary | ICD-10-CM

## 2021-09-13 LAB — INR BLD: 2.4 (ref 0.9–1.1)

## 2021-09-13 PROCEDURE — 85610 PROTHROMBIN TIME: CPT | Performed by: FAMILY MEDICINE

## 2021-09-13 NOTE — PROGRESS NOTES
ANTICOAGULATION MANAGEMENT     Kemi Soto 71 year old female is on warfarin with therapeutic INR result. (Goal INR 2.0-3.0)    Recent labs: (last 7 days)     09/13/21  1301   INR 2.4*       ASSESSMENT     Source(s): Chart Review, Patient/Caregiver Call and Template       Warfarin doses taken: Warfarin taken as instructed    Diet: No new diet changes identified    New illness, injury, or hospitalization: No    Medication/supplement changes: Completed Augmentin dose on 9/10    Signs or symptoms of bleeding or clotting: No    Previous INR: Subtherapeutic most likely due to missed dose.    Additional findings: None     PLAN     Recommended plan for temporary change(s) affecting INR     Dosing Instructions: Continue your current warfarin dose with next INR in 1 week       Summary  As of 9/13/2021    Full warfarin instructions:  2.5 mg every Mon, Fri; 5 mg all other days   Next INR check:  9/20/2021             Telephone call with Kemi who verbalizes understanding and agrees to plan    Lab visit scheduled    Education provided: Goal range and significance of current result, Importance of therapeutic range, Importance of following up at instructed interval and Importance of taking warfarin as instructed    Plan made per [facility] anticoagulation protocol    Cony Noonan RN  Anticoagulation Clinic  9/13/2021    _______________________________________________________________________     Anticoagulation Episode Summary     Current INR goal:  2.0-3.0   TTR:  29.6 % (1.3 mo)   Target end date:     Send INR reminders to:  [facility] HEART [facility]    Indications    Paroxysmal atrial fibrillation (H) [I48.0]           Comments:  trnasitioned from [facility]         Anticoagulation Care Providers     Provider Role Specialty Phone number    Chuck Hernandez MD Responsible Cardiovascular Disease [phone]

## 2021-09-20 ENCOUNTER — LAB (OUTPATIENT)
Dept: LAB | Facility: CLINIC | Age: 71
End: 2021-09-20
Payer: COMMERCIAL

## 2021-09-20 ENCOUNTER — ANTICOAGULATION THERAPY VISIT (OUTPATIENT)
Dept: ANTICOAGULATION | Facility: CLINIC | Age: 71
End: 2021-09-20

## 2021-09-20 DIAGNOSIS — I48.0 PAROXYSMAL ATRIAL FIBRILLATION (H): ICD-10-CM

## 2021-09-20 DIAGNOSIS — I48.0 PAROXYSMAL ATRIAL FIBRILLATION (H): Primary | ICD-10-CM

## 2021-09-20 LAB — INR BLD: 2.2 (ref 0.9–1.1)

## 2021-09-20 PROCEDURE — 85610 PROTHROMBIN TIME: CPT | Performed by: FAMILY MEDICINE

## 2021-09-20 NOTE — PROGRESS NOTES
Patient left a VM message on the Home Care Line at 3:25 pm returning a call. Please call back to [phone]  Monica Hopkins, RN  Anticoagulation Nurse - [facility], [place], [place]

## 2021-09-20 NOTE — PROGRESS NOTES
Anticoagulation Management    Unable to reach Ciarra today.    Today's INR result of 2.2 is therapeutic (goal INR of 2.0-3.0).  Result received from: Clinic Lab    Follow up required to confirm warfarin dose taken and assess for changes    Left voicemail requesting a callback when the message is received.     Tentative plan: Continue 2.5mg Mon/Fri; 5mg all other days. Recheck the INR again in 10 days.      Anticoagulation clinic to follow up    Velasquez SARGENT RN

## 2021-09-20 NOTE — PROGRESS NOTES
3:38 PM: Writer attempted to call patient again, phone goes straight to voicemail. Left another message to have patient callback when able.     Velasquez SARGENT RN

## 2021-09-20 NOTE — PROGRESS NOTES
ANTICOAGULATION MANAGEMENT     Kemi Soto 71 year old female is on warfarin with therapeutic INR result. (Goal INR 2.0-3.0)    Recent labs: (last 7 days)     09/20/21  1410   INR 2.2*       ASSESSMENT     Source(s): Chart Review and Patient/Caregiver Call       Warfarin doses taken: Warfarin taken as instructed    Diet: No new diet changes identified    New illness, injury, or hospitalization: No    Medication/supplement changes: None noted    Signs or symptoms of bleeding or clotting: No    Previous INR: Therapeutic last 2(+) visits    Additional findings: None     PLAN     Recommended plan for no diet, medication or health factor changes affecting INR     Dosing Instructions: Continue your current warfarin dose with next INR in 10 days       Summary  As of 9/20/2021    Full warfarin instructions:  2.5 mg every Mon, Fri; 5 mg all other days   Next INR check:  9/30/2021             Telephone call with Kemi who verbalizes understanding and agrees to plan    Lab visit scheduled    Education provided: Contact [phone] with any changes, questions or concerns.     Plan made per [facility] anticoagulation protocol    Beverly Coughlin RN  Anticoagulation Clinic  9/20/2021    _______________________________________________________________________     Anticoagulation Episode Summary     Current INR goal:  2.0-3.0   TTR:  39.9 % (1.6 mo)   Target end date:     Send INR reminders to:  [facility] HEART [facility]    Indications    Paroxysmal atrial fibrillation (H) [I48.0]           Comments:  trnasitioned from [facility]         Anticoagulation Care Providers     Provider Role Specialty Phone number    Chuck Hernandez MD Responsible Cardiovascular Disease [phone]

## 2021-09-30 DIAGNOSIS — E11.65 TYPE 2 DIABETES MELLITUS WITH HYPERGLYCEMIA, WITH LONG-TERM CURRENT USE OF INSULIN (H): Primary | ICD-10-CM

## 2021-09-30 DIAGNOSIS — Z79.4 TYPE 2 DIABETES MELLITUS WITH HYPERGLYCEMIA, WITH LONG-TERM CURRENT USE OF INSULIN (H): Primary | ICD-10-CM

## 2021-10-01 ENCOUNTER — ANTICOAGULATION THERAPY VISIT (OUTPATIENT)
Dept: ANTICOAGULATION | Facility: CLINIC | Age: 71
End: 2021-10-01

## 2021-10-01 ENCOUNTER — LAB (OUTPATIENT)
Dept: LAB | Facility: CLINIC | Age: 71
End: 2021-10-01
Payer: COMMERCIAL

## 2021-10-01 DIAGNOSIS — Z79.4 TYPE 2 DIABETES MELLITUS WITH HYPERGLYCEMIA, WITH LONG-TERM CURRENT USE OF INSULIN (H): ICD-10-CM

## 2021-10-01 DIAGNOSIS — I48.0 PAROXYSMAL ATRIAL FIBRILLATION (H): Primary | ICD-10-CM

## 2021-10-01 DIAGNOSIS — I48.0 PAROXYSMAL ATRIAL FIBRILLATION (H): ICD-10-CM

## 2021-10-01 DIAGNOSIS — E11.65 TYPE 2 DIABETES MELLITUS WITH HYPERGLYCEMIA, WITH LONG-TERM CURRENT USE OF INSULIN (H): ICD-10-CM

## 2021-10-01 LAB
HBA1C MFR BLD: 7.9 % (ref 0–5.6)
INR BLD: 4.2 (ref 0.9–1.1)

## 2021-10-01 PROCEDURE — 36415 COLL VENOUS BLD VENIPUNCTURE: CPT

## 2021-10-01 PROCEDURE — 85610 PROTHROMBIN TIME: CPT

## 2021-10-01 PROCEDURE — 83036 HEMOGLOBIN GLYCOSYLATED A1C: CPT

## 2021-10-01 RX ORDER — INSULIN GLARGINE 100 [IU]/ML
INJECTION, SOLUTION SUBCUTANEOUS
Qty: 60 ML | Refills: 1 | Status: SHIPPED | OUTPATIENT
Start: 2021-10-01 | End: 2021-10-01

## 2021-10-01 NOTE — TELEPHONE ENCOUNTER
"Routing refill request to provider for review/approval because:  Labs out of range:  Hgb A1C 8.3  Labs not current:  Hgb A1C 7/23/2020    Last Written Prescription Date:  11/28/2020  Last Fill Quantity: 60 mls,  # refills: 1   Last office visit provider:  5/5/21     Requested Prescriptions   Pending Prescriptions Disp Refills     LANTUS SOLOSTAR 100 UNIT/ML soln [Pharmacy Med Name: Lantus SoloStar 100 UNIT/ML Subcutaneous Solution Pen-injector] 60 mL 0     Sig: INJECT 28 UNITS SUBCUTANEOUSLY TWICE DAILY       Long Acting Insulin Protocol Failed - 9/30/2021  5:30 AM        Failed - HgbA1C in past 3 or 6 months     If HgbA1C is 8 or greater, it needs to be on file within the past 3 months.  If less than 8, must be on file within the past 6 months.     Recent Labs   Lab Test 07/23/20  1142   A1C 8.3*             Failed - Medication is active on med list        Passed - Serum creatinine on file in past 12 months     Recent Labs   Lab Test 05/05/21  1400   CR 0.93       Ok to refill medication if creatinine is low          Passed - Patient is age 18 or older        Passed - Recent (6 mo) or future (30 days) visit within the authorizing provider's specialty     Patient had office visit in the last 6 months or has a visit in the next 30 days with authorizing provider or within the authorizing provider's specialty.  See \"Patient Info\" tab in inbasket, or \"Choose Columns\" in Meds & Orders section of the refill encounter.                 Analia Eid RN 10/01/21 9:51 AM  "

## 2021-10-01 NOTE — TELEPHONE ENCOUNTER
Refill sent to pharmacy with advice to follow-up with dr cody as soon as possible, as she is overdue for visit.   Timi Nugent MD for Jodi

## 2021-10-01 NOTE — PROGRESS NOTES
ANTICOAGULATION MANAGEMENT     Kemi Soto 71 year old female is on warfarin with supratherapeutic INR result. (Goal INR 2.0-3.0)    Recent labs: (last 7 days)     10/01/21  1421   INR 4.2*       ASSESSMENT     Source(s): Chart Review, Patient/Caregiver Call and Template       Warfarin doses taken: More warfarin taken than planned which may be affecting INR- per patient, she has been so busy that she thought that she did not take her dose last night and took another 5 mg again    Diet: No new diet changes identified    New illness, injury, or hospitalization: No    Medication/supplement changes: None noted    Signs or symptoms of bleeding or clotting: No    Previous INR: Therapeutic last 2(+) visits    Additional findings: None     PLAN     Recommended plan for temporary change(s) affecting INR     Dosing Instructions: Hold dose anabell, partial hold tomorrow then continue your current warfarin dose with next INR in 7-10 days       Summary  As of 10/1/2021    Full warfarin instructions:  10/1: Hold; 10/2: 2.5 mg; Otherwise 2.5 mg every Mon, Fri; 5 mg all other days   Next INR check:  10/11/2021             Telephone call with Kemi who verbalizes understanding and agrees to plan    Check at provider office visit    Education provided: Importance of therapeutic range, Importance of following up at instructed interval, Importance of taking warfarin as instructed, Monitoring for bleeding signs and symptoms, When to seek medical attention/emergency care and Contact [phone] with any changes, questions or concerns.     Plan made per [facility] anticoagulation protocol    Cony Noonan RN  Anticoagulation Clinic  10/1/2021    _______________________________________________________________________     Anticoagulation Episode Summary     Current INR goal:  2.0-3.0   TTR:  39.9 % (1.9 mo)   Target end date:     Send INR reminders to:  [facility] HEART [facility]    Indications    Paroxysmal atrial fibrillation (H)  [I48.0]           Comments:  trnasitioned from [facility]         Anticoagulation Care Providers     Provider Role Specialty Phone number    Chuck Hernandez MD Responsible Cardiovascular Disease [phone]

## 2021-10-04 ENCOUNTER — TELEPHONE (OUTPATIENT)
Dept: FAMILY MEDICINE | Facility: CLINIC | Age: 71
End: 2021-10-04

## 2021-10-04 NOTE — TELEPHONE ENCOUNTER
----- Message from Timi Nugent MD sent at 10/3/2021 12:23 PM CDT -----  Please phone patient to notify her that her hemoglobin A1c was 7.9% when checked last Friday.  This is a bit higher than goal. I would recommend that she schedule a routine follow-up visit with Dr Zapata to review her current diabetes regimen (and other chronic health issues) sometime in the next month. For the time begin, please advise that she continue on the current doses of metformin and insulin.   Thank you,  Timi Nugent MD

## 2021-10-11 ENCOUNTER — ANTICOAGULATION THERAPY VISIT (OUTPATIENT)
Dept: ANTICOAGULATION | Facility: CLINIC | Age: 71
End: 2021-10-11

## 2021-10-11 ENCOUNTER — LAB (OUTPATIENT)
Dept: LAB | Facility: CLINIC | Age: 71
End: 2021-10-11

## 2021-10-11 ENCOUNTER — OFFICE VISIT (OUTPATIENT)
Dept: FAMILY MEDICINE | Facility: CLINIC | Age: 71
End: 2021-10-11
Payer: COMMERCIAL

## 2021-10-11 VITALS
SYSTOLIC BLOOD PRESSURE: 142 MMHG | WEIGHT: 179.8 LBS | HEART RATE: 65 BPM | DIASTOLIC BLOOD PRESSURE: 84 MMHG | OXYGEN SATURATION: 97 % | BODY MASS INDEX: 32.89 KG/M2

## 2021-10-11 DIAGNOSIS — Z00.00 HEALTH CARE MAINTENANCE: ICD-10-CM

## 2021-10-11 DIAGNOSIS — Z79.4 TYPE 2 DIABETES MELLITUS WITH DIABETIC POLYNEUROPATHY, WITH LONG-TERM CURRENT USE OF INSULIN (H): Primary | ICD-10-CM

## 2021-10-11 DIAGNOSIS — M79.644 PAIN OF FINGER OF RIGHT HAND: ICD-10-CM

## 2021-10-11 DIAGNOSIS — Z23 NEED FOR INFLUENZA VACCINATION: ICD-10-CM

## 2021-10-11 DIAGNOSIS — I48.3 TYPICAL ATRIAL FLUTTER (H): ICD-10-CM

## 2021-10-11 DIAGNOSIS — I48.0 PAROXYSMAL ATRIAL FIBRILLATION (H): Primary | ICD-10-CM

## 2021-10-11 DIAGNOSIS — Z79.01 CHRONIC ANTICOAGULATION: ICD-10-CM

## 2021-10-11 DIAGNOSIS — Z78.0 ASYMPTOMATIC MENOPAUSE: ICD-10-CM

## 2021-10-11 DIAGNOSIS — I48.0 PAROXYSMAL ATRIAL FIBRILLATION (H): ICD-10-CM

## 2021-10-11 DIAGNOSIS — F51.01 PRIMARY INSOMNIA: ICD-10-CM

## 2021-10-11 DIAGNOSIS — N18.31 STAGE 3A CHRONIC KIDNEY DISEASE (H): ICD-10-CM

## 2021-10-11 DIAGNOSIS — E11.42 TYPE 2 DIABETES MELLITUS WITH DIABETIC POLYNEUROPATHY, WITH LONG-TERM CURRENT USE OF INSULIN (H): Primary | ICD-10-CM

## 2021-10-11 PROBLEM — N18.30 CHRONIC KIDNEY DISEASE, STAGE 3 (H): Status: ACTIVE | Noted: 2021-10-11

## 2021-10-11 LAB — INR BLD: 2.6 (ref 0.9–1.1)

## 2021-10-11 PROCEDURE — 99214 OFFICE O/P EST MOD 30 MIN: CPT | Mod: 25 | Performed by: FAMILY MEDICINE

## 2021-10-11 PROCEDURE — 90662 IIV NO PRSV INCREASED AG IM: CPT | Performed by: FAMILY MEDICINE

## 2021-10-11 PROCEDURE — 36415 COLL VENOUS BLD VENIPUNCTURE: CPT

## 2021-10-11 PROCEDURE — 85610 PROTHROMBIN TIME: CPT

## 2021-10-11 PROCEDURE — G0008 ADMIN INFLUENZA VIRUS VAC: HCPCS | Performed by: FAMILY MEDICINE

## 2021-10-11 RX ORDER — TRAZODONE HYDROCHLORIDE 50 MG/1
TABLET, FILM COATED ORAL
Qty: 30 TABLET | Refills: 1 | Status: SHIPPED | OUTPATIENT
Start: 2021-10-11 | End: 2023-05-25

## 2021-10-11 NOTE — PROGRESS NOTES
ANTICOAGULATION MANAGEMENT     Kemi Soto 71 year old female is on warfarin with therapeutic INR result. (Goal INR 2.0-3.0)    Recent labs: (last 7 days)     10/11/21  1439   INR 2.6*       ASSESSMENT     Source(s): Chart Review and Template       Warfarin doses taken: Warfarin taken as instructed    Diet: No new diet changes identified    New illness, injury, or hospitalization: No    Medication/supplement changes: trazodone ordered by PCP today at [facility] which has potential for interaction; increasing INR    Signs or symptoms of bleeding or clotting: No    Previous INR: Supratherapeutic    Additional findings: None     PLAN     Recommended plan for no diet, medication or health factor changes affecting INR     Dosing Instructions: Continue your current warfarin dose with next INR in 1- 2 weeks       Summary  As of 10/11/2021    Full warfarin instructions:  2.5 mg every Mon, Fri; 5 mg all other days   Next INR check:  10/25/2021             Detailed voice message left for Kemi with dosing instructions and follow up date.     Contact [phone] to schedule and with any changes, questions or concerns.     Education provided: Goal range and significance of current result, Importance of therapeutic range, Importance of following up at instructed interval, Importance of taking warfarin as instructed, Potential interaction between warfarin and trazadone and No interaction anticipated between warfarin and Dulaglutide    Plan made per [facility] anticoagulation protocol    Cony Noonan RN  Anticoagulation Clinic  10/11/2021    _______________________________________________________________________     Anticoagulation Episode Summary     Current INR goal:  2.0-3.0   TTR:  37.8 % (2.3 mo)   Target end date:     Send INR reminders to:  [facility] HEART [facility]    Indications    Paroxysmal atrial fibrillation (H) [I48.0]           Comments:  trnasitioned from [facility]         Anticoagulation Care Providers      Provider Role Specialty Phone number    Chuck Hernandez MD Responsible Cardiovascular Disease [phone]

## 2021-10-12 NOTE — PROGRESS NOTES
ASSESSMENT/PLAN:       1. Type 2 diabetes mellitus with diabetic polyneuropathy, with long-term current use of insulin (H)     - AMB Adult Diabetes Educator Referral; Future  For diabetic education as well as discussion about continuous glucose monitoring pros and cons and to see if after that discussion she still has an interest.  If she does I certainly support that.    - dulaglutide (TRULICITY) 0.75 MG/0.5ML pen; Inject 0.75 mg Subcutaneous every 7 days  Dispense: 6 mL; Refill: 3    2. Primary insomnia  Trial of trazodone start with 25 mg an hour before bedtime and can advance to 50 mg nightly if needed    - traZODone (DESYREL) 50 MG tablet; 1/2 to 1 tablet at bedtime prn insomnia  Dispense: 30 tablet; Refill: 1    3. Need for influenza vaccination     - NE FLU VACCINE, INCREASED ANTIGEN, PRESV FREE ([id number])    4. Stage 3a chronic kidney disease (H)  Avoid nonsteroidal anti-inflammatory medications  Stay well-hydrated  Blood pressure control as well as glycemic control very important.    5. Pain of finger of right hand     - Orthopedic  Referral; Future  Referral to [facility] for hand specialist to see her    6. Asymptomatic menopause  Proceed with bone density study  Note that the patient does not take any additional calcium or vitamin D    - DEXA HIP/PELVIS/SPINE - Future; Future    7. Chronic anticoagulation  Continue with warfarin    8. Typical atrial flutter (H)  Follow-up with her primary care cardiologist Dr. Hernandez    9. Health care maintenance     - REVIEW OF HEALTH MAINTENANCE PROTOCOL ORDERS  Set up another time for a annual wellness visit with a physical including a pelvic exam because of her not having a pelvic exam for well over 10 years and a remote history of abnormal Pap smear.    E/M total time for office visit same day as encounter 39 minutes  Test results by letter  Follow-up 3 months annual wellness visit with physical exam including pelvic exam      No immunizations  due    Mayco Zapata MD      PROGRESS NOTE   10/12/2021    SUBJECTIVE:  [id number]  who presents for   Chief Complaint   Patient presents with     Diabetes     Doesn't check sugars at home.      The patient does not check her blood sugars at home but would like to do continuous blood glucose monitoring.  She has a meter but does not seem to work and she does not like to poke her fingers.  She is checked into her insurance and it apparently will cover continuous glucose monitoring and she would like to have an order for that.  She is on insulin Lantus 28 units twice a day and her last A1c was 7.9.  She also does take Metformin 1000 mg twice a day.  The patient has some diabetic neuropathy    She is complaining of trouble sleeping both falling asleep and staying asleep.  Would like to have a medication that she could use for insomnia.    Agrees to an influenza vaccine  The patient does have chronic kidney disease three eight  Pain in the palm of her right hand and would like to see an orthopedic specialist    Is overdue for bone density testing and is agreeable to that.  She also had questions about the need for a Pap smear.  States it has probably been 10 years since she has had a Pap smear and many years ago did have an abnormal Pap smear requiring what sounds like colposcopy with electrocautery removal of a portion of her cervix.    The patient is on chronic anticoagulation in the form of warfarin because of atrial flutter.  She still has not followed up with cardiology as I had suggested earlier this year.  She does not have any symptoms with her atrial flutter.  No today her blood pressure slightly elevated and her pulse is 65.      Patient Active Problem List   Diagnosis     Obesity     Hypercholesterolemia     Essential hypertension     Asthma with exacerbation     Type 2 diabetes mellitus (H)     Benign neoplasm of choroid     Paroxysmal atrial fibrillation (H)     Chronic kidney disease, stage 3       Current  Outpatient Medications   Medication Sig Dispense Refill     atorvastatin (LIPITOR) 80 MG tablet TAKE 1 TABLET BY MOUTH ONCE DAILY AT BEDTIME 90 tablet 0     busPIRone (BUSPAR) 15 MG tablet Take 1 tablet by mouth once daily 90 tablet 0     dulaglutide (TRULICITY) 0.75 MG/0.5ML pen Inject 0.75 mg Subcutaneous every 7 days 6 mL 3     insulin glargine (LANTUS SOLOSTAR) 100 UNIT/ML pen INJECT 28 UNITS SUBCUTANEOUSLY TWICE DAILY 60 mL 1     insulin NPH (HUMULIN N VIAL 100 UNIT/ML KAYE SUSP) 100 UNIT/ML injection Inject  Subcutaneous See Admin Instructions.       isosorbide mononitrate (IMDUR) 60 MG 24 hr tablet Take 1 tablet by mouth once daily 90 tablet 0     metFORMIN (GLUCOPHAGE) 1000 MG tablet Take 1,000 mg by mouth 2 times daily (with meals).       sertraline (ZOLOFT) 100 MG tablet Take 1 tablet by mouth once daily 90 tablet 0     sotalol (BETAPACE) 80 MG tablet TAKE 1/2 (ONE-HALF) TABLET BY MOUTH EVERY 12 HOURS .  DO  NOT  TAKE  WITH  SEROQUEL 90 tablet 1     traMADol (ULTRAM) 50 MG tablet TAKE 1 TABLET BY MOUTH EVERY 8 HOURS AS NEEDED FOR PAIN OR SEVERE PAIN ON SCALE (7-10) 30 tablet 0     traZODone (DESYREL) 50 MG tablet 1/2 to 1 tablet at bedtime prn insomnia 30 tablet 1     warfarin ANTICOAGULANT (COUMADIN) 5 MG tablet Take 0.5-1 tablets (2.5-5 mg) by mouth daily Adjust dose per INR results as instructed. 80 tablet 1       History   Smoking Status     Former Smoker     Quit date: 6/23/2007   Smokeless Tobacco     Never Used           OBJECTIVE:      Recent Results (from the past 120 hour(s))   INR point of care    Collection Time: 10/11/21  2:39 PM   Result Value Ref Range    INR 2.6 (H) 0.9 - 1.1       Vitals:    10/11/21 1429   BP: (!) 142/84   Pulse: 65   SpO2: 97%   Weight: 81.6 kg (179 lb 12.8 oz)     Wt Readings from Last 3 Encounters:   10/11/21 81.6 kg (179 lb 12.8 oz)   09/01/21 80.7 kg (178 lb)   05/19/21 77.1 kg (170 lb)           Physical Exam:  GENERAL APPEARANCE: 71-year-old female, anxious agitated  frustrated but, well hydrated, well nourished  SKIN:  Normal skin turgor, no lesions/rashes   HEENT: moist mucous membranes, no rhinorrhea  NECK: Normal without adenopathy or masses  CV: RRR, no M/G/R   LUNGS: CTAB  ABDOMEN: S&NT, no masses or enlarged organs   EXTREMITY: The patient has some tenderness in the palm of her right hand ring finger MCP joint and to a lesser degree along the flexor tendon.  No triggering of the finger.  The patient also has decreased range of motion of her left shoulder which she attributes to an injury that happened over 30 years ago.  NEURO: no focal findings

## 2021-10-12 NOTE — PROGRESS NOTES
ACN called and spoke with patient and she stated that she has not picked up trazodone yet and she stated that she might pick it up and will use it only as needed for sleep. She stated that she has been taking melatonin and it has been helping her with sleep.    INR appointment made for 10/25/2021.    She has no other concerns at this time.    Cony Noonan RN [facility]    [phone]

## 2021-10-12 NOTE — PROGRESS NOTES
Patient LVM on [place] line that she needs a callback, still has questions from yesterday. She can be reached at [phone].    Marianela Jenkins RN   [facility]  [facility]

## 2021-10-15 ENCOUNTER — TRANSFERRED RECORDS (OUTPATIENT)
Dept: HEALTH INFORMATION MANAGEMENT | Facility: CLINIC | Age: 71
End: 2021-10-15

## 2021-10-19 ENCOUNTER — TRANSFERRED RECORDS (OUTPATIENT)
Dept: HEALTH INFORMATION MANAGEMENT | Facility: CLINIC | Age: 71
End: 2021-10-19

## 2021-10-21 ENCOUNTER — ALLIED HEALTH/NURSE VISIT (OUTPATIENT)
Dept: EDUCATION SERVICES | Facility: CLINIC | Age: 71
End: 2021-10-21
Payer: COMMERCIAL

## 2021-10-21 VITALS — BODY MASS INDEX: 32.72 KG/M2 | WEIGHT: 178.9 LBS

## 2021-10-21 DIAGNOSIS — Z79.4 TYPE 2 DIABETES MELLITUS WITH DIABETIC POLYNEUROPATHY, WITH LONG-TERM CURRENT USE OF INSULIN (H): ICD-10-CM

## 2021-10-21 DIAGNOSIS — E11.42 TYPE 2 DIABETES MELLITUS WITH DIABETIC POLYNEUROPATHY, WITH LONG-TERM CURRENT USE OF INSULIN (H): ICD-10-CM

## 2021-10-21 PROCEDURE — G0108 DIAB MANAGE TRN  PER INDIV: HCPCS | Performed by: REGISTERED NURSE

## 2021-10-21 RX ORDER — PROCHLORPERAZINE 25 MG/1
1 SUPPOSITORY RECTAL
Qty: 1 EACH | Refills: 5 | Status: SHIPPED | OUTPATIENT
Start: 2021-10-21 | End: 2022-01-11

## 2021-10-21 RX ORDER — PROCHLORPERAZINE 25 MG/1
1 SUPPOSITORY RECTAL ONCE
Qty: 1 EACH | Refills: 0 | Status: SHIPPED | OUTPATIENT
Start: 2021-10-21 | End: 2021-10-21

## 2021-10-21 RX ORDER — PROCHLORPERAZINE 25 MG/1
1 SUPPOSITORY RECTAL
Qty: 9 EACH | Refills: 4 | Status: SHIPPED | OUTPATIENT
Start: 2021-10-21 | End: 2022-01-11

## 2021-10-21 NOTE — LETTER
10/21/2021         RE: Kemi Soto  [address] [facility] [address]        Dear Colleague,    Thank you for referring your patient, Kemi Soto, to the [facility]. Please see a copy of my visit note below.    Diabetes Self-Management Education & Support    Type of Visit: In Person    How would patient like to obtain AVS? MyChart    ASSESSMENT:  Discussed CGM with Ciarra today. We decided the DEXCOM G6 system would work best for her. Her iphone was unable to add the DEXCOM G6 ayleen to use as a reader. Ciarra was very frustrated with technology. I did not place a sample DEXCOM G6 sensor today. Ordered prescriptions to the [facility] pharmacy. Medicare requires Durable Medical Equipment to use the [facility] or the [facility].     Once Ciarra has her sensor kit she will call Diabetes care to schedule an appointment with me. We will be using the  to scan the blood sugars.     Patient's most recent   Lab Results   Component Value Date    A1C 7.9 10/01/2021    is not meeting goal of <8.0    Diabetes knowledge and skills assessment:     Patient is knowledgeable in diabetes management concepts related to: Monitoring and Taking Medication    Continue education with the following diabetes management concepts: Dexcom G6 sensor application and start    Based on learning assessment above, most appropriate setting for further diabetes education would be: Individual setting.    SUBJECTIVE / OBJECTIVE:  Presents for: Personal CGM Start  Accompanied by: Self  Diabetes education in the past 24mo: Yes  Focus of Visit: CGM  Type of CGM visit: Personal CGM Start  Date of diagnosis: about 22 years ago  Disease course: Getting harder to manage  Transportation concerns: No  Difficulty affording diabetes medication?: No  Difficulty affording diabetes testing supplies?: No  Other concerns:: [place] (Hard of hearing)  Cultural Influences/Ethnic  "Background:  Not  or     Diabetes Symptoms & Complications:  Weight trend: Stable  Complications assessed today?: No    Patient Problem List and Family Medical History reviewed for relevant medical history, current medical status, and diabetes risk factors.    Vitals:  Wt 81.1 kg (178 lb 14.4 oz)   BMI 32.72 kg/m    Estimated body mass index is 32.72 kg/m  as calculated from the following:    Height as of 5/5/21: 1.575 m (5' 2\").    Weight as of this encounter: 81.1 kg (178 lb 14.4 oz).   Last 3 BP:   BP Readings from Last 3 Encounters:   10/11/21 (!) 142/84   09/01/21 (!) 146/72   05/19/21 134/54     History   Smoking Status     Former Smoker     Quit date: 6/23/2007   Smokeless Tobacco     Never Used     Labs:  Lab Results   Component Value Date    A1C 7.9 10/01/2021     Lab Results   Component Value Date     05/05/2021     Lab Results   Component Value Date    LDL 79 05/05/2021    LDL 97 05/06/2019     Direct Measure HDL   Date Value Ref Range Status   05/05/2021 48 (L) >=50 mg/dL Final   ]  GFR Estimate   Date Value Ref Range Status   05/05/2021 59 (L) >60 mL/min/1.73m2 Final     GFR Estimate If Black   Date Value Ref Range Status   05/05/2021 >60 >60 mL/min/1.73m2 Final     Lab Results   Component Value Date    CR 0.93 05/05/2021     No results found for: MICROALBUMIN    Taking Medications:  Diabetes Medication(s)     Biguanides       metFORMIN (GLUCOPHAGE) 1000 MG tablet    Take 1,000 mg by mouth 2 times daily (with meals).    Insulin       insulin glargine (LANTUS SOLOSTAR) 100 UNIT/ML pen    INJECT 28 UNITS SUBCUTANEOUSLY TWICE DAILY     insulin NPH (HUMULIN N VIAL 100 UNIT/ML KAYE SUSP) 100 UNIT/ML injection    Inject  Subcutaneous See Admin Instructions.    Incretin Mimetic Agents (GLP-1 Receptor Agonists)       dulaglutide (TRULICITY) 0.75 MG/0.5ML pen    Inject 0.75 mg Subcutaneous every 7 days        Diabetes Self-Management Training ()  Time Spent: 60 minutes  Encounter Type: " Individual    Liv Contrersa RD, MARIA E, [facility] [place]  Certified Diabetes Care and      Any diabetes medication dose changes were made via the Certified Diabetes Care & Education Protocol in collaboration with the patient's primary care provider. A copy of this encounter was shared with the provider.

## 2021-10-21 NOTE — PROGRESS NOTES
Diabetes Self-Management Education & Support    Type of Visit: In Person    How would patient like to obtain AVS? MyChart    ASSESSMENT:  Discussed CGM with Ciarra today. We decided the DEXCOM G6 system would work best for her. Her iphone was unable to add the DEXCOM G6 ayleen to use as a reader. Ciarra was very frustrated with technology. I did not place a sample DEXCOM G6 sensor today. Ordered prescriptions to the [facility] pharmacy. Medicare requires Durable Medical Equipment to use the [facility] or the [facility].     Once Ciarra has her sensor kit she will call Diabetes care to schedule an appointment with me. We will be using the  to scan the blood sugars.     Patient's most recent   Lab Results   Component Value Date    A1C 7.9 10/01/2021    is not meeting goal of <8.0    Diabetes knowledge and skills assessment:     Patient is knowledgeable in diabetes management concepts related to: Monitoring and Taking Medication    Continue education with the following diabetes management concepts: Dexcom G6 sensor application and start    Based on learning assessment above, most appropriate setting for further diabetes education would be: Individual setting.    SUBJECTIVE / OBJECTIVE:  Presents for: Personal CGM Start  Accompanied by: Self  Diabetes education in the past 24mo: Yes  Focus of Visit: CGM  Type of CGM visit: Personal CGM Start  Date of diagnosis: about 22 years ago  Disease course: Getting harder to manage  Transportation concerns: No  Difficulty affording diabetes medication?: No  Difficulty affording diabetes testing supplies?: No  Other concerns:: [place] (Hard of hearing)  Cultural Influences/Ethnic Background:  Not  or     Diabetes Symptoms & Complications:  Weight trend: Stable  Complications assessed today?: No    Patient Problem List and Family Medical History reviewed for relevant medical history, current medical status, and diabetes risk  "factors.    Vitals:  Wt 81.1 kg (178 lb 14.4 oz)   BMI 32.72 kg/m    Estimated body mass index is 32.72 kg/m  as calculated from the following:    Height as of 5/5/21: 1.575 m (5' 2\").    Weight as of this encounter: 81.1 kg (178 lb 14.4 oz).   Last 3 BP:   BP Readings from Last 3 Encounters:   10/11/21 (!) 142/84   09/01/21 (!) 146/72   05/19/21 134/54     History   Smoking Status     Former Smoker     Quit date: 6/23/2007   Smokeless Tobacco     Never Used     Labs:  Lab Results   Component Value Date    A1C 7.9 10/01/2021     Lab Results   Component Value Date     05/05/2021     Lab Results   Component Value Date    LDL 79 05/05/2021    LDL 97 05/06/2019     Direct Measure HDL   Date Value Ref Range Status   05/05/2021 48 (L) >=50 mg/dL Final   ]  GFR Estimate   Date Value Ref Range Status   05/05/2021 59 (L) >60 mL/min/1.73m2 Final     GFR Estimate If Black   Date Value Ref Range Status   05/05/2021 >60 >60 mL/min/1.73m2 Final     Lab Results   Component Value Date    CR 0.93 05/05/2021     No results found for: MICROALBUMIN    Taking Medications:  Diabetes Medication(s)     Biguanides       metFORMIN (GLUCOPHAGE) 1000 MG tablet    Take 1,000 mg by mouth 2 times daily (with meals).    Insulin       insulin glargine (LANTUS SOLOSTAR) 100 UNIT/ML pen    INJECT 28 UNITS SUBCUTANEOUSLY TWICE DAILY     insulin NPH (HUMULIN N VIAL 100 UNIT/ML KAYE SUSP) 100 UNIT/ML injection    Inject  Subcutaneous See Admin Instructions.    Incretin Mimetic Agents (GLP-1 Receptor Agonists)       dulaglutide (TRULICITY) 0.75 MG/0.5ML pen    Inject 0.75 mg Subcutaneous every 7 days        Diabetes Self-Management Training ()  Time Spent: 60 minutes  Encounter Type: Individual    Liv Contreras RD, LD, CDCES  Certified Diabetes Care and      Any diabetes medication dose changes were made via the Certified Diabetes Care & Education Protocol in collaboration with the patient's primary care provider. A copy of " this encounter was shared with the provider.

## 2021-10-22 NOTE — PATIENT INSTRUCTIONS
1. Continue to check blood sugar 3 times per day before meals.  2. Continue current doses of Metformin, Insulin and Trulicity.  3. Prescription sent to [facility] pharmacy which is an approved Medicare Durable Equipment provider.  4. Call Diabetes care at [phone] to make an appointment to set up your Dexcom G6 sensor and .    Blood sugar goals:   Before meals:   1-2 hours after meals: less 200  Bedtime: 100-160    A1c goal of less than 8.0% (estimated average blood sugar of 183 on meter)

## 2021-10-25 ENCOUNTER — LAB (OUTPATIENT)
Dept: LAB | Facility: CLINIC | Age: 71
End: 2021-10-25
Payer: COMMERCIAL

## 2021-10-25 ENCOUNTER — ANTICOAGULATION THERAPY VISIT (OUTPATIENT)
Dept: ANTICOAGULATION | Facility: CLINIC | Age: 71
End: 2021-10-25

## 2021-10-25 DIAGNOSIS — I48.0 PAROXYSMAL A-FIB (H): ICD-10-CM

## 2021-10-25 DIAGNOSIS — I48.0 PAROXYSMAL ATRIAL FIBRILLATION (H): Primary | ICD-10-CM

## 2021-10-25 LAB — INR BLD: 2.1 (ref 0.9–1.1)

## 2021-10-25 PROCEDURE — 85610 PROTHROMBIN TIME: CPT | Performed by: FAMILY MEDICINE

## 2021-10-25 NOTE — PROGRESS NOTES
ANTICOAGULATION MANAGEMENT     Kemi Soto 71 year old female is on warfarin with therapeutic INR result. (Goal INR 2.0-3.0)    Recent labs: (last 7 days)     10/25/21  1355   INR 2.1*       ASSESSMENT     Source(s): Chart Review, Patient/Caregiver Call and Template       Warfarin doses taken: Warfarin taken as instructed    Diet: No new diet changes identified    New illness, injury, or hospitalization: No    Medication/supplement changes: Notes she's only taken one dose of trazodone since last visit; PRN use only anticipated at this time.  Interaction expected to increase risk of bleeding, but not affect INR    Signs or symptoms of bleeding or clotting: No    Previous INR: Therapeutic last visit; previously outside of goal range    Additional findings: None     PLAN     Recommended plan for no diet, medication or health factor changes affecting INR     Dosing Instructions: Continue your current warfarin dose with next INR in 2-3 weeks       Summary  As of 10/25/2021    Full warfarin instructions:  2.5 mg every Mon, Fri; 5 mg all other days   Next INR check:  11/4/2021             Telephone call with Kemi who verbalizes understanding and agrees to plan    Check at provider office visit 11/4    Education provided: None required    Plan made per [facility] anticoagulation protocol    Maria Luz Milner, [facility]  Anticoagulation Clinic  10/25/2021    _______________________________________________________________________     Anticoagulation Episode Summary     Current INR goal:  2.0-3.0   TTR:  48.2 % (2.7 mo)   Target end date:     Send INR reminders to:  [facility] HEART [facility]    Indications    Paroxysmal atrial fibrillation (H) [I48.0]           Comments:  trnasitioned from [facility]         Anticoagulation Care Providers     Provider Role Specialty Phone number    Chuck Hernandez MD Responsible Cardiovascular Disease [phone]

## 2021-10-26 ENCOUNTER — ANCILLARY PROCEDURE (OUTPATIENT)
Dept: BONE DENSITY | Facility: CLINIC | Age: 71
End: 2021-10-26
Attending: FAMILY MEDICINE
Payer: COMMERCIAL

## 2021-10-26 DIAGNOSIS — Z78.0 ASYMPTOMATIC POSTMENOPAUSAL ESTROGEN DEFICIENCY: ICD-10-CM

## 2021-10-26 DIAGNOSIS — Z78.0 ASYMPTOMATIC MENOPAUSE: ICD-10-CM

## 2021-10-26 PROCEDURE — 77081 DXA BONE DENSITY APPENDICULR: CPT | Mod: TC | Performed by: RADIOLOGY

## 2021-10-26 PROCEDURE — 77080 DXA BONE DENSITY AXIAL: CPT | Mod: TC | Performed by: RADIOLOGY

## 2021-10-29 ENCOUNTER — TELEPHONE (OUTPATIENT)
Dept: EDUCATION SERVICES | Facility: CLINIC | Age: 71
End: 2021-10-29

## 2021-10-29 NOTE — TELEPHONE ENCOUNTER
Patient is requesting a call back from Liv on Tuesday 11/2.    She would like to discuss the Continuous Glucose Monitor co-pay.

## 2021-11-02 NOTE — TELEPHONE ENCOUNTER
11/3/21 9:30 am Called Ciarra and left a message I will continue to reach her.    11/2/21 4:30 PM  Called Ciarra and left a message. I will call her at the end of the day around 4:00 pm.      Liv Contreras RD, LD, [facility]   Certified Diabetes Care and   [facility]

## 2021-11-04 ENCOUNTER — OFFICE VISIT (OUTPATIENT)
Dept: CARDIOLOGY | Facility: CLINIC | Age: 71
End: 2021-11-04
Payer: COMMERCIAL

## 2021-11-04 ENCOUNTER — DOCUMENTATION ONLY (OUTPATIENT)
Dept: ANTICOAGULATION | Facility: CLINIC | Age: 71
End: 2021-11-04

## 2021-11-04 ENCOUNTER — LAB (OUTPATIENT)
Dept: CARDIOLOGY | Facility: CLINIC | Age: 71
End: 2021-11-04
Payer: COMMERCIAL

## 2021-11-04 ENCOUNTER — ANTICOAGULATION THERAPY VISIT (OUTPATIENT)
Dept: ANTICOAGULATION | Facility: CLINIC | Age: 71
End: 2021-11-04

## 2021-11-04 VITALS
HEIGHT: 62 IN | BODY MASS INDEX: 32.76 KG/M2 | DIASTOLIC BLOOD PRESSURE: 64 MMHG | RESPIRATION RATE: 16 BRPM | SYSTOLIC BLOOD PRESSURE: 140 MMHG | HEART RATE: 64 BPM | WEIGHT: 178 LBS

## 2021-11-04 DIAGNOSIS — I48.0 PAROXYSMAL ATRIAL FIBRILLATION (H): Primary | ICD-10-CM

## 2021-11-04 DIAGNOSIS — I25.83 CORONARY ARTERY DISEASE DUE TO LIPID RICH PLAQUE: ICD-10-CM

## 2021-11-04 DIAGNOSIS — I25.10 CORONARY ARTERY DISEASE DUE TO LIPID RICH PLAQUE: ICD-10-CM

## 2021-11-04 DIAGNOSIS — I48.0 PAROXYSMAL ATRIAL FIBRILLATION (H): ICD-10-CM

## 2021-11-04 LAB
ATRIAL RATE - MUSE: 58 BPM
DIASTOLIC BLOOD PRESSURE - MUSE: NORMAL MMHG
INR POINT OF CARE: 2.7 (ref 0.86–1.14)
INTERPRETATION ECG - MUSE: NORMAL
P AXIS - MUSE: -15 DEGREES
PR INTERVAL - MUSE: 160 MS
QRS DURATION - MUSE: 88 MS
QT - MUSE: 442 MS
QTC - MUSE: 433 MS
R AXIS - MUSE: 34 DEGREES
SYSTOLIC BLOOD PRESSURE - MUSE: NORMAL MMHG
T AXIS - MUSE: 79 DEGREES
VENTRICULAR RATE- MUSE: 58 BPM

## 2021-11-04 PROCEDURE — 99214 OFFICE O/P EST MOD 30 MIN: CPT | Performed by: INTERNAL MEDICINE

## 2021-11-04 PROCEDURE — 85610 PROTHROMBIN TIME: CPT | Performed by: INTERNAL MEDICINE

## 2021-11-04 PROCEDURE — 93000 ELECTROCARDIOGRAM COMPLETE: CPT | Performed by: INTERNAL MEDICINE

## 2021-11-04 PROCEDURE — 36416 COLLJ CAPILLARY BLOOD SPEC: CPT | Performed by: INTERNAL MEDICINE

## 2021-11-04 RX ORDER — GABAPENTIN 300 MG/1
300 CAPSULE ORAL AT BEDTIME
COMMUNITY
End: 2022-05-20

## 2021-11-04 RX ORDER — LOSARTAN POTASSIUM 100 MG/1
100 TABLET ORAL DAILY
COMMUNITY
End: 2022-06-22

## 2021-11-04 ASSESSMENT — MIFFLIN-ST. JEOR: SCORE: 1270.65

## 2021-11-04 NOTE — LETTER
11/4/2021    Mayco Zapata MD  [address], [address]  [facility] [address]    RE: Kemi Soto       Dear Colleague,    I had the pleasure of seeing Kemi Soto in the [facility].    HEART CARE ENCOUNTER CONSULTATON NOTE      [facility]  [phone]      Assessment/Recommendations   Assessment/Plan:  1.  Typical isthmus flutter and atrial fibrillation.  Patient noted to be in atrial flutter during a routine follow-up visit in November 2020 and was admitted to the hospital and started on sotalol and subsequently seen by my EP colleagues discussion was had about ablation but after she returned from [place] she was feeling well and appeared to maintain sinus rhythm on sotalol her preference was to continue with sotalol and monitor for heart rhythm irregularity.  She has not had any awareness of tachypalpitations.  I did talk about the importance of monitoring her pulse on a regular basis.  She indicates that she will start using her blood pressure cuff that also measures her pulse and that she should update us with rapid heartbeats.    2.  Anticoagulation .  She does have an elevated chads 2 vascular score.  Her score is estimated to be 7.  She is currently on warfarin as apixaban was too expensive.  She did ask me to fill out a form today try to help her with potential cost of apixaban.    3.  Coronary artery disease.  Multivessel vessel intervention with LIMA to the LAD and vein graft and obtuse marginal branch and right PDA which was subsequently found to be occluded on angiography from May 2008.  Most recent angiogram is from August 2018 with right and circumflex lesions that were noted to be small caliber vessels and intervention to the circumflex was pursued.  Prior vein grafts were said to be occluded.  LIMA to the LAD was patent.  She had a nuclear stress test November 2020 that was reported negative for  ischemia with normal ejection fraction.    4  Dyslipidemia.  Most recent lipids are from the  May 2021 at which time LDL cholesterol was 79.  Cholesterol 174, triglycerides 235 with normal liver function test.  She is on atorvastatin 80 mg daily and I did talk with her that ideally would like to see the LDL less than 70.  I again reviewed the potential for initiation of Zetia but she remains reluctant.  We talked about the potential benefits of lowering her LDL down further.  She is following with  as a relates to her diabetes.  5  Hypertension.  Blood pressure mildly elevated upon arrival but repeat during my examination after resting was 120/60.  I did ask her to monitor her blood pressure on a regular basis.  Pressure goal 135/80 or less.  1.  Follow-up in 6 months when she returns from [place].  2.  She is given our phone number and asked to call with any heart related concerns.  As noted above I did ask her to monitor her heart rate regularly and to contact us or be seen for persistently elevated heart rate.  She indicated that she will start utilizing her blood pressure cuff more regularly.     History of Present Illness/Subjective    HPI: Kemi Soto is a 71 year old female was seen in follow up.  As outlined she was noted to be tachycardic during our visit November 2020 and was noted to be in atrial flutter with 2-1 conduction.  She was scheduled to travel to [place] and was admitted to the hospital and she converted with low dose of sotalol and wanted to travel to [place].  She was seen by my EP colleague at the timee Dr Boucher discussion was had regarding ablation for both atrial flutter and atrial fibrillation.  At that time he was leaning towards having the ablation procedure    Recent Echocardiogram Results:  Echocardiogram Complete  Order: [id number]  Status:  Final result   Visible to patient:  Yes (MyChart) Next appt:  11/04/2021 at 03:10 PM in Cardiology (Chuck Hernandez MD)   0 Result  Notes    Details    Reading Physician Reading Date Result Priority   Janee Choudhary MD  [phone] 11/5/2020 Routine   Provider, Historical 11/5/2020         Narrative & Impression    No previous study for comparison.    Left ventricle ejection fraction is normal. The calculated left ventricular ejection fraction is 58%.    Normal left ventricular size and systolic function.    Normal right ventricular size and systolic function.    Mild concentric hypertrophy noted.    Left atrial volume is moderately increased        CAD (coronary artery disease) [I25.10 (ICD-10-CM)]   Coronary artery disease involving native coronary artery of native heart without angina pectoris [I25.10 (ICD-10-CM)]           Conclusion      Estimated blood loss was <20 ml.    Prox Cx to Mid Cx lesion 90% stenosed.    Lat 1st Mrg-2 lesion 80% stenosed.    Lat 1st Mrg-1 lesion 90% stenosed.    Mid Cx lesion 80% stenosed.    Ost LAD to Prox LAD lesion 40% stenosed.    A drug eluting .    Ost Cx to Prox Cx lesion 35% stenosed.    A drug eluting .    Ost 1st Diag lesion 50% stenosed.    1st Mrg lesion 70% stenosed.    Mid LAD lesion 100% stenosed.    Ost RCA to Mid RCA lesion 30% stenosed.    Mid RCA lesion 70% stenosed.    Post Atrio lesion 40% stenosed.    Ost RPDA to RPDA lesion 40% stenosed.    The left ventricular size is normal. The left ventricular systolic function is normal. LV systolic pressure is normal. LV end diastolic pressure is normal. There are no wall motion abnormalities in the left ventricle.    Dist LAD-1 lesion 50% stenosed.    Dist LAD-2 lesion 70% stenosed.     Patient has 2 previous saphenous vein grafts known to be occluded.     Both right coronary and circumflex have significant lesions present.  Both precede small caliber distal vessels.  Options would include partial revascularization by stenting right coronary and circumflex with small caliber stents.  Circumflex has a   relatively long segment of disease.   Alternative would be continued medical management           NM Lexiscan stress test  Order# [id number]  Reading physicians: Deejay Warren DO; Provider, Historical Ordering physician: Chuck Hernandez MD Study date: 2020       Patient Information    Patient Name   Kemi Soto MRN   [id number] Legal Sex   Female              Age   1950 (71 year old)     NM Lexiscan stress test  Order: [id number]  Status:  Final result   Visible to patient:  Yes (MyChart) Next appt:  2021 at 03:10 PM in Cardiology (Chuck Hernandez MD)   0 Result Notes      Details    Reading Physician Reading Date Result Priority   Deejay Warren DO  [phone] 2020 Routine   Provider, Historical 2020         Result Text     The nuclear stress test is negative for inducible myocardial ischemia or infarction.     The left ventricular ejection fraction at rest is 64%.     Left ventricular function is normal.           ECG today sinus bradycardia, possible septal infarct,  ms.      Normal sinus rhythm   Nonspecific ST and T wave abnormality   Abnormal ECG   When compared with ECG of 2020 14:41,   No significant change was found   Confirmed by RADHA KNAPP MD LOC:JN ([id number]) on 2021 4:07:15 PM  Sinus bradycardia   Septal infarct , age undetermined   Abnormal ECG   When compared with ECG of 2020 07:08,   Septal infarct is now Present   Confirmed by RADHA KNAPP MD LOC:JN ([id number]) on 2020 4:00:45 PM          Specimen Collected: 21 Last Resulted: 21  4:07 PM          Scans on Order [id number]    Scan on 2021 12:00 AM by Historical Provider        Conclusion    Mobile cardiac outpatient telemetry [facility]OT report     Ordering physician:Chuck Hernandez MD.   Indication: Persistent atrial fibrillation.     Prescribed monitoring time: 14 days.  Effective monitoring time: 12 days and 19 hours.        Interpretation:     Patient was monitored for 12 days and 19  "hours, baseline rhythm sinus, heart rate averaging 67 bpm, lowest heart rate of 50 and max of 120 bpm.     No prolonged pauses, no high-grade AV block, no significant IVCD or aberrancy.     Infrequent PACs burden is 1% of total beats, no atrial runs, no evidence of PSVT, no evidence of atrial fibrillation or flutter.     Infrequent isolated PVCs without ventricular runs, no sustained or nonsustained VT.     Strip # 11 and 12 show sinus rhythm with downsloping ST depression 1 to 1.5 mm, suggestive but not diagnostic for ischemia.     A single symptomatic transmission on June 7 for \"symptoms other listed\" correlated with sinus rhythm rate of 64 bpm          Physical Examination  Review of Systems   Vitals: There were no vitals taken for this visit.  BMI= There is no height or weight on file to calculate BMI.  Wt Readings from Last 3 Encounters:   10/21/21 81.1 kg (178 lb 14.4 oz)   10/11/21 81.6 kg (179 lb 12.8 oz)   09/01/21 80.7 kg (178 lb)       General Appearance:   no distress, normal body habitus   ENT/Mouth: membranes moist, no oral lesions or bleeding gums.      EYES:  no scleral icterus, normal conjunctivae   Neck: no carotid bruits or thyromegaly   Chest/Lungs:   lungs are clear to auscultation, no rales or wheezing,  equal chest wall expansion    Cardiovascular:   Regular. Normal first and second heart sounds with no murmurs, rubs, or gallops; the carotid, radial and posterior tibial pulses are intact, Jugular venous pressure within normal limits, no significant edema bilaterally    Abdomen:  no organomegaly, masses, bruits, or tenderness; bowel sounds are present   Extremities: no cyanosis or clubbing   Skin: no xanthelasma, warm.    Neurologic: normal  bilateral, no tremors     Psychiatric: alert and oriented x3, calm        Please refer above for cardiac ROS details.        Medical History  Surgical History Family History Social History   Past Medical History:   Diagnosis Date     Coronary artery " disease      Diabetes mellitus (H)      Discitis of thoracolumbar region 10/10/2015     Encephalopathy 10/14/2015     Epidural abscess 10/10/2015     Hip pain, right      Hyperlipidemia      Sepsis (H) 10/8/2015     Stroke (H) 06/23/2015    Neurology felt that episode was C/W subacute ischemic stroke     TIA (transient ischemic attack) 6/23/2015     UTI (urinary tract infection)     admitted to [facility] with UTI     Past Surgical History:   Procedure Laterality Date     ANGIOPLASTY  03/30/2016    Drug eluting stent mid LAD, cutting balloon to 1st diagonal     ANGIOPLASTY  2008     BYPASS GRAFT ARTERY CORONARY  12/11/2007    X 3 vessels, LIMA to LAD, saph vein graft to distal RCA, saphvein graft to marginal, mini circuit bypass.  [facility].     CORONARY STENT PLACEMENT  2008    Left main, left circumflex, RCA     CORONARY STENT PLACEMENT  03/2016     CV CORONARY ANGIOGRAM N/A 8/1/2018    Procedure: Coronary Angiogram;  Surgeon: Kit Bean MD;  Location: [facility] Cath Lab;  Service:      CV LEFT HEART CATHETERIZATION WITH LEFT VENTRICULOGRAM N/A 8/1/2018    Procedure: Left Heart Catheterization with Left Ventriculogram;  Surgeon: Kit Bean MD;  Location: [facility] Cath Lab;  Service:      INSERT MIDLINE HE  10/31/2015          LUMBAR DISCECTOMY N/A 10/11/2015    Procedure: BILATERAL L1-L5 DECOMPRESSIVE LAMINECTOMY WITH EVACUATION OF EPIDURAL ABSCESS IRRIGATION & DEBRIDEMENT;  Surgeon: Luli Chan MD;  Location: [facility] OR;  Service:      PICC  10/19/2015          RELEASE CARPAL TUNNEL Bilateral      Family History   Problem Relation Age of Onset     Cerebrovascular Disease Mother      Diabetes Father      Diabetes Sister      Cerebrovascular Disease Sister 81.00     Cerebrovascular Disease Brother      Diabetes Brother         Social History     Socioeconomic History     Marital status: Single     Spouse name: Not on file     Number of children: 1     Years  of education: Not on file     Highest education level: Not on file   Occupational History     Not on file   Tobacco Use     Smoking status: Former Smoker     Quit date: 2007     Years since quittin.3     Smokeless tobacco: Never Used   Substance and Sexual Activity     Alcohol use: No     Comment: Alcoholic Drinks/day: stopped drinking 2015     Drug use: No     Sexual activity: Never   Other Topics Concern     Not on file   Social History Narrative    Lives alone with cats and dogs. , one daughter, Tory Lofton.      Social Determinants of Health     Financial Resource Strain:      Difficulty of Paying Living Expenses:    Food Insecurity:      Worried About Running Out of Food in the Last Year:      Ran Out of Food in the Last Year:    Transportation Needs:      Lack of Transportation (Medical):      Lack of Transportation (Non-Medical):    Physical Activity:      Days of Exercise per Week:      Minutes of Exercise per Session:    Stress:      Feeling of Stress :    Social Connections:      Frequency of Communication with Friends and Family:      Frequency of Social Gatherings with Friends and Family:      Attends Jew Services:      Active Member of Clubs or Organizations:      Attends Club or Organization Meetings:      Marital Status:    Intimate Partner Violence:      Fear of Current or Ex-Partner:      Emotionally Abused:      Physically Abused:      Sexually Abused:            Medications  Allergies   Current Outpatient Medications   Medication Sig Dispense Refill     atorvastatin (LIPITOR) 80 MG tablet TAKE 1 TABLET BY MOUTH ONCE DAILY AT BEDTIME 90 tablet 0     busPIRone (BUSPAR) 15 MG tablet Take 1 tablet by mouth once daily 90 tablet 0     Continuous Blood Gluc Sensor (DEXCOM G6 SENSOR) MISC 1 each every 10 days Change every 10 days. 9 each 4     Continuous Blood Gluc Transmit (DEXCOM G6 TRANSMITTER) MISC 1 each every 3 months Change every 3 months. 1 each 5     dulaglutide  (TRULICITY) 0.75 MG/0.5ML pen Inject 0.75 mg Subcutaneous every 7 days 6 mL 3     insulin glargine (LANTUS SOLOSTAR) 100 UNIT/ML pen INJECT 28 UNITS SUBCUTANEOUSLY TWICE DAILY 60 mL 1     insulin NPH (HUMULIN N VIAL 100 UNIT/ML KAYE SUSP) 100 UNIT/ML injection Inject  Subcutaneous See Admin Instructions.       isosorbide mononitrate (IMDUR) 60 MG 24 hr tablet Take 1 tablet by mouth once daily 90 tablet 0     metFORMIN (GLUCOPHAGE) 1000 MG tablet Take 1,000 mg by mouth 2 times daily (with meals).       sertraline (ZOLOFT) 100 MG tablet Take 1 tablet by mouth once daily 90 tablet 0     sotalol (BETAPACE) 80 MG tablet TAKE 1/2 (ONE-HALF) TABLET BY MOUTH EVERY 12 HOURS .  DO  NOT  TAKE  WITH  SEROQUEL 90 tablet 1     traMADol (ULTRAM) 50 MG tablet TAKE 1 TABLET BY MOUTH EVERY 8 HOURS AS NEEDED FOR PAIN OR SEVERE PAIN ON SCALE (7-10) 30 tablet 0     traZODone (DESYREL) 50 MG tablet 1/2 to 1 tablet at bedtime prn insomnia 30 tablet 1     warfarin ANTICOAGULANT (COUMADIN) 5 MG tablet Take 0.5-1 tablets (2.5-5 mg) by mouth daily Adjust dose per INR results as instructed. 80 tablet 1       Allergies   Allergen Reactions     Oxycodone           Lab Results    Chemistry/lipid CBC Cardiac Enzymes/BNP/TSH/INR   Recent Labs   Lab Test 05/05/21  1400   CHOL 174   HDL 48*   LDL 79   TRIG 235*     Recent Labs   Lab Test 05/05/21  1400 07/23/20  1142 07/01/19  1110   LDL 79 72 95     Recent Labs   Lab Test 05/05/21  1400      POTASSIUM 4.7   CHLORIDE 101   CO2 30   *   BUN 19   CR 0.93   GFRESTIMATED 59*   ELLIE 9.3     Recent Labs   Lab Test 05/05/21  1400 11/06/20  0559 11/05/20  0559   CR 0.93 0.76 0.76     Recent Labs   Lab Test 10/01/21  1421 07/23/20  1142 03/16/20  1435   A1C 7.9* 8.3* 8.7*      Recent Labs   Lab Test 05/05/21  1400   WBC 7.9   HGB 13.0   HCT 40.2   MCV 91        Recent Labs   Lab Test 05/05/21  1400 11/05/20  0559 11/04/20  1616   HGB 13.0 11.6* 13.5    Recent Labs   Lab Test 11/06/20  0559  11/05/20  0559 11/04/20  2229   TROPONINI 0.18 0.37* 0.28     No results for input(s): BNP, NTBNPI, NTBNP in the last 56989 hours.  Recent Labs   Lab Test 05/05/21  1400   TSH 0.77     Recent Labs   Lab Test 10/25/21  1355 10/11/21  1439 10/01/21  1421   INR 2.1* 2.6* 4.2*          Chuck Hernandez MD  [facility]

## 2021-11-04 NOTE — PROGRESS NOTES
ED Time Seen By Provider Entered On:  2/5/2020 19:15     Performed On:  2/5/2020 19:15  by Real Leonard MD               Time Seen By Provider   Time Seen by Provider :   2/5/2020 19:15    Real Leonard MD - 2/5/2020 19:15                Electronically Signed On 02.05.2020 19:15  ___________________________________________________   Real Leonard MD       HEART CARE ENCOUNTER CONSULTATON NOTE      [facility]  [phone]      Assessment/Recommendations   Assessment/Plan:  1.  Typical isthmus flutter and atrial fibrillation.  Patient noted to be in atrial flutter during a routine follow-up visit in November 2020 and was admitted to the hospital and started on sotalol and subsequently seen by my EP colleagues discussion was had about ablation but after she returned from [place] she was feeling well and appeared to maintain sinus rhythm on sotalol her preference was to continue with sotalol and monitor for heart rhythm irregularity.  She has not had any awareness of tachypalpitations.  I did talk about the importance of monitoring her pulse on a regular basis.  She indicates that she will start using her blood pressure cuff that also measures her pulse and that she should update us with rapid heartbeats.    2.  Anticoagulation .  She does have an elevated chads 2 vascular score.  Her score is estimated to be 7.  She is currently on warfarin as apixaban was too expensive.  She did ask me to fill out a form today try to help her with potential cost of apixaban.    3.  Coronary artery disease.  Multivessel vessel intervention with LIMA to the LAD and vein graft and obtuse marginal branch and right PDA which was subsequently found to be occluded on angiography from May 2008.  Most recent angiogram is from August 2018 with right and circumflex lesions that were noted to be small caliber vessels and intervention to the circumflex was pursued.  Prior vein grafts were said to be occluded.  LIMA to the LAD was patent.  She had a nuclear stress test November 2020 that was reported negative for ischemia with normal ejection fraction.    4  Dyslipidemia.  Most recent lipids are from the  May 2021 at which time LDL cholesterol was 79.  Cholesterol 174, triglycerides 235 with normal liver function test.  She is on atorvastatin 80 mg daily and I did talk with  her that ideally would like to see the LDL less than 70.  I again reviewed the potential for initiation of Zetia but she remains reluctant.  We talked about the potential benefits of lowering her LDL down further.  She is following with  as a relates to her diabetes.  5  Hypertension.  Blood pressure mildly elevated upon arrival but repeat during my examination after resting was 120/60.  I did ask her to monitor her blood pressure on a regular basis.  Pressure goal 135/80 or less.  1.  Follow-up in 6 months when she returns from [place].  2.  She is given our phone number and asked to call with any heart related concerns.  As noted above I did ask her to monitor her heart rate regularly and to contact us or be seen for persistently elevated heart rate.  She indicated that she will start utilizing her blood pressure cuff more regularly.     History of Present Illness/Subjective    HPI: Kemi Soto is a 71 year old female was seen in follow up.  As outlined she was noted to be tachycardic during our visit November 2020 and was noted to be in atrial flutter with 2-1 conduction.  She was scheduled to travel to [place] and was admitted to the hospital and she converted with low dose of sotalol and wanted to travel to [place].  She was seen by my EP colleague at the timee Dr Boucher discussion was had regarding ablation for both atrial flutter and atrial fibrillation.  At that time he was leaning towards having the ablation procedure    Recent Echocardiogram Results:  Echocardiogram Complete  Order: [id number]  Status:  Final result   Visible to patient:  Yes (MyChart) Next appt:  11/04/2021 at 03:10 PM in Cardiology (Chuck Hernandez MD)   0 Result Notes    Details    Reading Physician Reading Date Result Priority   Janee Choudhary MD  [phone] 11/5/2020 Routine   Provider, Historical 11/5/2020         Narrative & Impression    No previous study for comparison.    Left ventricle ejection fraction is  normal. The calculated left ventricular ejection fraction is 58%.    Normal left ventricular size and systolic function.    Normal right ventricular size and systolic function.    Mild concentric hypertrophy noted.    Left atrial volume is moderately increased        CAD (coronary artery disease) [I25.10 (ICD-10-CM)]   Coronary artery disease involving native coronary artery of native heart without angina pectoris [I25.10 (ICD-10-CM)]           Conclusion      Estimated blood loss was <20 ml.    Prox Cx to Mid Cx lesion 90% stenosed.    Lat 1st Mrg-2 lesion 80% stenosed.    Lat 1st Mrg-1 lesion 90% stenosed.    Mid Cx lesion 80% stenosed.    Ost LAD to Prox LAD lesion 40% stenosed.    A drug eluting .    Ost Cx to Prox Cx lesion 35% stenosed.    A drug eluting .    Ost 1st Diag lesion 50% stenosed.    1st Mrg lesion 70% stenosed.    Mid LAD lesion 100% stenosed.    Ost RCA to Mid RCA lesion 30% stenosed.    Mid RCA lesion 70% stenosed.    Post Atrio lesion 40% stenosed.    Ost RPDA to RPDA lesion 40% stenosed.    The left ventricular size is normal. The left ventricular systolic function is normal. LV systolic pressure is normal. LV end diastolic pressure is normal. There are no wall motion abnormalities in the left ventricle.    Dist LAD-1 lesion 50% stenosed.    Dist LAD-2 lesion 70% stenosed.     Patient has 2 previous saphenous vein grafts known to be occluded.     Both right coronary and circumflex have significant lesions present.  Both precede small caliber distal vessels.  Options would include partial revascularization by stenting right coronary and circumflex with small caliber stents.  Circumflex has a   relatively long segment of disease.  Alternative would be continued medical management           NM Lexiscan stress test  Order# [id number]  Reading physicians: Deejay Warren DO; Provider, Historical Ordering physician: Chuck Hernandez MD Study date: 11/06/2020       Patient  Information    Patient Name   Kemi Soto MRN   [id number] Legal Sex   Female              Age   1950 (71 year old)     NM Lexiscan stress test  Order: [id number]  Status:  Final result   Visible to patient:  Yes (Pinghart) Next appt:  2021 at 03:10 PM in Cardiology (Chuck Hernandez MD)   0 Result Notes      Details    Reading Physician Reading Date Result Priority   Deejay Warren DO  [phone] 2020 Routine   Provider, Historical 2020         Result Text     The nuclear stress test is negative for inducible myocardial ischemia or infarction.     The left ventricular ejection fraction at rest is 64%.     Left ventricular function is normal.           ECG today sinus bradycardia, possible septal infarct,  ms.      Normal sinus rhythm   Nonspecific ST and T wave abnormality   Abnormal ECG   When compared with ECG of 2020 14:41,   No significant change was found   Confirmed by RADHA KNAPP MD LOC:JN ([id number]) on 2021 4:07:15 PM  Sinus bradycardia   Septal infarct , age undetermined   Abnormal ECG   When compared with ECG of 2020 07:08,   Septal infarct is now Present   Confirmed by RADHA KNAPP MD LOC:JN ([id number]) on 2020 4:00:45 PM          Specimen Collected: 21 Last Resulted: 21  4:07 PM          Scans on Order [id number]    Scan on 2021 12:00 AM by Historical Provider        Conclusion    Mobile cardiac outpatient telemetry [facility]OT report     Ordering physician:Chuck Hernandez MD.   Indication: Persistent atrial fibrillation.     Prescribed monitoring time: 14 days.  Effective monitoring time: 12 days and 19 hours.        Interpretation:     Patient was monitored for 12 days and 19 hours, baseline rhythm sinus, heart rate averaging 67 bpm, lowest heart rate of 50 and max of 120 bpm.     No prolonged pauses, no high-grade AV block, no significant IVCD or aberrancy.     Infrequent PACs burden is 1% of total beats, no atrial runs, no  "evidence of PSVT, no evidence of atrial fibrillation or flutter.     Infrequent isolated PVCs without ventricular runs, no sustained or nonsustained VT.     Strip # 11 and 12 show sinus rhythm with downsloping ST depression 1 to 1.5 mm, suggestive but not diagnostic for ischemia.     A single symptomatic transmission on June 7 for \"symptoms other listed\" correlated with sinus rhythm rate of 64 bpm          Physical Examination  Review of Systems   Vitals: There were no vitals taken for this visit.  BMI= There is no height or weight on file to calculate BMI.  Wt Readings from Last 3 Encounters:   10/21/21 81.1 kg (178 lb 14.4 oz)   10/11/21 81.6 kg (179 lb 12.8 oz)   09/01/21 80.7 kg (178 lb)       General Appearance:   no distress, normal body habitus   ENT/Mouth: membranes moist, no oral lesions or bleeding gums.      EYES:  no scleral icterus, normal conjunctivae   Neck: no carotid bruits or thyromegaly   Chest/Lungs:   lungs are clear to auscultation, no rales or wheezing,  equal chest wall expansion    Cardiovascular:   Regular. Normal first and second heart sounds with no murmurs, rubs, or gallops; the carotid, radial and posterior tibial pulses are intact, Jugular venous pressure within normal limits, no significant edema bilaterally    Abdomen:  no organomegaly, masses, bruits, or tenderness; bowel sounds are present   Extremities: no cyanosis or clubbing   Skin: no xanthelasma, warm.    Neurologic: normal  bilateral, no tremors     Psychiatric: alert and oriented x3, calm        Please refer above for cardiac ROS details.        Medical History  Surgical History Family History Social History   Past Medical History:   Diagnosis Date     Coronary artery disease      Diabetes mellitus (H)      Discitis of thoracolumbar region 10/10/2015     Encephalopathy 10/14/2015     Epidural abscess 10/10/2015     Hip pain, right      Hyperlipidemia      Sepsis (H) 10/8/2015     Stroke (H) 06/23/2015    Neurology felt " that episode was C/W subacute ischemic stroke     TIA (transient ischemic attack) 2015     UTI (urinary tract infection)     admitted to [facility] with UTI     Past Surgical History:   Procedure Laterality Date     ANGIOPLASTY  2016    Drug eluting stent mid LAD, cutting balloon to 1st diagonal     ANGIOPLASTY       BYPASS GRAFT ARTERY CORONARY  12/11/2007    X 3 vessels, LIMA to LAD, saph vein graft to distal RCA, saphvein graft to marginal, mini circuit bypass.  [facility].     CORONARY STENT PLACEMENT      Left main, left circumflex, RCA     CORONARY STENT PLACEMENT  2016     CV CORONARY ANGIOGRAM N/A 2018    Procedure: Coronary Angiogram;  Surgeon: Kit Bean MD;  Location: [facility] Cath Lab;  Service:      CV LEFT HEART CATHETERIZATION WITH LEFT VENTRICULOGRAM N/A 2018    Procedure: Left Heart Catheterization with Left Ventriculogram;  Surgeon: Kit Bean MD;  Location: [facility] Cath Lab;  Service:      INSERT MIDLINE HE  10/31/2015          LUMBAR DISCECTOMY N/A 10/11/2015    Procedure: BILATERAL L1-L5 DECOMPRESSIVE LAMINECTOMY WITH EVACUATION OF EPIDURAL ABSCESS IRRIGATION & DEBRIDEMENT;  Surgeon: Luli Chan MD;  Location: [facility] OR;  Service:      PICC  10/19/2015          RELEASE CARPAL TUNNEL Bilateral      Family History   Problem Relation Age of Onset     Cerebrovascular Disease Mother      Diabetes Father      Diabetes Sister      Cerebrovascular Disease Sister 81.00     Cerebrovascular Disease Brother      Diabetes Brother         Social History     Socioeconomic History     Marital status: Single     Spouse name: Not on file     Number of children: 1     Years of education: Not on file     Highest education level: Not on file   Occupational History     Not on file   Tobacco Use     Smoking status: Former Smoker     Quit date: 2007     Years since quittin.3     Smokeless tobacco: Never Used   Substance  and Sexual Activity     Alcohol use: No     Comment: Alcoholic Drinks/day: stopped drinking 9/2015     Drug use: No     Sexual activity: Never   Other Topics Concern     Not on file   Social History Narrative    Lives alone with cats and dogs. , one daughter, Tory Lofton.      Social Determinants of Health     Financial Resource Strain:      Difficulty of Paying Living Expenses:    Food Insecurity:      Worried About Running Out of Food in the Last Year:      Ran Out of Food in the Last Year:    Transportation Needs:      Lack of Transportation (Medical):      Lack of Transportation (Non-Medical):    Physical Activity:      Days of Exercise per Week:      Minutes of Exercise per Session:    Stress:      Feeling of Stress :    Social Connections:      Frequency of Communication with Friends and Family:      Frequency of Social Gatherings with Friends and Family:      Attends Jainism Services:      Active Member of Clubs or Organizations:      Attends Club or Organization Meetings:      Marital Status:    Intimate Partner Violence:      Fear of Current or Ex-Partner:      Emotionally Abused:      Physically Abused:      Sexually Abused:            Medications  Allergies   Current Outpatient Medications   Medication Sig Dispense Refill     atorvastatin (LIPITOR) 80 MG tablet TAKE 1 TABLET BY MOUTH ONCE DAILY AT BEDTIME 90 tablet 0     busPIRone (BUSPAR) 15 MG tablet Take 1 tablet by mouth once daily 90 tablet 0     Continuous Blood Gluc Sensor (DEXCOM G6 SENSOR) MISC 1 each every 10 days Change every 10 days. 9 each 4     Continuous Blood Gluc Transmit (DEXCOM G6 TRANSMITTER) MISC 1 each every 3 months Change every 3 months. 1 each 5     dulaglutide (TRULICITY) 0.75 MG/0.5ML pen Inject 0.75 mg Subcutaneous every 7 days 6 mL 3     insulin glargine (LANTUS SOLOSTAR) 100 UNIT/ML pen INJECT 28 UNITS SUBCUTANEOUSLY TWICE DAILY 60 mL 1     insulin NPH (HUMULIN N VIAL 100 UNIT/ML KAYE SUSP) 100 UNIT/ML injection Inject   Subcutaneous See Admin Instructions.       isosorbide mononitrate (IMDUR) 60 MG 24 hr tablet Take 1 tablet by mouth once daily 90 tablet 0     metFORMIN (GLUCOPHAGE) 1000 MG tablet Take 1,000 mg by mouth 2 times daily (with meals).       sertraline (ZOLOFT) 100 MG tablet Take 1 tablet by mouth once daily 90 tablet 0     sotalol (BETAPACE) 80 MG tablet TAKE 1/2 (ONE-HALF) TABLET BY MOUTH EVERY 12 HOURS .  DO  NOT  TAKE  WITH  SEROQUEL 90 tablet 1     traMADol (ULTRAM) 50 MG tablet TAKE 1 TABLET BY MOUTH EVERY 8 HOURS AS NEEDED FOR PAIN OR SEVERE PAIN ON SCALE (7-10) 30 tablet 0     traZODone (DESYREL) 50 MG tablet 1/2 to 1 tablet at bedtime prn insomnia 30 tablet 1     warfarin ANTICOAGULANT (COUMADIN) 5 MG tablet Take 0.5-1 tablets (2.5-5 mg) by mouth daily Adjust dose per INR results as instructed. 80 tablet 1       Allergies   Allergen Reactions     Oxycodone           Lab Results    Chemistry/lipid CBC Cardiac Enzymes/BNP/TSH/INR   Recent Labs   Lab Test 05/05/21  1400   CHOL 174   HDL 48*   LDL 79   TRIG 235*     Recent Labs   Lab Test 05/05/21  1400 07/23/20  1142 07/01/19  1110   LDL 79 72 95     Recent Labs   Lab Test 05/05/21  1400      POTASSIUM 4.7   CHLORIDE 101   CO2 30   *   BUN 19   CR 0.93   GFRESTIMATED 59*   ELLIE 9.3     Recent Labs   Lab Test 05/05/21  1400 11/06/20  0559 11/05/20  0559   CR 0.93 0.76 0.76     Recent Labs   Lab Test 10/01/21  1421 07/23/20  1142 03/16/20  1435   A1C 7.9* 8.3* 8.7*          Recent Labs   Lab Test 05/05/21  1400   WBC 7.9   HGB 13.0   HCT 40.2   MCV 91        Recent Labs   Lab Test 05/05/21  1400 11/05/20  0559 11/04/20  1616   HGB 13.0 11.6* 13.5    Recent Labs   Lab Test 11/06/20  0559 11/05/20  0559 11/04/20  2229   TROPONINI 0.18 0.37* 0.28     No results for input(s): BNP, NTBNPI, NTBNP in the last 61829 hours.  Recent Labs   Lab Test 05/05/21  1400   TSH 0.77     Recent Labs   Lab Test 10/25/21  1355 10/11/21  1439 10/01/21  1421   INR 2.1*  2.6* 4.2*        Chuck Hernandez MD

## 2021-11-04 NOTE — PATIENT INSTRUCTIONS
We talked about monitoring your pulse either by counting your pulse or using a blood pressure cuff that allows you to monitor for pulse.Please call my nurse Priscila with any heart related questions.Her number is [phone]

## 2021-11-04 NOTE — PROGRESS NOTES
Anticoagulation Management    Discussed INR home monitoring program with Kemi Soto reviewing:      [employer] requirements: >= 3 months of anticoagulation therapy, indication for chronic anticoagulation and order from provider    Required testing frequency (q1-2 weeks)    Home meters, testing supplies, meter training, and reporting of INR results done through an outside company. Patient would be contacted by home monitoring company to review insurance coverage with home monitoring company prior to enrolling.    [facility] would continue to receive and manage INR results.    Home monitoring application may take several weeks and must continue to follow up with recommended INR monitoring in clinic until receives monitor and training completed.     Home monitoring terms reviewed with patient      Patient agrees to frequency of testing as directed by referring provider ( weekly or biweekly) Yes    Testing to be performed during business hours of [facility] Yes    Patient agrees they have the skill (or a designated caregiver) necessary to perform the self test Yes    Patient agrees to report all INR results to INR home monitoring company Yes    Patient agrees to have additional INR test in clinic if a home result is critical Yes    Patient agrees to schedule an INR test at a [facility] clinic yearly for technique observation and quality check of INR results with their home meter Yes    Patient agrees to use a [facility] approved service provider and device for home monitoring Yes    [employer]    Referring provider: Dr Hernandez    Referring providers Clinic Fax number [phone] or     Kemi Soto is interested home INR monitoring and requests order be submitted.

## 2021-11-04 NOTE — PROGRESS NOTES
ANTICOAGULATION MANAGEMENT     Kemi Soto 71 year old female is on warfarin with therapeutic INR result. (Goal INR 2.0-3.0)    Recent labs: (last 7 days)     11/04/21  1558   INR 2.7*       ASSESSMENT     Source(s): Chart Review and Patient/Caregiver Call       Warfarin doses taken: Warfarin taken as instructed    Diet: No new diet changes identified    New illness, injury, or hospitalization: No    Medication/supplement changes: None noted    Signs or symptoms of bleeding or clotting: No    Previous INR: Therapeutic last 2(+) visits    Additional findings: Interested in Home Monitor- made aware that application will be started     Patient would like to have it prior to leaving for [place]- made aware that [employer] cannot guarantee due to high demand at this time.     PLAN     Recommended plan for no diet, medication or health factor changes affecting INR     Dosing Instructions: Continue your current warfarin dose with next INR in 3 weeks       Summary  As of 11/4/2021    Full warfarin instructions:  2.5 mg every Mon, Fri; 5 mg all other days   Next INR check:  11/24/2021             Telephone call with Kemi who verbalizes understanding and agrees to plan    Lab visit scheduled    Education provided: Goal range and significance of current result, Importance of therapeutic range, Importance of following up at instructed interval, Importance of taking warfarin as instructed and Information on home INR monitoring    Plan made per [facility] anticoagulation protocol    Cony Noonan RN  Anticoagulation Clinic  11/4/2021    _______________________________________________________________________     Anticoagulation Episode Summary     Current INR goal:  2.0-3.0   TTR:  53.9 % (3.1 mo)   Target end date:     Send INR reminders to:  [facility]    Indications    Paroxysmal atrial fibrillation (H) [I48.0]           Comments:  trnasitioned from eliquis         Anticoagulation Care Providers     Provider  Role Specialty Phone number    Chuck Hernandez MD Responsible Cardiovascular Disease [phone]

## 2021-11-08 ENCOUNTER — TELEPHONE (OUTPATIENT)
Dept: FAMILY MEDICINE | Facility: CLINIC | Age: 71
End: 2021-11-08
Payer: COMMERCIAL

## 2021-11-08 NOTE — TELEPHONE ENCOUNTER
PA Initiation    Medication: TRULICITY 0.75 MG/0.5ML pen   Insurance Company: [employer] [place] - Phone [phone] Fax [phone]  Pharmacy Filling the Rx: [facility] [id number]  Filling Pharmacy Phone: [phone]  Filling Pharmacy Fax: [phone]  Start Date: 11/8/2021

## 2021-11-08 NOTE — TELEPHONE ENCOUNTER
Trulicity , not covered. Wanted Tier exception.   Wants to do this for the new year. 2022.    Prior Authorization Request  Who s requesting:  patient  Pharmacy Name and Location: [facility]  Medication Name: Trulicity  Insurance Plan: [employer]   Insurance Member ID Number:  [id number]  CoverMyMeds Key:   Informed patient that prior authorizations can take up to 10 business days for response:     Okay to leave a detailed message:       Trulicity is past medication due to no insurance.

## 2021-11-08 NOTE — TELEPHONE ENCOUNTER
Reason for Call:  Other prescription    Detailed comments: pt called very upset,stated her insurance company is telling her they have tried to contact us several times on the request for Trulicity and need and alternate for coverage    Phone Number Patient can be reached at: Home number on file [phone] (home)    Best Time: anytime-would like call as soon as possible to advise her why we are not responding to them    Can we leave a detailed message on this number? YES    Call taken on 11/8/2021 at 1:02 PM by Dheeraj Mullins

## 2021-11-09 NOTE — TELEPHONE ENCOUNTER
What do I need to appeal this denied medication?  Is it a letter, phone call?  If letter who does it go to?  If phone call what is the number?    Thanks,  Dr. Zapata

## 2021-11-10 DIAGNOSIS — I48.3 TYPICAL ATRIAL FLUTTER (H): ICD-10-CM

## 2021-11-10 DIAGNOSIS — I25.10 CORONARY ARTERY DISEASE INVOLVING NATIVE CORONARY ARTERY OF NATIVE HEART WITHOUT ANGINA PECTORIS: ICD-10-CM

## 2021-11-10 RX ORDER — SOTALOL HYDROCHLORIDE 80 MG/1
TABLET ORAL
Qty: 90 TABLET | Refills: 1 | Status: SHIPPED | OUTPATIENT
Start: 2021-11-10 | End: 2022-05-04

## 2021-11-10 NOTE — TELEPHONE ENCOUNTER
Please write a statement to support the exception request and then fax this statement along with the appeal request.([facility] has this)

## 2021-11-16 ENCOUNTER — TRANSFERRED RECORDS (OUTPATIENT)
Dept: HEALTH INFORMATION MANAGEMENT | Facility: CLINIC | Age: 71
End: 2021-11-16
Payer: COMMERCIAL

## 2021-11-18 ENCOUNTER — TELEPHONE (OUTPATIENT)
Dept: ANTICOAGULATION | Facility: CLINIC | Age: 71
End: 2021-11-18

## 2021-11-18 ENCOUNTER — ALLIED HEALTH/NURSE VISIT (OUTPATIENT)
Dept: EDUCATION SERVICES | Facility: CLINIC | Age: 71
End: 2021-11-18
Payer: COMMERCIAL

## 2021-11-18 DIAGNOSIS — Z79.4 TYPE 2 DIABETES MELLITUS WITH DIABETIC POLYNEUROPATHY, WITH LONG-TERM CURRENT USE OF INSULIN (H): Primary | ICD-10-CM

## 2021-11-18 DIAGNOSIS — E11.42 TYPE 2 DIABETES MELLITUS WITH DIABETIC POLYNEUROPATHY, WITH LONG-TERM CURRENT USE OF INSULIN (H): Primary | ICD-10-CM

## 2021-11-18 DIAGNOSIS — I48.0 PAROXYSMAL ATRIAL FIBRILLATION (H): Primary | ICD-10-CM

## 2021-11-18 PROCEDURE — G0108 DIAB MANAGE TRN  PER INDIV: HCPCS | Performed by: REGISTERED NURSE

## 2021-11-18 RX ORDER — PROCHLORPERAZINE 25 MG/1
1 SUPPOSITORY RECTAL ONCE
Qty: 1 EACH | Refills: 0 | Status: SHIPPED | OUTPATIENT
Start: 2021-11-18 | End: 2021-11-18

## 2021-11-18 NOTE — LETTER
11/18/2021         RE: Kemi Soto  [address] [facility] [address]        Dear Colleague,    Thank you for referring your patient, Kemi Soto, to the [facility]. Please see a copy of my visit note below.    Diabetes Self-Management Education & Support  Type of visit: In-person  AVS sent to home address    ASSESSMENT:  One month follow-up for diabetes education and second attempt at starting the DEXCOM G6 sensor. Ciarra's phone is incompatible for using as a  (too old). She needs to use a . Ciarra went to a diabetes support group in [place] last week. The speaker was Carolina, the DEXCOM  for the clinic. Patient thought Carolina told her I could provide a  at the clinic. I think Ciarra heard her wrong as she is hard of hearing. I only provide a sample DEXCOM G6 sensor and transmitter. Patient was very upset about this. Prescription for the DEXCOM G6  will be sent to the [facility]. Ciarra does not have a working meter at home. Provided a sample Contour Next EZ meter, 50 test strips and 100 lancets. She plans to use the Contour meter to check blood sugars until we get the sensor placed.     Ciarra is planning on going to [place] after [place].     PLAN:   1. Check blood sugar twice per day (before breakfast and bedtime).  2. Continue current dose of Metformin and Lantus insulin.  3.  the DEXCOM  at the [facility].  4. Contact me when you get the  and I can set up an appointment to place the sensor and set up the .    SUBJECTIVE/OBJECTIVE:  Presents for: Follow-up  Accompanied by: Self  Diabetes education in the past 24mo: Yes  Focus of Visit: CGM  Type of CGM visit: Personal CGM Start  Diabetes type: Type 2  Date of diagnosis: about 22 years ago  Disease course: Getting harder to manage  Diabetes management related comments/concerns: Has not started the DEXCOM G6  "sensor. Phone is not compatible to use as a .  Transportation concerns: No  Difficulty affording diabetes medication?: No  Difficulty affording diabetes testing supplies?: No  Other concerns:: [place] (Hard of hearing)  Cultural Influences/Ethnic Background:  Not  or     Diabetes Symptoms & Complications:  Weight trend: Stable  Complications assessed today?: No    Patient Problem List and Family Medical History reviewed for relevant medical history, current medical status, and diabetes risk factors.    Vitals:  There were no vitals taken for this visit.  Estimated body mass index is 32.56 kg/m  as calculated from the following:    Height as of 11/4/21: 1.575 m (5' 2\").    Weight as of 11/4/21: 80.7 kg (178 lb).   Last 3 BP:   BP Readings from Last 3 Encounters:   11/04/21 (!) 140/64   10/11/21 (!) 142/84   09/01/21 (!) 146/72       History   Smoking Status     Former Smoker     Quit date: 6/23/2007   Smokeless Tobacco     Never Used       Labs:  Lab Results   Component Value Date    A1C 7.9 10/01/2021     Lab Results   Component Value Date     05/05/2021     Lab Results   Component Value Date    LDL 79 05/05/2021    LDL 97 05/06/2019     Direct Measure HDL   Date Value Ref Range Status   05/05/2021 48 (L) >=50 mg/dL Final   ]  GFR Estimate   Date Value Ref Range Status   05/05/2021 59 (L) >60 mL/min/1.73m2 Final     GFR Estimate If Black   Date Value Ref Range Status   05/05/2021 >60 >60 mL/min/1.73m2 Final     Lab Results   Component Value Date    CR 0.93 05/05/2021     No results found for: MICROALBUMIN    Healthy Eating:  Healthy Eating Assessed Today: No  Meal planning/habits: Smaller portions,Avoiding sweets  Has patient met with a dietitian in the past?: Yes    Being Active:  Being Active Assessed Today: No    Monitoring:  Monitoring Assessed Today: Yes  Did patient bring glucose meter to appointment? : No (Meter not working at home)  Blood Glucose Meter: Unknown  No blood sugar " data    Taking Medications:  Diabetes Medication(s)     Biguanides       metFORMIN (GLUCOPHAGE) 1000 MG tablet    Take 1,000 mg by mouth 2 times daily (with meals).    Insulin       insulin glargine (LANTUS SOLOSTAR) 100 UNIT/ML pen    INJECT 28 UNITS SUBCUTANEOUSLY TWICE DAILY      Taking Medication Assessed Today: Yes  Current Treatments: Diet,Insulin Injections,Oral Medication (taken by mouth)    Problem Solving:  Problem Solving Assessed Today: No    Reducing Risks:  Reducing Risks Assessed Today: Yes  Diabetes Risks: Age over 45 years  CAD Risks: Diabetes Mellitus,Hypertension,Obesity,Post-menopausal,Dyslipidemia    Healthy Coping:  Healthy Coping Assessed Today: No  Stage of change: PREPARATION (Decided to change - considering how)  Patient Activation Measure Survey Score:  No flowsheet data found.    Diabetes knowledge and skills assessment:   Patient is knowledgeable in diabetes management concepts related to: Taking Medication    Patient needs further education on the following diabetes management concepts: Healthy Eating, Being Active, Problem Solving, Reducing Risks, Healthy Coping and CGM training & interpretation of blood glucose data    Based on learning assessment above, most appropriate setting for further diabetes education would be: Individual setting.      INTERVENTIONS:    Education provided today on:  AADE Self-Care Behaviors:  How a DEXCOM CGM works, phone compatibility, using a  to scan blood sugars    Opportunities for ongoing education and support in diabetes-self management were discussed.    Pt verbalized understanding of concepts discussed and recommendations provided today.       Education Materials Provided:  No new materials provided today    Patient's most recent   Lab Results   Component Value Date    A1C 7.9 10/01/2021    is meeting goal of <8.0    Diabetes Self-Management Training ()  Time Spent: 60 minutes  Encounter Type: Individual    Liv Contreras RD, MARIA E, [facility]    Certified Diabetes Care and   [facility]     Any diabetes medication dose changes were made via the CDE Protocol and Collaborative Practice Agreement with the patient's primary care provider. A copy of this encounter was shared with the provider.

## 2021-11-18 NOTE — TELEPHONE ENCOUNTER
"MYLES-PROCEDURAL ANTICOAGULATION  MANAGEMENT    ASSESSMENT     Warfarin interruption plan for Left Rotator Cuff Repair on 11/29/21.    Indication for Anticoagulation: Atrial Fibrillation      Risk stratification for thromboembolism: high (2017 ACC periprocedure pathway for NVAF Expert Consensus)    o [id number] = 7 (Hypertension, Age 65-74, Diabetes, Stroke, Vascular- CAD and Female)   - Stroke 2015      NVAF: 2017 ACC periprocedure pathway for NVAF advises likely bridge for high risk stratification ([id number] score 7-9 OR hx of stroke, TIA or systemic embolism within 3 months)     RECOMMENDATION       Pre-Procedure:  o Draw a creatine level to determine proper Lovenox dosing   o Hold warfarin for 4 days, until after procedure starting: Thursday 11/25/21   - Therapeutic Enoxaparin (Lovenox) based on creatine level  - Start enoxaparin: Friday 11/26/21 AM  - Last dose of enoxaparin prior to procedure: Sunday 11/28/21 AM  (~24 hours prior to procedure)      Post-Procedure:  o Resume warfarin dose if okay with provider doing procedure on night of procedure, 11/29/21 PM: 7.5mg night of procedure (11/29/21) and then continue home dose   o Resume  enoxaparin (Lovenox) ~ 24-48 hrs post procedure when okay with provider doing procedure. Continue until INR >= 2.3 or INR >= 2.0 x 2 (ACC protocol goal INR 2-3)  o Recheck INR 4-6 days after resuming warfarin   ?     Plan routed to referring provider for approval  ?   Amna Luu    SUBJECTIVE/OBJECTIVE     Kemi Soto, a 71 year old female    Goal INR Range: 2.0-3.0     Patient bridged in past: No    Wt Readings from Last 3 Encounters:   11/04/21 80.7 kg (178 lb)   10/21/21 81.1 kg (178 lb 14.4 oz)   10/11/21 81.6 kg (179 lb 12.8 oz)      Ideal body weight: 50.1 kg (110 lb 7.2 oz)  Adjusted ideal body weight: 62.4 kg (137 lb 7.5 oz)     Estimated body mass index is 32.56 kg/m  as calculated from the following:    Height as of 11/4/21: 1.575 m (5' 2\").    Weight as of " 11/4/21: 80.7 kg (178 lb).    Lab Results   Component Value Date    INR 2.7 (A) 11/04/2021    INR 2.1 (H) 10/25/2021    INR 2.6 (H) 10/11/2021     Lab Results   Component Value Date    HGB 13.0 05/05/2021    HCT 40.2 05/05/2021     05/05/2021     Lab Results   Component Value Date    CR 0.93 05/05/2021    CR 0.76 11/06/2020    CR 0.76 11/05/2020     CrCl cannot be calculated (Patient's most recent lab result is older than the maximum 30 days allowed.).

## 2021-11-18 NOTE — TELEPHONE ENCOUNTER
Patient call and reported that she is going to have surgery:  11/29/2021 Left Rotator Cuff Repair with [facility] and that they prefer to have her INR down to 1.5      Patient made aware that ACN will consult [facility] Pharm D to review her chart and will call her back with instructions once mccallum is approved by Dr Hernandez.    Patient verbalized understanding and agrees to plan.    Cony Noonan RN [facility]    [phone]

## 2021-11-19 NOTE — PATIENT INSTRUCTIONS
1. Check blood sugar twice per day (before breakfast and bedtime).  2. Continue current dose of Metformin and Lantus insulin.  3.  the DEXCOM  at the [facility].  4. Contact me when you get the  and I can set up an appointment to place the sensor and set up the .    Blood sugar goals:   Before meals:   1-2 hours after meals: less 200  Bedtime: 100-160    A1c goal of less than 8.0% (estimated average blood sugar of 183 on meter)

## 2021-11-19 NOTE — PROGRESS NOTES
Diabetes Self-Management Education & Support  Type of visit: In-person  AVS sent to home address    ASSESSMENT:  One month follow-up for diabetes education and second attempt at starting the DEXCOM G6 sensor. Ciarra's phone is incompatible for using as a  (too old). She needs to use a . Ciarra went to a diabetes support group in [place] last week. The speaker was Carolina, the DEXCOM  for the clinic. Patient thought Carolina told her I could provide a  at the clinic. I think Ciarra heard her wrong as she is hard of hearing. I only provide a sample DEXCOM G6 sensor and transmitter. Patient was very upset about this. Prescription for the DEXCOM G6  will be sent to the [facility]. Ciarra does not have a working meter at home. Provided a sample Contour Next EZ meter, 50 test strips and 100 lancets. She plans to use the Contour meter to check blood sugars until we get the sensor placed.     Ciarra is planning on going to [place] after [place].     PLAN:   1. Check blood sugar twice per day (before breakfast and bedtime).  2. Continue current dose of Metformin and Lantus insulin.  3.  the DEXCOM  at the [facility].  4. Contact me when you get the  and I can set up an appointment to place the sensor and set up the .    SUBJECTIVE/OBJECTIVE:  Presents for: Follow-up  Accompanied by: Self  Diabetes education in the past 24mo: Yes  Focus of Visit: CGM  Type of CGM visit: Personal CGM Start  Diabetes type: Type 2  Date of diagnosis: about 22 years ago  Disease course: Getting harder to manage  Diabetes management related comments/concerns: Has not started the DEXCOM G6 sensor. Phone is not compatible to use as a .  Transportation concerns: No  Difficulty affording diabetes medication?: No  Difficulty affording diabetes testing supplies?: No  Other concerns:: [place] (Hard of hearing)  Cultural Influences/Ethnic  "Background:  Not  or     Diabetes Symptoms & Complications:  Weight trend: Stable  Complications assessed today?: No    Patient Problem List and Family Medical History reviewed for relevant medical history, current medical status, and diabetes risk factors.    Vitals:  There were no vitals taken for this visit.  Estimated body mass index is 32.56 kg/m  as calculated from the following:    Height as of 11/4/21: 1.575 m (5' 2\").    Weight as of 11/4/21: 80.7 kg (178 lb).   Last 3 BP:   BP Readings from Last 3 Encounters:   11/04/21 (!) 140/64   10/11/21 (!) 142/84   09/01/21 (!) 146/72       History   Smoking Status     Former Smoker     Quit date: 6/23/2007   Smokeless Tobacco     Never Used       Labs:  Lab Results   Component Value Date    A1C 7.9 10/01/2021     Lab Results   Component Value Date     05/05/2021     Lab Results   Component Value Date    LDL 79 05/05/2021    LDL 97 05/06/2019     Direct Measure HDL   Date Value Ref Range Status   05/05/2021 48 (L) >=50 mg/dL Final   ]  GFR Estimate   Date Value Ref Range Status   05/05/2021 59 (L) >60 mL/min/1.73m2 Final     GFR Estimate If Black   Date Value Ref Range Status   05/05/2021 >60 >60 mL/min/1.73m2 Final     Lab Results   Component Value Date    CR 0.93 05/05/2021     No results found for: MICROALBUMIN    Healthy Eating:  Healthy Eating Assessed Today: No  Meal planning/habits: Smaller portions,Avoiding sweets  Has patient met with a dietitian in the past?: Yes    Being Active:  Being Active Assessed Today: No    Monitoring:  Monitoring Assessed Today: Yes  Did patient bring glucose meter to appointment? : No (Meter not working at home)  Blood Glucose Meter: Unknown  No blood sugar data    Taking Medications:  Diabetes Medication(s)     Biguanides       metFORMIN (GLUCOPHAGE) 1000 MG tablet    Take 1,000 mg by mouth 2 times daily (with meals).    Insulin       insulin glargine (LANTUS SOLOSTAR) 100 UNIT/ML pen    INJECT 28 UNITS " SUBCUTANEOUSLY TWICE DAILY      Taking Medication Assessed Today: Yes  Current Treatments: Diet,Insulin Injections,Oral Medication (taken by mouth)    Problem Solving:  Problem Solving Assessed Today: No    Reducing Risks:  Reducing Risks Assessed Today: Yes  Diabetes Risks: Age over 45 years  CAD Risks: Diabetes Mellitus,Hypertension,Obesity,Post-menopausal,Dyslipidemia    Healthy Coping:  Healthy Coping Assessed Today: No  Stage of change: PREPARATION (Decided to change - considering how)  Patient Activation Measure Survey Score:  No flowsheet data found.    Diabetes knowledge and skills assessment:   Patient is knowledgeable in diabetes management concepts related to: Taking Medication    Patient needs further education on the following diabetes management concepts: Healthy Eating, Being Active, Problem Solving, Reducing Risks, Healthy Coping and CGM training & interpretation of blood glucose data    Based on learning assessment above, most appropriate setting for further diabetes education would be: Individual setting.      INTERVENTIONS:    Education provided today on:  AADE Self-Care Behaviors:  How a DEXCOM CGM works, phone compatibility, using a  to scan blood sugars    Opportunities for ongoing education and support in diabetes-self management were discussed.    Pt verbalized understanding of concepts discussed and recommendations provided today.       Education Materials Provided:  No new materials provided today    Patient's most recent   Lab Results   Component Value Date    A1C 7.9 10/01/2021    is meeting goal of <8.0    Diabetes Self-Management Training ()  Time Spent: 60 minutes  Encounter Type: Individual    Liv Contreras RD, LD, CDCES   Certified Diabetes Care and   [facility]     Any diabetes medication dose changes were made via the CDE Protocol and Collaborative Practice Agreement with the patient's primary care provider. A copy of this  encounter was shared with the provider.

## 2021-11-23 ENCOUNTER — TRANSFERRED RECORDS (OUTPATIENT)
Dept: HEALTH INFORMATION MANAGEMENT | Facility: CLINIC | Age: 71
End: 2021-11-23
Payer: COMMERCIAL

## 2021-11-23 NOTE — TELEPHONE ENCOUNTER
Received message back from Dr. Hernandez    Message  Received: 2 days ago  Chuck Hernandez MD Harris, Priscila RICHARD RN; Maria Luz Milner RPH  Agree with plan for bridging with Lovenox given high chads vascular score.  We will need to coordinate with orthopedic surgery when Lovenox can be  initiated post procedure.mdg     ____________    Call placed to [facility] to discuss post procedure Lovenox. Informed surgery has been cancelled at this time.     Left message for Ciarra requesting call back to learn more. Creatinine order placed along with INR in case this procedure will be rescheduled soon.    Maria Luz Milner, [facility]

## 2021-11-24 ENCOUNTER — LAB (OUTPATIENT)
Dept: LAB | Facility: CLINIC | Age: 71
End: 2021-11-24
Payer: COMMERCIAL

## 2021-11-24 ENCOUNTER — ANTICOAGULATION THERAPY VISIT (OUTPATIENT)
Dept: ANTICOAGULATION | Facility: CLINIC | Age: 71
End: 2021-11-24

## 2021-11-24 DIAGNOSIS — I48.0 PAROXYSMAL ATRIAL FIBRILLATION (H): Primary | ICD-10-CM

## 2021-11-24 DIAGNOSIS — I48.0 PAROXYSMAL ATRIAL FIBRILLATION (H): ICD-10-CM

## 2021-11-24 DIAGNOSIS — I48.0 PAROXYSMAL A-FIB (H): ICD-10-CM

## 2021-11-24 LAB
CREAT SERPL-MCNC: 0.88 MG/DL (ref 0.6–1.1)
GFR SERPL CREATININE-BSD FRML MDRD: 66 ML/MIN/1.73M2
INR BLD: 1.6 (ref 0.9–1.1)

## 2021-11-24 PROCEDURE — 82565 ASSAY OF CREATININE: CPT

## 2021-11-24 PROCEDURE — 36416 COLLJ CAPILLARY BLOOD SPEC: CPT

## 2021-11-24 PROCEDURE — 36415 COLL VENOUS BLD VENIPUNCTURE: CPT

## 2021-11-24 PROCEDURE — 85610 PROTHROMBIN TIME: CPT

## 2021-11-24 NOTE — PROGRESS NOTES
ANTICOAGULATION MANAGEMENT     Kemi Soto 71 year old female is on warfarin with subtherapeutic INR result. (Goal INR 2.0-3.0)    Recent labs: (last 7 days)     11/24/21  1112   INR 1.6*       ASSESSMENT     Source(s): Chart Review, Patient/Caregiver Call and Template       Warfarin doses taken: Warfarin taken as instructed    Diet: No new diet changes identified    New illness, injury, or hospitalization: No    Medication/supplement changes: None noted    Signs or symptoms of bleeding or clotting: No    Previous INR: Therapeutic last 2(+) visits    Additional findings: 11/29 shoulder surgery cancelled ~ advised patient to update ACN once this is rescheduled and made aware that she will be bridging once she goes of warfarin per [facility] ( 11/18 encounter)- per patient she will have it done sometime in the spring    Patient is leaving for [place] on 12/21 thru 12/25 .    Updated that home monitor form was sent to Dr Hernandez for signature.     PLAN     Recommended plan for no diet, medication or health factor changes affecting INR     Dosing Instructions: Booster dose then Increase your warfarin dose (8% change) with next INR in 2 weeks       Summary  As of 11/24/2021    Full warfarin instructions:  11/24: 7.5 mg; Otherwise 2.5 mg every Mon; 5 mg all other days   Next INR check:  12/8/2021             Telephone call with Kemi who verbalizes understanding and agrees to plan    Lab visit scheduled    Education provided: Goal range and significance of current result, Importance of therapeutic range, Importance of following up at instructed interval and Importance of taking warfarin as instructed    Plan made per [facility] anticoagulation protocol    Cony Noonan, RN  Anticoagulation Clinic  11/24/2021    _______________________________________________________________________     Anticoagulation Episode Summary     Current INR goal:  2.0-3.0   TTR:  55.6 % (3.7 mo)   Target end date:     Send INR reminders to:  [facility]  [facility]    Indications    Paroxysmal atrial fibrillation (H) [I48.0]           Comments:  trnasitioned from [employer]         Anticoagulation Care Providers     Provider Role Specialty Phone number    Chuck Hernandez MD Responsible Cardiovascular Disease [phone]

## 2021-11-29 ENCOUNTER — TRANSFERRED RECORDS (OUTPATIENT)
Dept: HEALTH INFORMATION MANAGEMENT | Facility: CLINIC | Age: 71
End: 2021-11-29
Payer: COMMERCIAL

## 2021-12-08 ENCOUNTER — ANTICOAGULATION THERAPY VISIT (OUTPATIENT)
Dept: ANTICOAGULATION | Facility: CLINIC | Age: 71
End: 2021-12-08

## 2021-12-08 ENCOUNTER — LAB (OUTPATIENT)
Dept: LAB | Facility: CLINIC | Age: 71
End: 2021-12-08
Payer: COMMERCIAL

## 2021-12-08 DIAGNOSIS — I48.0 PAROXYSMAL ATRIAL FIBRILLATION (H): ICD-10-CM

## 2021-12-08 DIAGNOSIS — I48.0 PAROXYSMAL ATRIAL FIBRILLATION (H): Primary | ICD-10-CM

## 2021-12-08 LAB — INR BLD: 2.9 (ref 0.9–1.1)

## 2021-12-08 PROCEDURE — 85610 PROTHROMBIN TIME: CPT

## 2021-12-08 PROCEDURE — 36416 COLLJ CAPILLARY BLOOD SPEC: CPT

## 2021-12-08 NOTE — PROGRESS NOTES
ANTICOAGULATION MANAGEMENT     Kemi Soto 71 year old female is on warfarin with therapeutic INR result. (Goal INR 2.0-3.0)    Recent labs: (last 7 days)     12/08/21  1328   INR 2.9*       ASSESSMENT     Source(s): Chart Review, Patient/Caregiver Call and Template       Warfarin doses taken: Less warfarin taken than planned which may be affecting INR- will have her continue doses taken due to INR is therapeutic.    Diet: No new diet changes identified    New illness, injury, or hospitalization: No    Medication/supplement changes: None noted    Signs or symptoms of bleeding or clotting: No    Previous INR: Subtherapeutic    Additional findings: Patient is leaving for [place] on 12/21 thru 12/25 . Then going to [place] early next year.    Patient made aware that there is no current update about her home monitor application. ACN reached out to [facility] to ensure that the form is signed by David and then faxed to Home monitoring company.     PLAN     Recommended plan for ongoing change(s) affecting INR     Dosing Instructions: Continue  warfarin dose taken ( 8 percent change from previous plan= 0 % change from total taken by patient ) with next INR in 2 weeks       Summary  As of 12/8/2021    Full warfarin instructions:  2.5 mg every Mon, Fri; 5 mg all other days   Next INR check:  12/20/2021             Telephone call with Kemi who verbalizes understanding and agrees to plan    Patient elected to schedule next visit 12/27/2021    Education provided: Importance of therapeutic range and Importance of following up at instructed interval    Plan made per [facility] anticoagulation protocol    Cony Noonan RN  Anticoagulation Clinic  12/8/2021    _______________________________________________________________________     Anticoagulation Episode Summary     Current INR goal:  2.0-3.0   TTR:  57.1 % (4.2 mo)   Target end date:     Send INR reminders to:  [facility]    Indications    Paroxysmal atrial  fibrillation (H) [I48.0]           Comments:  trnasitioned from [employer]         Anticoagulation Care Providers     Provider Role Specialty Phone number    Chuck Hernandez MD Responsible Cardiovascular Disease [phone]

## 2021-12-08 NOTE — PROGRESS NOTES
KELLY reached out to [facility] for update and per Dr Hernandez's nurse Priscila Haines the form has been signed and faxed to [facility] today.    Cony Noonan RN [facility]

## 2021-12-10 ENCOUNTER — TELEPHONE (OUTPATIENT)
Dept: CARDIOLOGY | Facility: CLINIC | Age: 71
End: 2021-12-10
Payer: COMMERCIAL

## 2021-12-10 ENCOUNTER — TELEPHONE (OUTPATIENT)
Dept: FAMILY MEDICINE | Facility: CLINIC | Age: 71
End: 2021-12-10
Payer: COMMERCIAL

## 2021-12-10 NOTE — TELEPHONE ENCOUNTER
KELLY called and spoke with patient she stated that she called the home monitoring company and that they did not have the order form that was faxed on 12/8/2021    She stated that she is frustrated that it takes a long time for the home monitor to be set up.    Explained to patient that the order from was faxed by [facility] to [facility] on 12/8/2021.    Patient rescheduled INR appointment from 12/27 to 12/20    Cony Noonan RN [facility]    [phone]

## 2021-12-10 NOTE — TELEPHONE ENCOUNTER
----- Message from Faye Jerry sent at 12/10/2021  1:41 PM CST -----  Regarding: MDG pt  General phone call:    Caller: Ciarra  Primary cardiologist: MDG pt  Detailed reason for call: The [employer] said they haven't  recd an order for the INR monitor.  They need it faxed to [phone]  - she wants this to be taken care of today because she has been waiting for three weeks or more and [place]   Best phone number: [phone]  Best time to contact: any  Ok to leave a detailedmessage? yes          Called back Ciarra to address concerns. Writer can see that forms were signed by VIKASH on 12/8- printed from chart and faxed to [phone] at her request attention Kaushik. She is very frustrated by this delay; writer apologized and stated that we have the form in her chart; faxed there now and additional documentation can be obtained from [facility] team if need be. -[facility]

## 2021-12-10 NOTE — TELEPHONE ENCOUNTER
Reason for Call:  Other call back    Detailed comments: Patient is requesting a call back to discuss her INR    Phone Number Patient can be reached at: Cell number on file:    Telephone Information:   Mobile [phone]       Best Time: any     Can we leave a detailed message on this number? YES    Call taken on 12/10/2021 at 12:50 PM by Tiffany Worley

## 2021-12-15 ENCOUNTER — TELEPHONE (OUTPATIENT)
Dept: FAMILY MEDICINE | Facility: CLINIC | Age: 71
End: 2021-12-15
Payer: COMMERCIAL

## 2021-12-15 NOTE — TELEPHONE ENCOUNTER
Kaushik from [facility] calling to inform PCP that patient INR meter needs a referral letter from PCP and office visit notes from the last 90 days that confirms patient is taking coumadin.     Fax number to send this info. To: [phone]    Number to speak with Kaushik from [place] [phone]  Extension [phone]      Thanks! Yadi Dominique RN

## 2021-12-16 ENCOUNTER — OFFICE VISIT (OUTPATIENT)
Dept: FAMILY MEDICINE | Facility: CLINIC | Age: 71
End: 2021-12-16
Payer: COMMERCIAL

## 2021-12-16 VITALS — OXYGEN SATURATION: 99 % | HEART RATE: 63 BPM | SYSTOLIC BLOOD PRESSURE: 150 MMHG | DIASTOLIC BLOOD PRESSURE: 72 MMHG

## 2021-12-16 DIAGNOSIS — I48.3 TYPICAL ATRIAL FLUTTER (H): ICD-10-CM

## 2021-12-16 DIAGNOSIS — Z79.01 CHRONIC ANTICOAGULATION: Primary | ICD-10-CM

## 2021-12-16 PROCEDURE — 99207 PR NO CHARGE LOS: CPT | Performed by: FAMILY MEDICINE

## 2021-12-16 NOTE — PATIENT INSTRUCTIONS
It sounds like the [facility] home anticoagulation system is actually wanting records that you've been using the warfarin for the past 90 days and a referral letter from the prescribing provider. Kaushik from [facility] [phone]  Extension [phone]

## 2021-12-16 NOTE — TELEPHONE ENCOUNTER
Please see note from below. Dr. Zapata requesting INR team collect paperwork and fax to [facility]. On-site INR nurse to fax.    Looks like pt called regarding this issue on 12/10.     Thanks! Yadi Dominique RN

## 2021-12-16 NOTE — TELEPHONE ENCOUNTER
The Home Monitoring application form was sent to [facility] home monitoring company after Dr Hernandez who is responsible for anticoagulation signed the form. Priscila Haines confirmed this.    I am not sure why [facility] is asking for an application from.    I will have a Clinic Coordinator follow up on this.    Cony Noonan RN [facility]    [phone]

## 2021-12-20 ENCOUNTER — TELEPHONE (OUTPATIENT)
Dept: FAMILY MEDICINE | Facility: CLINIC | Age: 71
End: 2021-12-20

## 2021-12-20 ENCOUNTER — LAB (OUTPATIENT)
Dept: LAB | Facility: CLINIC | Age: 71
End: 2021-12-20
Payer: COMMERCIAL

## 2021-12-20 ENCOUNTER — ANTICOAGULATION THERAPY VISIT (OUTPATIENT)
Dept: ANTICOAGULATION | Facility: CLINIC | Age: 71
End: 2021-12-20

## 2021-12-20 DIAGNOSIS — I48.0 PAROXYSMAL ATRIAL FIBRILLATION (H): ICD-10-CM

## 2021-12-20 DIAGNOSIS — I48.0 PAROXYSMAL ATRIAL FIBRILLATION (H): Primary | ICD-10-CM

## 2021-12-20 LAB — INR BLD: 2.3 (ref 0.9–1.1)

## 2021-12-20 PROCEDURE — 85610 PROTHROMBIN TIME: CPT

## 2021-12-20 PROCEDURE — 36416 COLLJ CAPILLARY BLOOD SPEC: CPT

## 2021-12-20 NOTE — PROGRESS NOTES
ANTICOAGULATION MANAGEMENT     Kemi Soto 71 year old female is on warfarin with therapeutic INR result. (Goal INR 2.0-3.0)    Recent labs: (last 7 days)     12/20/21  1156   INR 2.3*       ASSESSMENT     Source(s): Chart Review and Template       Warfarin doses taken: Warfarin taken as instructed    Diet: Stomach flu may be affecting diet and INR    New illness, injury, or hospitalization: Yes: stomach flu 5 am Friday to 3 PM Sat ~ ate old fruit symptom resolved.    Medication/supplement changes: None noted    Signs or symptoms of bleeding or clotting: No    Previous INR: Therapeutic last visit; previously outside of goal range    Additional findings: None     PLAN     Recommended plan for temporary change(s) affecting INR     Dosing Instructions: Continue your current warfarin dose with next INR in 4 weeks       Summary  As of 12/20/2021    Full warfarin instructions:  2.5 mg every Mon, Fri; 5 mg all other days   Next INR check:  1/17/2022             Telephone call with Kemi who verbalizes understanding and agrees to plan    Patient offered & declined to schedule next visit   she stated that she told by the [facility] [employer] company that she will most likley get it in 3 weeks and they will set up training either virtually or in person while she is in [place]  Patient is aware that if home monitor is not set up by the recommended check that she needs to have it done at one of the Clinic in [place].      Education provided: Goal range and significance of current result, Importance of therapeutic range, Importance of following up at instructed interval, Importance of taking warfarin as instructed and Contact [phone] with any changes, questions or concerns.     Plan made per [facility] anticoagulation protocol    Cony Noonan RN  Anticoagulation Clinic  12/20/2021    _______________________________________________________________________     Anticoagulation Episode Summary     Current INR goal:  2.0-3.0   TTR:  60.8 %  (4.6 mo)   Target end date:     Send INR reminders to:  [facility]    Indications    Paroxysmal atrial fibrillation (H) [I48.0]           Comments:  trnasitioned from [employer]         Anticoagulation Care Providers     Provider Role Specialty Phone number    Chuck Hernandez MD Responsible Cardiovascular Disease [phone]

## 2021-12-20 NOTE — PROGRESS NOTES
ANTICOAGULATION  MANAGEMENT- Travel planning    Kemi Soto reports upcoming travel plans:    Departure date: [place] on 12/27  Anticipated return date: 4/15/2022  Alternate contact information (if applicable): [phone]      INR monitoring plan:     [facility] to monitor and dose while traveling. Waiting for Home monitor to be set up - [facility] she stated that she told by the company that she will most likley get it in 3 weeks and they will set up training either virtually or in person while she is in [place]  Patient is aware that if home monitor is not set up by the recommended [facility] that she needs to have it done at one of the Clinic in [place].    Anticoagulation calendar updated with date of next INR     Instructed to call the Anticoauglation Clinic for any changes, questions or concerns. [phone]  ?   Cony Noonan RN

## 2021-12-20 NOTE — TELEPHONE ENCOUNTER
Talked to pt and relayed the information from Kaushik to her. Her INR machine is currently waiting for a prior authorization from pt's insurance company. Pt was informed that once the prior authorization was completed it would still take at least a week for order to process and the machine be sent to her. Pt had no further questions and understood the timeline going forward.    Elsy Parisi CMA.

## 2021-12-20 NOTE — TELEPHONE ENCOUNTER
Talked to Kaushik from [facility] at [phone] extension [phone] and discussed the order for an INR machine for pt. They need a progress note that is at least 90 days old to prove she has been taking warfarin. Faxed progress note from 9/1/21 to Kaushik at [phone]. The current order is being processed with pt's insurance company currently.     Elsy Parisi CMA.

## 2021-12-20 NOTE — PROGRESS NOTES
Talked to Kaushik from [facility] at [phone] extension [phone] and discussed the order for an INR machine for pt. They need a progress note that is at least 90 days old to prove she has been taking warfarin. Faxed progress note from 9/1/21 to Kaushik at [phone]. The current order is being processed with pt's insurance company currently.     Elsy Parisi CMA.

## 2021-12-21 DIAGNOSIS — E78.00 HYPERCHOLESTEROLEMIA: ICD-10-CM

## 2021-12-23 RX ORDER — ATORVASTATIN CALCIUM 80 MG/1
TABLET, FILM COATED ORAL
Qty: 90 TABLET | Refills: 1 | Status: SHIPPED | OUTPATIENT
Start: 2021-12-23 | End: 2022-10-03

## 2021-12-23 NOTE — TELEPHONE ENCOUNTER
"Last Written Prescription Date:  8/28/21  Last Fill Quantity: 90,  # refills: 0   Last office visit provider:  12/16/21     Requested Prescriptions   Pending Prescriptions Disp Refills     atorvastatin (LIPITOR) 80 MG tablet [Pharmacy Med Name: Atorvastatin Calcium 80 MG Oral Tablet] 90 tablet 0     Sig: TAKE 1 TABLET BY MOUTH ONCE DAILY AT BEDTIME       [facility] Protocol Passed - 12/21/2021 12:37 PM        Passed - LDL on file in past 12 months     Recent Labs   Lab Test 05/05/21  1400   LDL 79             Passed - No abnormal creatine kinase in past 12 months     No lab results found.             Passed - Recent (12 mo) or future (30 days) visit within the authorizing provider's specialty     Patient has had an office visit with the authorizing provider or a provider within the authorizing providers department within the previous 12 mos or has a future within next 30 days. See \"Patient Info\" tab in inbasket, or \"Choose Columns\" in Meds & Orders section of the refill encounter.              Passed - Medication is active on med list        Passed - Patient is age 18 or older        Passed - No active pregnancy on record        Passed - No positive pregnancy test in past 12 months             Jean-Claude Adames RN 12/23/21 1:40 PM  "

## 2022-01-11 ENCOUNTER — TELEPHONE (OUTPATIENT)
Dept: FAMILY MEDICINE | Facility: CLINIC | Age: 72
End: 2022-01-11
Payer: COMMERCIAL

## 2022-01-11 DIAGNOSIS — E11.42 TYPE 2 DIABETES MELLITUS WITH DIABETIC POLYNEUROPATHY, WITH LONG-TERM CURRENT USE OF INSULIN (H): ICD-10-CM

## 2022-01-11 DIAGNOSIS — Z79.4 TYPE 2 DIABETES MELLITUS WITH DIABETIC POLYNEUROPATHY, WITH LONG-TERM CURRENT USE OF INSULIN (H): ICD-10-CM

## 2022-01-11 RX ORDER — PROCHLORPERAZINE 25 MG/1
1 SUPPOSITORY RECTAL
Qty: 1 EACH | Refills: 5 | Status: SHIPPED | OUTPATIENT
Start: 2022-01-11 | End: 2023-09-01

## 2022-01-11 RX ORDER — PROCHLORPERAZINE 25 MG/1
1 SUPPOSITORY RECTAL
Qty: 9 EACH | Refills: 4 | Status: SHIPPED | OUTPATIENT
Start: 2022-01-11 | End: 2023-07-20

## 2022-01-11 NOTE — TELEPHONE ENCOUNTER
INR machine- home INR has not been received as of today. She now has an appointment with [facility] in [facility]. Appt is scheduled for 1/17/22 . Needs order sent to [facility] location.      Phone [phone]   Code appointment DEYSI           Patient phone [phone]      "    11/6/2018         RE: Dustin Castelan III  [address]  [place] [address]        Dear Colleague,    Thank you for referring your patient, Dustin Castelan III, to the [facility]. Please see a copy of my visit note below.    HISTORY OF PRESENT ILLNESS:    Dustin Castelan III is a 19 year old male who is seen in follow up for   Chief Complaint   Patient presents with     RECHECK     Right shoulder arthroscopy with biceps tenodesis.  Mini open.  DOS: 10/24/18 (13 days postop)     Surgical Followup     PREOPERATIVE/POSTOPERATIVE DIAGNOSIS:  Recurrent SLAP lesion, right shoulder involving root of the biceps and anterior middle glenohumeral ligament.  PROCEDURE:  Arthroscopic evaluation of glenohumeral joint with biceps tenodesis performed through a mini open technique and arthroscopic repair of the middle glenohumeral ligament.    Interval: Patient states he has been doing well since surgery.  He has been doing primarily the pendulum exercises since surgery.  Patient evaluation done with Dr. Babb    Physical Exam:  Vitals: /78  Temp 97.7  F (36.5  C) (Temporal)  Ht 1.73 m (5' 8.1\")  Wt 71.4 kg (157 lb 8 oz)  BMI 23.88 kg/m2  BMI= Body mass index is 23.88 kg/(m^2).  Constitutional: healthy, alert and no acute distress   Psychiatric: mentation appears normal and affect normal/bright  NEURO: no focal deficits  Location of surgery: Right shoulder  Incision sites: Patient with only Steri-Strips in place which were removed.  Range of motion: Patient demonstrates pendulum exercises.  With first attempt at range of motion patient is able to draw his shoulder up in forward flexion to approximately 130 degrees.  Abduction approximately 80 degrees and internal rotation to right hip    IMAGING INTERPRETATION:  Intra-op pictures were reviewed with patient and a copy given to him.  These pictures show that the labrum was attached with sutures.  Independent visualization of the images was " performed.     ASSESSMENT:    Chief Complaint   Patient presents with     RECHECK     Right shoulder arthroscopy with biceps tenodesis.  Mini open.  DOS: 10/24/18 (13 days postop)     Surgical Followup     PREOPERATIVE/POSTOPERATIVE DIAGNOSIS:  Recurrent SLAP lesion, right shoulder involving root of the biceps and anterior middle glenohumeral ligament.  PROCEDURE:  Arthroscopic evaluation of glenohumeral joint with biceps tenodesis performed through a mini open technique and arthroscopic repair of the middle glenohumeral ligament.        ICD-10-CM    1. Biceps tendonitis, right M75.21    2. S/P arthroscopy of right shoulder Z98.890      Patient is 13 days status post right shoulder arthroscopy with mini open biceps tenodesis and repair of glenohumeral ligament    Plan:   Patient was subcutaneous sutures and the remaining Steri-Strips were removed today.  Patient may shower directly over the incision.  Refill pain medication: None requested  Physical Therapy: Not at this time.  Patient is asked only to do the pendulum exercises.  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  A note was written for work to avoid any type of lifting, reaching out or overhead activity for 4 months minimum.  Patient to follow-up in 3-4 weeks.  Patient states difficulty with transportation after the end of November as he drives a [place] and puts it away for winter.  Patient lives in the [place] area so other resources are available.  Return to clinic 3-4 weeks.  Will prescribe physical therapy at that time.  Patient will likely need to accomplish physical therapy near his home in [place]    BP Readings from Last 1 Encounters:   11/06/18 129/78       BP noted to be well controlled today in office.     Jenifer Castelan PA-C   11/6/2018  4:15 PM      I attest I have seen and evaluated the patient.  I agree with above impression and plan.    Wes Babb MD    Again,  thank you for allowing me to participate in the care of your patient.        Sincerely,        Wes Babb MD

## 2022-01-11 NOTE — TELEPHONE ENCOUNTER
Dexcom rx request to be sent to pharmacy. 2022 plan will not cover this machine for her. [facility] [address]      INR machine- home INR has not been received as of today. She now has an appointment with [facility] in [facility]. Appt is scheduled for 1/17/22 . Needs order sent to [facility] location.     Phone [phone]   Code appointment ANDREAZ        Patient phone [phone]      INR order request sent to INR nurses.     Dr Zapata, please send dexcom order to pharmacy pending.

## 2022-01-11 NOTE — TELEPHONE ENCOUNTER
Reason for Call:  Other call back    Detailed comments: Patient is reqiesting call back from INR nurse Nidhi in regards to her machine.    Phone Number Patient can be reached at: Cell number on file:    Telephone Information:   Mobile [phone]       Best Time: any     Can we leave a detailed message on this number? YES    Call taken on 1/11/2022 at 12:50 PM by Tiffany Worley

## 2022-01-12 ENCOUNTER — DOCUMENTATION ONLY (OUTPATIENT)
Dept: ANTICOAGULATION | Facility: CLINIC | Age: 72
End: 2022-01-12
Payer: COMMERCIAL

## 2022-01-12 NOTE — LETTER
January 12, 2022      Kemi Soto  [address] [place] [id number]      INR POCT, Enter/Edit Result [POC15] (Order [id number])  Point of Care Testing  Date: 10/25/2021 Department:  Anticoagulation Ordering: Maria Luz Milner, [facility] Authorizing: Chuck Hernandez MD               Order Providers    Authorizing Encounter   Chuck Hernandez Kristen            Standing Order Information    Remaining Occurrences Interval Next Expected Expires Last Released    98/99 Q1-6 weeks and PRN N/A 10/25/2022 11/4/2021           Associated Diagnoses    Paroxysmal atrial fibrillation (H)  - Primary  I48.0                            This is a standing order  INR 1-2 times a week and as needed    Fingerstick is okay    Please fax result to     Any questions, fabienne call     Code appointment DEYSI

## 2022-01-12 NOTE — TELEPHONE ENCOUNTER
ACN called and updated patient that INR standing order was faxed to [facility].    Instructed to call [facility] on 1/18/2022 if she does not get a call regarding INR result on date it is done.    Cony Noonan RN [facility]    [phone]

## 2022-01-12 NOTE — TELEPHONE ENCOUNTER
INR standing order faxed to [facility] at .    Cony Noonan RN [facility]tico[facility]    [phone]

## 2022-01-12 NOTE — PROGRESS NOTES
INR standing order faxed to [facility] at .    Cony Noonan RN [facility] Anticoagulation Clinic    [phone]

## 2022-01-17 ENCOUNTER — TELEPHONE (OUTPATIENT)
Dept: FAMILY MEDICINE | Facility: CLINIC | Age: 72
End: 2022-01-17
Payer: COMMERCIAL

## 2022-01-17 NOTE — TELEPHONE ENCOUNTER
"Reason for Call:  Other anti-coag     Detailed comments: Patient called stating [facility] hasn't received the fax for her INR. She is at the clinic & stated \"they are going to close & it's an emergency.\" Please fax to [phone] & [phone] asap. Please call patient to confirm that its been sent.    Phone Number Patient can be reached at: Cell number on file:    Telephone Information:   Mobile [phone]       Best Time: any    Can we leave a detailed message on this number? YES    Call taken on 1/17/2022 at 1:49 PM by Princess Vaz    "

## 2022-01-17 NOTE — TELEPHONE ENCOUNTER
ACN called an updated patient that standing order was refaxed to number provided below.    Instructed to call back for any other concerns.    Cony Noonan RN [facility]ticoagulation [facility]    [phone]

## 2022-01-18 ENCOUNTER — ANTICOAGULATION THERAPY VISIT (OUTPATIENT)
Dept: FAMILY MEDICINE | Facility: CLINIC | Age: 72
End: 2022-01-18
Payer: COMMERCIAL

## 2022-01-18 ENCOUNTER — TRANSFERRED RECORDS (OUTPATIENT)
Dept: HEALTH INFORMATION MANAGEMENT | Facility: CLINIC | Age: 72
End: 2022-01-18
Payer: COMMERCIAL

## 2022-01-18 VITALS
RESPIRATION RATE: 16 BRPM | OXYGEN SATURATION: 97 % | WEIGHT: 170 LBS | SYSTOLIC BLOOD PRESSURE: 134 MMHG | HEART RATE: 71 BPM | DIASTOLIC BLOOD PRESSURE: 54 MMHG | BODY MASS INDEX: 31.09 KG/M2

## 2022-01-18 VITALS
DIASTOLIC BLOOD PRESSURE: 80 MMHG | WEIGHT: 178 LBS | HEART RATE: 70 BPM | SYSTOLIC BLOOD PRESSURE: 160 MMHG | OXYGEN SATURATION: 97 % | BODY MASS INDEX: 32.56 KG/M2 | RESPIRATION RATE: 16 BRPM

## 2022-01-18 VITALS
SYSTOLIC BLOOD PRESSURE: 106 MMHG | RESPIRATION RATE: 14 BRPM | WEIGHT: 174 LBS | DIASTOLIC BLOOD PRESSURE: 60 MMHG | HEIGHT: 62 IN | BODY MASS INDEX: 32.02 KG/M2 | HEART RATE: 68 BPM

## 2022-01-18 VITALS — SYSTOLIC BLOOD PRESSURE: 154 MMHG | DIASTOLIC BLOOD PRESSURE: 80 MMHG

## 2022-01-18 VITALS
BODY MASS INDEX: 31.95 KG/M2 | HEART RATE: 58 BPM | DIASTOLIC BLOOD PRESSURE: 60 MMHG | SYSTOLIC BLOOD PRESSURE: 140 MMHG | OXYGEN SATURATION: 98 % | WEIGHT: 174.7 LBS

## 2022-01-18 VITALS
SYSTOLIC BLOOD PRESSURE: 146 MMHG | RESPIRATION RATE: 22 BRPM | OXYGEN SATURATION: 98 % | DIASTOLIC BLOOD PRESSURE: 78 MMHG | HEART RATE: 89 BPM

## 2022-01-18 VITALS
DIASTOLIC BLOOD PRESSURE: 78 MMHG | SYSTOLIC BLOOD PRESSURE: 138 MMHG | RESPIRATION RATE: 20 BRPM | TEMPERATURE: 98.2 F | HEART RATE: 76 BPM | OXYGEN SATURATION: 98 %

## 2022-01-18 VITALS
OXYGEN SATURATION: 98 % | DIASTOLIC BLOOD PRESSURE: 70 MMHG | WEIGHT: 183 LBS | SYSTOLIC BLOOD PRESSURE: 140 MMHG | HEART RATE: 88 BPM | BODY MASS INDEX: 33.2 KG/M2

## 2022-01-18 VITALS
BODY MASS INDEX: 32.2 KG/M2 | HEART RATE: 58 BPM | RESPIRATION RATE: 14 BRPM | SYSTOLIC BLOOD PRESSURE: 128 MMHG | HEIGHT: 62 IN | DIASTOLIC BLOOD PRESSURE: 74 MMHG | WEIGHT: 175 LBS

## 2022-01-18 VITALS — WEIGHT: 181 LBS | BODY MASS INDEX: 32.84 KG/M2

## 2022-01-18 DIAGNOSIS — I48.0 PAROXYSMAL ATRIAL FIBRILLATION (H): Primary | ICD-10-CM

## 2022-01-18 LAB — INR (EXTERNAL): 2.7 (ref 0.9–1.1)

## 2022-01-18 NOTE — PROGRESS NOTES
ANTICOAGULATION MANAGEMENT     Kemi Soto 71 year old female is on warfarin with therapeutic INR result. (Goal INR 2.0-3.0)    Recent labs: (last 7 days)     01/18/22  0000   INR 2.7*       ASSESSMENT     Source(s): Chart Review and Patient/Caregiver Call       Warfarin doses taken: Warfarin taken as instructed    Diet: No new diet changes identified    New illness, injury, or hospitalization: No    Medication/supplement changes: None noted    Signs or symptoms of bleeding or clotting: No    Previous INR: Therapeutic last 2(+) visits    Additional findings: This is first INR result from [facility]     PLAN     Recommended plan for no diet, medication or health factor changes affecting INR     Dosing Instructions: Continue your current warfarin dose with next INR in 1 week       Summary  As of 1/18/2022    Full warfarin instructions:  2.5 mg every Mon, Fri; 5 mg all other days   Next INR check:  1/25/2022             Telephone call with Kemi who verbalizes understanding and agrees to plan    Patient to recheck with home meter    Education provided: Will continue calling weekly until she is used to the home monitor process then start managing by exception.    Plan made per [facility] anticoagulation protocol    Cony Noonan RN  Anticoagulation Clinic  1/18/2022    _______________________________________________________________________     Anticoagulation Episode Summary     Current INR goal:  2.0-3.0   TTR:  67.5 % (5.6 mo)   Target end date:     Send INR reminders to:  [facility]    Indications    Paroxysmal atrial fibrillation (H) [I48.0]           Comments:  trnasitioned from [employer]  Home monitor ( [facility] ) first result 1/18/22         Anticoagulation Care Providers     Provider Role Specialty Phone number    Chuck Hernandez MD Responsible Cardiovascular Disease [phone]

## 2022-01-18 NOTE — TELEPHONE ENCOUNTER
Patient called back and she stated that [facility] did not received  Faxed standing order.    Made aware that ACN will refax it again today at the numbers provided.    She stated that she will call the home monitor company to follow up.    Instructed to call ACN back once she had her INR done  To follow up on result.    Cony Noonan RN [facility]    [phone]

## 2022-01-18 NOTE — PROGRESS NOTES
"ANTICOAGULATION  MANAGEMENT    Kemi Soto received home monitor and has submitted first result.  Reviewed home monitoring program management:      INR testing:    Ordered INR testing frequency is:  weekly    INR should be tested Monday-Friday prior to 2 pm and submitted directly to home monitoring company on day of testing    INR test and monitor review at [facility] required annually for technique observation and quality check.     Venous INR drawn in clinic/lab required if INR > 8    Patient will be discharged from home monitoring if they do not meet requirements of home monitoring company or in clinic check.       Management and Communication with [facility]:    Patient will be called regarding all out of range INRS on the business day result is received for assessment and dosing instructions. If no call received by 4 PM; please contact [facility] at: [phone]    ACN typically does not call patient for therapeutic results. Patient is to continue current warfarin maintenance dose and continue testing INR at ordered frequency.     Patient must call and notify [facility] if new medication started, dose missed, s/sx of bleeding or clotting or upcoming procedure    Patient will receive a check in call at least once every 12 weeks if INRs remain in range.      Ciarra verbalizes understanding and agrees to INR testing requirements; requests continued calls for all INR results    [facility] Maintenance Modifier: \"Anticoagulation: Home Monitoring\" added to chart    Cony Noonan RN              " 62M PMH HFrEF 10-15% (ischemic), MI, s/p AICD vs PPM, possible Afib, HTN, DM2 on insulin, possible CKD, and gout, who presents with a chief complaint of generalized weakness s/p septic shock likely 2/2 LE cellulitis. Now with AMS likely from brainstem infarct.     NEURO  #AMS  - Pt non responsive since last week. VItal signs normal during the event. Pt intubated for airway protection. Stroke code called. CT, CTA negative. VEEG in place- moderate slowing but no seizure activity.   - MRI shows several old post circulation infarcts and possible new area of brainstem infarct. Per neuro, event could have resulted from hypoperfusion.   - Started on Modafinil.   - no seizure activity on VEEG. will repeat today   -  TTE with [facility] shows no clot.   - neuro team consulted. f/u further recs     #   ID- septic shock 2/2 Cellulitis- resolved   - INcreased midodrine to 5 q6 given hypotension   - steroids changed from solucortef to decadron, equivalent dose 1 mg q12h. Will perform ACTH stim test to evaluate for adrenal insufficiency.  -.Discontinued zosyn for suspected UTI,(3/25-3/26)  s/p Zosyn (3/11-2/21). Was previously also on Vanco (3/11-3/17).   - RUQ dopper shows no portal vein thrombosis. given worsening transaminitis and increased residuals obtained RUQ sono, will f/u results.   - TTE with no vegetations. Pt not stable for RUKHSANA.   - Gallium scan read showed LLE cellulitis.     - repeat LE CT does not show abscess or gas.   - R lung Effusion likely from overload. s/p thoracentesis with improvement in resp status   -  HIV negative  - Vascular surgery on board. Recommending Amputation as an option if pt continues to be unstable.     #UTI- Urine cloudy appearing with increase WBC.  - discontinued zosyn 3/25-3/26    CARDIOVASCULAR   # HYPOTENSION-   -  Now on pressors from overnight. increased midrodrine to q6h to maintain SBP>65.   -  HD started Friday,also dialyzed on  Saturday . OFF CVVHD  - pending transfer to CCU  - Gallium scan with only LLE cellulitis   - Lactate downtrended to WNL   - CHF with severely reduced EF 15%, caution with fluids. Per renal recs, can give IV albumin for fluids.    # CHF exacerbation  - CHF with EF 10-15% per pt 2/2 ischemic cardiomyopathy s/p AICD as indicated by CXR   - Elevated BNP on admission with R sided effusion and edema  - Cardiac shock causing hypotension-   - Persistent pulm congestion noted on chest Xray. c/w CVVHD  - Give fluids judiciously given low EF in setting of likely sepsis  - Unclear medical regimen opt. Per [facility] pharmacy ( and [facility]) pt has not picked up Torsemide 20 or Metoprolol 100 since November.   - Hold ACE/Metoprolol in setting of sepsis  - CVO saturation improved after transfusion    #AFIB  - Metoprolol 12.5 q12h for rate control   - PT with hx of Afib and LV thrombus on coumadin.    -d/c  Hep drip given concern of HIT.d/c Argatroban.  - Dose coumadin daily. OG tube placed. WIll hold tonight given thrombocytopenia   - Pt s/p FFP (3/13,3/14) and Vit K (3/13) for thoracocentesis and HD catheter placement.   -TTE with [facility] shows no  LV thrombus        #CAD- Hx of MI s/p AICD  - Pt with pLAD in 7/2017, was started on Aspirin and Brelinta per records.  - On asp 81, consider re-instating Atorvastatin 80 (discontinued 2/2 LFT uptrend).   - HOlding aspirin and Coumadin tonight given thrombocytopenia     # LE Edema   - LE edema with chronic venous stasis.   - Duplex does not show DVT    PULMONARY   Intubated  - Intubated on 3/22 for airway protection   - Will attempt CPAP trial and extubation once patient more responsive     #Acute resp failure- Resolved   - Intubated for airway protection   - pt with R loculated plural effusion noted to have increased work of breathing. Likely 2/2 fluid overload, Fluid studies consistent with exudative effusion.   - s/p thoracentesis on 3/13 with R chest tube placement. Chest tube removed 3/15.   - s/p  pRBC transfusion on 3/12. 3/16  - Monitor resp status  - Appreciate CHF team recs regarding fluid status. Continue dialysis for fluid removal      #RENAL   #ARF- 2/2 ischemic ATN  -Unknown baseline Cr presenting with Cr 3.93 with metabolic derangements.   - Likely 2/2 Sepsis, low intravascular volume and CHF  - Trend BUN and Cr.  - Renal team on board, pt with R HD catheter placed by IR on 3/21. Switching to HD.   - CT abdomen and pelvis without acute obstruction.   - retroperitoneal ultrasound showing medical renal disease.     #Hyperkalemia   -Resolved   - PT with elevated K to 5.7 on admission,  no EKG changes   - Continue telemetry monitoring  - Trend K   - Can given Kayexalate if persists   - c/w dialysis     #Metabolic acidosis   - 2/2 Lactate, starvation ketoacidosis and uremia   - resolved       #GI   - pt wit transaminits. Abd distended, increased residuals   - Will obtain RUQ US, performed pending read   - Reglan for motility.   - prior CT abdomen/pelvis consistent with cholelithiasis and ascites.     #HEME  #Thrombocytopenia  - ELVIRA negative but PF4 was positive. Unlikely HIT.    - Could have been related to zosyn or sepsis   - Still downtrending. Will hold coumadin and Aspirin tonight. Monitor for signs of bleeding      # elevated INR. Unclear etiology. Pt on coumadin?   - Continue with daily coumadin dosing. Hold tonight   - Given Vit K and FFP3/12. FFP 3/13   - Monitor coags    #Anemia  - Stable at 8-9. Anemia of chronic disease.  -s/p 1pRBC on 3/12, 3/16   - WIll keep Hb around 9-10     #ENDOCRINE   - S/p insulin drip  - decreasing Lantus 13U given hypoglycemia     - Will adjust as we taper down steroids.    Monitor blood glucose and ISS coverage. pt with episodes of hypoglycemia.   - Monitor blood glucose   - A1C 8.6%      F: No IVF   E: Replete K<4 Mg<2, caution in setting of acute renal failure  N: OG tube placed, 1.5 Glucerna      Lines:  ETT (3/22), OG tube (3/21)R HD catheter (3/21)., Midline 3/20,    Full code   Dispo: MICU  Ppx: Coumadin, PPI

## 2022-01-18 NOTE — PROGRESS NOTES
INR first result received on 1/18/2022.    Cony Noonan RN [facility]    [phone]

## 2022-01-25 ENCOUNTER — TELEPHONE (OUTPATIENT)
Dept: FAMILY MEDICINE | Facility: CLINIC | Age: 72
End: 2022-01-25
Payer: COMMERCIAL

## 2022-01-25 ENCOUNTER — TRANSFERRED RECORDS (OUTPATIENT)
Dept: HEALTH INFORMATION MANAGEMENT | Facility: CLINIC | Age: 72
End: 2022-01-25
Payer: COMMERCIAL

## 2022-01-25 ENCOUNTER — ANTICOAGULATION THERAPY VISIT (OUTPATIENT)
Dept: FAMILY MEDICINE | Facility: CLINIC | Age: 72
End: 2022-01-25
Payer: COMMERCIAL

## 2022-01-25 DIAGNOSIS — I48.0 PAROXYSMAL ATRIAL FIBRILLATION (H): Primary | ICD-10-CM

## 2022-01-25 LAB — INR (EXTERNAL): 2 (ref 2–3)

## 2022-01-25 NOTE — PROGRESS NOTES
ANTICOAGULATION MANAGEMENT     Kemi Soto 72 year old female is on warfarin with therapeutic INR result. (Goal INR 2.0-3.0)    Recent labs: (last 7 days)     01/25/22  1209   INR 2.0       ASSESSMENT     Source(s): Chart Review and Patient/Caregiver Call       Warfarin doses taken: Warfarin taken as instructed    Diet: No new diet changes identified    New illness, injury, or hospitalization: No    Medication/supplement changes: None noted    Signs or symptoms of bleeding or clotting: No    Previous INR: Therapeutic last 2(+) visits    Additional findings: None     PLAN     Recommended plan for no diet, medication or health factor changes affecting INR     Dosing Instructions: Continue your current warfarin dose with next INR in 1 week       Summary  As of 1/25/2022    Full warfarin instructions:  2.5 mg every Mon, Fri; 5 mg all other days   Next INR check:  2/1/2022             Telephone call with  Ciarra who verbalizes understanding and agrees to plan and who agrees to plan and repeated back plan correctly    Patient to recheck with home meter    Education provided: Please call back if any changes to your diet, medications or how you've been taking warfarin    Plan made per [facility] anticoagulation protocol    Yadi Dominique, RN  Anticoagulation Clinic  1/25/2022    _______________________________________________________________________     Anticoagulation Episode Summary     Current INR goal:  2.0-3.0   TTR:  68.9 % (5.8 mo)   Target end date:     Send INR reminders to:  TEMO CLARKE    Indications    Paroxysmal atrial fibrillation (H) [I48.0]           Comments:  trnasitioned from [employer]  Home monitor ( MD INR ) first result 1/18/22         Anticoagulation Care Providers     Provider Role Specialty Phone number    Chuck Hernandez MD Responsible Cardiovascular Disease [phone]

## 2022-01-25 NOTE — TELEPHONE ENCOUNTER
Reason for Call:  Other returning call    Detailed comments: INR Line returning call     Phone Number Patient can be reached at: Cell number on file:    Telephone Information:   Mobile [phone]       Best Time:     Can we leave a detailed message on this number? YES    Call taken on 1/25/2022 at 12:37 PM by Carmelita Starr

## 2022-01-25 NOTE — PROGRESS NOTES
Anticoagulation Management    Unable to reach Ciarra today.    Today's INR result of 2.0 is therapeutic (goal INR of 2.0-3.0).  Result received from: Home Monitor    Follow up required to confirm warfarin dose taken and assess for changes    LVM to call back      Anticoagulation clinic to follow up    Yadi Dominique RN

## 2022-02-02 ENCOUNTER — ANTICOAGULATION THERAPY VISIT (OUTPATIENT)
Dept: FAMILY MEDICINE | Facility: CLINIC | Age: 72
End: 2022-02-02
Payer: COMMERCIAL

## 2022-02-02 ENCOUNTER — TRANSFERRED RECORDS (OUTPATIENT)
Dept: HEALTH INFORMATION MANAGEMENT | Facility: CLINIC | Age: 72
End: 2022-02-02
Payer: COMMERCIAL

## 2022-02-02 DIAGNOSIS — I48.0 PAROXYSMAL ATRIAL FIBRILLATION (H): Primary | ICD-10-CM

## 2022-02-02 LAB — INR (EXTERNAL): 2.3 (ref 0.9–1.1)

## 2022-02-02 NOTE — PROGRESS NOTES
ANTICOAGULATION MANAGEMENT     Kemi Soto 72 year old female is on warfarin with therapeutic INR result. (Goal INR 2.0-3.0)    Recent labs: (last 7 days)     02/02/22  1333   INR 2.3*       ASSESSMENT     Source(s): Chart Review and Patient/Caregiver Call       Warfarin doses taken: Warfarin taken as instructed    Diet: No new diet changes identified    New illness, injury, or hospitalization: No    Medication/supplement changes: None noted    Signs or symptoms of bleeding or clotting: No    Previous INR: Therapeutic last 2(+) visits    Additional findings: discussed call by exception for home monitor INRs today, she is agreeable     PLAN     Recommended plan for no diet, medication or health factor changes affecting INR     Dosing Instructions: Continue your current warfarin dose with next INR in 1 week       Summary  As of 2/2/2022    Full warfarin instructions:  2.5 mg every Mon, Fri; 5 mg all other days   Next INR check:  2/9/2022             Telephone call with Kemi who verbalizes understanding and agrees to plan    Patient to recheck with home meter    Education provided: Please call back if any changes to your diet, medications or how you've been taking warfarin, Importance of consistent vitamin K intake, Goal range and significance of current result, Importance of therapeutic range, Monitoring for bleeding signs and symptoms, Monitoring for clotting signs and symptoms, When to seek medical attention/emergency care, Importance of notifying clinic for changes in medications; a sooner lab recheck maybe needed., Importance of notifying clinic for diarrhea, nausea/vomiting, reduced intake, and/or illness; a sooner lab recheck maybe needed., Importance of notifying clinic of upcoming surgeries and procedures 2 weeks in advance and Contact [phone]  with any changes, questions or concerns.     Plan made per [facility] anticoagulation protocol    Amisha Bustillo RN  Anticoagulation  Clinic  2/2/2022    _______________________________________________________________________     Anticoagulation Episode Summary     Current INR goal:  2.0-3.0   TTR:  70.2 % (6.1 mo)   Target end date:     Send INR reminders to:  TEMO CLARKE    Indications    Paroxysmal atrial fibrillation (H) [I48.0]           Comments:  Home monitor ( MD INR), manage by exception         Anticoagulation Care Providers     Provider Role Specialty Phone number    Chuck Hernandez MD Responsible Cardiovascular Disease [phone]

## 2022-02-02 NOTE — PROGRESS NOTES
Received a fax INR result for Kemi from [facility]    INR result dated 2/2/2022 is 2.3.  New home monitor patient.    Mickey Johnston, RN, BSN  Anticoagulation Clinic

## 2022-02-08 ENCOUNTER — DOCUMENTATION ONLY (OUTPATIENT)
Dept: FAMILY MEDICINE | Facility: CLINIC | Age: 72
End: 2022-02-08
Payer: COMMERCIAL

## 2022-02-08 ENCOUNTER — TELEPHONE (OUTPATIENT)
Dept: FAMILY MEDICINE | Facility: CLINIC | Age: 72
End: 2022-02-08
Payer: COMMERCIAL

## 2022-02-08 ENCOUNTER — TRANSFERRED RECORDS (OUTPATIENT)
Dept: HEALTH INFORMATION MANAGEMENT | Facility: CLINIC | Age: 72
End: 2022-02-08
Payer: COMMERCIAL

## 2022-02-08 DIAGNOSIS — I48.0 PAROXYSMAL ATRIAL FIBRILLATION (H): Primary | ICD-10-CM

## 2022-02-08 LAB — INR (EXTERNAL): 2.3 (ref 0.9–1.1)

## 2022-02-08 NOTE — TELEPHONE ENCOUNTER
Reason for Call:  INR    Who is calling?  Ciarra     Phone number:  [phone]    Fax number:  NA    Name of caller: Ciarra    INR Value:  NA    Are there any other concerns:  Yes: She is currently down in [place] but has some questions about her INR stuff    Can we leave a detailed message on this number? YES    Phone number patient can be reached at: Home number on file [phone] (home)      Call taken on 2/8/2022 at 2:02 PM by Vicki Redmond

## 2022-02-08 NOTE — PROGRESS NOTES
ANTICOAGULATION  MANAGEMENT-Home Monitor Managed by Exception    Kemi Soto 72 year old female is on warfarin with therapeutic INR result. (Goal INR 2.0-3.0)    Recent labs: (last 7 days)     02/08/22  1338   INR 2.3*         Previous INR was Therapeutic    Medication, diet, health changes since last INR:chart reviewed; none identified    Contacted within the last 12 weeks by phone on 2/2/22      RITA Mcmullen was NOT contacted regarding therapeutic result today per home monitoring policy manage by exception agreement.   Current warfarin dose is to be continued:     Summary  As of 2/8/2022    Full warfarin instructions:  2.5 mg every Mon, Fri; 5 mg all other days   Next INR check:  2/15/2022           ?   Cony Dockery RN  Anticoagulation Clinic  2/8/2022    _______________________________________________________________________     Anticoagulation Episode Summary     Current INR goal:  2.0-3.0   TTR:  71.2 % (6.3 mo)   Target end date:     Send INR reminders to:  TEMO CLARKE    Indications    Paroxysmal atrial fibrillation (H) [I48.0]           Comments:  Home monitor ( MD INR), manage by exception         Anticoagulation Care Providers     Provider Role Specialty Phone number    Chuck Hernandez MD Responsible Cardiovascular Disease [phone]

## 2022-02-08 NOTE — TELEPHONE ENCOUNTER
Patient calls back, discussed message below. Patient verbalized understanding and agrees to plan. Patient will call back once she knows where she wants orders sent to.

## 2022-02-08 NOTE — TELEPHONE ENCOUNTER
Left a voicemail asking patient to call the clinic back.    Patient needs to be advised that she may look into going through [facility], [facility], or any hospital/clinic near her that would take outside lab orders. Once patient finds where she wants to go, she will need to inform clinic on where to send the orders.

## 2022-02-09 DIAGNOSIS — I25.10 CORONARY ARTERY DISEASE INVOLVING NATIVE CORONARY ARTERY OF NATIVE HEART WITHOUT ANGINA PECTORIS: ICD-10-CM

## 2022-02-09 RX ORDER — ISOSORBIDE MONONITRATE 60 MG/1
60 TABLET, EXTENDED RELEASE ORAL DAILY
Qty: 90 TABLET | Refills: 0 | Status: SHIPPED | OUTPATIENT
Start: 2022-02-09 | End: 2022-06-22

## 2022-02-09 NOTE — TELEPHONE ENCOUNTER
Telephone Call:     Pt's pharmacy in [place] is calling for a refill of their Imdur.     [facility]: [address] [facility]    Writer will process this refill request and forward to PCP.     Carol Dunbar RN  [facility] Nurse Advisor 2:42 PM 2/9/2022

## 2022-02-09 NOTE — TELEPHONE ENCOUNTER
"Routing refill request to provider for review/approval because:  Labs out of range:  BP      Last Written Prescription Date:  8/28/2021  Last Fill Quantity: 90,  # refills: 0   Last office visit provider:  11/4/2021     Requested Prescriptions   Pending Prescriptions Disp Refills     isosorbide mononitrate (IMDUR) 60 MG 24 hr tablet 90 tablet 0     Sig: Take 1 tablet (60 mg) by mouth daily       Nitrates Failed - 2/9/2022  2:42 PM        Failed - Blood pressure under 140/90 in past 12 months     BP Readings from Last 3 Encounters:   12/16/21 (!) 150/72   11/04/21 (!) 140/64   10/11/21 (!) 142/84                 Failed - Recent (12 mo) or future (30 days) visit within the authorizing provider's specialty     Patient has had an office visit with the authorizing provider or a provider within the authorizing providers department within the previous 12 mos or has a future within next 30 days. See \"Patient Info\" tab in inbasket, or \"Choose Columns\" in Meds & Orders section of the refill encounter.              Passed - Pt is not on erectile dysfunction medications        Passed - Medication is active on med list        Passed - Patient is age 18 or older             Carol Dunbar RN 02/09/22 2:43 PM  "

## 2022-02-11 ENCOUNTER — TELEPHONE (OUTPATIENT)
Dept: FAMILY MEDICINE | Facility: CLINIC | Age: 72
End: 2022-02-11
Payer: COMMERCIAL

## 2022-02-11 NOTE — TELEPHONE ENCOUNTER
Spoke with pharmacy staffDenny.   [facility] insurance. If they need additional medication information about the patient, they will reach out to us here at the clinic. We have to wait for their response.   Pt notified of this.     I'll reach out to the pharmacy again next week to make sure someone is reviewing this.   Dexcom will be ordered once approval is completed - next day shipment is available.

## 2022-03-03 NOTE — PROGRESS NOTES
"Assessment / Plan  Patient presented to clinic in order to try to obtain clearance for home INR monitoring.  She had previously sought this from her PCP and cardiologist.  Cardiology appears to be prescribing provider for her warfarin.   Medical assistant had called home INR monitoring company to see what else they needed before covering the device.  She had faxed over requested documentation confirming use of warfarin for more than 90 days.  They indicated that no additional information was needed and the request is now being processed.  Reviewed sequence of events with patient. She had no other concerns today.    Will \"no charge\" visit, as I did not spend any time with patient beyond explanation of where things were with her request for home INR monitoring device.       Timi Nugent MD   Family medicine physician  [facility]         "

## 2022-03-08 ENCOUNTER — DOCUMENTATION ONLY (OUTPATIENT)
Dept: NURSING | Facility: CLINIC | Age: 72
End: 2022-03-08
Payer: COMMERCIAL

## 2022-03-08 NOTE — PROGRESS NOTES
ANTICOAGULATION     Kemi Soto is overdue for INR check.      Spoke with Ciarra who denies to schedule INR, she reports that she is in [place]. Advised she can go to [facility] or another lab in [place] and we can send to that lab.  Patient denies this and says she will look into [facility] and get back to [facility]. She was given the phone number for [employer] in [facility] and given [employer] fax number.     Tootie Pemberton RN

## 2022-03-14 NOTE — TELEPHONE ENCOUNTER
Pt called back, stated pharmacy does not have it processed yet and she is so frustrated. She is about to give up on it and wait till she gets back to [place].   She states we can call her if needed or she might just tryand make this work when she gets back in a couple weeks

## 2022-03-15 NOTE — TELEPHONE ENCOUNTER
[facility] pharmacy states it's ready for p/u.   Pt notified and will try and pick it up today.

## 2022-03-17 ENCOUNTER — TELEPHONE (OUTPATIENT)
Dept: ANTICOAGULATION | Facility: CLINIC | Age: 72
End: 2022-03-17
Payer: COMMERCIAL

## 2022-03-17 NOTE — TELEPHONE ENCOUNTER
ANTICOAGULATION     Kemi Soto is overdue for INR check.      Left message for patient to call and schedule lab appointment as soon as possible. If returning call, please schedule.     Nicole Lopez RN

## 2022-03-18 ENCOUNTER — TELEPHONE (OUTPATIENT)
Dept: FAMILY MEDICINE | Facility: CLINIC | Age: 72
End: 2022-03-18
Payer: COMMERCIAL

## 2022-03-18 NOTE — TELEPHONE ENCOUNTER
Pt returned call. She requested that she have [facility] fax number to provide to the lab. Fax number given to patient, she is also aware that a note was made on the INR standing order with number to fax results to [facility] and that pt will be coming in on Monday for an INR.     Melissa Hunt RN

## 2022-03-18 NOTE — LETTER
2022      [facility] Hospitalist Program  [address] [address]  Phone:  [phone]  Fax:    Patient:     Kemi Soto MRN:  [id number]   [address] [place] [address] :  1950  Sex:  F   Phone: [phone]        INSURANCE [employer] # SUBSCRIBER ID   Primary:    [facility] [id number]           Allergies   Allergen Reactions     Oxycodone        Order Date:  2021  INR point of care       (Order ID: [id number])     Diagnosis:  Paroxysmal atrial fibrillation (H) (I48.0)   Priority:  Routine Expiration Date:  2022 Interval:  q1-6 weeks and PRN Count: 99      Ordered by: Cony Noonan RN  Authorized by:  Chuck Hernandez MD   (NPI: [id number])     Please note this is a standing order. Expires 22  Pt will be having INR done Monday, 3/21/22  Please fax INR results to [facility] at [phone]

## 2022-03-18 NOTE — TELEPHONE ENCOUNTER
Reason for Call:  Other Anti-coag     Detailed comments: Patient called request that an INR order be faxed to [facility] [place] [facility] at [phone], she will be going there on Monday to have her INR done. Patient would like a call back to confirm that this has been taken care of.    Phone Number Patient can be reached at: Cell number on file:    Telephone Information:   Mobile [phone]       Best Time: any    Can we leave a detailed message on this number? YES    Call taken on 3/18/2022 at 1:43 PM by Princess Vaz

## 2022-03-18 NOTE — TELEPHONE ENCOUNTER
Standing INR order faxed to [facility] at number provided by patient.     LVM for patient letting her know that INR order was faxed. Noted on order that she will be in on Monday for an INR, and to fax results to [facility]. Pt to call back with any questions/concerns.     Melissa Hunt RN

## 2022-03-22 ENCOUNTER — TRANSFERRED RECORDS (OUTPATIENT)
Dept: HEALTH INFORMATION MANAGEMENT | Facility: CLINIC | Age: 72
End: 2022-03-22
Payer: COMMERCIAL

## 2022-03-22 LAB — INR (EXTERNAL): 2.57 (ref 0.9–1.1)

## 2022-03-23 ENCOUNTER — ANTICOAGULATION THERAPY VISIT (OUTPATIENT)
Dept: ANTICOAGULATION | Facility: CLINIC | Age: 72
End: 2022-03-23
Payer: COMMERCIAL

## 2022-03-23 DIAGNOSIS — I48.0 PAROXYSMAL ATRIAL FIBRILLATION (H): Primary | ICD-10-CM

## 2022-03-23 NOTE — PROGRESS NOTES
ANTICOAGULATION MANAGEMENT     Kemi Soto 72 year old female is on warfarin with therapeutic INR result. (Goal INR 2.0-3.0)    Recent labs: (last 7 days)     03/22/22  1419   INR 2.57*       ASSESSMENT       Source(s): Chart Review and Patient/Caregiver Call       Warfarin doses taken: Warfarin taken as instructed    Diet: No new diet changes identified    New illness, injury, or hospitalization: No    Medication/supplement changes: None noted    Signs or symptoms of bleeding or clotting: No    Previous INR: Therapeutic last 2(+) visits    Additional findings: Pt is currently in [place], will be returning to [place] next week       PLAN     Recommended plan for no diet, medication or health factor changes affecting INR     Dosing Instructions: Continue your current warfarin dose with next INR in 4 weeks       Summary  As of 3/23/2022    Full warfarin instructions:  2.5 mg every Mon, Fri; 5 mg all other days   Next INR check:  4/20/2022             Telephone call with Kemi who verbalizes understanding and agrees to plan    Lab visit scheduled    Education provided: Goal range and significance of current result    Plan made per [facility] anticoagulation protocol    Melissa Hunt RN  Anticoagulation Clinic  3/23/2022    _______________________________________________________________________     Anticoagulation Episode Summary     Current INR goal:  2.0-3.0   TTR:  76.4 % (7.7 mo)   Target end date:     Send INR reminders to:  [facility]    Indications    Paroxysmal atrial fibrillation (H) [I48.0]           Comments:  Patient is no longer using home meter, [employer] and requesting to use [facility]         Anticoagulation Care Providers     Provider Role Specialty Phone number    Chuck Hernandez MD Responsible Cardiovascular Disease [phone]

## 2022-03-23 NOTE — PROGRESS NOTES
Incoming fax from [facility]    Date of result 3/22    INR result 2.57

## 2022-04-11 ENCOUNTER — OFFICE VISIT (OUTPATIENT)
Dept: FAMILY MEDICINE | Facility: CLINIC | Age: 72
End: 2022-04-11
Payer: COMMERCIAL

## 2022-04-11 VITALS
SYSTOLIC BLOOD PRESSURE: 156 MMHG | RESPIRATION RATE: 18 BRPM | BODY MASS INDEX: 32.37 KG/M2 | TEMPERATURE: 98.7 F | HEART RATE: 80 BPM | OXYGEN SATURATION: 98 % | DIASTOLIC BLOOD PRESSURE: 76 MMHG | WEIGHT: 177 LBS

## 2022-04-11 DIAGNOSIS — Z79.4 TYPE 2 DIABETES MELLITUS WITH HYPERGLYCEMIA, WITH LONG-TERM CURRENT USE OF INSULIN (H): Primary | ICD-10-CM

## 2022-04-11 DIAGNOSIS — E11.65 TYPE 2 DIABETES MELLITUS WITH HYPERGLYCEMIA, WITH LONG-TERM CURRENT USE OF INSULIN (H): Primary | ICD-10-CM

## 2022-04-11 DIAGNOSIS — M54.50 BILATERAL LOW BACK PAIN WITHOUT SCIATICA, UNSPECIFIED CHRONICITY: ICD-10-CM

## 2022-04-11 DIAGNOSIS — I10 PRIMARY HYPERTENSION: ICD-10-CM

## 2022-04-11 DIAGNOSIS — I48.0 PAROXYSMAL ATRIAL FIBRILLATION (H): ICD-10-CM

## 2022-04-11 PROBLEM — N18.30 CHRONIC KIDNEY DISEASE, STAGE 3 (H): Status: RESOLVED | Noted: 2021-10-11 | Resolved: 2022-04-11

## 2022-04-11 LAB — HBA1C MFR BLD: 8.2 % (ref 0–5.6)

## 2022-04-11 PROCEDURE — 99214 OFFICE O/P EST MOD 30 MIN: CPT | Performed by: FAMILY MEDICINE

## 2022-04-11 PROCEDURE — 36415 COLL VENOUS BLD VENIPUNCTURE: CPT | Performed by: FAMILY MEDICINE

## 2022-04-11 PROCEDURE — 83036 HEMOGLOBIN GLYCOSYLATED A1C: CPT | Performed by: FAMILY MEDICINE

## 2022-04-11 RX ORDER — WARFARIN SODIUM 5 MG/1
2.5-5 TABLET ORAL DAILY
Qty: 90 TABLET | Refills: 1 | Status: SHIPPED | OUTPATIENT
Start: 2022-04-11 | End: 2022-07-12

## 2022-04-11 RX ORDER — ISOSORBIDE MONONITRATE 60 MG/1
60 TABLET, EXTENDED RELEASE ORAL DAILY
Qty: 90 TABLET | Refills: 0 | Status: CANCELLED | OUTPATIENT
Start: 2022-04-11

## 2022-04-11 RX ORDER — ATORVASTATIN CALCIUM 80 MG/1
TABLET, FILM COATED ORAL
Qty: 90 TABLET | Refills: 1 | Status: CANCELLED | OUTPATIENT
Start: 2022-04-11

## 2022-04-11 RX ORDER — BUSPIRONE HYDROCHLORIDE 15 MG/1
15 TABLET ORAL DAILY
Qty: 90 TABLET | Refills: 0 | Status: CANCELLED | OUTPATIENT
Start: 2022-04-11

## 2022-04-11 RX ORDER — INSULIN GLARGINE 100 [IU]/ML
28 INJECTION, SOLUTION SUBCUTANEOUS 2 TIMES DAILY
Qty: 60 ML | Refills: 3 | Status: SHIPPED | OUTPATIENT
Start: 2022-04-11 | End: 2022-12-12

## 2022-04-11 NOTE — PROGRESS NOTES
Assessment & Plan     (E11.65,  Z79.4) Type 2 diabetes mellitus with hyperglycemia, with long-term current use of insulin (H)  (primary encounter diagnosis)  Comment: a1c 8.2 , DM poor control. She has not check bs for > 3 month. She uses lantus 28 unit(s) bid and no side effect.   Pt decline lab test only ok to do a1c.     Plan: HEMOGLOBIN A1C, insulin glargine (LANTUS         SOLOSTAR) 100 UNIT/ML pen         Strongly advise diet control. Advise to follow-up in 1 week to discuss the medication / insulin change. . Advise to check bs at home tid . Pt is stress today and she is not ready to do medication adjustment.      (I48.0) Paroxysmal atrial fibrillation (H)  Comment: she is taking coumadin. Denies cp, sob, palpitation.   Plan: warfarin ANTICOAGULANT (COUMADIN) 5 MG tablet        Continue current plan. Advise to follow-up with inr clinic for inr monitor and medication management     (M54.50) Bilateral low back pain without sciatica, unspecified chronicity  Comment: right low back pain x 2 weeks and is improving, tylenol helped. . It started when she twisted the back when moving her bags. No shooting pain and numbness and tingling to leg. Good bladder and bm control. Exam is not remarkable.   Plan: XR Lumbar Spine 2/3 Views, Physical Therapy         Referral        Adviser rom exercise as tolerated. Avoid heavy lifting. Continue tylenol prn and heating pad, not sleep on heating pad   Consider lumbar spine mri if pain persists or worse.     (I10) primary hypertension.   Comment: bp high at clinic. She takes mediation as instructed but does not monitor bp at home. She state she has been stress recently for moving / driving back to [place] from [place]. Denies headache, vision change, cp, sob     Plan: advise monitor bp at home. Advise to change medication: increase imdur to 120 mg due to bp high and she declined. She wanted to take the same medication and follow-up in one week.   Decline blood test today other than  "a1c.    0956}     BMI:   Estimated body mass index is 32.37 kg/m  as calculated from the following:    Height as of 11/4/21: 1.575 m (5' 2\").    Weight as of this encounter: 80.3 kg (177 lb).   Weight management plan: Discussed healthy diet and exercise guidelines     She dose not want covid vaccine from [facility].     Return in about 1 week (around 4/18/2022).    Elaine Galvez MD  [facility] BENTLEY Lafleur is a 72 year old who presents for the following health issues     HPI     1) DM: she uses lantus 28 unit(s) bid and she has not monitor bs for > 3 month due to insurance coverage.    2) right low back pain x 2 weeks. She was at [place] and moving her bags, some how strained, twisted the back. She came back to [place] recently and drove 52 hours. Pain is improving, tylenol helped. No shooting pain, numbness and tingling to leg. Good bladder and bm control. She has back surgery long time ago.   3) HTN: bp high today, she dose not monitor bp at home. She stated she is very stress moving/ driving back to [place].     Review of Systems   Constitutional, HEENT, cardiovascular, pulmonary, gi and gu systems are negative, except as otherwise noted.      Objective    BP (!) 156/76 (BP Location: Right arm, Patient Position: Sitting, Cuff Size: Adult Regular)   Pulse 80   Temp 98.7  F (37.1  C) (Oral)   Resp 18   Wt 80.3 kg (177 lb)   SpO2 98%   BMI 32.37 kg/m    Body mass index is 32.37 kg/m .  Physical Exam   GENERAL: healthy, alert and no distress  NECK: no adenopathy, no asymmetry, masses, or scars and thyroid normal to palpation  RESP: lungs clear to auscultation - no rales, rhonchi or wheezes  CV: regular rate and rhythm, normal S1 S2, no S3 or S4, no murmur, click or rub, no peripheral edema and peripheral pulses strong  ABDOMEN: soft, nontender, no hepatosplenomegaly, no masses and bowel sounds normal  MS: no gross musculoskeletal defects noted, no edema  Cervical, thoracic and lumbar spine " exam is normal without tenderness, masses or kyphoscoliosis. Decrease lumbar range of motion with   pain on motion is noted. Normal gait. Negative raise legs test bilateral at 90 degree. Normal strength, sensation and reflex on leg.     Lab Results   Component Value Date    A1C 8.2 04/11/2022    A1C 7.9 10/01/2021    A1C 8.3 07/23/2020    A1C 8.7 03/16/2020    A1C 9.1 01/22/2020

## 2022-04-11 NOTE — LETTER
April 12, 2022      Ciarra Soto  [address] [place] [id number]        Dear ,    We are writing to inform you of your test results.    I have reviewed all lab results : your a1c is high and your diabetes is poor control. Advise to have follow-up in 1-2 weeks either  Dr. Zapata or me for further medication management.     Elaine Galvez MD on 4/11/2022 at 3:58 PM       Resulted Orders   HEMOGLOBIN A1C   Result Value Ref Range    Hemoglobin A1C 8.2 (H) 0.0 - 5.6 %      Comment:      Normal <5.7%   Prediabetes 5.7-6.4%    Diabetes 6.5% or higher     Note: Adopted from ADA consensus guidelines.       If you have any questions or concerns, please call the clinic at the number listed above.       Sincerely,      Elaine Galvez MD

## 2022-04-20 ENCOUNTER — LAB (OUTPATIENT)
Dept: LAB | Facility: CLINIC | Age: 72
End: 2022-04-20
Payer: COMMERCIAL

## 2022-04-20 ENCOUNTER — ALLIED HEALTH/NURSE VISIT (OUTPATIENT)
Dept: FAMILY MEDICINE | Facility: CLINIC | Age: 72
End: 2022-04-20
Payer: COMMERCIAL

## 2022-04-20 ENCOUNTER — ANTICOAGULATION THERAPY VISIT (OUTPATIENT)
Dept: ANTICOAGULATION | Facility: CLINIC | Age: 72
End: 2022-04-20

## 2022-04-20 VITALS — HEART RATE: 67 BPM | OXYGEN SATURATION: 96 % | SYSTOLIC BLOOD PRESSURE: 137 MMHG | DIASTOLIC BLOOD PRESSURE: 78 MMHG

## 2022-04-20 DIAGNOSIS — I10 PRIMARY HYPERTENSION: Primary | ICD-10-CM

## 2022-04-20 DIAGNOSIS — I48.0 PAROXYSMAL ATRIAL FIBRILLATION (H): ICD-10-CM

## 2022-04-20 DIAGNOSIS — I48.0 PAROXYSMAL ATRIAL FIBRILLATION (H): Primary | ICD-10-CM

## 2022-04-20 LAB — INR BLD: 2.1 (ref 0.9–1.1)

## 2022-04-20 PROCEDURE — 36415 COLL VENOUS BLD VENIPUNCTURE: CPT

## 2022-04-20 PROCEDURE — 85610 PROTHROMBIN TIME: CPT

## 2022-04-20 PROCEDURE — 99207 PR NO CHARGE NURSE ONLY: CPT

## 2022-04-20 NOTE — PROGRESS NOTES
ANTICOAGULATION MANAGEMENT     Kemi Soto 72 year old female is on warfarin with therapeutic INR result. (Goal INR 2.0-3.0)    Recent labs: (last 7 days)     04/20/22  1300   INR 2.1*       ASSESSMENT       Source(s): Chart Review and Template       Warfarin doses taken: Warfarin taken as instructed    Diet: No new diet changes identified    New illness, injury, or hospitalization: No    Medication/supplement changes: None noted    Signs or symptoms of bleeding or clotting: No    Previous INR: Therapeutic last 2(+) visits    Additional findings: Was in [place] and back in [place] week of 3/28    Per patient she decided not to use the home monitor machine an is thinking of returning it to the company.       PLAN     Recommended plan for no diet, medication or health factor changes affecting INR     Dosing Instructions: continue your current warfarin dose with next INR in 4-5 weeks       Summary  As of 4/20/2022    Full warfarin instructions:  2.5 mg every Mon, Fri; 5 mg all other days   Next INR check:  5/25/2022           Telephone call with Kemi who verbalizes understanding and agrees to plan    Lab visit scheduled    Education provided: None required    Plan made per [facility] anticoagulation protocol    Cony Noonan RN  Anticoagulation Clinic  4/20/2022    _______________________________________________________________________     Anticoagulation Episode Summary     Current INR goal:  2.0-3.0   TTR:  79.1 % (8.6 mo)   Target end date:     Send INR reminders to:  [facility]    Indications    Paroxysmal atrial fibrillation (H) [I48.0]           Comments:  Patient is no longer using home meter, [employer] and requesting to use [facility]         Anticoagulation Care Providers     Provider Role Specialty Phone number    Chuck Hernandez MD Responsible Cardiovascular Disease [phone]

## 2022-04-21 NOTE — PROGRESS NOTES
Kemi Soto is a 72 year old patient who comes in today for a Blood Pressure check.  Initial BP:  /78 (BP Location: Right arm, Patient Position: Sitting, Cuff Size: Adult Regular)   Pulse 67   SpO2 96%      67  Disposition: results routed to provider

## 2022-04-22 ENCOUNTER — HOSPITAL ENCOUNTER (OUTPATIENT)
Dept: PHYSICAL THERAPY | Facility: REHABILITATION | Age: 72
Discharge: HOME | End: 2022-04-22
Attending: FAMILY MEDICINE
Payer: COMMERCIAL

## 2022-04-22 DIAGNOSIS — M54.50 BILATERAL LOW BACK PAIN WITHOUT SCIATICA, UNSPECIFIED CHRONICITY: ICD-10-CM

## 2022-04-22 PROCEDURE — 97140 MANUAL THERAPY 1/> REGIONS: CPT | Mod: GP | Performed by: PHYSICAL THERAPIST

## 2022-04-22 PROCEDURE — 97110 THERAPEUTIC EXERCISES: CPT | Mod: GP | Performed by: PHYSICAL THERAPIST

## 2022-04-22 PROCEDURE — 97162 PT EVAL MOD COMPLEX 30 MIN: CPT | Mod: GP | Performed by: PHYSICAL THERAPIST

## 2022-04-22 NOTE — PROGRESS NOTES
[facility] Rehabilitation Service    Outpatient Physical Therapy Discharge Note  Patient: Kemi Soto  : 1950    Beginning/End Dates of Reporting Period:  22 to 22    Referring Provider: Elaine Galvez MD    Therapy Diagnosis: Decreased activity tolerence due to increased lumbar pain and glute pain.     Client Self Report: See eval    Outcome Measures (most recent score):  LATRICIA 52%    Goals:  Goal Identifier Lumbar flexion ROM   Goal Description Patient will increase lumbar flexion ROM to >/= 75 degrees without pain in order to improve the quality of their ADLS.   Target Date 22   Date Met      Progress (detail required for progress note): 50     Goal Identifier Lumbar extension ROM   Goal Description Patient will increase lumbar extension ROM to >/= 20 degrees without pain in order to improve the quality of their ADLS.   Target Date 22   Date Met      Progress (detail required for progress note): 10     Goal Identifier LATRICIA   Goal Description Pt will demonstrate a decrease in pain and increase in function as measured by scoring </= 10% on the LATRICIA.   Target Date 22   Date Met      Progress (detail required for progress note): 52%       Plan:  Discharge from therapy.    Discharge:    Reason for Discharge: Patient has failed to schedule further appointments.    Discharge Plan: Patient to continue home program.    Subjective: She strained her back while moving her bags in [place] around 3/28/22. No radicular symptoms noted. It is getting better slowly. Recently drove 52 hours from [place] to [place], which also caused some back pain.    Assessment: Ciarra presents to therapy with increased pain in her right buttock and lower back after lifting her suitcases while coming home from [place]. She demonstrates decrease lumbar lordosis and decreased lumbar movement during ROM and functional activities. She  "reported a decrease in pain with manual and with her stretches. Ciarra will benefit from continued therapy to promote decrease in her lumbar/buttock pain.    Date 4/22/22   Exercise    Pretzel stretch 2 x30\"   Piriformis below 90 degrees 2 x30\"                                                 "

## 2022-04-25 NOTE — PROGRESS NOTES
[facility]    OUTPATIENT PHYSICAL THERAPY ORTHOPEDIC EVALUATION  PLAN OF TREATMENT FOR OUTPATIENT REHABILITATION  (COMPLETE FOR INITIAL CLAIMS ONLY)  Patient's Last Name, First Name, M.I.  YOB: 1950  Kemi Soto    Provider s Name:  [facility]   Medical Record No.  [id number]   Start of Care Date:  04/22/22   Onset Date:  03/28/22   Type:     _X__PT   ___OT   ___SLP Medical Diagnosis:  Bilateral low back pain without sciatica, unspecified chronicity     PT Diagnosis:  Decreased activity tolerence due to increased lumbar pain and glute pain.   Visits from SOC:  1      _________________________________________________________________________________  Plan of Treatment/Functional Goals:  joint mobilization, manual therapy, ROM, neuromuscular re-education, strengthening, stretching           Goals  Goal Identifier: Lumbar flexion ROM  Goal Description: Patient will increase lumbar flexion ROM to >/= 75 degrees without pain in order to improve the quality of their ADLS.  Target Date: 06/21/22    Goal Identifier: Lumbar extension ROM  Goal Description: Patient will increase lumbar extension ROM to >/= 20 degrees without pain in order to improve the quality of their ADLS.  Target Date: 06/21/22    Goal Identifier: LATRICIA  Goal Description: Pt will demonstrate a decrease in pain and increase in function as measured by scoring </= 10% on the LATRICIA.  Target Date: 06/21/22                                                           Therapy Frequency:  1 time/week  Predicted Duration of Therapy Intervention:  12 weeks    WALT JERNIGAN, PT                 I CERTIFY THE NEED FOR THESE SERVICES FURNISHED UNDER        THIS PLAN OF TREATMENT AND WHILE UNDER MY CARE     (Physician co-signature of this document indicates review and certification of the therapy plan).                      Certification Date From:  04/22/22   Certification Date To:  05/04/22    Referring Provider:  Elaine Galvez MD    Initial Assessment        See Epic Evaluation Start of Care Date: 04/22/22 04/22/22 1400   General Information   Type of Visit Initial OP Ortho PT Evaluation   Start of Care Date 04/22/22   Referring Physician Elaine Galvez MD   Orders Evaluate and Treat   Date of Order 04/11/22   Certification Required? Yes   Medical Diagnosis Bilateral low back pain without sciatica, unspecified chronicity   Body Part(s)   Body Part(s) Lumbar Spine/SI   Presentation and Etiology   Pertinent history of current problem (include personal factors and/or comorbidities that impact the POC) See note   Impairments A. Pain;D. Decreased ROM;E. Decreased flexibility;F. Decreased strength and endurance   Functional Limitations perform activities of daily living   How/Where did it occur While lifting   Onset date of current episode/exacerbation 03/28/22   Chronicity Recurrent   Pain rating (0-10 point scale) Best (/10);Worst (/10)   Best (/10) 0   Worst (/10) 8   Frequency of pain/symptoms B. Intermittent   Pain/symptoms exacerbated by A. Sitting;D. Carrying;G. Certain positions   Pain/symptoms eased by C. Rest;I. OTC medication(s);G. Heat   Progression of symptoms since onset: Improved   Fall Risk Screen   Fall screen completed by PT   Have you fallen 2 or more times in the past year? No   Have you fallen and had an injury in the past year? No   Is patient a fall risk? No   Abuse Screen (yes response referral indicated)   Feels Unsafe at Home or Work/School no   Feels Threatened by Someone no   Does Anyone Try to Keep You From Having Contact with Others or Doing Things Outside Your Home? no   Physical Signs of Abuse Present no   Patient needs abuse support services and resources No   Functional Scales   Functional Scales Other   Other Scales  LATRICIA: 52%   Lumbar Spine/SI Objective Findings   Flexion ROM 50   Extension ROM  10   Right Side Bending ROM 15   Left Side Bending ROM 15   Hip Flexion (L2) Strength 4-/5   Hip Abduction Strength 4-/5   Hip Adduction Strength 4/5   Hip Extension Strength 5/5   Knee Flexion Strength 5/5   Knee Extension (L3) Strength 5/5   Ankle Dorsiflexion (L4) Strength 5/5   Great Toe Extension (L5) Strength 5/5   Ankle Plantar Flexion (S1) Strength 5/5   SLR +   Lamonte Test -   Ely Test -   Prone Instability Test -   Crossover SLR -   Repeated Extension Prone -   Palpation Significant tenderness over right glue med, min, and piriformis.   Slump Test -   Posture Decreased lumbar lordosis, increased thoracic kyphosis.   Planned Therapy Interventions   Planned Therapy Interventions joint mobilization;manual therapy;ROM;neuromuscular re-education;strengthening;stretching   Clinical Impression   Criteria for Skilled Therapeutic Interventions Met yes, treatment indicated   PT Diagnosis Decreased activity tolerence due to increased lumbar pain and glute pain.   Clinical Presentation Evolving/Changing   Clinical Decision Making (Complexity) Moderate complexity   Therapy Frequency 1 time/week   Predicted Duration of Therapy Intervention (days/wks) 12 weeks   Risk & Benefits of therapy have been explained Yes   Patient, Family & other staff in agreement with plan of care Yes   ORTHO GOALS   PT Ortho Eval Goals 1;2;3   Ortho Goal 1   Goal Identifier Lumbar flexion ROM   Goal Description Patient will increase lumbar flexion ROM to >/= 75 degrees without pain in order to improve the quality of their ADLS.   Goal Progress 50   Target Date 06/21/22   Ortho Goal 2   Goal Identifier Lumbar extension ROM   Goal Description Patient will increase lumbar extension ROM to >/= 20 degrees without pain in order to improve the quality of their ADLS.   Goal Progress 10   Target Date 06/21/22   Ortho Goal 3   Goal Identifier LATRICIA   Goal Description Pt will demonstrate a decrease in pain and increase in function as measured by scoring  </= 10% on the LATRICIA.   Goal Progress 52%   Target Date 06/21/22   Total Evaluation Time   PT Eval, Moderate Complexity Minutes (36940) 34   Therapy Certification   Certification date from 04/22/22   Certification date to 05/04/22   Medical Diagnosis Bilateral low back pain without sciatica, unspecified chronicity

## 2022-04-25 NOTE — ADDENDUM NOTE
Encounter addended by: Cristobal Travis, PT on: 4/25/2022 8:21 AM   Actions taken: Clinical Note Signed, Flowsheet accepted, Document created, Document edited

## 2022-05-04 DIAGNOSIS — I25.10 CORONARY ARTERY DISEASE INVOLVING NATIVE CORONARY ARTERY OF NATIVE HEART WITHOUT ANGINA PECTORIS: ICD-10-CM

## 2022-05-04 DIAGNOSIS — I48.3 TYPICAL ATRIAL FLUTTER (H): ICD-10-CM

## 2022-05-04 RX ORDER — SOTALOL HYDROCHLORIDE 80 MG/1
40 TABLET ORAL 2 TIMES DAILY
Qty: 90 TABLET | Refills: 0 | Status: SHIPPED | OUTPATIENT
Start: 2022-05-04 | End: 2022-07-12

## 2022-05-17 NOTE — ADDENDUM NOTE
Encounter addended by: Cristobal Trvais, PT on: 5/17/2022 4:58 PM   Actions taken: Clinical Note Signed, Episode resolved

## 2022-05-19 DIAGNOSIS — F41.1 ANXIETY, GENERALIZED: ICD-10-CM

## 2022-05-20 RX ORDER — BUSPIRONE HYDROCHLORIDE 15 MG/1
TABLET ORAL
Qty: 90 TABLET | Refills: 0 | Status: SHIPPED | OUTPATIENT
Start: 2022-05-20 | End: 2022-10-03

## 2022-05-20 RX ORDER — GABAPENTIN 300 MG/1
CAPSULE ORAL
Qty: 270 CAPSULE | Refills: 0 | Status: SHIPPED | OUTPATIENT
Start: 2022-05-20 | End: 2022-10-03

## 2022-05-20 NOTE — TELEPHONE ENCOUNTER
"Outpatient Medication Detail     Disp Refills Start End URBANO   gabapentin (NEURONTIN) 300 MG capsule 270 capsule 3 4/18/2021  No   Sig - Route: Take 3 capsules (900 mg total) by mouth at bedtime. - Oral   Sent to pharmacy as: gabapentin 300 mg capsule (NEURONTIN)   E-Prescribing Status: Receipt confirmed by pharmacy (4/18/2021  6:23 AM CDT)       gabapentin (NEURONTIN) 300 MG capsule [[id number]]    Electronically signed by: Mayco Zapata MD on 04/18/21 0623 Status: Active   Ordering user: Mayco Zapata MD 04/18/21 0623 Authorized by: Mayco Zapata MD   Frequency: QHS 04/18/21 - Until Discontinued Released by: Mayco Zapata MD 04/18/21 0623   Diagnoses  Peripheral polyneuropathy [G62.9]     Routing refill request to provider for review/approval because:  Drug not on the [facility] refill protocol   A break in medication    Last Written Prescription Date:  8/28/21  Last Fill Quantity: 90,  # refills: 0   Last office visit provider:  4/11/22     Requested Prescriptions   Pending Prescriptions Disp Refills     gabapentin (NEURONTIN) 300 MG capsule [Pharmacy Med Name: Gabapentin 300 MG Oral Capsule] 270 capsule 0     Sig: TAKE 3 CAPSULES BY MOUTH EVERY DAY AT BEDTIME       There is no refill protocol information for this order        busPIRone (BUSPAR) 15 MG tablet [Pharmacy Med Name: busPIRone HCl 15 MG Oral Tablet] 90 tablet 0     Sig: Take 1 tablet by mouth once daily       Atypical Antidepressants Protocol Passed - 5/20/2022  3:08 PM        Passed - Recent (12 mo) or future (30 days) visit within the authorizing provider's specialty     Patient has had an office visit with the authorizing provider or a provider within the authorizing providers department within the previous 12 mos or has a future within next 30 days. See \"Patient Info\" tab in inbasket, or \"Choose Columns\" in Meds & Orders section of the refill encounter.              Passed - Medication active on med list        Passed - Patient is age 18 or " older        Passed - No active pregnancy on record        Passed - No positive pregnancy test in past 12 mos             Jean-Claude Adames RN 05/20/22 3:19 PM

## 2022-05-25 ENCOUNTER — ANTICOAGULATION THERAPY VISIT (OUTPATIENT)
Dept: ANTICOAGULATION | Facility: CLINIC | Age: 72
End: 2022-05-25

## 2022-05-25 ENCOUNTER — LAB (OUTPATIENT)
Dept: LAB | Facility: CLINIC | Age: 72
End: 2022-05-25
Payer: COMMERCIAL

## 2022-05-25 DIAGNOSIS — I48.0 PAROXYSMAL ATRIAL FIBRILLATION (H): ICD-10-CM

## 2022-05-25 DIAGNOSIS — I48.0 PAROXYSMAL ATRIAL FIBRILLATION (H): Primary | ICD-10-CM

## 2022-05-25 LAB — INR BLD: 2.8 (ref 0.9–1.1)

## 2022-05-25 PROCEDURE — 85610 PROTHROMBIN TIME: CPT

## 2022-05-25 PROCEDURE — 36416 COLLJ CAPILLARY BLOOD SPEC: CPT

## 2022-05-25 NOTE — PROGRESS NOTES
ANTICOAGULATION MANAGEMENT     Kemi Soto 72 year old female is on warfarin with therapeutic INR result. (Goal INR 2.0-3.0)    Recent labs: (last 7 days)     05/25/22  1310   INR 2.8*       ASSESSMENT       Source(s): Chart Review and Template       Warfarin doses taken: Warfarin taken as instructed    Diet: No new diet changes identified    New illness, injury, or hospitalization: No    Medication/supplement changes: None noted    Signs or symptoms of bleeding or clotting: No    Previous INR: Therapeutic last 2(+) visits    Additional findings: None       PLAN     Recommended plan for no diet, medication or health factor changes affecting INR     Dosing Instructions: continue your current warfarin dose with next INR in 6 weeks       Summary  As of 5/25/2022    Full warfarin instructions:  2.5 mg every Mon, Fri; 5 mg all other days   Next INR check:  7/6/2022             Telephone call with Kemi who verbalizes understanding and agrees to plan     Lab visit scheduled      Education provided: Importance of notifying clinic for changes in medications; a sooner lab recheck maybe needed.    Plan made per [facility] anticoagulation protocol    Cony Noonan RN  Anticoagulation Clinic  5/25/2022    _______________________________________________________________________     Anticoagulation Episode Summary     Current INR goal:  2.0-3.0   TTR:  81.6 % (9.8 mo)   Target end date:     Send INR reminders to:  [facility]    Indications    Paroxysmal atrial fibrillation (H) [I48.0]           Comments:  Patient is no longer using home meter         Anticoagulation Care Providers     Provider Role Specialty Phone number    Chuck Hernandez MD Responsible Cardiovascular Disease [phone]

## 2022-05-27 ENCOUNTER — DOCUMENTATION ONLY (OUTPATIENT)
Dept: ANTICOAGULATION | Facility: CLINIC | Age: 72
End: 2022-05-27

## 2022-05-27 DIAGNOSIS — I48.0 PAROXYSMAL ATRIAL FIBRILLATION (H): Primary | ICD-10-CM

## 2022-05-27 NOTE — PROGRESS NOTES
ANTICOAGULATION MANAGEMENT      Kemi Soto due for annual renewal of referral to anticoagulation monitoring. Order pended for your review and signature.      ANTICOAGULATION SUMMARY      Warfarin indication(s)     Atrial fibrillation    Heart valve present?  NO       Current goal range   INR: 2.0-3.0     Goal appropriate for indication? Yes, INR 2-3 appropriate for hx of DVT, PE, hypercoagulable state, Afib, LVAD, or bileaflet AVR without risk factors     Current duration of therapy Indefinite/long term therapy   Time in Therapeutic Range (TTR)  (Goal > 60%) 82%       Office visit with referring provider's group within last year yes on 11/14/2021       Cony Noonan RN

## 2022-06-03 ENCOUNTER — DOCUMENTATION ONLY (OUTPATIENT)
Dept: ANTICOAGULATION | Facility: CLINIC | Age: 72
End: 2022-06-03
Payer: COMMERCIAL

## 2022-06-03 ENCOUNTER — APPOINTMENT (OUTPATIENT)
Dept: RADIOLOGY | Facility: CLINIC | Age: 72
End: 2022-06-03
Attending: STUDENT IN AN ORGANIZED HEALTH CARE EDUCATION/TRAINING PROGRAM
Payer: COMMERCIAL

## 2022-06-03 ENCOUNTER — HOSPITAL ENCOUNTER (EMERGENCY)
Facility: CLINIC | Age: 72
Discharge: HOME | End: 2022-06-03
Admitting: PHYSICIAN ASSISTANT
Payer: COMMERCIAL

## 2022-06-03 VITALS
DIASTOLIC BLOOD PRESSURE: 57 MMHG | WEIGHT: 180 LBS | HEART RATE: 55 BPM | TEMPERATURE: 99.1 F | HEIGHT: 62 IN | RESPIRATION RATE: 18 BRPM | BODY MASS INDEX: 33.13 KG/M2 | OXYGEN SATURATION: 99 % | SYSTOLIC BLOOD PRESSURE: 161 MMHG

## 2022-06-03 DIAGNOSIS — S80.212A KNEE ABRASION, LEFT, INITIAL ENCOUNTER: ICD-10-CM

## 2022-06-03 DIAGNOSIS — G57.02 PIRIFORMIS SYNDROME, LEFT: ICD-10-CM

## 2022-06-03 PROCEDURE — 73502 X-RAY EXAM HIP UNI 2-3 VIEWS: CPT

## 2022-06-03 PROCEDURE — 73562 X-RAY EXAM OF KNEE 3: CPT | Mod: LT

## 2022-06-03 PROCEDURE — 250N000013 HC RX MED GY IP 250 OP 250 PS 637: Performed by: PHYSICIAN ASSISTANT

## 2022-06-03 PROCEDURE — 99284 EMERGENCY DEPT VISIT MOD MDM: CPT

## 2022-06-03 RX ORDER — CYCLOBENZAPRINE HCL 10 MG
10 TABLET ORAL 3 TIMES DAILY PRN
Qty: 20 TABLET | Refills: 0 | Status: SHIPPED | OUTPATIENT
Start: 2022-06-03 | End: 2022-06-09

## 2022-06-03 RX ORDER — CYCLOBENZAPRINE HCL 10 MG
10 TABLET ORAL ONCE
Status: COMPLETED | OUTPATIENT
Start: 2022-06-03 | End: 2022-06-03

## 2022-06-03 RX ORDER — LIDOCAINE 50 MG/G
1 PATCH TOPICAL EVERY 24 HOURS
Qty: 5 PATCH | Refills: 0 | Status: SHIPPED | OUTPATIENT
Start: 2022-06-03 | End: 2023-09-01

## 2022-06-03 RX ORDER — LIDOCAINE 4 G/G
1 PATCH TOPICAL
Status: DISCONTINUED | OUTPATIENT
Start: 2022-06-03 | End: 2022-06-03 | Stop reason: HOSPADM

## 2022-06-03 RX ADMIN — CYCLOBENZAPRINE HYDROCHLORIDE 10 MG: 10 TABLET, FILM COATED ORAL at 11:52

## 2022-06-03 RX ADMIN — LIDOCAINE 1 PATCH: 246 PATCH TOPICAL at 11:52

## 2022-06-03 ASSESSMENT — ENCOUNTER SYMPTOMS
FATIGUE: 0
WOUND: 1
HEADACHES: 0
DIZZINESS: 0
COLOR CHANGE: 0
WEAKNESS: 0
ADENOPATHY: 0
CONFUSION: 0
PALPITATIONS: 0
COUGH: 0
SORE THROAT: 0
NAUSEA: 0
NERVOUS/ANXIOUS: 0
FACIAL SWELLING: 0
VOMITING: 0
AGITATION: 0
MYALGIAS: 1
WHEEZING: 0
CHEST TIGHTNESS: 0
DIFFICULTY URINATING: 0
ARTHRALGIAS: 1
SHORTNESS OF BREATH: 0
VOICE CHANGE: 0
ACTIVITY CHANGE: 0
FEVER: 0

## 2022-06-03 NOTE — ED PROVIDER NOTES
EMERGENCY DEPARTMENT ENCOUNTER      NAME: Kemi Soto  AGE: 72 year old female  YOB: 1950  MRN: [id number]  EVALUATION DATE & TIME: 6/3/2022 11:09 AM    PCP: Mayco Zapata    ED PROVIDER: Rere Babb PA-C      Chief Complaint   Patient presents with     Knee Pain     Fall       FINAL IMPRESSION:  1. Knee abrasion, left, initial encounter    2. Piriformis syndrome, left          MEDICAL DECISION MAKING:    Pertinent Labs & Imaging studies reviewed. (See chart for details)  72 year old female with a h/o UTI, sepsis, epidural abscess, TIA, thoracolumbar discitis presents to the Emergency Department for evaluation of left knee, left buttocks and left hip pain.  She sustained a fall onto cement steps yesterday causing an abrasion and pain over the left knee, and has had acute on chronic left buttocks/sciatic pain which acutely worsened after her fall.    On exam she is alert, nontoxic-appearing and in no acute distress.  Vital signs are WNL.  Afebrile.  She has reduced range of motion at the left leg, notably has increased pain at the left buttocks when she tries to straighten the left leg completely.  There is piriformis tenderness on the left.  No new numbness or tingling of the legs.  No cauda equina symptoms.  No laxity of the left knee.     Differential diagnosis includes bony fracture, meniscal injury, ligamentous injury, muscle strain/sprain, lumbar radiculopathy, piriformis syndrome, sciatica.  X-rays of the left knee and hip do not show any fracture or dislocation.  She was given a lidocaine patch and Flexeril in the emergency department with some relief.  We will plan for follow-up with spine and PT for further evaluation.  Will discharge to home with additional course of Flexeril and lidocaine patches.    There is no evidence of acute or emergent process requiring intervention at this time. Pt is appropriate for outpatient management. Provisional nature of today's diagnosis was  discussed and strict return precautions were given. Pt expressed understanding and She was discharged to home in good condition.       CRITICAL CARE: None    ED COURSE  11:45 AM  Met and evaluated patient. Discussed ED plan.   12:15 PM discharged to home in good condition by RN.       MEDICATIONS GIVEN IN THE EMERGENCY:  Medications   Lidocaine (LIDOCARE) 4 % Patch 1 patch (1 patch Transdermal Patch/Med Applied 6/3/22 1152)   cyclobenzaprine (FLEXERIL) tablet 10 mg (10 mg Oral Given 6/3/22 1152)       NEW PRESCRIPTIONS STARTED AT TODAY'S ER VISIT  New Prescriptions    CYCLOBENZAPRINE (FLEXERIL) 10 MG TABLET    Take 1 tablet (10 mg) by mouth 3 times daily as needed for muscle spasms    LIDOCAINE (LIDODERM) 5 % PATCH    Place 1 patch onto the skin every 24 hours To prevent lidocaine toxicity, patient should be patch free for 12 hrs daily.          =================================================================    HPI    Patient information was obtained from: Patient    Use of Intrepreter: N/A       Kemi Soto is a 72 year old female who presents with pain in the left knee and left hip.  The left hip/buttocks pain is acute on chronic.  She notes that approximately week ago she was quite busy cleaning her home and moving around her shop vac and mopping, that evening she had some tightness in the left hip that prevented her from crossing the left leg over the right.  She also notes that it has been difficult to put on a sock as she feels that she cannot achieve her normal range of motion.  She has had difficulty straightening the left leg and feels that she has to limp while walking.  Over the last week her symptoms seemed to be improving.  However, yesterday, she tripped while wearing flip-flops and going up her outdoor cement steps, falling with the brunt of the force on the left knee.  She has an abrasion over the anterior surface of the knee  and pain at this area.  The left sided buttock/hip pain has  intensified since her fall, she laid down yesterday to take a rest and woke up with sharp pain that radiated from the left buttocks down the lateral and anterior left thigh.  She attempted ice, hot shower, massage and Voltaren gel without any significant relief.  She does already take gabapentin for her peripheral neuropathy.      She does note that she was unable to make it to the bathroom in time throughout the night last night, but she does have longstanding urinary incontinence issues and did feel the sensation of needing to urinate and could not get there in time due to the left sided pain. No bowel incontinence. No saddle anesthesia.    Denies sxs of illness such as fever, nausea, vomiting. No chest pain, shortness of breath, abdominal pain, low back pain. No urinary rentention, difficulty initiating urination.    REVIEW OF SYSTEMS   Review of Systems   Constitutional: Negative for activity change, fatigue and fever.   HENT: Negative for facial swelling, sore throat and voice change.    Eyes: Negative for visual disturbance.   Respiratory: Negative for cough, chest tightness, shortness of breath and wheezing.    Cardiovascular: Negative for chest pain and palpitations.   Gastrointestinal: Negative for nausea and vomiting.   Genitourinary: Negative for difficulty urinating.   Musculoskeletal: Positive for arthralgias, gait problem and myalgias.   Skin: Positive for wound (left knee abrasion). Negative for color change, pallor and rash.   Neurological: Negative for dizziness, weakness and headaches.   Hematological: Negative for adenopathy.   Psychiatric/Behavioral: Negative for agitation, behavioral problems and confusion. The patient is not nervous/anxious.    All other systems reviewed and are negative.        PAST MEDICAL HISTORY:  Past Medical History:   Diagnosis Date     Coronary artery disease      Diabetes mellitus (H)      Discitis of thoracolumbar region 10/10/2015     Encephalopathy 10/14/2015      Epidural abscess 10/10/2015     Hip pain, right      Hyperlipidemia      Sepsis (H) 10/8/2015     Stroke (H) 06/23/2015    Neurology felt that episode was C/W subacute ischemic stroke     TIA (transient ischemic attack) 6/23/2015     UTI (urinary tract infection)     admitted to [facility] with UTI       PAST SURGICAL HISTORY:  Past Surgical History:   Procedure Laterality Date     ANGIOPLASTY  03/30/2016    Drug eluting stent mid LAD, cutting balloon to 1st diagonal     ANGIOPLASTY  2008     BYPASS GRAFT ARTERY CORONARY  12/11/2007    X 3 vessels, LIMA to LAD, saph vein graft to distal RCA, saphvein graft to marginal, mini circuit bypass.  [facility].     CORONARY STENT PLACEMENT  2008    Left main, left circumflex, RCA     CORONARY STENT PLACEMENT  03/2016     CV CORONARY ANGIOGRAM N/A 8/1/2018    Procedure: Coronary Angiogram;  Surgeon: Kit Bean MD;  Location: [facility] Cath Lab;  Service:      CV LEFT HEART CATHETERIZATION WITH LEFT VENTRICULOGRAM N/A 8/1/2018    Procedure: Left Heart Catheterization with Left Ventriculogram;  Surgeon: Kit Bean MD;  Location: [facility] Cath Lab;  Service:      INSERT MIDLINE HE  10/31/2015          LUMBAR DISCECTOMY N/A 10/11/2015    Procedure: BILATERAL L1-L5 DECOMPRESSIVE LAMINECTOMY WITH EVACUATION OF EPIDURAL ABSCESS IRRIGATION & DEBRIDEMENT;  Surgeon: Luli Chan MD;  Location: [facility] OR;  Service:      PICC  10/19/2015          RELEASE CARPAL TUNNEL Bilateral            CURRENT MEDICATIONS:    cyclobenzaprine (FLEXERIL) 10 MG tablet  lidocaine (LIDODERM) 5 % patch  atorvastatin (LIPITOR) 80 MG tablet  busPIRone (BUSPAR) 15 MG tablet  Continuous Blood Gluc Sensor (DEXCOM G6 SENSOR) MISC  Continuous Blood Gluc Transmit (DEXCOM G6 TRANSMITTER) MISC  gabapentin (NEURONTIN) 300 MG capsule  insulin glargine (LANTUS SOLOSTAR) 100 UNIT/ML pen  isosorbide mononitrate (IMDUR) 60 MG 24 hr tablet  losartan (COZAAR) 100 MG  "tablet  metFORMIN (GLUCOPHAGE) 1000 MG tablet  sertraline (ZOLOFT) 100 MG tablet  sotalol (BETAPACE) 80 MG tablet  traMADol (ULTRAM) 50 MG tablet  traZODone (DESYREL) 50 MG tablet  warfarin ANTICOAGULANT (COUMADIN) 5 MG tablet        ALLERGIES:  Allergies   Allergen Reactions     Oxycodone        FAMILY HISTORY:  Family History   Problem Relation Age of Onset     Cerebrovascular Disease Mother      Diabetes Father      Diabetes Sister      Cerebrovascular Disease Sister 81.00     Cerebrovascular Disease Brother      Diabetes Brother        SOCIAL HISTORY:   Social History     Socioeconomic History     Marital status: Single     Spouse name: Not on file     Number of children: 1     Years of education: Not on file     Highest education level: Not on file   Occupational History     Not on file   Tobacco Use     Smoking status: Former Smoker     Quit date: 2007     Years since quittin.9     Smokeless tobacco: Never Used   Substance and Sexual Activity     Alcohol use: No     Comment: Alcoholic Drinks/day: stopped drinking 2015     Drug use: No     Sexual activity: Never   Other Topics Concern     Not on file   Social History Narrative    Lives alone with cats and dogs. , one daughter, Tory Lofton.      Social Determinants of Health     Financial Resource Strain: Not on file   Food Insecurity: Not on file   Transportation Needs: Not on file   Physical Activity: Not on file   Stress: Not on file   Social Connections: Not on file   Intimate Partner Violence: Not on file   Housing Stability: Not on file         VITALS:  Patient Vitals for the past 24 hrs:   BP Temp Temp src Pulse Resp SpO2 Height Weight   22 1045 (!) 161/57 99.1  F (37.3  C) Oral 55 18 99 % 1.575 m (5' 2\") 81.6 kg (180 lb)       PHYSICAL EXAM    Physical Exam  Vitals reviewed.   Constitutional:       General: She is not in acute distress.     Appearance: Normal appearance. She is not ill-appearing, toxic-appearing or diaphoretic. "   HENT:      Head: Normocephalic and atraumatic.      Nose: No congestion.      Mouth/Throat:      Mouth: Mucous membranes are moist.      Pharynx: Oropharynx is clear.   Eyes:      General: No scleral icterus.        Right eye: No discharge.         Left eye: No discharge.   Cardiovascular:      Rate and Rhythm: Normal rate.      Heart sounds: No murmur heard.  Pulmonary:      Effort: Pulmonary effort is normal. No respiratory distress.      Breath sounds: Normal breath sounds. No stridor. No wheezing or rales.   Abdominal:      General: Abdomen is flat. There is no distension.      Palpations: Abdomen is soft.      Tenderness: There is no abdominal tenderness. There is no right CVA tenderness, left CVA tenderness or guarding.   Musculoskeletal:         General: No swelling or deformity. Normal range of motion.      Cervical back: Normal range of motion and neck supple.      Right lower leg: No edema.      Left lower leg: No edema.      Comments: Difficulty straightening left leg due to pain in buttocks. There is piriformis tenderness. No tenderness with midline spine palpation No laxity with anterior/posterior drawer. No pain with medial/lateral joint palpation.    Skin:     General: Skin is warm.      Capillary Refill: Capillary refill takes less than 2 seconds.      Coloration: Skin is not jaundiced.      Findings: No bruising, erythema or rash.      Comments: 3x3cm abrasion overlying anterior left knee   Neurological:      General: No focal deficit present.      Mental Status: She is alert and oriented to person, place, and time.      Cranial Nerves: No cranial nerve deficit.   Psychiatric:         Mood and Affect: Mood normal.         Behavior: Behavior normal.            LAB:  All pertinent labs reviewed and interpreted.    Labs Ordered and Resulted from Time of ED Arrival to Time of ED Departure - No data to display      RADIOLOGY:  Reviewed all pertinent imaging. Please see official radiology report    XR  Knee Left 3 Views   Final Result   IMPRESSION: The left knee is negative for fracture. No compartmental narrowing or effusion.      XR Pelvis and Hip Left 2 Views   Final Result   IMPRESSION: Both hips are negative for fracture. Degenerative change both hip joints. Pelvis negative for fracture. Degenerative change at the SI joints and lower lumbar spine.                               Rere Babb PA-C  Emergency Medicine  [facility] EMERGENCY ROOM  [facility] [facility] [address] [phone]  Dept: [phone]    This note has in part been created with speech recognition technology and may create an occasional, unintended word/grammar substitution. Errors are generally corrected in real time. Please message me via [employer] In Basket if you note any errors requiring clarification.       Rere Babb PA-C  06/03/22 1903

## 2022-06-03 NOTE — DISCHARGE INSTRUCTIONS
You were seen in the emergency department for left sided buttock/hip pain which traveled down the leg and left knee pain.  Thankfully, you do not have any broken bones.  The left buttock/hip pain is likely due to your sciatic nerve being irritated.  The muscles that run near this nerve likely became strained after your fall.  We are going to try muscle relaxing medication and lidocaine patches to help get those muscles to relax which should improve the nerve pain.    I have also placed a referral for physical therapy and for evaluation at the spine clinic.  They will call you to make these appointments.  If you do not hear from the clinic coordinator in 48 hours, you may also call the [place] spine phone number below.     Return to the emergency department if you develop increased pain, a fever or you have numbness in the groin/new loss of bowel or bladder control (not just cant get to the bathroom fast enough but all of a sudden peeing on yourself without warning)

## 2022-06-03 NOTE — ED TRIAGE NOTES
Patient presents to the ED with complaints of a fall yesterday, denies head injury but endorses left hip and knee pain.

## 2022-06-20 ENCOUNTER — OFFICE VISIT (OUTPATIENT)
Dept: PHYSICAL MEDICINE AND REHAB | Facility: CLINIC | Age: 72
End: 2022-06-20
Payer: COMMERCIAL

## 2022-06-20 VITALS
WEIGHT: 180 LBS | TEMPERATURE: 98.2 F | SYSTOLIC BLOOD PRESSURE: 147 MMHG | BODY MASS INDEX: 33.13 KG/M2 | HEART RATE: 65 BPM | DIASTOLIC BLOOD PRESSURE: 65 MMHG | HEIGHT: 62 IN

## 2022-06-20 DIAGNOSIS — M54.42 CHRONIC BILATERAL LOW BACK PAIN WITH LEFT-SIDED SCIATICA: Primary | ICD-10-CM

## 2022-06-20 DIAGNOSIS — M54.2 CHRONIC NECK PAIN: ICD-10-CM

## 2022-06-20 DIAGNOSIS — M43.16 SPONDYLOLISTHESIS OF LUMBAR REGION: ICD-10-CM

## 2022-06-20 DIAGNOSIS — R29.898 WEAKNESS OF LEFT LEG: ICD-10-CM

## 2022-06-20 DIAGNOSIS — G57.02 PIRIFORMIS SYNDROME, LEFT: ICD-10-CM

## 2022-06-20 DIAGNOSIS — M47.816 LUMBAR FACET ARTHROPATHY: ICD-10-CM

## 2022-06-20 DIAGNOSIS — M54.16 LUMBAR RADICULOPATHY: ICD-10-CM

## 2022-06-20 DIAGNOSIS — Z98.890 HISTORY OF LUMBAR SURGERY: ICD-10-CM

## 2022-06-20 DIAGNOSIS — G89.29 CHRONIC BILATERAL LOW BACK PAIN WITH LEFT-SIDED SCIATICA: Primary | ICD-10-CM

## 2022-06-20 DIAGNOSIS — G89.29 CHRONIC NECK PAIN: ICD-10-CM

## 2022-06-20 DIAGNOSIS — M54.6 CHRONIC BILATERAL THORACIC BACK PAIN: ICD-10-CM

## 2022-06-20 DIAGNOSIS — G89.29 CHRONIC BILATERAL THORACIC BACK PAIN: ICD-10-CM

## 2022-06-20 PROCEDURE — 99204 OFFICE O/P NEW MOD 45 MIN: CPT | Performed by: NURSE PRACTITIONER

## 2022-06-20 ASSESSMENT — PAIN SCALES - GENERAL: PAINLEVEL: MODERATE PAIN (5)

## 2022-06-20 NOTE — PATIENT INSTRUCTIONS
~[facility] scheduling #[phone].  ~Please call our [facility] Nurse Navigation line [phone] with any questions or concerns about your treatment plan, if symptoms worsen and you would like to be seen urgently, or if you have problems controlling bladder and bowel function.       Imaging has been ordered. Radiology will call you to schedule. Please call below if you do not hear from them in the next couple of days.     [facility] Scheduling    Please call [phone] to schedule your image(s) (select option #1). There are 3 different locations, see below.     [facility]  [address] [facility] [place]  [address], [address]   [place] [id number]    [place] [id number] [place] [id number]

## 2022-06-20 NOTE — LETTER
6/20/2022         RE: Kemi Soto  [address] [place] [id number]        Dear Colleague,    Thank you for referring your patient, Kemi Soto, to the [facility]. Please see a copy of my visit note below.    ASSESSMENT: Kemi Soto is a 72 year old female who presents for consultation at the request of PCP Mayco Zapata, with a past medical history significant for hypercholesterolemia, type 2 diabetes mellitus, hypertension, atrial fibrillation, history of lumbar surgery for evacuation of abscess who presents today for new patient evaluation of:    -Chronic progressive bilateral low back pain with recent fall due to left lower extremity weakness, weakness noted on exam as well.    Patient is neurologically intact on exam. No myelopathic or red flag symptoms.     -Patient does endorse chronic neck and chronic generalized thoracic spine as well.    No flowsheet data found.         Diagnoses and all orders for this visit:  Chronic bilateral low back pain with left-sided sciatica  Piriformis syndrome, left  -     Spine Referral  History of lumbar surgery    PLAN:  Reviewed spine anatomy and disease process. Discussed diagnosis and treatment options with the patient today. A shared decision making model was used.  The patient's values and choices were respected. The following represents what was discussed and decided upon by the provider and the patient.      -DIAGNOSTIC TESTS:  Images were personally reviewed and interpreted and explained to patient today using spine model.   -- Ordered cervical and thoracic spine x-ray.  Ordered lumbar spine MRI--to evaluate chronic bilateral low back pain with left lumbar radiculopathy.  --Lumbar spine x-ray 4/11/2022 with chronic grade 1 spondylolisthesis L4-5 and L5-S1 with facet hypertrophy/arthropathy.  Chronic laminectomy L2-L5.  Slight disc space narrowing at L2-3.  --Left hip x-ray 6/3/2022 negative for fracture,  degenerative changes bilaterally.  Degenerative changes to bilateral SI joints also noted.  -- Bilateral upper extremity EMG with Dr. Kirk 2019 with no evidence of cervical radiculopathy.    -PHYSICAL THERAPY: Consider physical therapy pending imaging review.  Discussed the importance of core strengthening, ROM, stretching exercises with the patient and how each of these entities is important in decreasing pain.  Explained to the patient that the purpose of physical therapy is to teach the patient a home exercise program.  These exercises need to be performed every day in order to decrease pain and prevent future occurrences of pain.        -MEDICATIONS: No changes in medications today  Discussed multiple medication options today with patient. Discussed risks, side effects, and proper use of medications. Patient verbalized understanding.    -INTERVENTIONS: Consider injections pending imaging review.  Discussed risks and benefits of injections with patient today.    -PATIENT EDUCATION:  Total time of 46 minutes, on the day of service, spent with the patient, reviewing the chart, placing orders, and documenting.   -Today we also discussed the issues related to the current COVID-19 pandemic, the pros and cons of the current treatment plan, the CDC guidelines such as social distancing, washing hands, masking, and covering the cough.    -FOLLOW-UP:   Follow-up for nurse navigator phone call versus in person visit for imaging review.    Advised patient to call the Spine Center if symptoms worsen or you have problems controlling bladder and bowel function.   ______________________________________________________________________    SUBJECTIVE:  HPI:  Kemi Soto  Is a 72 year old female who presents today for new patient evaluation of low back pain generalized lumbosacral junction that has been intermittent for years even before her spine surgery with recent flareup and progression of low back pain as well as a  recent fall where she felt like her left leg gave out on her and she did go to the ED on 6/3/2022 because of this.  She was also seen in clinic in March where she felt like she threw her back out and did see physical therapy for a couple of sessions.        *Post surgery neurosurgical evaluation with Dr. Cuello 2016 status post evacuation of spontaneous epidural abscess in the lumbar spine lumbar laminectomy with medical management of cervical epidural abscess.    -Treatment to Date: Decompressive lumbar laminectomy L1-L5 for evacuation of abscess 10/11/2015 Dr. Cuello.  3-month antibiotic course post surgery, patient then had a drug-eluting stent placed 4/16/2016    -Medications:    Current Outpatient Medications   Medication     atorvastatin (LIPITOR) 80 MG tablet     busPIRone (BUSPAR) 15 MG tablet     Continuous Blood Gluc Sensor (DEXCOM G6 SENSOR) MISC     Continuous Blood Gluc Transmit (DEXCOM G6 TRANSMITTER) MISC     gabapentin (NEURONTIN) 300 MG capsule     insulin glargine (LANTUS SOLOSTAR) 100 UNIT/ML pen     isosorbide mononitrate (IMDUR) 60 MG 24 hr tablet     lidocaine (LIDODERM) 5 % patch     losartan (COZAAR) 100 MG tablet     metFORMIN (GLUCOPHAGE) 1000 MG tablet     sertraline (ZOLOFT) 100 MG tablet     sotalol (BETAPACE) 80 MG tablet     traMADol (ULTRAM) 50 MG tablet     traZODone (DESYREL) 50 MG tablet     warfarin ANTICOAGULANT (COUMADIN) 5 MG tablet     No current facility-administered medications for this visit.       Allergies   Allergen Reactions     Oxycodone        Past Medical History:   Diagnosis Date     Coronary artery disease      Diabetes mellitus (H)      Discitis of thoracolumbar region 10/10/2015     Encephalopathy 10/14/2015     Epidural abscess 10/10/2015     Hip pain, right      Hyperlipidemia      Sepsis (H) 10/8/2015     Stroke (H) 06/23/2015    Neurology felt that episode was C/W subacute ischemic stroke     TIA (transient ischemic attack) 6/23/2015     UTI  (urinary tract infection)     admitted to [facility] with UTI        Patient Active Problem List   Diagnosis     Obesity     Hypercholesterolemia     Essential hypertension     Type 2 diabetes mellitus (H)     Benign neoplasm of choroid     Paroxysmal atrial fibrillation (H)     Bilateral low back pain without sciatica, unspecified chronicity       Past Surgical History:   Procedure Laterality Date     ANGIOPLASTY  03/30/2016    Drug eluting stent mid LAD, cutting balloon to 1st diagonal     ANGIOPLASTY  2008     BYPASS GRAFT ARTERY CORONARY  12/11/2007    X 3 vessels, LIMA to LAD, saph vein graft to distal RCA, saphvein graft to marginal, mini circuit bypass.  [facility].     CORONARY STENT PLACEMENT  2008    Left main, left circumflex, RCA     CORONARY STENT PLACEMENT  03/2016     CV CORONARY ANGIOGRAM N/A 8/1/2018    Procedure: Coronary Angiogram;  Surgeon: Kit Bean MD;  Location: [facility] Cath Lab;  Service:      CV LEFT HEART CATHETERIZATION WITH LEFT VENTRICULOGRAM N/A 8/1/2018    Procedure: Left Heart Catheterization with Left Ventriculogram;  Surgeon: Kit Bean MD;  Location: [facility] Cath Lab;  Service:      INSERT MIDLINE HE  10/31/2015          LUMBAR DISCECTOMY N/A 10/11/2015    Procedure: BILATERAL L1-L5 DECOMPRESSIVE LAMINECTOMY WITH EVACUATION OF EPIDURAL ABSCESS IRRIGATION & DEBRIDEMENT;  Surgeon: Luli Chan MD;  Location: [facility] OR;  Service:      PICC  10/19/2015          RELEASE CARPAL TUNNEL Bilateral        Family History   Problem Relation Age of Onset     Cerebrovascular Disease Mother      Diabetes Father      Diabetes Sister      Cerebrovascular Disease Sister 81.00     Cerebrovascular Disease Brother      Diabetes Brother        Reviewed past medical, surgical, and family history with patient found on new patient intake packet located in EMR Media tab.     SOCIAL HX: Patient is retired.  Patient denies smoking/tobacco use, denies  AVF (arteriovenous fistula)    H/O foot surgery  sec to ankle fracture  S/P cataract extraction    S/P cholecystectomy    S/P coronary artery stent placement "alcohol use, does report history of being a heavy drinker, denies recreational drug use.    ROS: Positive for joint pain, muscle pain, itching, open wounds, imbalance, falls, insomnia, diarrhea, headache.  Specifically negative for bowel/bladder dysfunction, balance changes, headache, dizziness, foot drop, fevers, chills, appetite changes, nausea/vomiting, unexplained weight loss. Otherwise 13 systems reviewed are negative. Please see the patient's intake questionnaire from today for details.    OBJECTIVE:  BP (!) 147/65   Pulse 65   Temp 98.2  F (36.8  C) (Oral)   Ht 5' 2\" (1.575 m)   Wt 180 lb (81.6 kg)   BMI 32.92 kg/m      PHYSICAL EXAMINATION:    --CONSTITUTIONAL:  Vital signs as above.  No acute distress.  The patient is well nourished and well groomed.  --PSYCHIATRIC:  Appropriate mood and affect. The patient is awake, alert, oriented to person, place, time and answering questions appropriately with clear speech.    --SKIN:  Skin over the face, bilateral lower extremities, and posterior torso is clean, dry, intact without rashes.    --RESPIRATORY: Normal rhythm and effort. No abnormal accessory muscle breathing patterns noted.   --ABDOMINAL:  Non-distended.  --STANDING EXAMINATION:  Normal lumbar lordosis noted, no lateral shift.  --MUSCULOSKELETAL: Lumbar spine inspection reveals no evidence of deformity. Range of motion is not limited in lumbar flexion, extension, lateral rotation. No tenderness to palpation lumbar spine. Straight leg raising in the seated position is negative to radicular pain. Sciatic notch non-tender.  --GROSS MOTOR: Gait is non-antalgic.   --LOWER EXTREMITY MOTOR TESTING:  Plantar flexion left 4/5, right 5/5   Dorsiflexion left 4/5, right 5/5   Great toe MTP extension left 5/5, right 5/5  Knee flexion left 3/5, right 5/5  Knee extension left 4/5, right 5/5   Hip flexion left 4/5, right 5/5  Hip abduction left 5/5, right 5/5  Hip adduction left 5/5, right 5/5   --HIPS: Full range of " motion bilaterally.   --NEUROLOGICAL:  2/4 patellar, medial hamstring, and achilles reflexes bilaterally.  Sensation to light touch is intact in the bilateral L4, L5, and S1 dermatomes. Babinski is negative. No clonus.  Negative Luz Maria reflex bilaterally.  --VASCULAR:  2/4 dorsalis pedis and posterior tibialsi pulses bilaterally.  Bilateral lower extremities are warm.  There is no pitting edema of the bilateral lower extremities.    RESULTS: Prior medical records from [facility] and [facility] Everywhere were reviewed today.    Imaging: spine Imaging was personally reviewed and interpreted today. The images were shown to the patient and the findings were explained using a spine model.      XR Pelvis and Hip Left 2 Views  Result Date: 6/3/2022  EXAM: XR PELVIS AND HIP LEFT 2 VIEWS LOCATION: [facility] DATE/TIME: 6/3/2022 11:20 AM INDICATION: Fall, pain. COMPARISON: None.   IMPRESSION: Both hips are negative for fracture. Degenerative change both hip joints. Pelvis negative for fracture. Degenerative change at the SI joints and lower lumbar spine.           XR Knee Left 3 Views  Result Date: 6/3/2022  EXAM: XR KNEE LEFT 3 VIEWS LOCATION: [facility] DATE/TIME: 6/3/2022 11:30 AM INDICATION: fall, pain COMPARISON: None.   IMPRESSION: The left knee is negative for fracture. No compartmental narrowing or effusion.      XR LUMBAR SPINE 2-3 VIEWS  LOCATION: [facility]  DATE/TIME: 4/11/2022 1:42 PM  INDICATION:  Bilateral low back pain without sciatica, unspecified chronicity  COMPARISON: MRI 04/13/2016.  TECHNIQUE: CR Lumbar Spine.                                                            IMPRESSION: There is chronic grade 1 anterolisthesis of L4 over L5 and L5 over S1. This is secondary to significant facet hypertrophy at L4-L5 and L5-S1. There is chronic laminectomy from L2 through L5. There is slight narrowing of the disc space at   L2-L3.  Small Schmorl's nodes are present at L1-L2 endplates. Overall appearance of these findings is unchanged compared to the MRI of 04/13/2016. Extensive vascular aortic calcification is visualized.           Again, thank you for allowing me to participate in the care of your patient.        Sincerely,        Chula Garcia, CNP

## 2022-06-20 NOTE — PROGRESS NOTES
ASSESSMENT: Kemi Soto is a 72 year old female who presents for consultation at the request of PCP Mayco Zapata, with a past medical history significant for hypercholesterolemia, type 2 diabetes mellitus, hypertension, atrial fibrillation, history of lumbar surgery for evacuation of abscess who presents today for new patient evaluation of:    -Chronic progressive bilateral low back pain with recent fall due to left lower extremity weakness, weakness noted on exam as well.    Patient is neurologically intact on exam. No myelopathic or red flag symptoms.     -Patient does endorse chronic neck and chronic generalized thoracic spine as well.    No flowsheet data found.         Diagnoses and all orders for this visit:  Chronic bilateral low back pain with left-sided sciatica  Piriformis syndrome, left  -     Spine Referral  History of lumbar surgery    PLAN:  Reviewed spine anatomy and disease process. Discussed diagnosis and treatment options with the patient today. A shared decision making model was used.  The patient's values and choices were respected. The following represents what was discussed and decided upon by the provider and the patient.      -DIAGNOSTIC TESTS:  Images were personally reviewed and interpreted and explained to patient today using spine model.   -- Ordered cervical and thoracic spine x-ray.  Ordered lumbar spine MRI--to evaluate chronic bilateral low back pain with left lumbar radiculopathy.  --Lumbar spine x-ray 4/11/2022 with chronic grade 1 spondylolisthesis L4-5 and L5-S1 with facet hypertrophy/arthropathy.  Chronic laminectomy L2-L5.  Slight disc space narrowing at L2-3.  --Left hip x-ray 6/3/2022 negative for fracture, degenerative changes bilaterally.  Degenerative changes to bilateral SI joints also noted.  -- Bilateral upper extremity EMG with Dr. Kirk 2019 with no evidence of cervical radiculopathy.    -PHYSICAL THERAPY: Consider physical therapy pending imaging  review.  Discussed the importance of core strengthening, ROM, stretching exercises with the patient and how each of these entities is important in decreasing pain.  Explained to the patient that the purpose of physical therapy is to teach the patient a home exercise program.  These exercises need to be performed every day in order to decrease pain and prevent future occurrences of pain.        -MEDICATIONS: No changes in medications today  Discussed multiple medication options today with patient. Discussed risks, side effects, and proper use of medications. Patient verbalized understanding.    -INTERVENTIONS: Consider injections pending imaging review.  Discussed risks and benefits of injections with patient today.    -PATIENT EDUCATION:  Total time of 46 minutes, on the day of service, spent with the patient, reviewing the chart, placing orders, and documenting.   -Today we also discussed the issues related to the current COVID-19 pandemic, the pros and cons of the current treatment plan, the CDC guidelines such as social distancing, washing hands, masking, and covering the cough.    -FOLLOW-UP:   Follow-up for nurse navigator phone call versus in person visit for imaging review.    Advised patient to call the Spine Center if symptoms worsen or you have problems controlling bladder and bowel function.   ______________________________________________________________________    SUBJECTIVE:  HPI:  Kemi Soto  Is a 72 year old female who presents today for new patient evaluation of low back pain generalized lumbosacral junction that has been intermittent for years even before her spine surgery with recent flareup and progression of low back pain as well as a recent fall where she felt like her left leg gave out on her and she did go to the ED on 6/3/2022 because of this.  She was also seen in clinic in March where she felt like she threw her back out and did see physical therapy for a couple of  sessions.        *Post surgery neurosurgical evaluation with Dr. Cuello 2016 status post evacuation of spontaneous epidural abscess in the lumbar spine lumbar laminectomy with medical management of cervical epidural abscess.    -Treatment to Date: Decompressive lumbar laminectomy L1-L5 for evacuation of abscess 10/11/2015 Dr. Cuello.  3-month antibiotic course post surgery, patient then had a drug-eluting stent placed 4/16/2016    -Medications:    Current Outpatient Medications   Medication     atorvastatin (LIPITOR) 80 MG tablet     busPIRone (BUSPAR) 15 MG tablet     Continuous Blood Gluc Sensor (DEXCOM G6 SENSOR) MISC     Continuous Blood Gluc Transmit (DEXCOM G6 TRANSMITTER) MISC     gabapentin (NEURONTIN) 300 MG capsule     insulin glargine (LANTUS SOLOSTAR) 100 UNIT/ML pen     isosorbide mononitrate (IMDUR) 60 MG 24 hr tablet     lidocaine (LIDODERM) 5 % patch     losartan (COZAAR) 100 MG tablet     metFORMIN (GLUCOPHAGE) 1000 MG tablet     sertraline (ZOLOFT) 100 MG tablet     sotalol (BETAPACE) 80 MG tablet     traMADol (ULTRAM) 50 MG tablet     traZODone (DESYREL) 50 MG tablet     warfarin ANTICOAGULANT (COUMADIN) 5 MG tablet     No current facility-administered medications for this visit.       Allergies   Allergen Reactions     Oxycodone        Past Medical History:   Diagnosis Date     Coronary artery disease      Diabetes mellitus (H)      Discitis of thoracolumbar region 10/10/2015     Encephalopathy 10/14/2015     Epidural abscess 10/10/2015     Hip pain, right      Hyperlipidemia      Sepsis (H) 10/8/2015     Stroke (H) 06/23/2015    Neurology felt that episode was C/W subacute ischemic stroke     TIA (transient ischemic attack) 6/23/2015     UTI (urinary tract infection)     [place] [facility]        Patient Active Problem List   Diagnosis     Obesity     Hypercholesterolemia     Essential hypertension     Type 2 diabetes mellitus (H)     Benign neoplasm of  choroid     Paroxysmal atrial fibrillation (H)     Bilateral low back pain without sciatica, unspecified chronicity       Past Surgical History:   Procedure Laterality Date     ANGIOPLASTY  03/30/2016    Drug eluting stent mid LAD, cutting balloon to 1st diagonal     ANGIOPLASTY  2008     BYPASS GRAFT ARTERY CORONARY  12/11/2007    X 3 vessels, LIMA to LAD, saph vein graft to distal RCA, saphvein graft to marginal, mini circuit bypass.  [facility].     CORONARY STENT PLACEMENT  2008    Left main, left circumflex, RCA     CORONARY STENT PLACEMENT  03/2016     CV CORONARY ANGIOGRAM N/A 8/1/2018    Procedure: Coronary Angiogram;  Surgeon: Kit Bean MD;  Location: [facility] Cath Lab;  Service:      CV LEFT HEART CATHETERIZATION WITH LEFT VENTRICULOGRAM N/A 8/1/2018    Procedure: Left Heart Catheterization with Left Ventriculogram;  Surgeon: Kit Bean MD;  Location: [facility] Cath Lab;  Service:      INSERT MIDLINE HE  10/31/2015          LUMBAR DISCECTOMY N/A 10/11/2015    Procedure: BILATERAL L1-L5 DECOMPRESSIVE LAMINECTOMY WITH EVACUATION OF EPIDURAL ABSCESS IRRIGATION & DEBRIDEMENT;  Surgeon: Luli Chan MD;  Location: [facility] OR;  Service:      PICC  10/19/2015          RELEASE CARPAL TUNNEL Bilateral        Family History   Problem Relation Age of Onset     Cerebrovascular Disease Mother      Diabetes Father      Diabetes Sister      Cerebrovascular Disease Sister 81.00     Cerebrovascular Disease Brother      Diabetes Brother        Reviewed past medical, surgical, and family history with patient found on new patient intake packet located in EMR [employer] tab.     SOCIAL HX: Patient is retired.  Patient denies smoking/tobacco use, denies alcohol use, does report history of being a heavy drinker, denies recreational drug use.    ROS: Positive for joint pain, muscle pain, itching, open wounds, imbalance, falls, insomnia, diarrhea, headache.  Specifically negative for  "bowel/bladder dysfunction, balance changes, headache, dizziness, foot drop, fevers, chills, appetite changes, nausea/vomiting, unexplained weight loss. Otherwise 13 systems reviewed are negative. Please see the patient's intake questionnaire from today for details.    OBJECTIVE:  BP (!) 147/65   Pulse 65   Temp 98.2  F (36.8  C) (Oral)   Ht 5' 2\" (1.575 m)   Wt 180 lb (81.6 kg)   BMI 32.92 kg/m      PHYSICAL EXAMINATION:    --CONSTITUTIONAL:  Vital signs as above.  No acute distress.  The patient is well nourished and well groomed.  --PSYCHIATRIC:  Appropriate mood and affect. The patient is awake, alert, oriented to person, place, time and answering questions appropriately with clear speech.    --SKIN:  Skin over the face, bilateral lower extremities, and posterior torso is clean, dry, intact without rashes.    --RESPIRATORY: Normal rhythm and effort. No abnormal accessory muscle breathing patterns noted.   --ABDOMINAL:  Non-distended.  --STANDING EXAMINATION:  Normal lumbar lordosis noted, no lateral shift.  --MUSCULOSKELETAL: Lumbar spine inspection reveals no evidence of deformity. Range of motion is not limited in lumbar flexion, extension, lateral rotation. No tenderness to palpation lumbar spine. Straight leg raising in the seated position is negative to radicular pain. Sciatic notch non-tender.  --GROSS MOTOR: Gait is non-antalgic.   --LOWER EXTREMITY MOTOR TESTING:  Plantar flexion left 4/5, right 5/5   Dorsiflexion left 4/5, right 5/5   Great toe MTP extension left 5/5, right 5/5  Knee flexion left 3/5, right 5/5  Knee extension left 4/5, right 5/5   Hip flexion left 4/5, right 5/5  Hip abduction left 5/5, right 5/5  Hip adduction left 5/5, right 5/5   --HIPS: Full range of motion bilaterally.   --NEUROLOGICAL:  2/4 patellar, medial hamstring, and achilles reflexes bilaterally.  Sensation to light touch is intact in the bilateral L4, L5, and S1 dermatomes. Babinski is negative. No clonus.  Negative " Luz Maria reflex bilaterally.  --VASCULAR:  2/4 dorsalis pedis and posterior tibialsi pulses bilaterally.  Bilateral lower extremities are warm.  There is no pitting edema of the bilateral lower extremities.    RESULTS: Prior medical records from [facility] and Care Everywhere were reviewed today.    Imaging: spine Imaging was personally reviewed and interpreted today. The images were shown to the patient and the findings were explained using a spine model.      XR Pelvis and Hip Left 2 Views  Result Date: 6/3/2022  EXAM: XR PELVIS AND HIP LEFT 2 VIEWS LOCATION: [facility] DATE/TIME: 6/3/2022 11:20 AM INDICATION: Fall, pain. COMPARISON: None.   IMPRESSION: Both hips are negative for fracture. Degenerative change both hip joints. Pelvis negative for fracture. Degenerative change at the SI joints and lower lumbar spine.           XR Knee Left 3 Views  Result Date: 6/3/2022  EXAM: XR KNEE LEFT 3 VIEWS LOCATION: [facility] DATE/TIME: 6/3/2022 11:30 AM INDICATION: fall, pain COMPARISON: None.   IMPRESSION: The left knee is negative for fracture. No compartmental narrowing or effusion.      XR LUMBAR SPINE 2-3 VIEWS  LOCATION: [facility]  DATE/TIME: 4/11/2022 1:42 PM  INDICATION:  Bilateral low back pain without sciatica, unspecified chronicity  COMPARISON: MRI 04/13/2016.  TECHNIQUE: CR Lumbar Spine.                                                            IMPRESSION: There is chronic grade 1 anterolisthesis of L4 over L5 and L5 over S1. This is secondary to significant facet hypertrophy at L4-L5 and L5-S1. There is chronic laminectomy from L2 through L5. There is slight narrowing of the disc space at   L2-L3. Small Schmorl's nodes are present at L1-L2 endplates. Overall appearance of these findings is unchanged compared to the MRI of 04/13/2016. Extensive vascular aortic calcification is visualized.

## 2022-06-22 ENCOUNTER — LAB (OUTPATIENT)
Dept: CARDIOLOGY | Facility: CLINIC | Age: 72
End: 2022-06-22
Payer: COMMERCIAL

## 2022-06-22 ENCOUNTER — ANTICOAGULATION THERAPY VISIT (OUTPATIENT)
Dept: ANTICOAGULATION | Facility: CLINIC | Age: 72
End: 2022-06-22

## 2022-06-22 DIAGNOSIS — I25.10 CORONARY ARTERY DISEASE INVOLVING NATIVE CORONARY ARTERY OF NATIVE HEART WITHOUT ANGINA PECTORIS: ICD-10-CM

## 2022-06-22 DIAGNOSIS — I48.0 PAROXYSMAL ATRIAL FIBRILLATION (H): Primary | ICD-10-CM

## 2022-06-22 LAB — INR POINT OF CARE: 4 (ref 0.9–1.1)

## 2022-06-22 PROCEDURE — 85610 PROTHROMBIN TIME: CPT

## 2022-06-22 PROCEDURE — 36416 COLLJ CAPILLARY BLOOD SPEC: CPT

## 2022-06-22 RX ORDER — LOSARTAN POTASSIUM 100 MG/1
100 TABLET ORAL DAILY
Qty: 90 TABLET | Refills: 0 | Status: SHIPPED | OUTPATIENT
Start: 2022-06-22 | End: 2022-10-03

## 2022-06-22 RX ORDER — ISOSORBIDE MONONITRATE 60 MG/1
60 TABLET, EXTENDED RELEASE ORAL DAILY
Qty: 90 TABLET | Refills: 0 | Status: SHIPPED | OUTPATIENT
Start: 2022-06-22 | End: 2022-09-07

## 2022-06-22 NOTE — PROGRESS NOTES
ANTICOAGULATION MANAGEMENT     Kemi Soto 72 year old female is on warfarin with supratherapeutic INR result. (Goal INR 2.0-3.0)    Recent labs: (last 7 days)     06/22/22  1428   INR 4.0*       ASSESSMENT       Source(s): Chart Review, Patient/Caregiver Call and Template       Warfarin doses taken: More warfarin taken than planned which may be affecting INR - per patient she might have doubled her dose yesterday.    Diet: No new diet changes identified    New illness, injury, or hospitalization: Yes: 6/3 ED visit for left knee abrasion sustained after a fall    Medication/supplement changes: None noted    Signs or symptoms of bleeding or clotting: No    Previous INR: Therapeutic last 2(+) visits    Additional findings: Referred to [facility] and had OV yesterday stated that she will have MRI for back issues.       PLAN     Recommended plan for temporary change(s) affecting INR     Dosing Instructions: hold dose then continue your current warfarin dose with next INR in 7-10 days       Summary  As of 6/22/2022    Full warfarin instructions:  6/22: Hold; Otherwise 2.5 mg every Mon, Fri; 5 mg all other days   Next INR check:  7/1/2022             Telephone call with Ciarra who verbalizes understanding and agrees to plan    Lab visit scheduled    Education provided: Importance of therapeutic range, Importance of following up at instructed interval, Importance of taking warfarin as instructed and Monitoring for bleeding signs and symptoms    Plan made per [facility] anticoagulation protocol    Cony Noonan RN  Anticoagulation Clinic  6/22/2022    _______________________________________________________________________     Anticoagulation Episode Summary     Current INR goal:  2.0-3.0   TTR:  76.0 % (10.7 mo)   Target end date:  Indefinite   Send INR reminders to:  [facility]    Indications    Paroxysmal atrial fibrillation (H) [I48.0]           Comments:  Patient is no longer using home meter          Anticoagulation Care Providers     Provider Role Specialty Phone number    Chuck Hernandez MD Referring Cardiovascular Disease [phone]

## 2022-06-30 ENCOUNTER — ANTICOAGULATION THERAPY VISIT (OUTPATIENT)
Dept: ANTICOAGULATION | Facility: CLINIC | Age: 72
End: 2022-06-30

## 2022-06-30 ENCOUNTER — LAB (OUTPATIENT)
Dept: LAB | Facility: CLINIC | Age: 72
End: 2022-06-30
Payer: COMMERCIAL

## 2022-06-30 DIAGNOSIS — I48.0 PAROXYSMAL ATRIAL FIBRILLATION (H): Primary | ICD-10-CM

## 2022-06-30 LAB — INR BLD: 1.8 (ref 0.9–1.1)

## 2022-06-30 PROCEDURE — 36415 COLL VENOUS BLD VENIPUNCTURE: CPT

## 2022-06-30 PROCEDURE — 85610 PROTHROMBIN TIME: CPT

## 2022-06-30 NOTE — PROGRESS NOTES
ANTICOAGULATION MANAGEMENT     Kemi Holmy 72 year old female is on warfarin with subtherapeutic INR result. (Goal INR 2.0-3.0)    Recent labs: (last 7 days)     06/30/22  1118   INR 1.8*       ASSESSMENT       Source(s): Chart Review and Template       Warfarin doses taken: Warfarin recently held 6/23 versus 6/22 for INR 4.0 which may be affecting INR    Diet: No new diet changes identified    New illness, injury, or hospitalization: No    Medication/supplement changes: None noted    Signs or symptoms of bleeding or clotting: No    Previous INR: Supratherapeutic    Additional findings: None       PLAN     Recommended plan for temporary change(s) affecting INR     Dosing Instructions: continue your current warfarin dose with next INR in 2 weeks       Summary  As of 6/30/2022    Full warfarin instructions:  2.5 mg every Mon, Fri; 5 mg all other days   Next INR check:  7/14/2022             Telephone call with Ciarra who verbalizes understanding and agrees to plan    Check at provider office visit    Education provided: Importance of therapeutic range, Importance of following up at instructed interval and Importance of taking warfarin as instructed    Plan made per [facility] anticoagulation protocol    Cony Noonan RN  Anticoagulation Clinic  6/30/2022    _______________________________________________________________________     Anticoagulation Episode Summary     Current INR goal:  2.0-3.0   TTR:  75.2 % (11 mo)   Target end date:  Indefinite   Send INR reminders to:  [facility]    Indications    Paroxysmal atrial fibrillation (H) [I48.0]           Comments:  Patient is no longer using home meter         Anticoagulation Care Providers     Provider Role Specialty Phone number    Chuck Hernandez MD Referring Cardiovascular Disease [phone]

## 2022-07-12 ENCOUNTER — OFFICE VISIT (OUTPATIENT)
Dept: CARDIOLOGY | Facility: CLINIC | Age: 72
End: 2022-07-12
Attending: INTERNAL MEDICINE
Payer: COMMERCIAL

## 2022-07-12 ENCOUNTER — LAB (OUTPATIENT)
Dept: CARDIOLOGY | Facility: CLINIC | Age: 72
End: 2022-07-12

## 2022-07-12 ENCOUNTER — ANTICOAGULATION THERAPY VISIT (OUTPATIENT)
Dept: ANTICOAGULATION | Facility: CLINIC | Age: 72
End: 2022-07-12

## 2022-07-12 VITALS
HEART RATE: 63 BPM | DIASTOLIC BLOOD PRESSURE: 50 MMHG | WEIGHT: 178 LBS | OXYGEN SATURATION: 98 % | SYSTOLIC BLOOD PRESSURE: 146 MMHG | RESPIRATION RATE: 18 BRPM | BODY MASS INDEX: 32.56 KG/M2

## 2022-07-12 DIAGNOSIS — I48.0 PAROXYSMAL ATRIAL FIBRILLATION (H): Primary | ICD-10-CM

## 2022-07-12 DIAGNOSIS — I25.83 CORONARY ARTERY DISEASE DUE TO LIPID RICH PLAQUE: ICD-10-CM

## 2022-07-12 DIAGNOSIS — I48.3 TYPICAL ATRIAL FLUTTER (H): ICD-10-CM

## 2022-07-12 DIAGNOSIS — I48.0 PAROXYSMAL ATRIAL FIBRILLATION (H): ICD-10-CM

## 2022-07-12 DIAGNOSIS — I25.10 CORONARY ARTERY DISEASE DUE TO LIPID RICH PLAQUE: ICD-10-CM

## 2022-07-12 DIAGNOSIS — I25.10 CORONARY ARTERY DISEASE INVOLVING NATIVE CORONARY ARTERY OF NATIVE HEART WITHOUT ANGINA PECTORIS: ICD-10-CM

## 2022-07-12 LAB — INR POINT OF CARE: 4.9 (ref 0.9–1.1)

## 2022-07-12 PROCEDURE — 85610 PROTHROMBIN TIME: CPT

## 2022-07-12 PROCEDURE — 99214 OFFICE O/P EST MOD 30 MIN: CPT | Performed by: INTERNAL MEDICINE

## 2022-07-12 PROCEDURE — 36416 COLLJ CAPILLARY BLOOD SPEC: CPT

## 2022-07-12 RX ORDER — WARFARIN SODIUM 5 MG/1
2.5-5 TABLET ORAL DAILY
Qty: 90 TABLET | Refills: 3 | Status: SHIPPED | OUTPATIENT
Start: 2022-07-12 | End: 2023-04-12

## 2022-07-12 RX ORDER — SOTALOL HYDROCHLORIDE 80 MG/1
40 TABLET ORAL 2 TIMES DAILY
Qty: 90 TABLET | Refills: 4 | Status: SHIPPED | OUTPATIENT
Start: 2022-07-12 | End: 2023-04-12

## 2022-07-12 NOTE — LETTER
7/12/2022    Mayco Zapata MD  [address], [address]  [place] [facility] [place] [address]    RE: Kemi Soto       Dear Colleague,     I had the pleasure of seeing Kemi Soto in the [facility].    HEART CARE ENCOUNTER CONSULTATON NOTE      [facility]  [phone]      Assessment/Recommendations   Assessment/Plan:  1.  History of typical isthmus flutter and atrial fibrillation.  This has been well controlled on low-dose sotalol.  She had previously been seen by EP colleagues who discussed the possibility of ablation but she decided not to pursue ablation.  She has done well on sotalol.  Plan to repeat a Holter monitor.  She did wear an event monitor approximately 1 year ago that did not demonstrate any reoccurrence of tachycardic rhythm.  Most recent EKG is November 2021 sinus rhythm with a QTC of 432 ms.  She continues on warfarin as she could not afford the newer anticoagulant agents on previous evaluation.  She has had some gait instability.  We did review today the possibility of a watchman device and I did give her a pamphlet to review.    2.  Coronary artery disease.  Multivessel intervention with prior history of LIMA to the LAD and vein graft to an obtuse marginal and right PDA with the vein graft being noted to be occluded a number of years ago.  The most recent angiogram was August 2018 with the right and circumflex lesions that were noted to be small caliber vessel with subsequent intervention to the circumflex.  She had a nuclear stress test November 2020 that was reported negative.    3.  Hypertension.  Blood pressure mildly elevated upon arrival but repeat during my examination was at goal.  I did ask her to periodically monitor her blood pressure.    4.  Dyslipidemia.  She has not had her lipids checked since May 2021 from her primary care provider.  At that time the LDL was mildly above ideal goal at 79.  She is on 80 mg of atorvastatin.  Plan to  repeat the lipids in the next few weeks.    As outlined above with follow-up in 6 months.       History of Present Illness/Subjective    HPI: Keim Soto is a 72 year old female is seen in follow-up.   As outlined she was noted to be tachycardic during our visit November 2020 and was noted to be in atrial flutter with 2-1 conduction.  She was scheduled to travel to [place] and was admitted to the hospital and she converted with low dose of sotalol and wanted to travel to [place].  She was seen by my EP colleague at the timee Dr Boucher discussion was had regarding ablation for both atrial flutter and atrial fibrillation.  She has a history of coronary artery disease with   CABG in 2007 with angiogram in 2008 reporting both SVG (PDA   and diagonal) were occluded. Extensive, complex stenting at   that time of Left main/LAD/LCx and subsequent RCA stenting.    She reports no awareness of rapid heartbeat although she was not aware of higher heart rates when we visited November 2020.  She traveled to [place] and did well.  She does however indicate that she has been a little bit more unsteady on her feet.  She did fall and injured her knee and was evaluated in the ER in June 2022.  She does not think that she had dizziness or lightheadedness but does report that she has been more unsteady on her feet.  We did talk about the risk of bleeding associated with blood thinners.  We talked about a watchman device as an alternative option and I did give her a pamphlet today.  She tells me she has a new blood pressure cuff at home.    Recent Echocardiogram Results:  External Result Report          Contains abnormal data Echocardiogram Complete  Order: [id number]   Status: Final result     Visible to patient: No (inaccessible in MyChart)     Next appt: Today at 01:50 PM in Cardiology (Chuck Hernandez MD)     0 Result Notes    Details    Reading Physician Reading Date Result Priority   Janee Choudhary MD  [phone] 11/5/2020  Routine   Provider, Historical 11/5/2020      Narrative & Impression    No previous study for comparison.    Left ventricle ejection fraction is normal. The calculated left ventricular ejection fraction is 58%.    Normal left ventricular size and systolic function.    Normal right ventricular size and systolic function.    Mild concentric hypertrophy noted.    Left atrial volume is moderately increased.            Recent Coronary Angiogram Results:  Coronary Angiogram: Result Notes     Historical Provider   6/30/2021  4:03 PM CDT         Notes Recorded by Chuck Hernandez MD on 8/2/2018 at 8:44 AM  Hi Pat thanks for the report.Was she going to be discussed at cath conference.Nuke shows a large area of anterior-lateral ischemia  mdg                  Indications    CAD (coronary artery disease) [I25.10 (ICD-10-CM)]   Coronary artery disease involving native coronary artery of native heart without angina pectoris [I25.10 (ICD-10-CM)]           Conclusion      Estimated blood loss was <20 ml.    Prox Cx to Mid Cx lesion 90% stenosed.    Lat 1st Mrg-2 lesion 80% stenosed.    Lat 1st Mrg-1 lesion 90% stenosed.    Mid Cx lesion 80% stenosed.    Ost LAD to Prox LAD lesion 40% stenosed.    A drug eluting .    Ost Cx to Prox Cx lesion 35% stenosed.    A drug eluting .    Ost 1st Diag lesion 50% stenosed.    1st Mrg lesion 70% stenosed.    Mid LAD lesion 100% stenosed.    Ost RCA to Mid RCA lesion 30% stenosed.    Mid RCA lesion 70% stenosed.    Post Atrio lesion 40% stenosed.    Ost RPDA to RPDA lesion 40% stenosed.    The left ventricular size is normal. The left ventricular systolic function is normal. LV systolic pressure is normal. LV end diastolic pressure is normal. There are no wall motion abnormalities in the left ventricle.    Dist LAD-1 lesion 50% stenosed.    Dist LAD-2 lesion 70% stenosed.     Patient has 2 previous saphenous vein grafts known to be occluded.     Both right coronary and circumflex have significant  lesions present.  Both precede small caliber distal vessels.  Options would include partial revascularization by stenting right coronary and circumflex with small caliber stents.  Circumflex has a   relatively long segment of disease.  Alternative would be continued medical management.             Kemi Soto  Percutaneous Coronary Intervention  Order# [id number]  Reading physician: Kit Bean MD Ordering physician: Kit Bean MD Study date: 08/13/2018       Patient Information    Name MRN Description   Ciarra Soto [id number] 68 year old female             Indications    CAD (coronary artery disease) [I25.10 (ICD-10-CM)]   Coronary artery disease [I25.10 (ICD-10-CM)]             Conclusion      Estimated blood loss was <20 ml.    A STENT SYNERGY MR 2.25X28 drug eluting stent was successfully placed.    Prox Cx lesion 90% stenosed.    Dist Cx lesion 80% stenosed.    A STENT SYNERGY MR 2.25X20 drug eluting stent was successfully placed.    A STENT SYNERGY MR 2.25X8 drug eluting stent was successfully placed.    LM lesion 60% stenosed.     Manual removal of RFA sheah  Unable to access right radial artery succesfully  Overnight observation AM discharge     Narrative & Impression   US CAROTID BILATERAL  12/6/2018 3:48 PM     INDICATION: Bilateral carotid bruits.  TECHNIQUE: Duplex exam performed utilizing 2D gray-scale imaging, Doppler interrogation with color-flow and spectral waveform analysis.  COMPARISON: None.     FINDINGS:  RIGHT: There is mild atheromatous plaque. Normal waveforms with no significant stenosis.     LEFT: There is moderate atheromatous plaque. Normal waveforms with no significant stenosis.     Both vertebral arteries and subclavian artery waveforms are normal.     VELOCITY CHART:   The following velocities were obtained in the RIGHT carotid system.  CCA: 75/14 cm/s  ICA: 116/20 cm/s  ECA: 109/5 cm/s  ICA/CCA: PS 1.6     The following velocities were obtained in the LEFT carotid  "system.  CCA: 92/14 cm/s  ICA: 165/28 cm/s  ECA: 170/21 cm/s  ICA/CCA: PS 1.8                           IMPRESSION:  CONCLUSION:  1.  RIGHT: Less than 50% stenosis of the right ICA.  2.  LEFT: 50-69% stenosis of the left ICA     Evaluation based on velocities and NASCET criteria          Sinus bradycardia   Septal infarct , age undetermined   Abnormal ECG   When compared with ECG of 19-MAY-2021 13:33,   Nonspecific T wave abnormality, improved in Lateral leads   Confirmed by FELY MASCORRO MD LOC:WW ([id number]) on 11/4/2021 4:50:03 PM  Normal sinus rhythm   Nonspecific ST and T wave abnormality   Abnormal ECG   When compared with ECG of 09-NOV-2020 14:41,   No significant change was found   Confirmed by RADHA KNAPP MD LOC:JN ([id number]) on 5/19/2021 4:07:15 PM          Conclusion    Mobile cardiac outpatient telemetry MCOT report     Ordering physician:Chuck Hernandez MD.   Indication: Persistent atrial fibrillation.     Prescribed monitoring time: 14 days.  Effective monitoring time: 12 days and 19 hours.        Interpretation:     Patient was monitored for 12 days and 19 hours, baseline rhythm sinus, heart rate averaging 67 bpm, lowest heart rate of 50 and max of 120 bpm.     No prolonged pauses, no high-grade AV block, no significant IVCD or aberrancy.     Infrequent PACs burden is 1% of total beats, no atrial runs, no evidence of PSVT, no evidence of atrial fibrillation or flutter.     Infrequent isolated PVCs without ventricular runs, no sustained or nonsustained VT.     Strip # 11 and 12 show sinus rhythm with downsloping ST depression 1 to 1.5 mm, suggestive but not diagnostic for ischemia.     A single symptomatic transmission on June 7 for \"symptoms other listed\" correlated with sinus rhythm rate of 64 bpm.     physicians: Deejay Warren DO; Provider, Historical Ordering physician: Chuck Hernandez MD Study date: 11/06/2020       Patient Information    Patient Name   Kemi Soto MRN   [id number] " Legal Sex   Female              Age   1950 (72 year old)       NM Lexiscan stress test  Order: [id number]   Status: Final result     Visible to patient: No (inaccessible in MyChart)     Next appt: Today at 01:50 PM in Cardiology (Chuck Hernandez MD)     0 Result Notes    Details    Reading Physician Reading Date Result Priority   Deejay Warren DO  [phone] 2020 Routine   Provider, Historical 2020      Result Text     The nuclear stress test is negative for inducible myocardial ischemia or infarction.     The left ventricular ejection fraction at rest is 64%.     Left ventricular function is normal.             Physical Examination  Review of Systems   Vitals: 146/50,120/60 heart rate of 60s and regular weight 178 pounds  Wt Readings from Last 3 Encounters:   22 81.6 kg (180 lb)   22 81.6 kg (180 lb)   22 80.3 kg (177 lb)       General Appearance:   no distress, normal body habitus   ENT/Mouth: membranes moist, no oral lesions or bleeding gums.      EYES:  no scleral icterus, normal conjunctivae   Neck: no carotid bruits or thyromegaly   Chest/Lungs:   lungs are clear to auscultation, no rales or wheezing, well-healed sternal scar, equal chest wall expansion    Cardiovascular:   Regular. Normal first and second heart sounds with no murmurs, rubs, or gallops; the carotid, radial and posterior tibial pulses are intact, Jugular venous pressure within normal limits, no significant edema bilaterally    Abdomen:  no organomegaly, masses, bruits, or tenderness; bowel sounds are present   Extremities: no cyanosis or clubbing   Skin: no xanthelasma, warm.    Neurologic: normal  bilateral, no tremors     Psychiatric: alert and oriented x3, calm        Please refer above for cardiac ROS details.        Medical History  Surgical History Family History Social History   Past Medical History:   Diagnosis Date     Coronary artery disease      Diabetes mellitus (H)      Discitis  of thoracolumbar region 10/10/2015     Encephalopathy 10/14/2015     Epidural abscess 10/10/2015     Hip pain, right      Hyperlipidemia      Sepsis (H) 10/8/2015     Stroke (H) 06/23/2015    Neurology felt that episode was C/W subacute ischemic stroke     TIA (transient ischemic attack) 6/23/2015     UTI (urinary tract infection)     admitted to [facility] with UTI     Past Surgical History:   Procedure Laterality Date     ANGIOPLASTY  03/30/2016    Drug eluting stent mid LAD, cutting balloon to 1st diagonal     ANGIOPLASTY  2008     BYPASS GRAFT ARTERY CORONARY  12/11/2007    X 3 vessels, LIMA to LAD, saph vein graft to distal RCA, saphvein graft to marginal, mini circuit bypass.  [facility].     CORONARY STENT PLACEMENT  2008    Left main, left circumflex, RCA     CORONARY STENT PLACEMENT  03/2016     CV CORONARY ANGIOGRAM N/A 8/1/2018    Procedure: Coronary Angiogram;  Surgeon: Kit Bean MD;  Location: [facility] Cath Lab;  Service:      CV LEFT HEART CATHETERIZATION WITH LEFT VENTRICULOGRAM N/A 8/1/2018    Procedure: Left Heart Catheterization with Left Ventriculogram;  Surgeon: Kit Bean MD;  Location: [facility] Cath Lab;  Service:      INSERT MIDLINE HE  10/31/2015          LUMBAR DISCECTOMY N/A 10/11/2015    Procedure: BILATERAL L1-L5 DECOMPRESSIVE LAMINECTOMY WITH EVACUATION OF EPIDURAL ABSCESS IRRIGATION & DEBRIDEMENT;  Surgeon: Luli Chan MD;  Location: [facility] OR;  Service:      PICC  10/19/2015          RELEASE CARPAL TUNNEL Bilateral      Family History   Problem Relation Age of Onset     Cerebrovascular Disease Mother      Diabetes Father      Diabetes Sister      Cerebrovascular Disease Sister 81.00     Cerebrovascular Disease Brother      Diabetes Brother         Social History     Socioeconomic History     Marital status: Single     Spouse name: Not on file     Number of children: 1     Years of education: Not on file     Highest education  level: Not on file   Occupational History     Not on file   Tobacco Use     Smoking status: Former Smoker     Quit date: 6/23/2007     Years since quitting: 15.0     Smokeless tobacco: Never Used   Substance and Sexual Activity     Alcohol use: No     Comment: Alcoholic Drinks/day: stopped drinking 9/2015     Drug use: No     Sexual activity: Never   Other Topics Concern     Not on file   Social History Narrative    Lives alone with cats and dogs. , one daughter, Tory Lofton.      Social Determinants of Health     Financial Resource Strain: Not on file   Food Insecurity: Not on file   Transportation Needs: Not on file   Physical Activity: Not on file   Stress: Not on file   Social Connections: Not on file   Intimate Partner Violence: Not on file   Housing Stability: Not on file           Medications  Allergies   Current Outpatient Medications   Medication Sig Dispense Refill     atorvastatin (LIPITOR) 80 MG tablet TAKE 1 TABLET BY MOUTH ONCE DAILY AT BEDTIME 90 tablet 1     busPIRone (BUSPAR) 15 MG tablet Take 1 tablet by mouth once daily 90 tablet 0     Continuous Blood Gluc Sensor (DEXCOM G6 SENSOR) MISC 1 each every 10 days Change every 10 days. 9 each 4     Continuous Blood Gluc Transmit (DEXCOM G6 TRANSMITTER) MISC 1 each every 3 months Change every 3 months. 1 each 5     gabapentin (NEURONTIN) 300 MG capsule TAKE 3 CAPSULES BY MOUTH EVERY DAY AT BEDTIME 270 capsule 0     insulin glargine (LANTUS SOLOSTAR) 100 UNIT/ML pen Inject 28 Units Subcutaneous in the morning and 28 Units in the evening. INJECT 28 UNITS SUBCUTANEOUSLY TWICE DAILY 60 mL 3     isosorbide mononitrate (IMDUR) 60 MG 24 hr tablet Take 1 tablet (60 mg) by mouth daily 90 tablet 0     lidocaine (LIDODERM) 5 % patch Place 1 patch onto the skin every 24 hours To prevent lidocaine toxicity, patient should be patch free for 12 hrs daily. 5 patch 0     losartan (COZAAR) 100 MG tablet Take 1 tablet (100 mg) by mouth daily 90 tablet 0      metFORMIN (GLUCOPHAGE) 1000 MG tablet Take 1,000 mg by mouth 2 times daily (with meals)       sertraline (ZOLOFT) 100 MG tablet Take 1 tablet by mouth once daily 90 tablet 2     sotalol (BETAPACE) 80 MG tablet Take 0.5 tablets (40 mg) by mouth 2 times daily 90 tablet 0     traMADol (ULTRAM) 50 MG tablet TAKE 1 TABLET BY MOUTH EVERY 8 HOURS AS NEEDED FOR PAIN OR SEVERE PAIN ON SCALE (7-10) 30 tablet 0     traZODone (DESYREL) 50 MG tablet 1/2 to 1 tablet at bedtime prn insomnia 30 tablet 1     warfarin ANTICOAGULANT (COUMADIN) 5 MG tablet Take 0.5-1 tablets (2.5-5 mg) by mouth in the morning. Adjust dose per INR results as instructed. 90 tablet 1       Allergies   Allergen Reactions     Oxycodone           Lab Results    Chemistry/lipid CBC Cardiac Enzymes/BNP/TSH/INR   Recent Labs   Lab Test 05/05/21  1400   CHOL 174   HDL 48*   LDL 79   TRIG 235*     Recent Labs   Lab Test 05/05/21  1400 07/23/20  1142 07/01/19  1110   LDL 79 72 95     Recent Labs   Lab Test 11/24/21  1112 05/05/21  1400   NA  --  139   POTASSIUM  --  4.7   CHLORIDE  --  101   CO2  --  30   GLC  --  236*   BUN  --  19   CR 0.88 0.93   GFRESTIMATED 66 59*   ELLIE  --  9.3     Recent Labs   Lab Test 11/24/21  1112 05/05/21  1400 11/06/20  0559   CR 0.88 0.93 0.76     Recent Labs   Lab Test 04/11/22  1408 10/01/21  1421 07/23/20  1142   A1C 8.2* 7.9* 8.3*          Recent Labs   Lab Test 05/05/21  1400   WBC 7.9   HGB 13.0   HCT 40.2   MCV 91        Recent Labs   Lab Test 05/05/21  1400 11/05/20  0559 11/04/20  1616   HGB 13.0 11.6* 13.5    Recent Labs   Lab Test 11/06/20  0559 11/05/20  0559 11/04/20  2229   TROPONINI 0.18 0.37* 0.28     No results for input(s): BNP, NTBNPI, NTBNP in the last 69115 hours.  Recent Labs   Lab Test 05/05/21  1400   TSH 0.77     Recent Labs   Lab Test 06/30/22  1118 06/22/22  1428 05/25/22  1310   INR 1.8* 4.0* 2.8*        Chuck Hernandez MD              Thank you for allowing me to participate in the care of your  patient.      Sincerely,     Chuck Hernandez MD     [facility]  cc:   Chuck Hernandez MD  [address]  [address]  [place] [address]

## 2022-07-12 NOTE — PATIENT INSTRUCTIONS
We are going to plan a 24 hour monitor and blood work.Please send 8 to 10 resting blood pressure readings in the next 3 to 4 weeks.Please sit for 10 minutes before you check it.Please review the pamphlet on the Watchman and call my nurse if you have any questions.Priscila is my nurse and her number is [phone].Please call your primary about the metformin.We talked about a stress test, please call when you want to schedule.

## 2022-07-12 NOTE — PROGRESS NOTES
HEART CARE ENCOUNTER CONSULTATON NOTE      [facility]  [phone]      Assessment/Recommendations   Assessment/Plan:  1.  History of typical isthmus flutter and atrial fibrillation.  This has been well controlled on low-dose sotalol.  She had previously been seen by EP colleagues who discussed the possibility of ablation but she decided not to pursue ablation.  She has done well on sotalol.  Plan to repeat a Holter monitor.  She did wear an event monitor approximately 1 year ago that did not demonstrate any reoccurrence of tachycardic rhythm.  Most recent EKG is November 2021 sinus rhythm with a QTC of 432 ms.  She continues on warfarin as she could not afford the newer anticoagulant agents on previous evaluation.  She has had some gait instability.  We did review today the possibility of a watchman device and I did give her a pamphlet to review.    2.  Coronary artery disease.  Multivessel intervention with prior history of LIMA to the LAD and vein graft to an obtuse marginal and right PDA with the vein graft being noted to be occluded a number of years ago.  The most recent angiogram was August 2018 with the right and circumflex lesions that were noted to be small caliber vessel with subsequent intervention to the circumflex.  She had a nuclear stress test November 2020 that was reported negative.    3.  Hypertension.  Blood pressure mildly elevated upon arrival but repeat during my examination was at goal.  I did ask her to periodically monitor her blood pressure.    4.  Dyslipidemia.  She has not had her lipids checked since May 2021 from her primary care provider.  At that time the LDL was mildly above ideal goal at 79.  She is on 80 mg of atorvastatin.  Plan to repeat the lipids in the next few weeks.    As outlined above with follow-up in 6 months.       History of Present Illness/Subjective    HPI: Kemi Soto is a 72 year old female is seen in follow-up.   As outlined she was  noted to be tachycardic during our visit November 2020 and was noted to be in atrial flutter with 2-1 conduction.  She was scheduled to travel to [place] and was admitted to the hospital and she converted with low dose of sotalol and wanted to travel to [place].  She was seen by my EP colleague at the timee Dr Boucher discussion was had regarding ablation for both atrial flutter and atrial fibrillation.  She has a history of coronary artery disease with   CABG in 2007 with angiogram in 2008 reporting both SVG (PDA   and diagonal) were occluded. Extensive, complex stenting at   that time of Left main/LAD/LCx and subsequent RCA stenting.    She reports no awareness of rapid heartbeat although she was not aware of higher heart rates when we visited November 2020.  She traveled to [place] and did well.  She does however indicate that she has been a little bit more unsteady on her feet.  She did fall and injured her knee and was evaluated in the ER in June 2022.  She does not think that she had dizziness or lightheadedness but does report that she has been more unsteady on her feet.  We did talk about the risk of bleeding associated with blood thinners.  We talked about a watchman device as an alternative option and I did give her a pamphlet today.  She tells me she has a new blood pressure cuff at home.    Recent Echocardiogram Results:  External Result Report          Contains abnormal data Echocardiogram Complete  Order: [id number]   Status: Final result     Visible to patient: No (inaccessible in MyChart)     Next appt: Today at 01:50 PM in Cardiology (Chuck Hernandez MD)     0 Result Notes    Details    Reading Physician Reading Date Result Priority   Janee Choudhary MD  [phone] 11/5/2020 Routine   Provider, Historical 11/5/2020      Narrative & Impression    No previous study for comparison.    Left ventricle ejection fraction is normal. The calculated left ventricular ejection fraction is 58%.    Normal left  ventricular size and systolic function.    Normal right ventricular size and systolic function.    Mild concentric hypertrophy noted.    Left atrial volume is moderately increased.            Recent Coronary Angiogram Results:  Coronary Angiogram: Result Notes     Historical Provider   6/30/2021  4:03 PM CDT         Notes Recorded by Chuck Hernandez MD on 8/2/2018 at 8:44 AM  Hi Claritza thanks for the report.Was she going to be discussed at cath conference.Nuke shows a large area of anterior-lateral ischemia  mdg                  Indications    CAD (coronary artery disease) [I25.10 (ICD-10-CM)]   Coronary artery disease involving native coronary artery of native heart without angina pectoris [I25.10 (ICD-10-CM)]           Conclusion      Estimated blood loss was <20 ml.    Prox Cx to Mid Cx lesion 90% stenosed.    Lat 1st Mrg-2 lesion 80% stenosed.    Lat 1st Mrg-1 lesion 90% stenosed.    Mid Cx lesion 80% stenosed.    Ost LAD to Prox LAD lesion 40% stenosed.    A drug eluting .    Ost Cx to Prox Cx lesion 35% stenosed.    A drug eluting .    Ost 1st Diag lesion 50% stenosed.    1st Mrg lesion 70% stenosed.    Mid LAD lesion 100% stenosed.    Ost RCA to Mid RCA lesion 30% stenosed.    Mid RCA lesion 70% stenosed.    Post Atrio lesion 40% stenosed.    Ost RPDA to RPDA lesion 40% stenosed.    The left ventricular size is normal. The left ventricular systolic function is normal. LV systolic pressure is normal. LV end diastolic pressure is normal. There are no wall motion abnormalities in the left ventricle.    Dist LAD-1 lesion 50% stenosed.    Dist LAD-2 lesion 70% stenosed.     Patient has 2 previous saphenous vein grafts known to be occluded.     Both right coronary and circumflex have significant lesions present.  Both precede small caliber distal vessels.  Options would include partial revascularization by stenting right coronary and circumflex with small caliber stents.  Circumflex has a   relatively long segment of  disease.  Alternative would be continued medical management.             Kemi Soto  Percutaneous Coronary Intervention  Order# [id number]  Reading physician: Kit Bean MD Ordering physician: Kit Bean MD Study date: 08/13/2018       Patient Information    Name MRSUSHMA Soto [id number] 68 year old female             Indications    CAD (coronary artery disease) [I25.10 (ICD-10-CM)]   Coronary artery disease [I25.10 (ICD-10-CM)]             Conclusion      Estimated blood loss was <20 ml.    A STENT SYNERGY MR 2.25X28 drug eluting stent was successfully placed.    Prox Cx lesion 90% stenosed.    Dist Cx lesion 80% stenosed.    A STENT SYNERGY MR 2.25X20 drug eluting stent was successfully placed.    A STENT SYNERGY MR 2.25X8 drug eluting stent was successfully placed.    LM lesion 60% stenosed.     Manual removal of RFA sheah  Unable to access right radial artery succesfully  Overnight observation AM discharge     Narrative & Impression   US CAROTID BILATERAL  12/6/2018 3:48 PM     INDICATION: Bilateral carotid bruits.  TECHNIQUE: Duplex exam performed utilizing 2D gray-scale imaging, Doppler interrogation with color-flow and spectral waveform analysis.  COMPARISON: None.     FINDINGS:  RIGHT: There is mild atheromatous plaque. Normal waveforms with no significant stenosis.     LEFT: There is moderate atheromatous plaque. Normal waveforms with no significant stenosis.     Both vertebral arteries and subclavian artery waveforms are normal.     VELOCITY CHART:   The following velocities were obtained in the RIGHT carotid system.  CCA: 75/14 cm/s  ICA: 116/20 cm/s  ECA: 109/5 cm/s  ICA/CCA: PS 1.6     The following velocities were obtained in the LEFT carotid system.  CCA: 92/14 cm/s  ICA: 165/28 cm/s  ECA: 170/21 cm/s  ICA/CCA: PS 1.8                           IMPRESSION:  CONCLUSION:  1.  RIGHT: Less than 50% stenosis of the right ICA.  2.  LEFT: 50-69% stenosis of the left  "ICA     Evaluation based on velocities and NASCET criteria          Sinus bradycardia   Septal infarct , age undetermined   Abnormal ECG   When compared with ECG of 19-MAY-2021 13:33,   Nonspecific T wave abnormality, improved in Lateral leads   Confirmed by FELY MASCORRO MD LOC:WW ([id number]) on 2021 4:50:03 PM  Normal sinus rhythm   Nonspecific ST and T wave abnormality   Abnormal ECG   When compared with ECG of 2020 14:41,   No significant change was found   Confirmed by RADHA KNAPP MD LOC:JN ([id number]) on 2021 4:07:15 PM          Conclusion    Mobile cardiac outpatient telemetry MCOT report     Ordering physician:Chuck Hernandez MD.   Indication: Persistent atrial fibrillation.     Prescribed monitoring time: 14 days.  Effective monitoring time: 12 days and 19 hours.        Interpretation:     Patient was monitored for 12 days and 19 hours, baseline rhythm sinus, heart rate averaging 67 bpm, lowest heart rate of 50 and max of 120 bpm.     No prolonged pauses, no high-grade AV block, no significant IVCD or aberrancy.     Infrequent PACs burden is 1% of total beats, no atrial runs, no evidence of PSVT, no evidence of atrial fibrillation or flutter.     Infrequent isolated PVCs without ventricular runs, no sustained or nonsustained VT.     Strip # 11 and 12 show sinus rhythm with downsloping ST depression 1 to 1.5 mm, suggestive but not diagnostic for ischemia.     A single symptomatic transmission on  for \"symptoms other listed\" correlated with sinus rhythm rate of 64 bpm.     physicians: Deejay Warren DO; Provider, Historical Ordering physician: Chuck Hernandez MD Study date: 2020       Patient Information    Patient Name   Kemi Soto MRN   [id number] Legal Sex   Female              Age   1950 (72 year old)       NM Lexiscan stress test  Order: [id number]   Status: Final result     Visible to patient: No (inaccessible in MyChart)     Next appt: Today at 01:50 PM in " Cardiology (Chuck Hernandez MD)     0 Result Notes    Details    Reading Physician Reading Date Result Priority   Deejay Warren DO  [phone] 11/6/2020 Routine   Provider, Historical 11/6/2020      Result Text     The nuclear stress test is negative for inducible myocardial ischemia or infarction.     The left ventricular ejection fraction at rest is 64%.     Left ventricular function is normal.             Physical Examination  Review of Systems   Vitals: 146/50,120/60 heart rate of 60s and regular weight 178 pounds  Wt Readings from Last 3 Encounters:   06/20/22 81.6 kg (180 lb)   06/03/22 81.6 kg (180 lb)   04/11/22 80.3 kg (177 lb)       General Appearance:   no distress, normal body habitus   ENT/Mouth: membranes moist, no oral lesions or bleeding gums.      EYES:  no scleral icterus, normal conjunctivae   Neck: no carotid bruits or thyromegaly   Chest/Lungs:   lungs are clear to auscultation, no rales or wheezing, well-healed sternal scar, equal chest wall expansion    Cardiovascular:   Regular. Normal first and second heart sounds with no murmurs, rubs, or gallops; the carotid, radial and posterior tibial pulses are intact, Jugular venous pressure within normal limits, no significant edema bilaterally    Abdomen:  no organomegaly, masses, bruits, or tenderness; bowel sounds are present   Extremities: no cyanosis or clubbing   Skin: no xanthelasma, warm.    Neurologic: normal  bilateral, no tremors     Psychiatric: alert and oriented x3, calm        Please refer above for cardiac ROS details.        Medical History  Surgical History Family History Social History   Past Medical History:   Diagnosis Date     Coronary artery disease      Diabetes mellitus (H)      Discitis of thoracolumbar region 10/10/2015     Encephalopathy 10/14/2015     Epidural abscess 10/10/2015     Hip pain, right      Hyperlipidemia      Sepsis (H) 10/8/2015     Stroke (H) 06/23/2015    Neurology felt that episode was  C/W subacute ischemic stroke     TIA (transient ischemic attack) 6/23/2015     UTI (urinary tract infection)     admitted to [facility] with UTI     Past Surgical History:   Procedure Laterality Date     ANGIOPLASTY  03/30/2016    Drug eluting stent mid LAD, cutting balloon to 1st diagonal     ANGIOPLASTY  2008     BYPASS GRAFT ARTERY CORONARY  12/11/2007    X 3 vessels, LIMA to LAD, saph vein graft to distal RCA, saphvein graft to marginal, mini circuit bypass.  [facility].     CORONARY STENT PLACEMENT  2008    Left main, left circumflex, RCA     CORONARY STENT PLACEMENT  03/2016     CV CORONARY ANGIOGRAM N/A 8/1/2018    Procedure: Coronary Angiogram;  Surgeon: Kit Bean MD;  Location: [facility];  Service:      CV LEFT HEART CATHETERIZATION WITH LEFT VENTRICULOGRAM N/A 8/1/2018    Procedure: Left Heart Catheterization with Left Ventriculogram;  Surgeon: Kit Bean MD;  Location: [facility];  Service:      INSERT MIDLINE HE  10/31/2015          LUMBAR DISCECTOMY N/A 10/11/2015    Procedure: BILATERAL L1-L5 DECOMPRESSIVE LAMINECTOMY WITH EVACUATION OF EPIDURAL ABSCESS IRRIGATION & DEBRIDEMENT;  Surgeon: Luli Chan MD;  Location: [facility] OR;  Service:      PICC  10/19/2015          RELEASE CARPAL TUNNEL Bilateral      Family History   Problem Relation Age of Onset     Cerebrovascular Disease Mother      Diabetes Father      Diabetes Sister      Cerebrovascular Disease Sister 81.00     Cerebrovascular Disease Brother      Diabetes Brother         Social History     Socioeconomic History     Marital status: Single     Spouse name: Not on file     Number of children: 1     Years of education: Not on file     Highest education level: Not on file   Occupational History     Not on file   Tobacco Use     Smoking status: Former Smoker     Quit date: 6/23/2007     Years since quitting: 15.0     Smokeless tobacco: Never Used   Substance and Sexual  Activity     Alcohol use: No     Comment: Alcoholic Drinks/day: stopped drinking 9/2015     Drug use: No     Sexual activity: Never   Other Topics Concern     Not on file   Social History Narrative    Lives alone with cats and dogs. , one daughter, Tory Lofton.      Social Determinants of Health     Financial Resource Strain: Not on file   Food Insecurity: Not on file   Transportation Needs: Not on file   Physical Activity: Not on file   Stress: Not on file   Social Connections: Not on file   Intimate Partner Violence: Not on file   Housing Stability: Not on file           Medications  Allergies   Current Outpatient Medications   Medication Sig Dispense Refill     atorvastatin (LIPITOR) 80 MG tablet TAKE 1 TABLET BY MOUTH ONCE DAILY AT BEDTIME 90 tablet 1     busPIRone (BUSPAR) 15 MG tablet Take 1 tablet by mouth once daily 90 tablet 0     Continuous Blood Gluc Sensor (DEXCOM G6 SENSOR) MISC 1 each every 10 days Change every 10 days. 9 each 4     Continuous Blood Gluc Transmit (DEXCOM G6 TRANSMITTER) MISC 1 each every 3 months Change every 3 months. 1 each 5     gabapentin (NEURONTIN) 300 MG capsule TAKE 3 CAPSULES BY MOUTH EVERY DAY AT BEDTIME 270 capsule 0     insulin glargine (LANTUS SOLOSTAR) 100 UNIT/ML pen Inject 28 Units Subcutaneous in the morning and 28 Units in the evening. INJECT 28 UNITS SUBCUTANEOUSLY TWICE DAILY 60 mL 3     isosorbide mononitrate (IMDUR) 60 MG 24 hr tablet Take 1 tablet (60 mg) by mouth daily 90 tablet 0     lidocaine (LIDODERM) 5 % patch Place 1 patch onto the skin every 24 hours To prevent lidocaine toxicity, patient should be patch free for 12 hrs daily. 5 patch 0     losartan (COZAAR) 100 MG tablet Take 1 tablet (100 mg) by mouth daily 90 tablet 0     metFORMIN (GLUCOPHAGE) 1000 MG tablet Take 1,000 mg by mouth 2 times daily (with meals)       sertraline (ZOLOFT) 100 MG tablet Take 1 tablet by mouth once daily 90 tablet 2     sotalol (BETAPACE) 80 MG tablet Take 0.5 tablets  (40 mg) by mouth 2 times daily 90 tablet 0     traMADol (ULTRAM) 50 MG tablet TAKE 1 TABLET BY MOUTH EVERY 8 HOURS AS NEEDED FOR PAIN OR SEVERE PAIN ON SCALE (7-10) 30 tablet 0     traZODone (DESYREL) 50 MG tablet 1/2 to 1 tablet at bedtime prn insomnia 30 tablet 1     warfarin ANTICOAGULANT (COUMADIN) 5 MG tablet Take 0.5-1 tablets (2.5-5 mg) by mouth in the morning. Adjust dose per INR results as instructed. 90 tablet 1       Allergies   Allergen Reactions     Oxycodone           Lab Results    Chemistry/lipid CBC Cardiac Enzymes/BNP/TSH/INR   Recent Labs   Lab Test 05/05/21  1400   CHOL 174   HDL 48*   LDL 79   TRIG 235*     Recent Labs   Lab Test 05/05/21  1400 07/23/20  1142 07/01/19  1110   LDL 79 72 95     Recent Labs   Lab Test 11/24/21  1112 05/05/21  1400   NA  --  139   POTASSIUM  --  4.7   CHLORIDE  --  101   CO2  --  30   GLC  --  236*   BUN  --  19   CR 0.88 0.93   GFRESTIMATED 66 59*   ELLIE  --  9.3     Recent Labs   Lab Test 11/24/21  1112 05/05/21  1400 11/06/20  0559   CR 0.88 0.93 0.76     Recent Labs   Lab Test 04/11/22  1408 10/01/21  1421 07/23/20  1142   A1C 8.2* 7.9* 8.3*          Recent Labs   Lab Test 05/05/21  1400   WBC 7.9   HGB 13.0   HCT 40.2   MCV 91        Recent Labs   Lab Test 05/05/21  1400 11/05/20  0559 11/04/20  1616   HGB 13.0 11.6* 13.5    Recent Labs   Lab Test 11/06/20  0559 11/05/20  0559 11/04/20  2229   TROPONINI 0.18 0.37* 0.28     No results for input(s): BNP, NTBNPI, NTBNP in the last 32003 hours.  Recent Labs   Lab Test 05/05/21  1400   TSH 0.77     Recent Labs   Lab Test 06/30/22  1118 06/22/22  1428 05/25/22  1310   INR 1.8* 4.0* 2.8*        Chuck Hernandez MD

## 2022-07-12 NOTE — PROGRESS NOTES
ANTICOAGULATION MANAGEMENT     Kemi Soto 72 year old female is on warfarin with supratherapeutic INR result. (Goal INR 2.0-3.0)    Recent labs: (last 7 days)     07/12/22  1449   INR 4.9*       ASSESSMENT       Source(s): Chart Review, Patient/Caregiver Call and Template       Warfarin doses taken: Warfarin taken as instructed per patient she is not sure if she doubled up her dose sometime this week.    Diet: No new diet changes identified    New illness, injury, or hospitalization: No    Medication/supplement changes: None noted    Signs or symptoms of bleeding or clotting: No    Previous INR: Subtherapeutic    Additional findings: None       PLAN     Recommended plan for no diet, medication or health factor changes affecting INR     Dosing Instructions: hold dose then decrease your warfarin dose (8% change) with next INR in 7-10 days       Summary  As of 7/12/2022    Full warfarin instructions:  7/12: Hold; Otherwise 2.5 mg every Mon, Wed, Fri; 5 mg all other days   Next INR check:  7/22/2022             Telephone call with Ciarra who verbalizes understanding and agrees to plan and who agrees to plan and repeated back plan correctly    Patient prefers INR added to scheduled lab on 7/21    Education provided: Importance of therapeutic range, Importance of following up at instructed interval, Importance of taking warfarin as instructed, Monitoring for bleeding signs and symptoms and When to seek medical attention/emergency care    Plan made per [facility] anticoagulation protocol    Cony Noonan RN  Anticoagulation Clinic  7/12/2022    _______________________________________________________________________     Anticoagulation Episode Summary     Current INR goal:  2.0-3.0   TTR:  73.7 % (11.4 mo)   Target end date:  Indefinite   Send INR reminders to:  [facility]    Indications    Paroxysmal atrial fibrillation (H) [I48.0]           Comments:  Patient is no longer using home meter          Anticoagulation Care Providers     Provider Role Specialty Phone number    Chuck Hernandez MD Referring Cardiovascular Disease [phone]

## 2022-07-15 ENCOUNTER — TELEPHONE (OUTPATIENT)
Dept: PHYSICAL MEDICINE AND REHAB | Facility: CLINIC | Age: 72
End: 2022-07-15

## 2022-07-15 NOTE — TELEPHONE ENCOUNTER
PSP:  Chula Garcia CNP  Last clinic visit:  6/20/2022  Reason for call: Question  Clinical information:  Call received from pt. Pt states that she is potentially going to be a part of a research study conducted at the [facility], called a [facility] study. She was seen yesterday for an evaluation but was told she should check with her spine provider to see if it would be ok to participate.   The study involves being randomly chosen for 1 of 4 different treatment plans (1 of them being nothing). All treatments are non-invasive and include things like massage, mind-body treatment, etc.   Advice given to patient: Pt aware that Chula is not in clinic today, returning Monday. She will be called back on Monday with a response. Detailed message ok upon call back.   Provider to address: Ok for patient to participate?

## 2022-07-16 ENCOUNTER — HOSPITAL ENCOUNTER (OUTPATIENT)
Dept: RADIOLOGY | Facility: CLINIC | Age: 72
Discharge: HOME | End: 2022-07-16
Attending: NURSE PRACTITIONER
Payer: COMMERCIAL

## 2022-07-16 ENCOUNTER — HOSPITAL ENCOUNTER (OUTPATIENT)
Dept: MRI IMAGING | Facility: CLINIC | Age: 72
Discharge: HOME | End: 2022-07-16
Attending: NURSE PRACTITIONER
Payer: COMMERCIAL

## 2022-07-16 DIAGNOSIS — Z98.890 HISTORY OF LUMBAR SURGERY: ICD-10-CM

## 2022-07-16 DIAGNOSIS — M43.16 SPONDYLOLISTHESIS OF LUMBAR REGION: ICD-10-CM

## 2022-07-16 DIAGNOSIS — G89.29 CHRONIC BILATERAL LOW BACK PAIN WITH LEFT-SIDED SCIATICA: ICD-10-CM

## 2022-07-16 DIAGNOSIS — M54.42 CHRONIC BILATERAL LOW BACK PAIN WITH LEFT-SIDED SCIATICA: ICD-10-CM

## 2022-07-16 DIAGNOSIS — M54.16 LUMBAR RADICULOPATHY: ICD-10-CM

## 2022-07-16 DIAGNOSIS — M47.816 LUMBAR FACET ARTHROPATHY: ICD-10-CM

## 2022-07-16 DIAGNOSIS — G89.29 CHRONIC BILATERAL THORACIC BACK PAIN: ICD-10-CM

## 2022-07-16 DIAGNOSIS — M54.6 CHRONIC BILATERAL THORACIC BACK PAIN: ICD-10-CM

## 2022-07-16 DIAGNOSIS — R29.898 WEAKNESS OF LEFT LEG: ICD-10-CM

## 2022-07-16 DIAGNOSIS — M54.2 CHRONIC NECK PAIN: ICD-10-CM

## 2022-07-16 DIAGNOSIS — G89.29 CHRONIC NECK PAIN: ICD-10-CM

## 2022-07-16 PROCEDURE — 72148 MRI LUMBAR SPINE W/O DYE: CPT

## 2022-07-16 PROCEDURE — 72070 X-RAY EXAM THORAC SPINE 2VWS: CPT

## 2022-07-16 PROCEDURE — 72040 X-RAY EXAM NECK SPINE 2-3 VW: CPT

## 2022-07-16 PROCEDURE — 72110 X-RAY EXAM L-2 SPINE 4/>VWS: CPT

## 2022-07-18 NOTE — TELEPHONE ENCOUNTER
Call placed to pt with provider's response. Pt stated understanding. She reports she did request her imaging results to go to the study as well and will make sure they review specifically her x-rays. Pt hoping to avoid surgery.     Pt did want appt with Chula to review all of her imaging. She will call back to make this appt.

## 2022-07-18 NOTE — TELEPHONE ENCOUNTER
I did review her recent imaging.  Her flexion-extension x-ray of her lumbar spine did show spinal instability.    At the L4-5 level there is a 3 mm shift in neutral position that increases to 8.6 mm in forward flexion.  At L5-S1 there is an 8 mm shift that increases to 10.5 mm in forward flexion.  Again this is considered spine instability and typically if symptoms are not improved with conservative treatments then spine surgery for a spinal fusion is recommended.  I think it safe for her to move forward with the study.  However if they do recommend any type of surgical intervention that is not a spine fusion, she will need to get a second opinion before she does any type of spine surgery.  As long as she is aware of this again it is safe for her to participate in the study.  She should make sure that the research study has all of her recent imaging specifically her flexion-extension x-ray as well.  Thanks

## 2022-07-21 ENCOUNTER — LAB (OUTPATIENT)
Dept: CARDIOLOGY | Facility: CLINIC | Age: 72
End: 2022-07-21
Payer: COMMERCIAL

## 2022-07-21 ENCOUNTER — ANTICOAGULATION THERAPY VISIT (OUTPATIENT)
Dept: ANTICOAGULATION | Facility: CLINIC | Age: 72
End: 2022-07-21

## 2022-07-21 ENCOUNTER — HOSPITAL ENCOUNTER (OUTPATIENT)
Dept: CARDIOLOGY | Facility: CLINIC | Age: 72
Discharge: HOME | End: 2022-07-21
Attending: INTERNAL MEDICINE | Admitting: INTERNAL MEDICINE
Payer: COMMERCIAL

## 2022-07-21 DIAGNOSIS — I48.0 PAROXYSMAL ATRIAL FIBRILLATION (H): ICD-10-CM

## 2022-07-21 DIAGNOSIS — I48.0 PAROXYSMAL ATRIAL FIBRILLATION (H): Primary | ICD-10-CM

## 2022-07-21 DIAGNOSIS — I25.10 CORONARY ARTERY DISEASE DUE TO LIPID RICH PLAQUE: ICD-10-CM

## 2022-07-21 DIAGNOSIS — I25.83 CORONARY ARTERY DISEASE DUE TO LIPID RICH PLAQUE: ICD-10-CM

## 2022-07-21 LAB — INR POINT OF CARE: 1.8 (ref 0.9–1.1)

## 2022-07-21 PROCEDURE — 85610 PROTHROMBIN TIME: CPT

## 2022-07-21 PROCEDURE — 93226 XTRNL ECG REC<48 HR SCAN A/R: CPT

## 2022-07-21 PROCEDURE — 36416 COLLJ CAPILLARY BLOOD SPEC: CPT

## 2022-07-21 NOTE — PROGRESS NOTES
ANTICOAGULATION MANAGEMENT     Kemi Soto 72 year old female is on warfarin with subtherapeutic INR result. (Goal INR 2.0-3.0)    Recent labs: (last 7 days)     07/21/22  1107   INR 1.8*       ASSESSMENT       Source(s): Chart Review, Patient/Caregiver Call and Template       Warfarin doses taken: Warfarin taken as instructed    Diet: No new diet changes identified    New illness, injury, or hospitalization: No    Medication/supplement changes: None noted    Signs or symptoms of bleeding or clotting: No    Previous INR: Supratherapeutic most likely doubled dosed.    Additional findings: Going to [place] on 7/31 to 8/8    Patient verbalized frustration about not able to return       PLAN     Recommended plan for no diet, medication or health factor changes affecting INR     Dosing Instructions: Increase your warfarin dose (9% change) with next INR in 1-2 weeks       Summary  As of 7/21/2022    Full warfarin instructions:  2.5 mg every Mon, Fri; 5 mg all other days   Next INR check:  8/4/2022             Telephone call with Ciarra who verbalizes understanding and agrees to plan and who agrees to plan and repeated back plan correctly    Patient elected to schedule next visit 8/9- refusing to have INR check prior to leaving for her trip    Education provided: Goal range and significance of current result, Importance of therapeutic range, Importance of following up at instructed interval and Importance of taking warfarin as instructed    Plan made per [facility] anticoagulation protocol    Cony Noonan RN  Anticoagulation Clinic  7/21/2022    _______________________________________________________________________     Anticoagulation Episode Summary     Current INR goal:  2.0-3.0   TTR:  72.6 % (11.7 mo)   Target end date:  Indefinite   Send INR reminders to:  [facility]    Indications    Paroxysmal atrial fibrillation (H) [I48.0]           Comments:  Patient is no longer using home meter          Anticoagulation Care Providers     Provider Role Specialty Phone number    Chuck Hernandez MD Referring Cardiovascular Disease [phone]

## 2022-07-21 NOTE — PROGRESS NOTES
Pt returned call, please contact to discuss and advise.    Aspen Houston on 7/21/2022 at 1:47 PM

## 2022-07-22 LAB
ALBUMIN SERPL-MCNC: 3.4 G/DL (ref 3.5–5)
ALP SERPL-CCNC: 83 U/L (ref 45–120)
ALT SERPL W P-5'-P-CCNC: 13 U/L (ref 0–45)
ANION GAP SERPL CALCULATED.3IONS-SCNC: 8 MMOL/L (ref 5–18)
AST SERPL W P-5'-P-CCNC: 11 U/L (ref 0–40)
BILIRUB SERPL-MCNC: 0.5 MG/DL (ref 0–1)
BUN SERPL-MCNC: 16 MG/DL (ref 8–28)
CALCIUM SERPL-MCNC: 9.5 MG/DL (ref 8.5–10.5)
CHLORIDE BLD-SCNC: 103 MMOL/L (ref 98–107)
CHOLEST SERPL-MCNC: 152 MG/DL
CO2 SERPL-SCNC: 29 MMOL/L (ref 22–31)
CREAT SERPL-MCNC: 0.78 MG/DL (ref 0.6–1.1)
FASTING STATUS PATIENT QL REPORTED: YES
GFR SERPL CREATININE-BSD FRML MDRD: 80 ML/MIN/1.73M2
GLUCOSE BLD-MCNC: 124 MG/DL (ref 70–125)
HDLC SERPL-MCNC: 46 MG/DL
LDLC SERPL CALC-MCNC: 78 MG/DL
POTASSIUM BLD-SCNC: 4.6 MMOL/L (ref 3.5–5)
PROT SERPL-MCNC: 6.9 G/DL (ref 6–8)
SODIUM SERPL-SCNC: 140 MMOL/L (ref 136–145)
TRIGL SERPL-MCNC: 142 MG/DL

## 2022-07-22 PROCEDURE — 80061 LIPID PANEL: CPT

## 2022-07-22 PROCEDURE — 36415 COLL VENOUS BLD VENIPUNCTURE: CPT

## 2022-07-22 PROCEDURE — 80053 COMPREHEN METABOLIC PANEL: CPT

## 2022-07-25 ENCOUNTER — DOCUMENTATION ONLY (OUTPATIENT)
Dept: LAB | Facility: CLINIC | Age: 72
End: 2022-07-25

## 2022-07-25 DIAGNOSIS — I48.0 PAROXYSMAL ATRIAL FIBRILLATION (H): Primary | ICD-10-CM

## 2022-07-25 NOTE — PROGRESS NOTES
Patient has lab only appt 8/9/22, no orders in chart for INR POCT (only for regular INR).  Please place new STANDING orders in Epic if appropriate.    Thanks,   [place] lab

## 2022-07-26 DIAGNOSIS — E78.00 HYPERCHOLESTEROLEMIA: Primary | ICD-10-CM

## 2022-07-26 DIAGNOSIS — E78.5 HYPERLIPIDEMIA WITH TARGET LDL LESS THAN 70: ICD-10-CM

## 2022-07-26 DIAGNOSIS — I25.10 CORONARY ARTERY DISEASE DUE TO LIPID RICH PLAQUE: ICD-10-CM

## 2022-07-26 DIAGNOSIS — E78.2 MIXED HYPERLIPIDEMIA: ICD-10-CM

## 2022-07-26 DIAGNOSIS — I25.83 CORONARY ARTERY DISEASE DUE TO LIPID RICH PLAQUE: ICD-10-CM

## 2022-07-26 PROCEDURE — 93227 XTRNL ECG REC<48 HR R&I: CPT | Performed by: INTERNAL MEDICINE

## 2022-07-26 RX ORDER — EZETIMIBE 10 MG/1
10 TABLET ORAL DAILY
Qty: 90 TABLET | Refills: 1 | Status: SHIPPED | OUTPATIENT
Start: 2022-07-26 | End: 2022-11-08

## 2022-07-26 NOTE — PROGRESS NOTES
Chuck Hernandez MD   7/24/2022  6:23 PM CDT         Cholesterol numbers are stable but remain mildly above ideal goal. Ldl less than 70 would be ideal.Kidney and liver ok, if willing would ask her to consider zetia 10 mg daily.Limited side effects and recheck ast, alt and lipids in 2 months.I believe she might be traveling to [place]. Thanks mdg     Zetia rx sent.  Repeat labs ordered.  -moustapha

## 2022-07-29 ENCOUNTER — TELEPHONE (OUTPATIENT)
Dept: CARDIOLOGY | Facility: CLINIC | Age: 72
End: 2022-07-29

## 2022-07-29 NOTE — TELEPHONE ENCOUNTER
Pt got a new BP arm cuff, and reports the following:    Date BP P   7/23/22 164/80 57    159/87 56   7/24/22 157/74 61   7/25/22 167/78 61   7/26/22 160/80 65   7/27/22 High      Pt not sure if they are accurate, said machine came with batteries in it.  Suggested pt change batteries to see if makes a difference.  Pt said she has a lot going on right now, and is leaving town on Sunday.  She doesn't want to deal with it right now.    Current BP meds are:  Imdur 60 mg daily  Losartan 100 mg daily  Sotalol 40 mg twice daily.

## 2022-07-29 NOTE — TELEPHONE ENCOUNTER
===View-only below this line===  ----- Message -----  From: Chuck Hernandez MD  Sent: 7/29/2022  12:56 PM CDT  To: Priscila Yoon RN    I agree I prefer to make sure that her cuff is accurate.  Hopefully she will be willing to bring it in when she comes back.THEO Hernandez

## 2022-08-03 ENCOUNTER — TELEPHONE (OUTPATIENT)
Dept: CARDIOLOGY | Facility: CLINIC | Age: 72
End: 2022-08-03

## 2022-08-12 ENCOUNTER — NURSE TRIAGE (OUTPATIENT)
Dept: NURSING | Facility: CLINIC | Age: 72
End: 2022-08-12

## 2022-08-12 DIAGNOSIS — Z20.822 SUSPECTED COVID-19 VIRUS INFECTION: ICD-10-CM

## 2022-08-12 DIAGNOSIS — I48.91 ATRIAL FIBRILLATION, UNSPECIFIED TYPE (H): Primary | ICD-10-CM

## 2022-08-12 NOTE — TELEPHONE ENCOUNTER
Tory (daughter) calls and says that she is not with her mother  And says that her mother tested positive for Covid today. Tory says that pt's symptoms began on Tuesday. Pt. Has a cough,  Diarrhea, and body aches. Pt. Has a virtual visit scheduled for tomorrow at 1630, but Tory wants to know if her mother can get one earlier than tomorrow. Tory was conferenced with [facility] , for further assistance. COVID 19 Nurse Triage Plan/Patient Instructions    Please be aware that novel coronavirus (COVID-19) may be circulating in the community. If you develop symptoms such as fever, cough, or SOB or if you have concerns about the presence of another infection including coronavirus (COVID-19), please contact your health care provider or visit [email or web address].     Disposition/Instructions    Home care recommended. Follow home care protocol based instructions.    Thank you for taking steps to prevent the spread of this virus.  o Limit your contact with others.  o Wear a simple mask to cover your cough.  o Wash your hands well and often.    Resources    [facility]: About COVID-19: [email or web address]    CDC: What to Do If You're Sick: www.cdc.gov/coronavirus/2019-ncov/about/steps-when-sick.html    CDC: Ending Home Isolation: www.cdc.gov/coronavirus/2019-ncov/hcp/disposition-in-home-patients.html     CDC: Caring for Someone: www.cdc.gov/coronavirus/2019-ncov/if-you-are-sick/care-for-someone.html     [facility]: Interim Guidance for Hospital Discharge to Home: www.health.[facility].[place]/diseases/coronavirus/hcp/hospdischarge.pdf    [facility] clinical trials (COVID-19 research studies): clinicalaffairs.[facility]/um-clinical-trials     Below are the COVID-19 hotlines at the [place] Department of Health ([facility]). Interpreters are available.   o For health questions: Call 250-443-3021 or 1-212.126.8404 (7 a.m. to 7 p.m.)  o For questions about schools and childcare: Call 971-542-1126 or  7-939-032-5868 (7 a.m. to 7 p.m.)                     Reason for Disposition    General information question, no triage required and triager able to answer question    Additional Information    Negative: [1] Caller is not with the adult (patient) AND [2] reporting urgent symptoms    Negative: Lab result questions    Negative: Medication questions    Negative: Caller can't be reached by phone    Negative: Caller has already spoken to PCP or another triager    Negative: RN needs further essential information from caller in order to complete triage    Negative: Requesting regular office appointment    Negative: [1] Caller requesting NON-URGENT health information AND [2] PCP's office is the best resource    Negative: Health Information question, no triage required and triager able to answer question    Protocols used: INFORMATION ONLY CALL - NO TRIAGE-A-

## 2022-08-13 ENCOUNTER — TELEPHONE (OUTPATIENT)
Dept: NURSING | Facility: CLINIC | Age: 72
End: 2022-08-13

## 2022-08-13 ENCOUNTER — LAB (OUTPATIENT)
Dept: LAB | Facility: HOSPITAL | Age: 72
End: 2022-08-13
Payer: COMMERCIAL

## 2022-08-13 ENCOUNTER — VIRTUAL VISIT (OUTPATIENT)
Dept: URGENT CARE | Facility: CLINIC | Age: 72
End: 2022-08-13
Payer: COMMERCIAL

## 2022-08-13 DIAGNOSIS — U07.1 INFECTION DUE TO 2019 NOVEL CORONAVIRUS: Primary | ICD-10-CM

## 2022-08-13 DIAGNOSIS — I48.91 ATRIAL FIBRILLATION, UNSPECIFIED TYPE (H): Primary | ICD-10-CM

## 2022-08-13 LAB — INR PPP: 2.14 (ref 0.85–1.15)

## 2022-08-13 PROCEDURE — 36415 COLL VENOUS BLD VENIPUNCTURE: CPT

## 2022-08-13 PROCEDURE — 85610 PROTHROMBIN TIME: CPT

## 2022-08-13 PROCEDURE — 99203 OFFICE O/P NEW LOW 30 MIN: CPT | Mod: 95

## 2022-08-13 NOTE — PATIENT INSTRUCTIONS
Paxlovid sent in       Medication adjustments while on Paxlovid:    STOP taking for duration of PAxlovid     Lipitor      May decrease effect of these medications while on Paxlovid     Lantus   Metformin   Coumadin      May increase or decrease effect of these medications while on Paxlovid     Tramadol      Decrease dose, while on Paxlovid,of the following medication     Trazodone - only take 25 mg at bedtime if needed    Buspar take 1/2 of 15 mg pill during paxlovid

## 2022-08-13 NOTE — TELEPHONE ENCOUNTER
Telephone call  Patient calling to find out if she could get her INR done at [facility] . Called the lab at [facility] and was told she can have it drawn at the outpatient lab that is open till noon today.  She will draw it there.    Karolina Montalvo RN   [facility] Nurse Advisor  8:54 AM 8/13/2022

## 2022-08-13 NOTE — PROGRESS NOTES
Ciarra is a 72 year old who is being evaluated via a billable telephone visit.      What phone number would you like to be contacted at? [phone]  How would you like to obtain your AVS? Ashley    Assessment & Plan     Infection due to 2019 novel coronavirus  Already on paxlovid       41 minutes spent on the date of the encounter doing chart review, history and exam, documentation and further activities per the note       Patient Instructions   Paxlovid sent in       Medication adjustments while on Paxlovid:    STOP taking for duration of PAxlovid     Lipitor      May decrease effect of these medications while on Paxlovid     Lantus   Metformin   Coumadin      May increase or decrease effect of these medications while on Paxlovid     Tramadol      Decrease dose, while on Paxlovid,of the following medication     Trazodone - only take 25 mg at bedtime if needed    Buspar take 1/2 of 15 mg pill during paxlovid           No follow-ups on file.    Virtual Urgent Care  [facility] VIRTUAL URGENT CARE    Subjective   Ciarra is a 72 year old, presenting for the following health issues:  Covid Concern      HPI  Able to get hold provider - [facility] doctor   He prescribed paxlovid     INR today 2.14  Taking half dose as her norm  INR Monday         COVID-19 Symptom Review  How many days ago did these symptoms start? 8/12/2022    Are any of the following symptoms significant for you?    New or worsening difficulty breathing? No    Worsening cough? Yes, it's a dry cough.     Fever or chills? Yes, I felt feverish or had chills.    Headache: YES    Sore throat: No    Chest pain: No    Diarrhea: yes    Body aches? YES    What treatments has patient tried? melatonin   Does patient live in a nursing home, group home, or shelter? No  Does patient have a way to get food/medications during quarantined? Yes, I have a friend or family member who can help me.              Review of Systems   Constitutional, HEENT, cardiovascular,  pulmonary, GI, , musculoskeletal, neuro, skin, endocrine and psych systems are negative, except as otherwise noted.      Objective           Vitals:  No vitals were obtained today due to virtual visit.    Physical Exam   alert and no distress  PSYCH: Alert and oriented times 3; coherent speech, normal   rate and volume, able to articulate logical thoughts, able   to abstract reason, no tangential thoughts, no hallucinations   or delusions  Her affect is normal  RESP: No cough, no audible wheezing, able to talk in full sentences  Remainder of exam unable to be completed due to telephone visits            Phone call duration: 42 minutes    .  ..

## 2022-08-15 ENCOUNTER — TELEPHONE (OUTPATIENT)
Dept: CARDIOLOGY | Facility: CLINIC | Age: 72
End: 2022-08-15

## 2022-08-15 ENCOUNTER — ANTICOAGULATION THERAPY VISIT (OUTPATIENT)
Dept: ANTICOAGULATION | Facility: CLINIC | Age: 72
End: 2022-08-15

## 2022-08-15 DIAGNOSIS — I48.0 PAROXYSMAL ATRIAL FIBRILLATION (H): Primary | ICD-10-CM

## 2022-08-15 NOTE — TELEPHONE ENCOUNTER
LUCIANO for pt to call [phone] regarding so I can help direct her to the correct team.  Looks like she was told by a nurse advisor that she could use [facility] lab, unsure if nurse advisor knew that pt had covid.  Routing to AP and EJB as well.  -kimberley

## 2022-08-15 NOTE — PROGRESS NOTES
ANTICOAGULATION MANAGEMENT     Kemi Soto 72 year old female is on warfarin with therapeutic INR result. (Goal INR 2.0-3.0)    Recent labs: (last 7 days)     08/13/22  1038   INR 2.14*       ASSESSMENT       Source(s): Chart Review and Patient/Caregiver Call       Warfarin doses taken: Less warfarin taken than planned which may be affecting INR Pt was instructed to cut Warfarin in half when started on paxlovid.     Diet: No new diet changes identified    New illness, injury, or hospitalization: Yes: Covid    Medication/supplement changes: paxlovid 5 day course (dates: 8/13-8/17) which can decrease INR    Signs or symptoms of bleeding or clotting: No    Previous INR: Subtherapeutic    Additional findings: Will have patient resume normal Warfarin dosing today and recommended recheck by end of week.        PLAN     Recommended plan for temporary change(s) affecting INR     Dosing Instructions: Continue your current warfarin dose with next INR in 4 days       Summary  As of 8/15/2022    Full warfarin instructions:  2.5 mg every Mon, Fri; 5 mg all other days   Next INR check:  8/18/2022             Telephone call with Ciarra who agrees to plan and repeated back plan correctly    Lab visit scheduled    Education provided: Goal range and significance of current result, Importance of therapeutic range, Importance of following up at instructed interval and Potential interaction between warfarin and paxlovid/Covid    Plan made per [facility] anticoagulation protocol    Melissa Hunt RN  Anticoagulation Clinic  8/15/2022    _______________________________________________________________________     Anticoagulation Episode Summary     Current INR goal:  2.0-3.0   TTR:  72.1 % (1 y)   Target end date:  Indefinite   Send INR reminders to:  [facility] HEART [facility]    Indications    Paroxysmal atrial fibrillation (H) [I48.0]           Comments:  Patient is no longer using home meter         Anticoagulation Care Providers      Provider Role Specialty Phone number    Chuck Hernandez MD Referring Cardiovascular Disease [phone]

## 2022-08-15 NOTE — TELEPHONE ENCOUNTER
[facility] Call Center    Phone Message    May a detailed message be left on voicemail: yes     Reason for Call: Other: Patient calling to request a call back from clinic manager.  She states she was escorted out of the [facility] lab over the weekend because she had Covid but had spoken with nurses prior who had told her she could go in as long as she had a mask on. She is requesting a call back to discuss what happened.    Action Taken: Other: Cardiology    Travel Screening: Not Applicable

## 2022-08-16 NOTE — TELEPHONE ENCOUNTER
"Pt LVM listing multiple reasons she is done with [facility] and would like to know where else she can see Dr Hernandez.  Pt had a laundry list of things that she is not happy about - as far back as the care her mother received before her death in 2007.  Pt needed INR drawn because she is on Paxlovid for covid.  Paxlovid was prescribed by Urgent Care provider with recommendation to get INR checked.  Pt said everyone knew she has covid, and still sent her to [facility] lab for an INR.  And then she was treated like a \"pariah\".  She said she wore her mask the entire time.  She said everyone at [facility] was wonderful.      "

## 2022-08-18 ENCOUNTER — ANTICOAGULATION THERAPY VISIT (OUTPATIENT)
Dept: ANTICOAGULATION | Facility: CLINIC | Age: 72
End: 2022-08-18

## 2022-08-18 DIAGNOSIS — I48.0 PAROXYSMAL ATRIAL FIBRILLATION (H): Primary | ICD-10-CM

## 2022-08-18 LAB — INR BLD: 1.3 (ref 0.9–1.1)

## 2022-08-18 PROCEDURE — 85610 PROTHROMBIN TIME: CPT | Performed by: FAMILY MEDICINE

## 2022-08-18 NOTE — PROGRESS NOTES
ANTICOAGULATION MANAGEMENT     Kemi Soto 72 year old female is on warfarin with subtherapeutic INR result. (Goal INR 2.0-3.0)    Recent labs: (last 7 days)     08/18/22  1307   INR 1.3*       ASSESSMENT       Source(s): Chart Review    Previous INR was Therapeutic last visit; previously outside of goal range    Medication, diet, health changes since last INR chart reviewed; She was on Paxlovid for Covid and was apparently instructed to reduce her dose over the weekend. The dose that Ciarra wrote on her lab template today is not the same as what she verbalized to ACN earlier this week, would like to confirm what she took prior to providing final instructions.          PLAN     Unable to reach Ciarra today.    Left message to take a booster dose of warfarin,  10 mg tonight. Request call back for assessment. Also sent a [email or web address] message.    Follow up required to confirm warfarin dose taken and discuss out of range result     Amisha Bustillo RN  Anticoagulation Clinic  8/18/2022

## 2022-08-19 NOTE — PROGRESS NOTES
ANTICOAGULATION MANAGEMENT     Kemi Soto 72 year old female is on warfarin with subtherapeutic INR result. (Goal INR 2.0-3.0)    Recent labs: (last 7 days)     08/18/22  1307   INR 1.3*       ASSESSMENT       Source(s): Chart Review, Patient/Caregiver Call and Template       Warfarin doses taken: Warfarin taken as instructed and Per patient she was instructed to take warfarin 2.5 mg daily while she was taking paxlovid    Diet: No new diet changes identified    New illness, injury, or hospitalization: Yes: covid - self tested last Friday due to coughing, sneezing, sneezing when she got back from her trip)     Medication/supplement changes: Paxlovid 5 day course (dates: 8/12-8/16) which may be decreasing INR today    Signs or symptoms of bleeding or clotting: No    Previous INR: Therapeutic last visit; previously outside of goal range    Additional findings: Patient confirmed that she took warfarin 10 mg dose yesterday as instructed.       PLAN     Recommended plan for temporary change(s) affecting INR     Dosing Instructions: Continue your current warfarin dose with next INR in 1 week       Summary  As of 8/18/2022    Full warfarin instructions:  8/18: 10 mg; Otherwise 2.5 mg every Mon, Fri; 5 mg all other days   Next INR check:  8/25/2022             Telephone call with Ciarra who verbalizes understanding and agrees to plan and who agrees to plan and repeated back plan correctly    Lab visit scheduled    Education provided: Importance of therapeutic range, Importance of following up at instructed interval, Importance of taking warfarin as instructed, Potential interaction between warfarin and paxlovid, Monitoring for clotting signs and symptoms, When to seek medical attention/emergency care and Contact [phone] with any changes, questions or concerns.     Plan made per [facility] anticoagulation protocol    Cony Noonan RN  Anticoagulation  Clinic  8/19/2022    _______________________________________________________________________     Anticoagulation Episode Summary     Current INR goal:  2.0-3.0   TTR:  71.7 % (1 y)   Target end date:  Indefinite   Send INR reminders to:  [facility] HEART [facility]    Indications    Paroxysmal atrial fibrillation (H) [I48.0]           Comments:  Patient is no longer using home meter         Anticoagulation Care Providers     Provider Role Specialty Phone number    Chuck Hernandez MD Referring Cardiovascular Disease [phone]

## 2022-08-24 ENCOUNTER — TELEPHONE (OUTPATIENT)
Dept: CARDIOLOGY | Facility: CLINIC | Age: 72
End: 2022-08-24

## 2022-08-25 ENCOUNTER — LAB (OUTPATIENT)
Dept: LAB | Facility: CLINIC | Age: 72
End: 2022-08-25
Payer: COMMERCIAL

## 2022-08-25 ENCOUNTER — ANTICOAGULATION THERAPY VISIT (OUTPATIENT)
Dept: ANTICOAGULATION | Facility: CLINIC | Age: 72
End: 2022-08-25

## 2022-08-25 DIAGNOSIS — I48.0 PAROXYSMAL ATRIAL FIBRILLATION (H): Primary | ICD-10-CM

## 2022-08-25 DIAGNOSIS — I48.0 PAROXYSMAL ATRIAL FIBRILLATION (H): ICD-10-CM

## 2022-08-25 LAB — INR BLD: 4.2 (ref 0.9–1.1)

## 2022-08-25 PROCEDURE — 85610 PROTHROMBIN TIME: CPT

## 2022-08-25 PROCEDURE — 36416 COLLJ CAPILLARY BLOOD SPEC: CPT

## 2022-08-25 NOTE — PROGRESS NOTES
ANTICOAGULATION MANAGEMENT     Kemi Soto 72 year old female is on warfarin with supratherapeutic INR result. (Goal INR 2.0-3.0)    Recent labs: (last 7 days)     08/25/22  1306   INR 4.2*       ASSESSMENT       Source(s): Chart Review, Patient/Caregiver Call and Template       Warfarin doses taken: Warfarin taken as instructed - patient 35 mg in the last week due to booster dose on 8/18/22    Diet: No new diet changes identified    New illness, injury, or hospitalization: No - cough and sneezing symptoms all cleared except that she is just tired    Medication/supplement changes: None noted    Signs or symptoms of bleeding or clotting: No    Previous INR: Subtherapeutic most likely due to dose reduction while taking paclovid    Additional findings: None       PLAN     Recommended plan for temporary change(s) affecting INR     Dosing Instructions: hold dose then continue your current warfarin dose with next INR in 7-10 days       Summary  As of 8/25/2022    Full warfarin instructions:  8/25: Hold; Otherwise 2.5 mg every Mon, Fri; 5 mg all other days   Next INR check:  9/5/2022             Telephone call with Ciarra who verbalizes understanding and agrees to plan and who agrees to plan and repeated back plan correctly    Lab visit scheduled    Education provided: Importance of therapeutic range, Importance of following up at instructed interval, Monitoring for bleeding signs and symptoms, When to seek medical attention/emergency care and Contact [phone] with any changes, questions or concerns.     Plan made per [facility] anticoagulation protocol    Cony Noonan RN  Anticoagulation Clinic  8/25/2022    _______________________________________________________________________     Anticoagulation Episode Summary     Current INR goal:  2.0-3.0   TTR:  72.2 % (1 y)   Target end date:  Indefinite   Send INR reminders to:  [facility]    Indications    Paroxysmal atrial fibrillation (H) [I48.0]            Comments:  Patient is no longer using home meter         Anticoagulation Care Providers     Provider Role Specialty Phone number    Chuck Hernandez MD Referring Cardiovascular Disease [phone]

## 2022-09-01 ENCOUNTER — ANTICOAGULATION THERAPY VISIT (OUTPATIENT)
Dept: ANTICOAGULATION | Facility: CLINIC | Age: 72
End: 2022-09-01

## 2022-09-01 ENCOUNTER — LAB (OUTPATIENT)
Dept: LAB | Facility: CLINIC | Age: 72
End: 2022-09-01
Payer: COMMERCIAL

## 2022-09-01 DIAGNOSIS — I48.0 PAROXYSMAL ATRIAL FIBRILLATION (H): Primary | ICD-10-CM

## 2022-09-01 DIAGNOSIS — I48.0 PAROXYSMAL ATRIAL FIBRILLATION (H): ICD-10-CM

## 2022-09-01 LAB — INR BLD: 1.9 (ref 0.9–1.1)

## 2022-09-01 PROCEDURE — 36415 COLL VENOUS BLD VENIPUNCTURE: CPT

## 2022-09-01 PROCEDURE — 85610 PROTHROMBIN TIME: CPT

## 2022-09-01 NOTE — PROGRESS NOTES
ANTICOAGULATION MANAGEMENT     Kemi Soto 72 year old female is on warfarin with subtherapeutic INR result. (Goal INR 2.0-3.0)    Recent labs: (last 7 days)     09/01/22  1306   INR 1.9*       ASSESSMENT       Source(s): Chart Review, Patient/Caregiver Call and Template       Warfarin doses taken: Warfarin taken as instructed    Diet: No new diet changes identified    New illness, injury, or hospitalization: Yes: COVID + a few weeks ago    Medication/supplement changes: None noted    Signs or symptoms of bleeding or clotting: No    Previous INR: Supratherapeutic    Additional findings: None       PLAN     Recommended plan for no diet, medication or health factor changes affecting INR     Dosing Instructions: Continue your current warfarin dose with next INR in 10-14 days       Summary  As of 9/1/2022    Full warfarin instructions:  2.5 mg every Mon, Fri; 5 mg all other days   Next INR check:  9/16/2022             Telephone call with Ciarra who verbalizes understanding and agrees to plan    Patient elected to schedule next visit 9/16    Education provided: Goal range and significance of current result and Importance of following up at instructed interval    Plan made per [facility] anticoagulation protocol    Melissa Hunt RN  Anticoagulation Clinic  9/1/2022    _______________________________________________________________________     Anticoagulation Episode Summary     Current INR goal:  2.0-3.0   TTR:  72.9 % (1 y)   Target end date:  Indefinite   Send INR reminders to:  [facility] HEART [facility]    Indications    Paroxysmal atrial fibrillation (H) [I48.0]           Comments:  Patient is no longer using home meter         Anticoagulation Care Providers     Provider Role Specialty Phone number    Chuck Hernandez MD Referring Cardiovascular Disease [phone]          
relief break RN, pt A&Ox4, NAD, D/C home

## 2022-09-16 ENCOUNTER — ANTICOAGULATION THERAPY VISIT (OUTPATIENT)
Dept: ANTICOAGULATION | Facility: CLINIC | Age: 72
End: 2022-09-16

## 2022-09-16 ENCOUNTER — LAB (OUTPATIENT)
Dept: LAB | Facility: CLINIC | Age: 72
End: 2022-09-16
Payer: COMMERCIAL

## 2022-09-16 DIAGNOSIS — I48.0 PAROXYSMAL ATRIAL FIBRILLATION (H): ICD-10-CM

## 2022-09-16 DIAGNOSIS — I48.0 PAROXYSMAL ATRIAL FIBRILLATION (H): Primary | ICD-10-CM

## 2022-09-16 LAB — INR BLD: 2.4 (ref 0.9–1.1)

## 2022-09-16 PROCEDURE — 85610 PROTHROMBIN TIME: CPT

## 2022-09-16 PROCEDURE — 36415 COLL VENOUS BLD VENIPUNCTURE: CPT

## 2022-09-16 NOTE — PROGRESS NOTES
ANTICOAGULATION MANAGEMENT     Kemi Soto 72 year old female is on warfarin with therapeutic INR result. (Goal INR 2.0-3.0)    Recent labs: (last 7 days)     09/16/22  1340   INR 2.4*       ASSESSMENT       Source(s): Chart Review, Patient/Caregiver Call and Template       Warfarin doses taken: Warfarin taken as instructed    Diet: No new diet changes identified    New illness, injury, or hospitalization: No    Medication/supplement changes: None noted    Signs or symptoms of bleeding or clotting: No    Previous INR: Subtherapeutic    Additional findings: None       PLAN     Recommended plan for no diet, medication or health factor changes affecting INR     Dosing Instructions: Continue your current warfarin dose with next INR in 2 weeks       Summary  As of 9/16/2022    Full warfarin instructions:  2.5 mg every Mon, Fri; 5 mg all other days   Next INR check:  9/30/2022             Telephone call with Ciarra who verbalizes understanding and agrees to plan    Lab visit scheduled    Education provided: Goal range and significance of current result, Importance of therapeutic range and Importance of following up at instructed interval    Plan made per [facility] anticoagulation protocol    Cony Noonan RN  Anticoagulation Clinic  9/16/2022    _______________________________________________________________________     Anticoagulation Episode Summary     Current INR goal:  2.0-3.0   TTR:  73.5 % (1 y)   Target end date:  Indefinite   Send INR reminders to:  [facility] HEART [facility]    Indications    Paroxysmal atrial fibrillation (H) [I48.0]           Comments:  Patient is no longer using home meter         Anticoagulation Care Providers     Provider Role Specialty Phone number    Chuck Hernandez MD Referring Cardiovascular Disease [phone]

## 2022-09-30 ENCOUNTER — ANTICOAGULATION THERAPY VISIT (OUTPATIENT)
Dept: ANTICOAGULATION | Facility: CLINIC | Age: 72
End: 2022-09-30

## 2022-09-30 ENCOUNTER — LAB (OUTPATIENT)
Dept: LAB | Facility: CLINIC | Age: 72
End: 2022-09-30
Payer: COMMERCIAL

## 2022-09-30 DIAGNOSIS — I48.0 PAROXYSMAL ATRIAL FIBRILLATION (H): ICD-10-CM

## 2022-09-30 DIAGNOSIS — I48.0 PAROXYSMAL ATRIAL FIBRILLATION (H): Primary | ICD-10-CM

## 2022-09-30 LAB — INR BLD: 3 (ref 0.9–1.1)

## 2022-09-30 PROCEDURE — 36416 COLLJ CAPILLARY BLOOD SPEC: CPT

## 2022-09-30 PROCEDURE — 85610 PROTHROMBIN TIME: CPT

## 2022-09-30 NOTE — PROGRESS NOTES
ANTICOAGULATION MANAGEMENT     Kemi Soto 72 year old female is on warfarin with therapeutic INR result. (Goal INR 2.0-3.0)    Recent labs: (last 7 days)     09/30/22  1303   INR 3.0*       ASSESSMENT       Source(s): Chart Review and Template       Warfarin doses taken: Warfarin taken as instructed    Diet: No new diet changes identified    New illness, injury, or hospitalization: No    Medication/supplement changes: None noted    Signs or symptoms of bleeding or clotting: No    Previous INR: Therapeutic last visit; previously outside of goal range    Additional findings: None       PLAN     Recommended plan for no diet, medication or health factor changes affecting INR     Dosing Instructions: Continue your current warfarin dose with next INR in 4 weeks       Summary  As of 9/30/2022    Full warfarin instructions:  2.5 mg every Mon, Fri; 5 mg all other days   Next INR check:  10/28/2022             Detailed voice message left for Ciarra with dosing instructions and follow up date.     Contact [phone] to schedule and with any changes, questions or concerns.     Education provided: Importance of therapeutic range and Importance of following up at instructed interval    Plan made per [facility] anticoagulation protocol    Cony Noonan, RN  Anticoagulation Clinic  9/30/2022    _______________________________________________________________________     Anticoagulation Episode Summary     Current INR goal:  2.0-3.0   TTR:  74.9 % (1 y)   Target end date:  Indefinite   Send INR reminders to:  [facility] HEART [facility]    Indications    Paroxysmal atrial fibrillation (H) [I48.0]           Comments:  Patient is no longer using home meter         Anticoagulation Care Providers     Provider Role Specialty Phone number    Chuck Hernandez MD Referring Cardiovascular Disease [phone]

## 2022-09-30 NOTE — PROGRESS NOTES
ACN called patient back and discussed dosing as outlined below.    INR appointment as recommended.    Cony Noonan RN [facility]

## 2022-10-02 DIAGNOSIS — I25.10 CORONARY ARTERY DISEASE INVOLVING NATIVE CORONARY ARTERY OF NATIVE HEART WITHOUT ANGINA PECTORIS: ICD-10-CM

## 2022-10-03 DIAGNOSIS — E78.00 HYPERCHOLESTEROLEMIA: ICD-10-CM

## 2022-10-03 DIAGNOSIS — F41.1 ANXIETY, GENERALIZED: ICD-10-CM

## 2022-10-03 RX ORDER — ATORVASTATIN CALCIUM 80 MG/1
80 TABLET, FILM COATED ORAL AT BEDTIME
Qty: 90 TABLET | Refills: 1 | Status: SHIPPED | OUTPATIENT
Start: 2022-10-03 | End: 2023-01-17

## 2022-10-03 RX ORDER — GABAPENTIN 300 MG/1
CAPSULE ORAL
Qty: 270 CAPSULE | Refills: 0 | Status: SHIPPED | OUTPATIENT
Start: 2022-10-03 | End: 2023-01-18

## 2022-10-03 RX ORDER — BUSPIRONE HYDROCHLORIDE 15 MG/1
15 TABLET ORAL DAILY
Qty: 90 TABLET | Refills: 0 | Status: SHIPPED | OUTPATIENT
Start: 2022-10-03 | End: 2023-04-12

## 2022-10-03 RX ORDER — LOSARTAN POTASSIUM 100 MG/1
TABLET ORAL
Qty: 90 TABLET | Refills: 0 | Status: SHIPPED | OUTPATIENT
Start: 2022-10-03 | End: 2022-11-08

## 2022-10-03 NOTE — TELEPHONE ENCOUNTER
"Former patient of Jodi & has not established care with another provider.  Please assign refill request to covering provider per clinic standard process.    Routing refill request to provider for review/approval because:  Drug not on the [facility] [place]     Last Written Prescription Date:  5/20/22  Last Fill Quantity: 270,  # refills: 0   Last office visit provider:  4/11/22     Requested Prescriptions   Pending Prescriptions Disp Refills     busPIRone (BUSPAR) 15 MG tablet 90 tablet 0     Sig: Take 1 tablet (15 mg) by mouth daily       Atypical Antidepressants Protocol Passed - 10/3/2022  9:44 AM        Passed - Recent (12 mo) or future (30 days) visit within the authorizing provider's specialty     Patient has had an office visit with the authorizing provider or a provider within the authorizing providers department within the previous 12 mos or has a future within next 30 days. See \"Patient Info\" tab in inbasket, or \"Choose Columns\" in Meds & Orders section of the refill encounter.              Passed - Medication active on med list        Passed - Patient is age 18 or older        Passed - No active pregnancy on record        Passed - No positive pregnancy test in past 12 mos           gabapentin (NEURONTIN) 300 MG capsule 270 capsule 0     Sig: TAKE 3 CAPSULES BY MOUTH EVERY DAY AT BEDTIME       There is no refill protocol information for this order        atorvastatin (LIPITOR) 80 MG tablet 90 tablet 1     Sig: Take 1 tablet (80 mg) by mouth At Bedtime       Statins Protocol Passed - 10/3/2022  9:44 AM        Passed - LDL on file in past 12 months     Recent Labs   Lab Test 07/22/22  1126   LDL 78             Passed - No abnormal creatine kinase in past 12 months     No lab results found.             Passed - Recent (12 mo) or future (30 days) visit within the authorizing provider's specialty     Patient has had an office visit with the authorizing provider or a provider within the authorizing providers " "department within the previous 12 mos or has a future within next 30 days. See \"Patient Info\" tab in inbasket, or \"Choose Columns\" in Meds & Orders section of the refill encounter.              Passed - Medication is active on med list        Passed - Patient is age 18 or older        Passed - No active pregnancy on record        Passed - No positive pregnancy test in past 12 months             Josie Argueta RN 10/03/22 2:40 PM  "

## 2022-10-18 NOTE — PROGRESS NOTES
===View-only below this line===  ----- Message -----  From: Faye Jerry  Sent: 10/10/2022  11:01 AM CDT  To: Priscila Yoon RN  Subject: RE: VIKASH                                          She will do at her PMD's office.  Thank you

## 2022-10-28 ENCOUNTER — LAB (OUTPATIENT)
Dept: LAB | Facility: CLINIC | Age: 72
End: 2022-10-28
Payer: COMMERCIAL

## 2022-10-28 ENCOUNTER — ANTICOAGULATION THERAPY VISIT (OUTPATIENT)
Dept: ANTICOAGULATION | Facility: CLINIC | Age: 72
End: 2022-10-28

## 2022-10-28 DIAGNOSIS — I48.0 PAROXYSMAL ATRIAL FIBRILLATION (H): Primary | ICD-10-CM

## 2022-10-28 DIAGNOSIS — I25.83 CORONARY ARTERY DISEASE DUE TO LIPID RICH PLAQUE: ICD-10-CM

## 2022-10-28 DIAGNOSIS — I48.0 PAROXYSMAL ATRIAL FIBRILLATION (H): ICD-10-CM

## 2022-10-28 DIAGNOSIS — E78.00 HYPERCHOLESTEROLEMIA: ICD-10-CM

## 2022-10-28 DIAGNOSIS — I25.10 CORONARY ARTERY DISEASE DUE TO LIPID RICH PLAQUE: ICD-10-CM

## 2022-10-28 DIAGNOSIS — E78.5 HYPERLIPIDEMIA WITH TARGET LDL LESS THAN 70: ICD-10-CM

## 2022-10-28 DIAGNOSIS — E78.2 MIXED HYPERLIPIDEMIA: ICD-10-CM

## 2022-10-28 LAB
ALT SERPL W P-5'-P-CCNC: 18 U/L (ref 10–35)
AST SERPL W P-5'-P-CCNC: 20 U/L (ref 10–35)
CHOLEST SERPL-MCNC: 171 MG/DL
HDLC SERPL-MCNC: 47 MG/DL
INR BLD: 1.6 (ref 0.9–1.1)
LDLC SERPL CALC-MCNC: 88 MG/DL
NONHDLC SERPL-MCNC: 124 MG/DL
TRIGL SERPL-MCNC: 182 MG/DL

## 2022-10-28 PROCEDURE — 80061 LIPID PANEL: CPT

## 2022-10-28 PROCEDURE — 84450 TRANSFERASE (AST) (SGOT): CPT

## 2022-10-28 PROCEDURE — 84460 ALANINE AMINO (ALT) (SGPT): CPT

## 2022-10-28 PROCEDURE — 36415 COLL VENOUS BLD VENIPUNCTURE: CPT

## 2022-10-28 PROCEDURE — 85610 PROTHROMBIN TIME: CPT

## 2022-10-28 NOTE — PROGRESS NOTES
ANTICOAGULATION MANAGEMENT     Kemi Soto 72 year old female is on warfarin with subtherapeutic INR result. (Goal INR 2.0-3.0)    Recent labs: (last 7 days)     10/28/22  1210   INR 1.6*       ASSESSMENT       Source(s): Chart Review, Patient/Caregiver Call and Template       Warfarin doses taken: Patient not sure if she missed any dose     Diet: No new diet changes identified    New illness, injury, or hospitalization: No    Medication/supplement changes: None noted    Signs or symptoms of bleeding or clotting: No    Previous INR: Therapeutic last 2(+) visits    Additional findings: None       PLAN     Recommended plan for temporary change(s) affecting INR     Dosing Instructions: booster dose then continue your current warfarin dose with next INR in 7-10 days       Summary  As of 10/28/2022    Full warfarin instructions:  10/28: 5 mg; Otherwise 2.5 mg every Mon, Fri; 5 mg all other days; Starting 10/28/2022   Next INR check:  11/8/2022             Telephone call with Ciarra who verbalizes understanding and agrees to plan and who agrees to plan and repeated back plan correctly    Check at provider office visit    Education provided:     Taking warfarin: Importance of taking warfarin as instructed    Goal range and lab monitoring: goal range and significance of current result, Importance of therapeutic range and Importance of following up at instructed interval    Contact [phone] with any changes, questions or concerns.     Plan made per [facility] anticoagulation protocol    Cony Noonan RN  Anticoagulation Clinic  10/28/2022    _______________________________________________________________________     Anticoagulation Episode Summary     Current INR goal:  2.0-3.0   TTR:  75.1 % (1 y)   Target end date:  Indefinite   Send INR reminders to:  [facility]    Indications    Paroxysmal atrial fibrillation (H) [I48.0]           Comments:  Patient is no longer using home meter          Anticoagulation Care Providers     Provider Role Specialty Phone number    Chuck Hernandez MD Referring Cardiovascular Disease [phone]

## 2022-11-02 ENCOUNTER — IMMUNIZATION (OUTPATIENT)
Dept: FAMILY MEDICINE | Facility: CLINIC | Age: 72
End: 2022-11-02
Payer: COMMERCIAL

## 2022-11-02 DIAGNOSIS — Z23 IMMUNIZATION DUE: Primary | ICD-10-CM

## 2022-11-02 PROCEDURE — G0008 ADMIN INFLUENZA VIRUS VAC: HCPCS | Mod: 59

## 2022-11-02 PROCEDURE — 90662 IIV NO PRSV INCREASED AG IM: CPT

## 2022-11-02 PROCEDURE — 91312 COVID-19,PF,PFIZER BOOSTER BIVALENT: CPT

## 2022-11-02 PROCEDURE — 0124A COVID-19,PF,PFIZER BOOSTER BIVALENT: CPT

## 2022-11-08 ENCOUNTER — MYC MEDICAL ADVICE (OUTPATIENT)
Dept: ANTICOAGULATION | Facility: CLINIC | Age: 72
End: 2022-11-08

## 2022-11-08 ENCOUNTER — ANTICOAGULATION THERAPY VISIT (OUTPATIENT)
Dept: ANTICOAGULATION | Facility: CLINIC | Age: 72
End: 2022-11-08

## 2022-11-08 ENCOUNTER — LAB (OUTPATIENT)
Dept: CARDIOLOGY | Facility: CLINIC | Age: 72
End: 2022-11-08
Payer: COMMERCIAL

## 2022-11-08 ENCOUNTER — OFFICE VISIT (OUTPATIENT)
Dept: CARDIOLOGY | Facility: CLINIC | Age: 72
End: 2022-11-08
Attending: INTERNAL MEDICINE
Payer: COMMERCIAL

## 2022-11-08 VITALS
HEART RATE: 67 BPM | RESPIRATION RATE: 16 BRPM | BODY MASS INDEX: 32.02 KG/M2 | SYSTOLIC BLOOD PRESSURE: 162 MMHG | HEIGHT: 62 IN | WEIGHT: 174 LBS | DIASTOLIC BLOOD PRESSURE: 70 MMHG

## 2022-11-08 DIAGNOSIS — E78.00 HYPERCHOLESTEROLEMIA: ICD-10-CM

## 2022-11-08 DIAGNOSIS — I25.10 CORONARY ARTERY DISEASE DUE TO LIPID RICH PLAQUE: ICD-10-CM

## 2022-11-08 DIAGNOSIS — E78.2 MIXED HYPERLIPIDEMIA: ICD-10-CM

## 2022-11-08 DIAGNOSIS — I48.0 PAROXYSMAL ATRIAL FIBRILLATION (H): Primary | ICD-10-CM

## 2022-11-08 DIAGNOSIS — I48.0 PAROXYSMAL ATRIAL FIBRILLATION (H): ICD-10-CM

## 2022-11-08 DIAGNOSIS — E78.5 HYPERLIPIDEMIA WITH TARGET LDL LESS THAN 70: ICD-10-CM

## 2022-11-08 DIAGNOSIS — I25.83 CORONARY ARTERY DISEASE DUE TO LIPID RICH PLAQUE: ICD-10-CM

## 2022-11-08 DIAGNOSIS — I25.10 CORONARY ARTERY DISEASE INVOLVING NATIVE CORONARY ARTERY OF NATIVE HEART WITHOUT ANGINA PECTORIS: ICD-10-CM

## 2022-11-08 LAB — INR POINT OF CARE: 2.9 (ref 0.9–1.1)

## 2022-11-08 PROCEDURE — 99214 OFFICE O/P EST MOD 30 MIN: CPT | Performed by: INTERNAL MEDICINE

## 2022-11-08 PROCEDURE — 93000 ELECTROCARDIOGRAM COMPLETE: CPT | Performed by: GENERAL ACUTE CARE HOSPITAL

## 2022-11-08 PROCEDURE — 36416 COLLJ CAPILLARY BLOOD SPEC: CPT

## 2022-11-08 PROCEDURE — 85610 PROTHROMBIN TIME: CPT

## 2022-11-08 RX ORDER — EZETIMIBE 10 MG/1
10 TABLET ORAL DAILY
Qty: 90 TABLET | Refills: 4 | Status: SHIPPED | OUTPATIENT
Start: 2022-11-08 | End: 2023-04-12

## 2022-11-08 RX ORDER — LOSARTAN POTASSIUM 100 MG/1
100 TABLET ORAL DAILY
Qty: 90 TABLET | Refills: 4 | Status: SHIPPED | OUTPATIENT
Start: 2022-11-08 | End: 2023-04-12

## 2022-11-08 NOTE — PROGRESS NOTES
ANTICOAGULATION MANAGEMENT     Kemi Soto 72 year old female is on warfarin with therapeutic INR result. (Goal INR 2.0-3.0)    Recent labs: (last 7 days)     11/08/22  1547   INR 2.9*       ASSESSMENT       Source(s): Chart Review    Previous INR was Subtherapeutic    Medication, diet, health changes since last INR chart reviewed; none identified           PLAN     Recommended plan for no diet, medication or health factor changes affecting INR     Dosing Instructions: Continue your current warfarin dose with next INR in 3 weeks       Summary  As of 11/8/2022    Full warfarin instructions:  2.5 mg every Mon, Fri; 5 mg all other days; Starting 11/8/2022   Next INR check:  11/29/2022             Sent Selerity message with dosing and follow up instructions.  OmniPV Message sent since patient was at Dr. Hernandez appointment.    Contact [phone] to schedule and with any changes, questions or concerns.     Education provided:     Goal range and lab monitoring: goal range and significance of current result    Contact [phone] with any changes, questions or concerns.     Plan made per [facility] anticoagulation protocol    Janee Douglas, RN  Anticoagulation Clinic  11/8/2022    _______________________________________________________________________     Anticoagulation Episode Summary     Current INR goal:  2.0-3.0   TTR:  74.2 % (1 y)   Target end date:  Indefinite   Send INR reminders to:  [facility]    Indications    Paroxysmal atrial fibrillation (H) [I48.0]           Comments:  Patient is no longer using home meter         Anticoagulation Care Providers     Provider Role Specialty Phone number    Chuck Hernandez MD Referring Cardiovascular Disease [phone]

## 2022-11-08 NOTE — PROGRESS NOTES
HEART CARE ENCOUNTER CONSULTATON NOTE      [facility]  [phone]      Assessment/Recommendations   Assessment/Plan:  1.  History of typical isthmus flutter and atrial fibrillation.  Appears to be well controlled on sotalol.  She is asked to continue to monitor for irregular heart rhythm or palpitations.  Previous discussion with the EP regarding possible ablation which she has declined.  Currently she will continue on low-dose sotalol.  EKG today is pending and will be reviewed.  Renal function from July 2022 demonstrates normal creatinine.    2.  Coronary artery disease.  Multivessel intervention as outlined with prior history of LIMA to the LAD and vein graft to the obtuse marginal branch.  No anginal chest discomfort with negative nuclear stress test November 2020.  She is planning to go to [place] for the winter and wishes not to do additional testing at this time.    3.  Hypertension.  Blood pressure is elevated here in the office today.  She indicates that she has been stressed related to the drive to get the clinic today.  We talked about blood pressure monitoring and the importance of blood pressure control.  I noted during my previous visit her blood pressure was mildly elevated upon arrival but repeat 128/62.  Renewed her losartan.    4.  Dyslipidemia.  She tells me today that she has not been taking the Zetia.  She is not certain why she has not been taking it.  She is willing to take it and I gave her a new prescription.  Lipids from October shows her cholesterol numbers to be higher with a total cholesterol 171, triglycerides 182 LDL 88.  We talked about taking the Zetia and having a repeat lipid in a few months which she will try to do through [place].      Plan as outlined above with follow-up in 6 months.         History of Present Illness/Subjective    HPI: Kemi Soto is a 72 year old female is seen in follow-up.  She reports that she has been feeling well overall.  She  "has not been aware of tacky irregular heart rhythms or complaints of chest pain or shortness of breath.  She states that she is a little flustered today by the drive out here.  She has chosen not to go to [facility].  She plans to go back to [place] for 6 months leaving in December.  She has a history of typical isthmus flutter and atrial fibrillation.  This appears to have been controlled on low-dose sotalol.  Discussion was had regarding ablation but her preference was to be on medical management.  She is aware to monitor for any increase heart rate or palpitations.  She has a history of multivessel intervention with prior history of LIMA to the LAD and vein graft to the obtuse marginal and right PDA with a vein graft being noted to be occluded to the PDA and number of years ago.  Most recent angiogram was August 2018 with the right and circumflex lesions were noted to be small in caliber and subsequent intervention to the circumflex.  She had a negative nuclear stress test November 2020.        Recent Coronary Angiogram Results:    Mobile cardiac outpatient telemetry [facility] report     Ordering physician:Chuck Hernandez MD.   Indication: Persistent atrial fibrillation.     Prescribed monitoring time: 14 days.  Effective monitoring time: 12 days and 19 hours.        Interpretation:     Patient was monitored for 12 days and 19 hours, baseline rhythm sinus, heart rate averaging 67 bpm, lowest heart rate of 50 and max of 120 bpm.     No prolonged pauses, no high-grade AV block, no significant IVCD or aberrancy.     Infrequent PACs burden is 1% of total beats, no atrial runs, no evidence of PSVT, no evidence of atrial fibrillation or flutter.     Infrequent isolated PVCs without ventricular runs, no sustained or nonsustained VT.     Strip # 11 and 12 show sinus rhythm with downsloping ST depression 1 to 1.5 mm, suggestive but not diagnostic for ischemia.     A single symptomatic transmission on June 7 for \"symptoms other " "listed\" correlated with sinus rhythm rate of 64 bpm.          Physical Examination  Review of Systems   Vitals: 162/70, repeat 150/70, heart rate of 60s and regular, weight 174 pounds.  Wt Readings from Last 3 Encounters:   07/12/22 80.7 kg (178 lb)   06/20/22 81.6 kg (180 lb)   06/03/22 81.6 kg (180 lb)       General Appearance:   no distress, normal body habitus   ENT/Mouth:  Wearing a facemask      EYES:  no scleral icterus, normal conjunctivae   Neck: no carotid bruits or thyromegaly   Chest/Lungs:   lungs are clear to auscultation, no rales or wheezing, well-healed sternal scar, equal chest wall expansion    Cardiovascular:   Regular. Normal first and second heart sounds with no murmurs, rubs, or gallops; the carotid, radial and posterior tibial pulses are intact, Jugular venous pressure within normal limits, no significant edema bilaterally    Abdomen:  , bruits, or tenderness; bowel sounds are present   Extremities: no cyanosis or clubbing   Skin: no xanthelasma, warm.    Neurologic: , no tremors     Psychiatric: alert and oriented x3, calm        Please refer above for cardiac ROS details.        Medical History  Surgical History Family History Social History   Past Medical History:   Diagnosis Date     Coronary artery disease      Diabetes mellitus (H)      Discitis of thoracolumbar region 10/10/2015     Encephalopathy 10/14/2015     Epidural abscess 10/10/2015     Hip pain, right      Hyperlipidemia      Sepsis (H) 10/8/2015     Stroke (H) 06/23/2015    Neurology felt that episode was C/W subacute ischemic stroke     TIA (transient ischemic attack) 6/23/2015     UTI (urinary tract infection)     admitted to [facility] with UTI     Past Surgical History:   Procedure Laterality Date     ANGIOPLASTY  03/30/2016    Drug eluting stent mid LAD, cutting balloon to 1st diagonal     ANGIOPLASTY  2008     BYPASS GRAFT ARTERY CORONARY  12/11/2007    X 3 vessels, LIMA to LAD, saph vein graft to distal RCA, " saphvein graft to marginal, mini circuit bypass.  [facility].     CORONARY STENT PLACEMENT  2008    Left main, left circumflex, RCA     CORONARY STENT PLACEMENT  03/2016     CV CORONARY ANGIOGRAM N/A 8/1/2018    Procedure: Coronary Angiogram;  Surgeon: Kit Bean MD;  Location: [facility] Cath Lab;  Service:      CV LEFT HEART CATHETERIZATION WITH LEFT VENTRICULOGRAM N/A 8/1/2018    Procedure: Left Heart Catheterization with Left Ventriculogram;  Surgeon: Kit Bean MD;  Location: [facility] Cath Lab;  Service:      INSERT MIDLINE HE  10/31/2015          LUMBAR DISCECTOMY N/A 10/11/2015    Procedure: BILATERAL L1-L5 DECOMPRESSIVE LAMINECTOMY WITH EVACUATION OF EPIDURAL ABSCESS IRRIGATION & DEBRIDEMENT;  Surgeon: Luli Chan MD;  Location: [facility];  Service:      PICC  10/19/2015          RELEASE CARPAL TUNNEL Bilateral      Family History   Problem Relation Age of Onset     Cerebrovascular Disease Mother      Diabetes Father      Diabetes Sister      Cerebrovascular Disease Sister 81.00     Cerebrovascular Disease Brother      Diabetes Brother         Social History     Socioeconomic History     Marital status: Single     Spouse name: Not on file     Number of children: 1     Years of education: Not on file     Highest education level: Not on file   Occupational History     Not on file   Tobacco Use     Smoking status: Former     Types: Cigarettes     Quit date: 6/23/2007     Years since quitting: 15.3     Smokeless tobacco: Never   Substance and Sexual Activity     Alcohol use: No     Comment: Alcoholic Drinks/day: stopped drinking 9/2015     Drug use: No     Sexual activity: Never   Other Topics Concern     Not on file   Social History Narrative    Lives alone with cats and dogs. , one daughter, Tory Lofton.      Social Determinants of Health     Financial Resource Strain: Not on file   Food Insecurity: Not on file   Transportation Needs: Not on file    Physical Activity: Not on file   Stress: Not on file   Social Connections: Not on file   Intimate Partner Violence: Not on file   Housing Stability: Not on file           Medications  Allergies   Current Outpatient Medications   Medication Sig Dispense Refill     atorvastatin (LIPITOR) 80 MG tablet Take 1 tablet (80 mg) by mouth At Bedtime 90 tablet 1     busPIRone (BUSPAR) 15 MG tablet Take 1 tablet (15 mg) by mouth daily 90 tablet 0     Continuous Blood Gluc Sensor (DEXCOM G6 SENSOR) MISC 1 each every 10 days Change every 10 days. 9 each 4     Continuous Blood Gluc Transmit (DEXCOM G6 TRANSMITTER) MISC 1 each every 3 months Change every 3 months. 1 each 5     ezetimibe (ZETIA) 10 MG tablet Take 1 tablet (10 mg) by mouth daily 90 tablet 1     gabapentin (NEURONTIN) 300 MG capsule TAKE 3 CAPSULES BY MOUTH EVERY DAY AT BEDTIME 270 capsule 0     insulin glargine (LANTUS SOLOSTAR) 100 UNIT/ML pen Inject 28 Units Subcutaneous in the morning and 28 Units in the evening. INJECT 28 UNITS SUBCUTANEOUSLY TWICE DAILY 60 mL 3     isosorbide mononitrate (IMDUR) 60 MG 24 hr tablet Take 1 tablet by mouth once daily 90 tablet 1     lidocaine (LIDODERM) 5 % patch Place 1 patch onto the skin every 24 hours To prevent lidocaine toxicity, patient should be patch free for 12 hrs daily. 5 patch 0     losartan (COZAAR) 100 MG tablet Take 1 tablet by mouth once daily 90 tablet 0     metFORMIN (GLUCOPHAGE) 1000 MG tablet Take 1,000 mg by mouth 2 times daily (with meals)       sertraline (ZOLOFT) 100 MG tablet Take 1 tablet by mouth once daily 90 tablet 2     sotalol (BETAPACE) 80 MG tablet Take 0.5 tablets (40 mg) by mouth 2 times daily 90 tablet 4     traMADol (ULTRAM) 50 MG tablet TAKE 1 TABLET BY MOUTH EVERY 8 HOURS AS NEEDED FOR PAIN OR SEVERE PAIN ON SCALE (7-10) 30 tablet 0     traZODone (DESYREL) 50 MG tablet 1/2 to 1 tablet at bedtime prn insomnia 30 tablet 1     warfarin ANTICOAGULANT (COUMADIN) 5 MG tablet Take 0.5-1 tablets  (2.5-5 mg) by mouth daily Adjust dose per INR results as instructed. 90 tablet 3       Allergies   Allergen Reactions     Oxycodone           Lab Results    Chemistry/lipid CBC Cardiac Enzymes/BNP/TSH/INR   Recent Labs   Lab Test 10/28/22  1210   CHOL 171   HDL 47*   LDL 88   TRIG 182*     Recent Labs   Lab Test 10/28/22  1210 07/22/22  1126 05/05/21  1400   LDL 88 78 79     Recent Labs   Lab Test 07/22/22  1126      POTASSIUM 4.6   CHLORIDE 103   CO2 29      BUN 16   CR 0.78   GFRESTIMATED 80   ELLIE 9.5     Recent Labs   Lab Test 07/22/22  1126 11/24/21  1112 05/05/21  1400   CR 0.78 0.88 0.93     Recent Labs   Lab Test 04/11/22  1408 10/01/21  1421 07/23/20  1142   A1C 8.2* 7.9* 8.3*          Recent Labs   Lab Test 05/05/21  1400   WBC 7.9   HGB 13.0   HCT 40.2   MCV 91        Recent Labs   Lab Test 05/05/21  1400 11/05/20  0559 11/04/20  1616   HGB 13.0 11.6* 13.5    Recent Labs   Lab Test 11/06/20  0559 11/05/20  0559 11/04/20  2229   TROPONINI 0.18 0.37* 0.28     No results for input(s): BNP, NTBNPI, NTBNP in the last [id number] hours.  Recent Labs   Lab Test 05/05/21  1400   TSH 0.77     Recent Labs   Lab Test 10/28/22  1210 09/30/22  1303 09/16/22  1340   INR 1.6* 3.0* 2.4*        Chuck Hernandez MD

## 2022-11-08 NOTE — LETTER
11/8/2022    [facility]  [address] [address]    RE: Kemi Soto       Dear Colleague,     I had the pleasure of seeing K[facility] in the [facility].    HEART CARE ENCOUNTER CONSULTATON NOTE      [facility]  [phone]      Assessment/Recommendations   Assessment/Plan:  1.  History of typical isthmus flutter and atrial fibrillation.  Appears to be well controlled on sotalol.  She is asked to continue to monitor for irregular heart rhythm or palpitations.  Previous discussion with the EP regarding possible ablation which she has declined.  Currently she will continue on low-dose sotalol.  EKG today is pending and will be reviewed.  Renal function from July 2022 demonstrates normal creatinine.    2.  Coronary artery disease.  Multivessel intervention as outlined with prior history of LIMA to the LAD and vein graft to the obtuse marginal branch.  No anginal chest discomfort with negative nuclear stress test November 2020.  She is planning to go to [place] for the winter and wishes not to do additional testing at this time.    3.  Hypertension.  Blood pressure is elevated here in the office today.  She indicates that she has been stressed related to the drive to get the clinic today.  We talked about blood pressure monitoring and the importance of blood pressure control.  I noted during my previous visit her blood pressure was mildly elevated upon arrival but repeat 128/62.  Renewed her losartan.    4.  Dyslipidemia.  She tells me today that she has not been taking the Zetia.  She is not certain why she has not been taking it.  She is willing to take it and I gave her a new prescription.  Lipids from October shows her cholesterol numbers to be higher with a total cholesterol 171, triglycerides 182 LDL 88.  We talked about taking the Zetia and having a repeat lipid in a few  months which she will try to do through [place].      Plan as outlined above with follow-up in 6 months.         History of Present Illness/Subjective    HPI: Kemi Soto is a 72 year old female is seen in follow-up.  She reports that she has been feeling well overall.  She has not been aware of tacky irregular heart rhythms or complaints of chest pain or shortness of breath.  She states that she is a little flustered today by the drive out here.  She has chosen not to go to [facility].  She plans to go back to [place] for 6 months leaving in December.  She has a history of typical isthmus flutter and atrial fibrillation.  This appears to have been controlled on low-dose sotalol.  Discussion was had regarding ablation but her preference was to be on medical management.  She is aware to monitor for any increase heart rate or palpitations.  She has a history of multivessel intervention with prior history of LIMA to the LAD and vein graft to the obtuse marginal and right PDA with a vein graft being noted to be occluded to the PDA and number of years ago.  Most recent angiogram was August 2018 with the right and circumflex lesions were noted to be small in caliber and subsequent intervention to the circumflex.  She had a negative nuclear stress test November 2020.        Recent Coronary Angiogram Results:    Mobile cardiac outpatient telemetry [facility] report     Ordering physician:Chuck Hernandez MD.   Indication: Persistent atrial fibrillation.     Prescribed monitoring time: 14 days.  Effective monitoring time: 12 days and 19 hours.        Interpretation:     Patient was monitored for 12 days and 19 hours, baseline rhythm sinus, heart rate averaging 67 bpm, lowest heart rate of 50 and max of 120 bpm.     No prolonged pauses, no high-grade AV block, no significant IVCD or aberrancy.     Infrequent PACs burden is 1% of total beats, no atrial runs, no evidence of PSVT, no evidence of atrial fibrillation or  "flutter.     Infrequent isolated PVCs without ventricular runs, no sustained or nonsustained VT.     Strip # 11 and 12 show sinus rhythm with downsloping ST depression 1 to 1.5 mm, suggestive but not diagnostic for ischemia.     A single symptomatic transmission on June 7 for \"symptoms other listed\" correlated with sinus rhythm rate of 64 bpm.          Physical Examination  Review of Systems   Vitals: 162/70, repeat 150/70, heart rate of 60s and regular, weight 174 pounds.  Wt Readings from Last 3 Encounters:   07/12/22 80.7 kg (178 lb)   06/20/22 81.6 kg (180 lb)   06/03/22 81.6 kg (180 lb)       General Appearance:   no distress, normal body habitus   ENT/Mouth:  Wearing a facemask      EYES:  no scleral icterus, normal conjunctivae   Neck: no carotid bruits or thyromegaly   Chest/Lungs:   lungs are clear to auscultation, no rales or wheezing, well-healed sternal scar, equal chest wall expansion    Cardiovascular:   Regular. Normal first and second heart sounds with no murmurs, rubs, or gallops; the carotid, radial and posterior tibial pulses are intact, Jugular venous pressure within normal limits, no significant edema bilaterally    Abdomen:  , bruits, or tenderness; bowel sounds are present   Extremities: no cyanosis or clubbing   Skin: no xanthelasma, warm.    Neurologic: , no tremors     Psychiatric: alert and oriented x3, calm        Please refer above for cardiac ROS details.        Medical History  Surgical History Family History Social History   Past Medical History:   Diagnosis Date     Coronary artery disease      Diabetes mellitus (H)      Discitis of thoracolumbar region 10/10/2015     Encephalopathy 10/14/2015     Epidural abscess 10/10/2015     Hip pain, right      Hyperlipidemia      Sepsis (H) 10/8/2015     Stroke (H) 06/23/2015    Neurology felt that episode was C/W subacute ischemic stroke     TIA (transient ischemic attack) 6/23/2015     UTI (urinary tract infection)     admitted to [facility] with [facility]TI     Past Surgical History:   Procedure Laterality Date     ANGIOPLASTY  03/30/2016    Drug eluting stent mid LAD, cutting balloon to 1st diagonal     ANGIOPLASTY  2008     BYPASS GRAFT ARTERY CORONARY  12/11/2007    X 3 vessels, LIMA to LAD, saph vein graft to distal RCA, saphvein graft to marginal, mini circuit bypass.  [facility].     CORONARY STENT PLACEMENT  2008    Left main, left circumflex, RCA     CORONARY STENT PLACEMENT  03/2016     CV CORONARY ANGIOGRAM N/A 8/1/2018    Procedure: Coronary Angiogram;  Surgeon: Kit Bean MD;  Location: [facility] Cath Lab;  Service:      CV LEFT HEART CATHETERIZATION WITH LEFT VENTRICULOGRAM N/A 8/1/2018    Procedure: Left Heart Catheterization with Left Ventriculogram;  Surgeon: Kit Bean MD;  Location: [facility] Cath Lab;  Service:      INSERT MIDLINE HE  10/31/2015          LUMBAR DISCECTOMY N/A 10/11/2015    Procedure: BILATERAL L1-L5 DECOMPRESSIVE LAMINECTOMY WITH EVACUATION OF EPIDURAL ABSCESS IRRIGATION & DEBRIDEMENT;  Surgeon: Luli Chan MD;  Location: Health system OR;  Service:      PICC  10/19/2015          RELEASE CARPAL TUNNEL Bilateral      Family History   Problem Relation Age of Onset     Cerebrovascular Disease Mother      Diabetes Father      Diabetes Sister      Cerebrovascular Disease Sister 81.00     Cerebrovascular Disease Brother      Diabetes Brother         Social History     Socioeconomic History     Marital status: Single     Spouse name: Not on file     Number of children: 1     Years of education: Not on file     Highest education level: Not on file   Occupational History     Not on file   Tobacco Use     Smoking status: Former     Types: Cigarettes     Quit date: 6/23/2007     Years since quitting: 15.3     Smokeless tobacco: Never   Substance and Sexual Activity     Alcohol use: No     Comment: Alcoholic Drinks/day: stopped drinking 9/2015     Drug use: No     Sexual activity: Never    Other Topics Concern     Not on file   Social History Narrative    Lives alone with cats and dogs. , one daughter, Tory Lofton.      Social Determinants of Health     Financial Resource Strain: Not on file   Food Insecurity: Not on file   Transportation Needs: Not on file   Physical Activity: Not on file   Stress: Not on file   Social Connections: Not on file   Intimate Partner Violence: Not on file   Housing Stability: Not on file           Medications  Allergies   Current Outpatient Medications   Medication Sig Dispense Refill     atorvastatin (LIPITOR) 80 MG tablet Take 1 tablet (80 mg) by mouth At Bedtime 90 tablet 1     busPIRone (BUSPAR) 15 MG tablet Take 1 tablet (15 mg) by mouth daily 90 tablet 0     Continuous Blood Gluc Sensor (DEXCOM G6 SENSOR) MISC 1 each every 10 days Change every 10 days. 9 each 4     Continuous Blood Gluc Transmit (DEXCOM G6 TRANSMITTER) MISC 1 each every 3 months Change every 3 months. 1 each 5     ezetimibe (ZETIA) 10 MG tablet Take 1 tablet (10 mg) by mouth daily 90 tablet 1     gabapentin (NEURONTIN) 300 MG capsule TAKE 3 CAPSULES BY MOUTH EVERY DAY AT BEDTIME 270 capsule 0     insulin glargine (LANTUS SOLOSTAR) 100 UNIT/ML pen Inject 28 Units Subcutaneous in the morning and 28 Units in the evening. INJECT 28 UNITS SUBCUTANEOUSLY TWICE DAILY 60 mL 3     isosorbide mononitrate (IMDUR) 60 MG 24 hr tablet Take 1 tablet by mouth once daily 90 tablet 1     lidocaine (LIDODERM) 5 % patch Place 1 patch onto the skin every 24 hours To prevent lidocaine toxicity, patient should be patch free for 12 hrs daily. 5 patch 0     losartan (COZAAR) 100 MG tablet Take 1 tablet by mouth once daily 90 tablet 0     metFORMIN (GLUCOPHAGE) 1000 MG tablet Take 1,000 mg by mouth 2 times daily (with meals)       sertraline (ZOLOFT) 100 MG tablet Take 1 tablet by mouth once daily 90 tablet 2     sotalol (BETAPACE) 80 MG tablet Take 0.5 tablets (40 mg) by mouth 2 times daily 90 tablet 4     traMADol  (ULTRAM) 50 MG tablet TAKE 1 TABLET BY MOUTH EVERY 8 HOURS AS NEEDED FOR PAIN OR SEVERE PAIN ON SCALE (7-10) 30 tablet 0     traZODone (DESYREL) 50 MG tablet 1/2 to 1 tablet at bedtime prn insomnia 30 tablet 1     warfarin ANTICOAGULANT (COUMADIN) 5 MG tablet Take 0.5-1 tablets (2.5-5 mg) by mouth daily Adjust dose per INR results as instructed. 90 tablet 3       Allergies   Allergen Reactions     Oxycodone           Lab Results    Chemistry/lipid CBC Cardiac Enzymes/BNP/TSH/INR   Recent Labs   Lab Test 10/28/22  1210   CHOL 171   HDL 47*   LDL 88   TRIG 182*     Recent Labs   Lab Test 10/28/22  1210 07/22/22  1126 05/05/21  1400   LDL 88 78 79     Recent Labs   Lab Test 07/22/22  1126      POTASSIUM 4.6   CHLORIDE 103   CO2 29      BUN 16   CR 0.78   GFRESTIMATED 80   ELLIE 9.5     Recent Labs   Lab Test 07/22/22  1126 11/24/21  1112 05/05/21  1400   CR 0.78 0.88 0.93     Recent Labs   Lab Test 04/11/22  1408 10/01/21  1421 07/23/20  1142   A1C 8.2* 7.9* 8.3*          Recent Labs   Lab Test 05/05/21  1400   WBC 7.9   HGB 13.0   HCT 40.2   MCV 91        Recent Labs   Lab Test 05/05/21  1400 11/05/20  0559 11/04/20  1616   HGB 13.0 11.6* 13.5    Recent Labs   Lab Test 11/06/20  0559 11/05/20  0559 11/04/20  2229   TROPONINI 0.18 0.37* 0.28     No results for input(s): BNP, NTBNPI, NTBNP in the last 29199 hours.  Recent Labs   Lab Test 05/05/21  1400   TSH 0.77     Recent Labs   Lab Test 10/28/22  1210 09/30/22  1303 09/16/22  1340   INR 1.6* 3.0* 2.4*        Chuck Hernandez MD                Thank you for allowing me to participate in the care of your patient.      Sincerely,     Chuck Hernandez MD     [facility]  cc:   Chuck Hernandez MD  [address] [facility]  [address]  [place] [address]

## 2022-11-08 NOTE — PATIENT INSTRUCTIONS
I am going to resume the Zetia and would ask that you have a fasting cholesterol panel in 2 months.Please find out if we can order this where get your INR and call my nurse Priscila.Her number is [phone].Please keep monitoring your blood pressure and call with results.Call with 6 to 8 blood pressure readings over the next few week.

## 2022-11-09 LAB
ATRIAL RATE - MUSE: 59 BPM
DIASTOLIC BLOOD PRESSURE - MUSE: NORMAL MMHG
INTERPRETATION ECG - MUSE: NORMAL
P AXIS - MUSE: -12 DEGREES
PR INTERVAL - MUSE: 160 MS
QRS DURATION - MUSE: 86 MS
QT - MUSE: 428 MS
QTC - MUSE: 423 MS
R AXIS - MUSE: 47 DEGREES
SYSTOLIC BLOOD PRESSURE - MUSE: NORMAL MMHG
T AXIS - MUSE: 107 DEGREES
VENTRICULAR RATE- MUSE: 59 BPM

## 2022-11-10 NOTE — TELEPHONE ENCOUNTER
ACN called patient back she was upset that she did not get a call about her INR done 11/8 instead she got a Share[employer] message.    ACN apologized to patient and scheduled her for follow up appointment as recommended.    Made aware that she will continue to get a phone call with INR results moving forward.

## 2022-11-19 ENCOUNTER — HEALTH MAINTENANCE LETTER (OUTPATIENT)
Age: 72
End: 2022-11-19

## 2022-11-21 ENCOUNTER — TELEPHONE (OUTPATIENT)
Dept: CARDIOLOGY | Facility: CLINIC | Age: 72
End: 2022-11-21

## 2022-11-21 DIAGNOSIS — I25.10 CORONARY ARTERY DISEASE DUE TO LIPID RICH PLAQUE: Primary | ICD-10-CM

## 2022-11-21 DIAGNOSIS — E78.00 HYPERCHOLESTEROLEMIA: ICD-10-CM

## 2022-11-21 DIAGNOSIS — I25.83 CORONARY ARTERY DISEASE DUE TO LIPID RICH PLAQUE: Primary | ICD-10-CM

## 2022-11-21 NOTE — TELEPHONE ENCOUNTER
Health Call Center    Phone Message    May a detailed message be left on voicemail: yes     Reason for Call: Other: Please review and call patient if needed:      Patient is reporting that she will have her lipid labs drawn in [place] and her INR at:  [facility] in [facility]: Fax  [phone]    Please fax the lab orders.    BP:   11/14:  137/59        147/72  11/15:  134/94        168/116  11/16:  170/70     11/18:  156/73     11/19:  147/60          138/61     Please review Bps and advise patient.      Please call patient to advise when she should have her labs drawn. She started taking ezetimibe (ZETIA) 10 MG tablet  the on 11/14/22.    Action Taken: Other: Cardiology    Travel Screening: Not Applicable

## 2022-11-22 NOTE — TELEPHONE ENCOUNTER
From: Chuck Hernandez MD  Sent: 11/21/2022   5:48 PM CST  To: Ines Jacome    She should have a fasting lipid panel AST and ALT in 2 months.  The blood pressure numbers that she provides is mildly suboptimal.  Can we confirm that she is taking her medications as prescribed.  Can we confirm that when she checks her blood pressure she seated for 10 minutes and varies the time of day.  Pending the response we can make additional recommendations.  Thanks NAVEEN Hernandez

## 2022-11-22 NOTE — TELEPHONE ENCOUNTER
Left detailed message for pt, to discuss medication regimen, bp readings. Confirmed request for lab orders sent to [facility] in  [place] [phone] as requested.-KEL

## 2022-11-23 NOTE — TELEPHONE ENCOUNTER
Spoke with regarding request from STEPHANIE Hernandez,      Pt confirmed current med list and correct med administration. Pt does take bp after 10 min of rest and at different times of the day. Ensured adequate water intake and lower salt diet. Will discuss with provider and follow up with any further recommendations.  Pt to continue monitoring bp readings in the interim. Leaves out of town on 11/29 .       Last inr reading 11/28. No further questions or concerns noted.-KEL

## 2022-11-28 NOTE — TELEPHONE ENCOUNTER
From: Chuck Hernandez MD  Sent: 11/27/2022   7:12 PM CST  To: Ines Jacome, Priscila Yoon, RN    We can add hydrochlorothiazide 12.5mg daily and ask that she monitor her blood pressure, she will require a bmp in 7 to 10 days, can we please arrange? If the 12.5 dose helps we can give her a combined pill with losartan or increase to 25mg daily.She will have to follow thru with blood work and blood pressure monioring as well as previously requested lipids  ty mdg

## 2022-11-28 NOTE — TELEPHONE ENCOUNTER
Pt called in and would like to think about adding an additional medication of hydrochlorothiazide. Pt in the process of traveling to [place] . Will continue to monitor bp's in the interim and would like to contact Dr. Hernandez's primary nurse next week. -KEL

## 2022-12-11 ENCOUNTER — NURSE TRIAGE (OUTPATIENT)
Dept: NURSING | Facility: CLINIC | Age: 72
End: 2022-12-11

## 2022-12-11 DIAGNOSIS — Z79.4 TYPE 2 DIABETES MELLITUS WITH HYPERGLYCEMIA, WITH LONG-TERM CURRENT USE OF INSULIN (H): ICD-10-CM

## 2022-12-11 DIAGNOSIS — E11.65 TYPE 2 DIABETES MELLITUS WITH HYPERGLYCEMIA, WITH LONG-TERM CURRENT USE OF INSULIN (H): ICD-10-CM

## 2022-12-11 NOTE — TELEPHONE ENCOUNTER
Ciarra is calling and states that she is currently in [place] for the winter.  Left on Monday December 5 th.  Patient accidentally put her insulin in a cooler with food.  Insulin is frozen in the freezer and feels it is destroyed.  The Lantus Solostar .  Patient has 1.5 pen that is not frozen.  Patient only has a few days worth of Lantus.  Patient uses the [facility] in [facility] on [address].  Telephone number is [phone].  Patient's PCP retired in June and Ciarra was seen on 4/11/2022 by Elaine Galvez MD.  Please phone patient on Monday morning at [phone].      Reason for Disposition    Health Information question, no triage required and triager able to answer question    Additional Information    Negative: RN needs further essential information from caller in order to complete triage    Negative: Requesting regular office appointment    Negative: [1] Caller requesting NON-URGENT health information AND [2] PCP's office is the best resource    Protocols used: INFORMATION ONLY CALL - NO TRIAGE-A-

## 2022-12-12 ENCOUNTER — TELEPHONE (OUTPATIENT)
Dept: ANTICOAGULATION | Facility: CLINIC | Age: 72
End: 2022-12-12

## 2022-12-12 RX ORDER — INSULIN GLARGINE 100 [IU]/ML
28 INJECTION, SOLUTION SUBCUTANEOUS 2 TIMES DAILY
Qty: 60 ML | Refills: 0 | Status: SHIPPED | OUTPATIENT
Start: 2022-12-12 | End: 2023-03-31

## 2022-12-12 NOTE — TELEPHONE ENCOUNTER
Please inform pt that I will do 3 month lantus refill to bridge her. She needs to see provider for her continuity management.    I did see her at 4/11/2022 and her diabetes was poor control with a1c 8.2. I advised to adjust medication and she was stress that day and did not want to do the medication adjustment   but agreed to follow-up in one week to address it, but I have not seen her since.     Elaine Galvez MD on 12/12/2022 at 9:18 AM'

## 2022-12-12 NOTE — TELEPHONE ENCOUNTER
ANTICOAGULATION     Kemi Soto is overdue for INR check.      Spoke with Ciarra who declined to schedule a lab appointment at this time. If calling back please schedule as soon as possible.    Amisha Bustillo RN

## 2022-12-12 NOTE — TELEPHONE ENCOUNTER
Pt notified. She will be in [place] until April/May 2023. She will schedule an appt so she's set when she gets back to [place]

## 2022-12-15 ENCOUNTER — TELEPHONE (OUTPATIENT)
Dept: CARDIOLOGY | Facility: CLINIC | Age: 72
End: 2022-12-15

## 2022-12-15 DIAGNOSIS — I10 ESSENTIAL HYPERTENSION: Primary | ICD-10-CM

## 2022-12-15 NOTE — TELEPHONE ENCOUNTER
"Called patient to review recent elevated blood pressure reading.  Per [place] Community Measures guidelines, patients blood pressure is out of parameters and recheck blood pressure is recommended.    Last Blood Pressure: 162/70, 150/70 (2nd check)  Last Heart Rate: 60  Date: 11/8/2022  Location: [facility]    Today's Blood Pressure: N/A  Today's Heart Rate: N/A    I spoke with the patient who is currently wintering in [place] and she refused to take her BP stating \"We know it's high so I don't see why I need to retake it and I'm waiting to see what medication Dr. Hernandez is going to prescribe to treat my high blood pressure. I tried to make the patient understand we just want to know how her BP and pulse are doing at this time but she continued to refuse.    Estela Tripathi CMA    ===View-only below this line===  ----- Message -----  From: Estela Tripathi  Sent: 12/15/2022  11:35 AM CST  To: Priscila Yoon RN  Subject: Patient's [place] Pharmacy                       Hello,    I'm not sure if you have Ciarra's [place] Pharmacy information but she gave it to me when I spoke with her about her BP. She goes to the [facility] in [address]. The phone number is [phone] in the event any new prescriptions need to be called in for her.    Thank you,     Estela HODGE"

## 2022-12-15 NOTE — TELEPHONE ENCOUNTER
Previous plan was to add HCTZ 12.5 mg daily, but pt initially declined as she is on her way to [place].  Pt currently in [place] last BP documented was at 11/08/22 office visit with Dr Hernandez.    She did call 11/21 and offered the following:  BP:   11/14:  137/59        147/72  11/15:  134/94        168/116  11/16:  170/70     11/18:  156/73     11/19:  147/60          138/61     Message sent to Dr Hernandez for recommendations.  -[facility]

## 2022-12-20 ENCOUNTER — TELEPHONE (OUTPATIENT)
Dept: ANTICOAGULATION | Facility: CLINIC | Age: 72
End: 2022-12-20

## 2022-12-20 ENCOUNTER — TELEPHONE (OUTPATIENT)
Dept: SCHEDULING | Facility: CLINIC | Age: 72
End: 2022-12-20

## 2022-12-20 ENCOUNTER — MEDICAL CORRESPONDENCE (OUTPATIENT)
Dept: HEALTH INFORMATION MANAGEMENT | Facility: CLINIC | Age: 72
End: 2022-12-20

## 2022-12-20 DIAGNOSIS — I48.0 PAROXYSMAL ATRIAL FIBRILLATION (H): Primary | ICD-10-CM

## 2022-12-20 RX ORDER — HYDROCHLOROTHIAZIDE 12.5 MG/1
12.5 TABLET ORAL DAILY
Qty: 90 TABLET | Refills: 1 | Status: SHIPPED | OUTPATIENT
Start: 2022-12-20 | End: 2023-04-12

## 2022-12-20 NOTE — TELEPHONE ENCOUNTER
Reason for Call:  Other     Detailed comments: Pt called back regarding INR. Read off note to pt about needing to schedule an INR appt. Pt said orders need to be sent to [facility] where pt gets INR done. Pt requesting call back regarding this.    Phone Number Patient can be reached at: [phone]    Best Time: any    Can we leave a detailed message on this number? YES    Call taken on 12/20/2022 at 2:27 PM by Elizabeth Padilla

## 2022-12-20 NOTE — TELEPHONE ENCOUNTER
KELLY called and spoke with patient and she stated that she will have INR done at [facility] in [facility]- she will send  Contact information via [employer] so ACN can fax to them the inr standing order.

## 2022-12-20 NOTE — TELEPHONE ENCOUNTER
ANTICOAGULATION  MANAGEMENT- Travel planning    Kemi Soto reports upcoming travel plans to [place].    Departure date: already there  Anticipated return date: April/ May 2023  Alternate contact information (if applicable): [phone]      INR monitoring plan:     [facility] to monitor and dose while traveling. patient to follow up with [facility] with lab information to fax/mail standing order. Patient will send clinic information via [employer] for [facility] to fax standing orders to.    Anticoagulation calendar updated with date of next INR     Instructed to call the Anticoauglation Clinic for any changes, questions or concerns. [phone]  ?   Cony Noonan RN

## 2022-12-20 NOTE — TELEPHONE ENCOUNTER
Recommendations discussed with pt.  Pt verbalized understanding and had no questions.  HCTZ rx sent.  BMP ordered.  Lab order sent to [facility].  -rah    ===View-only below this line===  ----- Message -----  From: Chuck Hernandez MD  Sent: 12/18/2022   1:34 PM CST  To: Priscila Yoon RN    Is she willing to add hydrochlorothiazide and arrange lab follow up in [place]? mdg

## 2022-12-20 NOTE — TELEPHONE ENCOUNTER
ANTICOAGULATION     Kemi Soto is overdue for INR check.      Left message for patient to call and schedule lab appointment as soon as possible. If returning call, please schedule.     Cony Noonan RN

## 2022-12-21 NOTE — TELEPHONE ENCOUNTER
Ciarra called back with clinic information. She  Was unable to send it to you in My chart.     [facility]   Lab fax # [phone]

## 2022-12-28 NOTE — TELEPHONE ENCOUNTER
Received call while patient was at the lab. The standing order that was sent did not have a diagnosis code on it. Spoke with  and re-faxed order. Confirmed with lab that order was received and pt able to have INR ran.     Melissa Hunt RN

## 2023-01-03 ENCOUNTER — TELEPHONE (OUTPATIENT)
Dept: ANTICOAGULATION | Facility: CLINIC | Age: 73
End: 2023-01-03

## 2023-01-03 DIAGNOSIS — I48.0 PAROXYSMAL ATRIAL FIBRILLATION (H): Primary | ICD-10-CM

## 2023-01-03 LAB — INR (EXTERNAL): 3.84 (ref 0.9–1.1)

## 2023-01-03 NOTE — TELEPHONE ENCOUNTER
ANTICOAGULATION MANAGEMENT     Kemi Soto 72 year old female is on warfarin with supratherapeutic INR result. (Goal INR )    Recent labs: (last 7 days)     12/28/22  0000   INR 3.84*   INR was not faxed to [facility] on date it was done.    ASSESSMENT       Source(s): Chart Review and Patient/Caregiver Call       Warfarin doses taken: Warfarin taken as instructed    Diet: No new diet changes identified    New illness, injury, or hospitalization: No    Medication/supplement changes: None noted    Signs or symptoms of bleeding or clotting: No    Previous INR: Therapeutic last visit; previously outside of goal range    Additional findings: Currently in [place] and had INR at [facility]     Recommended plan for no diet, medication or health factor changes affecting INR     Dosing Instructions: Continue your current warfarin dose with next INR in 1 day       Summary  As of 1/3/2023    Full warfarin instructions:  2.5 mg every Mon, Fri; 5 mg all other days   Next INR check:  1/4/2023             Telephone call with Ciarra who verbalizes understanding and agrees to plan    Patient using outside facility for labs    Education provided:     Goal range and lab monitoring: goal range and significance of current result, Importance of therapeutic range and Importance of following up at instructed interval    Contact [phone] with any changes, questions or concerns.     Plan made per [facility] anticoagulation protocol    Cony Noonan RN  Anticoagulation Clinic  1/3/2023    _______________________________________________________________________     Anticoagulation Episode Summary     Current INR goal:  2.0-3.0   TTR:  64.6 % (12 mo)   Target end date:  Indefinite   Send INR reminders to:  [facility]    Indications    Paroxysmal atrial fibrillation (H) [I48.0]           Comments:  Patient is no longer using home meter         Anticoagulation Care Providers     Provider Role Specialty Phone number     Chuck Hernandez MD Referring Cardiovascular Disease [phone]

## 2023-01-03 NOTE — TELEPHONE ENCOUNTER
Received  INR result for Kemi Soto    From [facility] via care everywhere    Result 12/28/22 is 3.84

## 2023-01-05 ENCOUNTER — TRANSFERRED RECORDS (OUTPATIENT)
Dept: HEALTH INFORMATION MANAGEMENT | Facility: CLINIC | Age: 73
End: 2023-01-05

## 2023-01-05 ENCOUNTER — TELEPHONE (OUTPATIENT)
Dept: ANTICOAGULATION | Facility: CLINIC | Age: 73
End: 2023-01-05

## 2023-01-05 ENCOUNTER — MYC MEDICAL ADVICE (OUTPATIENT)
Dept: ANTICOAGULATION | Facility: CLINIC | Age: 73
End: 2023-01-05

## 2023-01-05 ENCOUNTER — TELEPHONE (OUTPATIENT)
Dept: NURSING | Facility: CLINIC | Age: 73
End: 2023-01-05

## 2023-01-05 ENCOUNTER — TELEPHONE (OUTPATIENT)
Dept: CARDIOLOGY | Facility: CLINIC | Age: 73
End: 2023-01-05

## 2023-01-05 DIAGNOSIS — I48.0 PAROXYSMAL ATRIAL FIBRILLATION (H): Primary | ICD-10-CM

## 2023-01-05 LAB — INR (EXTERNAL): 4.62 (ref 0.9–1.1)

## 2023-01-05 NOTE — TELEPHONE ENCOUNTER
ANTICOAGULATION MANAGEMENT     Kemi Soto 72 year old female is on warfarin with supratherapeutic INR result. (Goal INR )    Recent labs: (last 7 days)     01/05/23  0000   INR 4.62*       ASSESSMENT       Source(s): Chart Review and Patient/Caregiver Call       Warfarin doses taken: Warfarin taken as instructed    Diet: No new diet changes identified    New illness, injury, or hospitalization: No    Medication/supplement changes: None noted    Signs or symptoms of bleeding or clotting: No    Previous INR: Supratherapeutic per patient she had o'douls beer prior to inr check but she never had any since then.    Additional findings: None       PLAN     Recommended plan for no diet, medication or health factor changes affecting INR     Dosing Instructions: hold 2 doses then decrease your warfarin dose (16.7% change) - this is the closest dose change with patient's current tablet size~ with next INR in 7-10 days       Summary  As of 1/5/2023    Full warfarin instructions:  1/5: Hold; 1/6: Hold; Otherwise 5 mg every Sun, Tue, Thu; 2.5 mg all other days   Next INR check:  1/16/2023             Telephone call with Ciarra who verbalizes understanding and agrees to plan and who agrees to plan and repeated back plan correctly    Patient using outside facility for labs    Education provided:     Goal range and lab monitoring: goal range and significance of current result, Importance of therapeutic range and Importance of following up at instructed interval    Symptom monitoring: monitoring for bleeding signs and symptoms and when to seek medical attention/emergency care    Contact [phone] with any changes, questions or concerns.     Plan made with [facility] Pharmacist Maria Luz Noonan, RN  Anticoagulation Clinic  1/5/2023    _______________________________________________________________________     Anticoagulation Episode Summary     Current INR goal:  2.0-3.0   TTR:  63.1 % (1 y)   Target end date:  Indefinite    Send INR reminders to:  [facility] HEART [facility]    Indications    Paroxysmal atrial fibrillation (H) [I48.0]           Comments:  Patient is no longer using home meter         Anticoagulation Care Providers     Provider Role Specialty Phone number    Chuck Hernandez MD Referring Cardiovascular Disease [phone]

## 2023-01-05 NOTE — TELEPHONE ENCOUNTER
Health  Outside Information      Contains abnormal data BASIC METABOLIC PANEL  Specimen:  Blood - Blood specimen (specimen)   Ref Range & Units 8 d ago Comments   Glucose 74 - 104 mg/dL 246 High      BUN 7 - 17 mg/dL 23 High      Creatinine 0.43 - 1.07 mg/dL 0.93     BUN/Crea Ratio 7.0 - 25.0 24.7     Sodium 135 - 144 mmol/L 138     Potassium 3.5 - 5.1 mmol/L 4.8     Chloride 98 - 110 mmol/L 95 Low      CO2 22 - 30 mmol/L 34 High      Anion Gap 7 - 15 mmol/L 9     Calcium 8.4 - 10.2 mg/dL 9.4     Glomerular Filtration Rate (eGFR) >=60.0 mL/min/1.73 m2 >60.0  Calculation based on the Chronic Kidney Disease Epidemiology Collaboration (CKD-EPI) equation refit without adjustment for race.   Resulting Agency  [facility]    Specimen Collected: 12/28/22  2:03 PM Last Resulted: 12/28/22  4:11 PM   Received From: [facility]  Result Received: 01/03/23  9:38 AM          BMP check after addition of hydrochlorothiazide.    Called pt to see how blood pressure is doing - she hasn't checked it.  Advised pt to monitor BP and call back with a handful of them.  --moustapha

## 2023-01-05 NOTE — TELEPHONE ENCOUNTER
INR results: 4.62    Marianela from the [facility] is calling to report an abnormal INR results.    I am not sure who to send this to, I am unsure who her primary is.    I will send to anticoag team and her cardiologist for review.    Please contact patient.    Thank you

## 2023-01-05 NOTE — TELEPHONE ENCOUNTER
Lab calling to inform of critical result during [place]. Transferred call to clinic answering service number.    Yeimy Lea RN on 1/5/2023 at 4:37 PM

## 2023-01-05 NOTE — TELEPHONE ENCOUNTER
ANTICOAGULATION     Kemi Soto is overdue for INR check.      My chart message sent    Cony Noonan RN

## 2023-01-06 NOTE — TELEPHONE ENCOUNTER
Pt will start to monitor her BP - she hasn't been since she got to [place].  -rah    ===View-only below this line===  ----- Message -----  From: Chuck Hernandez MD  Sent: 1/5/2023   4:53 PM CST  To: Priscila Yoon RN    Blood sugar is elevated and should be addressed by the primary.  Kidney function is otherwise okay.  Did she provide us with some blood pressure readings now that she is taking the hydrochlorothiazide?  mdg

## 2023-01-13 ENCOUNTER — TELEPHONE (OUTPATIENT)
Dept: CARDIOLOGY | Facility: CLINIC | Age: 73
End: 2023-01-13

## 2023-01-13 NOTE — TELEPHONE ENCOUNTER
[facility] Call Center    Phone Message    May a detailed message be left on voicemail: yes     Reason for Call: Other: Please call pt back about DX code for her lab work. The lab in [place] will not perform the draw without it. AST, ALT, Lipids. Labs are supposed to be drawn Monday.  Pts blood pressure cuf is not working properly.     Action Taken: Message routed to:  Other: Cardiology    Travel Screening: Not Applicable         Thank you!  [facility]

## 2023-01-16 ENCOUNTER — NURSE TRIAGE (OUTPATIENT)
Dept: NURSING | Facility: CLINIC | Age: 73
End: 2023-01-16

## 2023-01-16 DIAGNOSIS — E78.00 HYPERCHOLESTEROLEMIA: ICD-10-CM

## 2023-01-16 DIAGNOSIS — F41.1 ANXIETY, GENERALIZED: ICD-10-CM

## 2023-01-16 NOTE — TELEPHONE ENCOUNTER
Refill Request  Medication name: Pending Prescriptions:                       Disp   Refills    gabapentin (NEURONTIN) 300 MG capsule     270 ca*0            Sig: TAKE 3 CAPSULES BY MOUTH EVERY DAY AT BEDTIME    sertraline (ZOLOFT) 100 MG tablet         90 tab*2            Sig: Take 1 tablet (100 mg) by mouth daily    Requested Pharmacy: [facility]

## 2023-01-16 NOTE — TELEPHONE ENCOUNTER
Call from [facility] in [place] -- requesting refill of atorvastatin.      Binh Queen RN/[facility]

## 2023-01-16 NOTE — TELEPHONE ENCOUNTER
Call from [place] [facility] asking for refill of atorvastatin, sertraline, and gabapentin.    Advised will add request for atorvastatin. The other medication request in chart already.      Binh Queen RN, BSN  Triage Nurse Advisor        Reason for Disposition    Prescription refill request for NON-ESSENTIAL medicine (i.e., no harm to patient if med not taken) and triager unable to refill per department policy    Protocols used: MEDICATION REFILL AND RENEWAL CALL-A-[place]

## 2023-01-17 ENCOUNTER — TRANSFERRED RECORDS (OUTPATIENT)
Dept: HEALTH INFORMATION MANAGEMENT | Facility: CLINIC | Age: 73
End: 2023-01-17
Payer: COMMERCIAL

## 2023-01-17 RX ORDER — ATORVASTATIN CALCIUM 80 MG/1
80 TABLET, FILM COATED ORAL AT BEDTIME
Qty: 90 TABLET | Refills: 0 | Status: SHIPPED | OUTPATIENT
Start: 2023-01-17 | End: 2023-04-12

## 2023-01-17 NOTE — TELEPHONE ENCOUNTER
Routing remaining refill of atorvastatin to the requesting pharmacy.   Filled 10/3/2022 disp 90 refill 1  Analia Eid, EAMON   01/17/23 2:09 PM  [facility] Nurse Advisor

## 2023-01-17 NOTE — TELEPHONE ENCOUNTER
"Called pt to help her set up a visit to Est care. Pt states she est care with the \" Doctor\". Then reviewed the note from 4/11/22 with Dr. Galvez. I tried to explain to the pt that these medications were not addressed and an est care visit was not done at that time. There was a note on the visit stating she needed to return in one week. Based on this information I was going to ask the pt if she would like to schedule with Dr. Galvez and I could then request the refill from her.    Pt became very agitated and interpreted me multiple times to tell me how upset she was before I could explain the situation.    She eventually did agreed to schedule the appointment, and stated to me Dr. Galvez better fill her meds as she has been coming here along time. I did tell her I could not make that guarantee, but would call her if that was an issue. She then became very upset and continued on for sometime.    I did tell her all I could do from my end was make the request and call her if there was an issue with it. She then became upset again and asked for the clinic manager. I did tell her she was not in today, but I could have her call when she returned. This also upset her. she asked again for the manager's name, my name and to repeat who she saw. I did give her all the information and sent an email to the clinic manager to make her aware of the situation.   "

## 2023-01-17 NOTE — TELEPHONE ENCOUNTER
"Routing refill request to provider for review/approval because:  Drug not on the [facility] refill protocol   Former patient of Dr. Zapata & has not established care with another provider.  Please assign refill request to covering provider per clinic standard process.        Last Written Prescription Date:  10/3/2022  Last Fill Quantity: 270,  # refills: 0   Last office visit provider:  4/11/2022 with Leonor     Requested Prescriptions   Pending Prescriptions Disp Refills     gabapentin (NEURONTIN) 300 MG capsule 270 capsule 0     Sig: TAKE 3 CAPSULES BY MOUTH EVERY DAY AT BEDTIME       There is no refill protocol information for this order     Last Written Prescription Date:  3/7/2022  Last Fill Quantity: 90,  # refills: 2   Last office visit provider:  4/11/2022 with Leonor        sertraline (ZOLOFT) 100 MG tablet 90 tablet 2     Sig: Take 1 tablet (100 mg) by mouth daily       [facility] Protocol Passed - 1/16/2023 11:14 AM        Passed - Recent (12 mo) or future (30 days) visit within the authorizing provider's specialty     Patient has had an office visit with the authorizing provider or a provider within the authorizing providers department within the previous 12 mos or has a future within next 30 days. See \"Patient Info\" tab in inbasket, or \"Choose Columns\" in Meds & Orders section of the refill encounter.              Passed - Medication is active on med list        Passed - Patient is age 18 or older        Passed - No active pregnancy on record        Passed - No positive pregnancy test in last 12 months         Signed Prescriptions Disp Refills    atorvastatin (LIPITOR) 80 MG tablet 90 tablet 0     Sig: Take 1 tablet (80 mg) by mouth At Bedtime       There is no refill protocol information for this order          Analia Eid RN 01/17/23 2:11 PM  "

## 2023-01-18 ENCOUNTER — ANTICOAGULATION THERAPY VISIT (OUTPATIENT)
Dept: ANTICOAGULATION | Facility: CLINIC | Age: 73
End: 2023-01-18
Payer: COMMERCIAL

## 2023-01-18 DIAGNOSIS — I48.0 PAROXYSMAL ATRIAL FIBRILLATION (H): Primary | ICD-10-CM

## 2023-01-18 LAB — INR (EXTERNAL): 1.96 (ref 0.9–1.1)

## 2023-01-18 RX ORDER — GABAPENTIN 300 MG/1
CAPSULE ORAL
Qty: 270 CAPSULE | Refills: 0 | Status: SHIPPED | OUTPATIENT
Start: 2023-01-18 | End: 2023-04-12

## 2023-01-18 RX ORDER — SERTRALINE HYDROCHLORIDE 100 MG/1
100 TABLET, FILM COATED ORAL DAILY
Qty: 90 TABLET | Refills: 0 | Status: SHIPPED | OUTPATIENT
Start: 2023-01-18 | End: 2023-04-12

## 2023-01-18 NOTE — PROGRESS NOTES
ANTICOAGULATION MANAGEMENT     Kemi Soto 72 year old female is on warfarin with subtherapeutic INR result. (Goal INR 2.0-3.0)    Recent labs: (last 7 days)     01/17/23  0000   INR 1.96*       ASSESSMENT       Source(s): Chart Review and Patient/Caregiver Call       Warfarin doses taken: Warfarin taken as instructed    Diet: No new diet changes identified    New illness, injury, or hospitalization: No    Medication/supplement changes: None noted    Signs or symptoms of bleeding or clotting: No    Previous INR: Supratherapeutic x 2 dose adjustment done    Additional findings: still in [place] for the winter- see travel plan on encounter dated 12/20/22       PLAN     Recommended plan for no diet, medication or health factor changes affecting INR     Dosing Instructions: Continue your current warfarin dose with next INR in 2 weeks       Summary  As of 1/18/2023    Full warfarin instructions:  5 mg every Sun, Tue, Thu; 2.5 mg all other days   Next INR check:  1/31/2023             Telephone call with Ciarra who verbalizes understanding and agrees to plan    Patient using outside facility for labs    Education provided:     Goal range and lab monitoring: goal range and significance of current result, Importance of therapeutic range and Importance of following up at instructed interval    Contact [phone] with any changes, questions or concerns.     Plan made per [facility] anticoagulation protocol    Cony Noonan RN  Anticoagulation Clinic  1/18/2023    _______________________________________________________________________     Anticoagulation Episode Summary     Current INR goal:  2.0-3.0   TTR:  60.9 % (1 y)   Target end date:  Indefinite   Send INR reminders to:  [facility] HEART [facility]    Indications    Paroxysmal atrial fibrillation (H) [I48.0]           Comments:  Patient is no longer using home meter         Anticoagulation Care Providers     Provider Role Specialty Phone number    Chuck Hernandez,  MD Referring Cardiovascular Disease [phone]

## 2023-01-19 NOTE — TELEPHONE ENCOUNTER
Pt called in 1/18, initially still very upset but once I told her medications had been filled she was very happy, stated she did not need a call back and all was ok.  Clinic manager advised.

## 2023-02-03 ENCOUNTER — DOCUMENTATION ONLY (OUTPATIENT)
Dept: ANTICOAGULATION | Facility: CLINIC | Age: 73
End: 2023-02-03
Payer: COMMERCIAL

## 2023-02-03 NOTE — PROGRESS NOTES
ANTICOAGULATION     Kemi Soto is overdue for INR check.      Reminder letter sent by [employer] Babatunde Vera RN

## 2023-02-21 ENCOUNTER — TELEPHONE (OUTPATIENT)
Dept: CARDIOLOGY | Facility: CLINIC | Age: 73
End: 2023-02-21
Payer: COMMERCIAL

## 2023-02-21 NOTE — TELEPHONE ENCOUNTER
[facility] Call Center    Phone Message    May a detailed message be left on voicemail: yes     Reason for Call: Other: Per call from patient, can't access Rule., if anything reach out to her by phone.      Action Taken: Other: Cardio    Travel Screening: Not Applicable

## 2023-02-22 NOTE — TELEPHONE ENCOUNTER
ANTICOAGULATION     Kemi Soto is overdue for INR check.      Spoke with Ciarra who declined to schedule a lab appointment at this time. If calling back please schedule as soon as possible.  She is in [place].    Bianca Cerda RN

## 2023-03-02 ENCOUNTER — DOCUMENTATION ONLY (OUTPATIENT)
Dept: ANTICOAGULATION | Facility: CLINIC | Age: 73
End: 2023-03-02
Payer: COMMERCIAL

## 2023-03-02 NOTE — PROGRESS NOTES
ANTICOAGULATION     Kemi Soto is overdue for INR check.      Reminder letter sent    Bianca Cerda RN

## 2023-03-02 NOTE — LETTER
[facility] ANTICOAGULATION CLINIC  [address] [facility] [address]  Phone: [phone]  Fax: [phone]       March 2, 2023        Kemi Soto  [address] [place] [id number]            Dear Kemi,    You are currently under the care of [facility] for your warfarin (Coumadin , Jantoven ) therapy.  We are contacting you because our records show you were due for an INR on 1/31/23.    There are potentially serious risks when taking warfarin without careful monitoring and we want to make sure you are safely managed.  Routine lab monitoring is required for warfarin refills.     Please call [phone] as soon as possible to schedule an appointment.  If there has been a change in your care or other concerns, please let us know so we can help and or update our records.     Sincerely,       [facility]

## 2023-03-03 ENCOUNTER — TRANSFERRED RECORDS (OUTPATIENT)
Dept: HEALTH INFORMATION MANAGEMENT | Facility: CLINIC | Age: 73
End: 2023-03-03
Payer: COMMERCIAL

## 2023-03-03 LAB — INR (EXTERNAL): 1.81 (ref 0.9–1.1)

## 2023-03-06 ENCOUNTER — ANTICOAGULATION THERAPY VISIT (OUTPATIENT)
Dept: ANTICOAGULATION | Facility: CLINIC | Age: 73
End: 2023-03-06
Payer: COMMERCIAL

## 2023-03-06 DIAGNOSIS — I48.0 PAROXYSMAL ATRIAL FIBRILLATION (H): Primary | ICD-10-CM

## 2023-03-06 NOTE — PROGRESS NOTES
ANTICOAGULATION MANAGEMENT     Kemi Soto 73 year old female is on warfarin with subtherapeutic INR result. (Goal INR 2.0-3.0)    Recent labs: (last 7 days)     03/03/23  0000   INR 1.81*       ASSESSMENT       Source(s): Chart Review and Patient/Caregiver Call       Warfarin doses taken: Warfarin taken as instructed    Diet: No new diet changes identified    New illness, injury, or hospitalization: No    Medication/supplement changes: None noted    Signs or symptoms of bleeding or clotting: No    Previous INR: Subtherapeutic    Additional findings: Pt will be back to [place] around 3/24         PLAN     Recommended plan for no diet, medication or health factor changes affecting INR     Dosing Instructions: Increase your warfarin dose (10% change) with next INR in 2 weeks       Summary  As of 3/6/2023    Full warfarin instructions:  2.5 mg every Mon, Wed, Fri; 5 mg all other days   Next INR check:  3/28/2023             Telephone call with Ciarra who agrees to plan and repeated back plan correctly    Patient elected to schedule next visit 3/28 after she returns to [place]    Education provided:     Goal range and lab monitoring: goal range and significance of current result    Plan made per [facility] anticoagulation protocol    Melissa Hunt RN  Anticoagulation Clinic  3/6/2023    _______________________________________________________________________     Anticoagulation Episode Summary     Current INR goal:  2.0-3.0   TTR:  48.2 % (1 y)   Target end date:  Indefinite   Send INR reminders to:  [facility] HEART [facility]    Indications    Paroxysmal atrial fibrillation (H) [I48.0]           Comments:           Anticoagulation Care Providers     Provider Role Specialty Phone number    Chuck Hernandez MD Referring Cardiovascular Disease [phone]

## 2023-03-06 NOTE — PROGRESS NOTES
Incoming fax from [facility] [place] [facility]    Date of result 3/3 @ 4460    INR result 1.81

## 2023-03-30 DIAGNOSIS — Z79.4 TYPE 2 DIABETES MELLITUS WITH HYPERGLYCEMIA, WITH LONG-TERM CURRENT USE OF INSULIN (H): ICD-10-CM

## 2023-03-30 DIAGNOSIS — E11.65 TYPE 2 DIABETES MELLITUS WITH HYPERGLYCEMIA, WITH LONG-TERM CURRENT USE OF INSULIN (H): ICD-10-CM

## 2023-03-30 NOTE — TELEPHONE ENCOUNTER
Refill Request  Medication name: Pending Prescriptions:                       Disp   Refills    insulin glargine (LANTUS SOLOSTAR) 100 UN*60 mL  0            Sig: Inject 28 Units Subcutaneous 2 times daily INJECT           28 UNITS SUBCUTANEOUSLY TWICE DAILY    Requested Pharmacy: [facility]

## 2023-03-31 RX ORDER — INSULIN GLARGINE 100 [IU]/ML
28 INJECTION, SOLUTION SUBCUTANEOUS 2 TIMES DAILY
Qty: 30 ML | Refills: 0 | Status: SHIPPED | OUTPATIENT
Start: 2023-03-31 | End: 2023-04-12

## 2023-03-31 NOTE — TELEPHONE ENCOUNTER
"Routing refill request to provider for review/approval because:  Patient needs to be seen because:    Long Acting Insulin Protocol Failed - 3/31/2023 12:32 PM        Failed - HgbA1C in past 3 or 6 months       Last Written Prescription Date:  12/12/22  Last Fill Quantity:60 mL ,  # refills: 0  Last office visit provider:  4/11/22    Requested Prescriptions   Pending Prescriptions Disp Refills     insulin glargine (LANTUS SOLOSTAR) 100 UNIT/ML pen 60 mL 0     Sig: Inject 28 Units Subcutaneous 2 times daily INJECT 28 UNITS SUBCUTANEOUSLY TWICE DAILY       Long Acting Insulin Protocol Failed - 3/31/2023 12:32 PM        Failed - HgbA1C in past 3 or 6 months     If HgbA1C is 8 or greater, it needs to be on file within the past 3 months.  If less than 8, must be on file within the past 6 months.     Recent Labs   Lab Test 04/11/22  1408   A1C 8.2*             Failed - Recent (6 mo) or future (30 days) visit within the authorizing provider's specialty     Patient had office visit in the last 6 months or has a visit in the next 30 days with authorizing provider or within the authorizing provider's specialty.  See \"Patient Info\" tab in inbasket, or \"Choose Columns\" in Meds & Orders section of the refill encounter.            Passed - Serum creatinine on file in past 12 months     Recent Labs   Lab Test 07/22/22  1126   CR 0.78       Ok to refill medication if creatinine is low          Passed - Medication is active on med list        Passed - Patient is age 18 or older             Princess Velazquez RN 03/31/23 1:35 PM  "

## 2023-03-31 NOTE — TELEPHONE ENCOUNTER
Patient has 2 days left on medication. Has appointment in April to establish care with Dr. Galvez

## 2023-04-03 ENCOUNTER — LAB (OUTPATIENT)
Dept: LAB | Facility: CLINIC | Age: 73
End: 2023-04-03
Payer: COMMERCIAL

## 2023-04-03 ENCOUNTER — ANTICOAGULATION THERAPY VISIT (OUTPATIENT)
Dept: ANTICOAGULATION | Facility: CLINIC | Age: 73
End: 2023-04-03

## 2023-04-03 DIAGNOSIS — I48.0 PAROXYSMAL ATRIAL FIBRILLATION (H): Primary | ICD-10-CM

## 2023-04-03 DIAGNOSIS — I48.0 PAROXYSMAL ATRIAL FIBRILLATION (H): ICD-10-CM

## 2023-04-03 LAB — INR BLD: 1.9 (ref 0.9–1.1)

## 2023-04-03 PROCEDURE — 36416 COLLJ CAPILLARY BLOOD SPEC: CPT

## 2023-04-03 PROCEDURE — 85610 PROTHROMBIN TIME: CPT

## 2023-04-03 NOTE — PROGRESS NOTES
ANTICOAGULATION MANAGEMENT     Kemi Soto 73 year old female is on warfarin with subtherapeutic INR result. (Goal INR 2.0-3.0)    Recent labs: (last 7 days)     04/03/23  1211   INR 1.9*       ASSESSMENT       Source(s): Chart Review, Patient/Caregiver Call and Template       Warfarin doses taken: Missed dose(s) may be affecting INR    Diet: No new diet changes identified    New illness, injury, or hospitalization: No    Medication/supplement changes: None noted    Signs or symptoms of bleeding or clotting: No    Previous INR: Subtherapeutic    Additional findings: None         PLAN     Recommended plan for temporary change(s) affecting INR     Dosing Instructions: booster dose then continue your current warfarin dose with next INR in 1-2 weeks       Summary  As of 4/3/2023    Full warfarin instructions:  4/3: 5 mg; Otherwise 2.5 mg every Mon, Wed, Fri; 5 mg all other days   Next INR check:  4/17/2023             Telephone call with Ciarra who verbalizes understanding and agrees to plan and who agrees to plan and repeated back plan correctly    Check at provider office visit    Education provided:     Please call back if any changes to your diet, medications or how you've been taking warfarin    Goal range and lab monitoring: goal range and significance of current result and Importance of therapeutic range    Plan made per [facility] anticoagulation protocol    Bianca Cerda RN  Anticoagulation Clinic  4/3/2023    _______________________________________________________________________     Anticoagulation Episode Summary     Current INR goal:  2.0-3.0   TTR:  40.1 % (1 y)   Target end date:  Indefinite   Send INR reminders to:  [facility] HEART [facility]    Indications    Paroxysmal atrial fibrillation (H) [I48.0]           Comments:           Anticoagulation Care Providers     Provider Role Specialty Phone number    Chuck Hernandez MD Referring Cardiovascular Disease [phone]

## 2023-04-12 ENCOUNTER — ANTICOAGULATION THERAPY VISIT (OUTPATIENT)
Dept: ANTICOAGULATION | Facility: CLINIC | Age: 73
End: 2023-04-12

## 2023-04-12 ENCOUNTER — TELEPHONE (OUTPATIENT)
Dept: FAMILY MEDICINE | Facility: CLINIC | Age: 73
End: 2023-04-12

## 2023-04-12 ENCOUNTER — OFFICE VISIT (OUTPATIENT)
Dept: FAMILY MEDICINE | Facility: CLINIC | Age: 73
End: 2023-04-12
Payer: COMMERCIAL

## 2023-04-12 VITALS
HEIGHT: 62 IN | SYSTOLIC BLOOD PRESSURE: 164 MMHG | BODY MASS INDEX: 32.39 KG/M2 | RESPIRATION RATE: 16 BRPM | DIASTOLIC BLOOD PRESSURE: 82 MMHG | TEMPERATURE: 97.8 F | OXYGEN SATURATION: 97 % | HEART RATE: 65 BPM | WEIGHT: 176 LBS

## 2023-04-12 DIAGNOSIS — I25.10 CORONARY ARTERY DISEASE DUE TO LIPID RICH PLAQUE: ICD-10-CM

## 2023-04-12 DIAGNOSIS — I10 ESSENTIAL HYPERTENSION: ICD-10-CM

## 2023-04-12 DIAGNOSIS — Z79.4 TYPE 2 DIABETES MELLITUS WITH HYPERGLYCEMIA, WITH LONG-TERM CURRENT USE OF INSULIN (H): Primary | ICD-10-CM

## 2023-04-12 DIAGNOSIS — I48.0 PAROXYSMAL ATRIAL FIBRILLATION (H): ICD-10-CM

## 2023-04-12 DIAGNOSIS — I48.3 TYPICAL ATRIAL FLUTTER (H): ICD-10-CM

## 2023-04-12 DIAGNOSIS — F41.1 ANXIETY, GENERALIZED: ICD-10-CM

## 2023-04-12 DIAGNOSIS — I48.0 PAROXYSMAL ATRIAL FIBRILLATION (H): Primary | ICD-10-CM

## 2023-04-12 DIAGNOSIS — I25.83 CORONARY ARTERY DISEASE DUE TO LIPID RICH PLAQUE: ICD-10-CM

## 2023-04-12 DIAGNOSIS — M54.50 CHRONIC BILATERAL LOW BACK PAIN WITHOUT SCIATICA: ICD-10-CM

## 2023-04-12 DIAGNOSIS — E11.65 TYPE 2 DIABETES MELLITUS WITH HYPERGLYCEMIA, WITH LONG-TERM CURRENT USE OF INSULIN (H): Primary | ICD-10-CM

## 2023-04-12 DIAGNOSIS — L98.9 SKIN LESION: ICD-10-CM

## 2023-04-12 DIAGNOSIS — G89.29 CHRONIC BILATERAL LOW BACK PAIN WITHOUT SCIATICA: ICD-10-CM

## 2023-04-12 DIAGNOSIS — E78.00 HYPERCHOLESTEROLEMIA: ICD-10-CM

## 2023-04-12 LAB
HBA1C MFR BLD: 10.8 % (ref 0–5.6)
INR BLD: 4.2 (ref 0.9–1.1)

## 2023-04-12 PROCEDURE — 85610 PROTHROMBIN TIME: CPT | Performed by: FAMILY MEDICINE

## 2023-04-12 PROCEDURE — 36415 COLL VENOUS BLD VENIPUNCTURE: CPT | Performed by: FAMILY MEDICINE

## 2023-04-12 PROCEDURE — 80061 LIPID PANEL: CPT | Performed by: FAMILY MEDICINE

## 2023-04-12 PROCEDURE — 99214 OFFICE O/P EST MOD 30 MIN: CPT | Performed by: FAMILY MEDICINE

## 2023-04-12 PROCEDURE — 83036 HEMOGLOBIN GLYCOSYLATED A1C: CPT | Performed by: FAMILY MEDICINE

## 2023-04-12 PROCEDURE — 80048 BASIC METABOLIC PNL TOTAL CA: CPT | Performed by: FAMILY MEDICINE

## 2023-04-12 PROCEDURE — 82570 ASSAY OF URINE CREATININE: CPT | Performed by: FAMILY MEDICINE

## 2023-04-12 PROCEDURE — 36416 COLLJ CAPILLARY BLOOD SPEC: CPT | Performed by: FAMILY MEDICINE

## 2023-04-12 PROCEDURE — 82043 UR ALBUMIN QUANTITATIVE: CPT | Performed by: FAMILY MEDICINE

## 2023-04-12 RX ORDER — BUSPIRONE HYDROCHLORIDE 15 MG/1
15 TABLET ORAL DAILY
Qty: 90 TABLET | Refills: 3 | Status: SHIPPED | OUTPATIENT
Start: 2023-04-12 | End: 2024-04-23

## 2023-04-12 RX ORDER — ATORVASTATIN CALCIUM 80 MG/1
80 TABLET, FILM COATED ORAL AT BEDTIME
Qty: 90 TABLET | Refills: 3 | Status: SHIPPED | OUTPATIENT
Start: 2023-04-12 | End: 2024-04-29

## 2023-04-12 RX ORDER — WARFARIN SODIUM 5 MG/1
2.5-5 TABLET ORAL DAILY
Qty: 90 TABLET | Refills: 3 | Status: SHIPPED | OUTPATIENT
Start: 2023-04-12 | End: 2024-01-12

## 2023-04-12 RX ORDER — EZETIMIBE 10 MG/1
10 TABLET ORAL DAILY
Qty: 90 TABLET | Refills: 3 | Status: SHIPPED | OUTPATIENT
Start: 2023-04-12 | End: 2023-09-01

## 2023-04-12 RX ORDER — SOTALOL HYDROCHLORIDE 80 MG/1
40 TABLET ORAL 2 TIMES DAILY
Qty: 90 TABLET | Refills: 3 | Status: SHIPPED | OUTPATIENT
Start: 2023-04-12 | End: 2024-03-05

## 2023-04-12 RX ORDER — GABAPENTIN 300 MG/1
CAPSULE ORAL
Qty: 270 CAPSULE | Refills: 3 | Status: SHIPPED | OUTPATIENT
Start: 2023-04-12 | End: 2024-03-08

## 2023-04-12 RX ORDER — SERTRALINE HYDROCHLORIDE 100 MG/1
100 TABLET, FILM COATED ORAL DAILY
Qty: 90 TABLET | Refills: 3 | Status: SHIPPED | OUTPATIENT
Start: 2023-04-12 | End: 2024-04-23

## 2023-04-12 RX ORDER — HYDROCHLOROTHIAZIDE 12.5 MG/1
12.5 TABLET ORAL DAILY
Qty: 90 TABLET | Refills: 3 | Status: SHIPPED | OUTPATIENT
Start: 2023-04-12 | End: 2023-05-15 | Stop reason: SINTOL

## 2023-04-12 RX ORDER — LOSARTAN POTASSIUM 100 MG/1
100 TABLET ORAL DAILY
Qty: 90 TABLET | Refills: 3 | Status: SHIPPED | OUTPATIENT
Start: 2023-04-12 | End: 2024-07-10

## 2023-04-12 RX ORDER — ISOSORBIDE MONONITRATE 60 MG/1
60 TABLET, EXTENDED RELEASE ORAL DAILY
Qty: 90 TABLET | Refills: 3 | Status: SHIPPED | OUTPATIENT
Start: 2023-04-12 | End: 2024-07-10

## 2023-04-12 RX ORDER — INSULIN GLARGINE 100 [IU]/ML
28 INJECTION, SOLUTION SUBCUTANEOUS 2 TIMES DAILY
Qty: 60 ML | Refills: 3 | Status: SHIPPED | OUTPATIENT
Start: 2023-04-12 | End: 2024-02-09

## 2023-04-12 NOTE — PROGRESS NOTES
Assessment & Plan     (E11.65,  Z79.4) Type 2 diabetes mellitus with hyperglycemia, with long-term current use of insulin (H)  (primary encounter diagnosis)  Comment: a1c 10.8 poor controlled and much worse than one year ago ( a1c 8.2 at 4/11/2022) . Pt dose not check bs at home. She takes lantus 28 unit(s) bid. She does not take metformin due to not tolerate it.   Plan: Albumin Random Urine Quantitative with Creat         Ratio, HEMOGLOBIN A1C, insulin glargine (LANTUS        SOLOSTAR) 100 UNIT/ML pen, Basic metabolic         panel        We addressed diet control, foot care and eye care. Long term complications of uncontrol DM discussed. Side effect of insulin addressed. Strongly advise to check bs at home 3 times a day and any time she dose not feel good. If bs is < 70 she needs to push juice or candy pill.     (I48.0) Paroxysmal atrial fibrillation (H)  Comment: she is on long term anticoagulation therapy with coumadin. No active bleeding such as nose bleeding, blood in urine and stool, black stool. She is on sotalol for rate control . Denies palpitation.  Plan: warfarin ANTICOAGULANT (COUMADIN) 5 MG tablet        Fall precaution. Advise to call if she has active bleeding. Follow-up with cardiologist.     (E78.00) Hypercholesterolemia  Comment: doing well with medication.   Plan: atorvastatin (LIPITOR) 80 MG tablet, ezetimibe         (ZETIA) 10 MG tablet        Advise regular exercise and low fat diet. Continue lipitor.     (F41.1) Anxiety, generalized  Comment: doing well with medication. No side effect.   Plan: busPIRone (BUSPAR) 15 MG tablet, gabapentin         (NEURONTIN) 300 MG capsule, sertraline (ZOLOFT)        100 MG tablet        Continue current medication.     (I25.10,  I25.83) Coronary artery disease due to lipid rich plaque  Comment: no cp, sob. She never needs nitroglycerin. She takes coumadin for anticoagulation.   Plan: ezetimibe (ZETIA) 10 MG tablet             (I10) Essential  "hypertension  Comment: bp poor controlled. She dose not check bp at home. She just came back from [place] to [place] 10 days ago. She dose take medication as instructed. Denies cp, sob, headache and blurry vision.   Plan: hydrochlorothiazide (HYDRODIURIL) 12.5 MG         tablet        We discussed that she needs to have better bp control, likely adjust. Pt likely to wait for the follow-up visit in two weeks. . If bp continue high at follow-up visit will adjust medication.     (I48.3) Typical atrial flutter (H)  Comment: doing well. She is on coumadin and follow-up with cardiologist.   Plan: sotalol (BETAPACE) 80 MG tablet            (L98.9) Skin lesion  Comment: pt state her skin has been thin for years and getting worse. She is worried. She use coumadin and sometimes has bruise. Skin exam is not remarkable. Likely skin aging.   Plan: Adult Dermatology Referral        Pt likes to have consult with dermatologist.     (M54.50,  G89.29) Chronic bilateral low back pain without sciatica  Comment: pt has chronic low back pain. Stable. No recent injury. No fever. She has good bladder and bm control. Sometimes she has left sciatic nerve pain, no shoot pain now. She has consult with spine and neurosurgeon at 6/2022. She likes continue follow-up with the spine specialist.   Plan: Spine  Referral              BMI:   Estimated body mass index is 32.19 kg/m  as calculated from the following:    Height as of this encounter: 1.575 m (5' 2\").    Weight as of this encounter: 79.8 kg (176 lb).   Weight management plan: Discussed healthy diet and exercise guidelines    See Patient Instructions    Elaine Galvez MD  [facility] BENTLEY Lafleur is a 73 year old, presenting for the following health issues:  Medication Update        4/12/2023     3:14 PM   Additional Questions   Roomed by Gen KALPESH CMA     HPI     Diabetes Follow-up      How often are you checking your blood sugar? Not at all    What " concerns do you have today about your diabetes? None and Other: not controlled     Do you have any of these symptoms? (Select all that apply)  No numbness or tingling in feet.  No redness, sores or blisters on feet.  No complaints of excessive thirst.  No reports of blurry vision.  No significant changes to weight.    Have you had a diabetic eye exam in the last 12 months? No            Hyperlipidemia Follow-Up      Are you regularly taking any medication or supplement to lower your cholesterol?   Yes- lipitor    Are you having muscle aches or other side effects that you think could be caused by your cholesterol lowering medication?  No    Hypertension Follow-up      Do you check your blood pressure regularly outside of the clinic? No     Are you following a low salt diet? No    Are your blood pressures ever more than 140 on the top number (systolic) OR more   than 90 on the bottom number (diastolic), for example 140/90? Yes    BP Readings from Last 2 Encounters:   04/12/23 (!) 164/82   11/08/22 (!) 162/70     Hemoglobin A1C (%)   Date Value   04/11/2022 8.2 (H)   10/01/2021 7.9 (H)     LDL Cholesterol Calculated (mg/dL)   Date Value   10/28/2022 88   07/22/2022 78     LDL Cholesterol Direct (mg/dl)   Date Value   05/06/2019 97   12/18/2017 144 (H)       Vascular Disease Follow-up      How often do you take nitroglycerin? Never    Do you take an aspirin every day? No. Pt is on anticoagulation treatment     Anxiety Follow-Up    How are you doing with your anxiety since your last visit? No change    Are you having other symptoms that might be associated with anxiety? No    Have you had a significant life event? No     Are you feeling depressed? No    Do you have any concerns with your use of alcohol or other drugs? No    Social History     Tobacco Use     Smoking status: Former     Types: Cigarettes     Quit date: 6/23/2007     Years since quitting: 15.8     Smokeless tobacco: Never   Substance Use Topics     Alcohol  "use: No     Comment: Alcoholic Drinks/day: stopped drinking 9/2015     Drug use: No          View : No data to display.                   View : No data to display.                  Chronic/Recurring Back Pain Follow Up      Where is your back pain located? (Select all that apply) low back bilateral    How would you describe your back pain?  dull ache    Where does your back pain spread? nowhere    Since your last clinic visit for back pain, how has your pain changed? unchanged    Does your back pain interfere with your job? Not applicable    Since your last visit, have you tried any new treatment? No          Review of Systems   Constitutional, HEENT, cardiovascular, pulmonary, gi and gu systems are negative, except as otherwise noted.      Objective    BP (!) 164/82 (BP Location: Right arm, Patient Position: Sitting, Cuff Size: Adult Regular)   Pulse 65   Temp 97.8  F (36.6  C) (Oral)   Resp 16   Ht 1.575 m (5' 2\")   Wt 79.8 kg (176 lb)   SpO2 97%   BMI 32.19 kg/m    Body mass index is 32.19 kg/m .  Physical Exam   GENERAL: healthy, alert and no distress  NECK: no adenopathy, no asymmetry, masses, or scars and thyroid normal to palpation  RESP: lungs clear to auscultation - no rales, rhonchi or wheezes  CV: regular rate and rhythm, normal S1 S2, no S3 or S4, no murmur, click or rub, no peripheral edema and peripheral pulses strong  ABDOMEN: soft, nontender, no hepatosplenomegaly, no masses and bowel sounds normal  MS: no gross musculoskeletal defects noted, no edema           "

## 2023-04-12 NOTE — TELEPHONE ENCOUNTER
Reason for Call:  Appointment Request    Patient requesting this type of appt:  Blood pressure check     Requested provider: MA/MCKAYLA/LPN Visit    Reason patient unable to be scheduled: Not within requested timeframe    When does patient want to be seen/preferred time: 1-2 weeks    Comments: Patient would like a blood pressure check in next 2 weeks as she was told it is necessary by her pcp AIMEE. There is no abl appointments can we squeeze the patient in in 2 weeks?     Could we send this information to you in [facility] or would you prefer to receive a phone call?:   Patient would prefer a phone call   Okay to leave a detailed message?: Yes at Cell number on file:    No relevant phone numbers on file .         Call taken on 4/12/2023 at 4:45 PM by Brit Bennett

## 2023-04-12 NOTE — PROGRESS NOTES
ANTICOAGULATION MANAGEMENT     Kemi Soto 73 year old female is on warfarin with supratherapeutic INR result. (Goal INR 2.0-3.0)    Recent labs: (last 7 days)     04/12/23  1617   INR 4.2*       ASSESSMENT       Source(s): Chart Review and Patient/Caregiver Call       Warfarin doses taken: Warfarin taken as instructed    Diet: No new diet changes identified  - if anything, slightly increased greens d/t eating a lot of avocados [place]    New illness, injury, or hospitalization: Yes: poor BP and DM2 control.     Medication/supplement changes: None noted    Signs or symptoms of bleeding or clotting: No    Previous INR: Subtherapeutic    Additional findings: Pt returned to [place] from [place] about 10 days ago.          PLAN     Recommended plan for temporary change(s) and ongoing change(s) affecting INR     Dosing Instructions: hold dose then decrease your warfarin dose (9.1% change) with next INR in 10 days       Summary  As of 4/12/2023    Full warfarin instructions:  4/12: Hold; Otherwise 5 mg every Sun, Tue, Thu; 2.5 mg all other days   Next INR check:  4/25/2023             Telephone call with Ciarra who agrees to plan and repeated back plan correctly    Patient elected to schedule next visit 4/25 with BP recheck    Education provided:     Goal range and lab monitoring: goal range and significance of current result and Importance of following up at instructed interval    Dietary considerations: importance of consistent vitamin K intake    Symptom monitoring: monitoring for bleeding signs and symptoms    Plan made per [facility] anticoagulation protocol    Melissa Hunt RN  Anticoagulation Clinic  4/12/2023    _______________________________________________________________________     Anticoagulation Episode Summary     Current INR goal:  2.0-3.0   TTR:  38.7 % (1 y)   Target end date:  Indefinite   Send INR reminders to:  [facility]    Indications    Paroxysmal atrial fibrillation  (H) [I48.0]           Comments:           Anticoagulation Care Providers     Provider Role Specialty Phone number    Chuck Hernandez MD Referring Cardiovascular Disease [phone]

## 2023-04-13 LAB
ANION GAP SERPL CALCULATED.3IONS-SCNC: 10 MMOL/L (ref 7–15)
BUN SERPL-MCNC: 19 MG/DL (ref 8–23)
CALCIUM SERPL-MCNC: 9 MG/DL (ref 8.8–10.2)
CHLORIDE SERPL-SCNC: 100 MMOL/L (ref 98–107)
CHOLEST SERPL-MCNC: 129 MG/DL
CREAT SERPL-MCNC: 0.97 MG/DL (ref 0.51–0.95)
CREAT UR-MCNC: 171 MG/DL
DEPRECATED HCO3 PLAS-SCNC: 27 MMOL/L (ref 22–29)
GFR SERPL CREATININE-BSD FRML MDRD: 61 ML/MIN/1.73M2
GLUCOSE SERPL-MCNC: 185 MG/DL (ref 70–99)
HDLC SERPL-MCNC: 38 MG/DL
LDLC SERPL CALC-MCNC: 56 MG/DL
MICROALBUMIN UR-MCNC: 63.5 MG/L
MICROALBUMIN/CREAT UR: 37.13 MG/G CR (ref 0–25)
NONHDLC SERPL-MCNC: 91 MG/DL
POTASSIUM SERPL-SCNC: 4.5 MMOL/L (ref 3.4–5.3)
SODIUM SERPL-SCNC: 137 MMOL/L (ref 136–145)
TRIGL SERPL-MCNC: 173 MG/DL

## 2023-04-19 ENCOUNTER — OFFICE VISIT (OUTPATIENT)
Dept: PHYSICAL MEDICINE AND REHAB | Facility: CLINIC | Age: 73
End: 2023-04-19
Payer: COMMERCIAL

## 2023-04-19 VITALS — OXYGEN SATURATION: 71 % | DIASTOLIC BLOOD PRESSURE: 68 MMHG | SYSTOLIC BLOOD PRESSURE: 148 MMHG | HEART RATE: 98 BPM

## 2023-04-19 DIAGNOSIS — M54.42 CHRONIC BILATERAL LOW BACK PAIN WITH LEFT-SIDED SCIATICA: Primary | ICD-10-CM

## 2023-04-19 DIAGNOSIS — M48.061 LUMBAR FORAMINAL STENOSIS: ICD-10-CM

## 2023-04-19 DIAGNOSIS — G89.29 CHRONIC BILATERAL LOW BACK PAIN WITH LEFT-SIDED SCIATICA: Primary | ICD-10-CM

## 2023-04-19 DIAGNOSIS — Z98.890 HISTORY OF LUMBAR SURGERY: ICD-10-CM

## 2023-04-19 DIAGNOSIS — M54.16 LUMBAR RADICULOPATHY: ICD-10-CM

## 2023-04-19 DIAGNOSIS — M43.16 SPONDYLOLISTHESIS OF LUMBAR REGION: ICD-10-CM

## 2023-04-19 PROCEDURE — 99214 OFFICE O/P EST MOD 30 MIN: CPT | Performed by: NURSE PRACTITIONER

## 2023-04-19 ASSESSMENT — PAIN SCALES - GENERAL: PAINLEVEL: SEVERE PAIN (7)

## 2023-04-19 NOTE — PROGRESS NOTES
Assessment:     Diagnoses and all orders for this visit:  Chronic bilateral low back pain with left-sided sciatica  -     PAIN Transforaminal DIANNE Inj Lumbosacral Blair; Future  -     Physical Therapy Referral; Future  Lumbar radiculopathy  -     PAIN Transforaminal DIANNE Inj Lumbosacral Blair; Future  -     Physical Therapy Referral; Future  Spondylolisthesis of lumbar region  -     PAIN Transforaminal DIANNE Inj Lumbosacral Blair; Future  -     Physical Therapy Referral; Future  Lumbar foraminal stenosis  -     PAIN Transforaminal DIANNE Inj Lumbosacral Blair; Future  -     Physical Therapy Referral; Future  History of lumbar surgery  -     Physical Therapy Referral; Future     Kemi Soto is a 73 year old y.o. female with past medical history significant for hypercholesterolemia, type 2 diabetes mellitus, hypertension, atrial fibrillation, history of lumbar surgery for evacuation of abscess who presents today for follow-up regarding:    -Chronic bilateral low back pain with lumbar radiculopathy greater on the left.  MRI with L4-5 spondylolisthesis that shifts from 3 to 8 mm with forward flexion and spondylolisthesis L5-S1 that shifts from 8 to 10 mm forward flexion.     Plan:     A shared decision making plan was used. The patient's values and choices were respected. Prior medical records were reviewed today. The following represents what was discussed and decided upon by the provider and the patient.        -DIAGNOSTIC TESTS: Images were personally reviewed and interpreted.   --Lumbar spine x-ray 4/11/2022 with chr grade 1 spondylolisthesis 3.3 mm in neutral that increases to 8.6 mm in forward flexion.  L5-S1 8 mm spondylolisthesis that increases to 10.5 mm in forward flexion. Chronic laminectomy L2-L5.  Slight disc space narrowing at L2-3.  --Left hip x-ray 6/3/2022 negative for fracture, degenerative changes bilaterally.  Degenerative changes to bilateral SI joints also noted.  -- Bilateral upper extremity EMG with   Yelena 2019 with no evidence of cervical radiculopathy.    -INTERVENTIONS: Ordered bilateral L5-S1 transforaminal epidural steroid injections he will get further relief of bilateral low back pain and lumbar radiculopathy.  She does have spondylolisthesis with foraminal stenosis at this level.  If minimal benefit with this injection we could trial a bilateral L4-5 TFESI.  Patient is hoping to avoid spine surgery at this time.    -MEDICATIONS: No changes in medications today Discussed side effects of medications and proper use. Patient verbalized understanding.    -PHYSICAL THERAPY: physical therapy referral placed to revisit home exercise program specifically for core strengthening  Discussed the importance of core strengthening, ROM, stretching exercises with the patient and how each of these entities is important in decreasing pain.  Explained to the patient that the purpose of physical therapy is to teach the patient a home exercise program.  These exercises need to be performed every day in order to decrease pain and prevent future occurrences of pain.        -PATIENT EDUCATION:  Total time of 32 minutes, on the day of service, spent with the patient, reviewing the chart, placing orders, and documenting.   -Today we also discussed the issues related to the current COVID-19 pandemic, the pros and cons of the current treatment plan, the CDC guidelines such as social distancing, washing the hands, and covering the cough.    -FOLLOW UP: Follow-up for injection and then 2 weeks postinjection  Advised to contact clinic if symptoms worsen or change.    Subjective:     Kemi Soto is a 73 year old female who presents today for follow-up regarding chronic bilateral low back pain generalized lumbar spine that is equal on both sides with lower extremity radiating pain that is greater on the left however her back pain is her biggest concern.  Currently her pain is a constant 7/10, and 8 at its worse with walking, laying  down, does improve with heat.  She reports that her pain has been something she has been dealing with for a long time.  Patient does report she was in a back study at the [facility] last year where they gave her a few exercises but she does not continue to do them at this time.  Patient denies any recent trips or falls, denies episodes of her legs giving out on her but she does feel fatigue in her lower extremities.  Does report intermittent numbness and tingling lower extremities into the lateral thighs as well.  Denies bowel or bladder loss control, denies saddle anesthesia.    *Post surgery neurosurgical evaluation with Dr. Cuello 2016 status post evacuation of spontaneous epidural abscess in the lumbar spine lumbar laminectomy with medical management of cervical epidural abscess.     -Treatment to Date: Decompressive lumbar laminectomy L1-L5 for evacuation of abscess 10/11/2015 Dr. Cuello.  3-month antibiotic course post surgery, patient then had a drug-eluting stent placed 4/16/2016     -Medications:    Patient Active Problem List   Diagnosis     Obesity     Hypercholesterolemia     Essential hypertension     Type 2 diabetes mellitus with hyperglycemia, with long-term current use of insulin (H)     Benign neoplasm of choroid     Paroxysmal atrial fibrillation (H)     Bilateral low back pain without sciatica, unspecified chronicity       Current Outpatient Medications   Medication     gabapentin (NEURONTIN) 300 MG capsule     lidocaine (LIDODERM) 5 % patch     traMADol (ULTRAM) 50 MG tablet     warfarin ANTICOAGULANT (COUMADIN) 5 MG tablet     ASPIRIN NOT PRESCRIBED (INTENTIONAL)     atorvastatin (LIPITOR) 80 MG tablet     busPIRone (BUSPAR) 15 MG tablet     Continuous Blood Gluc Sensor (DEXCOM G6 SENSOR) MISC     Continuous Blood Gluc Transmit (DEXCOM G6 TRANSMITTER) MISC     ezetimibe (ZETIA) 10 MG tablet     hydrochlorothiazide (HYDRODIURIL) 12.5 MG tablet     insulin glargine  (LANTUS SOLOSTAR) 100 UNIT/ML pen     isosorbide mononitrate (IMDUR) 60 MG 24 hr tablet     losartan (COZAAR) 100 MG tablet     sertraline (ZOLOFT) 100 MG tablet     sotalol (BETAPACE) 80 MG tablet     traZODone (DESYREL) 50 MG tablet     No current facility-administered medications for this visit.       Allergies   Allergen Reactions     Metformin GI Disturbance     Oxycodone        Past Medical History:   Diagnosis Date     Coronary artery disease      Diabetes mellitus (H)      Discitis of thoracolumbar region 10/10/2015     Encephalopathy 10/14/2015     Epidural abscess 10/10/2015     Hip pain, right      Hyperlipidemia      Sepsis (H) 10/8/2015     Stroke (H) 06/23/2015    Neurology felt that episode was C/W subacute ischemic stroke     TIA (transient ischemic attack) 6/23/2015     UTI (urinary tract infection)     admitted to [facility] with UTI        Review of Systems  ROS:  Specifically negative for bowel/bladder dysfunction, balance changes, headache, dizziness, foot drop, fevers, chills, appetite changes, nausea/vomiting, unexplained weight loss. Otherwise 13 systems reviewed are negative. Please see the patient's intake questionnaire from today for details.    Reviewed Social, Family, Past Medical and Past Surgical history with patient, no significant changes noted since prior visit.     Objective:     BP (!) 148/68 (BP Location: Right arm, Patient Position: Sitting)   Pulse 98   SpO2 (!) 71%     PHYSICAL EXAMINATION:    --CONSTITUTIONAL: Well developed, well nourished, healthy appearing individual.  --PSYCHIATRIC: Appropriate mood and affect. No difficulty interacting due to temper, social withdrawal, or memory issues.  --SKIN: Lumbar region is dry and intact.   --RESPIRATORY: Normal rhythm and effort. No abnormal accessory muscle breathing patterns noted.   --MUSCULOSKELETAL:  Normal lumbar lordosis noted, no lateral shift.  --GROSS MOTOR: Easily arises from a seated position. Gait is  non-antalgic  --LUMBAR SPINE:  Inspection reveals no evidence of deformity.     RESULTS:   Imaging: Spine imaging was reviewed today. The images were shown to the patient and the findings were explained using a spine model.      XR Pelvis and Hip Left 2 Views  Result Date: 6/3/2022  EXAM: XR PELVIS AND HIP LEFT 2 VIEWS LOCATION: [facility] DATE/TIME: 6/3/2022 11:20 AM INDICATION: Fall, pain. COMPARISON: None.   IMPRESSION: Both hips are negative for fracture. Degenerative change both hip joints. Pelvis negative for fracture. Degenerative change at the SI joints and lower lumbar spine.             XR Knee Left 3 Views  Result Date: 6/3/2022  EXAM: XR KNEE LEFT 3 VIEWS LOCATION: [facility] DATE/TIME: 6/3/2022 11:30 AM INDICATION: fall, pain COMPARISON: None.   IMPRESSION: The left knee is negative for fracture. No compartmental narrowing or effusion.        XR LUMBAR SPINE 2-3 VIEWS  LOCATION: [facility]  DATE/TIME: 4/11/2022 1:42 PM  INDICATION:  Bilateral low back pain without sciatica, unspecified chronicity  COMPARISON: MRI 04/13/2016.  TECHNIQUE: CR Lumbar Spine.                                                            IMPRESSION: There is chronic grade 1 anterolisthesis of L4 over L5 and L5 over S1. This is secondary to significant facet hypertrophy at L4-L5 and L5-S1. There is chronic laminectomy from L2 through L5. There is slight narrowing of the disc space at   L2-L3. Small Schmorl's nodes are present at L1-L2 endplates. Overall appearance of these findings is unchanged compared to the MRI of 04/13/2016. Extensive vascular aortic calcification is visualized.

## 2023-04-19 NOTE — LETTER
4/19/2023         RE: Kemi Soto  [address] [place] [id number]        Dear Colleague,    Thank you for referring your patient, Kemi Soto, to the [facility]. Please see a copy of my visit note below.      Assessment:     Diagnoses and all orders for this visit:  Chronic bilateral low back pain with left-sided sciatica  -     PAIN Transforaminal DIANNE Inj Lumbosacral Blair; Future  -     Physical Therapy Referral; Future  Lumbar radiculopathy  -     PAIN Transforaminal DIANNE Inj Lumbosacral Blair; Future  -     Physical Therapy Referral; Future  Spondylolisthesis of lumbar region  -     PAIN Transforaminal DIANNE Inj Lumbosacral Blair; Future  -     Physical Therapy Referral; Future  Lumbar foraminal stenosis  -     PAIN Transforaminal DIANNE Inj Lumbosacral Blair; Future  -     Physical Therapy Referral; Future  History of lumbar surgery  -     Physical Therapy Referral; Future     Kemi Soto is a 73 year old y.o. female with past medical history significant for hypercholesterolemia, type 2 diabetes mellitus, hypertension, atrial fibrillation, history of lumbar surgery for evacuation of abscess who presents today for follow-up regarding:    -Chronic bilateral low back pain with lumbar radiculopathy greater on the left.  MRI with L4-5 spondylolisthesis that shifts from 3 to 8 mm with forward flexion and spondylolisthesis L5-S1 that shifts from 8 to 10 mm forward flexion.     Plan:     A shared decision making plan was used. The patient's values and choices were respected. Prior medical records were reviewed today. The following represents what was discussed and decided upon by the provider and the patient.        -DIAGNOSTIC TESTS: Images were personally reviewed and interpreted.   --Lumbar spine x-ray 4/11/2022 with chr grade 1 spondylolisthesis 3.3 mm in neutral that increases to 8.6 mm in forward flexion.  L5-S1 8 mm spondylolisthesis that increases to 10.5 mm in forward  flexion. Chronic laminectomy L2-L5.  Slight disc space narrowing at L2-3.  --Left hip x-ray 6/3/2022 negative for fracture, degenerative changes bilaterally.  Degenerative changes to bilateral SI joints also noted.  -- Bilateral upper extremity EMG with Dr. Kirk 2019 with no evidence of cervical radiculopathy.    -INTERVENTIONS: Ordered bilateral L5-S1 transforaminal epidural steroid injections he will get further relief of bilateral low back pain and lumbar radiculopathy.  She does have spondylolisthesis with foraminal stenosis at this level.  If minimal benefit with this injection we could trial a bilateral L4-5 TFESI.  Patient is hoping to avoid spine surgery at this time.    -MEDICATIONS: No changes in medications today Discussed side effects of medications and proper use. Patient verbalized understanding.    -PHYSICAL THERAPY: physical therapy referral placed to revisit home exercise program specifically for core strengthening  Discussed the importance of core strengthening, ROM, stretching exercises with the patient and how each of these entities is important in decreasing pain.  Explained to the patient that the purpose of physical therapy is to teach the patient a home exercise program.  These exercises need to be performed every day in order to decrease pain and prevent future occurrences of pain.        -PATIENT EDUCATION:  Total time of 32 minutes, on the day of service, spent with the patient, reviewing the chart, placing orders, and documenting.   -Today we also discussed the issues related to the current COVID-19 pandemic, the pros and cons of the current treatment plan, the CDC guidelines such as social distancing, washing the hands, and covering the cough.    -FOLLOW UP: Follow-up for injection and then 2 weeks postinjection  Advised to contact clinic if symptoms worsen or change.    Subjective:     Kemi Soto is a 73 year old female who presents today for follow-up regarding chronic  bilateral low back pain generalized lumbar spine that is equal on both sides with lower extremity radiating pain that is greater on the left however her back pain is her biggest concern.  Currently her pain is a constant 7/10, and 8 at its worse with walking, laying down, does improve with heat.  She reports that her pain has been something she has been dealing with for a long time.  Patient does report she was in a back study at the [facility] last year where they gave her a few exercises but she does not continue to do them at this time.  Patient denies any recent trips or falls, denies episodes of her legs giving out on her but she does feel fatigue in her lower extremities.  Does report intermittent numbness and tingling lower extremities into the lateral thighs as well.  Denies bowel or bladder loss control, denies saddle anesthesia.    *Post surgery neurosurgical evaluation with Dr. Cuello 2016 status post evacuation of spontaneous epidural abscess in the lumbar spine lumbar laminectomy with medical management of cervical epidural abscess.     -Treatment to Date: Decompressive lumbar laminectomy L1-L5 for evacuation of abscess 10/11/2015 Dr. Cuello.  3-month antibiotic course post surgery, patient then had a drug-eluting stent placed 4/16/2016     -Medications:    Patient Active Problem List   Diagnosis     Obesity     Hypercholesterolemia     Essential hypertension     Type 2 diabetes mellitus with hyperglycemia, with long-term current use of insulin (H)     Benign neoplasm of choroid     Paroxysmal atrial fibrillation (H)     Bilateral low back pain without sciatica, unspecified chronicity       Current Outpatient Medications   Medication     gabapentin (NEURONTIN) 300 MG capsule     lidocaine (LIDODERM) 5 % patch     traMADol (ULTRAM) 50 MG tablet     warfarin ANTICOAGULANT (COUMADIN) 5 MG tablet     ASPIRIN NOT PRESCRIBED (INTENTIONAL)     atorvastatin (LIPITOR) 80 MG tablet      busPIRone (BUSPAR) 15 MG tablet     Continuous Blood Gluc Sensor (DEXCOM G6 SENSOR) MISC     Continuous Blood Gluc Transmit (DEXCOM G6 TRANSMITTER) MISC     ezetimibe (ZETIA) 10 MG tablet     hydrochlorothiazide (HYDRODIURIL) 12.5 MG tablet     insulin glargine (LANTUS SOLOSTAR) 100 UNIT/ML pen     isosorbide mononitrate (IMDUR) 60 MG 24 hr tablet     losartan (COZAAR) 100 MG tablet     sertraline (ZOLOFT) 100 MG tablet     sotalol (BETAPACE) 80 MG tablet     traZODone (DESYREL) 50 MG tablet     No current facility-administered medications for this visit.       Allergies   Allergen Reactions     Metformin GI Disturbance     Oxycodone        Past Medical History:   Diagnosis Date     Coronary artery disease      Diabetes mellitus (H)      Discitis of thoracolumbar region 10/10/2015     Encephalopathy 10/14/2015     Epidural abscess 10/10/2015     Hip pain, right      Hyperlipidemia      Sepsis (H) 10/8/2015     Stroke (H) 06/23/2015    Neurology felt that episode was C/W subacute ischemic stroke     TIA (transient ischemic attack) 6/23/2015     UTI (urinary tract infection)     admitted to [facility] with UTI        Review of Systems  ROS:  Specifically negative for bowel/bladder dysfunction, balance changes, headache, dizziness, foot drop, fevers, chills, appetite changes, nausea/vomiting, unexplained weight loss. Otherwise 13 systems reviewed are negative. Please see the patient's intake questionnaire from today for details.    Reviewed Social, Family, Past Medical and Past Surgical history with patient, no significant changes noted since prior visit.     Objective:     BP (!) 148/68 (BP Location: Right arm, Patient Position: Sitting)   Pulse 98   SpO2 (!) 71%     PHYSICAL EXAMINATION:    --CONSTITUTIONAL: Well developed, well nourished, healthy appearing individual.  --PSYCHIATRIC: Appropriate mood and affect. No difficulty interacting due to temper, social withdrawal, or memory issues.  --SKIN: Lumbar  region is dry and intact.   --RESPIRATORY: Normal rhythm and effort. No abnormal accessory muscle breathing patterns noted.   --MUSCULOSKELETAL:  Normal lumbar lordosis noted, no lateral shift.  --GROSS MOTOR: Easily arises from a seated position. Gait is non-antalgic  --LUMBAR SPINE:  Inspection reveals no evidence of deformity.     RESULTS:   Imaging: Spine imaging was reviewed today. The images were shown to the patient and the findings were explained using a spine model.      XR Pelvis and Hip Left 2 Views  Result Date: 6/3/2022  EXAM: XR PELVIS AND HIP LEFT 2 VIEWS LOCATION: [facility] DATE/TIME: 6/3/2022 11:20 AM INDICATION: Fall, pain. COMPARISON: None.   IMPRESSION: Both hips are negative for fracture. Degenerative change both hip joints. Pelvis negative for fracture. Degenerative change at the SI joints and lower lumbar spine.             XR Knee Left 3 Views  Result Date: 6/3/2022  EXAM: XR KNEE LEFT 3 VIEWS LOCATION: [facility] DATE/TIME: 6/3/2022 11:30 AM INDICATION: fall, pain COMPARISON: None.   IMPRESSION: The left knee is negative for fracture. No compartmental narrowing or effusion.        XR LUMBAR SPINE 2-3 VIEWS  LOCATION: [facility]  DATE/TIME: 4/11/2022 1:42 PM  INDICATION:  Bilateral low back pain without sciatica, unspecified chronicity  COMPARISON: MRI 04/13/2016.  TECHNIQUE: CR Lumbar Spine.                                                            IMPRESSION: There is chronic grade 1 anterolisthesis of L4 over L5 and L5 over S1. This is secondary to significant facet hypertrophy at L4-L5 and L5-S1. There is chronic laminectomy from L2 through L5. There is slight narrowing of the disc space at   L2-L3. Small Schmorl's nodes are present at L1-L2 endplates. Overall appearance of these findings is unchanged compared to the MRI of 04/13/2016. Extensive vascular aortic calcification is visualized.                           Again, thank you for allowing me to participate in the care of your patient.        Sincerely,        Chula Garcia CNP

## 2023-04-19 NOTE — PATIENT INSTRUCTIONS
~Please call our [facility] Nurse Navigation line [phone] with any questions or concerns about your treatment plan, if symptoms worsen and you would like to be seen urgently, or if you have problems controlling bladder and bowel function.  ~Please note that any My Chart messages may take multiple days for a response due to the high volume of patients seen in clinic.   Anything sent Thursday night or after will be answered the following week when able, as Chula Garcia CNP does not work in clinic on Fridays.        ~You have been referred for Physical Therapy to [facility]. They will call you to schedule an appointment.      Scheduling phone number is [phone] for [facility], or [place] location.  If you have not heard from the scheduling office within 2 business days, please call [phone] for ALL other locations.    Discussed the importance of core strengthening, ROM, stretching exercises and how each of these entities is important in decreasing pain and improving long term spine health.  The purpose of physical therapy is to teach you an individualized home exercise program.  These exercises need to be performed every day in order to decrease pain and prevent future occurrences of pain.           An injection has been ordered today to potentially help with your pain symptoms. These injections do not fix what is going on in your back, therefore they typically do not take away the pain completely, however they can many times help improve symptoms. Injections should always be completed along with other modalities such as physical therapy for the best long term outcomes. If injections alone are done, then pain will likely return.     [facility] Injection Requirements    A  is required for all fluoroscopically-guided injections.  Injection appointments may be cancelled if there are signs/symptoms of an  active infection or if the patient is being actively treated with antibiotics for a diagnosed infection.  Patients may have their steroid injection cancelled if they have had another steroid injection within 2 weeks.  Diabetic patients will have their blood glucose levels checked the day of their injection and the appointment will be rescheduled if the blood glucose level is 300 or higher.  Patients with allergies to cortisone, local anesthetics, iodine, or contrast dye should contact the Spine Center to further discuss these considerations.  Patients scheduled for medial branch block diagnostic injections should refrain from taking pain medication the day of the procedure.  The medial branch block injection appointment will be rescheduled if the patient's pain rating is not 5/10 or greater at the time of the procedure.  Patients taking warfarin/Coumadin will have their INR checked the day of the procedure and the procedure may be rescheduled if the INR is greater than 3.0.  Please contact the Spine Center (#[phone]) if you are taking any prescription blood-thinning medications (warfarin, Plavix, Lovenox, Eliquis, Brilinta, Effient, etc.) as special dosing adjustments may need to be made depending on the type of injection you are scheduled to receive.  It is recommended that you delay having your steroid injection if you have received a flu shot or shingles vaccine within 2 weeks.

## 2023-04-20 ENCOUNTER — TELEPHONE (OUTPATIENT)
Dept: PHYSICAL MEDICINE AND REHAB | Facility: CLINIC | Age: 73
End: 2023-04-20
Payer: COMMERCIAL

## 2023-04-20 NOTE — TELEPHONE ENCOUNTER
Phoned pt to schedule, pt taking Warfarin.      Screening Questions for Radiology Injections:    Injection to be done at which interventional clinic site? [facility]    Procedure ordered by     Procedure ordered? Bilateral L5-S1 TF DIANNE    Transforaminal Cervical DIANNE - [place] [facility] - No  Community Site providers perform this procedure    What insurance would patient like us to bill for this procedure? BC/Medicare    IF SCHEDULING IN [place] [facility] OR [facility]INE PLEASE SCHEDULE AT LEAST 7-10 BUSINESS DAYS OUT SO A PA CAN BE OBTAINED    Worker's comp or MVA (motor vehicle accident) -Any injection DO NOT SCHEDULE and route to Rip Villalba.      Hatch insurance - For SI joint injections, DO NOT SCHEDULE and route to Marian Whitehead.       ALL BCBS, [employer] and [employer] C[employer] - DO NOT SCHEDULE and route to Marian Ventura    MEDICA- facet joint injections, route to Marian Ventura    Is patient scheduled at [facility]? YES   If YES, route every encounter to [facility] SPINE CENTER CARE NAVIGATION POOL [[phone]]    Is an  needed? No     Patient has a  home? (Review Grid) YES: ok    Any chance of pregnancy? NO   If YES, do NOT schedule and route to RN pool  - Dr. Cruz route to Yadi Mcnally and PM&R Nurse  [[id number]]      Is patient actively being treated for cancer or immunocompromised? No  If YES, do NOT schedule and route to RN pool/ Dr. Cruz's Team    Does the patient have a bleeding or clotting disorder? No     If YES, okay to schedule AND route to RN nurse vu/ Dr. Cruz's Team     (For any patients with platelet count <100, RN must forward to provider)    Is patient taking any Blood Thinners OR Antiplatelet medication?  Yes - Warfarin   If hold needed, do NOT schedule, route to RN pool/ Dr. Cruz's Team    Examples:   o Blood Thinners: (Coumadin, Warfarin, Jantoven, Pradaxa, Xarelto, Eliquis, Edoxaban, Enoxaparin, Lovenox, Heparin, Arixtra, Fondaparinux or Fragmin)  o Antiplatelet  Medications: (Plavix, Brilinta or Effient)     Is patient taking any aspirin products (includes Excedrin and Fiorinal)? No     If more than 325mg/day, OK to schedule; Instruct Pt to decrease to less than 325 mg for 7 days AND route to RN pool/ Dr. Cruz's Team     For CERVICAL procedures, hold all aspirin products for 6 days.     Tell Pt that if aspirin product is not held for 6 days, the procedure WILL BE cancelled.     Any allergies to contrast dye, iodine, shellfish, or numbing and steroid medications? No    If YES, schedule and add allergy information to appointment notes AND route to the RN pool/ Dr. Cruz's Team    If DIANNE and Contrast Dye / Iodine Allergy? DO NOT SCHEDULE, route to RN pool/ Dr. Cruz's Team    Allergies: Metformin and Oxycodone     Does patient have an active infection or treated for one within the past week? No    Is patient currently taking any antibiotics or steroid medications?  No     For patients on chronic, preventative, or prophylactic antibiotics, procedures may be scheduled.     For patients on antibiotics for active or recent infection, schedule 4 days after completed.    For patients on steroid medications, schedule 4 days after completed.     Has the patient had a flu shot or any other vaccinations within the past 7 days? No  If yes, explain that for the vaccine to work best they need to:       wait 1 week before and 1 week after getting any Vaccine    wait 1 week before and 2 weeks after getting Covid Vaccine #2 or BOOSTER    If patient has concerns about the timing, send to RN pool/ Dr. Cruz's Team    Does patient have an MRI/CT?  YES: MRI Include Date and Check Procedure Scheduling Grid to see if required.    Was the MRI/CT done within the last 3 years?  Yes     If no route to RN Pool/ Dr. Cruz's Team    If yes, where was the MRI/CT done? [facility]     Refer to PACS Transmissions list for approved external locations and route to RN Pool High Priority/ Dr. Salazars  Team    If MRI was not done at approved external location do NOT schedule and route to RN pool/ Dr. Cruz's Team      If patient has an imaging disc, the injection MAY be scheduled but patient must bring disc to appt or appt will be cancelled.    Is patient able to transfer to a procedure table with minimal or no assistance? Yes     If no, do NOT schedule and route to RN Pool/ Dr. Cruz's Team    Procedure Specific Instructions:    If celiac plexus block, informed patient NPO for 6 hours and that it is okay to take medications with sips of water, especially blood pressure medications Not Applicable         If this is for a cervical procedure, informed patient that aspirin needs to be held for 6 days.   Not Applicable      Sedation, If Sedation is ordered for any procedure, patient must be NPO for 6 hours prior to procedure Not Applicable      If IV needed:    Do not schedule procedures requiring IV placement in the first appointment of the day or first appointment after lunch. Do NOT schedule at 0745, 0815 or 1245. ok    Instructed patient to arrive 30 minutes early for IV start if required. (Check Procedure Scheduling Grid)  Not Applicable    Reminders:    If you are started on any steroids or antibiotics between now and your appointment, you must contact us because the procedure may need to be cancelled.  Yes      As a reminder, receiving steroids can decrease your body's ability to fight infection.   Would you still like to move forward with scheduling the injection?  Yes      IV Sedation is not provided for procedures. If oral anti-anxiety medication is needed, the patient should request this from their referring provider.      Instruct patient to arrive as directed prior to the scheduled appointment time:  If IV needed 30 minutes before appointment time       For patients 85 or older we recommend having an adult stay w/ them for the remainder of the day.       If the patient is Diabetic, remind them to bring  their glucometer.      Does the patient have any questions?  NO  Kathy Villalba  [facility] [place]

## 2023-04-24 NOTE — TELEPHONE ENCOUNTER
Also, I see that the patient is scheduled for Bilateral L5-S1 TFESIs with Dr. Kirk here at the [facility] with the ultrasound. Is this the procedure that this is referring too?    If this procedure is to be scheduled at the Spine Center, this procedure would need to be scheduled with Dr. Garsia as Dr. Kirk does not perform this procedure. It will need to be scheduled with the MARY KAY and not the ultrasound and no Warfarin hold needed. We would check INR the day of procedure and it will need to be 3.0 or less to proceed.

## 2023-04-24 NOTE — TELEPHONE ENCOUNTER
"Ok to hold coumadin. Before the procedure. Advise pt to call if she has cp, palpitation, sob before the procedure she needs to call me or her cardiologist.     Elaine Galvez MD on 4/24/2023 at 12:40 PM'    Reviewed the chart pt saw cardiologist and had ekg at 11/2022 with \"Sinus bradycardia with sinus arrhythmia \"     "

## 2023-04-24 NOTE — TELEPHONE ENCOUNTER
email sent to the  [facility] RN's to review.    Humaira RN-BSN  [facility]   [phone]

## 2023-04-24 NOTE — TELEPHONE ENCOUNTER
Patient is requesting to schedule an injection with the [facility].     This would require the patient to hold:                 Coumadin (Warfarin)         Hold 5 days prior to procedure.  Check INR prior to procedure.  Ok to resume night of procedure, unless directed otherwise by provider or anticoagulation clinic.      We are requesting your approval to hold the medication for this time frame.    Please keep call open and route back to the PAIN NURSE [[id number]] pool, Pt will be scheduled by Pain clinic after hold approved.    PROVIDER REMINDER:  For Coumadin, please also route to Pt's INR clinic     If hold approved, we will contact the patient for scheduling.    Thank you.    Routed to Dr Galvez and [facility]    Janee THOMAS RN Care Coordinator  [facility]

## 2023-04-25 ENCOUNTER — LAB (OUTPATIENT)
Dept: LAB | Facility: CLINIC | Age: 73
End: 2023-04-25
Payer: COMMERCIAL

## 2023-04-25 ENCOUNTER — ANTICOAGULATION THERAPY VISIT (OUTPATIENT)
Dept: ANTICOAGULATION | Facility: CLINIC | Age: 73
End: 2023-04-25

## 2023-04-25 ENCOUNTER — ALLIED HEALTH/NURSE VISIT (OUTPATIENT)
Dept: FAMILY MEDICINE | Facility: CLINIC | Age: 73
End: 2023-04-25
Payer: COMMERCIAL

## 2023-04-25 VITALS — DIASTOLIC BLOOD PRESSURE: 64 MMHG | HEART RATE: 58 BPM | SYSTOLIC BLOOD PRESSURE: 124 MMHG | OXYGEN SATURATION: 97 %

## 2023-04-25 DIAGNOSIS — I48.0 PAROXYSMAL ATRIAL FIBRILLATION (H): ICD-10-CM

## 2023-04-25 DIAGNOSIS — I48.0 PAROXYSMAL ATRIAL FIBRILLATION (H): Primary | ICD-10-CM

## 2023-04-25 DIAGNOSIS — Z01.30 BP CHECK: Primary | ICD-10-CM

## 2023-04-25 LAB — INR BLD: 2.2 (ref 0.9–1.1)

## 2023-04-25 PROCEDURE — 99207 PR NO CHARGE NURSE ONLY: CPT

## 2023-04-25 PROCEDURE — 85610 PROTHROMBIN TIME: CPT

## 2023-04-25 PROCEDURE — 36416 COLLJ CAPILLARY BLOOD SPEC: CPT

## 2023-04-25 NOTE — PROGRESS NOTES
Kemi Soto is a 73 year old patient who comes in today for a Blood Pressure check.  Initial BP:  /64   Pulse 58   SpO2 97%      58  Disposition: results routed to provider

## 2023-04-25 NOTE — PROGRESS NOTES
ANTICOAGULATION MANAGEMENT     Kemi Soto 73 year old female is on warfarin with therapeutic INR result. (Goal INR 2.0-3.0)    Recent labs: (last 7 days)     04/25/23  1315   INR 2.2*       ASSESSMENT       Source(s): Chart Review and Patient/Caregiver Call       Warfarin doses taken: Warfarin taken as instructed    Diet: No new diet changes identified    Medication/supplement changes: None noted    New illness, injury, or hospitalization: No    Signs or symptoms of bleeding or clotting: No    Previous result: Supratherapeutic    Additional findings: Upcoming surgery/procedure DIANNE 5/19 - per chart review, no warfarin hold needed.          PLAN     Recommended plan for no diet, medication or health factor changes affecting INR     Dosing Instructions: Continue your current warfarin dose with next INR in 3 weeks       Summary  As of 4/25/2023    Full warfarin instructions:  5 mg every Sun, Tue, Thu; 2.5 mg all other days   Next INR check:  5/25/2023             Telephone call with Ciarra who verbalizes understanding and agrees to plan  Send follow up [employer] message recommending pt check INR at [facility] 5/15 to ensure INR in range for procedure 5/19. Advised she call back/respond to [facility] with any questions.     Patient elected to schedule next visit 5/25 with cardiology [facility]    Education provided:     Goal range and lab monitoring: goal range and significance of current result and Importance of following up at instructed interval    Plan made per [facility] anticoagulation protocol    Melissa Hunt RN  Anticoagulation Clinic  4/25/2023    _______________________________________________________________________     Anticoagulation Episode Summary     Current INR goal:  2.0-3.0   TTR:  36.6 % (1 y)   Target end date:  Indefinite   Send INR reminders to:  [facility]    Indications    Paroxysmal atrial fibrillation (H) [I48.0]           Comments:           Anticoagulation Care Providers     Provider Role  Specialty Phone number    Chuck Hernandez MD Referring Cardiovascular Disease [phone]

## 2023-05-01 ENCOUNTER — DOCUMENTATION ONLY (OUTPATIENT)
Dept: ANTICOAGULATION | Facility: CLINIC | Age: 73
End: 2023-05-01
Payer: COMMERCIAL

## 2023-05-01 DIAGNOSIS — I48.0 PAROXYSMAL ATRIAL FIBRILLATION (H): Primary | ICD-10-CM

## 2023-05-09 NOTE — TELEPHONE ENCOUNTER
Discussed with patient. Confirmed date/time of her upcoming appt with Dr. Garsia. Informed her she does not need to hold her Warfarin but we will check her INR the day of procedure and it will need to be 3.0 or less to process. She stated understanding and had no other questions at this time.

## 2023-05-15 ENCOUNTER — OFFICE VISIT (OUTPATIENT)
Dept: FAMILY MEDICINE | Facility: CLINIC | Age: 73
End: 2023-05-15
Payer: COMMERCIAL

## 2023-05-15 VITALS
HEIGHT: 63 IN | HEART RATE: 73 BPM | OXYGEN SATURATION: 99 % | SYSTOLIC BLOOD PRESSURE: 136 MMHG | BODY MASS INDEX: 31.36 KG/M2 | RESPIRATION RATE: 16 BRPM | TEMPERATURE: 98.5 F | WEIGHT: 177 LBS | DIASTOLIC BLOOD PRESSURE: 64 MMHG

## 2023-05-15 DIAGNOSIS — E11.65 TYPE 2 DIABETES MELLITUS WITH HYPERGLYCEMIA, WITH LONG-TERM CURRENT USE OF INSULIN (H): ICD-10-CM

## 2023-05-15 DIAGNOSIS — Z12.31 ENCOUNTER FOR SCREENING MAMMOGRAM FOR BREAST CANCER: ICD-10-CM

## 2023-05-15 DIAGNOSIS — Z00.00 ENCOUNTER FOR MEDICARE ANNUAL WELLNESS EXAM: Primary | ICD-10-CM

## 2023-05-15 DIAGNOSIS — Z12.11 SCREEN FOR COLON CANCER: ICD-10-CM

## 2023-05-15 DIAGNOSIS — E78.00 HYPERCHOLESTEROLEMIA: ICD-10-CM

## 2023-05-15 DIAGNOSIS — I10 ESSENTIAL HYPERTENSION: ICD-10-CM

## 2023-05-15 DIAGNOSIS — Z79.4 TYPE 2 DIABETES MELLITUS WITH HYPERGLYCEMIA, WITH LONG-TERM CURRENT USE OF INSULIN (H): ICD-10-CM

## 2023-05-15 PROCEDURE — G0439 PPPS, SUBSEQ VISIT: HCPCS | Performed by: FAMILY MEDICINE

## 2023-05-15 PROCEDURE — 99214 OFFICE O/P EST MOD 30 MIN: CPT | Mod: 25 | Performed by: FAMILY MEDICINE

## 2023-05-15 RX ORDER — TIRZEPATIDE 2.5 MG/.5ML
2.5 INJECTION, SOLUTION SUBCUTANEOUS WEEKLY
Qty: 6 ML | Refills: 0 | Status: SHIPPED | OUTPATIENT
Start: 2023-05-15 | End: 2023-05-25

## 2023-05-15 ASSESSMENT — ENCOUNTER SYMPTOMS
DIZZINESS: 1
MYALGIAS: 1
HEMATURIA: 0
CHILLS: 0
DYSURIA: 0
WEAKNESS: 1
PALPITATIONS: 0
ABDOMINAL PAIN: 0
HEMATOCHEZIA: 0
CONSTIPATION: 0
SHORTNESS OF BREATH: 0
HEARTBURN: 0
FEVER: 0
HEADACHES: 1
NAUSEA: 0
FREQUENCY: 1
PARESTHESIAS: 0
ARTHRALGIAS: 1
NERVOUS/ANXIOUS: 1
DIARRHEA: 0
BREAST MASS: 0
COUGH: 0
EYE PAIN: 0
SORE THROAT: 0
JOINT SWELLING: 1

## 2023-05-15 ASSESSMENT — ACTIVITIES OF DAILY LIVING (ADL): CURRENT_FUNCTION: NO ASSISTANCE NEEDED

## 2023-05-15 NOTE — PROGRESS NOTES
"    She is at risk for lack of exercise and has been provided with information to increase physical activity for the benefit of her well-being.  The patient was provided with written information regarding signs of hearing loss.  Information on urinary incontinence and treatment options given to patient.  She is at risk for falling and has been provided with information to reduce the risk of falling at home.  Answers for HPI/ROS submitted by the patient on 5/15/2023  In general, how would you rate your overall physical health?: good  Frequency of exercise:: None  Do you usually eat at least 4 servings of fruit and vegetables a day, include whole grains & fiber, and avoid regularly eating high fat or \"junk\" foods? : Yes  Taking medications regularly:: Yes  Medication side effects:: Other  Activities of Daily Living: no assistance needed  Home safety: no safety concerns identified  Hearing Impairment:: difficulty following a conversation in a noisy restaurant or crowded room, need to ask people to speak up or repeat themselves  In the past 6 months, have you been bothered by leaking of urine?: Yes  abdominal pain: No  Blood in stool: No  Blood in urine: No  chest pain: No  chills: No  congestion: No  constipation: No  cough: No  diarrhea: No  dizziness: Yes  ear pain: No  eye pain: No  nervous/anxious: Yes  fever: No  frequency: Yes  genital sores: No  headaches: Yes  hearing loss: Yes  heartburn: No  arthralgias: Yes  joint swelling: Yes  peripheral edema: No  mood changes: No  myalgias: Yes  nausea: No  dysuria: No  palpitations: No  Skin sensation changes: No  sore throat: No  urgency: Yes  rash: No  shortness of breath: No  visual disturbance: No  weakness: Yes  pelvic pain: No  vaginal bleeding: No  vaginal discharge: No  tenderness: No  breast mass: No  breast discharge: No  In general, how would you rate your overall mental or emotional health?: good  Additional concerns today:: No      "

## 2023-05-15 NOTE — PATIENT INSTRUCTIONS
Patient Education   Personalized Prevention Plan  You are due for the preventive services outlined below.  Your care team is available to assist you in scheduling these services.  If you have already completed any of these items, please share that information with your care team to update in your medical record.  Health Maintenance Due   Topic Date Due     Diabetic Foot Exam  Never done     URINE DRUG SCREEN  Never done     Zoster (Shingles) Vaccine (1 of 2) Never done     Annual Wellness Visit  01/04/2018     Colorectal Cancer Screening  07/03/2021     COVID-19 Vaccine (6 - Moderna series) 03/02/2023       Exercise for a Healthier Heart  You may wonder how you can improve the health of your heart. If you re thinking about exercise, you re on the right track. You don t need to become an athlete. But you do need a certain amount of brisk exercise to help strengthen your heart. If you have been diagnosed with a heart condition, your healthcare provider may advise exercise to help your condition. To help make exercise a habit, choose safe, fun activities.      Exercise with a friend. When activity is fun, you're more likely to stick with it.     Before you start  Check with your healthcare provider before starting an exercise program. This is especially important if you haven't been active for a while. It's also important if you have a long-term (chronic) health problem such as heart disease, diabetes, or obesity. Also check with your provider if you're at high risk for having these problems.   Why exercise?  Exercising regularly offers many healthy rewards. It can help you do all of these:     Improve your blood cholesterol level to help prevent further heart trouble.    Lower your blood pressure to help prevent a stroke or heart attack.    Control diabetes or reduce your risk of getting this disease.    Improve your heart and lung function.    Reach and stay at a healthy weight.    Make your muscles stronger so you  can stay active.    Prevent falls and fractures by slowing the loss of bone mass (osteoporosis).    Manage stress better.    Improve your sense of self and your body image.  Exercise tips      Ease into your routine. Set small goals. Then build on them. Talk with your healthcare provider first before starting an exercise routine if you're not sure what your activity level should be.    Exercise on most days. Aim for a total of at least 150 minutes (2 hours and 30 minutes) or more of moderate-intensity aerobic activity each week. You could also do 75 minutes (1 hour and 15 minutes) or more of vigorous-intensity aerobic activity each week. Or try for a combination of both. Moderate activity means that you breathe heavier and your heart rate increases, but you can still talk. Think about doing at least 30 minutes of moderate exercise, 5 times a week. It's OK to work up to the 30-minute period over time. Examples of moderate-intensity activity are brisk walking, gardening, and water aerobics.    Step up your daily activity level.  Along with your exercise program, try being more active the whole day. Walk instead of drive. Or park further away so that you take more steps each day. Do more household tasks or yard work. You may not be able to meet the advised amount of physical activity. But doing some moderate- or vigorous-intensity aerobic activity can help reduce your risk for heart disease. Your healthcare provider can help you figure out what is best for you.    Choose 1 or more activities you enjoy.  Walking is one of the easiest things you can do. You can also try swimming, riding a bike, dancing, or taking an exercise class.    Call 911  Call 911 right away if any of these occur:     Chest pain that doesn't go away quickly with rest    New burning, tightness, pressure, or heaviness in your chest, neck, shoulders, back, or arms    Abnormal or severe shortness of breath    A very fast or irregular heartbeat  (palpitations)    Fainting  When to call your healthcare provider  Call your healthcare provider if you have any of these:     Dizziness or lightheadedness    Mild shortness of breath or chest pain    Increased or new joint or muscle pain    [employer] last reviewed this educational content on 7/1/2022 2000-2022 [employer]. All rights reserved. This information is not intended as a substitute for professional medical care. Always follow your healthcare professional's instructions.          Signs of Hearing Loss  Hearing loss is a problem shared by many people. In fact, it's one of the most common health problems, particularly as people age. Most people aged 65 and older have some hearing loss. By age 80, almost everyone does. Hearing loss often occurs slowly over the years. So, you may not realize your hearing has gotten worse.   When sudden hearing loss occurs, it's important to contact your healthcare provider right away. Your provider will do a medical exam and a hearing exam as soon as possible. This is to help find the cause and type of your sudden hearing loss. Based on your diagnosis, your healthcare provider will discuss possible treatments.      Hearing much better with one ear can be a sign of hearing loss.     Have your hearing checked  Call your healthcare provider if you:     Have to strain to hear normal conversation    Have to watch other people s faces very carefully to follow what they re saying    Need to ask people to repeat what they ve said    Often misunderstand what people are saying    Turn the volume of the television or radio up so high that others complain    Feel that people are mumbling when they re talking to you    Find that the effort to hear leaves you feeling tired and irritated    Notice, when using the phone, that you hear better with one ear than the other  [employer] last reviewed this educational content on 6/1/2022 2000-2022 [employer]. All  rights reserved. This information is not intended as a substitute for professional medical care. Always follow your healthcare professional's instructions.          Urinary Incontinence, Female (Adult)   Urinary incontinence means loss of bladder control. This problem affects many women, especially as they get older. If you have incontinence, you may be embarrassed to ask for help. But know that this problem can be treated.   Types of Incontinence  There are different types of incontinence. Two of the main types are described here. You can have more than one type.     Stress incontinence. With this type, urine leaks when pressure (stress) is put on the bladder. This may happen when you cough, sneeze, or laugh. Stress incontinence most often occurs because the pelvic floor muscles that support the bladder and urethra are weak. This can happen after pregnancy and vaginal childbirth or a hysterectomy. It can also be due to excess body weight or hormone changes.    Urge incontinence (also called overactive bladder). With this type, a sudden urge to urinate is felt often. This may happen even though there may not be much urine in the bladder. The need to urinate often during the night is common. Urge incontinence most often occurs because of bladder spasms. This may be due to bladder irritation or infection. Damage to bladder nerves or pelvic muscles, constipation, and certain medicines can also lead to urge incontinence.  Treatment depends on the cause. Further evaluation is needed to find the type you have. This will likely include an exam and certain tests. Based on the results, you and your healthcare provider can then plan treatment. Until a diagnosis is made, the home care tips below can help ease symptoms.   Home care    Do pelvic floor muscle exercises, if they are prescribed. The pelvic floor muscles help support the bladder and urethra. Many women find that their symptoms improve when doing special exercises  that strengthen these muscles. To do the exercises, contract the muscles you would use to stop your stream of urine. But do this when you re not urinating. Hold for 10 seconds, then relax. Repeat 10 to 20 times in a row, at least 3 times a day. Your healthcare provider may give you other instructions for how to do the exercises and how often.    Keep a bladder diary. This helps track how often and how much you urinate over a set period of time. Bring this diary with you to your next visit with the provider. The information can help your provider learn more about your bladder problem.    Lose weight, if advised to by your provider. Extra weight puts pressure on the bladder. Your provider can help you create a weight-loss plan that s right for you. This may include exercising more and making certain diet changes.    Don't have foods and drinks that may irritate the bladder. These can include alcohol and caffeinated drinks.    Quit smoking. Smoking and other tobacco use can lead to a long-term (chronic) cough that strains the pelvic floor muscles. Smoking may also damage the bladder and urethra. Talk with your provider about treatments or methods you can use to quit smoking.    If drinking large amounts of fluid makes you have symptoms, you may be advised to limit your fluid intake. You may also be advised to drink most of your fluids during the day and to limit fluids at night.    If you re worried about urine leakage or accidents, you may wear absorbent pads to catch urine. Change the pads often. This helps reduce discomfort. It may also reduce the risk of skin or bladder infections.    Follow-up care  Follow up with your healthcare provider, or as directed. It may take some to find the right treatment for your problem. But healthy lifestyle changes can be made right away. These include such things as exercising on a regular basis, eating a healthy diet, losing weight (if needed), and quitting smoking. Your treatment  plan may include special therapies or medicines. Certain procedures or surgery may also be options. Talk about any questions you have with your provider.   When to seek medical advice  Call the healthcare provider right away if any of these occur:    Fever of 100.4 F (38 C) or higher, or as directed by your provider    Bladder pain or fullness    Belly swelling    Nausea or vomiting    Back pain    Weakness, dizziness, or fainting  [employer] last reviewed this educational content on 1/1/2020 2000-2022 [employer]. All rights reserved. This information is not intended as a substitute for professional medical care. Always follow your healthcare professional's instructions.          Preventing Falls at Home  A person can fall for many reasons. Older adults may fall because reaction time slows down as we age. Your muscles and joints may get stiff, weak, or less flexible because of illness, medicines, or a physical condition.   Other health problems that make falls more likely include:     Arthritis    Dizziness or lightheadedness when you stand up (orthostatic hypotension)    History of a stroke    Dizziness    Anemia    Certain medicines taken for mental illness or to control blood pressure.    Problems with balance or gait    Bladder or urinary problems    History of falling    Changes in vision (vision impairment)    Changes in thinking skills and memory (cognitive impairment)  Injuries from a fall can include serious injuries such as broken bones, dislocated joints, internal bleeding and cuts. Injuries like these can limit your independence.   Prevention tips  To help prevent falls and fall-related injuries, follow the tips below.    Floors  To make floors safer:     Put nonskid pads under area rugs.    Remove small rugs.    Replace worn floor coverings.    Tack carpets firmly to each step on carpeted stairs. Put nonskid strips on the edges of uncarpeted stairs.    Keep floors and stairs free of  clutter and cords.    Arrange furniture so there are clear pathways.    Clean up any spills right away.  Bathrooms    To make bathrooms safer:     Install grab bars in the tub or shower.    Apply nonskid strips or put a nonskid rubber mat in the tub or shower.    Sit on a bath chair to bathe.    Use bathmats with nonskid backing.  Lighting  To improve visibility in your home:      Keep a flashlight in each room. Or put a lamp next to the bed within easy reach.    Put nightlights in the bedrooms, hallways, kitchen, and bathrooms.    Make sure all stairways have good lighting.    Take your time when going up and down stairs.    Put handrails on both sides of stairs and in walkways for more support. To prevent injury to your wrist or arm, don t use handrails to pull yourself up.    Install grab bars to pull yourself up.    Move or rearrange items that you use often. This will make them easier to find or reach.    Look at your home to find any safety hazards. Especially look at doorways, walkways, and the driveway. Remove or repair any safety problems that you find.  Other changes to make    Look around to find any safety hazards. Look closely at doorways, walkways, and the driveway. Remove or repair any safety problems that you find.    Wear shoes that fit well.    Take your time when going up and down stairs.    Put handrails on both sides of stairs and in walkways for more support. To prevent injury to your wrist or arm, don t use handrails to pull yourself up.    Install grab bars wherever needed to pull yourself up.    Arrange items that you use often. This will make them easier to find or reach.    [employer] last reviewed this educational content on 3/1/2020    9301-0270 [employer]. All rights reserved. This information is not intended as a substitute for professional medical care. Always follow your healthcare professional's instructions.

## 2023-05-15 NOTE — PROGRESS NOTES
"SUBJECTIVE:   Ciarra is a 73 year old who presents for Preventive Visit.      5/15/2023     2:12 PM   Additional Questions   Roomed by Gen KALPESH CMA     Patient has been advised of split billing requirements and indicates understanding: Yes  Are you in the first 12 months of your Medicare coverage?  No    Healthy Habits:     In general, how would you rate your overall health?  Good    Frequency of exercise:  None    Do you usually eat at least 4 servings of fruit and vegetables a day, include whole grains    & fiber and avoid regularly eating high fat or \"junk\" foods?  Yes    Taking medications regularly:  Yes    Medication side effects:  Other    Ability to successfully perform activities of daily living:  No assistance needed    Home Safety:  No safety concerns identified    Hearing Impairment:  Difficulty following a conversation in a noisy restaurant or crowded room and need to ask people to speak up or repeat themselves    In the past 6 months, have you been bothered by leaking of urine? Yes    In general, how would you rate your overall mental or emotional health?  Good      PHQ-2 Total Score: 0    Additional concerns today:  No      Have you ever done Advance Care Planning? (For example, a Health Directive, POLST, or a discussion with a medical provider or your loved ones about your wishes): No, advance care planning information given to patient to review.  Advanced care planning was discussed at today's visit.       Fall risk  Fallen 2 or more times in the past year?: Yes  Any fall with injury in the past year?: Yes    Cognitive Screening   1) Repeat 3 items (Leader, Season, Table)    2) Clock draw: NORMAL  3) 3 item recall: Recalls 3 objects  Results: 3 items recalled: COGNITIVE IMPAIRMENT LESS LIKELY    Mini-CogTM Copyright GARY Kothari. Licensed by the author for use in [facility]; reprinted with permission ([email or web address]). All rights reserved.      Do you have sleep apnea, excessive snoring or " daytime drowsiness?: no    Reviewed and updated as needed this visit by clinical staff   Tobacco  Allergies  Meds  Problems  Med Hx  Surg Hx  Fam Hx          Reviewed and updated as needed this visit by Provider   Tobacco  Allergies  Meds  Problems  Med Hx  Surg Hx  Fam Hx         Social History     Tobacco Use     Smoking status: Former     Types: Cigarettes     Quit date: 6/23/2007     Years since quitting: 15.9     Smokeless tobacco: Never   Vaping Use     Vaping status: Not on file   Substance Use Topics     Alcohol use: No     Comment: Alcoholic Drinks/day: stopped drinking 9/2015             5/15/2023     2:19 PM   Alcohol Use   Prescreen: >3 drinks/day or >7 drinks/week? No     Do you have a current opioid prescription? No  Do you use any other controlled substances or medications that are not prescribed by a provider? None          Diabetes Follow-up      How often are you checking your blood sugar? Not at all    What concerns do you have today about your diabetes? None and Other: poor control     Do you have any of these symptoms? (Select all that apply)  No numbness or tingling in feet.  No redness, sores or blisters on feet.  No complaints of excessive thirst.  No reports of blurry vision.  No significant changes to weight.          Hyperlipidemia Follow-Up      Are you regularly taking any medication or supplement to lower your cholesterol?   Yes- lipitor    Are you having muscle aches or other side effects that you think could be caused by your cholesterol lowering medication?  No    Hypertension Follow-up      Do you check your blood pressure regularly outside of the clinic? No     Are you following a low salt diet? No    Are your blood pressures ever more than 140 on the top number (systolic) OR more   than 90 on the bottom number (diastolic), for example 140/90? No    BP Readings from Last 2 Encounters:   05/15/23 136/64   04/25/23 124/64     Hemoglobin A1C (%)   Date Value   04/12/2023  10.8 (H)   04/11/2022 8.2 (H)     LDL Cholesterol Calculated (mg/dL)   Date Value   04/12/2023 56   10/28/2022 88     LDL Cholesterol Direct (mg/dl)   Date Value   05/06/2019 97   12/18/2017 144 (H)         Current providers sharing in care for this patient include:   Patient Care Team:  Clinic - [place] as PCP - General  Chuck Hernandez MD as Assigned Heart and Vascular Provider  Elaine Galvez MD as Assigned PCP    The following health maintenance items are reviewed in Epic and correct as of today:  Health Maintenance   Topic Date Due     DIABETIC FOOT EXAM  Never done     URINE DRUG SCREEN  Never done     ZOSTER IMMUNIZATION (1 of 2) Never done     MEDICARE ANNUAL WELLNESS VISIT  01/04/2018     COLORECTAL CANCER SCREENING  07/03/2021     ADVANCE CARE PLANNING  01/04/2022     COVID-19 Vaccine (6 - Moderna series) 03/02/2023     MAMMO SCREENING  07/27/2023     A1C  10/12/2023     EYE EXAM  01/01/2024     BMP  04/12/2024     LIPID  04/12/2024     MICROALBUMIN  04/12/2024     ANNUAL REVIEW OF HM ORDERS  04/12/2024     FALL RISK ASSESSMENT  05/15/2024     DTAP/TDAP/TD IMMUNIZATION (3 - Td or Tdap) 08/10/2030     DEXA  10/26/2036     HEPATITIS C SCREENING  Completed     PHQ-2 (once per calendar year)  Completed     INFLUENZA VACCINE  Completed     Pneumococcal Vaccine: 65+ Years  Completed     IPV IMMUNIZATION  Aged Out     MENINGITIS IMMUNIZATION  Aged Out     Lab work is in process  Labs reviewed in EPIC        Review of Systems   Constitutional: Negative for chills and fever.   HENT: Positive for hearing loss. Negative for congestion, ear pain and sore throat.    Eyes: Negative for pain and visual disturbance.   Respiratory: Negative for cough and shortness of breath.    Cardiovascular: Negative for chest pain, palpitations and peripheral edema.   Gastrointestinal: Negative for abdominal pain, constipation, diarrhea, heartburn, hematochezia and nausea.   Breasts:  Negative for tenderness,  "breast mass and discharge.   Genitourinary: Positive for frequency and urgency. Negative for dysuria, genital sores, hematuria, pelvic pain, vaginal bleeding and vaginal discharge.   Musculoskeletal: Positive for arthralgias, joint swelling and myalgias.   Skin: Negative for rash.   Neurological: Positive for dizziness, weakness and headaches. Negative for paresthesias.   Psychiatric/Behavioral: Negative for mood changes. The patient is nervous/anxious.         OBJECTIVE:   /64   Pulse 73   Temp 98.5  F (36.9  C) (Oral)   Resp 16   Ht 1.588 m (5' 2.5\")   Wt 80.3 kg (177 lb)   SpO2 99%   BMI 31.86 kg/m   Estimated body mass index is 31.86 kg/m  as calculated from the following:    Height as of this encounter: 1.588 m (5' 2.5\").    Weight as of this encounter: 80.3 kg (177 lb).  Physical Exam  GENERAL APPEARANCE: healthy, alert and no distress  EYES: Eyes grossly normal to inspection, PERRL and conjunctivae and sclerae normal  HENT: ear canals and TM's normal, nose and mouth without ulcers or lesions, oropharynx clear and oral mucous membranes moist  NECK: no adenopathy, no asymmetry, masses, or scars and thyroid normal to palpation  RESP: lungs clear to auscultation - no rales, rhonchi or wheezes  BREAST: normal without masses, tenderness or nipple discharge and no palpable axillary masses or adenopathy  CV: regular rate and rhythm, normal S1 S2, no S3 or S4, no murmur, click or rub, no peripheral edema and peripheral pulses strong  ABDOMEN: soft, nontender, no hepatosplenomegaly, no masses and bowel sounds normal  MS: no musculoskeletal defects are noted and gait is age appropriate without ataxia  SKIN: no suspicious lesions or rashes  NEURO: Normal strength and tone, sensory exam grossly normal, mentation intact and speech normal  PSYCH: mentation appears normal and affect normal/bright    Diagnostic Test Results:  Labs reviewed in Epic    ASSESSMENT / PLAN:   (Z00.00) Encounter for Medicare annual " wellness exam  (primary encounter diagnosis)  Comment: encourage annual wellness and vaccine up date  Plan: PRIMARY CARE FOLLOW-UP SCHEDULING        Advise pt to get shingle vaccine at pharmacy due to the cost    (Z12.11) Screen for colon cancer  Comment: strongly advise to have colonoscope.   Plan: Colonoscopy Screening  Referral            (Z12.31) Encounter for screening mammogram for breast cancer  Comment:   Plan: *MA Screening Digital Bilateral            (E11.65,  Z79.4) Type 2 diabetes mellitus with hyperglycemia, with long-term current use of insulin (H)  Comment: a1c 10.8 one month ago and diabetes poor control. Worse than one year ago; pt takes insuline lantus 28 bid and oral medication as instructed and no side effect. Pt state she has not been walking in past year due to back pain. Pt had eye exam up date.   Plan: tirzepatide (MOUNJARO) 2.5 MG/0.5ML pen        We discussed the diet control, exercise. Advised to have consult with endo and she declined. Will try injectable GLP-1. her insurance dose not cover it. But she has coupon for 3 month mounjaro. Side effect addressed. Follow-up in 3 month.      (E78.00) Hypercholesterolemia  Comment: doing well with lipitor 80 mg/.   Plan: continue current medication. Advise regular exercise and low fat diet.     (I10) Essential hypertension    Comment: bp reasonable control. Pt stopped taking hydrochlorothiazide due to urine frequency. She dose not want to take it.   Plan: advise close monitor bp at home . Pt dose not check bp at home.     Patient has been advised of split billing requirements and indicates understanding: Yes      COUNSELING:  Reviewed preventive health counseling, as reflected in patient instructions       Regular exercise       Healthy diet/nutrition       Vision screening       Hearing screening       Dental care       Bladder control       Fall risk prevention       Immunizations    Declined: Covid-19 and Zoster due to Concerns about  side effects/safety               Osteoporosis prevention/bone health       Colon cancer screening        She reports that she quit smoking about 15 years ago. Her smoking use included cigarettes. She has never used smokeless tobacco.      Appropriate preventive services were discussed with this patient, including applicable screening as appropriate for cardiovascular disease, diabetes, osteopenia/osteoporosis, and glaucoma.  As appropriate for age/gender, discussed screening for colorectal cancer, prostate cancer, breast cancer, and cervical cancer. Checklist reviewing preventive services available has been given to the patient.    Reviewed patients plan of care and provided an AVS. The Basic Care Plan (routine screening as documented in Health Maintenance) for Kemi meets the Care Plan requirement. This Care Plan has been established and reviewed with the Patient.          Elaine Galvez MD  [facility]    Identified Health Risks:    I have reviewed Opioid Use Disorder and Substance Use Disorder risk factors and made any needed referrals.

## 2023-05-19 ENCOUNTER — RADIOLOGY INJECTION OFFICE VISIT (OUTPATIENT)
Dept: PHYSICAL MEDICINE AND REHAB | Facility: CLINIC | Age: 73
End: 2023-05-19
Attending: NURSE PRACTITIONER
Payer: COMMERCIAL

## 2023-05-19 ENCOUNTER — ANTICOAGULATION THERAPY VISIT (OUTPATIENT)
Dept: ANTICOAGULATION | Facility: CLINIC | Age: 73
End: 2023-05-19

## 2023-05-19 VITALS
HEART RATE: 52 BPM | RESPIRATION RATE: 16 BRPM | DIASTOLIC BLOOD PRESSURE: 62 MMHG | SYSTOLIC BLOOD PRESSURE: 140 MMHG | TEMPERATURE: 98.2 F | OXYGEN SATURATION: 96 %

## 2023-05-19 DIAGNOSIS — Z79.01 ANTICOAGULATED: ICD-10-CM

## 2023-05-19 DIAGNOSIS — Z79.4 TYPE 2 DIABETES MELLITUS WITH HYPERGLYCEMIA, WITH LONG-TERM CURRENT USE OF INSULIN (H): Primary | ICD-10-CM

## 2023-05-19 DIAGNOSIS — M54.42 CHRONIC BILATERAL LOW BACK PAIN WITH LEFT-SIDED SCIATICA: ICD-10-CM

## 2023-05-19 DIAGNOSIS — E11.65 TYPE 2 DIABETES MELLITUS WITH HYPERGLYCEMIA, WITH LONG-TERM CURRENT USE OF INSULIN (H): Primary | ICD-10-CM

## 2023-05-19 DIAGNOSIS — M43.16 SPONDYLOLISTHESIS OF LUMBAR REGION: ICD-10-CM

## 2023-05-19 DIAGNOSIS — M48.061 LUMBAR FORAMINAL STENOSIS: ICD-10-CM

## 2023-05-19 DIAGNOSIS — M54.16 LUMBAR RADICULOPATHY: ICD-10-CM

## 2023-05-19 DIAGNOSIS — I48.0 PAROXYSMAL ATRIAL FIBRILLATION (H): Primary | ICD-10-CM

## 2023-05-19 DIAGNOSIS — G89.29 CHRONIC BILATERAL LOW BACK PAIN WITH LEFT-SIDED SCIATICA: ICD-10-CM

## 2023-05-19 LAB
GLUCOSE SERPL-MCNC: 160 MG/DL (ref 70–99)
INR POINT OF CARE: 1.8 (ref 0.9–1.1)

## 2023-05-19 PROCEDURE — 64483 NJX AA&/STRD TFRM EPI L/S 1: CPT | Mod: 50 | Performed by: PAIN MEDICINE

## 2023-05-19 PROCEDURE — 85610 PROTHROMBIN TIME: CPT | Performed by: PAIN MEDICINE

## 2023-05-19 PROCEDURE — 36416 COLLJ CAPILLARY BLOOD SPEC: CPT | Performed by: PAIN MEDICINE

## 2023-05-19 PROCEDURE — 82962 GLUCOSE BLOOD TEST: CPT | Performed by: PAIN MEDICINE

## 2023-05-19 RX ORDER — DEXAMETHASONE SODIUM PHOSPHATE 10 MG/ML
INJECTION, SOLUTION INTRAMUSCULAR; INTRAVENOUS
Status: SHIPPED | OUTPATIENT
Start: 2023-05-19

## 2023-05-19 RX ORDER — LIDOCAINE HYDROCHLORIDE 10 MG/ML
INJECTION, SOLUTION EPIDURAL; INFILTRATION; INTRACAUDAL; PERINEURAL
Status: SHIPPED | OUTPATIENT
Start: 2023-05-19

## 2023-05-19 RX ADMIN — DEXAMETHASONE SODIUM PHOSPHATE 20 MG: 10 INJECTION, SOLUTION INTRAMUSCULAR; INTRAVENOUS at 13:26

## 2023-05-19 RX ADMIN — LIDOCAINE HYDROCHLORIDE 4 ML: 10 INJECTION, SOLUTION EPIDURAL; INFILTRATION; INTRACAUDAL; PERINEURAL at 13:26

## 2023-05-19 ASSESSMENT — PAIN SCALES - GENERAL
PAINLEVEL: SEVERE PAIN (6)
PAINLEVEL: SEVERE PAIN (6)

## 2023-05-19 NOTE — PROGRESS NOTES
ANTICOAGULATION MANAGEMENT     Kemi Soto 73 year old female is on warfarin with subtherapeutic INR result. (Goal INR 2.0-3.0)    Recent labs: (last 7 days)     05/19/23  1315   INR 1.8*       ASSESSMENT       Source(s): Chart Review    Previous INR was Therapeutic last visit; previously outside of goal range    Medication, diet, health changes since last INR chart reviewed; none identified     Pt had DIANNE injection today             PLAN     Recommended plan for temporary change(s) affecting INR     Dosing Instructions: Continue your current warfarin dose with next INR in 2 weeks       Summary  As of 5/19/2023    Full warfarin instructions:  5 mg every Sun, Tue, Thu; 2.5 mg all other days   Next INR check:  6/2/2023             Detailed voice message left for Ciarra with dosing instructions and follow up date.   Sent [employer] message with dosing and follow up instructions    Contact [phone] to schedule and with any changes, questions or concerns.     Education provided:     Please call back if any changes to your diet, medications or how you've been taking warfarin    Plan made per [facility] anticoagulation protocol    Melissa Hunt RN  Anticoagulation Clinic  5/19/2023    _______________________________________________________________________     Anticoagulation Episode Summary     Current INR goal:  2.0-3.0   TTR:  33.3 % (1 y)   Target end date:  Indefinite   Send INR reminders to:  [facility]    Indications    Paroxysmal atrial fibrillation (H) [I48.0]           Comments:           Anticoagulation Care Providers     Provider Role Specialty Phone number    Chuck Hernandez MD Referring Cardiovascular Disease [phone]

## 2023-05-19 NOTE — PATIENT INSTRUCTIONS
DISCHARGE INSTRUCTIONS    During office hours (8:00 a.m.- 4:00 p.m.) questions or concerns may be answered  by calling [facility] Navigation Nurses at  [phone].  Messages received after hours will be returned the following business day.      In the case of an emergency, please dial 911 or seek assistance at the nearest Emergency Room/Urgent Care facility.     All Patients:    You may experience an increase in your symptoms for the first 2 days (It may take anywhere between 2 days- 2 weeks for the steroid to have maximum effect).    You may use ice on the injection site, as frequently as 20 minutes each hour if needed.    You may take your pain medicine.    You may continue taking your regular medication after your injection. If you have had a Medial Branch Block you may resume pain medication once your pain diary is completed.    You may shower. No swimming, tub bath or hot tub for 48 hours.  You may remove your bandaid/bandage as soon as you are home.    You may resume light activities, as tolerated.    Resume your usual diet as tolerated.    It is strongly advised that you do not drive for 1-3 hours post injection.    If you have had oral sedation:  Do not drive for 8 hours post injection.      If you have had IV sedation:  Do not drive for 24 hours post injection.  Do not operate hazardous machinery or make important personal/business decisions for 24 hours.      POSSIBLE STEROID SIDE EFFECTS (If steroid/cortisone was used for your procedure)    -If you experience these symptoms, it should only last for a short period    Swelling of the legs              Skin redness (flushing)     Mouth (oral) irritation   Blood sugar (glucose) levels            Sweats                    Mood changes  Headache  Sleeplessness  Weakened immune system for up to 14 days, which could increase the risk of melo the COVID-19 virus and/or experiencing more severe symptoms of the disease, if exposed.  Decreased  effectiveness of the flu vaccine if given within 2 weeks of the steroid.         POSSIBLE PROCEDURE SIDE EFFECTS  -Call the Spine Center if you are concerned  Increased Pain           Increased numbness/tingling      Nausea/Vomiting          Bruising/bleeding at site      Redness or swelling                                              Difficulty walking      Weakness           Fever greater than 100.5    *In the event of a severe headache after an epidural steroid injection that is relieved by lying down, please call the [facility] to speak with a clinical staff member*

## 2023-05-25 ENCOUNTER — OFFICE VISIT (OUTPATIENT)
Dept: CARDIOLOGY | Facility: CLINIC | Age: 73
End: 2023-05-25
Attending: INTERNAL MEDICINE
Payer: COMMERCIAL

## 2023-05-25 VITALS
OXYGEN SATURATION: 96 % | DIASTOLIC BLOOD PRESSURE: 56 MMHG | BODY MASS INDEX: 31.68 KG/M2 | RESPIRATION RATE: 16 BRPM | SYSTOLIC BLOOD PRESSURE: 134 MMHG | HEART RATE: 63 BPM | WEIGHT: 176 LBS

## 2023-05-25 DIAGNOSIS — I25.83 CORONARY ARTERY DISEASE DUE TO LIPID RICH PLAQUE: ICD-10-CM

## 2023-05-25 DIAGNOSIS — E78.2 MIXED HYPERLIPIDEMIA: ICD-10-CM

## 2023-05-25 DIAGNOSIS — I25.10 CORONARY ARTERY DISEASE DUE TO LIPID RICH PLAQUE: ICD-10-CM

## 2023-05-25 DIAGNOSIS — I48.0 PAROXYSMAL ATRIAL FIBRILLATION (H): ICD-10-CM

## 2023-05-25 PROCEDURE — 93000 ELECTROCARDIOGRAM COMPLETE: CPT | Performed by: INTERNAL MEDICINE

## 2023-05-25 PROCEDURE — 99214 OFFICE O/P EST MOD 30 MIN: CPT | Performed by: INTERNAL MEDICINE

## 2023-05-25 NOTE — PATIENT INSTRUCTIONS
We are going to plan a 2 week monitor and an echocardiogram.I talked about an oxygen saturation finger device that will help you monitor your pulse.Please call or write with any heart related questions.Her number is [phone],please review the watchman pamphlet at let me know.

## 2023-05-25 NOTE — PROGRESS NOTES
HEART CARE ENCOUNTER CONSULTATON NOTE      [facility]  [phone]      Assessment/Recommendations   Assessment/Plan:  1.  History of typical isthmus flutter and atrial fibrillation.  Patient has chosen to remain on sotalol and not pursue ablation which has been previously discussed.  We are going to plan a 2-week event monitor to try to determine the frequency on the current dose of sotalol.  I reminded her to make sure that she tells any 1 that prescribes medications that she is on sotalol.    2.  Hypertension.  We had discussed the potential for adding hydrochlorothiazide secondary to suboptimal blood pressure control previously.  She told us in November that she wanted to think on this further.  She had some blood work in January at which time her glucose was 246, BUN of 23, creatinine 0.93, sodium 138, potassium 4.8, chloride 95 CO2 34.  Patient indicated at that time that she was not monitoring her blood pressure.  She did have a blood pressure check April 25 that was 124/64.  On May 15 her blood pressure is 136/64.  Today's blood pressure is similar.  She states that she started to chlorothiazide and then discontinued it on her own.  Her blood pressures appear reasonable and I did ask her to continue to monitor her blood pressure and to monitor the salt in her diet.    3.  Coronary artery disease.  No complaints of anginal chest discomfort.  As noted she had a negative nuclear stress test in 2020.    4.  Dyslipidemia.  Lipids completed in April finds a cholesterol 129, triglycerides 173, LDL of 56.    Plan  1.  14-day event monitor  2.  Echocardiogram  3.  Patient wishes to review the Watchman pamphlet and will update me if she has questions.  4.  Follow-up in 3 to 4 months with recommendations pending the above.  5.  Blood pressure monitoring and report.           History of Present Illness/Subjective    HPI: Kemi Soto is a 73 year old female who is seen in follow-up.  We last  visited November 2022 and then she traveled to [place].  She has a history of typical isthmus flutter and atrial fibrillation.  Last time we visited this appears to be controlled on sotalol.  She had previously visited with the EP colleague regarding possible ablation which she declined.  She wished to continue on low-dose of sotalol.  She has a history of coronary artery disease with multivessel intervention with prior history of LIMA to the LAD and vein graft to the obtuse marginal.  She had a negative nuclear stress test November 2020.  She has a history of hypertension and a history of hyperlipidemia.  She told me during our last visit that she had not been taking Zetia and was not certain why she stopped taking the medication.  I commented at that time that her LDL cholesterol was higher at 88.  Patient visited with her primary care provider in April.  She has a history of type 2 diabetes mellitus.  Chlorothiazide was added after that visit but she subsequently chose to come off of it because she felt as if she was urinating too much.  She reports that in the interim her blood pressure has been reasonable.    She reports that she has been feeling well.  She does endorse some occasional awareness of a rapid heartbeat although feels that this is relatively infrequent but was not able to be certain as to the duration or the frequency.  She has not had anginal type chest discomfort or shortness of breath.  She has been limited by some back discomfort more recently.    ECG from November 8, 2022 sinus bradycardia 59 bpm, nonspecific T wave abnormality, QTc 423 ms.    Ordering physician:Chuck Hernandez MD.   Indication: Persistent atrial fibrillation.     Prescribed monitoring time: 14 days.  Effective monitoring time: 12 days and 19 hours.        Interpretation:     Patient was monitored for 12 days and 19 hours, baseline rhythm sinus, heart rate averaging 67 bpm, lowest heart rate of 50 and max of 120 bpm.     No  "prolonged pauses, no high-grade AV block, no significant IVCD or aberrancy.     Infrequent PACs burden is 1% of total beats, no atrial runs, no evidence of PSVT, no evidence of atrial fibrillation or flutter.     Infrequent isolated PVCs without ventricular runs, no sustained or nonsustained VT.     Strip # 11 and 12 show sinus rhythm with downsloping ST depression 1 to 1.5 mm, suggestive but not diagnostic for ischemia.     A single symptomatic transmission on June 7 for \"symptoms other listed\" correlated with sinus rhythm rate of 64 bpm.     Holter monitor from July 2022 revealed essentially normal Holter.  There is no atrial fibrillation on that occasion.  Rare atrial ectopy noted.    Echocardiogram Complete  Order: [id number]  Status: Final result     Visible to patient: Yes (not seen)     Next appt: 05/25/2023 at 01:20 PM in Cardiology (Chuck Hernandez MD)     0 Result Notes  Details    Reading Physician Reading Date Result Priority   Janee Choudhary MD  [phone] 11/5/2020 Routine   Provider, Historical 11/5/2020      Narrative & Impression    No previous study for comparison.    Left ventricle ejection fraction is normal. The calculated left ventricular ejection fraction is 58%.    Normal left ventricular size and systolic function.    Normal right ventricular size and systolic function.    Mild concentric hypertrophy noted.    Left atrial volume is moderately increased.           important suggestion  Newer result          Physical Examination  Review of Systems   Vitals: There were no vitals taken for this visit.  BMI= There is no height or weight on file to calculate BMI.  Wt Readings from Last 3 Encounters:   05/15/23 80.3 kg (177 lb)   04/12/23 79.8 kg (176 lb)   11/08/22 78.9 kg (174 lb)       General Appearance:   no distress, normal body habitus   ENT/Mouth: membranes moist, no oral lesions or bleeding gums.      EYES:  no scleral icterus, normal conjunctivae   Neck: no carotid bruits or " thyromegaly   Chest/Lungs:   lungs are clear to auscultation, no rales or wheezing, *equal chest wall expansion    Cardiovascular:   Regular. Normal first and second heart sounds with no murmurs, rubs, or gallops; the carotid, radial and posterior tibial pulses are intact, Jugular venous pressure within normal limits, no significant edema bilaterally    Abdomen:  no  bruits, or tenderness; bowel sounds are present   Extremities: no cyanosis or clubbing   Skin: no xanthelasma, warm.    Neurologic: normal  bilateral, no tremors     Psychiatric: alert and oriented x3, calm        Please refer above for cardiac ROS details.        Medical History  Surgical History Family History Social History   Past Medical History:   Diagnosis Date    Coronary artery disease     Diabetes mellitus (H)     Discitis of thoracolumbar region 10/10/2015    Encephalopathy 10/14/2015    Epidural abscess 10/10/2015    Hip pain, right     Hyperlipidemia     Sepsis (H) 10/8/2015    Stroke (H) 06/23/2015    Neurology felt that episode was C/W subacute ischemic stroke    TIA (transient ischemic attack) 6/23/2015    UTI (urinary tract infection)     admitted to [facility] with UTI     Past Surgical History:   Procedure Laterality Date    ANGIOPLASTY  03/30/2016    Drug eluting stent mid LAD, cutting balloon to 1st diagonal    ANGIOPLASTY  2008    BYPASS GRAFT ARTERY CORONARY  12/11/2007    X 3 vessels, LIMA to LAD, saph vein graft to distal RCA, saphvein graft to marginal, mini circuit bypass.  [facility].    CORONARY STENT PLACEMENT  2008    Left main, left circumflex, RCA    CORONARY STENT PLACEMENT  03/2016    CV CORONARY ANGIOGRAM N/A 8/1/2018    Procedure: Coronary Angiogram;  Surgeon: Kit Bean MD;  Location: [facility] Cath Lab;  Service:     CV LEFT HEART CATHETERIZATION WITH LEFT VENTRICULOGRAM N/A 8/1/2018    Procedure: Left Heart Catheterization with Left Ventriculogram;  Surgeon: Kit Bean MD;   Location: [facility] Cath Lab;  Service:     INSERT MIDLINE HE  10/31/2015         LUMBAR DISCECTOMY N/A 10/11/2015    Procedure: BILATERAL L1-L5 DECOMPRESSIVE LAMINECTOMY WITH EVACUATION OF EPIDURAL ABSCESS IRRIGATION & DEBRIDEMENT;  Surgeon: Luli Chan MD;  Location: [facility] OR;  Service:     PICC  10/19/2015         RELEASE CARPAL TUNNEL Bilateral      Family History   Problem Relation Age of Onset    Cerebrovascular Disease Mother     Diabetes Father     Diabetes Sister     Cerebrovascular Disease Sister 81.00    Cerebrovascular Disease Brother     Diabetes Brother         Social History     Socioeconomic History    Marital status: Single     Spouse name: Not on file    Number of children: 1    Years of education: Not on file    Highest education level: Not on file   Occupational History    Not on file   Tobacco Use    Smoking status: Former     Types: Cigarettes     Quit date: 6/23/2007     Years since quitting: 15.9    Smokeless tobacco: Never   Vaping Use    Vaping status: Not on file   Substance and Sexual Activity    Alcohol use: No     Comment: Alcoholic Drinks/day: stopped drinking 9/2015    Drug use: No    Sexual activity: Never   Other Topics Concern    Not on file   Social History Narrative    Lives alone with cats and dogs. , one daughter, Tory Lofton.      Social Determinants of Health     Financial Resource Strain: Not on file   Food Insecurity: Not on file   Transportation Needs: Not on file   Physical Activity: Not on file   Stress: Not on file   Social Connections: Not on file   Intimate Partner Violence: Not on file   Housing Stability: Not on file           Medications  Allergies   Current Outpatient Medications   Medication Sig Dispense Refill    ASPIRIN NOT PRESCRIBED (INTENTIONAL) Please choose reason not prescribed from choices below.      atorvastatin (LIPITOR) 80 MG tablet Take 1 tablet (80 mg) by mouth At Bedtime 90 tablet 3    busPIRone (BUSPAR) 15 MG  tablet Take 1 tablet (15 mg) by mouth daily 90 tablet 3    Continuous Blood Gluc Sensor (DEXCOM G6 SENSOR) MISC 1 each every 10 days Change every 10 days. 9 each 4    Continuous Blood Gluc Transmit (DEXCOM G6 TRANSMITTER) MISC 1 each every 3 months Change every 3 months. 1 each 5    ezetimibe (ZETIA) 10 MG tablet Take 1 tablet (10 mg) by mouth daily 90 tablet 3    gabapentin (NEURONTIN) 300 MG capsule TAKE 3 CAPSULES BY MOUTH EVERY DAY AT BEDTIME 270 capsule 3    insulin glargine (LANTUS SOLOSTAR) 100 UNIT/ML pen Inject 28 Units Subcutaneous 2 times daily INJECT 28 UNITS SUBCUTANEOUSLY TWICE DAILY 60 mL 3    isosorbide mononitrate (IMDUR) 60 MG 24 hr tablet Take 1 tablet (60 mg) by mouth daily 90 tablet 3    lidocaine (LIDODERM) 5 % patch Place 1 patch onto the skin every 24 hours To prevent lidocaine toxicity, patient should be patch free for 12 hrs daily. 5 patch 0    losartan (COZAAR) 100 MG tablet Take 1 tablet (100 mg) by mouth daily 90 tablet 3    sertraline (ZOLOFT) 100 MG tablet Take 1 tablet (100 mg) by mouth daily 90 tablet 3    sotalol (BETAPACE) 80 MG tablet Take 0.5 tablets (40 mg) by mouth 2 times daily 90 tablet 3    tirzepatide (MOUNJARO) 2.5 MG/0.5ML pen Inject 2.5 mg Subcutaneous once a week 6 mL 0    traMADol (ULTRAM) 50 MG tablet TAKE 1 TABLET BY MOUTH EVERY 8 HOURS AS NEEDED FOR PAIN OR SEVERE PAIN ON SCALE (7-10) 30 tablet 0    traZODone (DESYREL) 50 MG tablet 1/2 to 1 tablet at bedtime prn insomnia 30 tablet 1    warfarin ANTICOAGULANT (COUMADIN) 5 MG tablet Take 0.5-1 tablets (2.5-5 mg) by mouth daily Adjust dose per INR results as instructed. 90 tablet 3       Allergies   Allergen Reactions    Metformin GI Disturbance    Oxycodone           Lab Results    Chemistry/lipid CBC Cardiac Enzymes/BNP/TSH/INR   Recent Labs   Lab Test 04/12/23  1617   CHOL 129   HDL 38*   LDL 56   TRIG 173*     Recent Labs   Lab Test 04/12/23  1617 10/28/22  1210 07/22/22  1126   LDL 56 88 78     Recent Labs   Lab  Test 05/19/23  1315 04/12/23  1617   NA  --  137   POTASSIUM  --  4.5   CHLORIDE  --  100   CO2  --  27   * 185*   BUN  --  19.0   CR  --  0.97*   GFRESTIMATED  --  61   ELLIE  --  9.0     Recent Labs   Lab Test 04/12/23  1617 07/22/22  1126 11/24/21  1112   CR 0.97* 0.78 0.88     Recent Labs   Lab Test 04/12/23  1617 04/11/22  1408 10/01/21  1421   A1C 10.8* 8.2* 7.9*          Recent Labs   Lab Test 05/05/21  1400   WBC 7.9   HGB 13.0   HCT 40.2   MCV 91        Recent Labs   Lab Test 05/05/21  1400 11/05/20  0559 11/04/20  1616   HGB 13.0 11.6* 13.5    Recent Labs   Lab Test 11/06/20  0559 11/05/20  0559 11/04/20  2229   TROPONINI 0.18 0.37* 0.28     No results for input(s): BNP, NTBNPI, NTBNP in the last 17019 hours.  Recent Labs   Lab Test 05/05/21  1400   TSH 0.77     Recent Labs   Lab Test 05/19/23  1315 04/25/23  1315 04/12/23  1617   INR 1.8* 2.2* 4.2*        Chuck Hernandez MD

## 2023-05-25 NOTE — LETTER
5/25/2023    [facility]  [address] [address]    RE: Kemi Soto       Dear Colleague,     I had the pleasure of seeing K[facility] Soto in the [facility].    HEART CARE ENCOUNTER CONSULTATON NOTE      [facility]  [phone]      Assessment/Recommendations   Assessment/Plan:  1.  History of typical isthmus flutter and atrial ablation.  Patient has chosen to remain on sotalol and not pursue ablation which has been previously discussed.  We are going to plan a 2-week event monitor to try to determine the frequency on the current dose of sotalol.  I reminded her to make sure that she tells any 1 that prescribes medications that she is on sotalol.    2.  Hypertension.  We had discussed the potential for adding hydrochlorothiazide secondary to suboptimal blood pressure control previously.  She told us in November that she wanted to think on this further.  She had some blood work in January at which time her glucose was 246, BUN of 23, creatinine 0.93, sodium 138, potassium 4.8, chloride 95 CO2 34.  Patient indicated at that time that she was not monitoring her blood pressure.  She did have a blood pressure check April 25 that was 124/64.  On May 15 her blood pressure is 136/64.  Today's blood pressure is similar.  She states that she started to chlorothiazide and then discontinued it on her own.  Her blood pressures appear reasonable and I did ask her to continue to monitor her blood pressure and to monitor the salt in her diet.    3.  Coronary artery disease.  No complaints of anginal chest discomfort.  As noted she had a negative nuclear stress test in 2020.    4.  Dyslipidemia.  Lipids completed in April finds a cholesterol 129, triglycerides 173, LDL of 56.    Plan  1.  14-day event monitor  2.  Echocardiogram  3.  Patient wishes to review the Watchman pamphlet and will update me  if she has questions.  4.  Follow-up in 3 to 4 months with recommendations pending the above.  5.  Blood pressure monitoring and report.           History of Present Illness/Subjective    HPI: Kemi Soto is a 73 year old female who is seen in follow-up.  We last visited November 2022 and then she traveled to [place].  She has a history of typical isthmus flutter and atrial fibrillation.  Last time we visited this appears to be controlled on sotalol.  She had previously visited with the EP colleague regarding possible ablation which she declined.  She wished to continue on low-dose of sotalol.  She has a history of coronary artery disease with multivessel intervention with prior history of LIMA to the LAD and vein graft to the obtuse marginal.  She had a negative nuclear stress test November 2020.  She has a history of hypertension and a history of hyperlipidemia.  She told me during our last visit that she had not been taking Zetia and was not certain why she stopped taking the medication.  I commented at that time that her LDL cholesterol was higher at 88.  Patient visited with her primary care provider in April.  She has a history of type 2 diabetes mellitus.  Chlorothiazide was added after that visit but she subsequently chose to come off of it because she felt as if she was urinating too much.  She reports that in the interim her blood pressure has been reasonable.    She reports that she has been feeling well.  She does endorse some occasional awareness of a rapid heartbeat although feels that this is relatively infrequent but was not able to be certain as to the duration or the frequency.  She has not had anginal type chest discomfort or shortness of breath.  She has been limited by some back discomfort more recently.    ECG from November 8, 2022 sinus bradycardia 59 bpm, nonspecific T wave abnormality, QTc 423 ms.    Ordering physician:Chuck Hernandez MD.   Indication: Persistent atrial fibrillation.    "  Prescribed monitoring time: 14 days.  Effective monitoring time: 12 days and 19 hours.        Interpretation:     Patient was monitored for 12 days and 19 hours, baseline rhythm sinus, heart rate averaging 67 bpm, lowest heart rate of 50 and max of 120 bpm.     No prolonged pauses, no high-grade AV block, no significant IVCD or aberrancy.     Infrequent PACs burden is 1% of total beats, no atrial runs, no evidence of PSVT, no evidence of atrial fibrillation or flutter.     Infrequent isolated PVCs without ventricular runs, no sustained or nonsustained VT.     Strip # 11 and 12 show sinus rhythm with downsloping ST depression 1 to 1.5 mm, suggestive but not diagnostic for ischemia.     A single symptomatic transmission on June 7 for \"symptoms other listed\" correlated with sinus rhythm rate of 64 bpm.     Holter monitor from July 2022 revealed essentially normal Holter.  There is no atrial fibrillation on that occasion.  Rare atrial ectopy noted.    Echocardiogram Complete  Order: [id number]  Status: Final result     Visible to patient: Yes (not seen)     Next appt: 05/25/2023 at 01:20 PM in Cardiology (Chuck Hernandez MD)     0 Result Notes  Details    Reading Physician Reading Date Result Priority   Janee Choudhary MD  [phone] 11/5/2020 Routine   Provider, Historical 11/5/2020      Narrative & Impression    No previous study for comparison.    Left ventricle ejection fraction is normal. The calculated left ventricular ejection fraction is 58%.    Normal left ventricular size and systolic function.    Normal right ventricular size and systolic function.    Mild concentric hypertrophy noted.    Left atrial volume is moderately increased.           important suggestion  Newer result          Physical Examination  Review of Systems   Vitals: There were no vitals taken for this visit.  BMI= There is no height or weight on file to calculate BMI.  Wt Readings from Last 3 Encounters:   05/15/23 80.3 kg (177 lb) "   04/12/23 79.8 kg (176 lb)   11/08/22 78.9 kg (174 lb)       General Appearance:   no distress, normal body habitus   ENT/Mouth: membranes moist, no oral lesions or bleeding gums.      EYES:  no scleral icterus, normal conjunctivae   Neck: no carotid bruits or thyromegaly   Chest/Lungs:   lungs are clear to auscultation, no rales or wheezing, *equal chest wall expansion    Cardiovascular:   Regular. Normal first and second heart sounds with no murmurs, rubs, or gallops; the carotid, radial and posterior tibial pulses are intact, Jugular venous pressure within normal limits, no significant edema bilaterally    Abdomen:  no  bruits, or tenderness; bowel sounds are present   Extremities: no cyanosis or clubbing   Skin: no xanthelasma, warm.    Neurologic: normal  bilateral, no tremors     Psychiatric: alert and oriented x3, calm        Please refer above for cardiac ROS details.        Medical History  Surgical History Family History Social History   Past Medical History:   Diagnosis Date    Coronary artery disease     Diabetes mellitus (H)     Discitis of thoracolumbar region 10/10/2015    Encephalopathy 10/14/2015    Epidural abscess 10/10/2015    Hip pain, right     Hyperlipidemia     Sepsis (H) 10/8/2015    Stroke (H) 06/23/2015    Neurology felt that episode was C/W subacute ischemic stroke    TIA (transient ischemic attack) 6/23/2015    UTI (urinary tract infection)     admitted to [facility] with UTI     Past Surgical History:   Procedure Laterality Date    ANGIOPLASTY  03/30/2016    Drug eluting stent mid LAD, cutting balloon to 1st diagonal    ANGIOPLASTY  2008    BYPASS GRAFT ARTERY CORONARY  12/11/2007    X 3 vessels, LIMA to LAD, saph vein graft to distal RCA, saphvein graft to marginal, mini circuit bypass.  [facility].    CORONARY STENT PLACEMENT  2008    Left main, left circumflex, RCA    CORONARY STENT PLACEMENT  03/2016    CV CORONARY ANGIOGRAM N/A 8/1/2018    Procedure: Coronary  Angiogram;  Surgeon: Kit Bean MD;  Location: [facility] Cath Lab;  Service:     CV LEFT HEART CATHETERIZATION WITH LEFT VENTRICULOGRAM N/A 8/1/2018    Procedure: Left Heart Catheterization with Left Ventriculogram;  Surgeon: Kit Bean MD;  Location: [facility] Cath Lab;  Service:     INSERT MIDLINE HE  10/31/2015         LUMBAR DISCECTOMY N/A 10/11/2015    Procedure: BILATERAL L1-L5 DECOMPRESSIVE LAMINECTOMY WITH EVACUATION OF EPIDURAL ABSCESS IRRIGATION & DEBRIDEMENT;  Surgeon: Luli Chan MD;  Location: [facility] OR;  Service:     PICC  10/19/2015         RELEASE CARPAL TUNNEL Bilateral      Family History   Problem Relation Age of Onset    Cerebrovascular Disease Mother     Diabetes Father     Diabetes Sister     Cerebrovascular Disease Sister 81.00    Cerebrovascular Disease Brother     Diabetes Brother         Social History     Socioeconomic History    Marital status: Single     Spouse name: Not on file    Number of children: 1    Years of education: Not on file    Highest education level: Not on file   Occupational History    Not on file   Tobacco Use    Smoking status: Former     Types: Cigarettes     Quit date: 6/23/2007     Years since quitting: 15.9    Smokeless tobacco: Never   Vaping Use    Vaping status: Not on file   Substance and Sexual Activity    Alcohol use: No     Comment: Alcoholic Drinks/day: stopped drinking 9/2015    Drug use: No    Sexual activity: Never   Other Topics Concern    Not on file   Social History Narrative    Lives alone with cats and dogs. , one daughter, Tory Lofton.      Social Determinants of Health     Financial Resource Strain: Not on file   Food Insecurity: Not on file   Transportation Needs: Not on file   Physical Activity: Not on file   Stress: Not on file   Social Connections: Not on file   Intimate Partner Violence: Not on file   Housing Stability: Not on file           Medications  Allergies   Current Outpatient  Medications   Medication Sig Dispense Refill    ASPIRIN NOT PRESCRIBED (INTENTIONAL) Please choose reason not prescribed from choices below.      atorvastatin (LIPITOR) 80 MG tablet Take 1 tablet (80 mg) by mouth At Bedtime 90 tablet 3    busPIRone (BUSPAR) 15 MG tablet Take 1 tablet (15 mg) by mouth daily 90 tablet 3    Continuous Blood Gluc Sensor (DEXCOM G6 SENSOR) MISC 1 each every 10 days Change every 10 days. 9 each 4    Continuous Blood Gluc Transmit (DEXCOM G6 TRANSMITTER) MISC 1 each every 3 months Change every 3 months. 1 each 5    ezetimibe (ZETIA) 10 MG tablet Take 1 tablet (10 mg) by mouth daily 90 tablet 3    gabapentin (NEURONTIN) 300 MG capsule TAKE 3 CAPSULES BY MOUTH EVERY DAY AT BEDTIME 270 capsule 3    insulin glargine (LANTUS SOLOSTAR) 100 UNIT/ML pen Inject 28 Units Subcutaneous 2 times daily INJECT 28 UNITS SUBCUTANEOUSLY TWICE DAILY 60 mL 3    isosorbide mononitrate (IMDUR) 60 MG 24 hr tablet Take 1 tablet (60 mg) by mouth daily 90 tablet 3    lidocaine (LIDODERM) 5 % patch Place 1 patch onto the skin every 24 hours To prevent lidocaine toxicity, patient should be patch free for 12 hrs daily. 5 patch 0    losartan (COZAAR) 100 MG tablet Take 1 tablet (100 mg) by mouth daily 90 tablet 3    sertraline (ZOLOFT) 100 MG tablet Take 1 tablet (100 mg) by mouth daily 90 tablet 3    sotalol (BETAPACE) 80 MG tablet Take 0.5 tablets (40 mg) by mouth 2 times daily 90 tablet 3    tirzepatide (MOUNJARO) 2.5 MG/0.5ML pen Inject 2.5 mg Subcutaneous once a week 6 mL 0    traMADol (ULTRAM) 50 MG tablet TAKE 1 TABLET BY MOUTH EVERY 8 HOURS AS NEEDED FOR PAIN OR SEVERE PAIN ON SCALE (7-10) 30 tablet 0    traZODone (DESYREL) 50 MG tablet 1/2 to 1 tablet at bedtime prn insomnia 30 tablet 1    warfarin ANTICOAGULANT (COUMADIN) 5 MG tablet Take 0.5-1 tablets (2.5-5 mg) by mouth daily Adjust dose per INR results as instructed. 90 tablet 3       Allergies   Allergen Reactions    Metformin GI Disturbance    Oxycodone            Lab Results    Chemistry/lipid CBC Cardiac Enzymes/BNP/TSH/INR   Recent Labs   Lab Test 04/12/23  1617   CHOL 129   HDL 38*   LDL 56   TRIG 173*     Recent Labs   Lab Test 04/12/23  1617 10/28/22  1210 07/22/22  1126   LDL 56 88 78     Recent Labs   Lab Test 05/19/23  1315 04/12/23  1617   NA  --  137   POTASSIUM  --  4.5   CHLORIDE  --  100   CO2  --  27   * 185*   BUN  --  19.0   CR  --  0.97*   GFRESTIMATED  --  61   ELLIE  --  9.0     Recent Labs   Lab Test 04/12/23  1617 07/22/22  1126 11/24/21  1112   CR 0.97* 0.78 0.88     Recent Labs   Lab Test 04/12/23  1617 04/11/22  1408 10/01/21  1421   A1C 10.8* 8.2* 7.9*          Recent Labs   Lab Test 05/05/21  1400   WBC 7.9   HGB 13.0   HCT 40.2   MCV 91        Recent Labs   Lab Test 05/05/21  1400 11/05/20  0559 11/04/20  1616   HGB 13.0 11.6* 13.5    Recent Labs   Lab Test 11/06/20  0559 11/05/20  0559 11/04/20  2229   TROPONINI 0.18 0.37* 0.28     No results for input(s): BNP, NTBNPI, NTBNP in the last 67802 hours.  Recent Labs   Lab Test 05/05/21  1400   TSH 0.77     Recent Labs   Lab Test 05/19/23  1315 04/25/23  1315 04/12/23  1617   INR 1.8* 2.2* 4.2*        Chuck Hernandez MD        Thank you for allowing me to participate in the care of your patient.      Sincerely,     Chuck Hernandez MD     [facility]  cc:   Chuck Hernandez MD  [address]  [address]  [place] [address]

## 2023-05-26 LAB
ATRIAL RATE - MUSE: 61 BPM
DIASTOLIC BLOOD PRESSURE - MUSE: NORMAL MMHG
INTERPRETATION ECG - MUSE: NORMAL
P AXIS - MUSE: -8 DEGREES
PR INTERVAL - MUSE: 166 MS
QRS DURATION - MUSE: 84 MS
QT - MUSE: 428 MS
QTC - MUSE: 430 MS
R AXIS - MUSE: 26 DEGREES
SYSTOLIC BLOOD PRESSURE - MUSE: NORMAL MMHG
T AXIS - MUSE: 136 DEGREES
VENTRICULAR RATE- MUSE: 61 BPM

## 2023-06-01 ENCOUNTER — HOSPITAL ENCOUNTER (OUTPATIENT)
Dept: CARDIOLOGY | Facility: HOSPITAL | Age: 73
Discharge: HOME | End: 2023-06-01
Attending: INTERNAL MEDICINE
Payer: COMMERCIAL

## 2023-06-01 DIAGNOSIS — I48.0 PAROXYSMAL ATRIAL FIBRILLATION (H): ICD-10-CM

## 2023-06-01 PROCEDURE — 999N000096 CARDIAC MOBILE TELEMETRY MONITOR

## 2023-06-05 ENCOUNTER — TELEPHONE (OUTPATIENT)
Dept: ANTICOAGULATION | Facility: CLINIC | Age: 73
End: 2023-06-05
Payer: COMMERCIAL

## 2023-06-05 NOTE — TELEPHONE ENCOUNTER
ANTICOAGULATION     Kemi Soto is overdue for INR check.      my chart message sent    Bianca Cerda RN

## 2023-06-13 ENCOUNTER — TRANSFERRED RECORDS (OUTPATIENT)
Dept: HEALTH INFORMATION MANAGEMENT | Facility: CLINIC | Age: 73
End: 2023-06-13

## 2023-06-13 ENCOUNTER — OFFICE VISIT (OUTPATIENT)
Dept: PHYSICAL MEDICINE AND REHAB | Facility: CLINIC | Age: 73
End: 2023-06-13
Payer: COMMERCIAL

## 2023-06-13 VITALS — HEART RATE: 70 BPM | OXYGEN SATURATION: 96 % | SYSTOLIC BLOOD PRESSURE: 132 MMHG | DIASTOLIC BLOOD PRESSURE: 60 MMHG

## 2023-06-13 DIAGNOSIS — M48.061 LUMBAR FORAMINAL STENOSIS: ICD-10-CM

## 2023-06-13 DIAGNOSIS — M43.16 SPONDYLOLISTHESIS OF LUMBAR REGION: ICD-10-CM

## 2023-06-13 DIAGNOSIS — M47.816 LUMBAR FACET ARTHROPATHY: ICD-10-CM

## 2023-06-13 DIAGNOSIS — Z98.890 HISTORY OF LUMBAR SURGERY: ICD-10-CM

## 2023-06-13 DIAGNOSIS — M54.16 LUMBAR RADICULOPATHY: ICD-10-CM

## 2023-06-13 DIAGNOSIS — G89.29 CHRONIC BILATERAL LOW BACK PAIN WITHOUT SCIATICA: Primary | ICD-10-CM

## 2023-06-13 DIAGNOSIS — M54.50 CHRONIC BILATERAL LOW BACK PAIN WITHOUT SCIATICA: Primary | ICD-10-CM

## 2023-06-13 PROCEDURE — 99214 OFFICE O/P EST MOD 30 MIN: CPT | Performed by: NURSE PRACTITIONER

## 2023-06-13 RX ORDER — ACETAMINOPHEN 500 MG
1000 TABLET ORAL EVERY 12 HOURS PRN
COMMUNITY

## 2023-06-13 ASSESSMENT — PAIN SCALES - GENERAL: PAINLEVEL: SEVERE PAIN (6)

## 2023-06-13 NOTE — PROGRESS NOTES
Assessment:     Diagnoses and all orders for this visit:  Chronic bilateral low back pain without sciatica  -     Pain Medial Branch Block Lumbar Two Lvls Blair; Future  Lumbar facet arthropathy  -     Pain Medial Branch Block Lumbar Two Lvls Blair; Future  Spondylolisthesis of lumbar region  Lumbar foraminal stenosis  Lumbar radiculopathy  History of lumbar surgery     Kemi Soto is a 73 year old y.o. female with past medical history significant for hypercholesterolemia, type 2 diabetes mellitus, hypertension, atrial fibrillation, history of lumbar surgery for evacuation of abscess who presents today for follow-up regarding:    -Chronic bilateral low back pain with lumbar radiculopathy with with improvement with leg pain but not back pain.  Patient does also have facet arthropathy with spondylolisthesis at L4-5 and L5-S1.     Plan:     A shared decision making plan was used. The patient's values and choices were respected. Prior medical records were reviewed today. The following represents what was discussed and decided upon by the provider and the patient.        -DIAGNOSTIC TESTS: Images were personally reviewed and interpreted.   --Lumbar spine x-ray 4/11/2022 with grade 1 spondylolisthesis 3.3 mm in neutral that increases to 8.6 mm in forward flexion.  L5-S1 8 mm spondylolisthesis that increases to 10.5 mm in forward flexion. Chronic laminectomy L2-L5.  Slight disc space narrowing at L2-3.  --Left hip x-ray 6/3/2022 negative for fracture, degenerative changes bilaterally.  Degenerative changes to bilateral SI joints also noted.  -- Bilateral upper extremity EMG with Dr. Kirk 2019 with no evidence of cervical radiculopathy.    -INTERVENTIONS: Ordered bilateral L3, L4, L5 medial branch block to diagnostically determine how much of her back pain is facet mediated as she does have facet arthropathy at L4-5 and L5-S1 and ongoing back pain.  If she does have a positive response would recommend confirmatory  block for radiofrequency ablation.  If no benefit with the test injection we could consider a bilateral L4-5 TFESI versus IL DIANNE  Down the road could consider spinal cord stimulator  Patient is not interested in surgery options at this time.    -MEDICATIONS: No changes in medications at this time  Discussed side effects of medications and proper use. Patient verbalized understanding.    -PHYSICAL THERAPY: Physical therapy referral placed 4/19/2023.  Discussed the importance of core strengthening, ROM, stretching exercises with the patient and how each of these entities is important in decreasing pain.  Explained to the patient that the purpose of physical therapy is to teach the patient a home exercise program.  These exercises need to be performed every day in order to decrease pain and prevent future occurrences of pain.        -PATIENT EDUCATION:  Total time of 32 minutes, on the day of service, spent with the patient, reviewing the chart, placing orders, and documenting.   -Today we also discussed the issues related to the current COVID-19 pandemic, the pros and cons of the current treatment plan, the CDC guidelines such as social distancing, washing the hands, and covering the cough.    -FOLLOW UP: Follow-up for injection test injection with Dr. Garsia  Advised to contact clinic if symptoms worsen or change.    Subjective:     Kemi Soto is a 73 year old female who presents today for follow-up regarding ongoing bilateral low back pain with previous leg pain which improved with the injection however her back pain unfortunately has not improved.  Currently her pain is still a 6/10 in the low back bilaterally, and 8 at its worst before its best.  Aggravated with prolonged standing and walking, does improve with sitting resting and heat pad.  Denies lower extremity pain.  Denies any recent falls or balance changes.  Denies bowel or bladder loss control.    *Post surgery neurosurgical evaluation with   Khushboo 2016 status post evacuation of spontaneous epidural abscess in the lumbar spine lumbar laminectomy with medical management of cervical epidural abscess.     -Treatment to Date: Decompressive lumbar laminectomy L1-L5 for evacuation of abscess 10/11/2015 Dr. Cuello.  3-month antibiotic course post surgery, patient then had a drug-eluting stent placed 4/16/2016    Bilateral L5-S1 TFESI 5/19/2023 with 100% relief with leg pain, no relief with back pain.  Preprocedure pain 8/10, post 5/10.     -Medications:  Gabapentin 300 mg    Patient Active Problem List   Diagnosis     Obesity     Hypercholesterolemia     Essential hypertension     Type 2 diabetes mellitus with hyperglycemia, with long-term current use of insulin (H)     Benign neoplasm of choroid     Paroxysmal atrial fibrillation (H)     Bilateral low back pain without sciatica, unspecified chronicity       Current Outpatient Medications   Medication     acetaminophen (TYLENOL) 500 MG tablet     gabapentin (NEURONTIN) 300 MG capsule     lidocaine (LIDODERM) 5 % patch     traMADol (ULTRAM) 50 MG tablet     warfarin ANTICOAGULANT (COUMADIN) 5 MG tablet     atorvastatin (LIPITOR) 80 MG tablet     busPIRone (BUSPAR) 15 MG tablet     Continuous Blood Gluc Sensor (DEXCOM G6 SENSOR) MISC     Continuous Blood Gluc Transmit (DEXCOM G6 TRANSMITTER) MISC     ezetimibe (ZETIA) 10 MG tablet     insulin glargine (LANTUS SOLOSTAR) 100 UNIT/ML pen     isosorbide mononitrate (IMDUR) 60 MG 24 hr tablet     losartan (COZAAR) 100 MG tablet     sertraline (ZOLOFT) 100 MG tablet     sotalol (BETAPACE) 80 MG tablet     No current facility-administered medications for this visit.     Facility-Administered Medications Ordered in Other Visits   Medication     dexamethasone PF (DECADRON) injection     iohexol (OMNIPAQUE) 300 mg/mL injection     lidocaine (PF) (XYLOCAINE) 1 % injection       Allergies   Allergen Reactions     Metformin GI Disturbance     Oxycodone         Past Medical History:   Diagnosis Date     Coronary artery disease      Diabetes mellitus (H)      Discitis of thoracolumbar region 10/10/2015     Encephalopathy 10/14/2015     Epidural abscess 10/10/2015     Hip pain, right      Hyperlipidemia      Sepsis (H) 10/8/2015     Stroke (H) 06/23/2015    Neurology felt that episode was C/W subacute ischemic stroke     TIA (transient ischemic attack) 6/23/2015     UTI (urinary tract infection)     admitted to [facility] with UTI        Review of Systems  ROS:  Specifically negative for bowel/bladder dysfunction, balance changes, headache, dizziness, foot drop, fevers, chills, appetite changes, nausea/vomiting, unexplained weight loss. Otherwise 13 systems reviewed are negative. Please see the patient's intake questionnaire from today for details.    Reviewed Social, Family, Past Medical and Past Surgical history with patient, no significant changes noted since prior visit.     Objective:     /60 (BP Location: Right arm, Patient Position: Sitting)   Pulse 70   SpO2 96%     PHYSICAL EXAMINATION:    --CONSTITUTIONAL: Well developed, well nourished, healthy appearing individual.  --PSYCHIATRIC: Appropriate mood and affect. No difficulty interacting due to temper, social withdrawal, or memory issues.  --SKIN: Lumbar region is dry and intact.   --RESPIRATORY: Normal rhythm and effort. No abnormal accessory muscle breathing patterns noted.   --MUSCULOSKELETAL:  Normal lumbar lordosis noted, no lateral shift.  --GROSS MOTOR: Easily arises from a seated position. Gait is non-antalgic  --LUMBAR SPINE:  Inspection reveals no evidence of deformity. Range of motion is not limited in lumbar flexion, increased pain with lumbar extension and lateral rotation simultaneously bilaterally.    RESULTS:   Imaging: Spine imaging was reviewed today. The images were shown to the patient and the findings were explained using a spine model.      XR Pelvis and Hip Left 2  Views  Result Date: 6/3/2022  EXAM: XR PELVIS AND HIP LEFT 2 VIEWS LOCATION: [facility] DATE/TIME: 6/3/2022 11:20 AM INDICATION: Fall, pain. COMPARISON: None.   IMPRESSION: Both hips are negative for fracture. Degenerative change both hip joints. Pelvis negative for fracture. Degenerative change at the SI joints and lower lumbar spine.             XR Knee Left 3 Views  Result Date: 6/3/2022  EXAM: XR KNEE LEFT 3 VIEWS LOCATION: [facility] DATE/TIME: 6/3/2022 11:30 AM INDICATION: fall, pain COMPARISON: None.   IMPRESSION: The left knee is negative for fracture. No compartmental narrowing or effusion.        XR LUMBAR SPINE 2-3 VIEWS  LOCATION: [facility]  DATE/TIME: 4/11/2022 1:42 PM  INDICATION:  Bilateral low back pain without sciatica, unspecified chronicity  COMPARISON: MRI 04/13/2016.  TECHNIQUE: CR Lumbar Spine.                                                            IMPRESSION: There is chronic grade 1 anterolisthesis of L4 over L5 and L5 over S1. This is secondary to significant facet hypertrophy at L4-L5 and L5-S1. There is chronic laminectomy from L2 through L5. There is slight narrowing of the disc space at   L2-L3. Small Schmorl's nodes are present at L1-L2 endplates. Overall appearance of these findings is unchanged compared to the MRI of 04/13/2016. Extensive vascular aortic calcification is visualized.

## 2023-06-13 NOTE — PATIENT INSTRUCTIONS
~Please call our [facility] Nurse Navigation line [phone] with any questions or concerns about your treatment plan, if symptoms worsen and you would like to be seen urgently, or if you have problems controlling bladder and bowel function.  ~Please note that any My Chart messages may take multiple days for a response due to the high volume of patients seen in clinic.   Anything sent Thursday night or after will be answered the following week when able, as Chula Garcia CNP does not work in clinic on Fridays.   ~Chula Garcia CNP is at the [facility] on the first and third Tuesdays of the month only, otherwise primarily at the [facility].        ~You have been referred for Physical Therapy to [facility]. They will call you to schedule an appointment.      Scheduling phone number is [phone] [facility] [place], [place], or [place] location.  If you have not heard from the scheduling office within 2 business days, please call [phone] for ALL other locations.    Discussed the importance of core strengthening, ROM, stretching exercises and how each of these entities is important in decreasing pain and improving long term spine health.  The purpose of physical therapy is to teach you an individualized home exercise program.  These exercises need to be performed every day in order to decrease pain and prevent future occurrences of pain.

## 2023-06-13 NOTE — LETTER
6/13/2023         RE: Kemi Soto  [address] [place] [id number]        Dear Colleague,    Thank you for referring your patient, Kemi Soto, to the [facility]. Please see a copy of my visit note below.      Assessment:     Diagnoses and all orders for this visit:  Chronic bilateral low back pain without sciatica  -     Pain Medial Branch Block Lumbar Two Lvls Blair; Future  Lumbar facet arthropathy  -     Pain Medial Branch Block Lumbar Two Lvls Blair; Future  Spondylolisthesis of lumbar region  Lumbar foraminal stenosis  Lumbar radiculopathy  History of lumbar surgery     Kemi Soto is a 73 year old y.o. female with past medical history significant for hypercholesterolemia, type 2 diabetes mellitus, hypertension, atrial fibrillation, history of lumbar surgery for evacuation of abscess who presents today for follow-up regarding:    -Chronic bilateral low back pain with lumbar radiculopathy with with improvement with leg pain but not back pain.  Patient does also have facet arthropathy with spondylolisthesis at L4-5 and L5-S1.     Plan:     A shared decision making plan was used. The patient's values and choices were respected. Prior medical records were reviewed today. The following represents what was discussed and decided upon by the provider and the patient.        -DIAGNOSTIC TESTS: Images were personally reviewed and interpreted.   --Lumbar spine x-ray 4/11/2022 with grade 1 spondylolisthesis 3.3 mm in neutral that increases to 8.6 mm in forward flexion.  L5-S1 8 mm spondylolisthesis that increases to 10.5 mm in forward flexion. Chronic laminectomy L2-L5.  Slight disc space narrowing at L2-3.  --Left hip x-ray 6/3/2022 negative for fracture, degenerative changes bilaterally.  Degenerative changes to bilateral SI joints also noted.  -- Bilateral upper extremity EMG with Dr. Kirk 2019 with no evidence of cervical radiculopathy.    -INTERVENTIONS: Ordered  bilateral L3, L4, L5 medial branch block to diagnostically determine how much of her back pain is facet mediated as she does have facet arthropathy at L4-5 and L5-S1 and ongoing back pain.  If she does have a positive response would recommend confirmatory block for radiofrequency ablation.  If no benefit with the test injection we could consider a bilateral L4-5 TFESI versus IL DIANNE  Down the road could consider spinal cord stimulator  Patient is not interested in surgery options at this time.    -MEDICATIONS: No changes in medications at this time  Discussed side effects of medications and proper use. Patient verbalized understanding.    -PHYSICAL THERAPY: Physical therapy referral placed 4/19/2023.  Discussed the importance of core strengthening, ROM, stretching exercises with the patient and how each of these entities is important in decreasing pain.  Explained to the patient that the purpose of physical therapy is to teach the patient a home exercise program.  These exercises need to be performed every day in order to decrease pain and prevent future occurrences of pain.        -PATIENT EDUCATION:  Total time of 32 minutes, on the day of service, spent with the patient, reviewing the chart, placing orders, and documenting.   -Today we also discussed the issues related to the current COVID-19 pandemic, the pros and cons of the current treatment plan, the CDC guidelines such as social distancing, washing the hands, and covering the cough.    -FOLLOW UP: Follow-up for injection test injection with Dr. Garsia  Advised to contact clinic if symptoms worsen or change.    Subjective:     Kemi Soto is a 73 year old female who presents today for follow-up regarding ongoing bilateral low back pain with previous leg pain which improved with the injection however her back pain unfortunately has not improved.  Currently her pain is still a 6/10 in the low back bilaterally, and 8 at its worst before its best.   Aggravated with prolonged standing and walking, does improve with sitting resting and heat pad.  Denies lower extremity pain.  Denies any recent falls or balance changes.  Denies bowel or bladder loss control.    *Post surgery neurosurgical evaluation with Dr. Cuello 2016 status post evacuation of spontaneous epidural abscess in the lumbar spine lumbar laminectomy with medical management of cervical epidural abscess.     -Treatment to Date: Decompressive lumbar laminectomy L1-L5 for evacuation of abscess 10/11/2015 Dr. Cuello.  3-month antibiotic course post surgery, patient then had a drug-eluting stent placed 4/16/2016    Bilateral L5-S1 TFESI 5/19/2023 with 100% relief with leg pain, no relief with back pain.  Preprocedure pain 8/10, post 5/10.     -Medications:  Gabapentin 300 mg    Patient Active Problem List   Diagnosis     Obesity     Hypercholesterolemia     Essential hypertension     Type 2 diabetes mellitus with hyperglycemia, with long-term current use of insulin (H)     Benign neoplasm of choroid     Paroxysmal atrial fibrillation (H)     Bilateral low back pain without sciatica, unspecified chronicity       Current Outpatient Medications   Medication     acetaminophen (TYLENOL) 500 MG tablet     gabapentin (NEURONTIN) 300 MG capsule     lidocaine (LIDODERM) 5 % patch     traMADol (ULTRAM) 50 MG tablet     warfarin ANTICOAGULANT (COUMADIN) 5 MG tablet     atorvastatin (LIPITOR) 80 MG tablet     busPIRone (BUSPAR) 15 MG tablet     Continuous Blood Gluc Sensor (DEXCOM G6 SENSOR) MISC     Continuous Blood Gluc Transmit (DEXCOM G6 TRANSMITTER) MISC     ezetimibe (ZETIA) 10 MG tablet     insulin glargine (LANTUS SOLOSTAR) 100 UNIT/ML pen     isosorbide mononitrate (IMDUR) 60 MG 24 hr tablet     losartan (COZAAR) 100 MG tablet     sertraline (ZOLOFT) 100 MG tablet     sotalol (BETAPACE) 80 MG tablet     No current facility-administered medications for this visit.     Facility-Administered  Medications Ordered in Other Visits   Medication     dexamethasone PF (DECADRON) injection     iohexol (OMNIPAQUE) 300 mg/mL injection     lidocaine (PF) (XYLOCAINE) 1 % injection       Allergies   Allergen Reactions     Metformin GI Disturbance     Oxycodone        Past Medical History:   Diagnosis Date     Coronary artery disease      Diabetes mellitus (H)      Discitis of thoracolumbar region 10/10/2015     Encephalopathy 10/14/2015     Epidural abscess 10/10/2015     Hip pain, right      Hyperlipidemia      Sepsis (H) 10/8/2015     Stroke (H) 06/23/2015    Neurology felt that episode was C/W subacute ischemic stroke     TIA (transient ischemic attack) 6/23/2015     UTI (urinary tract infection)     admitted to [facility] with UTI        Review of Systems  ROS:  Specifically negative for bowel/bladder dysfunction, balance changes, headache, dizziness, foot drop, fevers, chills, appetite changes, nausea/vomiting, unexplained weight loss. Otherwise 13 systems reviewed are negative. Please see the patient's intake questionnaire from today for details.    Reviewed Social, Family, Past Medical and Past Surgical history with patient, no significant changes noted since prior visit.     Objective:     /60 (BP Location: Right arm, Patient Position: Sitting)   Pulse 70   SpO2 96%     PHYSICAL EXAMINATION:    --CONSTITUTIONAL: Well developed, well nourished, healthy appearing individual.  --PSYCHIATRIC: Appropriate mood and affect. No difficulty interacting due to temper, social withdrawal, or memory issues.  --SKIN: Lumbar region is dry and intact.   --RESPIRATORY: Normal rhythm and effort. No abnormal accessory muscle breathing patterns noted.   --MUSCULOSKELETAL:  Normal lumbar lordosis noted, no lateral shift.  --GROSS MOTOR: Easily arises from a seated position. Gait is non-antalgic  --LUMBAR SPINE:  Inspection reveals no evidence of deformity. Range of motion is not limited in lumbar flexion, increased  pain with lumbar extension and lateral rotation simultaneously bilaterally.    RESULTS:   Imaging: Spine imaging was reviewed today. The images were shown to the patient and the findings were explained using a spine model.      XR Pelvis and Hip Left 2 Views  Result Date: 6/3/2022  EXAM: XR PELVIS AND HIP LEFT 2 VIEWS LOCATION: [facility] DATE/TIME: 6/3/2022 11:20 AM INDICATION: Fall, pain. COMPARISON: None.   IMPRESSION: Both hips are negative for fracture. Degenerative change both hip joints. Pelvis negative for fracture. Degenerative change at the SI joints and lower lumbar spine.             XR Knee Left 3 Views  Result Date: 6/3/2022  EXAM: XR KNEE LEFT 3 VIEWS LOCATION: [facility] DATE/TIME: 6/3/2022 11:30 AM INDICATION: fall, pain COMPARISON: None.   IMPRESSION: The left knee is negative for fracture. No compartmental narrowing or effusion.        XR LUMBAR SPINE 2-3 VIEWS  LOCATION: [facility]  DATE/TIME: 4/11/2022 1:42 PM  INDICATION:  Bilateral low back pain without sciatica, unspecified chronicity  COMPARISON: MRI 04/13/2016.  TECHNIQUE: CR Lumbar Spine.                                                            IMPRESSION: There is chronic grade 1 anterolisthesis of L4 over L5 and L5 over S1. This is secondary to significant facet hypertrophy at L4-L5 and L5-S1. There is chronic laminectomy from L2 through L5. There is slight narrowing of the disc space at   L2-L3. Small Schmorl's nodes are present at L1-L2 endplates. Overall appearance of these findings is unchanged compared to the MRI of 04/13/2016. Extensive vascular aortic calcification is visualized.                              Again, thank you for allowing me to participate in the care of your patient.        Sincerely,        Chula Garcia, CNP

## 2023-06-14 ENCOUNTER — TELEPHONE (OUTPATIENT)
Dept: FAMILY MEDICINE | Facility: CLINIC | Age: 73
End: 2023-06-14
Payer: COMMERCIAL

## 2023-06-14 NOTE — TELEPHONE ENCOUNTER
.Patient Quality Outreach    Patient is due for the following:   Diabetes -  Diabetic Follow-Up Visit after Aug 15th     Next Steps:   Schedule a office visit for DM check    Type of outreach:    Phone, left message for patient/parent to call back.      Questions for provider review:    None           Rhona Mathew, [facility]  Chart routed to Care Team.

## 2023-06-19 PROCEDURE — 93228 REMOTE 30 DAY ECG REV/REPORT: CPT | Performed by: INTERNAL MEDICINE

## 2023-06-20 ENCOUNTER — OFFICE VISIT (OUTPATIENT)
Dept: FAMILY MEDICINE | Facility: CLINIC | Age: 73
End: 2023-06-20
Payer: COMMERCIAL

## 2023-06-20 ENCOUNTER — TELEPHONE (OUTPATIENT)
Dept: FAMILY MEDICINE | Facility: CLINIC | Age: 73
End: 2023-06-20

## 2023-06-20 ENCOUNTER — ANTICOAGULATION THERAPY VISIT (OUTPATIENT)
Dept: ANTICOAGULATION | Facility: CLINIC | Age: 73
End: 2023-06-20

## 2023-06-20 VITALS
TEMPERATURE: 98.1 F | SYSTOLIC BLOOD PRESSURE: 128 MMHG | OXYGEN SATURATION: 97 % | WEIGHT: 176 LBS | HEART RATE: 60 BPM | BODY MASS INDEX: 31.18 KG/M2 | DIASTOLIC BLOOD PRESSURE: 62 MMHG | HEIGHT: 63 IN | RESPIRATION RATE: 22 BRPM

## 2023-06-20 DIAGNOSIS — E11.65 UNCONTROLLED TYPE 2 DIABETES MELLITUS WITH HYPERGLYCEMIA (H): ICD-10-CM

## 2023-06-20 DIAGNOSIS — I48.0 PAROXYSMAL ATRIAL FIBRILLATION (H): ICD-10-CM

## 2023-06-20 DIAGNOSIS — Z79.01 LONG TERM CURRENT USE OF ANTICOAGULANT THERAPY: ICD-10-CM

## 2023-06-20 DIAGNOSIS — E66.01 MORBID OBESITY (H): Primary | ICD-10-CM

## 2023-06-20 DIAGNOSIS — I48.0 PAROXYSMAL ATRIAL FIBRILLATION (H): Primary | ICD-10-CM

## 2023-06-20 DIAGNOSIS — Z23 IMMUNIZATION DUE: ICD-10-CM

## 2023-06-20 LAB — INR BLD: 3.8 (ref 0.9–1.1)

## 2023-06-20 PROCEDURE — 0124A COVID-19 BIVALENT 12+ (PFIZER): CPT | Performed by: FAMILY MEDICINE

## 2023-06-20 PROCEDURE — 99215 OFFICE O/P EST HI 40 MIN: CPT | Mod: 25 | Performed by: FAMILY MEDICINE

## 2023-06-20 PROCEDURE — 36416 COLLJ CAPILLARY BLOOD SPEC: CPT | Performed by: FAMILY MEDICINE

## 2023-06-20 PROCEDURE — 85610 PROTHROMBIN TIME: CPT | Performed by: FAMILY MEDICINE

## 2023-06-20 PROCEDURE — 91312 COVID-19 BIVALENT 12+ (PFIZER): CPT | Performed by: FAMILY MEDICINE

## 2023-06-20 ASSESSMENT — PAIN SCALES - GENERAL: PAINLEVEL: NO PAIN (0)

## 2023-06-20 NOTE — PROGRESS NOTES
ANTICOAGULATION MANAGEMENT     Kemi Soto 73 year old female is on warfarin with supratherapeutic INR result. (Goal INR 2.0-3.0)    Recent labs: (last 7 days)     06/20/23  1206   INR 3.8*       ASSESSMENT       Source(s): Chart Review and Patient/Caregiver Call       Warfarin doses taken: Warfarin taken as instructed - pt did not have time to confirm dose, but states she's been taking it correctly    Diet: No new diet changes identified    Medication/supplement changes: None noted    New illness, injury, or hospitalization: No    Signs or symptoms of bleeding or clotting: No    Previous result: Subtherapeutic    Additional findings: Phone call was rushed and patient asked that dosing be sent via [employer]a         PLAN     Recommended plan for no diet, medication or health factor changes affecting INR     Dosing Instructions: hold dose then decrease your warfarin dose (10% change) with next INR in 2 weeks       Summary  As of 6/20/2023    Full warfarin instructions:  6/20: Hold; Otherwise 5 mg every Sun, Thu; 2.5 mg all other days   Next INR check:  7/5/2023             Telephone call with Ciarra who verbalizes understanding and agrees to plan  Sent [employer]loola message with dosing and follow up instructions    Contact [phone] to schedule and with any changes, questions or concerns.     Education provided:     Please call back if any changes to your diet, medications or how you've been taking warfarin    Plan made per [facility] anticoagulation protocol    Melissa Hunt RN  Anticoagulation Clinic  6/20/2023    _______________________________________________________________________     Anticoagulation Episode Summary     Current INR goal:  2.0-3.0   TTR:  34.6 % (1 y)   Target end date:  Indefinite   Send INR reminders to:  [facility]    Indications    Paroxysmal atrial fibrillation (H) [I48.0]           Comments:           Anticoagulation Care Providers     Provider Role Specialty Phone number     Chuck Hernandez MD Referring Cardiovascular Disease [phone]

## 2023-06-20 NOTE — TELEPHONE ENCOUNTER
General Call      Reason for Call:  Anticoagulation     What are your questions or concerns:  INR results. Not available until after 6:00 pm this evening.     Date of last appointment with provider: 6/20    Could we send this information to you in [employer] or would you prefer to receive a phone call?:   Patient would prefer a phone call   Okay to leave a detailed message?: Yes at Home number on file [phone] (home)

## 2023-06-20 NOTE — TELEPHONE ENCOUNTER
Prior Authorization Request  Who s requesting:  covermymeds  Pharmacy Name and Location: [facility]  Medication Name: Trulicity 0.75  Insurance Plan: [facility] medicare  Insurance Member ID Number:  [id number]  CoverMyMeds Key: [id number]  Informed patient that prior authorizations can take up to 10 business days for response:   No  Okay to leave a detailed message:

## 2023-06-20 NOTE — PROGRESS NOTES
Morbid obesity (H)  Current BMI is 31.7.  Wants to lose weight by using medication.  Could consider a referral to Dr. Costello at the weight loss clinic in [place].    Uncontrolled type 2 diabetes mellitus with hyperglycemia (H)  Last A1c was 10.8%.  I am going to send a prescription for Trulicity and refer her to [facility] for education and to be taught how to use Trulicity and reestablish with the Dexcom.  We discussed the importance of following regularly for A1c etc.  - dulaglutide (TRULICITY) 0.75 MG/0.5ML pen  Dispense: 2 mL; Refill: 1  - AMB Adult Diabetes Educator Referral    Long term current use of anticoagulant therapy  On warfarin with last INR of 1.8 on 5/19.  Needs recheck of INR.    Paroxysmal atrial fibrillation (H)  I believe she is in NSR currently.  - INR point of care    Immunization due  Willing to have her second COVID-vaccine today.  - COVID-19 BIVALENT 12+ (PFIZER)    I stressed that she should really make sure to follow through with her diabetes education and to make a future appointment with Dr. Galvez in July or perhaps August.    43 minutes spent in review of the chart of this patient I have never met before, in the visit with patient and her daughter from [place], and in documentation of our visit all on day of service.    Keith Lafleur is a 73 year old, presenting for the following health issues:  Recheck Medication (Would like alternate medication for mounjaro for her diabetes)        6/20/2023    11:22 AM   Additional Questions   Roomed by Alisa VEGA CMA     History of Present Illness       Reason for visit:  Would like to discuss alternative medication of mounjaro    She eats 2-3 servings of fruits and vegetables daily.She consumes 0 sweetened beverage(s) daily.She exercises with enough effort to increase her heart rate 9 or less minutes per day.  She exercises with enough effort to increase her heart rate 3 or less days per week.   She is taking medications regularly.    This is a  "patient I have never met before.  She comes with her daughter from out of state.  She has gone through a number of different providers in our clinic and each has retired on her or left.  She is a poorly controlled type II diabetic.  She last saw Dr. Galvez for an annual wellness visit.  Prior to that she had her diabetes check in April and her A1c was 10.8%.  She comes to talk about her obesity as she wants to try to lose some weight by using diabetic meds.  Her daughter has lost weight on Mounjaro and Dr. Galvez put in an order for this medication but it was not covered at all by her insurance.  She was told to bring a list of what is covered in the GLP-1 category of medication.  We have a discussion about Ozempic versus Trulicity.  I suggested Trulicity be where they start.  I also highly recommended that she see the diabetic educator as she is not using a Dexcom even though she had attempted to a number of years back.  We discussed that it would be of great benefit for her and at first she declined but then agreed.  She also is anticoagulated for atrial fibrillation and needs an INR.  Her last few INR's were up and down.  Her most recent was on 5/19 and was a bit low at 1.8.  We also discussed getting the second booster of the bivalent COVID-vaccine.          Objective    /62 (BP Location: Right arm, Patient Position: Sitting, Cuff Size: Adult Regular)   Pulse 60   Temp 98.1  F (36.7  C) (Oral)   Resp 22   Ht 1.588 m (5' 2.5\")   Wt 79.8 kg (176 lb)   SpO2 97%   Breastfeeding No   BMI 31.68 kg/m    Body mass index is 31.68 kg/m .  Physical Exam   GENERAL: alert, no distress, obese and elderly  RESP: lungs clear to auscultation - no rales, rhonchi or wheezes  CV: regular rates and rhythm and without murmur  ABDOMEN: soft, nontender and bowel sounds normal  MS: no gross musculoskeletal defects noted, no edema          "

## 2023-06-22 ENCOUNTER — THERAPY VISIT (OUTPATIENT)
Dept: PHYSICAL THERAPY | Facility: REHABILITATION | Age: 73
End: 2023-06-22
Attending: NURSE PRACTITIONER
Payer: COMMERCIAL

## 2023-06-22 DIAGNOSIS — M48.061 LUMBAR FORAMINAL STENOSIS: ICD-10-CM

## 2023-06-22 DIAGNOSIS — G89.29 CHRONIC BILATERAL LOW BACK PAIN WITH LEFT-SIDED SCIATICA: Primary | ICD-10-CM

## 2023-06-22 DIAGNOSIS — M54.42 CHRONIC BILATERAL LOW BACK PAIN WITH LEFT-SIDED SCIATICA: Primary | ICD-10-CM

## 2023-06-22 DIAGNOSIS — Z98.890 HISTORY OF LUMBAR SURGERY: ICD-10-CM

## 2023-06-22 DIAGNOSIS — M54.16 LUMBAR RADICULOPATHY: ICD-10-CM

## 2023-06-22 DIAGNOSIS — M43.16 SPONDYLOLISTHESIS OF LUMBAR REGION: ICD-10-CM

## 2023-06-22 PROCEDURE — 97535 SELF CARE MNGMENT TRAINING: CPT | Mod: GP | Performed by: PHYSICAL THERAPIST

## 2023-06-22 PROCEDURE — 97110 THERAPEUTIC EXERCISES: CPT | Mod: GP | Performed by: PHYSICAL THERAPIST

## 2023-06-22 PROCEDURE — 97161 PT EVAL LOW COMPLEX 20 MIN: CPT | Mod: GP | Performed by: PHYSICAL THERAPIST

## 2023-06-22 NOTE — PROGRESS NOTES
"PHYSICAL THERAPY EVALUATION  Type of Visit: Evaluation    See electronic medical record for Abuse and Falls Screening details.    Subjective      Presenting condition or subjective complaint:    Date of onset: 06/22/22    Relevant medical history:     Dates & types of surgery:      Prior diagnostic imaging/testing results:       Prior therapy history for the same diagnosis, illness or injury:        6/13/2023 subjective:  \"73 year old female who presents today for follow-up regarding ongoing bilateral low back pain with previous leg pain which improved with the injection however her back pain unfortunately has not improved.  Currently her pain is still a 6/10 in the low back bilaterally, and 8 at its worst before its best.  Aggravated with prolonged standing and walking, does improve with sitting resting and heat pad.  Denies lower extremity pain.  Denies any recent falls or balance changes.  Denies bowel or bladder loss control.     *Post surgery neurosurgical evaluation with Dr. Cuello 2016 status post evacuation of spontaneous epidural abscess in the lumbar spine lumbar laminectomy with medical management of cervical epidural abscess.     -Treatment to Date: Decompressive lumbar laminectomy L1-L5 for evacuation of abscess 10/11/2015 Dr. Cuello.  3-month antibiotic course post surgery, patient then had a drug-eluting stent placed 4/16/2016\"    \"I had an injection recently and it actually feels worse. I don't want surgery. I was referred to PT for some strengthening. Symptoms are with standing, dishes, cooking. Pain is in lower back. I've had back issues for many years. Last year in the spring, I had a place in [place], and when I went to leave in March 2022 I lifted a suitcase which started a lot of back pain. I also had a fall in June 2022, went to ER and had MRI and was referred to spine center. With the fall, I came back from mailbox and it was early in the morning I could my sandals and landed " on left knee.   Prior Level of Function   Transfers: Independent  Ambulation: Independent, previously able to walk 1 mile without being limited due to back pain. Longest walk in the past 12 months was 25 minutes with wearing a pain patch.   ADL: Independent    Hobbies/Interests:  animal advocacy    Patient goals for therapy:  would like to get back to more walking, and walking her dogs. Would like to be able to walk at least 1 mile.        Objective   LUMBAR SPINE EVALUATION  PAIN: Pain is Exacerbated By: standing, walking >10 minutes  INTEGUMENTARY (edema, incisions): WNL  POSTURE: Sitting Posture: Lordosis decreased  GAIT:   Weightbearing Status: WBAT  Assistive Device(s): None  Gait Deviations: WNL  BALANCE/PROPRIOCEPTION: Single Leg Stance Eyes Open (seconds): o seconds bilaterally  WEIGHTBEARING ALIGNMENT: WNL  NON-WEIGHTBEARING ALIGNMENT: WNL   ROM: AROM WFL  PELVIC/SI SCREEN: WFL  STRENGTH:  WNL, except knee extension 5-/5 bilaterally, hip abduction 4-/5 bilaterally, hip extension 4-/5 bilaterally    MYOTOMES:   DTR S:    Left Right   C5 (Biceps)     C6 (Brachioradialis)     C7 (Triceps)     L4 (Quad) 2 2   S1 (Achilles) 2 2       6 meter walk test: 7.44 seconds  5x sit to stand: 17.53      Assessment & Plan   CLINICAL IMPRESSIONS   Medical Diagnosis: M54.42, G89.29 (ICD-10-CM) - Chronic bilateral low back pain with left-sided sciatica  M43.16 (ICD-10-CM) - Spondylolisthesis of lumbar region  M54.16 (ICD-10-CM) - Lumbar radiculopathy  M48.061 (ICD-10-CM) - Lumbar foraminal stenosis  Z98.890 (ICD-10-CM) - History of lumbar surgery    Treatment Diagnosis: generalized deconditioning   Impression/Assessment: Patient is a 73 year old female with low back pain complaints.  The following significant findings have been identified: Decreased strength, Impaired balance, Decreased proprioception, Impaired gait, Impaired muscle performance and Decreased activity tolerance. These impairments interfere with their ability  to perform self care tasks, recreational activities, household chores, household mobility and community mobility as compared to previous level of function.     Clinical Decision Making (Complexity):   Clinical Presentation: Stable/Uncomplicated  Clinical Presentation Rationale: based on medical and personal factors listed in PT evaluation  Clinical Decision Making (Complexity): Low complexity    PLAN OF CARE  Treatment Interventions:  Interventions: Gait Training, Manual Therapy, Neuromuscular Re-education, Therapeutic Activity, Therapeutic Exercise, Self-Care/Home Management    Long Term Goals     PT Goal 1  Goal Identifier: HEP  Goal Description: Patient will demonstrate >50% compliance with home exercise program to promote independence and progress towards  Target Date: 08/04/23  PT Goal 2  Goal Identifier: 5x sit to stand  Goal Description: Patient will improve 5 times sit to stand by 2.4 seconds to reflect significant improvements in functional lower extremity strength  Target Date: 09/15/23  PT Goal 3  Goal Identifier: 10 meter walk test  Goal Description: Patient will improve gait speed during 10 meter walk test by at least 0.2 meters per second compared to baseline to reflect significant improvements in gait speed and improved mobility  Target Date: 08/18/23      Frequency of Treatment: 1x per week  Duration of Treatment: 12 weeks    Recommended Referrals to Other Professionals:   Education Assessment:        Risks and benefits of evaluation/treatment have been explained.   Patient/Family/caregiver agrees with Plan of Care.     Evaluation Time:     PT Eval, Low Complexity Minutes (39259): 15      Signing Clinician: Jules Madrid PT      [facility]                                                                                   OUTPATIENT PHYSICAL THERAPY      PLAN OF TREATMENT FOR OUTPATIENT REHABILITATION   Patient's Last Name, First Name, MKemi Ruffin Date of  Birth:  1950   Provider's Name   [facility]   Medical Record No.  [id number]     Onset Date: 06/22/22  Start of Care Date: 06/22/23     Medical Diagnosis:  M54.42, G89.29 (ICD-10-CM) - Chronic bilateral low back pain with left-sided sciatica  M43.16 (ICD-10-CM) - Spondylolisthesis of lumbar region  M54.16 (ICD-10-CM) - Lumbar radiculopathy  M48.061 (ICD-10-CM) - Lumbar foraminal stenosis  Z98.890 (ICD-10-CM) - History of lumbar surgery      PT Treatment Diagnosis:  generalized deconditioning Plan of Treatment  Frequency/Duration: 1x per week/ 12 weeks    Certification date from 06/22/23 to 09/14/23         See note for plan of treatment details and functional goals     Jules Madrid, PT                         I CERTIFY THE NEED FOR THESE SERVICES FURNISHED UNDER        THIS PLAN OF TREATMENT AND WHILE UNDER MY CARE     (Physician attestation of this document indicates review and certification of the therapy plan).                  Referring Provider:  Chula Garcia      Initial Assessment  See Epic Evaluation- Start of Care Date: 06/22/23

## 2023-06-26 ENCOUNTER — TELEPHONE (OUTPATIENT)
Dept: PHYSICAL MEDICINE AND REHAB | Facility: CLINIC | Age: 73
End: 2023-06-26
Payer: COMMERCIAL

## 2023-06-26 DIAGNOSIS — M54.50 CHRONIC BILATERAL LOW BACK PAIN WITHOUT SCIATICA: Primary | ICD-10-CM

## 2023-06-26 DIAGNOSIS — M47.816 LUMBAR FACET ARTHROPATHY: ICD-10-CM

## 2023-06-26 DIAGNOSIS — G89.29 CHRONIC BILATERAL LOW BACK PAIN WITHOUT SCIATICA: Primary | ICD-10-CM

## 2023-06-26 DIAGNOSIS — M43.16 SPONDYLOLISTHESIS OF LUMBAR REGION: ICD-10-CM

## 2023-06-26 DIAGNOSIS — M54.16 LUMBAR RADICULOPATHY: ICD-10-CM

## 2023-06-26 DIAGNOSIS — Z98.890 HISTORY OF LUMBAR SURGERY: ICD-10-CM

## 2023-06-26 RX ORDER — LIDOCAINE 50 MG/G
1 PATCH TOPICAL EVERY 24 HOURS
Qty: 14 PATCH | Refills: 3 | Status: SHIPPED | OUTPATIENT
Start: 2023-06-26 | End: 2023-08-25

## 2023-06-26 NOTE — TELEPHONE ENCOUNTER
Central Prior Authorization Team   Phone: [phone]      PA Initiation    Medication: TRULICITY 0.75 MG/0.5ML SC SOPN  Insurance Company: Other (see comments)Comment:  Prime Medicare [phone]  Pharmacy Filling the Rx: [facility] [address] [facility] [address]  Filling Pharmacy Phone: [phone]  Filling Pharmacy Fax:    Start Date: 6/26/2023

## 2023-06-26 NOTE — TELEPHONE ENCOUNTER
Reason for call:  Medication   If this is a refill request, has the caller requested the refill from the pharmacy already? N/A  Will the patient be using a [facility]? No  Name of the pharmacy and phone number for the current request: [facility] [place] [phone]    Name of the medication requested: Lidocaine patches     Other request: Needs Prior Auth     Phone number to reach patient:  Other phone number:  [phone]    Best Time:  any  Can we leave a detailed message on this number?  Not Applicable    Travel screening: Not Applicable

## 2023-06-26 NOTE — TELEPHONE ENCOUNTER
Prior Authorization Approval    Medication: TRULICITY 0.75 MG/0.5ML SC SOPN  Authorization Effective Date: 3/28/2023  Authorization Expiration Date: 6/26/2024  Approved Dose/Quantity:   Reference #:     Insurance Company: Other (see comments)Comment:  Prime Medicare [phone]  Expected CoPay:       CoPay Card Available:      Financial Assistance Needed:   Which Pharmacy is filling the prescription: [facility] [address] [place] [address] [address] [place] [address]  Pharmacy Notified: Yes  Patient Notified: No  **Instructed pharmacy to notify patient when script is ready to /ship.**

## 2023-06-27 ENCOUNTER — PATIENT OUTREACH (OUTPATIENT)
Dept: CARE COORDINATION | Facility: CLINIC | Age: 73
End: 2023-06-27
Payer: COMMERCIAL

## 2023-06-27 NOTE — TELEPHONE ENCOUNTER
Prior Authorization Retail Medication Request    Medication/Dose: Lidocaine 5% patches   ICD code (if different than what is on RX):    Previously Tried and Failed:    Rationale:      Insurance Name:    Insurance ID:        Pharmacy Information (if different than what is on RX)  Name:  [facility]   Phone:  [phone]    [email or web address]  Key: [id number]

## 2023-06-27 NOTE — TELEPHONE ENCOUNTER
----- Message from Chula Garcia CNP sent at 6/26/2023  8:40 AM CDT -----  Regarding: RE: lidocaine patch  No problem, prescription was sent to the pharmacy.    Occasionally insurance has a tricky time covering these so we will see if were able to get coverage for her back pain.  If her insurance denies it, there is a 4% over-the-counter patch that sometimes can be just as helpful.  Thanks,  Chula    ----- Message -----  From: Jules Madrid, PT  Sent: 6/22/2023   2:30 PM CDT  To: Chula Garcia CNP  Subject: lidocaine patch                                  Wu Forbesly,     I met with Ciarra for PT evaluation. She mentioned having previous success using lidocaine patches for when going on extended walks. We discussed initiating a walking program, and if having lidocaine patches on hand might be helpful to initiate this. Would you be willing to place an order for lidocaine patches for Ciarra to have in case she wants/needs them for going on walks?    Thank you,     Jules Madrid, PT

## 2023-06-28 NOTE — TELEPHONE ENCOUNTER
Central Prior Authorization Team - Phone: [phone]     PA Initiation    Medication: LIDOCAINE 5 % EX PTCH  Insurance Company: [employer] - Phone [phone] Fax [phone]  Pharmacy Filling the Rx: [facility] [address] [facility] [address]  Filling Pharmacy Phone: [phone]  Filling Pharmacy Fax:    Start Date: 6/28/2023

## 2023-06-29 NOTE — TELEPHONE ENCOUNTER
Central Prior Authorization Team - Phone: [phone]     Prior Authorization Approval    Medication: LIDOCAINE 5 % EX PTCH  Authorization Effective Date: 3/30/2023  Authorization Expiration Date: 6/28/2024  Approved Dose/Quantity: 14  Reference #:     Insurance Company: [employer] - Phone [phone] Fax [phone]  Expected CoPay:       CoPay Card Available:      Financial Assistance Needed:   Which Pharmacy is filling the prescription: [facility] [address] [facility] [address]  Pharmacy Notified: Yes  Patient Notified: Yes pharmacy will notify when ready

## 2023-07-12 ENCOUNTER — MYC MEDICAL ADVICE (OUTPATIENT)
Dept: ANTICOAGULATION | Facility: CLINIC | Age: 73
End: 2023-07-12
Payer: COMMERCIAL

## 2023-07-13 ENCOUNTER — TELEPHONE (OUTPATIENT)
Dept: FAMILY MEDICINE | Facility: CLINIC | Age: 73
End: 2023-07-13

## 2023-07-13 NOTE — TELEPHONE ENCOUNTER
Nurse CELY    Situation:   Patient stated that her daughter wanted her to get tested for COVID-19 infection, which she did and it was negative yesterday 7/12/23. Patient stated her daughter wanted her to call since she is extremely tired today although her COVID test was negative.    Background:   Had COVID last time when she was from [place]  Diabetic pt  Was with her daughter and granddaughter from 7/8-7/1 in [place]. Daughter was sick, but have not tested for COVID. Patient believed her daughter said someone at work with her daughter had COVID-19 infection. Granddaughter was fine during the trip, but is now sick too and have not been tested for COVID-19 infection either.     Assessment:   Negative home COVID test yesterday.  Tuesday 7/11 was the last day she was with her daughter and granddaughter.  Raining on Monday and there were out and about at [employer].    Patient stated she got home around 10 PM on Tuesday and was feeling OK Tuesday and yesterday, but then was super extremely tired today where she had to cancel her PT appt for today.     Fever or chills- no  Cough- no  Shortness of breath or difficulty breathing- no  Chest pain- no  Fatigue- extremely tired today.  Muscle or body aches- yes, which is typical for her.  Headache- no  New loss of taste or smell- no   Sore throat- no  Congestion or runny nose- always have a runny nose  Nausea or vomiting- no;   Diarrhea- no    Recommendation:   Patient stated that other than just feel extremely tired today, she doesn't really have any other COVID symptoms. Will route to PCP to review and advise on next step as to when pt should retest again (if appropriate) or if pt should come in to clinic for COVID test instead of home test?    Paz Escalera, BALAJIN, RN  [facility]

## 2023-07-13 NOTE — TELEPHONE ENCOUNTER
Called and spoke with pt. Pt will keep an eye on how she is feeling and if she feels like things are not getting better then she will go to UC/WIC to be tested or evaluated.

## 2023-07-13 NOTE — TELEPHONE ENCOUNTER
Please inform pt that rest and push water. She does not need to repeat the home covid test since it was negative and she is getting better. Does not need to the clinic for the test. If her symptoms worse she will come to clinic or go to  to see provider.     Elaine Galvez MD on 7/13/2023 at 3:44 PM;

## 2023-07-20 ENCOUNTER — LAB (OUTPATIENT)
Dept: LAB | Facility: CLINIC | Age: 73
End: 2023-07-20
Payer: COMMERCIAL

## 2023-07-20 ENCOUNTER — ANTICOAGULATION THERAPY VISIT (OUTPATIENT)
Dept: ANTICOAGULATION | Facility: CLINIC | Age: 73
End: 2023-07-20

## 2023-07-20 ENCOUNTER — ALLIED HEALTH/NURSE VISIT (OUTPATIENT)
Dept: EDUCATION SERVICES | Facility: CLINIC | Age: 73
End: 2023-07-20
Payer: COMMERCIAL

## 2023-07-20 VITALS — WEIGHT: 173 LBS | BODY MASS INDEX: 31.14 KG/M2

## 2023-07-20 DIAGNOSIS — Z79.4 TYPE 2 DIABETES MELLITUS WITH DIABETIC POLYNEUROPATHY, WITH LONG-TERM CURRENT USE OF INSULIN (H): ICD-10-CM

## 2023-07-20 DIAGNOSIS — E11.42 TYPE 2 DIABETES MELLITUS WITH DIABETIC POLYNEUROPATHY, WITH LONG-TERM CURRENT USE OF INSULIN (H): ICD-10-CM

## 2023-07-20 DIAGNOSIS — I48.0 PAROXYSMAL ATRIAL FIBRILLATION (H): Primary | ICD-10-CM

## 2023-07-20 DIAGNOSIS — I48.0 PAROXYSMAL ATRIAL FIBRILLATION (H): ICD-10-CM

## 2023-07-20 DIAGNOSIS — E11.65 UNCONTROLLED TYPE 2 DIABETES MELLITUS WITH HYPERGLYCEMIA (H): ICD-10-CM

## 2023-07-20 LAB — INR BLD: 1.4 (ref 0.9–1.1)

## 2023-07-20 PROCEDURE — 36416 COLLJ CAPILLARY BLOOD SPEC: CPT

## 2023-07-20 PROCEDURE — 85610 PROTHROMBIN TIME: CPT

## 2023-07-20 PROCEDURE — G0108 DIAB MANAGE TRN  PER INDIV: HCPCS | Performed by: REGISTERED NURSE

## 2023-07-20 RX ORDER — PROCHLORPERAZINE 25 MG/1
1 SUPPOSITORY RECTAL
Qty: 9 EACH | Refills: 4 | Status: SHIPPED | OUTPATIENT
Start: 2023-07-20 | End: 2023-09-01

## 2023-07-20 NOTE — LETTER
7/20/2023         RE: Kemi Soto  [address] [facility] [address]        Dear Colleague,    Thank you for referring your patient, Kemi Soto, to the [facility]. Please see a copy of my visit note below.    Diabetes Self-Management Education & Support    Presents for: Personal CGM Start    Type of Service: In Person Visit    Assessment Type:   Sensor started today in clinic   CGM Initial Settings: Dexcom  Dexcom Initial Settings: Low Glucose Alarm: On (fixed at 55 mg/dL and can't be changed), High Glucose Alert, Low Glucose Alert, Out of Range Alert    Sensor was inserted with no resistance or bleeding at insertion site.    CGM-specific education:   Dexcom sensor: insertion technique, sensor site location and rotation, insulin administration in relation to sensor placement, Dexcom Mobile ayleen and Data sharing       ASSESSMENT:  Initial visit for Dexcom G6 sensor start using her iPhone.  Ciarra has been taking Trulicity 0.75 mg for 2 weeks with no negative side effects.  Discussed increasing Trulicity dose to 1.5 mg every 7 days in 2 weeks.  Instructed on possible side effects and decreasing low side effects.    Pt verbalized understanding of concepts discussed and recommendations provided today.    Continue education with the following diabetes management concepts: Taking Medication, Problem Solving and assistance making lifestyle changes, CGM interpretation    PLAN  See patient instructions.  See Care Plan for co-developed, patient-state behavior change goals.  AVS provided for patient today.    Education Materials Provided:  Dexcom G6 starter folder    SUBJECTIVE/OBJECTIVE:  Presents for: Personal CGM Start  Accompanied by: Self  Diabetes education in the past 24mo: Yes  Focus of Visit: CGM  Type of CGM visit: Personal CGM Start  Diabetes type: Type 2  Date of diagnosis: about 24 years ago  Disease course: Getting harder to manage  Diabetes management related  "comments/concerns: none  Transportation concerns: No  Difficulty affording diabetes medication?: No  Difficulty affording diabetes testing supplies?: No  Other concerns:: [place] (Hard of hearing), Glasses  Cultural Influences/Ethnic Background:  Not  or     Diabetes Symptoms & Complications:  Weight trend: Decreasing  Complications assessed today?: Yes  Autonomic neuropathy: No  CVA: No  Heart disease: Yes (A-fib)  Nephropathy: No  Peripheral neuropathy: No  Peripheral Vascular Disease: No  Retinopathy: No    Patient Problem List and Family Medical History reviewed for relevant medical history, current medical status, and diabetes risk factors.    Vitals:  Wt 78.5 kg (173 lb)   BMI 31.14 kg/m    Estimated body mass index is 31.14 kg/m  as calculated from the following:    Height as of 6/20/23: 1.588 m (5' 2.5\").    Weight as of this encounter: 78.5 kg (173 lb).   Last 3 BP:   BP Readings from Last 3 Encounters:   06/20/23 128/62   06/13/23 132/60   05/25/23 134/56       History   Smoking Status     Former     Types: Cigarettes     Quit date: 6/23/2007   Smokeless Tobacco     Never       Labs:  Lab Results   Component Value Date    A1C 10.8 04/12/2023     Lab Results   Component Value Date     05/19/2023     Lab Results   Component Value Date    LDL 56 04/12/2023    LDL 97 05/06/2019     Direct Measure HDL   Date Value Ref Range Status   04/12/2023 38 (L) >=50 mg/dL Final   ]  GFR Estimate   Date Value Ref Range Status   04/12/2023 61 >60 mL/min/1.73m2 Final     Comment:     eGFR calculated using 2021 CKD-EPI equation.   05/05/2021 59 (L) >60 mL/min/1.73m2 Final     GFR Estimate If Black   Date Value Ref Range Status   05/05/2021 >60 >60 mL/min/1.73m2 Final     Lab Results   Component Value Date    CR 0.97 04/12/2023     No results found for: MICROALBUMIN    Healthy Eating:  Healthy Eating Assessed Today: No  Meal planning/habits: Smaller portions, Avoiding sweets  Has patient met with a dietitian " in the past?: Yes    Being Active:  Being Active Assessed Today: No    Monitoring:  Monitoring Assessed Today: No  Did patient bring glucose meter to appointment? : No (Meter not working at home)  Blood Glucose Meter: CGM    Taking Medications:  Diabetes Medication(s)     Insulin       insulin glargine (LANTUS SOLOSTAR) 100 UNIT/ML pen    Inject 28 Units Subcutaneous 2 times daily INJECT 28 UNITS SUBCUTANEOUSLY TWICE DAILY    Incretin Mimetic Agents       dulaglutide (TRULICITY) 1.5 MG/0.5ML pen    Inject 1.5 mg Subcutaneous every 7 days          Taking Medication Assessed Today: Yes  Current Treatments: Diet, Insulin Injections, Oral Medication (taken by mouth), Non-insulin Injectables  Dose schedule: Pre-breakfast, At bedtime  Given by: Patient  Injection/Infusion sites: Abdomen  Problems taking diabetes medications regularly?: No  Diabetes medication side effects?: No    Problem Solving:  Problem Solving Assessed Today: Yes  Is the patient at risk for hypoglycemia?: Yes    Reducing Risks:  Reducing Risks Assessed Today: Yes  Diabetes Risks: Age over 45 years, Hyperlipidemia  CAD Risks: Diabetes Mellitus, Hypertension, Obesity, Post-menopausal, Dyslipidemia  Has dilated eye exam at least once a year?: Yes  Feet checked by healthcare provider in the last year?: Yes    Healthy Coping:  Healthy Coping Assessed Today: Yes  Emotional response to diabetes: Ready to learn  Stage of change: ACTION (Actively working towards change)  Patient Activation Measure Survey Score:       No data to display                Care Plan and Education Provided:  Care Plan: Diabetes   Updates made by Liv Contreras RD since 7/20/2023 12:00 AM      Problem: HbA1C Not In Goal       Goal: Establish Regular Follow-Ups with PCP       Task: Discuss with PCP the recommended timing for patient's next follow up visit(s)    Responsible User: Liv Contreras RD      Task: Discuss schedule for PCP visits with patient    Responsible User: Diane  Liv VENEGAS RD      Goal: Get HbA1C Level in Goal       Task: Educate patient on diabetes education self-management topics    Responsible User: Liv Contreras RD      Task: Educate patient on benefits of regular glucose monitoring    Responsible User: Liv Contreras RD      Task: Refer patient to appropriate extended care team member, as needed (Medication Therapy Management, Behavioral Health, Physical Therapy, etc.)    Responsible User: Liv Contreras RD      Task: Discuss diabetes treatment plan with patient    Responsible User: Liv Contreras RD      Problem: Diabetes Self-Management Education Needed to Optimize Self-Care Behaviors       Goal: Understand diabetes pathophysiology and disease progression       Task: Provide education on diabetes pathophysiology and disease progression specfic to patient's diabetes type    Responsible User: Liv Contreras RD      Goal: Healthy Eating - follow a healthy eating pattern for diabetes       Task: Provide education on portion control and consistency in amount, composition and timing of food intake    Responsible User: Liv Contreras RD      Task: Provide education on managing carbohydrate intake (carbohydrate counting, plate planning method, etc.)    Responsible User: Liv Contreras RD      Task: Provide education on weight management    Responsible User: Liv Contreras RD      Task: Provide education on heart healthy eating    Responsible User: Liv Contreras RD      Task: Provide education on eating out    Responsible User: Liv Contreras RD      Task: Develop individualized healthy eating plan with patient    Responsible User: Liv Contreras RD      Goal: Being Active - get regular physical activity, working up to at least 150 minutes per week       Task: Provide education on relationship of activity to glucose and precautions to take if at risk for low glucose    Responsible User: Liv Contreras RD      Task: Discuss barriers to physical activity  with patient    Responsible User: Liv Contreras RD      Task: Develop physical activity plan with patient    Responsible User: Liv Contreras RD      Task: Explore community resources including walking groups, assistance programs, and home videos    Responsible User: Liv Contreras RD      Goal: Monitoring - monitor glucose and ketones as directed       Task: Provide education on blood glucose monitoring (purpose, proper technique, frequency, glucose targets, interpreting results, when to use glucose control solution, sharps disposal)    Responsible User: Liv Contreras RD      Task: Provide education on continuous glucose monitoring (sensor placement, use of ayleen or /reader, understanding glucose trends, alerts and alarms, differences between sensor glucose and blood glucose) Completed 7/20/2023   Responsible User: Liv Contreras RD      Task: Provide education on ketone monitoring (when to monitor, frequency, etc.)    Responsible User: Liv Cotnreras RD      Goal: Taking Medication - patient is consistently taking medications as directed       Task: Provide education on action of prescribed medication, including when to take and possible side effects Completed 7/20/2023   Responsible User: Liv Contreras RD      Task: Provide education on insulin and injectable diabetes medications, including administration, storage, site selection and rotation for injection sites    Responsible User: Liv Contreras RD      Task: Discuss barriers to medication adherence with patient and provide management technique ideas as appropriate    Responsible User: Liv Contreras RD      Task: Provide education on frequency and refill details of medications    Responsible User: Liv Contreras RD      Goal: Problem Solving - know how to prevent and manage short-term diabetes complications       Task: Provide education on high blood glucose - causes, signs/symptoms, prevention and treatment    Responsible User:  Liv Contreras RD      Task: Provide education on low blood glucose - causes, signs/symptoms, prevention, treatment, carrying a carbohydrate source at all times, and medical identification    Responsible User: Liv Contreras RD      Task: Provide education on safe travel with diabetes    Responsible User: Liv Contreras RD      Task: Provide education on how to care for diabetes on sick days    Responsible User: Liv Contreras RD      Task: Provide education on when to call a health care provider    Responsible User: Liv Contreras RD      Goal: Reducing Risks - know how to prevent and treat long-term diabetes complications       Task: Provide education on major complications of diabetes, prevention, early diagnostic measures and treatment of complications    Responsible User: Liv Contreras RD      Task: Provide education on recommended care for dental, eye and foot health    Responsible User: Liv Contreras RD      Task: Provide education on Hemoglobin A1c - goals and relationship to blood glucose levels    Responsible User: Liv Contreras RD      Task: Provide education on recommendations for heart health - lipid levels and goals, blood pressure and goals, and aspirin therapy, if indicated    Responsible User: Liv Contreras RD      Task: Provide education on tobacco cessation    Responsible User: Liv Contreras RD      Goal: Healthy Coping - use available resources to cope with the challenges of managing diabetes       Task: Discuss recognizing feelings about having diabetes    Responsible User: Liv Contreras RD      Task: Provide education on the benefits of making appropriate lifestyle changes    Responsible User: Liv Contreras RD      Task: Provide education on benefits of utilizing support systems    Responsible User: Liv Contreras RD      Task: Discuss methods for coping with stress    Responsible User: Liv Contreras RD      Task: Provide education on when to seek  professional counseling    Responsible User: Liv Contreras RD Joy Smetanka RD, LD, [facility]   Certified Diabetes Care and   [facility]     Time Spent: 60 minutes  Encounter Type: Individual    Any diabetes medication dose changes were made via the CDE Protocol per the patient's primary care provider. A copy of this encounter was shared with the provider.

## 2023-07-20 NOTE — PATIENT INSTRUCTIONS
Finish supply of Trulicity 0.75 mg every 7 days.  Increase Trulicity to 1.5 mg every 7 days in about 2 weeks.  Call Diabetes care if your Dexcom G6 sensor prescription is expensive at [facility]. You may need to order from a [employer] order supply company.    [phone] for questions or to schedule diabetes appointments.      Dexcom G6 Instructions    1.  The Dexcom G6 has a two hour warm up after you place the sensor is the warmup period (black out period).  You will need to carry your blood glucose meter and check blood glucose during this time.     2.  Check blood sugar if feeling symptoms of hypoglycemia but meter is not displaying a low number- may be some variability between sensor and meter at times.     3.  Change sensor every 10 days. The transmitter (grey) is used for 90 days.     4.  Read blood sugars every 8 hours to ensure entirety of data is available and keep phone/reader near you.     Tip: Check blood sugars before each meal and at bedtime.     5.  Watch trend arrows- will let you know if blood sugars are rising, falling or stable at the time you check.     6.  Dexcom G6 has alarms. You can adjust both the high and low blood glucose alarm (when they will go off) or turn off high blood glucose alarm if alarming too much.     You cannot turn off the critical low blood glucose alarm     7.  You can shower, bathe, and swim with sensor/transmitter on. Do not get reader wet.     8.  Remove the sensor/transmitter if you need to have an MRI or CT scan.     9.  If you need extra adhesive, you can ask Dexcom support for them free of charge.  If you have trouble with sensor falling off, these are approved products to help with sensor adhesion: Torbot Skin Tac, SKIN-PREP Protective Barrier Wipe and Mastisol Liquid Adhesive     10. Once you place a new sensor you will click the grey transmitter into it.  You can use the application on your phone to start the sensor or the . The Dexcom clarity ayleen allows you to  share data with your provider. You cannot share data via the  unless manually downloaded to clarity.     Support:   If you have any trouble with use or if the sensor falls off early, call the customer service number for Dexcom located on the sensor's box. They can often help troubleshoot or replace sensor/transmitter if needed.     Dexcom Customer Service/Support: [phone]     Dexcom CARE: [phone]   [email or web address]

## 2023-07-20 NOTE — PROGRESS NOTES
ANTICOAGULATION MANAGEMENT     Kemi Soto 73 year old female is on warfarin with subtherapeutic INR result. (Goal INR 2.0-3.0)    Recent labs: (last 7 days)     07/20/23  1351   INR 1.4*       ASSESSMENT       Source(s): Chart Review and Patient/Caregiver Call       Warfarin doses taken: Warfarin taken as instructed - she does not think she missed any doses    Diet: No new diet changes identified    Medication/supplement changes: None noted    New illness, injury, or hospitalization: Yes: had cold symptoms last week    Signs or symptoms of bleeding or clotting: No    Previous result: Supratherapeutic    Additional findings: None and INRs have been labile and patient is overdue.          PLAN     Recommended plan for temporary change(s) affecting INR     Dosing Instructions: booster dose then Increase your warfarin dose (11.1% change) with next INR in 1 week       Summary  As of 7/20/2023    Full warfarin instructions:  7/20: 7.5 mg; Otherwise 5 mg every Sun, Tue, Thu; 2.5 mg all other days   Next INR check:  7/27/2023             Telephone call with Ciarra who agrees to plan and repeated back plan correctly    Lab visit scheduled    Education provided:     Goal range and lab monitoring: goal range and significance of current result and Importance of following up at instructed interval    Plan made per [facility] anticoagulation protocol    Melissa Hunt RN  Anticoagulation Clinic  7/20/2023    _______________________________________________________________________     Anticoagulation Episode Summary     Current INR goal:  2.0-3.0   TTR:  35.5 % (1 y)   Target end date:  Indefinite   Send INR reminders to:  [facility]    Indications    Paroxysmal atrial fibrillation (H) [I48.0]           Comments:           Anticoagulation Care Providers     Provider Role Specialty Phone number    Chuck Hernandez MD Referring Cardiovascular Disease [phone]

## 2023-07-20 NOTE — PROGRESS NOTES
Diabetes Self-Management Education & Support    Presents for: Personal CGM Start    Type of Service: In Person Visit    Assessment Type:   Sensor started today in clinic   CGM Initial Settings: Dexcom  Dexcom Initial Settings: Low Glucose Alarm: On (fixed at 55 mg/dL and can't be changed), High Glucose Alert, Low Glucose Alert, Out of Range Alert    Sensor was inserted with no resistance or bleeding at insertion site.    CGM-specific education:   Dexcom sensor: insertion technique, sensor site location and rotation, insulin administration in relation to sensor placement, Dexcom Mobile ayleen and Data sharing       ASSESSMENT:  Initial visit for Dexcom G6 sensor start using her iPhone.  Ciarra has been taking Trulicity 0.75 mg for 2 weeks with no negative side effects.  Discussed increasing Trulicity dose to 1.5 mg every 7 days in 2 weeks.  Instructed on possible side effects and decreasing low side effects.    Pt verbalized understanding of concepts discussed and recommendations provided today.    Continue education with the following diabetes management concepts: Taking Medication, Problem Solving and assistance making lifestyle changes, CGM interpretation    PLAN  See patient instructions.  See Care Plan for co-developed, patient-state behavior change goals.  AVS provided for patient today.    Education Materials Provided:  Dexcom G6 starter folder    SUBJECTIVE/OBJECTIVE:  Presents for: Personal CGM Start  Accompanied by: Self  Diabetes education in the past 24mo: Yes  Focus of Visit: CGM  Type of CGM visit: Personal CGM Start  Diabetes type: Type 2  Date of diagnosis: about 24 years ago  Disease course: Getting harder to manage  Diabetes management related comments/concerns: none  Transportation concerns: No  Difficulty affording diabetes medication?: No  Difficulty affording diabetes testing supplies?: No  Other concerns:: [place] (Hard of hearing), Glasses  Cultural Influences/Ethnic Background:  Not  or  "    Diabetes Symptoms & Complications:  Weight trend: Decreasing  Complications assessed today?: Yes  Autonomic neuropathy: No  CVA: No  Heart disease: Yes (A-fib)  Nephropathy: No  Peripheral neuropathy: No  Peripheral Vascular Disease: No  Retinopathy: No    Patient Problem List and Family Medical History reviewed for relevant medical history, current medical status, and diabetes risk factors.    Vitals:  Wt 78.5 kg (173 lb)   BMI 31.14 kg/m    Estimated body mass index is 31.14 kg/m  as calculated from the following:    Height as of 6/20/23: 1.588 m (5' 2.5\").    Weight as of this encounter: 78.5 kg (173 lb).   Last 3 BP:   BP Readings from Last 3 Encounters:   06/20/23 128/62   06/13/23 132/60   05/25/23 134/56       History   Smoking Status     Former     Types: Cigarettes     Quit date: 6/23/2007   Smokeless Tobacco     Never       Labs:  Lab Results   Component Value Date    A1C 10.8 04/12/2023     Lab Results   Component Value Date     05/19/2023     Lab Results   Component Value Date    LDL 56 04/12/2023    LDL 97 05/06/2019     Direct Measure HDL   Date Value Ref Range Status   04/12/2023 38 (L) >=50 mg/dL Final   ]  GFR Estimate   Date Value Ref Range Status   04/12/2023 61 >60 mL/min/1.73m2 Final     Comment:     eGFR calculated using 2021 CKD-EPI equation.   05/05/2021 59 (L) >60 mL/min/1.73m2 Final     GFR Estimate If Black   Date Value Ref Range Status   05/05/2021 >60 >60 mL/min/1.73m2 Final     Lab Results   Component Value Date    CR 0.97 04/12/2023     No results found for: MICROALBUMIN    Healthy Eating:  Healthy Eating Assessed Today: No  Meal planning/habits: Smaller portions, Avoiding sweets  Has patient met with a dietitian in the past?: Yes    Being Active:  Being Active Assessed Today: No    Monitoring:  Monitoring Assessed Today: No  Did patient bring glucose meter to appointment? : No (Meter not working at home)  Blood Glucose Meter: CGM    Taking Medications:  Diabetes " Medication(s)     Insulin       insulin glargine (LANTUS SOLOSTAR) 100 UNIT/ML pen    Inject 28 Units Subcutaneous 2 times daily INJECT 28 UNITS SUBCUTANEOUSLY TWICE DAILY    Incretin Mimetic Agents       dulaglutide (TRULICITY) 1.5 MG/0.5ML pen    Inject 1.5 mg Subcutaneous every 7 days          Taking Medication Assessed Today: Yes  Current Treatments: Diet, Insulin Injections, Oral Medication (taken by mouth), Non-insulin Injectables  Dose schedule: Pre-breakfast, At bedtime  Given by: Patient  Injection/Infusion sites: Abdomen  Problems taking diabetes medications regularly?: No  Diabetes medication side effects?: No    Problem Solving:  Problem Solving Assessed Today: Yes  Is the patient at risk for hypoglycemia?: Yes    Reducing Risks:  Reducing Risks Assessed Today: Yes  Diabetes Risks: Age over 45 years, Hyperlipidemia  CAD Risks: Diabetes Mellitus, Hypertension, Obesity, Post-menopausal, Dyslipidemia  Has dilated eye exam at least once a year?: Yes  Feet checked by healthcare provider in the last year?: Yes    Healthy Coping:  Healthy Coping Assessed Today: Yes  Emotional response to diabetes: Ready to learn  Stage of change: ACTION (Actively working towards change)  Patient Activation Measure Survey Score:       No data to display                Care Plan and Education Provided:  Care Plan: Diabetes   Updates made by Liv Contreras RD since 7/20/2023 12:00 AM      Problem: HbA1C Not In Goal       Goal: Establish Regular Follow-Ups with PCP       Task: Discuss with PCP the recommended timing for patient's next follow up visit(s)    Responsible User: Liv Contreras RD      Task: Discuss schedule for PCP visits with patient    Responsible User: Liv Contreras RD      Goal: Get HbA1C Level in Goal       Task: Educate patient on diabetes education self-management topics    Responsible User: Liv Contreras RD      Task: Educate patient on benefits of regular glucose monitoring    Responsible User:  Liv Contreras RD      Task: Refer patient to appropriate extended care team member, as needed (Medication Therapy Management, Behavioral Health, Physical Therapy, etc.)    Responsible User: Liv Contreras RD      Task: Discuss diabetes treatment plan with patient    Responsible User: Liv Contreras RD      Problem: Diabetes Self-Management Education Needed to Optimize Self-Care Behaviors       Goal: Understand diabetes pathophysiology and disease progression       Task: Provide education on diabetes pathophysiology and disease progression specfic to patient's diabetes type    Responsible User: Liv Contreras RD      Goal: Healthy Eating - follow a healthy eating pattern for diabetes       Task: Provide education on portion control and consistency in amount, composition and timing of food intake    Responsible User: Liv Contreras RD      Task: Provide education on managing carbohydrate intake (carbohydrate counting, plate planning method, etc.)    Responsible User: Liv Contreras RD      Task: Provide education on weight management    Responsible User: Liv Contreras RD      Task: Provide education on heart healthy eating    Responsible User: Liv Contreras RD      Task: Provide education on eating out    Responsible User: Liv Contreras RD      Task: Develop individualized healthy eating plan with patient    Responsible User: Liv Contreras RD      Goal: Being Active - get regular physical activity, working up to at least 150 minutes per week       Task: Provide education on relationship of activity to glucose and precautions to take if at risk for low glucose    Responsible User: Liv Contreras RD      Task: Discuss barriers to physical activity with patient    Responsible User: Liv Contreras RD      Task: Develop physical activity plan with patient    Responsible User: Liv Contreras RD      Task: Explore community resources including walking groups, assistance programs, and home  videos    Responsible User: Liv Contreras RD      Goal: Monitoring - monitor glucose and ketones as directed       Task: Provide education on blood glucose monitoring (purpose, proper technique, frequency, glucose targets, interpreting results, when to use glucose control solution, sharps disposal)    Responsible User: Liv Contreras RD      Task: Provide education on continuous glucose monitoring (sensor placement, use of ayleen or /reader, understanding glucose trends, alerts and alarms, differences between sensor glucose and blood glucose) Completed 7/20/2023   Responsible User: Liv Contreras RD      Task: Provide education on ketone monitoring (when to monitor, frequency, etc.)    Responsible User: Liv Contreras RD      Goal: Taking Medication - patient is consistently taking medications as directed       Task: Provide education on action of prescribed medication, including when to take and possible side effects Completed 7/20/2023   Responsible User: Liv Contreras RD      Task: Provide education on insulin and injectable diabetes medications, including administration, storage, site selection and rotation for injection sites    Responsible User: Liv Contreras RD      Task: Discuss barriers to medication adherence with patient and provide management technique ideas as appropriate    Responsible User: Liv Contreras RD      Task: Provide education on frequency and refill details of medications    Responsible User: Liv Contreras RD      Goal: Problem Solving - know how to prevent and manage short-term diabetes complications       Task: Provide education on high blood glucose - causes, signs/symptoms, prevention and treatment    Responsible User: Liv Contreras RD      Task: Provide education on low blood glucose - causes, signs/symptoms, prevention, treatment, carrying a carbohydrate source at all times, and medical identification    Responsible User: Liv Contreras RD      Task:  Provide education on safe travel with diabetes    Responsible User: Liv Contreras RD      Task: Provide education on how to care for diabetes on sick days    Responsible User: Liv Contreras RD      Task: Provide education on when to call a health care provider    Responsible User: Liv Contreras RD      Goal: Reducing Risks - know how to prevent and treat long-term diabetes complications       Task: Provide education on major complications of diabetes, prevention, early diagnostic measures and treatment of complications    Responsible User: Liv Contreras RD      Task: Provide education on recommended care for dental, eye and foot health    Responsible User: Liv Contreras RD      Task: Provide education on Hemoglobin A1c - goals and relationship to blood glucose levels    Responsible User: Liv Contreras RD      Task: Provide education on recommendations for heart health - lipid levels and goals, blood pressure and goals, and aspirin therapy, if indicated    Responsible User: Liv Contreras RD      Task: Provide education on tobacco cessation    Responsible User: Liv Contreras RD      Goal: Healthy Coping - use available resources to cope with the challenges of managing diabetes       Task: Discuss recognizing feelings about having diabetes    Responsible User: Liv Contreras RD      Task: Provide education on the benefits of making appropriate lifestyle changes    Responsible User: Liv Contreras RD      Task: Provide education on benefits of utilizing support systems    Responsible User: Liv Contreras RD      Task: Discuss methods for coping with stress    Responsible User: Liv Contreras RD      Task: Provide education on when to seek professional counseling    Responsible User: Liv Contreras RD Joy Smetanka RD, LD, [facility]   Certified Diabetes Care and   [facility]     Time Spent: 60 minutes  Encounter Type:  Individual    Any diabetes medication dose changes were made via the CDE Protocol per the patient's primary care provider. A copy of this encounter was shared with the provider.

## 2023-07-25 ENCOUNTER — PATIENT OUTREACH (OUTPATIENT)
Dept: CARE COORDINATION | Facility: CLINIC | Age: 73
End: 2023-07-25
Payer: COMMERCIAL

## 2023-07-27 ENCOUNTER — ANTICOAGULATION THERAPY VISIT (OUTPATIENT)
Dept: ANTICOAGULATION | Facility: CLINIC | Age: 73
End: 2023-07-27

## 2023-07-27 ENCOUNTER — LAB (OUTPATIENT)
Dept: LAB | Facility: CLINIC | Age: 73
End: 2023-07-27
Payer: COMMERCIAL

## 2023-07-27 DIAGNOSIS — I48.0 PAROXYSMAL ATRIAL FIBRILLATION (H): ICD-10-CM

## 2023-07-27 DIAGNOSIS — I48.0 PAROXYSMAL ATRIAL FIBRILLATION (H): Primary | ICD-10-CM

## 2023-07-27 LAB — INR BLD: 1.8 (ref 0.9–1.1)

## 2023-07-27 PROCEDURE — 36416 COLLJ CAPILLARY BLOOD SPEC: CPT

## 2023-07-27 PROCEDURE — 85610 PROTHROMBIN TIME: CPT

## 2023-07-27 NOTE — PROGRESS NOTES
ANTICOAGULATION MANAGEMENT     Kemi Soto 73 year old female is on warfarin with subtherapeutic INR result. (Goal INR 2.0-3.0)    Recent labs: (last 7 days)     07/27/23  1332   INR 1.8*       ASSESSMENT     Source(s): Chart Review and Patient/Caregiver Call     Warfarin doses taken: Warfarin taken as instructed  Diet: No new diet changes identified  Medication/supplement changes: None noted  New illness, injury, or hospitalization: No  Signs or symptoms of bleeding or clotting: No  Previous result: Subtherapeutic  Additional findings: None and INR trending up nicely from 1.4 last week.       PLAN     Recommended plan for no diet, medication or health factor changes affecting INR     Dosing Instructions: Continue your current warfarin dose with next INR in 2 weeks       Summary  As of 7/27/2023      Full warfarin instructions:  5 mg every Sun, Tue, Thu; 2.5 mg all other days   Next INR check:  8/10/2023               Telephone call with Ciarra who verbalizes understanding and agrees to plan    Lab visit scheduled    Education provided:   Goal range and lab monitoring: goal range and significance of current result and Importance of following up at instructed interval    Plan made per [facility] anticoagulation protocol    Melissa Hunt RN  Anticoagulation Clinic  7/27/2023    _______________________________________________________________________     Anticoagulation Episode Summary       Current INR goal:  2.0-3.0   TTR:  35.2 % (1 y)   Target end date:  Indefinite   Send INR reminders to:  [facility]    Indications    Paroxysmal atrial fibrillation (H) [I48.0]             Comments:               Anticoagulation Care Providers       Provider Role Specialty Phone number    Chuck Hernandez MD Referring Cardiovascular Disease [phone]

## 2023-08-02 ENCOUNTER — TELEPHONE (OUTPATIENT)
Dept: EDUCATION SERVICES | Facility: CLINIC | Age: 73
End: 2023-08-02
Payer: COMMERCIAL

## 2023-08-02 NOTE — TELEPHONE ENCOUNTER
Pt tried to put a new applicator in the CGM and it didn't work. Would like to speak with someone. Doesn't see Liv till next Thurs. Please call her [phone].

## 2023-08-02 NOTE — TELEPHONE ENCOUNTER
Spoke with patient.  Having trouble getting sensors to work.  Sensor and transmitter are a year old.  She is not sure if she connected sensor and phone the right way.  Will have Dexcom support contact her.

## 2023-08-10 ENCOUNTER — ALLIED HEALTH/NURSE VISIT (OUTPATIENT)
Dept: EDUCATION SERVICES | Facility: CLINIC | Age: 73
End: 2023-08-10
Payer: COMMERCIAL

## 2023-08-10 ENCOUNTER — ANTICOAGULATION THERAPY VISIT (OUTPATIENT)
Dept: ANTICOAGULATION | Facility: CLINIC | Age: 73
End: 2023-08-10

## 2023-08-10 ENCOUNTER — LAB (OUTPATIENT)
Dept: LAB | Facility: CLINIC | Age: 73
End: 2023-08-10
Payer: COMMERCIAL

## 2023-08-10 DIAGNOSIS — I48.0 PAROXYSMAL ATRIAL FIBRILLATION (H): ICD-10-CM

## 2023-08-10 DIAGNOSIS — Z79.4 TYPE 2 DIABETES MELLITUS WITH DIABETIC POLYNEUROPATHY, WITH LONG-TERM CURRENT USE OF INSULIN (H): Primary | ICD-10-CM

## 2023-08-10 DIAGNOSIS — E11.42 TYPE 2 DIABETES MELLITUS WITH DIABETIC POLYNEUROPATHY, WITH LONG-TERM CURRENT USE OF INSULIN (H): Primary | ICD-10-CM

## 2023-08-10 LAB — INR BLD: 2.3 (ref 0.9–1.1)

## 2023-08-10 PROCEDURE — G0108 DIAB MANAGE TRN  PER INDIV: HCPCS | Performed by: REGISTERED NURSE

## 2023-08-10 PROCEDURE — 36416 COLLJ CAPILLARY BLOOD SPEC: CPT

## 2023-08-10 PROCEDURE — 85610 PROTHROMBIN TIME: CPT

## 2023-08-10 NOTE — Clinical Note
8/10/2023         RE: Kemi Soto  [address] [facility] [address]        Dear Colleague,    Thank you for referring your patient, Kemi Soto, to the [facility]. Please see a copy of my visit note below.    Diabetes Self-Management Education & Support  Type of Service: In Person Visit    ASSESSMENT:  Follow-up visit for Dexcom G6 interpretation. Ciarra is struggling to use her Dexcom G6 properly.  The last time she change the sensor she did not change the 4 digits sensor code.  This results in inaccurate blood sugar numbers.  The sensor sent in alert to calibrate with a fingerstick.  She entered a blood sugar from the sensor instead of doing a fingerstick.  Reviewed how to get the transmitter out of the sensor.  Attempted to enable data sharing on Dexcom clarity.  Patient cannot remember her password.  When we tried to make a new password she could not find the link on her Gmail. Ciarra met with the Dexcom rep in the past to assist with her sensor.  Patient has difficulty understanding/remembering instructions and staying organized.    Patient's most recent   Lab Results   Component Value Date    A1C 10.8 04/12/2023     is not meeting goal of <8.0    Diabetes knowledge and skills assessment:   Patient is knowledgeable in diabetes management concepts related to: Taking Medication    Continue education with the following diabetes management concepts: Healthy Eating, Being Active, Monitoring, Problem Solving, Reducing Risks, and CGM interpretation    Based on learning assessment above, most appropriate setting for further diabetes education would be: Individual setting.      PLAN  Either add the Dexcom clarity ayleen to your phone or use a computer to access Dexcom clarity.  Find your Dexcom clarity password or make a new one.  Call the Dexcom customer service line for assistance as needed. [phone]  4.   Change the sensor every 10 days and remember to change the 4 digit  "code.  Change the transmitter after 90 days. Change the code on the transmitter box when prompted.  Calibration is only required if a sensor code is not entered. Use a fingerstick blood sugar to calibrate. This will improve accuracy of your data.  Continue Trulicity 1.5 mg every 7 days.  Continue Lantus 28 units twice per day.    Call diabetes care to schedule a follow-up to interpret Dexcom data and adjust medications. ^  [phone]    See Care Plan for co-developed, patient-state behavior change goals.  AVS provided for patient today.    Education Materials Provided:  No new materials provided today    SUBJECTIVE/OBJECTIVE:  Presents for: Follow-up  Accompanied by: Self  Diabetes education in the past 24mo: Yes  Focus of Visit: CGM, Problem Solving  Type of CGM visit: Personal CGM Follow-up  Diabetes type: Type 2  Date of diagnosis: about 24 years ago  Disease course: Getting harder to manage  Diabetes management related comments/concerns: Having difficulty with the Dexcom sensor and phone  Transportation concerns: No  Difficulty affording diabetes medication?: No  Difficulty affording diabetes testing supplies?: No  Other concerns:: [place] (Hard of hearing), Glasses, Cognitive impairment  Cultural Influences/Ethnic Background:  Not  or     Diabetes Symptoms & Complications:     Complications assessed today?: Yes  Autonomic neuropathy: No  CVA: No  Heart disease: Yes (A-fib)  Nephropathy: No  Peripheral neuropathy: No  Peripheral Vascular Disease: No  Retinopathy: No    Patient Problem List and Family Medical History reviewed for relevant medical history, current medical status, and diabetes risk factors.    Vitals:  There were no vitals taken for this visit.  Estimated body mass index is 31.14 kg/m  as calculated from the following:    Height as of 6/20/23: 1.588 m (5' 2.5\").    Weight as of 7/20/23: 78.5 kg (173 lb).   Last 3 BP:   BP Readings from Last 3 Encounters:   06/20/23 128/62   06/13/23 " 132/60   05/25/23 134/56       History   Smoking Status    Former    Types: Cigarettes    Quit date: 6/23/2007   Smokeless Tobacco    Never       Labs:  Lab Results   Component Value Date    A1C 10.8 04/12/2023     Lab Results   Component Value Date     05/19/2023     Lab Results   Component Value Date    LDL 56 04/12/2023    LDL 97 05/06/2019     Direct Measure HDL   Date Value Ref Range Status   04/12/2023 38 (L) >=50 mg/dL Final   ]  GFR Estimate   Date Value Ref Range Status   04/12/2023 61 >60 mL/min/1.73m2 Final     Comment:     eGFR calculated using 2021 CKD-EPI equation.   05/05/2021 59 (L) >60 mL/min/1.73m2 Final     GFR Estimate If Black   Date Value Ref Range Status   05/05/2021 >60 >60 mL/min/1.73m2 Final     Lab Results   Component Value Date    CR 0.97 04/12/2023     No results found for: MICROALBUMIN    Healthy Eating:  Healthy Eating Assessed Today: No  Meal planning/habits: Smaller portions, Avoiding sweets  Has patient met with a dietitian in the past?: Yes    Being Active:  Being Active Assessed Today: No    Monitoring:  Monitoring Assessed Today: Yes  Did patient bring glucose meter to appointment? : Yes (Meter not working at home)  Blood Glucose Meter: CGM  Blood glucose trend: Fluctuating    ***    Taking Medications:  Diabetes Medication(s)       Insulin       insulin glargine (LANTUS SOLOSTAR) 100 UNIT/ML pen    Inject 28 Units Subcutaneous 2 times daily INJECT 28 UNITS SUBCUTANEOUSLY TWICE DAILY      Incretin Mimetic Agents       dulaglutide (TRULICITY) 1.5 MG/0.5ML pen    Inject 1.5 mg Subcutaneous every 7 days            Taking Medication Assessed Today: Yes  Current Treatments: Diet, Insulin Injections, Oral Medication (taken by mouth), Non-insulin Injectables  Dose schedule: Pre-breakfast, At bedtime  Given by: Patient  Injection/Infusion sites: Abdomen  Problems taking diabetes medications regularly?: No  Diabetes medication side effects?: No    Problem Solving:  Problem Solving  Assessed Today: Yes  Is the patient at risk for hypoglycemia?: Yes              Reducing Risks:  Reducing Risks Assessed Today: Yes  Diabetes Risks: Age over 45 years, Hyperlipidemia  CAD Risks: Diabetes Mellitus, Hypertension, Obesity, Post-menopausal, Dyslipidemia  Has dilated eye exam at least once a year?: Yes  Feet checked by healthcare provider in the last year?: Yes    Healthy Coping:  Healthy Coping Assessed Today: Yes  Emotional response to diabetes: Ready to learn  Stage of change: ACTION (Actively working towards change)  Patient Activation Measure Survey Score:       No data to display                  Care Plan and Education Provided:  {Care Plan and Eduction Provided:819982}    ***    Time Spent: {:877873} minutes  Encounter Type: Individual    Any diabetes medication dose changes were made via the CDE Protocol per the patient's {:[id number]}. A copy of this encounter was shared with the provider.

## 2023-08-10 NOTE — LETTER
8/10/2023         RE: Kemi Soto  [address] [facility] [address]        Dear Colleague,    Thank you for referring your patient, Kemi Soto, to the [facility]. Please see a copy of my visit note below.    Diabetes Self-Management Education & Support  Type of Service: In Person Visit    ASSESSMENT:  Follow-up visit for Dexcom G6 interpretation. Ciarra is struggling to use her Dexcom G6 properly.  The last time she change the sensor she did not change the 4 digits sensor code.  This results in inaccurate blood sugar numbers.  The sensor sent in alert to calibrate with a fingerstick.  She entered a blood sugar from the sensor instead of doing a fingerstick.  Reviewed how to get the transmitter out of the sensor.  Attempted to enable data sharing on Dexcom clarity.  Patient cannot remember her password.  When we tried to make a new password she could not find the link on her Gmail. Ciarra met with the Dexcom rep in the past to assist with her sensor.  Patient has difficulty understanding/remembering instructions and staying organized.    Patient's most recent   Lab Results   Component Value Date    A1C 10.8 04/12/2023     is not meeting goal of <8.0    Diabetes knowledge and skills assessment:   Patient is knowledgeable in diabetes management concepts related to: Taking Medication    Continue education with the following diabetes management concepts: Healthy Eating, Being Active, Monitoring, Problem Solving, Reducing Risks, and CGM interpretation    Based on learning assessment above, most appropriate setting for further diabetes education would be: Individual setting.      PLAN  Either add the Dexcom clarity ayleen to your phone or use a computer to access Dexcom clarity.  Find your Dexcom clarity password or make a new one.  Call the Dexcom customer service line for assistance as needed. [phone]  4.   Change the sensor every 10 days and remember to change the 4 digit  "code.  Change the transmitter after 90 days. Change the code on the transmitter box when prompted.  Calibration is only required if a sensor code is not entered. Use a fingerstick blood sugar to calibrate. This will improve accuracy of your data.  Continue Trulicity 1.5 mg every 7 days.  Continue Lantus 28 units twice per day.    Call diabetes care to schedule a follow-up to interpret Dexcom data and adjust medications. ^  [phone]    See Care Plan for co-developed, patient-state behavior change goals.  AVS provided for patient today.    Education Materials Provided:  No new materials provided today    SUBJECTIVE/OBJECTIVE:  Presents for: Follow-up  Accompanied by: Self  Diabetes education in the past 24mo: Yes  Focus of Visit: CGM, Problem Solving  Type of CGM visit: Personal CGM Follow-up  Diabetes type: Type 2  Date of diagnosis: about 24 years ago  Disease course: Getting harder to manage  Diabetes management related comments/concerns: Having difficulty with the Dexcom sensor and phone  Transportation concerns: No  Difficulty affording diabetes medication?: No  Difficulty affording diabetes testing supplies?: No  Other concerns:: [place] (Hard of hearing), Glasses, Cognitive impairment  Cultural Influences/Ethnic Background:  Not  or     Diabetes Symptoms & Complications:     Complications assessed today?: Yes  Autonomic neuropathy: No  CVA: No  Heart disease: Yes (A-fib)  Nephropathy: No  Peripheral neuropathy: No  Peripheral Vascular Disease: No  Retinopathy: No    Patient Problem List and Family Medical History reviewed for relevant medical history, current medical status, and diabetes risk factors.    Vitals:  There were no vitals taken for this visit.  Estimated body mass index is 31.14 kg/m  as calculated from the following:    Height as of 6/20/23: 1.588 m (5' 2.5\").    Weight as of 7/20/23: 78.5 kg (173 lb).   Last 3 BP:   BP Readings from Last 3 Encounters:   06/20/23 128/62   06/13/23 " 132/60   05/25/23 134/56       History   Smoking Status    Former    Types: Cigarettes    Quit date: 6/23/2007   Smokeless Tobacco    Never       Labs:  Lab Results   Component Value Date    A1C 10.8 04/12/2023     Lab Results   Component Value Date     05/19/2023     Lab Results   Component Value Date    LDL 56 04/12/2023    LDL 97 05/06/2019     Direct Measure HDL   Date Value Ref Range Status   04/12/2023 38 (L) >=50 mg/dL Final   ]  GFR Estimate   Date Value Ref Range Status   04/12/2023 61 >60 mL/min/1.73m2 Final     Comment:     eGFR calculated using 2021 CKD-EPI equation.   05/05/2021 59 (L) >60 mL/min/1.73m2 Final     GFR Estimate If Black   Date Value Ref Range Status   05/05/2021 >60 >60 mL/min/1.73m2 Final     Lab Results   Component Value Date    CR 0.97 04/12/2023     No results found for: MICROALBUMIN    Healthy Eating:  Healthy Eating Assessed Today: No  Meal planning/habits: Smaller portions, Avoiding sweets  Has patient met with a dietitian in the past?: Yes    Being Active:  Being Active Assessed Today: No    Monitoring:  Monitoring Assessed Today: Yes  Did patient bring glucose meter to appointment? : Yes (Meter not working at home)  Blood Glucose Meter: CGM  Blood glucose trend: Fluctuating    Taking Medications:  Diabetes Medication(s)       Insulin       insulin glargine (LANTUS SOLOSTAR) 100 UNIT/ML pen    Inject 28 Units Subcutaneous 2 times daily INJECT 28 UNITS SUBCUTANEOUSLY TWICE DAILY      Incretin Mimetic Agents       dulaglutide (TRULICITY) 1.5 MG/0.5ML pen    Inject 1.5 mg Subcutaneous every 7 days            Taking Medication Assessed Today: Yes  Current Treatments: Diet, Insulin Injections, Oral Medication (taken by mouth), Non-insulin Injectables  Dose schedule: Pre-breakfast, At bedtime  Given by: Patient  Injection/Infusion sites: Abdomen  Problems taking diabetes medications regularly?: No  Diabetes medication side effects?: No    Problem Solving:  Problem Solving  Assessed Today: Yes  Is the patient at risk for hypoglycemia?: Yes    Reducing Risks:  Reducing Risks Assessed Today: Yes  Diabetes Risks: Age over 45 years, Hyperlipidemia  CAD Risks: Diabetes Mellitus, Hypertension, Obesity, Post-menopausal, Dyslipidemia  Has dilated eye exam at least once a year?: Yes  Feet checked by healthcare provider in the last year?: Yes    Healthy Coping:  Healthy Coping Assessed Today: Yes  Emotional response to diabetes: Ready to learn  Stage of change: ACTION (Actively working towards change)  Patient Activation Measure Survey Score:       No data to display                Care Plan and Education Provided:  Care Plan: Diabetes   Care plan tasks assigned to Lvi Contreras RD        Problem: HbA1C Not In Goal         Goal: Establish Regular Follow-Ups with PCP         Task: Discuss with PCP the recommended timing for patient's next follow up visit(s)    Responsible User: Liv Contreras RD        Goal: Get HbA1C Level in Goal         Task: Educate patient on diabetes education self-management topics    Responsible User: Liv Contreras RD        Task: Educate patient on benefits of regular glucose monitoring    Responsible User: Liv Contreras RD        Task: Refer patient to appropriate extended care team member, as needed (Medication Therapy Management, Behavioral Health, Physical Therapy, etc.)    Responsible User: Liv Contreras RD        Task: Discuss diabetes treatment plan with patient    Responsible User: Liv Contreras RD        Problem: Diabetes Self-Management Education Needed to Optimize Self-Care Behaviors         Goal: Understand diabetes pathophysiology and disease progression         Task: Provide education on diabetes pathophysiology and disease progression specfic to patient's diabetes type    Responsible User: Liv Contreras RD        Goal: Healthy Eating - follow a healthy eating pattern for diabetes         Task: Provide education on portion control and  consistency in amount, composition and timing of food intake    Responsible User: Liv Contreras RD        Task: Provide education on managing carbohydrate intake (carbohydrate counting, plate planning method, etc.)    Responsible User: Liv Contreras RD        Task: Provide education on weight management    Responsible User: Liv Contreras RD        Task: Provide education on heart healthy eating    Responsible User: Liv Contreras RD        Task: Provide education on eating out    Responsible User: Liv Contreras RD        Task: Develop individualized healthy eating plan with patient    Responsible User: Liv Contreras RD        Goal: Being Active - get regular physical activity, working up to at least 150 minutes per week         Task: Provide education on relationship of activity to glucose and precautions to take if at risk for low glucose    Responsible User: Liv Contreras RD        Task: Discuss barriers to physical activity with patient    Responsible User: Liv Contreras RD        Task: Develop physical activity plan with patient    Responsible User: Liv Contreras RD        Task: Explore community resources including walking groups, assistance programs, and home videos    Responsible User: Liv Contreras RD        Goal: Monitoring - monitor glucose and ketones as directed    This Visit's Progress: 100%        Task: Provide education on blood glucose monitoring (purpose, proper technique, frequency, glucose targets, interpreting results, when to use glucose control solution, sharps disposal)    Responsible User: Liv Contreras RD        Task: Provide education on ketone monitoring (when to monitor, frequency, etc.)    Responsible User: Liv Contreras RD        Goal: Taking Medication - patient is consistently taking medications as directed         Task: Provide education on insulin and injectable diabetes medications, including administration, storage, site selection and rotation  for injection sites    Responsible User: Liv Contreras RD        Task: Discuss barriers to medication adherence with patient and provide management technique ideas as appropriate    Responsible User: Liv Contreras RD        Task: Provide education on frequency and refill details of medications    Responsible User: Liv Contreras RD        Goal: Problem Solving - know how to prevent and manage short-term diabetes complications         Task: Provide education on safe travel with diabetes    Responsible User: Liv Contreras RD        Task: Provide education on how to care for diabetes on sick days    Responsible User: Liv Contreras RD        Task: Provide education on when to call a health care provider    Responsible User: Liv Contreras RD        Goal: Reducing Risks - know how to prevent and treat long-term diabetes complications         Task: Provide education on major complications of diabetes, prevention, early diagnostic measures and treatment of complications    Responsible User: Liv Contreras RD        Task: Provide education on recommended care for dental, eye and foot health    Responsible User: Liv Contreras RD        Task: Provide education on Hemoglobin A1c - goals and relationship to blood glucose levels    Responsible User: Liv Contreras RD        Task: Provide education on recommendations for heart health - lipid levels and goals, blood pressure and goals, and aspirin therapy, if indicated    Responsible User: Liv Contreras RD        Task: Provide education on tobacco cessation    Responsible User: Liv Contreras RD        Goal: Healthy Coping - use available resources to cope with the challenges of managing diabetes         Task: Discuss recognizing feelings about having diabetes    Responsible User: Liv Contreras RD        Task: Provide education on the benefits of making appropriate lifestyle changes    Responsible User: Liv Contreras RD        Task: Provide  education on benefits of utilizing support systems    Responsible User: Liv Contreras RD        Task: Discuss methods for coping with stress    Responsible User: Liv Contreras RD        Task: Provide education on when to seek professional counseling    Responsible User: Liv Contreras RD           ***    Time Spent: {:702542} minutes  Encounter Type: Individual    Any diabetes medication dose changes were made via the CDE Protocol per the patient's {:[id number]}. A copy of this encounter was shared with the provider.

## 2023-08-10 NOTE — PROGRESS NOTES
ANTICOAGULATION MANAGEMENT     Kemi Soto 73 year old female is on warfarin with therapeutic INR result. (Goal INR 2.0-3.0)    Recent labs: (last 7 days)     08/10/23  1401   INR 2.3*       ASSESSMENT     Source(s): Chart Review and Patient/Caregiver Call     Warfarin doses taken: Warfarin taken as instructed  Diet: No new diet changes identified  Medication/supplement changes: None noted  New illness, injury, or hospitalization: No  Signs or symptoms of bleeding or clotting: No  Previous result: Subtherapeutic  Additional findings: Pt is scheduled to see her PCP on 8/2523. Will recheck INR again then       PLAN     Recommended plan for no diet, medication or health factor changes affecting INR     Dosing Instructions: Continue your current warfarin dose with next INR in 2 weeks       Summary  As of 8/10/2023      Full warfarin instructions:  5 mg every Sun, Tue, Thu; 2.5 mg all other days   Next INR check:  8/25/2023               Telephone call with Ciarra who verbalizes understanding and agrees to plan    Lab visit scheduled    Education provided:   Contact [phone] with any changes, questions or concerns.     Plan made per [facility] anticoagulation protocol    Carmelita Mackenzie RN  Anticoagulation Clinic  8/10/2023    _______________________________________________________________________     Anticoagulation Episode Summary       Current INR goal:  2.0-3.0   TTR:  35.9 % (1 y)   Target end date:  Indefinite   Send INR reminders to:  [facility]    Indications    Paroxysmal atrial fibrillation (H) [I48.0]             Comments:               Anticoagulation Care Providers       Provider Role Specialty Phone number    Chuck Hernandez MD Referring Cardiovascular Disease [phone]

## 2023-08-10 NOTE — LETTER
8/10/2023         RE: Kemi Soto  [address] [facility] [address]        Dear Colleague,    Thank you for referring your patient, Kemi Soto, to the [facility]. Please see a copy of my visit note below.    Diabetes Self-Management Education & Support  Type of Service: In Person Visit    ASSESSMENT:  Follow-up visit for Dexcom G6 interpretation. Ciarra is struggling to use her Dexcom G6 properly.  The last time she change the sensor she did not change the 4 digits sensor code.  This results in inaccurate blood sugar numbers.  The sensor sent in alert to calibrate with a fingerstick.  She entered a blood sugar from the sensor instead of doing a fingerstick.  Reviewed how to get the transmitter out of the sensor.  Attempted to enable data sharing on Dexcom clarity.  Patient cannot remember her password.  When we tried to make a new password she could not find the link on her Gmail. Ciarra met with the Dexcom rep in the past to assist with her sensor.  Patient has difficulty understanding/remembering instructions and staying organized.    Patient's most recent   Lab Results   Component Value Date    A1C 10.8 04/12/2023     is not meeting goal of <8.0    Diabetes knowledge and skills assessment:   Patient is knowledgeable in diabetes management concepts related to: Taking Medication    Continue education with the following diabetes management concepts: Healthy Eating, Being Active, Monitoring, Problem Solving, Reducing Risks, and CGM interpretation    Based on learning assessment above, most appropriate setting for further diabetes education would be: Individual setting.      PLAN  Either add the Dexcom clarity ayleen to your phone or use a computer to access Dexcom clarity.  Find your Dexcom clarity password or make a new one.  Call the Dexcom customer service line for assistance as needed. [phone]  4.   Change the sensor every 10 days and remember to change the 4 digit  "code.  Change the transmitter after 90 days. Change the code on the transmitter box when prompted.  Calibration is only required if a sensor code is not entered. Use a fingerstick blood sugar to calibrate. This will improve accuracy of your data.  Continue Trulicity 1.5 mg every 7 days.  Continue Lantus 28 units twice per day.    Call diabetes care to schedule a follow-up to interpret Dexcom data and adjust medications. ^  [phone]    See Care Plan for co-developed, patient-state behavior change goals.  AVS provided for patient today.    Education Materials Provided:  No new materials provided today    SUBJECTIVE/OBJECTIVE:  Presents for: Follow-up  Accompanied by: Self  Diabetes education in the past 24mo: Yes  Focus of Visit: CGM, Problem Solving  Type of CGM visit: Personal CGM Follow-up  Diabetes type: Type 2  Date of diagnosis: about 24 years ago  Disease course: Getting harder to manage  Diabetes management related comments/concerns: Having difficulty with the Dexcom sensor and phone  Transportation concerns: No  Difficulty affording diabetes medication?: No  Difficulty affording diabetes testing supplies?: No  Other concerns:: [place] (Hard of hearing), Glasses, Cognitive impairment  Cultural Influences/Ethnic Background:  Not  or     Diabetes Symptoms & Complications:     Complications assessed today?: Yes  Autonomic neuropathy: No  CVA: No  Heart disease: Yes (A-fib)  Nephropathy: No  Peripheral neuropathy: No  Peripheral Vascular Disease: No  Retinopathy: No    Patient Problem List and Family Medical History reviewed for relevant medical history, current medical status, and diabetes risk factors.    Vitals:  There were no vitals taken for this visit.  Estimated body mass index is 31.14 kg/m  as calculated from the following:    Height as of 6/20/23: 1.588 m (5' 2.5\").    Weight as of 7/20/23: 78.5 kg (173 lb).   Last 3 BP:   BP Readings from Last 3 Encounters:   06/20/23 128/62   06/13/23 " 132/60   05/25/23 134/56       History   Smoking Status    Former    Types: Cigarettes    Quit date: 6/23/2007   Smokeless Tobacco    Never       Labs:  Lab Results   Component Value Date    A1C 10.8 04/12/2023     Lab Results   Component Value Date     05/19/2023     Lab Results   Component Value Date    LDL 56 04/12/2023    LDL 97 05/06/2019     Direct Measure HDL   Date Value Ref Range Status   04/12/2023 38 (L) >=50 mg/dL Final   ]  GFR Estimate   Date Value Ref Range Status   04/12/2023 61 >60 mL/min/1.73m2 Final     Comment:     eGFR calculated using 2021 CKD-EPI equation.   05/05/2021 59 (L) >60 mL/min/1.73m2 Final     GFR Estimate If Black   Date Value Ref Range Status   05/05/2021 >60 >60 mL/min/1.73m2 Final     Lab Results   Component Value Date    CR 0.97 04/12/2023     No results found for: MICROALBUMIN    Healthy Eating:  Healthy Eating Assessed Today: No  Meal planning/habits: Smaller portions, Avoiding sweets  Has patient met with a dietitian in the past?: Yes    Being Active:  Being Active Assessed Today: No    Monitoring:  Monitoring Assessed Today: Yes  Did patient bring glucose meter to appointment? : Yes (Meter not working at home)  Blood Glucose Meter: CGM  Blood glucose trend: Fluctuating    ***    Taking Medications:  Diabetes Medication(s)       Insulin       insulin glargine (LANTUS SOLOSTAR) 100 UNIT/ML pen    Inject 28 Units Subcutaneous 2 times daily INJECT 28 UNITS SUBCUTANEOUSLY TWICE DAILY      Incretin Mimetic Agents       dulaglutide (TRULICITY) 1.5 MG/0.5ML pen    Inject 1.5 mg Subcutaneous every 7 days            Taking Medication Assessed Today: Yes  Current Treatments: Diet, Insulin Injections, Oral Medication (taken by mouth), Non-insulin Injectables  Dose schedule: Pre-breakfast, At bedtime  Given by: Patient  Injection/Infusion sites: Abdomen  Problems taking diabetes medications regularly?: No  Diabetes medication side effects?: No    Problem Solving:  Problem Solving  Assessed Today: Yes  Is the patient at risk for hypoglycemia?: Yes              Reducing Risks:  Reducing Risks Assessed Today: Yes  Diabetes Risks: Age over 45 years, Hyperlipidemia  CAD Risks: Diabetes Mellitus, Hypertension, Obesity, Post-menopausal, Dyslipidemia  Has dilated eye exam at least once a year?: Yes  Feet checked by healthcare provider in the last year?: Yes    Healthy Coping:  Healthy Coping Assessed Today: Yes  Emotional response to diabetes: Ready to learn  Stage of change: ACTION (Actively working towards change)  Patient Activation Measure Survey Score:       No data to display                  Care Plan and Education Provided:  {Care Plan and Eduction Provided:230006}    ***    Time Spent: {:073021} minutes  Encounter Type: Individual    Any diabetes medication dose changes were made via the CDE Protocol per the patient's {:157699}. A copy of this encounter was shared with the provider.

## 2023-08-10 NOTE — LETTER
8/10/2023         RE: Kemi Soto  [address] [facility] [address]        Dear Colleague,    Thank you for referring your patient, Kemi Soto, to the [facility]. Please see a copy of my visit note below.    Diabetes Self-Management Education & Support  Type of Service: In Person Visit    ASSESSMENT:  Follow-up visit for Dexcom G6 interpretation. Ciarra is struggling to use her Dexcom G6 properly.  The last time she change the sensor she did not change the 4 digits sensor code.  This results in inaccurate blood sugar numbers.  The sensor sent in alert to calibrate with a fingerstick.  She entered a blood sugar from the sensor instead of doing a fingerstick.  Reviewed how to get the transmitter out of the sensor.  Attempted to enable data sharing on Dexcom clarity.  Patient cannot remember her password.  When we tried to make a new password she could not find the link on her Gmail. Ciarra met with the Dexcom rep in the past to assist with her sensor.  Patient has difficulty understanding/remembering instructions and staying organized.    Patient's most recent   Lab Results   Component Value Date    A1C 10.8 04/12/2023     is not meeting goal of <8.0    Diabetes knowledge and skills assessment:   Patient is knowledgeable in diabetes management concepts related to: Taking Medication    Continue education with the following diabetes management concepts: Healthy Eating, Being Active, Monitoring, Problem Solving, Reducing Risks, and CGM interpretation    Based on learning assessment above, most appropriate setting for further diabetes education would be: Individual setting.      PLAN  Either add the Dexcom clarity ayleen to your phone or use a computer to access Dexcom clarity.  Find your Dexcom clarity password or make a new one.  Call the Dexcom customer service line for assistance as needed. [phone]  4.   Change the sensor every 10 days and remember to change the 4 digit  "code.  Change the transmitter after 90 days. Change the code on the transmitter box when prompted.  Calibration is only required if a sensor code is not entered. Use a fingerstick blood sugar to calibrate. This will improve accuracy of your data.  Continue Trulicity 1.5 mg every 7 days.  Continue Lantus 28 units twice per day.    Call diabetes care to schedule a follow-up to interpret Dexcom data and adjust medications. ^  [phone]    See Care Plan for co-developed, patient-state behavior change goals.  AVS provided for patient today.    Education Materials Provided:  No new materials provided today    SUBJECTIVE/OBJECTIVE:  Presents for: Follow-up  Accompanied by: Self  Diabetes education in the past 24mo: Yes  Focus of Visit: CGM, Problem Solving  Type of CGM visit: Personal CGM Follow-up  Diabetes type: Type 2  Date of diagnosis: about 24 years ago  Disease course: Getting harder to manage  Diabetes management related comments/concerns: Having difficulty with the Dexcom sensor and phone  Transportation concerns: No  Difficulty affording diabetes medication?: No  Difficulty affording diabetes testing supplies?: No  Other concerns:: [place] (Hard of hearing), Glasses, Cognitive impairment  Cultural Influences/Ethnic Background:  Not  or     Diabetes Symptoms & Complications:     Complications assessed today?: Yes  Autonomic neuropathy: No  CVA: No  Heart disease: Yes (A-fib)  Nephropathy: No  Peripheral neuropathy: No  Peripheral Vascular Disease: No  Retinopathy: No    Patient Problem List and Family Medical History reviewed for relevant medical history, current medical status, and diabetes risk factors.    Vitals:  There were no vitals taken for this visit.  Estimated body mass index is 31.14 kg/m  as calculated from the following:    Height as of 6/20/23: 1.588 m (5' 2.5\").    Weight as of 7/20/23: 78.5 kg (173 lb).   Last 3 BP:   BP Readings from Last 3 Encounters:   06/20/23 128/62   06/13/23 " 132/60   05/25/23 134/56       History   Smoking Status    Former    Types: Cigarettes    Quit date: 6/23/2007   Smokeless Tobacco    Never       Labs:  Lab Results   Component Value Date    A1C 10.8 04/12/2023     Lab Results   Component Value Date     05/19/2023     Lab Results   Component Value Date    LDL 56 04/12/2023    LDL 97 05/06/2019     Direct Measure HDL   Date Value Ref Range Status   04/12/2023 38 (L) >=50 mg/dL Final   ]  GFR Estimate   Date Value Ref Range Status   04/12/2023 61 >60 mL/min/1.73m2 Final     Comment:     eGFR calculated using 2021 CKD-EPI equation.   05/05/2021 59 (L) >60 mL/min/1.73m2 Final     GFR Estimate If Black   Date Value Ref Range Status   05/05/2021 >60 >60 mL/min/1.73m2 Final     Lab Results   Component Value Date    CR 0.97 04/12/2023     No results found for: MICROALBUMIN    Healthy Eating:  Healthy Eating Assessed Today: No  Meal planning/habits: Smaller portions, Avoiding sweets  Has patient met with a dietitian in the past?: Yes    Being Active:  Being Active Assessed Today: No    Monitoring:  Monitoring Assessed Today: Yes  Did patient bring glucose meter to appointment? : Yes (Meter not working at home)  Blood Glucose Meter: CGM  Blood glucose trend: Fluctuating    Taking Medications:  Diabetes Medication(s)       Insulin       insulin glargine (LANTUS SOLOSTAR) 100 UNIT/ML pen    Inject 28 Units Subcutaneous 2 times daily INJECT 28 UNITS SUBCUTANEOUSLY TWICE DAILY      Incretin Mimetic Agents       dulaglutide (TRULICITY) 1.5 MG/0.5ML pen    Inject 1.5 mg Subcutaneous every 7 days            Taking Medication Assessed Today: Yes  Current Treatments: Diet, Insulin Injections, Oral Medication (taken by mouth), Non-insulin Injectables  Dose schedule: Pre-breakfast, At bedtime  Given by: Patient  Injection/Infusion sites: Abdomen  Problems taking diabetes medications regularly?: No  Diabetes medication side effects?: No    Problem Solving:  Problem Solving  Assessed Today: Yes  Is the patient at risk for hypoglycemia?: Yes    Reducing Risks:  Reducing Risks Assessed Today: Yes  Diabetes Risks: Age over 45 years, Hyperlipidemia  CAD Risks: Diabetes Mellitus, Hypertension, Obesity, Post-menopausal, Dyslipidemia  Has dilated eye exam at least once a year?: Yes  Feet checked by healthcare provider in the last year?: Yes    Healthy Coping:  Healthy Coping Assessed Today: Yes  Emotional response to diabetes: Ready to learn  Stage of change: ACTION (Actively working towards change)  Patient Activation Measure Survey Score:       No data to display                Care Plan and Education Provided:  Care Plan: Diabetes   Care plan tasks assigned to Liv Contreras RD        Problem: HbA1C Not In Goal         Goal: Establish Regular Follow-Ups with PCP         Task: Discuss with PCP the recommended timing for patient's next follow up visit(s)    Responsible User: Liv Contreras RD        Goal: Get HbA1C Level in Goal         Task: Educate patient on diabetes education self-management topics    Responsible User: Liv Contreras RD        Task: Educate patient on benefits of regular glucose monitoring    Responsible User: Liv Contreras RD        Task: Refer patient to appropriate extended care team member, as needed (Medication Therapy Management, Behavioral Health, Physical Therapy, etc.)    Responsible User: Liv Contreras RD        Task: Discuss diabetes treatment plan with patient    Responsible User: Liv Contreras RD        Problem: Diabetes Self-Management Education Needed to Optimize Self-Care Behaviors         Goal: Understand diabetes pathophysiology and disease progression         Task: Provide education on diabetes pathophysiology and disease progression specfic to patient's diabetes type    Responsible User: Liv Contreras RD        Goal: Healthy Eating - follow a healthy eating pattern for diabetes         Task: Provide education on portion control and  consistency in amount, composition and timing of food intake    Responsible User: Liv Contreras RD        Task: Provide education on managing carbohydrate intake (carbohydrate counting, plate planning method, etc.)    Responsible User: Liv Contreras RD        Task: Provide education on weight management    Responsible User: Liv Contreras RD        Task: Provide education on heart healthy eating    Responsible User: Liv Contreras RD        Task: Provide education on eating out    Responsible User: Liv Contreras RD        Task: Develop individualized healthy eating plan with patient    Responsible User: Liv Contreras RD        Goal: Being Active - get regular physical activity, working up to at least 150 minutes per week         Task: Provide education on relationship of activity to glucose and precautions to take if at risk for low glucose    Responsible User: Liv Contreras RD        Task: Discuss barriers to physical activity with patient    Responsible User: Liv Contreras RD        Task: Develop physical activity plan with patient    Responsible User: Liv Contreras RD        Task: Explore community resources including walking groups, assistance programs, and home videos    Responsible User: Liv Contreras RD        Goal: Monitoring - monitor glucose and ketones as directed    This Visit's Progress: 100%        Task: Provide education on blood glucose monitoring (purpose, proper technique, frequency, glucose targets, interpreting results, when to use glucose control solution, sharps disposal)    Responsible User: Liv Contreras RD        Task: Provide education on ketone monitoring (when to monitor, frequency, etc.)    Responsible User: Liv Contreras RD        Goal: Taking Medication - patient is consistently taking medications as directed         Task: Provide education on insulin and injectable diabetes medications, including administration, storage, site selection and rotation  for injection sites    Responsible User: Liv Contreras RD        Task: Discuss barriers to medication adherence with patient and provide management technique ideas as appropriate    Responsible User: Liv Contreras RD        Task: Provide education on frequency and refill details of medications    Responsible User: Liv Contreras RD        Goal: Problem Solving - know how to prevent and manage short-term diabetes complications         Task: Provide education on safe travel with diabetes    Responsible User: Liv Contreras RD        Task: Provide education on how to care for diabetes on sick days    Responsible User: Liv Contreras RD        Task: Provide education on when to call a health care provider    Responsible User: Liv Contreras RD        Goal: Reducing Risks - know how to prevent and treat long-term diabetes complications         Task: Provide education on major complications of diabetes, prevention, early diagnostic measures and treatment of complications    Responsible User: Liv Contreras RD        Task: Provide education on recommended care for dental, eye and foot health    Responsible User: Liv Contreras RD        Task: Provide education on Hemoglobin A1c - goals and relationship to blood glucose levels    Responsible User: Liv Contreras RD        Task: Provide education on recommendations for heart health - lipid levels and goals, blood pressure and goals, and aspirin therapy, if indicated    Responsible User: Liv Contreras RD        Task: Provide education on tobacco cessation    Responsible User: Liv Contreras RD        Goal: Healthy Coping - use available resources to cope with the challenges of managing diabetes         Task: Discuss recognizing feelings about having diabetes    Responsible User: Liv Contreras RD        Task: Provide education on the benefits of making appropriate lifestyle changes    Responsible User: Liv Contreras RD        Task: Provide  education on benefits of utilizing support systems    Responsible User: Liv Contreras RD        Task: Discuss methods for coping with stress    Responsible User: Liv Contreras RD        Task: Provide education on when to seek professional counseling    Responsible User: Liv Contreras RD Joy Smetanka RD, LD, [facility]   Certified Diabetes [facility] and   [facility]     Time Spent: 60 minutes  Encounter Type: Individual    Any diabetes medication dose changes were made via the CDE Protocol per the patient's referring provider and primary care provider. A copy of this encounter was shared with the provider.

## 2023-08-11 NOTE — PROGRESS NOTES
Diabetes Self-Management Education & Support  Type of Service: In Person Visit    ASSESSMENT:  Follow-up visit for Dexcom G6 interpretation. Ciarra is struggling to use her Dexcom G6 properly.  The last time she change the sensor she did not change the 4 digits sensor code.  This results in inaccurate blood sugar numbers.  The sensor sent in alert to calibrate with a fingerstick.  She entered a blood sugar from the sensor instead of doing a fingerstick.  Reviewed how to get the transmitter out of the sensor.  Attempted to enable data sharing on Dexcom clarity.  Patient cannot remember her password.  When we tried to make a new password she could not find the link on her Gmail. Ciarra met with the [employer] rep in the past to assist with her sensor.  Patient has difficulty understanding/remembering instructions and staying organized.    Patient's most recent   Lab Results   Component Value Date    A1C 10.8 04/12/2023     is not meeting goal of <8.0    Diabetes knowledge and skills assessment:   Patient is knowledgeable in diabetes management concepts related to: Taking Medication    Continue education with the following diabetes management concepts: Healthy Eating, Being Active, Monitoring, Problem Solving, Reducing Risks, and CGM interpretation    Based on learning assessment above, most appropriate setting for further diabetes education would be: Individual setting.      PLAN  Either add the Dexcom clarity ayleen to your phone or use a computer to access Dexcom clarity.  Find your Dexcom clarity password or make a new one.  Call the Dexcom customer service line for assistance as needed. [phone]  4.   Change the sensor every 10 days and remember to change the 4 digit code.  Change the transmitter after 90 days. Change the code on the transmitter box when prompted.  Calibration is only required if a sensor code is not entered. Use a fingerstick blood sugar to calibrate. This will improve accuracy of your  "data.  Continue Trulicity 1.5 mg every 7 days.  Continue Lantus 28 units twice per day.    Call diabetes care to schedule a follow-up to interpret Dexcom data and adjust medications. ^  [phone]    See Care Plan for co-developed, patient-state behavior change goals.  AVS provided for patient today.    Education Materials Provided:  No new materials provided today    SUBJECTIVE/OBJECTIVE:  Presents for: Follow-up  Accompanied by: Self  Diabetes education in the past 24mo: Yes  Focus of Visit: CGM, Problem Solving  Type of CGM visit: Personal CGM Follow-up  Diabetes type: Type 2  Date of diagnosis: about 24 years ago  Disease course: Getting harder to manage  Diabetes management related comments/concerns: Having difficulty with the Dexcom sensor and phone  Transportation concerns: No  Difficulty affording diabetes medication?: No  Difficulty affording diabetes testing supplies?: No  Other concerns:: [place] (Hard of hearing), Glasses, Cognitive impairment  Cultural Influences/Ethnic Background:  Not  or     Diabetes Symptoms & Complications:     Complications assessed today?: Yes  Autonomic neuropathy: No  CVA: No  Heart disease: Yes (A-fib)  Nephropathy: No  Peripheral neuropathy: No  Peripheral Vascular Disease: No  Retinopathy: No    Patient Problem List and Family Medical History reviewed for relevant medical history, current medical status, and diabetes risk factors.    Vitals:  There were no vitals taken for this visit.  Estimated body mass index is 31.14 kg/m  as calculated from the following:    Height as of 6/20/23: 1.588 m (5' 2.5\").    Weight as of 7/20/23: 78.5 kg (173 lb).   Last 3 BP:   BP Readings from Last 3 Encounters:   06/20/23 128/62   06/13/23 132/60   05/25/23 134/56       History   Smoking Status    Former    Types: Cigarettes    Quit date: 6/23/2007   Smokeless Tobacco    Never       Labs:  Lab Results   Component Value Date    A1C 10.8 04/12/2023     Lab Results   Component Value " Date     05/19/2023     Lab Results   Component Value Date    LDL 56 04/12/2023    LDL 97 05/06/2019     Direct Measure HDL   Date Value Ref Range Status   04/12/2023 38 (L) >=50 mg/dL Final   ]  GFR Estimate   Date Value Ref Range Status   04/12/2023 61 >60 mL/min/1.73m2 Final     Comment:     eGFR calculated using 2021 CKD-EPI equation.   05/05/2021 59 (L) >60 mL/min/1.73m2 Final     GFR Estimate If Black   Date Value Ref Range Status   05/05/2021 >60 >60 mL/min/1.73m2 Final     Lab Results   Component Value Date    CR 0.97 04/12/2023     No results found for: MICROALBUMIN    Healthy Eating:  Healthy Eating Assessed Today: No  Meal planning/habits: Smaller portions, Avoiding sweets  Has patient met with a dietitian in the past?: Yes    Being Active:  Being Active Assessed Today: No    Monitoring:  Monitoring Assessed Today: Yes  Did patient bring glucose meter to appointment? : Yes (Meter not working at home)  Blood Glucose Meter: CGM  Blood glucose trend: Fluctuating    Taking Medications:  Diabetes Medication(s)       Insulin       insulin glargine (LANTUS SOLOSTAR) 100 UNIT/ML pen    Inject 28 Units Subcutaneous 2 times daily INJECT 28 UNITS SUBCUTANEOUSLY TWICE DAILY      Incretin Mimetic Agents       dulaglutide (TRULICITY) 1.5 MG/0.5ML pen    Inject 1.5 mg Subcutaneous every 7 days            Taking Medication Assessed Today: Yes  Current Treatments: Diet, Insulin Injections, Oral Medication (taken by mouth), Non-insulin Injectables  Dose schedule: Pre-breakfast, At bedtime  Given by: Patient  Injection/Infusion sites: Abdomen  Problems taking diabetes medications regularly?: No  Diabetes medication side effects?: No    Problem Solving:  Problem Solving Assessed Today: Yes  Is the patient at risk for hypoglycemia?: Yes    Reducing Risks:  Reducing Risks Assessed Today: Yes  Diabetes Risks: Age over 45 years, Hyperlipidemia  CAD Risks: Diabetes Mellitus, Hypertension, Obesity, Post-menopausal,  Dyslipidemia  Has dilated eye exam at least once a year?: Yes  Feet checked by healthcare provider in the last year?: Yes    Healthy Coping:  Healthy Coping Assessed Today: Yes  Emotional response to diabetes: Ready to learn  Stage of change: ACTION (Actively working towards change)  Patient Activation Measure Survey Score:       No data to display                Care Plan and Education Provided:  Care Plan: Diabetes   Care plan tasks assigned to Liv Contreras RD        Problem: HbA1C Not In Goal         Goal: Establish Regular Follow-Ups with PCP         Task: Discuss with PCP the recommended timing for patient's next follow up visit(s)    Responsible User: Liv Contreras RD        Goal: Get HbA1C Level in Goal         Task: Educate patient on diabetes education self-management topics    Responsible User: Liv Contreras RD        Task: Educate patient on benefits of regular glucose monitoring    Responsible User: Liv Contreras RD        Task: Refer patient to appropriate extended care team member, as needed (Medication Therapy Management, Behavioral Health, Physical Therapy, etc.)    Responsible User: Liv Contreras RD        Task: Discuss diabetes treatment plan with patient    Responsible User: Liv Contreras RD        Problem: Diabetes Self-Management Education Needed to Optimize Self-Care Behaviors         Goal: Understand diabetes pathophysiology and disease progression         Task: Provide education on diabetes pathophysiology and disease progression specfic to patient's diabetes type    Responsible User: Liv Contreras RD        Goal: Healthy Eating - follow a healthy eating pattern for diabetes         Task: Provide education on portion control and consistency in amount, composition and timing of food intake    Responsible User: Liv Contreras RD        Task: Provide education on managing carbohydrate intake (carbohydrate counting, plate planning method, etc.)    Responsible User:  Liv Contreras RD        Task: Provide education on weight management    Responsible User: Liv Contreras RD        Task: Provide education on heart healthy eating    Responsible User: Liv Contreras RD        Task: Provide education on eating out    Responsible User: Liv Contreras RD        Task: Develop individualized healthy eating plan with patient    Responsible User: Liv Contreras RD        Goal: Being Active - get regular physical activity, working up to at least 150 minutes per week         Task: Provide education on relationship of activity to glucose and precautions to take if at risk for low glucose    Responsible User: Liv Contreras RD        Task: Discuss barriers to physical activity with patient    Responsible User: Liv Contreras RD        Task: Develop physical activity plan with patient    Responsible User: Liv Cnotreras RD        Task: Explore community resources including walking groups, assistance programs, and home videos    Responsible User: Liv Contreras RD        Goal: Monitoring - monitor glucose and ketones as directed    This Visit's Progress: 100%        Task: Provide education on blood glucose monitoring (purpose, proper technique, frequency, glucose targets, interpreting results, when to use glucose control solution, sharps disposal)    Responsible User: Liv Contreras RD        Task: Provide education on ketone monitoring (when to monitor, frequency, etc.)    Responsible User: Liv Contreras RD        Goal: Taking Medication - patient is consistently taking medications as directed         Task: Provide education on insulin and injectable diabetes medications, including administration, storage, site selection and rotation for injection sites    Responsible User: Liv Contreras RD        Task: Discuss barriers to medication adherence with patient and provide management technique ideas as appropriate    Responsible User: Liv Contreras RD        Task:  Provide education on frequency and refill details of medications    Responsible User: Liv Contreras RD        Goal: Problem Solving - know how to prevent and manage short-term diabetes complications         Task: Provide education on safe travel with diabetes    Responsible User: Liv Contreras RD        Task: Provide education on how to care for diabetes on sick days    Responsible User: Liv Contreras RD        Task: Provide education on when to call a health care provider    Responsible User: Liv Contreras RD        Goal: Reducing Risks - know how to prevent and treat long-term diabetes complications         Task: Provide education on major complications of diabetes, prevention, early diagnostic measures and treatment of complications    Responsible User: Liv Contreras RD        Task: Provide education on recommended care for dental, eye and foot health    Responsible User: Liv Contreras RD        Task: Provide education on Hemoglobin A1c - goals and relationship to blood glucose levels    Responsible User: Liv Contreras RD        Task: Provide education on recommendations for heart health - lipid levels and goals, blood pressure and goals, and aspirin therapy, if indicated    Responsible User: Liv Contreras RD        Task: Provide education on tobacco cessation    Responsible User: Liv Contreras RD        Goal: Healthy Coping - use available resources to cope with the challenges of managing diabetes         Task: Discuss recognizing feelings about having diabetes    Responsible User: Liv Contreras RD        Task: Provide education on the benefits of making appropriate lifestyle changes    Responsible User: Liv Contreras RD        Task: Provide education on benefits of utilizing support systems    Responsible User: Liv Contreras RD        Task: Discuss methods for coping with stress    Responsible User: Liv Contreras RD        Task: Provide education on when to seek  professional counseling    Responsible User: Liv Contreras RD Joy Smetanka RD, LD, [facility]   Certified Diabetes Care and   [facility]     Time Spent: 60 minutes  Encounter Type: Individual    Any diabetes medication dose changes were made via the CDE Protocol per the patient's referring provider and primary care provider. A copy of this encounter was shared with the provider.

## 2023-08-11 NOTE — PATIENT INSTRUCTIONS
Either add the Dexcom clarity nimco to your phone or use a computer to access Dexcom clarity.  Find your Dexcom clarity password or make a new one.  Call the Dexcom customer service line for assistance as needed. [phone]  4.  Change the sensor every 10 days and remember to change the 4 digit code. The code is on the sensor tab.  Change the transmitter for 90 days. The new transmitter code is on the box.  Calibration is only required if a sensor code is not entered. Use a fingerstick blood sugar to calibrate. This will improve accuracy of your data.  Continue Trulicity 1.5 mg every 7 days.  Continue Lantus 28 units twice per day.    Call diabetes care to schedule a follow-up to interpret Dexcom data and adjust medications. ^  [phone]    Dexcom Sharing  To share data with the clinic/providers please download the Clarity Dexcom Nimco on your phone and sign in. Find your password or create a new one.  You can also access Dexcom Clarity on your home computer.  3. Tap profile.  4. Tap authorize sharing.  5. Tap accept invitation.  6. Enter sharing code provided by your clinic (in folder).  7. Select your Date of Birth.  8. Tap continue.  9. Tap the I consent to share my data with my clinic box.  10. Tap yes, share my data.    You can call Dexcom Customer Service for assistance or ask your daughter to help if needed.     Continue Lantus and Trulicity at current doses.    Blood sugar goals:   Before meals:   1-2 hours after meals: less 200  Bedtime: 100-160    A1c goal of less than 8.0% (estimated average blood sugar of 183 on meter)

## 2023-08-17 ENCOUNTER — THERAPY VISIT (OUTPATIENT)
Dept: PHYSICAL THERAPY | Facility: REHABILITATION | Age: 73
End: 2023-08-17
Payer: COMMERCIAL

## 2023-08-17 DIAGNOSIS — G89.29 CHRONIC BILATERAL LOW BACK PAIN WITH LEFT-SIDED SCIATICA: Primary | ICD-10-CM

## 2023-08-17 DIAGNOSIS — Z98.890 HISTORY OF LUMBAR SURGERY: ICD-10-CM

## 2023-08-17 DIAGNOSIS — M54.50 BILATERAL LOW BACK PAIN WITHOUT SCIATICA, UNSPECIFIED CHRONICITY: ICD-10-CM

## 2023-08-17 DIAGNOSIS — M43.16 SPONDYLOLISTHESIS OF LUMBAR REGION: ICD-10-CM

## 2023-08-17 DIAGNOSIS — M54.42 CHRONIC BILATERAL LOW BACK PAIN WITH LEFT-SIDED SCIATICA: Primary | ICD-10-CM

## 2023-08-17 DIAGNOSIS — M48.061 LUMBAR FORAMINAL STENOSIS: ICD-10-CM

## 2023-08-17 DIAGNOSIS — M54.16 LUMBAR RADICULOPATHY: ICD-10-CM

## 2023-08-17 PROCEDURE — 97110 THERAPEUTIC EXERCISES: CPT | Mod: GP | Performed by: PHYSICAL THERAPIST

## 2023-08-25 ENCOUNTER — OFFICE VISIT (OUTPATIENT)
Dept: FAMILY MEDICINE | Facility: CLINIC | Age: 73
End: 2023-08-25
Payer: COMMERCIAL

## 2023-08-25 ENCOUNTER — ANTICOAGULATION THERAPY VISIT (OUTPATIENT)
Dept: ANTICOAGULATION | Facility: CLINIC | Age: 73
End: 2023-08-25

## 2023-08-25 ENCOUNTER — LAB (OUTPATIENT)
Dept: LAB | Facility: CLINIC | Age: 73
End: 2023-08-25
Payer: COMMERCIAL

## 2023-08-25 VITALS
WEIGHT: 169.9 LBS | BODY MASS INDEX: 30.11 KG/M2 | SYSTOLIC BLOOD PRESSURE: 132 MMHG | HEART RATE: 76 BPM | HEIGHT: 63 IN | TEMPERATURE: 98 F | OXYGEN SATURATION: 98 % | DIASTOLIC BLOOD PRESSURE: 60 MMHG | RESPIRATION RATE: 18 BRPM

## 2023-08-25 DIAGNOSIS — I10 PRIMARY HYPERTENSION: ICD-10-CM

## 2023-08-25 DIAGNOSIS — Z79.4 TYPE 2 DIABETES MELLITUS WITH HYPERGLYCEMIA, WITH LONG-TERM CURRENT USE OF INSULIN (H): Primary | ICD-10-CM

## 2023-08-25 DIAGNOSIS — F51.01 PRIMARY INSOMNIA: ICD-10-CM

## 2023-08-25 DIAGNOSIS — I48.0 PAROXYSMAL ATRIAL FIBRILLATION (H): ICD-10-CM

## 2023-08-25 DIAGNOSIS — E11.65 TYPE 2 DIABETES MELLITUS WITH HYPERGLYCEMIA, WITH LONG-TERM CURRENT USE OF INSULIN (H): Primary | ICD-10-CM

## 2023-08-25 DIAGNOSIS — I48.0 PAROXYSMAL ATRIAL FIBRILLATION (H): Primary | ICD-10-CM

## 2023-08-25 LAB
HBA1C MFR BLD: 8.4 % (ref 0–5.6)
INR BLD: 1.9 (ref 0.9–1.1)

## 2023-08-25 PROCEDURE — 36415 COLL VENOUS BLD VENIPUNCTURE: CPT | Performed by: FAMILY MEDICINE

## 2023-08-25 PROCEDURE — 83036 HEMOGLOBIN GLYCOSYLATED A1C: CPT | Performed by: FAMILY MEDICINE

## 2023-08-25 PROCEDURE — 99214 OFFICE O/P EST MOD 30 MIN: CPT | Performed by: FAMILY MEDICINE

## 2023-08-25 PROCEDURE — 36416 COLLJ CAPILLARY BLOOD SPEC: CPT

## 2023-08-25 PROCEDURE — 85610 PROTHROMBIN TIME: CPT

## 2023-08-25 RX ORDER — CLOBETASOL PROPIONATE 0.5 MG/G
CREAM TOPICAL 2 TIMES DAILY
COMMUNITY
Start: 2023-05-30 | End: 2024-09-06

## 2023-08-25 RX ORDER — DULAGLUTIDE 3 MG/.5ML
3 INJECTION, SOLUTION SUBCUTANEOUS WEEKLY
Qty: 6 ML | Refills: 3 | Status: SHIPPED | OUTPATIENT
Start: 2023-08-25 | End: 2023-08-30

## 2023-08-25 RX ORDER — HYDROXYZINE HYDROCHLORIDE 25 MG/1
25 TABLET, FILM COATED ORAL
Qty: 30 TABLET | Refills: 1 | Status: SHIPPED | OUTPATIENT
Start: 2023-08-25 | End: 2023-10-24

## 2023-08-25 RX ORDER — TRIAMCINOLONE ACETONIDE 1 MG/G
CREAM TOPICAL 2 TIMES DAILY
COMMUNITY
Start: 2023-05-30

## 2023-08-25 ASSESSMENT — ENCOUNTER SYMPTOMS: HYPERTENSION: 1

## 2023-08-25 NOTE — PROGRESS NOTES
ANTICOAGULATION MANAGEMENT     Kemi Soto 73 year old female is on warfarin with subtherapeutic INR result. (Goal INR 2.0-3.0)    Recent labs: (last 7 days)     08/25/23  1353   INR 1.9*       ASSESSMENT     Source(s): Chart Review and Patient/Caregiver Call     Warfarin doses taken: Warfarin taken as instructed  Diet:  being out of town this week may be affecting diet and INR  Medication/supplement changes: None noted  New illness, injury, or hospitalization: No  Signs or symptoms of bleeding or clotting: No  Previous result: Therapeutic last visit; previously outside of goal range  Additional findings: None       PLAN     Recommended plan for temporary change(s) affecting INR     Dosing Instructions: Continue your current warfarin dose with next INR in 2 weeks       Summary  As of 8/25/2023      Full warfarin instructions:  5 mg every Sun, Tue, Thu; 2.5 mg all other days   Next INR check:  9/13/2023               Telephone call with Ciarra who verbalizes understanding and agrees to plan    Patient elected to schedule next visit 9/13    Education provided:   Goal range and lab monitoring: goal range and significance of current result and Importance of following up at instructed interval    Plan made per [facility] anticoagulation protocol    Melissa Hunt RN  Anticoagulation Clinic  8/25/2023    _______________________________________________________________________     Anticoagulation Episode Summary       Current INR goal:  2.0-3.0   TTR:  37.3 % (1 y)   Target end date:  Indefinite   Send INR reminders to:  [facility]    Indications    Paroxysmal atrial fibrillation (H) [I48.0]             Comments:               Anticoagulation Care Providers       Provider Role Specialty Phone number    Chuck Hernandez MD Referring Cardiovascular Disease [phone]

## 2023-08-25 NOTE — PROGRESS NOTES
Assessment & Plan     (E11.65,  Z79.4) Type 2 diabetes mellitus with hyperglycemia, with long-term current use of insulin (H)  (primary encounter diagnosis)  Comment: A1c 8.4 improved a lot compared to 4 month ago. She started trilucity 2 month ago and no side effect. She continues use lantus. Bs never < 70, occasionally > 300. Most time is around 140-160.   Plan: Hemoglobin A1c        We addressed diet control, exercise, eye care and foot care. Will increase the trulicity from 1.5 mg to 3 mg weekly for better control. Follow-up in 3 month/        (F51.01) Primary insomnia  Comment: pt state sometimes she is anxious and anxiety at bedtime and not able to fall asleep, then the next day she will be tired. She tried trazodone that did not help.    Plan: hydrOXYzine (ATARAX) 25 MG tablet        Will try hydroxyzine prn at bed time for insomnia. Side effect addressed    (I10) Primary hypertension  Comment: bp reasonable control. She check bp at home regular. Denies cp, sob, palpitation, headache and blurry vision  Plan: continue current medication. Advise to close monitor bp and pulse at home. If bp is < 100/60 she will hold al bp medication. If pulse is < 60 she will hold sotalol.         See Patient Instructions    Elaine Galvez MD  [facility]    Keith Lafleur is a 73 year old, presenting for the following health issues:  Diabetes, Hypertension, and Hyperlipidemia        8/25/2023     1:58 PM   Additional Questions   Roomed by Mari       Hypertension     Hyperlipidemia    History of Present Illness       Diabetes:   She presents for follow up of diabetes.    She is not checking blood glucose.        She is concerned about other.   She is having numbness in feet, redness, sores, or blisters on feet and excessive thirst.            Hyperlipidemia:  She presents for follow up of hyperlipidemia.   She is taking medication to lower cholesterol. She is not having myalgia or other side  "effects to statin medications.    Hypertension: She presents for follow up of hypertension.  She does check blood pressure  regularly outside of the clinic. Outpatient blood pressures have not been over 140/90. She follows a low salt diet.     She eats 2-3 servings of fruits and vegetables daily.She consumes 0 sweetened beverage(s) daily.She exercises with enough effort to increase her heart rate 9 or less minutes per day.  She exercises with enough effort to increase her heart rate 3 or less days per week.   She is taking medications regularly.            Review of Systems   Constitutional, HEENT, cardiovascular, pulmonary, gi and gu systems are negative, except as otherwise noted.      Objective    /60 (BP Location: Left arm, Patient Position: Sitting, Cuff Size: Adult Regular)   Pulse 76   Temp 98  F (36.7  C) (Oral)   Resp 18   Ht 1.588 m (5' 2.5\")   Wt 77.1 kg (169 lb 14.4 oz)   LMP  (LMP Unknown)   SpO2 98%   BMI 30.58 kg/m    Body mass index is 30.58 kg/m .  Physical Exam   GENERAL: healthy, alert and no distress  NECK: no adenopathy, no asymmetry, masses, or scars and thyroid normal to palpation  RESP: lungs clear to auscultation - no rales, rhonchi or wheezes  CV: regular rate and rhythm, normal S1 S2, no S3 or S4, no murmur, click or rub, no peripheral edema and peripheral pulses strong  ABDOMEN: soft, nontender, no hepatosplenomegaly, no masses and bowel sounds normal  MS: no gross musculoskeletal defects noted, no edema                 "

## 2023-08-28 ENCOUNTER — TELEPHONE (OUTPATIENT)
Dept: FAMILY MEDICINE | Facility: CLINIC | Age: 73
End: 2023-08-28
Payer: COMMERCIAL

## 2023-08-28 DIAGNOSIS — I10 ESSENTIAL HYPERTENSION: ICD-10-CM

## 2023-08-28 RX ORDER — HYDROCHLOROTHIAZIDE 12.5 MG/1
12.5 TABLET ORAL DAILY
Qty: 90 TABLET | Refills: 0 | Status: SHIPPED | OUTPATIENT
Start: 2023-08-28 | End: 2024-01-09

## 2023-08-28 NOTE — TELEPHONE ENCOUNTER
Called pt to confirm she is taking med because Dr Hernandez's note from May 2023 said pt started and then stopped.  Pt states she never stopped the medication.  Refill sent.  -[facility]

## 2023-08-28 NOTE — TELEPHONE ENCOUNTER
Prior Authorization Request  Who s requesting:  cover my meds  Pharmacy Name and Location: [address] [facility]   Medication Name:     hydrOXYzine (ATARAX) 25 MG tablet     Insurance Plan: [facility] medicare  Insurance Member ID Number:  [id number]  CoverMyMeds Key: [id number]

## 2023-08-29 ENCOUNTER — TELEPHONE (OUTPATIENT)
Dept: FAMILY MEDICINE | Facility: CLINIC | Age: 73
End: 2023-08-29

## 2023-08-29 NOTE — TELEPHONE ENCOUNTER
General Call      Reason for Call:  medication questions, Trulicity    What are your questions or concerns:    dulaglutide (TRULICITY) 1.5 MG/0.5ML pen     Patient states the copay is over $700 looking to have medication switched to a cheaper pharmacy through [facility], would like to know if it valid, and would like to know if she may qualify for assistance in paying for medication, or if there is an alternative that is possible    Date of last appointment with provider: 8/25/23    Could we send this information to you in [place] in or would you prefer to receive a phone call?:   Patient would prefer a phone call   Okay to leave a detailed message?: Yes at Cell number on file:  [phone]

## 2023-08-30 ENCOUNTER — MYC MEDICAL ADVICE (OUTPATIENT)
Dept: FAMILY MEDICINE | Facility: CLINIC | Age: 73
End: 2023-08-30
Payer: COMMERCIAL

## 2023-08-30 ENCOUNTER — TELEPHONE (OUTPATIENT)
Dept: FAMILY MEDICINE | Facility: CLINIC | Age: 73
End: 2023-08-30
Payer: COMMERCIAL

## 2023-08-30 DIAGNOSIS — Z79.4 TYPE 2 DIABETES MELLITUS WITH HYPERGLYCEMIA, WITH LONG-TERM CURRENT USE OF INSULIN (H): ICD-10-CM

## 2023-08-30 DIAGNOSIS — E11.65 TYPE 2 DIABETES MELLITUS WITH HYPERGLYCEMIA, WITH LONG-TERM CURRENT USE OF INSULIN (H): Primary | ICD-10-CM

## 2023-08-30 DIAGNOSIS — E11.65 TYPE 2 DIABETES MELLITUS WITH HYPERGLYCEMIA, WITH LONG-TERM CURRENT USE OF INSULIN (H): ICD-10-CM

## 2023-08-30 DIAGNOSIS — Z79.4 TYPE 2 DIABETES MELLITUS WITH HYPERGLYCEMIA, WITH LONG-TERM CURRENT USE OF INSULIN (H): Primary | ICD-10-CM

## 2023-08-30 RX ORDER — DULAGLUTIDE 3 MG/.5ML
3 INJECTION, SOLUTION SUBCUTANEOUS WEEKLY
Qty: 6 ML | Refills: 3 | Status: SHIPPED | OUTPATIENT
Start: 2023-08-30 | End: 2023-10-05

## 2023-08-30 NOTE — TELEPHONE ENCOUNTER
Central Prior Authorization Team   Phone: [phone]    PA Initiation    Medication: HYDROXYZINE HCL 25 MG PO TABS  Insurance Company: Other (see comments)Comment:  [employer] [phone]  Pharmacy Filling the Rx: [facility] [address] [place], [place] - [address] [place] [facility]  Filling Pharmacy Phone: [phone]  Filling Pharmacy Fax:    Start Date: 8/30/2023

## 2023-08-30 NOTE — TELEPHONE ENCOUNTER
Please inform pt that I re ordered ozempic 1 mg weekly to her pharmacy.     Elaine Galvez MD on 8/30/2023 at 2:53 PM;

## 2023-08-30 NOTE — TELEPHONE ENCOUNTER
TCB  Can not get medication for [facility] due to restrictions.  Should call [employer]urnace and see if Ozempic or Mounjaro are covered better.  Trulicity is not taking new applications for financial assistance at this time

## 2023-08-30 NOTE — TELEPHONE ENCOUNTER
Prior Authorization Request  Who s requesting:  patient and pharmacy  Pharmacy Name and Location: [facility]  Medication Name: Ozempic 1mg  Insurance Plan: [employer]  Insurance Member ID Number:  [id number]  CoverMyMeds Key:   Informed patient that prior authorizations can take up to 10 business days for response:     Okay to leave a detailed message:

## 2023-08-30 NOTE — TELEPHONE ENCOUNTER
Prior Authorization Approval    Medication: HYDROXYZINE HCL 25 MG PO TABS  Authorization Effective Date: 6/1/2023  Authorization Expiration Date: 8/30/2024  Approved Dose/Quantity:   Reference #:     Insurance Company: Other (see comments)Comment:  [employer] [phone]  Expected CoPay:       CoPay Card Available:      Financial Assistance Needed:   Which Pharmacy is filling the prescription: [facility] [address] [place] [address] [address] [place] [address]  Pharmacy Notified: Yes  Patient Notified: No  **Instructed pharmacy to notify patient when script is ready to /ship.**

## 2023-08-30 NOTE — TELEPHONE ENCOUNTER
Reason for call:  Other     Patient called regarding (reason for call): Medication question    Additional comments: Pt is needing to change from Trulicity to a lower cost medication. She spoke with insurance and the following will be covered: Victoza, Rybelsus, Ozempic, Bydureon. She would like to know which one Dr. Galvez recommends and asks that a script be sent to her pharmacy for that med.    Phone number to reach patient:  Home number on file [phone] (home)    Best Time:  Any    Can we leave a detailed message on this number?  YES

## 2023-08-30 NOTE — TELEPHONE ENCOUNTER
Left message to call back for: patient  Information to relay to patient: message will be sent to diabetic team for Liv's help.

## 2023-08-30 NOTE — TELEPHONE ENCOUNTER
Pt notified. She knows Monjaro is not covered as she started with this medication. Will ask about Ozempic.  She is asking if she can get her refills sent to a [place] pharmacy if she can find one. She was notified per Dr Galvez that we can't send directly to [facility] but we can print a rx and the patient can mail it or fax it to them. Pt understands.     Apixaban/Eliquis increases your risk for bleeding. Notify your doctor if you experience any of the following side effects: bleeding, coughing or vomiting blood, red or black stool, unexpected pain or swelling, itching or hives, chest pain, chest tightness, trouble breathing, changes in how much or how often you urinate, red or pink urine, numbness or tingling in your feet, or unusual muscle weakness. When Apixaban/Eliquis is taken with other medicines, they can affect how it works. Taking other medications such as aspirin, blood thinners, nonsteroidal anti-inflammatories, and medications that treat depression can increase your risk of bleeding. It is very important to tell your health care provider about all of the other medicines, including over-the-counter medications, herbs, and vitamins you are taking. DO NOT start, stop, or change the dosage of any medicine, including over-the-counter medicines, vitamins, and herbal products without your doctor’s approval. Any products containing aspirin or are nonsteroidal anti-inflammatories lessen the blood’s ability to form clots and add to the effect of Apixaban/Eliquis. Never take aspirin or medicines that contain aspirin without speaking to your doctor.

## 2023-09-01 ENCOUNTER — OFFICE VISIT (OUTPATIENT)
Dept: CARDIOLOGY | Facility: CLINIC | Age: 73
End: 2023-09-01
Attending: INTERNAL MEDICINE
Payer: COMMERCIAL

## 2023-09-01 VITALS
RESPIRATION RATE: 16 BRPM | HEIGHT: 63 IN | DIASTOLIC BLOOD PRESSURE: 72 MMHG | SYSTOLIC BLOOD PRESSURE: 154 MMHG | HEART RATE: 59 BPM | WEIGHT: 172 LBS | BODY MASS INDEX: 30.48 KG/M2

## 2023-09-01 DIAGNOSIS — E78.2 MIXED HYPERLIPIDEMIA: ICD-10-CM

## 2023-09-01 DIAGNOSIS — E78.00 HYPERCHOLESTEROLEMIA: ICD-10-CM

## 2023-09-01 DIAGNOSIS — I25.10 CORONARY ARTERY DISEASE DUE TO LIPID RICH PLAQUE: ICD-10-CM

## 2023-09-01 DIAGNOSIS — I48.0 PAROXYSMAL ATRIAL FIBRILLATION (H): ICD-10-CM

## 2023-09-01 DIAGNOSIS — I25.83 CORONARY ARTERY DISEASE DUE TO LIPID RICH PLAQUE: ICD-10-CM

## 2023-09-01 PROCEDURE — 99214 OFFICE O/P EST MOD 30 MIN: CPT | Performed by: INTERNAL MEDICINE

## 2023-09-01 RX ORDER — EZETIMIBE 10 MG/1
10 TABLET ORAL DAILY
Qty: 90 TABLET | Refills: 3 | Status: SHIPPED | OUTPATIENT
Start: 2023-09-01 | End: 2024-07-10

## 2023-09-01 NOTE — LETTER
9/1/2023    Elaine Galvez MD  [address]  [address]  [facility] [address]    RE: Kemi Soto       Dear Colleague,     I had the pleasure of seeing Kemi Soto in the [facility].    HEART CARE ENCOUNTER CONSULTATON NOTE      [facility]  [phone]      Assessment/Recommendations   Assessment/Plan:  1.  Coronary artery disease.  Most recent stress test is a nuclear stress test from 2020 that was negative for ischemia.  Coronary angiogram completed in 2019 demonstrated diffuse disease.  The proximal to mid circumflex had a 90% stenosis, the obtuse marginal branches had 80 to 90% stenosis, stent in the left anterior descending and stent in the circumflex.  The mid LAD was 100% stenosed.  The mid RCA was 70% stenosed.  2 previous saphenous vein grafts are known to be occluded.  The note at that time indicates that both right coronary and circumflex had significant lesions both preceded by small caliber distal vessels options suggested including revascularization by stenting to the right coronary artery and circumflex with small caliber stents with long disease in the circumflex or medical management.  She had LIMA to the LAD also documented.  We are going to plan follow-up stress testing given her diffuse coronary artery disease.    2.  Typical isthmus flutter and atrial fibrillation.  Patient had been noted to be in atrial flutter during routine follow-up visit November 2021.  Discussions were had with the EP colleagues regarding ablation procedure which she pursued medical management.  She has been on sotalol.  Patient wanted to review the BRAINDIGIT pamphlet at that time which I gave her to review.  ECG from November 2022 reported a QTc of 423 ms with nonspecific ST-T changes.  She reports that she has noted some occasional awareness of her heart pounding but not clearly tachycardic.  She has since cut herself with a Apple Watch and we talked about how to utilize it  and save tracings.    3.  Dyslipidemia.  Most recent lipids are from April 2023 at which time total cholesterol was 129, LDL of 56.  4.  Hypertension.  Last visit she had told me she was no longer taking hydrochlorothiazide but is indeed taking this medication.  Her blood pressure on arrival was elevated at 154/72 my examination was 110/60.    Plan 1.  Exercise nuclear stress test.  If patient is unable to exercise which she would like to try then we can switch to a Lexiscan study.  This is to follow-up of her diffuse coronary artery disease history.  2.  Echocardiogram in the setting of atrial fibrillation.  3.  Blood pressure monitoring and report  4.  Follow-up in 6 months.       History of Present Illness/Subjective    HPI: Kemi Soto is a 73 year old female who is seen in follow up.She has a history of typical isthmus flutter and atrial fibrillation. Last time we visited this appears to be controlled on sotalol. She had previously visited with the EP colleague regarding possible ablation which she declined. She wished to continue on low-dose of sotalol. She has a history of coronary artery disease with multivessel intervention with prior history of LIMA to the LAD and vein graft to the obtuse marginal. She had a negative nuclear stress test November 2020. She has a history of hypertension and a history of hyperlipidemia.  We last visited May 2023.  Hydrochlorothiazide has been added to her medication regimen which she subsequently chose to come off because she felt as if she was urinating too much.  She wore an event monitor in June 2023 that revealed no atrial fibrillation with occasional supraventricular ectopic complexes.  Echocardiogram was also requested in May which was not yet completed.    Recent Echocardiogram Results:    Status: Final result       Visible to patient: Yes (not seen)       Next appt: Today at 01:20 PM in Cardiology (Chuck Hernandez MD)    0 Result Notes  Details    Reading  Physician Reading Date Result Priority   Janee Choudhary MD  [phone] 11/5/2020 Routine   Provider, Historical 11/5/2020      Narrative & Impression    No previous study for comparison.    Left ventricle ejection fraction is normal. The calculated left ventricular ejection fraction is 58%.    Normal left ventricular size and systolic function.    Normal right ventricular size and systolic function.    Mild concentric hypertrophy noted.    Left atrial volume is moderately increase     Recent Coronary Angiogram Results:     dications    CAD (coronary artery disease) [I25.10 (ICD-10-CM)]   Coronary artery disease involving native coronary artery of native heart without angina pectoris [I25.10 (ICD-10-CM)]         Conclusion      Estimated blood loss was <20 ml.    Prox Cx to Mid Cx lesion 90% stenosed.    Lat 1st Mrg-2 lesion 80% stenosed.    Lat 1st Mrg-1 lesion 90% stenosed.    Mid Cx lesion 80% stenosed.    Ost LAD to Prox LAD lesion 40% stenosed.    A drug eluting .    Ost Cx to Prox Cx lesion 35% stenosed.    A drug eluting .    Ost 1st Diag lesion 50% stenosed.    1st Mrg lesion 70% stenosed.    Mid LAD lesion 100% stenosed.    Ost RCA to Mid RCA lesion 30% stenosed.    Mid RCA lesion 70% stenosed.    Post Atrio lesion 40% stenosed.    Ost RPDA to RPDA lesion 40% stenosed.    The left ventricular size is normal. The left ventricular systolic function is normal. LV systolic pressure is normal. LV end diastolic pressure is normal. There are no wall motion abnormalities in the left ventricle.    Dist LAD-1 lesion 50% stenosed.    Dist LAD-2 lesion 70% stenosed.     Patient has 2 previous saphenous vein grafts known to be occluded.     Both right coronary and circumflex have significant lesions present.  Both precede small caliber distal vessels.  Options would include partial revascularization by stenting right coronary and circumflex with small caliber stents.  Circumflex has a   relatively long segment of  disease.  Alternative would be continued medical management.     Cardiac Electrophysiology  Mobile cardiac telemetry monitor report     Mobile cardiac telemetry monitoring from 2023 to 2023 (monitored duration 4d 0h 50m).  Predominant underlying rhythm was sinus rhythm, 50 to 120bpm, average 62bpm.  No tachyarrhythmias.  No atrial fibrillation.  There were no pauses noted.  Occasional supraventricular ectopic beats (2%).  No premature ventricular contractions recorded.  Symptom triggers (1, other) correlated to sinus rhythm with PACs.        Electronically signed by Gualberto Garsia MD  2023  8:55 AM      Sinus rhythm   Septal infarct (cited on or before 2021)   ST & T wave abnormality, consider lateral ischemia   Abnormal ECG   When compared with ECG of 2022 17:35,   No significant change was found   Confirmed by SHARON COHEN MD LOC:WW ([id number]) on 2023 4:28      Kemi Soto MRN  [id number] Legal Sex  Female              Age  1950 (73 year old)     NM Lexiscan stress test  Order: [id number]  Status: Final result       Visible to patient: Yes (not seen)       Next appt: Today at 01:20 PM in Cardiology (Chuck Hernandez MD)    0 Result Notes  Details    Reading Physician Reading Date Result Priority   Deejay Warren   [phone] 2020 Routine   Provider, Historical 2020      Result Text    The nuclear stress test is negative for inducible myocardial ischemia or infarction.    The left ventricular ejection fraction at rest is 64%.    Left ventricular function is normal.        Physical Examination  Review of Systems   Vitals: 154/72, 110/60, pulse of 59, respiratory rate 16, weight 172 pounds  Wt Readings from Last 3 Encounters:   23 77.1 kg (169 lb 14.4 oz)   23 78.5 kg (173 lb)   23 79.8 kg (176 lb)       General Appearance:   no distress, normal body habitus   ENT/Mouth: membranes moist, no oral lesions or bleeding gums.       EYES:  no scleral icterus, normal conjunctivae   Neck: no carotid bruits    Chest/Lungs:   lungs are clear to auscultation, no rales or wheezing,equal chest wall expansion    Cardiovascular:   Regular. Normal first and second heart sounds with no murmurs, rubs, or gallops; the carotid, radial and posterior tibial pulses are intact, Jugular venous pressure within normal limits, no significant edema bilaterally    Abdomen:  no  bruits, or tenderness; bowel sounds are present   Extremities: no cyanosis or clubbing   Skin: no xanthelasma, warm.    Neurologic: no tremors     Psychiatric: alert and oriented x3, calm        Please refer above for cardiac ROS details.        Medical History  Surgical History Family History Social History   Past Medical History:   Diagnosis Date    Coronary artery disease     Diabetes mellitus (H)     Discitis of thoracolumbar region 10/10/2015    Encephalopathy 10/14/2015    Epidural abscess 10/10/2015    Hip pain, right     Hyperlipidemia     Sepsis (H) 10/8/2015    Stroke (H) 06/23/2015    Neurology felt that episode was C/W subacute ischemic stroke    TIA (transient ischemic attack) 6/23/2015    UTI (urinary tract infection)     admitted to [facility] with UTI     Past Surgical History:   Procedure Laterality Date    ANGIOPLASTY  03/30/2016    Drug eluting stent mid LAD, cutting balloon to 1st diagonal    ANGIOPLASTY  2008    BYPASS GRAFT ARTERY CORONARY  12/11/2007    X 3 vessels, LIMA to LAD, saph vein graft to distal RCA, saphvein graft to marginal, mini circuit bypass.  [facility].    CORONARY STENT PLACEMENT  2008    Left main, left circumflex, RCA    CORONARY STENT PLACEMENT  03/2016    CV CORONARY ANGIOGRAM N/A 8/1/2018    Procedure: Coronary Angiogram;  Surgeon: Kit Bena MD;  Location: [facility] Cath Lab;  Service:     CV LEFT HEART CATHETERIZATION WITH LEFT VENTRICULOGRAM N/A 8/1/2018    Procedure: Left Heart Catheterization with Left Ventriculogram;   Surgeon: Kit Bean MD;  Location: [facility] Cath Lab;  Service:     INSERT MIDLINE HE  10/31/2015         LUMBAR DISCECTOMY N/A 10/11/2015    Procedure: BILATERAL L1-L5 DECOMPRESSIVE LAMINECTOMY WITH EVACUATION OF EPIDURAL ABSCESS IRRIGATION & DEBRIDEMENT;  Surgeon: Luli Chan MD;  Location: [facility] OR;  Service:     PICC  10/19/2015         RELEASE CARPAL TUNNEL Bilateral      Family History   Problem Relation Age of Onset    Cerebrovascular Disease Mother     Diabetes Father     Diabetes Sister     Cerebrovascular Disease Sister 81.00    Cerebrovascular Disease Brother     Diabetes Brother         Social History     Socioeconomic History    Marital status: Single     Spouse name: Not on file    Number of children: 1    Years of education: Not on file    Highest education level: Not on file   Occupational History    Not on file   Tobacco Use    Smoking status: Former     Types: Cigarettes     Quit date: 2007     Years since quittin.2     Passive exposure: Past    Smokeless tobacco: Never   Vaping Use    Vaping Use: Never used   Substance and Sexual Activity    Alcohol use: No     Comment: Alcoholic Drinks/day: stopped drinking 2015    Drug use: No    Sexual activity: Never   Other Topics Concern    Not on file   Social History Narrative    Lives alone with cats and dogs. , one daughter, Tory Lofton.      Social Determinants of Health     Financial Resource Strain: Not on file   Food Insecurity: Not on file   Transportation Needs: Not on file   Physical Activity: Not on file   Stress: Not on file   Social Connections: Not on file   Intimate Partner Violence: Not on file   Housing Stability: Not on file           Medications  Allergies   Current Outpatient Medications   Medication Sig Dispense Refill    acetaminophen (TYLENOL) 500 MG tablet Take 1,000 mg by mouth every 12 hours as needed for mild pain      atorvastatin (LIPITOR) 80 MG tablet Take 1 tablet (80 mg)  by mouth At Bedtime 90 tablet 3    busPIRone (BUSPAR) 15 MG tablet Take 1 tablet (15 mg) by mouth daily 90 tablet 3    clobetasol (TEMOVATE) 0.05 % external cream Apply topically 2 times daily      Dulaglutide (TRULICITY) 3 MG/0.5ML SOPN Inject 3 mg Subcutaneous once a week 6 mL 3    ezetimibe (ZETIA) 10 MG tablet Take 1 tablet (10 mg) by mouth daily 90 tablet 3    gabapentin (NEURONTIN) 300 MG capsule TAKE 3 CAPSULES BY MOUTH EVERY DAY AT BEDTIME 270 capsule 3    hydrochlorothiazide (HYDRODIURIL) 12.5 MG tablet Take 1 tablet by mouth once daily 90 tablet 0    hydrOXYzine (ATARAX) 25 MG tablet Take 1 tablet (25 mg) by mouth nightly as needed for anxiety 30 tablet 1    insulin glargine (LANTUS SOLOSTAR) 100 UNIT/ML pen Inject 28 Units Subcutaneous 2 times daily INJECT 28 UNITS SUBCUTANEOUSLY TWICE DAILY 60 mL 3    isosorbide mononitrate (IMDUR) 60 MG 24 hr tablet Take 1 tablet (60 mg) by mouth daily 90 tablet 3    losartan (COZAAR) 100 MG tablet Take 1 tablet (100 mg) by mouth daily 90 tablet 3    Semaglutide, 1 MG/DOSE, (OZEMPIC) 4 MG/3ML pen Inject 1 mg Subcutaneous every 7 days 3 mL 3    sertraline (ZOLOFT) 100 MG tablet Take 1 tablet (100 mg) by mouth daily 90 tablet 3    sotalol (BETAPACE) 80 MG tablet Take 0.5 tablets (40 mg) by mouth 2 times daily 90 tablet 3    traMADol (ULTRAM) 50 MG tablet TAKE 1 TABLET BY MOUTH EVERY 8 HOURS AS NEEDED FOR PAIN OR SEVERE PAIN ON SCALE (7-10) 30 tablet 0    triamcinolone (KENALOG) 0.1 % external cream Apply topically 2 times daily      warfarin ANTICOAGULANT (COUMADIN) 5 MG tablet Take 0.5-1 tablets (2.5-5 mg) by mouth daily Adjust dose per INR results as instructed. 90 tablet 3       Allergies   Allergen Reactions    Metformin GI Disturbance    Oxycodone           Lab Results    Chemistry/lipid CBC Cardiac Enzymes/BNP/TSH/INR   Recent Labs   Lab Test 04/12/23  1617   CHOL 129   HDL 38*   LDL 56   TRIG 173*     Recent Labs   Lab Test 04/12/23  1617 10/28/22  1210  07/22/22  1126   LDL 56 88 78     Recent Labs   Lab Test 05/19/23  1315 04/12/23  1617   NA  --  137   POTASSIUM  --  4.5   CHLORIDE  --  100   CO2  --  27   * 185*   BUN  --  19.0   CR  --  0.97*   GFRESTIMATED  --  61   ELLIE  --  9.0     Recent Labs   Lab Test 04/12/23  1617 07/22/22  1126 11/24/21  1112   CR 0.97* 0.78 0.88     Recent Labs   Lab Test 08/25/23  1436 04/12/23  1617 04/11/22  1408   A1C 8.4* 10.8* 8.2*          Recent Labs   Lab Test 05/05/21  1400   WBC 7.9   HGB 13.0   HCT 40.2   MCV 91        Recent Labs   Lab Test 05/05/21  1400 11/05/20  0559 11/04/20  1616   HGB 13.0 11.6* 13.5    Recent Labs   Lab Test 11/06/20  0559 11/05/20  0559 11/04/20  2229   TROPONINI 0.18 0.37* 0.28     No results for input(s): BNP, NTBNPI, NTBNP in the last [id number] hours.  Recent Labs   Lab Test 05/05/21  1400   TSH 0.77     Recent Labs   Lab Test 08/25/23  1353 08/10/23  1401 07/27/23  1332   INR 1.9* 2.3* 1.8*        Chuck Hernandez MD      Thank you for allowing me to participate in the care of your patient.      Sincerely,     Chuck Hernandez MD     [facility]  cc:   Chuck Hernandez MD  [address]  [address]  [place] [address]

## 2023-09-01 NOTE — PATIENT INSTRUCTIONS
We are going to plan an echocardiogram and a nuclear stress test and I will be in touch with results.If you have a heart rhythm tracing call Priscila and she can tell you how to send it.Her number is [phone].Please ask your daughter to yanna set up the ECG function on the Apple watch.

## 2023-09-01 NOTE — PROGRESS NOTES
HEART CARE ENCOUNTER CONSULTATON NOTE      [facility]  [phone]      Assessment/Recommendations   Assessment/Plan:  1.  Coronary artery disease.  Most recent stress test is a nuclear stress test from 2020 that was negative for ischemia.  Coronary angiogram completed in 2019 demonstrated diffuse disease.  The proximal to mid circumflex had a 90% stenosis, the obtuse marginal branches had 80 to 90% stenosis, stent in the left anterior descending and stent in the circumflex.  The mid LAD was 100% stenosed.  The mid RCA was 70% stenosed.  2 previous saphenous vein grafts are known to be occluded.  The note at that time indicates that both right coronary and circumflex had significant lesions both preceded by small caliber distal vessels options suggested including revascularization by stenting to the right coronary artery and circumflex with small caliber stents with long disease in the circumflex or medical management.  She had LIMA to the LAD also documented.  We are going to plan follow-up stress testing given her diffuse coronary artery disease.    2.  Typical isthmus flutter and atrial fibrillation.  Patient had been noted to be in atrial flutter during routine follow-up visit November 2021.  Discussions were had with the EP colleagues regarding ablation procedure which she pursued medical management.  She has been on sotalol.  Patient wanted to review the [employer] pamphlet at that time which I gave her to review.  ECG from November 2022 reported a QTc of 423 ms with nonspecific ST-T changes.  She reports that she has noted some occasional awareness of her heart pounding but not clearly tachycardic.  She has since cut herself with a Apple Watch and we talked about how to utilize it and save tracings.    3.  Dyslipidemia.  Most recent lipids are from April 2023 at which time total cholesterol was 129, LDL of 56.  4.  Hypertension.  Last visit she had told me she was no longer taking  hydrochlorothiazide but is indeed taking this medication.  Her blood pressure on arrival was elevated at 154/72 my examination was 110/60.    Plan 1.  Exercise nuclear stress test.  If patient is unable to exercise which she would like to try then we can switch to a Lexiscan study.  This is to follow-up of her diffuse coronary artery disease history.  2.  Echocardiogram in the setting of atrial fibrillation.  3.  Blood pressure monitoring and report  4.  Follow-up in 6 months.       History of Present Illness/Subjective    HPI: Kemi Soto is a 73 year old female who is seen in follow up.She has a history of typical isthmus flutter and atrial fibrillation. Last time we visited this appears to be controlled on sotalol. She had previously visited with the EP colleague regarding possible ablation which she declined. She wished to continue on low-dose of sotalol. She has a history of coronary artery disease with multivessel intervention with prior history of LIMA to the LAD and vein graft to the obtuse marginal. She had a negative nuclear stress test November 2020. She has a history of hypertension and a history of hyperlipidemia.  We last visited May 2023.  Hydrochlorothiazide has been added to her medication regimen which she subsequently chose to come off because she felt as if she was urinating too much.  She wore an event monitor in June 2023 that revealed no atrial fibrillation with occasional supraventricular ectopic complexes.  Echocardiogram was also requested in May which was not yet completed.    Recent Echocardiogram Results:    Status: Final result       Visible to patient: Yes (not seen)       Next appt: Today at 01:20 PM in Cardiology (Chuck Hernandez MD)    0 Result Notes  Details    Reading Physician Reading Date Result Priority   Janee Choudhary MD  [phone] 11/5/2020 Routine   Provider, Historical 11/5/2020      Narrative & Impression    No previous study for comparison.    Left ventricle  ejection fraction is normal. The calculated left ventricular ejection fraction is 58%.    Normal left ventricular size and systolic function.    Normal right ventricular size and systolic function.    Mild concentric hypertrophy noted.    Left atrial volume is moderately increase     Recent Coronary Angiogram Results:     dications    CAD (coronary artery disease) [I25.10 (ICD-10-CM)]   Coronary artery disease involving native coronary artery of native heart without angina pectoris [I25.10 (ICD-10-CM)]         Conclusion      Estimated blood loss was <20 ml.    Prox Cx to Mid Cx lesion 90% stenosed.    Lat 1st Mrg-2 lesion 80% stenosed.    Lat 1st Mrg-1 lesion 90% stenosed.    Mid Cx lesion 80% stenosed.    Ost LAD to Prox LAD lesion 40% stenosed.    A drug eluting .    Ost Cx to Prox Cx lesion 35% stenosed.    A drug eluting .    Ost 1st Diag lesion 50% stenosed.    1st Mrg lesion 70% stenosed.    Mid LAD lesion 100% stenosed.    Ost RCA to Mid RCA lesion 30% stenosed.    Mid RCA lesion 70% stenosed.    Post Atrio lesion 40% stenosed.    Ost RPDA to RPDA lesion 40% stenosed.    The left ventricular size is normal. The left ventricular systolic function is normal. LV systolic pressure is normal. LV end diastolic pressure is normal. There are no wall motion abnormalities in the left ventricle.    Dist LAD-1 lesion 50% stenosed.    Dist LAD-2 lesion 70% stenosed.     Patient has 2 previous saphenous vein grafts known to be occluded.     Both right coronary and circumflex have significant lesions present.  Both precede small caliber distal vessels.  Options would include partial revascularization by stenting right coronary and circumflex with small caliber stents.  Circumflex has a   relatively long segment of disease.  Alternative would be continued medical management.     Cardiac Electrophysiology  Mobile cardiac telemetry monitor report     Mobile cardiac telemetry monitoring from 6/1/2023 to 6/14/2023 (monitored  duration 4d 0h 50m).  Predominant underlying rhythm was sinus rhythm, 50 to 120bpm, average 62bpm.  No tachyarrhythmias.  No atrial fibrillation.  There were no pauses noted.  Occasional supraventricular ectopic beats (2%).  No premature ventricular contractions recorded.  Symptom triggers (1, other) correlated to sinus rhythm with PACs.        Electronically signed by Gualberto Garsia MD  2023  8:55 AM      Sinus rhythm   Septal infarct (cited on or before 2021)   ST & T wave abnormality, consider lateral ischemia   Abnormal ECG   When compared with ECG of 2022 17:35,   No significant change was found   Confirmed by SHARON COHEN MD LOC:WW ([id number]) on 2023 4:28      Kemi Soto MRN  [id number] Legal Sex  Female              Age  1950 (73 year old)     NM Lexiscan stress test  Order: [id number]  Status: Final result       Visible to patient: Yes (not seen)       Next appt: Today at 01:20 PM in Cardiology (Chuck Hernandez MD)    0 Result Notes  Details    Reading Physician Reading Date Result Priority   Deejay Warren DO  [phone] 2020 Routine   Provider, Historical 2020      Result Text    The nuclear stress test is negative for inducible myocardial ischemia or infarction.    The left ventricular ejection fraction at rest is 64%.    Left ventricular function is normal.        Physical Examination  Review of Systems   Vitals: 154/72, 110/60, pulse of 59, respiratory rate 16, weight 172 pounds  Wt Readings from Last 3 Encounters:   23 77.1 kg (169 lb 14.4 oz)   23 78.5 kg (173 lb)   23 79.8 kg (176 lb)       General Appearance:   no distress, normal body habitus   ENT/Mouth: membranes moist, no oral lesions or bleeding gums.      EYES:  no scleral icterus, normal conjunctivae   Neck: no carotid bruits    Chest/Lungs:   lungs are clear to auscultation, no rales or wheezing,equal chest wall expansion    Cardiovascular:   Regular.  Normal first and second heart sounds with no murmurs, rubs, or gallops; the carotid, radial and posterior tibial pulses are intact, Jugular venous pressure within normal limits, no significant edema bilaterally    Abdomen:  no  bruits, or tenderness; bowel sounds are present   Extremities: no cyanosis or clubbing   Skin: no xanthelasma, warm.    Neurologic: no tremors     Psychiatric: alert and oriented x3, calm        Please refer above for cardiac ROS details.        Medical History  Surgical History Family History Social History   Past Medical History:   Diagnosis Date    Coronary artery disease     Diabetes mellitus (H)     Discitis of thoracolumbar region 10/10/2015    Encephalopathy 10/14/2015    Epidural abscess 10/10/2015    Hip pain, right     Hyperlipidemia     Sepsis (H) 10/8/2015    Stroke (H) 06/23/2015    Neurology felt that episode was C/W subacute ischemic stroke    TIA (transient ischemic attack) 6/23/2015    UTI (urinary tract infection)     ad[facility] to [facility] with UTI     Past Surgical History:   Procedure Laterality Date    ANGIOPLASTY  03/30/2016    Drug eluting stent mid LAD, cutting balloon to 1st diagonal    ANGIOPLASTY  2008    BYPASS GRAFT ARTERY CORONARY  12/11/2007    X 3 vessels, LIMA to LAD, saph vein graft to distal RCA, saphvein graft to marginal, mini circuit bypass.  [facility].    CORONARY STENT PLACEMENT  2008    Left main, left circumflex, RCA    CORONARY STENT PLACEMENT  03/2016    CV CORONARY ANGIOGRAM N/A 8/1/2018    Procedure: Coronary Angiogram;  Surgeon: Kit Bean MD;  Location: [facility] Cath Lab;  Service:     CV LEFT HEART CATHETERIZATION WITH LEFT VENTRICULOGRAM N/A 8/1/2018    Procedure: Left Heart Catheterization with Left Ventriculogram;  Surgeon: Kit Bean MD;  Location: [facility] Cath Lab;  Service:     INSERT MIDLINE HE  10/31/2015         LUMBAR DISCECTOMY N/A 10/11/2015    Procedure: BILATERAL L1-L5 DECOMPRESSIVE LAMINECTOMY  WITH EVACUATION OF EPIDURAL ABSCESS IRRIGATION & DEBRIDEMENT;  Surgeon: Luli Chan MD;  Location: [facility] OR;  Service:     PICC  10/19/2015         RELEASE CARPAL TUNNEL Bilateral      Family History   Problem Relation Age of Onset    Cerebrovascular Disease Mother     Diabetes Father     Diabetes Sister     Cerebrovascular Disease Sister 81.00    Cerebrovascular Disease Brother     Diabetes Brother         Social History     Socioeconomic History    Marital status: Single     Spouse name: Not on file    Number of children: 1    Years of education: Not on file    Highest education level: Not on file   Occupational History    Not on file   Tobacco Use    Smoking status: Former     Types: Cigarettes     Quit date: 2007     Years since quittin.2     Passive exposure: Past    Smokeless tobacco: Never   Vaping Use    Vaping Use: Never used   Substance and Sexual Activity    Alcohol use: No     Comment: Alcoholic Drinks/day: stopped drinking 2015    Drug use: No    Sexual activity: Never   Other Topics Concern    Not on file   Social History Narrative    Lives alone with cats and dogs. , one daughter, Tory Lofton.      Social Determinants of Health     Financial Resource Strain: Not on file   Food Insecurity: Not on file   Transportation Needs: Not on file   Physical Activity: Not on file   Stress: Not on file   Social Connections: Not on file   Intimate Partner Violence: Not on file   Housing Stability: Not on file           Medications  Allergies   Current Outpatient Medications   Medication Sig Dispense Refill    acetaminophen (TYLENOL) 500 MG tablet Take 1,000 mg by mouth every 12 hours as needed for mild pain      atorvastatin (LIPITOR) 80 MG tablet Take 1 tablet (80 mg) by mouth At Bedtime 90 tablet 3    busPIRone (BUSPAR) 15 MG tablet Take 1 tablet (15 mg) by mouth daily 90 tablet 3    clobetasol (TEMOVATE) 0.05 % external cream Apply topically 2 times daily       Dulaglutide (TRULICITY) 3 MG/0.5ML SOPN Inject 3 mg Subcutaneous once a week 6 mL 3    ezetimibe (ZETIA) 10 MG tablet Take 1 tablet (10 mg) by mouth daily 90 tablet 3    gabapentin (NEURONTIN) 300 MG capsule TAKE 3 CAPSULES BY MOUTH EVERY DAY AT BEDTIME 270 capsule 3    hydrochlorothiazide (HYDRODIURIL) 12.5 MG tablet Take 1 tablet by mouth once daily 90 tablet 0    hydrOXYzine (ATARAX) 25 MG tablet Take 1 tablet (25 mg) by mouth nightly as needed for anxiety 30 tablet 1    insulin glargine (LANTUS SOLOSTAR) 100 UNIT/ML pen Inject 28 Units Subcutaneous 2 times daily INJECT 28 UNITS SUBCUTANEOUSLY TWICE DAILY 60 mL 3    isosorbide mononitrate (IMDUR) 60 MG 24 hr tablet Take 1 tablet (60 mg) by mouth daily 90 tablet 3    losartan (COZAAR) 100 MG tablet Take 1 tablet (100 mg) by mouth daily 90 tablet 3    Semaglutide, 1 MG/DOSE, (OZEMPIC) 4 MG/3ML pen Inject 1 mg Subcutaneous every 7 days 3 mL 3    sertraline (ZOLOFT) 100 MG tablet Take 1 tablet (100 mg) by mouth daily 90 tablet 3    sotalol (BETAPACE) 80 MG tablet Take 0.5 tablets (40 mg) by mouth 2 times daily 90 tablet 3    traMADol (ULTRAM) 50 MG tablet TAKE 1 TABLET BY MOUTH EVERY 8 HOURS AS NEEDED FOR PAIN OR SEVERE PAIN ON SCALE (7-10) 30 tablet 0    triamcinolone (KENALOG) 0.1 % external cream Apply topically 2 times daily      warfarin ANTICOAGULANT (COUMADIN) 5 MG tablet Take 0.5-1 tablets (2.5-5 mg) by mouth daily Adjust dose per INR results as instructed. 90 tablet 3       Allergies   Allergen Reactions    Metformin GI Disturbance    Oxycodone           Lab Results    Chemistry/lipid CBC Cardiac Enzymes/BNP/TSH/INR   Recent Labs   Lab Test 04/12/23  1617   CHOL 129   HDL 38*   LDL 56   TRIG 173*     Recent Labs   Lab Test 04/12/23  1617 10/28/22  1210 07/22/22  1126   LDL 56 88 78     Recent Labs   Lab Test 05/19/23  1315 04/12/23  1617   NA  --  137   POTASSIUM  --  4.5   CHLORIDE  --  100   CO2  --  27   * 185*   BUN  --  19.0   CR  --  0.97*    GFRESTIMATED  --  61   ELLIE  --  9.0     Recent Labs   Lab Test 04/12/23  1617 07/22/22  1126 11/24/21  1112   CR 0.97* 0.78 0.88     Recent Labs   Lab Test 08/25/23  1436 04/12/23  1617 04/11/22  1408   A1C 8.4* 10.8* 8.2*          Recent Labs   Lab Test 05/05/21  1400   WBC 7.9   HGB 13.0   HCT 40.2   MCV 91        Recent Labs   Lab Test 05/05/21  1400 11/05/20  0559 11/04/20  1616   HGB 13.0 11.6* 13.5    Recent Labs   Lab Test 11/06/20  0559 11/05/20  0559 11/04/20  2229   TROPONINI 0.18 0.37* 0.28     No results for input(s): BNP, NTBNPI, NTBNP in the last 00822 hours.  Recent Labs   Lab Test 05/05/21  1400   TSH 0.77     Recent Labs   Lab Test 08/25/23  1353 08/10/23  1401 07/27/23  1332   INR 1.9* 2.3* 1.8*        Cuhck Hernandez MD

## 2023-09-02 NOTE — TELEPHONE ENCOUNTER
PA Initiation    Medication: OZEMPIC (1 MG/DOSE) 4 MG/3ML SC SOPN  Insurance Company: [employer] - Phone [phone] Fax [phone]  Pharmacy Filling the Rx: [facility] [address] [place] [address] [address]  Filling Pharmacy Phone: [phone]  Filling Pharmacy Fax:    Start Date: 9/2/2023

## 2023-09-05 NOTE — TELEPHONE ENCOUNTER
Prior Authorization Approval    Medication: OZEMPIC (1 MG/DOSE) 4 MG/3ML SC SOPN  Authorization Effective Date: 6/4/2023  Authorization Expiration Date: 9/2/2024  Approved Dose/Quantity: 3/28  Reference #: [id number]   Insurance Company: [employer] - Phone [phone] Fax [phone]  Expected CoPay:       CoPay Card Available:      Financial Assistance Needed:   Which Pharmacy is filling the prescription: [facility] [address] [facility] [address]  Pharmacy Notified: Yes  Patient Notified: Yes

## 2023-09-13 ENCOUNTER — ANTICOAGULATION THERAPY VISIT (OUTPATIENT)
Dept: ANTICOAGULATION | Facility: CLINIC | Age: 73
End: 2023-09-13

## 2023-09-13 ENCOUNTER — LAB (OUTPATIENT)
Dept: LAB | Facility: CLINIC | Age: 73
End: 2023-09-13
Payer: COMMERCIAL

## 2023-09-13 DIAGNOSIS — I48.0 PAROXYSMAL ATRIAL FIBRILLATION (H): ICD-10-CM

## 2023-09-13 DIAGNOSIS — I48.0 PAROXYSMAL ATRIAL FIBRILLATION (H): Primary | ICD-10-CM

## 2023-09-13 LAB — INR BLD: 1.7 (ref 0.9–1.1)

## 2023-09-13 PROCEDURE — 36416 COLLJ CAPILLARY BLOOD SPEC: CPT

## 2023-09-13 PROCEDURE — 85610 PROTHROMBIN TIME: CPT

## 2023-09-13 NOTE — PROGRESS NOTES
ANTICOAGULATION MANAGEMENT     Kemi Soto 73 year old female is on warfarin with subtherapeutic INR result. (Goal INR 2.0-3.0)    Recent labs: (last 7 days)     09/13/23  1304   INR 1.7*       ASSESSMENT     Source(s): Chart Review and Patient/Caregiver Call     Warfarin doses taken: Warfarin taken as instructed  Diet: No new diet changes identified  Medication/supplement changes: None noted  New illness, injury, or hospitalization: No  Signs or symptoms of bleeding or clotting: No  Previous result: Subtherapeutic  Additional findings: None       PLAN     Recommended plan for no diet, medication or health factor changes affecting INR     Dosing Instructions: Increase your warfarin dose (10% change) with next INR in 2 weeks       Summary  As of 9/13/2023      Full warfarin instructions:  2.5 mg every Mon, Wed, Fri; 5 mg all other days   Next INR check:  9/28/2023               Telephone call with Ciarra who agrees to plan and repeated back plan correctly    Lab visit scheduled    Education provided:   Goal range and lab monitoring: goal range and significance of current result    Plan made per [facility] anticoagulation protocol    Melissa Hunt RN  Anticoagulation Clinic  9/13/2023    _______________________________________________________________________     Anticoagulation Episode Summary       Current INR goal:  2.0-3.0   TTR:  34.0 % (1 y)   Target end date:  Indefinite   Send INR reminders to:  [facility] HEART [facility]    Indications    Paroxysmal atrial fibrillation (H) [I48.0]             Comments:               Anticoagulation Care Providers       Provider Role Specialty Phone number    Chuck Hernandez MD Referring Cardiovascular Disease [phone]

## 2023-09-28 ENCOUNTER — ANTICOAGULATION THERAPY VISIT (OUTPATIENT)
Dept: ANTICOAGULATION | Facility: CLINIC | Age: 73
End: 2023-09-28

## 2023-09-28 ENCOUNTER — LAB (OUTPATIENT)
Dept: LAB | Facility: CLINIC | Age: 73
End: 2023-09-28
Payer: COMMERCIAL

## 2023-09-28 DIAGNOSIS — I48.0 PAROXYSMAL ATRIAL FIBRILLATION (H): ICD-10-CM

## 2023-09-28 DIAGNOSIS — I48.0 PAROXYSMAL ATRIAL FIBRILLATION (H): Primary | ICD-10-CM

## 2023-09-28 LAB — INR BLD: 4.8 (ref 0.9–1.1)

## 2023-09-28 PROCEDURE — 85610 PROTHROMBIN TIME: CPT

## 2023-09-28 PROCEDURE — 36416 COLLJ CAPILLARY BLOOD SPEC: CPT

## 2023-09-28 NOTE — PROGRESS NOTES
ANTICOAGULATION MANAGEMENT     Kemi Soto 73 year old female is on warfarin with supratherapeutic INR result. (Goal INR 2.0-3.0)    Recent labs: (last 7 days)     09/28/23  1351   INR 4.8*       ASSESSMENT     Source(s): Chart Review  Previous INR was Subtherapeutic  Medication, diet, health changes since last INR chart reviewed; none identified         PLAN     Unable to reach Ciarra today.    Left message to hold warfarin tonight. Request call back for assessment.    Follow up required to confirm warfarin dose taken and assess for changes    Melissa Hunt RN  Anticoagulation Clinic  9/28/2023

## 2023-09-28 NOTE — PROGRESS NOTES
ANTICOAGULATION MANAGEMENT     Kemi Soto 73 year old female is on warfarin with supratherapeutic INR result. (Goal INR 2.0-3.0)    Recent labs: (last 7 days)     09/28/23  1351   INR 4.8*       ASSESSMENT     Source(s): Chart Review and Patient/Caregiver Call     Warfarin doses taken: Warfarin taken as instructed  Diet: No new diet changes identified  Medication/supplement changes: None noted  New illness, injury, or hospitalization: No  Signs or symptoms of bleeding or clotting: No  Previous result: Subtherapeutic  Additional findings:  Very labile INRs . Encouraged patient to call insurance to find out pricing of DOACs.        PLAN     Recommended plan for no diet, medication or health factor changes affecting INR     Dosing Instructions: hold dose then decrease your warfarin dose (9.1% change) with next INR in 10 days       Summary  As of 9/28/2023      Full warfarin instructions:  9/28: Hold; Otherwise 5 mg every Sun, Tue, Thu; 2.5 mg all other days   Next INR check:  10/12/2023               Telephone call with Ciarra who agrees to plan and repeated back plan correctly    Patient elected to schedule next visit 10/12    Education provided:   Goal range and lab monitoring: goal range and significance of current result, Importance of therapeutic range, and Importance of following up at instructed interval  Symptom monitoring: monitoring for bleeding signs and symptoms    Plan made per [facility] anticoagulation protocol    Melissa Hunt RN  Anticoagulation Clinic  9/28/2023    _______________________________________________________________________     Anticoagulation Episode Summary       Current INR goal:  2.0-3.0   TTR:  31.2 % (1 y)   Target end date:  Indefinite   Send INR reminders to:  [facility] HEART [facility]    Indications    Paroxysmal atrial fibrillation (H) [I48.0]             Comments:               Anticoagulation Care Providers       Provider Role Specialty Phone number    Chuck Hernandez,  MD Referring Cardiovascular Disease [phone]

## 2023-09-29 ENCOUNTER — HOSPITAL ENCOUNTER (OUTPATIENT)
Dept: NUCLEAR MEDICINE | Facility: HOSPITAL | Age: 73
Discharge: HOME | End: 2023-09-29
Attending: INTERNAL MEDICINE
Payer: COMMERCIAL

## 2023-09-29 ENCOUNTER — HOSPITAL ENCOUNTER (OUTPATIENT)
Dept: CARDIOLOGY | Facility: HOSPITAL | Age: 73
Discharge: HOME | End: 2023-09-29
Attending: INTERNAL MEDICINE
Payer: COMMERCIAL

## 2023-09-29 DIAGNOSIS — I25.83 CORONARY ARTERY DISEASE DUE TO LIPID RICH PLAQUE: ICD-10-CM

## 2023-09-29 DIAGNOSIS — I25.10 CORONARY ARTERY DISEASE DUE TO LIPID RICH PLAQUE: ICD-10-CM

## 2023-09-29 LAB
CV STRESS CURRENT BP HE: NORMAL
CV STRESS CURRENT HR HE: 101
CV STRESS CURRENT HR HE: 101
CV STRESS CURRENT HR HE: 103
CV STRESS CURRENT HR HE: 105
CV STRESS CURRENT HR HE: 106
CV STRESS CURRENT HR HE: 65
CV STRESS CURRENT HR HE: 66
CV STRESS CURRENT HR HE: 66
CV STRESS CURRENT HR HE: 67
CV STRESS CURRENT HR HE: 68
CV STRESS CURRENT HR HE: 71
CV STRESS CURRENT HR HE: 72
CV STRESS CURRENT HR HE: 73
CV STRESS CURRENT HR HE: 74
CV STRESS CURRENT HR HE: 79
CV STRESS CURRENT HR HE: 80
CV STRESS CURRENT HR HE: 82
CV STRESS CURRENT HR HE: 85
CV STRESS CURRENT HR HE: 86
CV STRESS CURRENT HR HE: 87
CV STRESS CURRENT HR HE: 88
CV STRESS CURRENT HR HE: 90
CV STRESS CURRENT HR HE: 91
CV STRESS CURRENT HR HE: 91
CV STRESS CURRENT HR HE: 94
CV STRESS CURRENT HR HE: 96
CV STRESS CURRENT HR HE: 98
CV STRESS DEVIATION TIME HE: NORMAL
CV STRESS ECHO PERCENT HR HE: NORMAL
CV STRESS EXERCISE STAGE HE: NORMAL
CV STRESS EXERCISE STAGE REACHED HE: NORMAL
CV STRESS FINAL RESTING BP HE: NORMAL
CV STRESS FINAL RESTING HR HE: 73
CV STRESS MAX HR HE: 106
CV STRESS MAX TREADMILL GRADE HE: 12
CV STRESS MAX TREADMILL SPEED HE: 2.5
CV STRESS PEAK DIA BP HE: NORMAL
CV STRESS PEAK SYS BP HE: NORMAL
CV STRESS PHASE HE: NORMAL
CV STRESS PROTOCOL HE: NORMAL
CV STRESS RESTING PT POSITION HE: NORMAL
CV STRESS RESTING PT POSITION HE: NORMAL
CV STRESS ST DEVIATION AMOUNT HE: NORMAL
CV STRESS ST DEVIATION ELEVATION HE: NORMAL
CV STRESS ST EVELATION AMOUNT HE: NORMAL
CV STRESS TEST TYPE HE: NORMAL
CV STRESS TOTAL STAGE TIME MIN 1 HE: NORMAL
NUC STRESS EJECTION FRACTION: 63 %
RATE PRESSURE PRODUCT: NORMAL
STRESS ECHO BASELINE DIASTOLIC HE: 67
STRESS ECHO BASELINE HR: 66
STRESS ECHO BASELINE SYSTOLIC BP: 148
STRESS ECHO CALCULATED PERCENT HR: 73 %
STRESS ECHO LAST STRESS DIASTOLIC BP: 72
STRESS ECHO LAST STRESS HR: 105
STRESS ECHO LAST STRESS SYSTOLIC BP: 170
STRESS ECHO POST ESTIMATED WORKLOAD: 5.6
STRESS ECHO TARGET HR: 146
STRESS ST DEPRESSION: 1 MM
STRESS/REST PERFUSION RATIO: 1.34

## 2023-09-29 PROCEDURE — 250N000011 HC RX IP 250 OP 636: Mod: JZ | Performed by: INTERNAL MEDICINE

## 2023-09-29 PROCEDURE — 93018 CV STRESS TEST I&R ONLY: CPT | Performed by: INTERNAL MEDICINE

## 2023-09-29 PROCEDURE — 343N000001 HC RX 343: Performed by: INTERNAL MEDICINE

## 2023-09-29 PROCEDURE — 93017 CV STRESS TEST TRACING ONLY: CPT

## 2023-09-29 PROCEDURE — 78452 HT MUSCLE IMAGE SPECT MULT: CPT

## 2023-09-29 PROCEDURE — A9500 TC99M SESTAMIBI: HCPCS | Performed by: INTERNAL MEDICINE

## 2023-09-29 PROCEDURE — 78452 HT MUSCLE IMAGE SPECT MULT: CPT | Mod: 26 | Performed by: INTERNAL MEDICINE

## 2023-09-29 PROCEDURE — 93016 CV STRESS TEST SUPVJ ONLY: CPT | Performed by: INTERNAL MEDICINE

## 2023-09-29 RX ORDER — AMINOPHYLLINE 25 MG/ML
50 INJECTION, SOLUTION INTRAVENOUS
Status: DISCONTINUED | OUTPATIENT
Start: 2023-09-29 | End: 2023-09-29 | Stop reason: HOSPADM

## 2023-09-29 RX ORDER — REGADENOSON 0.08 MG/ML
0.4 INJECTION, SOLUTION INTRAVENOUS ONCE
Status: COMPLETED | OUTPATIENT
Start: 2023-09-29 | End: 2023-09-29

## 2023-09-29 RX ADMIN — AMINOPHYLLINE 50 MG: 25 INJECTION, SOLUTION INTRAVENOUS at 13:05

## 2023-09-29 RX ADMIN — Medication 30.5 MILLICURIE: at 14:01

## 2023-09-29 RX ADMIN — REGADENOSON 0.4 MG: 0.08 INJECTION, SOLUTION INTRAVENOUS at 13:01

## 2023-09-29 RX ADMIN — Medication 8.4 MILLICURIE: at 12:06

## 2023-10-02 ENCOUNTER — TELEPHONE (OUTPATIENT)
Dept: CARDIOLOGY | Facility: CLINIC | Age: 73
End: 2023-10-02
Payer: COMMERCIAL

## 2023-10-02 NOTE — TELEPHONE ENCOUNTER
Pt LVM - she is trying to schedule angiogram and is asking if she still needs echo done 10/03/23.  She also wanted to know if she can travel to [place] in January.  -moustapha    Informed pt that provider has full clinic schedule today, and he will respond to her as soon as he is able.  -moustapha

## 2023-10-03 ENCOUNTER — TELEPHONE (OUTPATIENT)
Dept: CARDIOLOGY | Facility: CLINIC | Age: 73
End: 2023-10-03

## 2023-10-03 ENCOUNTER — HOSPITAL ENCOUNTER (OUTPATIENT)
Dept: CARDIOLOGY | Facility: CLINIC | Age: 73
Discharge: HOME | End: 2023-10-03
Attending: INTERNAL MEDICINE | Admitting: INTERNAL MEDICINE
Payer: COMMERCIAL

## 2023-10-03 DIAGNOSIS — I25.83 CORONARY ARTERY DISEASE DUE TO LIPID RICH PLAQUE: ICD-10-CM

## 2023-10-03 DIAGNOSIS — I48.0 PAROXYSMAL ATRIAL FIBRILLATION (H): ICD-10-CM

## 2023-10-03 DIAGNOSIS — I25.10 CORONARY ARTERY DISEASE INVOLVING NATIVE CORONARY ARTERY OF NATIVE HEART WITHOUT ANGINA PECTORIS: ICD-10-CM

## 2023-10-03 DIAGNOSIS — I25.10 CORONARY ARTERY DISEASE DUE TO LIPID RICH PLAQUE: ICD-10-CM

## 2023-10-03 DIAGNOSIS — R94.39 ABNORMAL NUCLEAR STRESS TEST: Primary | ICD-10-CM

## 2023-10-03 DIAGNOSIS — I25.810 CORONARY ARTERY DISEASE INVOLVING CORONARY BYPASS GRAFT OF NATIVE HEART WITHOUT ANGINA PECTORIS: ICD-10-CM

## 2023-10-03 LAB — LVEF ECHO: NORMAL

## 2023-10-03 PROCEDURE — 93306 TTE W/DOPPLER COMPLETE: CPT | Mod: 26 | Performed by: INTERNAL MEDICINE

## 2023-10-03 PROCEDURE — 255N000002 HC RX 255 OP 636: Performed by: INTERNAL MEDICINE

## 2023-10-03 PROCEDURE — 999N000208 ECHOCARDIOGRAM COMPLETE

## 2023-10-03 RX ADMIN — PERFLUTREN 2 ML: 6.52 INJECTION, SUSPENSION INTRAVENOUS at 14:35

## 2023-10-03 NOTE — TELEPHONE ENCOUNTER
[facility] Call Center    Phone Message    May a detailed message be left on voicemail: yes     Reason for Call: Other: Patient would like a call back from Priscila Yoon RN. Please reach out to patient. Patient wants to know if she needs angiogram.      Action Taken: Other: Cardiology     Travel Screening: Not Applicable     Thank you!  [facility]

## 2023-10-04 NOTE — TELEPHONE ENCOUNTER
Note sent to Dr Hernandez - pt wants to schedule an angio, and she wants to know if she is ok to pay for her trip to [place] in January.  -moustapha

## 2023-10-04 NOTE — RESULT ENCOUNTER NOTE
Echo shows normal lv function, no significant valve abnormality,I wrote to her about the nuclear stress test recently completed, will write again re the findings of both  mdg

## 2023-10-04 NOTE — TELEPHONE ENCOUNTER
Recommendations discussed with pt.  Pt verbalized understanding and had no questions.    Case requested.  BMP, CBC, type and screen, & lipids ordered.  Message sent to procedure .  -[facility]    ----- Message -----  From: Priscila Yoon RN  Sent: 10/4/2023   3:50 PM CDT  To: Priscila Yoon RN; *  Subject: MDG - CA grafts poss PCI                         Case Type: CA grafts poss PCI    Ordering Provider: Dr Hernandez    H&P: needs - pt will schedule with PCP    Alerts: on warfarin - need 4 day hold, is diabetic    Additional Info/Urgency: per pt - would like done 10/11 - 10/18    Orders for Pre-Procedure Labs? BMP, CBC, lipids, type & screen orders are in.        ----- Message -----  From: Chuck Hernandez MD  Sent: 10/4/2023   2:27 PM CDT  To: Priscila Yoon RN    Okay to hold the warfarin without bridging initially unless she recognizes atrial fibrillation before the procedure she should let us know.  Small risk of holding the medicine related to stroke.    ----- Message from Priscila Yoon RN sent at 10/4/2023  1:02 PM CDT -----  Ok to hold warfarin for 4 days prior to procedure?  Any bridging recommended?  -[facility]    ---- Message -----  From: Chuck Hernandez MD  Sent: 10/4/2023  12:07 PM CDT  To: Priscila Yoon RN    I am okay scheduling the angiogram as a core possible with grafts.  I do not know the answer to the question about paying for her trip pending the results of the angiogram.

## 2023-10-05 ENCOUNTER — PATIENT OUTREACH (OUTPATIENT)
Dept: FAMILY MEDICINE | Facility: CLINIC | Age: 73
End: 2023-10-05
Payer: COMMERCIAL

## 2023-10-05 ENCOUNTER — TELEPHONE (OUTPATIENT)
Dept: CARDIOLOGY | Facility: CLINIC | Age: 73
End: 2023-10-05
Payer: COMMERCIAL

## 2023-10-05 ENCOUNTER — MYC MEDICAL ADVICE (OUTPATIENT)
Dept: ANTICOAGULATION | Facility: CLINIC | Age: 73
End: 2023-10-05
Payer: COMMERCIAL

## 2023-10-05 ENCOUNTER — PREP FOR PROCEDURE (OUTPATIENT)
Dept: CARDIOLOGY | Facility: CLINIC | Age: 73
End: 2023-10-05
Payer: COMMERCIAL

## 2023-10-05 DIAGNOSIS — I25.810 CORONARY ARTERY DISEASE INVOLVING CORONARY BYPASS GRAFT OF NATIVE HEART WITHOUT ANGINA PECTORIS: ICD-10-CM

## 2023-10-05 DIAGNOSIS — R94.39 ABNORMAL NUCLEAR STRESS TEST: Primary | ICD-10-CM

## 2023-10-05 DIAGNOSIS — I48.0 PAROXYSMAL ATRIAL FIBRILLATION (H): Primary | ICD-10-CM

## 2023-10-05 DIAGNOSIS — I25.10 CORONARY ARTERY DISEASE INVOLVING NATIVE CORONARY ARTERY OF NATIVE HEART WITHOUT ANGINA PECTORIS: ICD-10-CM

## 2023-10-05 RX ORDER — NICOTINE POLACRILEX 4 MG
15-30 LOZENGE BUCCAL
Status: CANCELLED | OUTPATIENT
Start: 2023-10-11

## 2023-10-05 RX ORDER — DEXTROSE MONOHYDRATE 25 G/50ML
25-50 INJECTION, SOLUTION INTRAVENOUS
Status: CANCELLED | OUTPATIENT
Start: 2023-10-11

## 2023-10-05 RX ORDER — LIDOCAINE 40 MG/G
CREAM TOPICAL
Status: CANCELLED | OUTPATIENT
Start: 2023-10-05

## 2023-10-05 RX ORDER — ASPIRIN 81 MG/1
243 TABLET, CHEWABLE ORAL ONCE
Status: CANCELLED | OUTPATIENT
Start: 2023-10-11

## 2023-10-05 RX ORDER — FENTANYL CITRATE 50 UG/ML
25 INJECTION, SOLUTION INTRAMUSCULAR; INTRAVENOUS
Status: CANCELLED | OUTPATIENT
Start: 2023-10-11

## 2023-10-05 RX ORDER — SODIUM CHLORIDE 9 MG/ML
INJECTION, SOLUTION INTRAVENOUS CONTINUOUS
Status: CANCELLED | OUTPATIENT
Start: 2023-10-11

## 2023-10-05 RX ORDER — ASPIRIN 325 MG
325 TABLET ORAL ONCE
Status: CANCELLED | OUTPATIENT
Start: 2023-10-11 | End: 2023-10-05

## 2023-10-05 NOTE — TELEPHONE ENCOUNTER
Patient scheduled for angiogram 10/11/23. She has been instructed to hold warfarin x4 days with no bridge per cardiology. Send [employer] message reviewing plan and advising booster dose day of procedure. Patient to call with any questions.     Melissa Hunt RN

## 2023-10-05 NOTE — TELEPHONE ENCOUNTER
Patient Quality Outreach    Patient is due for the following:   IVD  -  BP Check    Next Steps:   Patient has upcoming appointment, these items will be addressed at that time.    Type of outreach:    Chart review performed, no outreach needed.      Questions for provider review:    None           Rhona Mathew MA

## 2023-10-05 NOTE — TELEPHONE ENCOUNTER
"----- Message from Ruby Urias sent at 10/5/2023  9:30 AM CDT -----  Regarding: RE: MDG - CA grafts poss PCI  Case type: CORS POSS PCI    Procedure Physician(s): JOSE    Procedure Date and Patient Arrival Time: Wednesday 10/11, with arrival time of 900AM    H&P: Pt will scheduled with PMD    Pre-Procedure Lab Appt: Tuesday 10/10 at 2pm  at [facility]     Alerts/Important Info: DIABETIC, 4 DAY WARFARIN HOLD    ##Pt says \"they always keep me overnight\" I told her most patients do go some same day, so prepare to have a  home, but I told her I would confirm with you that there is no plan to keep her overnight. She is concerned about her animals if she is kept overnight.     Liz Segura     ----- Message -----  From: Priscila Yoon RN  Sent: 10/4/2023   3:50 PM CDT  To: Priscila Yoon RN; #  Subject: MDG - CA grafts poss PCI                         Case Type: CA grafts poss PCI    Ordering Provider: Dr Hernandez    H&P: needs - pt will schedule with PCP    Alerts: on warfarin - need 4 day hold, is diabetic    Additional Info/Urgency: per pt - would like done 10/11 - 10/18    Orders for Pre-Procedure Labs? BMP, CBC, lipids, type & screen orders are in.        "

## 2023-10-05 NOTE — TELEPHONE ENCOUNTER
Kemi Soto  [address] [address]  [phone] (home) [phone] (work)    PCP:  Elaine Galvez  H&P completed by:  Andie Sifuentes 10/09/23  Admit date:  10/11/23  Arrival time:  9:00 AM  Anticoagulation:  warfarin (COUMADIN)  Previous PCI: Yes  Bypass Grafts: Yes  Renal Issues: No  Diabetic?: Yes  Device?: No    Ambulation status: independent    Reason for Visit:  Telephone call to discuss pre-procedure education in preparation for: Coronary Angiogram with Possible PCI    Procedure Prep:  EKG results obtained, dated: To be completed on day of cath lab procedure  Hemogram results obtained: Completed on 10/10/23  Basic Metabolic Panel results obtained: Completed on 10/10/23  Pertinent cardiac test results: 9/29/23 abnormal nuclear stress, 10/03/23 echo.  PCI - 8/13/23, 8/1/18, 3/30/16.  CABG 12/11/07 at [facility].    Patient Education  Your arrival time is 9:00 AM.  [place] [address] - Main Entrance of the Hospital  Please plan on being at the hospital all day.  At any time, emergencies and/or urgent cases may come up which could delay the start of your procedure.      Pre-procedure instructions  Take your temperature in the morning prior to coming in.  If your temperature is 100 F please call [facility] [phone] and notify them.  If you do not have access to a thermometer at home, please come in for testing.  If you are running a temperature your procedure may be rescheduled.  Patient instructed to not Eat or Drink after midnight.  Patient instructed to shower the evening before or the morning of the procedure.  Patient instructed to arrange for transportation home following procedure from a responsible family member or friend. No driving for at least 24 hours.  Patient instructed to have a responsible adult with them for 24 hours post-procedure.  Post-procedure follow up process.  Conscious sedation  discussed.      Pre-procedure medication instructions.  Continue medications as scheduled, with a small amount of water on the day of the procedure unless indicated. (NO Diabetic Medications or Blood Thinners)  Pt instructed not to consume Alcohol, Tobacco, Caffeine, or Carbonated beverages 12 hours prior to procedure.  Patient instructed to take 324 mg of Aspirin the morning of procedure: Yes  Other medication: instructed to only take buspirone, ezetimibe, isosorbide, losartan, sertraline, sotalol a.m. of the procedure.              Diabetic Medication Instructions  DO NOT take any oral diabetic medication, short-acting diabetes medications/insulin, humalog or regular insulin the morning of your test  Take   dose of long-acting insulin (Lantus, Levemir) the day of your test  Hold Oral Diabetic on the day of the procedure and for 48 hours after IV contrast given  Remember to  bring your glucometer and insulin with you to take after your test if needed              Anticoagulation Medication Instructions     warfarin (COUMADIN) - Hold 4 Days prior to procedure    Patient states understanding of procedure and agrees to proceed.    *PATIENTS RECORDS AVAILABLE IN [facility]Minor UNLESS OTHERWISE INDICATED*      Patient Active Problem List   Diagnosis    Obesity    Hypercholesterolemia    Essential hypertension    Type 2 diabetes mellitus with hyperglycemia, with long-term current use of insulin (H)    Benign neoplasm of choroid    Paroxysmal atrial fibrillation (H)    Bilateral low back pain without sciatica, unspecified chronicity       Current Outpatient Medications   Medication Sig Dispense Refill    acetaminophen (TYLENOL) 500 MG tablet Take 1,000 mg by mouth every 12 hours as needed for mild pain      atorvastatin (LIPITOR) 80 MG tablet Take 1 tablet (80 mg) by mouth At Bedtime 90 tablet 3    busPIRone (BUSPAR) 15 MG tablet Take 1 tablet (15 mg) by mouth daily 90 tablet 3    clobetasol (TEMOVATE) 0.05 % external cream Apply  topically 2 times daily      ezetimibe (ZETIA) 10 MG tablet Take 1 tablet (10 mg) by mouth daily 90 tablet 3    gabapentin (NEURONTIN) 300 MG capsule TAKE 3 CAPSULES BY MOUTH EVERY DAY AT BEDTIME 270 capsule 3    hydrochlorothiazide (HYDRODIURIL) 12.5 MG tablet Take 1 tablet by mouth once daily 90 tablet 0    hydrOXYzine (ATARAX) 25 MG tablet Take 1 tablet (25 mg) by mouth nightly as needed for anxiety 30 tablet 1    insulin glargine (LANTUS SOLOSTAR) 100 UNIT/ML pen Inject 28 Units Subcutaneous 2 times daily INJECT 28 UNITS SUBCUTANEOUSLY TWICE DAILY 60 mL 3    isosorbide mononitrate (IMDUR) 60 MG 24 hr tablet Take 1 tablet (60 mg) by mouth daily 90 tablet 3    losartan (COZAAR) 100 MG tablet Take 1 tablet (100 mg) by mouth daily 90 tablet 3    Semaglutide, 1 MG/DOSE, (OZEMPIC) 4 MG/3ML pen Inject 1 mg Subcutaneous every 7 days 3 mL 3    sertraline (ZOLOFT) 100 MG tablet Take 1 tablet (100 mg) by mouth daily 90 tablet 3    sotalol (BETAPACE) 80 MG tablet Take 0.5 tablets (40 mg) by mouth 2 times daily 90 tablet 3    traMADol (ULTRAM) 50 MG tablet TAKE 1 TABLET BY MOUTH EVERY 8 HOURS AS NEEDED FOR PAIN OR SEVERE PAIN ON SCALE (7-10) 30 tablet 0    triamcinolone (KENALOG) 0.1 % external cream Apply topically 2 times daily      warfarin ANTICOAGULANT (COUMADIN) 5 MG tablet Take 0.5-1 tablets (2.5-5 mg) by mouth daily Adjust dose per INR results as instructed. 90 tablet 3       Allergies   Allergen Reactions    Metformin GI Disturbance    Oxycodone Nausea and Vomiting       Priscila Yoon RN on 10/6/2023 at 11:44 AM

## 2023-10-09 ENCOUNTER — OFFICE VISIT (OUTPATIENT)
Dept: FAMILY MEDICINE | Facility: CLINIC | Age: 73
End: 2023-10-09
Payer: COMMERCIAL

## 2023-10-09 VITALS
SYSTOLIC BLOOD PRESSURE: 128 MMHG | HEART RATE: 64 BPM | TEMPERATURE: 98.1 F | OXYGEN SATURATION: 98 % | BODY MASS INDEX: 30.65 KG/M2 | HEIGHT: 63 IN | WEIGHT: 173 LBS | RESPIRATION RATE: 18 BRPM | DIASTOLIC BLOOD PRESSURE: 62 MMHG

## 2023-10-09 DIAGNOSIS — I25.83 CORONARY ARTERY DISEASE DUE TO LIPID RICH PLAQUE: ICD-10-CM

## 2023-10-09 DIAGNOSIS — Z01.818 PREOP GENERAL PHYSICAL EXAM: Primary | ICD-10-CM

## 2023-10-09 DIAGNOSIS — I25.10 CORONARY ARTERY DISEASE DUE TO LIPID RICH PLAQUE: ICD-10-CM

## 2023-10-09 PROBLEM — H91.93 BILATERAL HEARING LOSS: Status: ACTIVE | Noted: 2018-07-10

## 2023-10-09 PROBLEM — I21.A1: Status: ACTIVE | Noted: 2020-11-05

## 2023-10-09 PROBLEM — D12.6 BENIGN ADENOMATOUS POLYP OF LARGE INTESTINE: Status: ACTIVE | Noted: 2018-05-17

## 2023-10-09 PROBLEM — Z79.01 CHRONIC ANTICOAGULATION: Status: ACTIVE | Noted: 2021-05-07

## 2023-10-09 PROBLEM — I49.9: Status: ACTIVE | Noted: 2020-11-05

## 2023-10-09 PROBLEM — G62.9 PERIPHERAL NEUROPATHY: Status: ACTIVE | Noted: 2018-10-31

## 2023-10-09 LAB
ABO/RH(D): NORMAL
ANTIBODY SCREEN: NEGATIVE
SPECIMEN EXPIRATION DATE: NORMAL

## 2023-10-09 PROCEDURE — 99214 OFFICE O/P EST MOD 30 MIN: CPT | Performed by: NURSE PRACTITIONER

## 2023-10-09 RX ORDER — KETOCONAZOLE 20 MG/G
CREAM TOPICAL
COMMUNITY
Start: 2023-05-30

## 2023-10-09 ASSESSMENT — PAIN SCALES - GENERAL: PAINLEVEL: NO PAIN (0)

## 2023-10-09 NOTE — PROGRESS NOTES
[facility]  [address]  [place] [facility] [address]  Phone: [phone]  Fax: [phone]  Primary Provider: Elaine Galvez  Pre-op Performing Provider: JOEY KILPATRICK      PREOPERATIVE EVALUATION:  Today's date: 10/9/2023    Ciarra is a 73 year old female who presents for a preoperative evaluation.      10/9/2023     2:20 PM   Additional Questions   Roomed by Alisa VEGA CMA       Surgical Information:  Surgery/Procedure: Angiogram  Surgery Location: [facility]   Surgeon: Dr Elliot Amos  Surgery Date: 10/11/23  Time of Surgery: 9am   Where patient plans to recover: At home with family  Fax number for surgical facility: Note does not need to be faxed, will be available electronically in Epic.      Subjective       HPI related to upcoming procedure: abnormal stress test.  History of heart surgery, stents, atrial fibrillation/flutter.         10/9/2023     2:11 PM   Preop Questions   1. Have you ever had a heart attack or stroke? YES - min-stroke 2015   2. Have you ever had surgery on your heart or blood vessels, such as a stent placement, a coronary artery bypass, or surgery on an artery in your head, neck, heart, or legs? YES - stents, bypass surgery   3. Do you have chest pain with activity? No   4. Do you have a history of  heart failure? No   5. Do you currently have a cold, bronchitis or symptoms of other infection? No   6. Do you have a cough, shortness of breath, or wheezing? No   7. Do you or anyone in your family have previous history of blood clots? UNKNOWN - ? sister   8. Do you or does anyone in your family have a serious bleeding problem such as prolonged bleeding following surgeries or cuts? No   9. Have you ever had problems with anemia or been told to take iron pills? No   10. Have you had any abnormal blood loss such as black, tarry or bloody stools, or abnormal vaginal bleeding? No   11. Have you ever had a blood transfusion? UNKNOWN  - not sure?   12. Are you willing to have a blood transfusion if it is medically needed before, during, or after your surgery? Yes   13. Have you or any of your relatives ever had problems with anesthesia? No   14. Do you have sleep apnea, excessive snoring or daytime drowsiness? No   15. Do you have any artifical heart valves or other implanted medical devices like a pacemaker, defibrillator, or continuous glucose monitor? No   16. Do you have artificial joints? No   17. Are you allergic to latex? No       Health Care Directive:  Patient does not have a Health Care Directive or Living Will: Patient states has Advance Directive and will bring in a copy to clinic.    Preoperative Review of :   reviewed - no record of controlled substances prescribed.      Status of Chronic Conditions:  A-FIB - Patient has a longstanding history of chronic A-fib currently on rate control. Current treatment regimen includes Warfarin for stroke prevention and denies significant symptoms of lightheadedness, palpitations or dyspnea.     DIABETES - Patient has a longstanding history of DiabetesType Type II . Patient is being treated with oral agents, SMBG, and insulin injections and denies significant side effects. Control has been good. Complicating factors include but are not limited to: hypertension, hyperlipidemia, and CAD/PVD.     HYPERLIPIDEMIA - Patient has a long history of significant Hyperlipidemia requiring medication for treatment with recent good control. Patient reports no problems or side effects with the medication.     HYPERTENSION - Patient has longstanding history of HTN , currently denies any symptoms referable to elevated blood pressure. Specifically denies chest pain, palpitations, dyspnea, orthopnea, PND or peripheral edema. Blood pressure readings have been in normal range. Current medication regimen is as listed below. Patient denies any side effects of medication.     Review of Systems  CONSTITUTIONAL:  NEGATIVE for fever, chills, change in weight  INTEGUMENTARY/SKIN: NEGATIVE for worrisome rashes, moles or lesions  EYES: NEGATIVE for vision changes or irritation, glasses  ENT/MOUTH: NEGATIVE for ear, mouth and throat problems  RESP: NEGATIVE for significant cough or SOB  CV: NEGATIVE for chest pain, palpitations.  + mild peripheral edema  GI: NEGATIVE for nausea, abdominal pain, heartburn, or change in bowel habits  : NEGATIVE for frequency, dysuria, or hematuria  MUSCULOSKELETAL: arthralgias and myalgia  NEURO: NEGATIVE for weakness, dizziness or paresthesias  ENDOCRINE: NEGATIVE for temperature intolerance, skin/hair changes  HEME: NEGATIVE for bleeding problems  PSYCHIATRIC: NEGATIVE for changes in mood or affect    Patient Active Problem List    Diagnosis Date Noted    Bilateral low back pain without sciatica, unspecified chronicity 04/22/2022     Priority: Medium    Paroxysmal atrial fibrillation (H) 07/11/2021     Priority: Medium    Obesity 10/22/2012     Priority: Medium    Hypercholesterolemia 10/22/2012     Priority: Medium    Essential hypertension 10/22/2012     Priority: Medium     Problem list name updated by automated process. 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     Priority: Medium      Past Medical History:   Diagnosis Date    Coronary artery disease     Diabetes mellitus (H)     Discitis of thoracolumbar region 10/10/2015    Encephalopathy 10/14/2015    Epidural abscess 10/10/2015    Hip pain, right     Hyperlipidemia     Sepsis (H) 10/8/2015    Stroke (H) 06/23/2015    Neurology felt that episode was C/W subacute ischemic stroke    TIA (transient ischemic attack) 6/23/2015    UTI (urinary tract infection)     admitted to [facility] with UTI     Past Surgical History:   Procedure Laterality Date    ANGIOPLASTY  03/30/2016    Drug eluting  stent mid LAD, cutting balloon to 1st diagonal    ANGIOPLASTY  2008    BYPASS GRAFT ARTERY CORONARY  12/11/2007    X 3 vessels, LIMA to LAD, saph vein graft to distal RCA, saphvein graft to marginal, mini circuit bypass.  [facility].    CORONARY STENT PLACEMENT  2008    Left main, left circumflex, RCA    CORONARY STENT PLACEMENT  03/2016    CV CORONARY ANGIOGRAM N/A 8/1/2018    Procedure: Coronary Angiogram;  Surgeon: Kit Bean MD;  Location: [facility];  Service:     CV LEFT HEART CATHETERIZATION WITH LEFT VENTRICULOGRAM N/A 8/1/2018    Procedure: Left Heart Catheterization with Left Ventriculogram;  Surgeon: Kit Bean MD;  Location: [facility] Cath Lab;  Service:     INSERT MIDLINE HE  10/31/2015         LUMBAR DISCECTOMY N/A 10/11/2015    Procedure: BILATERAL L1-L5 DECOMPRESSIVE LAMINECTOMY WITH EVACUATION OF EPIDURAL ABSCESS IRRIGATION & DEBRIDEMENT;  Surgeon: Luli Chan MD;  Location: [facility];  Service:     PICC  10/19/2015         RELEASE CARPAL TUNNEL Bilateral      Current Outpatient Medications   Medication Sig Dispense Refill    acetaminophen (TYLENOL) 500 MG tablet Take 1,000 mg by mouth every 12 hours as needed for mild pain      atorvastatin (LIPITOR) 80 MG tablet Take 1 tablet (80 mg) by mouth At Bedtime 90 tablet 3    busPIRone (BUSPAR) 15 MG tablet Take 1 tablet (15 mg) by mouth daily 90 tablet 3    clobetasol (TEMOVATE) 0.05 % external cream Apply topically 2 times daily      ezetimibe (ZETIA) 10 MG tablet Take 1 tablet (10 mg) by mouth daily 90 tablet 3    gabapentin (NEURONTIN) 300 MG capsule TAKE 3 CAPSULES BY MOUTH EVERY DAY AT BEDTIME 270 capsule 3    hydrochlorothiazide (HYDRODIURIL) 12.5 MG tablet Take 1 tablet by mouth once daily 90 tablet 0    hydrOXYzine (ATARAX) 25 MG tablet Take 1 tablet (25 mg) by mouth nightly as needed for anxiety 30 tablet 1    insulin glargine (LANTUS SOLOSTAR) 100 UNIT/ML pen Inject 28 Units Subcutaneous 2  "times daily INJECT 28 UNITS SUBCUTANEOUSLY TWICE DAILY 60 mL 3    isosorbide mononitrate (IMDUR) 60 MG 24 hr tablet Take 1 tablet (60 mg) by mouth daily 90 tablet 3    ketoconazole (NIZORAL) 2 % external cream APPLY 1 APPLICATION TOPICALLY TWICE DAILY TO AFFECTED AREAS FOR 4 WEEKS THEN AS NEEDED      losartan (COZAAR) 100 MG tablet Take 1 tablet (100 mg) by mouth daily 90 tablet 3    Semaglutide, 1 MG/DOSE, (OZEMPIC) 4 MG/3ML pen Inject 1 mg Subcutaneous every 7 days 3 mL 3    sertraline (ZOLOFT) 100 MG tablet Take 1 tablet (100 mg) by mouth daily 90 tablet 3    sotalol (BETAPACE) 80 MG tablet Take 0.5 tablets (40 mg) by mouth 2 times daily 90 tablet 3    traMADol (ULTRAM) 50 MG tablet TAKE 1 TABLET BY MOUTH EVERY 8 HOURS AS NEEDED FOR PAIN OR SEVERE PAIN ON SCALE (7-10) 30 tablet 0    triamcinolone (KENALOG) 0.1 % external cream Apply topically 2 times daily      warfarin ANTICOAGULANT (COUMADIN) 5 MG tablet Take 0.5-1 tablets (2.5-5 mg) by mouth daily Adjust dose per INR results as instructed. 90 tablet 3       Allergies   Allergen Reactions    Metformin GI Disturbance    Oxycodone Nausea and Vomiting        Social History     Tobacco Use    Smoking status: Former     Types: Cigarettes     Quit date: 2007     Years since quittin.3     Passive exposure: Past    Smokeless tobacco: Never   Substance Use Topics    Alcohol use: No     Comment: Alcoholic Drinks/day: stopped drinking 2015     Family History   Problem Relation Age of Onset    Cerebrovascular Disease Mother     Diabetes Father     Diabetes Sister     Cerebrovascular Disease Sister 81.00    Cerebrovascular Disease Brother     Diabetes Brother      History   Drug Use No         Objective     /62 (BP Location: Left arm, Patient Position: Sitting, Cuff Size: Adult Regular)   Pulse 64   Temp 98.1  F (36.7  C) (Oral)   Resp 18   Ht 1.588 m (5' 2.5\")   Wt 78.5 kg (173 lb)   LMP  (LMP Unknown)   SpO2 98%   BMI 31.14 kg/m      Physical " Exam    GENERAL APPEARANCE: healthy, alert and no distress     EYES: EOMI, PERRL     HENT: ear canals and TM's normal and nose and mouth without ulcers or lesions     NECK: no adenopathy, no asymmetry, masses, or scars and thyroid normal to palpation     RESP: lungs clear to auscultation - no rales, rhonchi or wheezes     CV: irregular, borderline bradycardia, trace pedal edema      ABDOMEN:  soft, nontender, no HSM or masses and bowel sounds normal     MS: extremities normal- no gross deformities noted, no evidence of inflammation in joints, FROM in all extremities.     SKIN: no suspicious lesions or rashes     NEURO: Normal strength and tone, sensory exam grossly normal, mentation intact and speech normal     PSYCH: mentation appears normal. and affect normal/bright     LYMPHATICS: No cervical adenopathy    Recent Labs   Lab Test 09/28/23  1351 09/13/23  1304 08/25/23  1436 04/25/23  1315 04/12/23  1617 08/13/22  1038 07/22/22  1126   INR 4.8* 1.7*  --    < > 4.2*   < >  --    NA  --   --   --   --  137  --  140   POTASSIUM  --   --   --   --  4.5  --  4.6   CR  --   --   --   --  0.97*  --  0.78   A1C  --   --  8.4*  --  10.8*  --   --     < > = values in this interval not displayed.        Diagnostics:  Has lab appointment tomorrow- lab orders per Dr. Hernandez   No EKG this visit, done per cardiology    Revised Cardiac Risk Index (RCRI):  The patient has the following serious cardiovascular risks for perioperative complications:   - Coronary Artery Disease (MI, positive stress test, angina, Qs on EKG) = 1 point   - Cerebrovascular Disease (TIA or CVA) = 1 point   - Diabetes Mellitus (on Insulin) = 1 point     RCRI Interpretation: 2 points: Class III (moderate risk - 6.6% complication rate)     Estimated Functional Capacity: Performs 4 METS exercise without symptoms (e.g., light housework, stairs, 4 mph walk, 7 mph bike, slow step dance)    A/P:  (Z01.818) Preop general physical exam  (primary encounter  diagnosis)  Comment: Cleared for surgery  Plan: Angiogram/surgery as scheduled    (I25.10,  I25.83) Coronary artery disease due to lipid rich plaque  Comment:   Plan: Angiogram as scheduled         Signed Electronically by: Andie Sifuentes CNP  Copy of this evaluation report is provided to requesting physician.    35 minutes spent by me on the date of the encounter doing chart review, history and exam, documentation and further activities per the note

## 2023-10-09 NOTE — H&P (VIEW-ONLY)
[facility]  [address]  [place] [facility] [address]  Phone: [phone]  Fax: [phone]  Primary Provider: Elaine Galvez  Pre-op Performing Provider: JOEY KILPATRICK      PREOPERATIVE EVALUATION:  Today's date: 10/9/2023    Ciarra is a 73 year old female who presents for a preoperative evaluation.      10/9/2023     2:20 PM   Additional Questions   Roomed by Alisa VEGA CMA       Surgical Information:  Surgery/Procedure: Angiogram  Surgery Location: [facility]   Surgeon: Dr Elliot Amos  Surgery Date: 10/11/23  Time of Surgery: 9am   Where patient plans to recover: At home with family  Fax number for surgical facility: Note does not need to be faxed, will be available electronically in Epic.      Subjective       HPI related to upcoming procedure: abnormal stress test.  History of heart surgery, stents, atrial fibrillation/flutter.         10/9/2023     2:11 PM   Preop Questions   1. Have you ever had a heart attack or stroke? YES - min-stroke 2015   2. Have you ever had surgery on your heart or blood vessels, such as a stent placement, a coronary artery bypass, or surgery on an artery in your head, neck, heart, or legs? YES - stents, bypass surgery   3. Do you have chest pain with activity? No   4. Do you have a history of  heart failure? No   5. Do you currently have a cold, bronchitis or symptoms of other infection? No   6. Do you have a cough, shortness of breath, or wheezing? No   7. Do you or anyone in your family have previous history of blood clots? UNKNOWN - ? sister   8. Do you or does anyone in your family have a serious bleeding problem such as prolonged bleeding following surgeries or cuts? No   9. Have you ever had problems with anemia or been told to take iron pills? No   10. Have you had any abnormal blood loss such as black, tarry or bloody stools, or abnormal vaginal bleeding? No   11. Have you ever had a blood transfusion? UNKNOWN  - not sure?   12. Are you willing to have a blood transfusion if it is medically needed before, during, or after your surgery? Yes   13. Have you or any of your relatives ever had problems with anesthesia? No   14. Do you have sleep apnea, excessive snoring or daytime drowsiness? No   15. Do you have any artifical heart valves or other implanted medical devices like a pacemaker, defibrillator, or continuous glucose monitor? No   16. Do you have artificial joints? No   17. Are you allergic to latex? No       Health Care Directive:  Patient does not have a Health Care Directive or Living Will: Patient states has Advance Directive and will bring in a copy to clinic.    Preoperative Review of :   reviewed - no record of controlled substances prescribed.      Status of Chronic Conditions:  A-FIB - Patient has a longstanding history of chronic A-fib currently on rate control. Current treatment regimen includes Warfarin for stroke prevention and denies significant symptoms of lightheadedness, palpitations or dyspnea.     DIABETES - Patient has a longstanding history of DiabetesType Type II . Patient is being treated with oral agents, SMBG, and insulin injections and denies significant side effects. Control has been good. Complicating factors include but are not limited to: hypertension, hyperlipidemia, and CAD/PVD.     HYPERLIPIDEMIA - Patient has a long history of significant Hyperlipidemia requiring medication for treatment with recent good control. Patient reports no problems or side effects with the medication.     HYPERTENSION - Patient has longstanding history of HTN , currently denies any symptoms referable to elevated blood pressure. Specifically denies chest pain, palpitations, dyspnea, orthopnea, PND or peripheral edema. Blood pressure readings have been in normal range. Current medication regimen is as listed below. Patient denies any side effects of medication.     Review of Systems  CONSTITUTIONAL:  NEGATIVE for fever, chills, change in weight  INTEGUMENTARY/SKIN: NEGATIVE for worrisome rashes, moles or lesions  EYES: NEGATIVE for vision changes or irritation, glasses  ENT/MOUTH: NEGATIVE for ear, mouth and throat problems  RESP: NEGATIVE for significant cough or SOB  CV: NEGATIVE for chest pain, palpitations.  + mild peripheral edema  GI: NEGATIVE for nausea, abdominal pain, heartburn, or change in bowel habits  : NEGATIVE for frequency, dysuria, or hematuria  MUSCULOSKELETAL: arthralgias and myalgia  NEURO: NEGATIVE for weakness, dizziness or paresthesias  ENDOCRINE: NEGATIVE for temperature intolerance, skin/hair changes  HEME: NEGATIVE for bleeding problems  PSYCHIATRIC: NEGATIVE for changes in mood or affect    Patient Active Problem List    Diagnosis Date Noted    Bilateral low back pain without sciatica, unspecified chronicity 04/22/2022     Priority: Medium    Paroxysmal atrial fibrillation (H) 07/11/2021     Priority: Medium    Obesity 10/22/2012     Priority: Medium    Hypercholesterolemia 10/22/2012     Priority: Medium    Essential hypertension 10/22/2012     Priority: Medium     Problem list name updated by automated process. 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     Priority: Medium      Past Medical History:   Diagnosis Date    Coronary artery disease     Diabetes mellitus (H)     Discitis of thoracolumbar region 10/10/2015    Encephalopathy 10/14/2015    Epidural abscess 10/10/2015    Hip pain, right     Hyperlipidemia     Sepsis (H) 10/8/2015    Stroke (H) 06/23/2015    Neurology felt that episode was C/W subacute ischemic stroke    TIA (transient ischemic attack) 6/23/2015    UTI (urinary tract infection)     admitted to [facility] with UTI     Past Surgical History:   Procedure Laterality Date    ANGIOPLASTY  03/30/2016    Drug eluting  stent mid LAD, cutting balloon to 1st diagonal    ANGIOPLASTY  2008    BYPASS GRAFT ARTERY CORONARY  12/11/2007    X 3 vessels, LIMA to LAD, saph vein graft to distal RCA, saphvein graft to marginal, mini circuit bypass.  [facility].    CORONARY STENT PLACEMENT  2008    Left main, left circumflex, RCA    CORONARY STENT PLACEMENT  03/2016    CV CORONARY ANGIOGRAM N/A 8/1/2018    Procedure: Coronary Angiogram;  Surgeon: Kit Bean MD;  Location: [facility];  Service:     CV LEFT HEART CATHETERIZATION WITH LEFT VENTRICULOGRAM N/A 8/1/2018    Procedure: Left Heart Catheterization with Left Ventriculogram;  Surgeon: Kit Bean MD;  Location: [facility];  Service:     INSERT MIDLINE HE  10/31/2015         LUMBAR DISCECTOMY N/A 10/11/2015    Procedure: BILATERAL L1-L5 DECOMPRESSIVE LAMINECTOMY WITH EVACUATION OF EPIDURAL ABSCESS IRRIGATION & DEBRIDEMENT;  Surgeon: Luli Chan MD;  Location: [facility];  Service:     PICC  10/19/2015         RELEASE CARPAL TUNNEL Bilateral      Current Outpatient Medications   Medication Sig Dispense Refill    acetaminophen (TYLENOL) 500 MG tablet Take 1,000 mg by mouth every 12 hours as needed for mild pain      atorvastatin (LIPITOR) 80 MG tablet Take 1 tablet (80 mg) by mouth At Bedtime 90 tablet 3    busPIRone (BUSPAR) 15 MG tablet Take 1 tablet (15 mg) by mouth daily 90 tablet 3    clobetasol (TEMOVATE) 0.05 % external cream Apply topically 2 times daily      ezetimibe (ZETIA) 10 MG tablet Take 1 tablet (10 mg) by mouth daily 90 tablet 3    gabapentin (NEURONTIN) 300 MG capsule TAKE 3 CAPSULES BY MOUTH EVERY DAY AT BEDTIME 270 capsule 3    hydrochlorothiazide (HYDRODIURIL) 12.5 MG tablet Take 1 tablet by mouth once daily 90 tablet 0    hydrOXYzine (ATARAX) 25 MG tablet Take 1 tablet (25 mg) by mouth nightly as needed for anxiety 30 tablet 1    insulin glargine (LANTUS SOLOSTAR) 100 UNIT/ML pen Inject 28 Units Subcutaneous 2  "times daily INJECT 28 UNITS SUBCUTANEOUSLY TWICE DAILY 60 mL 3    isosorbide mononitrate (IMDUR) 60 MG 24 hr tablet Take 1 tablet (60 mg) by mouth daily 90 tablet 3    ketoconazole (NIZORAL) 2 % external cream APPLY 1 APPLICATION TOPICALLY TWICE DAILY TO AFFECTED AREAS FOR 4 WEEKS THEN AS NEEDED      losartan (COZAAR) 100 MG tablet Take 1 tablet (100 mg) by mouth daily 90 tablet 3    Semaglutide, 1 MG/DOSE, (OZEMPIC) 4 MG/3ML pen Inject 1 mg Subcutaneous every 7 days 3 mL 3    sertraline (ZOLOFT) 100 MG tablet Take 1 tablet (100 mg) by mouth daily 90 tablet 3    sotalol (BETAPACE) 80 MG tablet Take 0.5 tablets (40 mg) by mouth 2 times daily 90 tablet 3    traMADol (ULTRAM) 50 MG tablet TAKE 1 TABLET BY MOUTH EVERY 8 HOURS AS NEEDED FOR PAIN OR SEVERE PAIN ON SCALE (7-10) 30 tablet 0    triamcinolone (KENALOG) 0.1 % external cream Apply topically 2 times daily      warfarin ANTICOAGULANT (COUMADIN) 5 MG tablet Take 0.5-1 tablets (2.5-5 mg) by mouth daily Adjust dose per INR results as instructed. 90 tablet 3       Allergies   Allergen Reactions    Metformin GI Disturbance    Oxycodone Nausea and Vomiting        Social History     Tobacco Use    Smoking status: Former     Types: Cigarettes     Quit date: 2007     Years since quittin.3     Passive exposure: Past    Smokeless tobacco: Never   Substance Use Topics    Alcohol use: No     Comment: Alcoholic Drinks/day: stopped drinking 2015     Family History   Problem Relation Age of Onset    Cerebrovascular Disease Mother     Diabetes Father     Diabetes Sister     Cerebrovascular Disease Sister 81.00    Cerebrovascular Disease Brother     Diabetes Brother      History   Drug Use No         Objective     /62 (BP Location: Left arm, Patient Position: Sitting, Cuff Size: Adult Regular)   Pulse 64   Temp 98.1  F (36.7  C) (Oral)   Resp 18   Ht 1.588 m (5' 2.5\")   Wt 78.5 kg (173 lb)   LMP  (LMP Unknown)   SpO2 98%   BMI 31.14 kg/m      Physical " Exam    GENERAL APPEARANCE: healthy, alert and no distress     EYES: EOMI, PERRL     HENT: ear canals and TM's normal and nose and mouth without ulcers or lesions     NECK: no adenopathy, no asymmetry, masses, or scars and thyroid normal to palpation     RESP: lungs clear to auscultation - no rales, rhonchi or wheezes     CV: irregular, borderline bradycardia, trace pedal edema      ABDOMEN:  soft, nontender, no HSM or masses and bowel sounds normal     MS: extremities normal- no gross deformities noted, no evidence of inflammation in joints, FROM in all extremities.     SKIN: no suspicious lesions or rashes     NEURO: Normal strength and tone, sensory exam grossly normal, mentation intact and speech normal     PSYCH: mentation appears normal. and affect normal/bright     LYMPHATICS: No cervical adenopathy    Recent Labs   Lab Test 09/28/23  1351 09/13/23  1304 08/25/23  1436 04/25/23  1315 04/12/23  1617 08/13/22  1038 07/22/22  1126   INR 4.8* 1.7*  --    < > 4.2*   < >  --    NA  --   --   --   --  137  --  140   POTASSIUM  --   --   --   --  4.5  --  4.6   CR  --   --   --   --  0.97*  --  0.78   A1C  --   --  8.4*  --  10.8*  --   --     < > = values in this interval not displayed.        Diagnostics:  Has lab appointment tomorrow- lab orders per Dr. Hernandez   No EKG this visit, done per cardiology    Revised Cardiac Risk Index (RCRI):  The patient has the following serious cardiovascular risks for perioperative complications:   - Coronary Artery Disease (MI, positive stress test, angina, Qs on EKG) = 1 point   - Cerebrovascular Disease (TIA or CVA) = 1 point   - Diabetes Mellitus (on Insulin) = 1 point     RCRI Interpretation: 2 points: Class III (moderate risk - 6.6% complication rate)     Estimated Functional Capacity: Performs 4 METS exercise without symptoms (e.g., light housework, stairs, 4 mph walk, 7 mph bike, slow step dance)    A/P:  (Z01.818) Preop general physical exam  (primary encounter  diagnosis)  Comment: Cleared for surgery  Plan: Angiogram/surgery as scheduled    (I25.10,  I25.83) Coronary artery disease due to lipid rich plaque  Comment:   Plan: Angiogram as scheduled         Signed Electronically by: Andie Sifuentes CNP  Copy of this evaluation report is provided to requesting physician.    35 minutes spent by me on the date of the encounter doing chart review, history and exam, documentation and further activities per the note

## 2023-10-09 NOTE — PATIENT INSTRUCTIONS
Preparing for Your Surgery  Getting started  A nurse will call you to review your health history and instructions. They will give you an arrival time based on your scheduled surgery time. Please be ready to share:  Your doctor's clinic name and phone number  Your medical, surgical, and anesthesia history  A list of allergies and sensitivities  A list of medicines, including herbal treatments and over-the-counter drugs  Whether the patient has a legal guardian (ask how to send us the papers in advance)  Please tell us if you're pregnant--or if there's any chance you might be pregnant. Some surgeries may injure a fetus (unborn baby), so they require a pregnancy test. Surgeries that are safe for a fetus don't always need a test, and you can choose whether to have one.   If you have a child who's having surgery, please ask for a copy of Preparing for Your Child's Surgery.    Preparing for surgery  Within 10 to 30 days of surgery: Have a pre-op exam (sometimes called an H&P, or History and Physical). This can be done at a clinic or pre-operative center.  If you're having a , you may not need this exam. Talk to your care team.  At your pre-op exam, talk to your care team about all medicines you take. If you need to stop any medicines before surgery, ask when to start taking them again.  We do this for your safety. Many medicines can make you bleed too much during surgery. Some change how well surgery (anesthesia) drugs work.  Call your insurance company to let them know you're having surgery. (If you don't have insurance, call [phone].)  Call your clinic if there's any change in your health. This includes signs of a cold or flu (sore throat, runny nose, cough, rash, fever). It also includes a scrape or scratch near the surgery site.  If you have questions on the day of surgery, call your hospital or surgery center.  Eating and drinking guidelines  For your safety: Unless your surgeon tells you otherwise,  follow the guidelines below.  Eat and drink as usual until 8 hours before you arrive for surgery. After that, no food or milk.  Drink clear liquids until 2 hours before you arrive. These are liquids you can see through, like water, Gatorade, and Propel Water. They also include plain black coffee and tea (no cream or milk), candy, and breath mints. You can spit out gum when you arrive.  If you drink alcohol: Stop drinking it the night before surgery.  If your care team tells you to take medicine on the morning of surgery, it's okay to take it with a sip of water.  Preventing infection  Shower or bathe the night before and morning of your surgery. Follow the instructions your clinic gave you. (If no instructions, use regular soap.)  Don't shave or clip hair near your surgery site. We'll remove the hair if needed.  Don't smoke or vape the morning of surgery. You may chew nicotine gum up to 2 hours before surgery. A nicotine patch is okay.  Note: Some surgeries require you to completely quit smoking and nicotine. Check with your surgeon.  Your care team will make every effort to keep you safe from infection. We will:  Clean our hands often with soap and water (or an alcohol-based hand rub).  Clean the skin at your surgery site with a special soap that kills germs.  Give you a special gown to keep you warm. (Cold raises the risk of infection.)  Wear special hair covers, masks, gowns and gloves during surgery.  Give antibiotic medicine, if prescribed. Not all surgeries need antibiotics.  What to bring on the day of surgery  Photo ID and insurance card  Copy of your health care directive, if you have one  Glasses and hearing aids (bring cases)  You can't wear contacts during surgery  Inhaler and eye drops, if you use them (tell us about these when you arrive)  CPAP machine or breathing device, if you use them  A few personal items, if spending the night  If you have . . .  A pacemaker, ICD (cardiac defibrillator) or other  implant: Bring the ID card.  An implanted stimulator: Bring the remote control.  A legal guardian: Bring a copy of the certified (court-stamped) guardianship papers.  Please remove any jewelry, including body piercings. Leave jewelry and other valuables at home.  If you're going home the day of surgery  You must have a responsible adult drive you home. They should stay with you overnight as well.  If you don't have someone to stay with you, and you aren't safe to go home alone, we may keep you overnight. Insurance often won't pay for this.  After surgery  If it's hard to control your pain or you need more pain medicine, please call your surgeon's office.  Questions?   If you have any questions for your care team, list them here: _________________________________________________________________________________________________________________________________________________________________________ ____________________________________ ____________________________________ ____________________________________  For informational purposes only. Not to replace the advice of your health care provider. Copyright   2003, 2019 [employer]. All rights reserved. Clinically reviewed by Vickie Greco MD. [employer] [id number] - REV 12/22.    How to Take Your Medication Before Surgery  - HOLD (do not take) your HYDROCHLOROTHIAZIDE on the morning of surgery.   - Lantus- decrease by 1/2 night before and day of surgery   -Coumadin on hold already

## 2023-10-10 ENCOUNTER — LAB (OUTPATIENT)
Dept: CARDIOLOGY | Facility: CLINIC | Age: 73
End: 2023-10-10
Payer: COMMERCIAL

## 2023-10-10 DIAGNOSIS — I25.810 CORONARY ARTERY DISEASE INVOLVING CORONARY BYPASS GRAFT OF NATIVE HEART WITHOUT ANGINA PECTORIS: ICD-10-CM

## 2023-10-10 DIAGNOSIS — I25.10 CORONARY ARTERY DISEASE INVOLVING NATIVE CORONARY ARTERY OF NATIVE HEART WITHOUT ANGINA PECTORIS: ICD-10-CM

## 2023-10-10 DIAGNOSIS — R94.39 ABNORMAL NUCLEAR STRESS TEST: ICD-10-CM

## 2023-10-10 LAB
ANION GAP SERPL CALCULATED.3IONS-SCNC: 10 MMOL/L (ref 7–15)
BUN SERPL-MCNC: 15.5 MG/DL (ref 8–23)
CALCIUM SERPL-MCNC: 9.4 MG/DL (ref 8.8–10.2)
CHLORIDE SERPL-SCNC: 99 MMOL/L (ref 98–107)
CREAT SERPL-MCNC: 0.96 MG/DL (ref 0.51–0.95)
DEPRECATED HCO3 PLAS-SCNC: 32 MMOL/L (ref 22–29)
EGFRCR SERPLBLD CKD-EPI 2021: 62 ML/MIN/1.73M2
ERYTHROCYTE [DISTWIDTH] IN BLOOD BY AUTOMATED COUNT: 12.8 % (ref 10–15)
GLUCOSE SERPL-MCNC: 147 MG/DL (ref 70–99)
HCT VFR BLD AUTO: 39.6 % (ref 35–47)
HGB BLD-MCNC: 12.9 G/DL (ref 11.7–15.7)
MCH RBC QN AUTO: 30.6 PG (ref 26.5–33)
MCHC RBC AUTO-ENTMCNC: 32.6 G/DL (ref 31.5–36.5)
MCV RBC AUTO: 94 FL (ref 78–100)
PLATELET # BLD AUTO: 384 10E3/UL (ref 150–450)
POTASSIUM SERPL-SCNC: 3.9 MMOL/L (ref 3.4–5.3)
RBC # BLD AUTO: 4.22 10E6/UL (ref 3.8–5.2)
SODIUM SERPL-SCNC: 141 MMOL/L (ref 135–145)
WBC # BLD AUTO: 7.4 10E3/UL (ref 4–11)

## 2023-10-10 PROCEDURE — 80048 BASIC METABOLIC PNL TOTAL CA: CPT

## 2023-10-10 PROCEDURE — 36415 COLL VENOUS BLD VENIPUNCTURE: CPT

## 2023-10-10 PROCEDURE — 80061 LIPID PANEL: CPT

## 2023-10-10 PROCEDURE — 86901 BLOOD TYPING SEROLOGIC RH(D): CPT

## 2023-10-10 PROCEDURE — 85027 COMPLETE CBC AUTOMATED: CPT

## 2023-10-10 PROCEDURE — 86900 BLOOD TYPING SEROLOGIC ABO: CPT

## 2023-10-10 PROCEDURE — 86850 RBC ANTIBODY SCREEN: CPT

## 2023-10-11 ENCOUNTER — HOSPITAL ENCOUNTER (OUTPATIENT)
Facility: HOSPITAL | Age: 73
Discharge: HOME | End: 2023-10-11
Attending: INTERNAL MEDICINE | Admitting: INTERNAL MEDICINE
Payer: COMMERCIAL

## 2023-10-11 VITALS
OXYGEN SATURATION: 97 % | SYSTOLIC BLOOD PRESSURE: 131 MMHG | RESPIRATION RATE: 23 BRPM | BODY MASS INDEX: 31.14 KG/M2 | WEIGHT: 173 LBS | HEART RATE: 74 BPM | DIASTOLIC BLOOD PRESSURE: 63 MMHG | TEMPERATURE: 97.6 F

## 2023-10-11 DIAGNOSIS — I25.10 CORONARY ARTERY DISEASE INVOLVING NATIVE CORONARY ARTERY OF NATIVE HEART WITHOUT ANGINA PECTORIS: ICD-10-CM

## 2023-10-11 DIAGNOSIS — I25.810 CORONARY ARTERY DISEASE INVOLVING CORONARY BYPASS GRAFT OF NATIVE HEART WITHOUT ANGINA PECTORIS: ICD-10-CM

## 2023-10-11 DIAGNOSIS — R94.39 ABNORMAL NUCLEAR STRESS TEST: ICD-10-CM

## 2023-10-11 LAB
ATRIAL RATE - MUSE: 69 BPM
CHOLEST SERPL-MCNC: 105 MG/DL
CHOLEST SERPL-MCNC: 138 MG/DL
DIASTOLIC BLOOD PRESSURE - MUSE: NORMAL MMHG
GLUCOSE BLDC GLUCOMTR-MCNC: 104 MG/DL (ref 70–99)
HDLC SERPL-MCNC: 36 MG/DL
HDLC SERPL-MCNC: 43 MG/DL
INR POINT OF CARE: 1.2 (ref 0.9–1.1)
INTERPRETATION ECG - MUSE: NORMAL
LDLC SERPL CALC-MCNC: 39 MG/DL
LDLC SERPL CALC-MCNC: 40 MG/DL
NONHDLC SERPL-MCNC: 69 MG/DL
NONHDLC SERPL-MCNC: 95 MG/DL
P AXIS - MUSE: 35 DEGREES
PR INTERVAL - MUSE: 178 MS
QRS DURATION - MUSE: 92 MS
QT - MUSE: 418 MS
QTC - MUSE: 447 MS
R AXIS - MUSE: 50 DEGREES
SYSTOLIC BLOOD PRESSURE - MUSE: NORMAL MMHG
T AXIS - MUSE: 181 DEGREES
TRIGL SERPL-MCNC: 149 MG/DL
TRIGL SERPL-MCNC: 277 MG/DL
VENTRICULAR RATE- MUSE: 69 BPM

## 2023-10-11 PROCEDURE — 93459 L HRT ART/GRFT ANGIO: CPT | Mod: 26 | Performed by: INTERNAL MEDICINE

## 2023-10-11 PROCEDURE — 93005 ELECTROCARDIOGRAM TRACING: CPT

## 2023-10-11 PROCEDURE — 250N000009 HC RX 250: Performed by: INTERNAL MEDICINE

## 2023-10-11 PROCEDURE — 258N000003 HC RX IP 258 OP 636: Performed by: INTERNAL MEDICINE

## 2023-10-11 PROCEDURE — 82962 GLUCOSE BLOOD TEST: CPT

## 2023-10-11 PROCEDURE — C1769 GUIDE WIRE: HCPCS | Performed by: INTERNAL MEDICINE

## 2023-10-11 PROCEDURE — 999N000054 HC STATISTIC EKG NON-CHARGEABLE

## 2023-10-11 PROCEDURE — 99152 MOD SED SAME PHYS/QHP 5/>YRS: CPT | Performed by: INTERNAL MEDICINE

## 2023-10-11 PROCEDURE — C1894 INTRO/SHEATH, NON-LASER: HCPCS | Performed by: INTERNAL MEDICINE

## 2023-10-11 PROCEDURE — 272N000001 HC OR GENERAL SUPPLY STERILE: Performed by: INTERNAL MEDICINE

## 2023-10-11 PROCEDURE — 80061 LIPID PANEL: CPT | Performed by: INTERNAL MEDICINE

## 2023-10-11 PROCEDURE — 93010 ELECTROCARDIOGRAM REPORT: CPT | Performed by: INTERNAL MEDICINE

## 2023-10-11 PROCEDURE — 250N000011 HC RX IP 250 OP 636: Performed by: INTERNAL MEDICINE

## 2023-10-11 PROCEDURE — 93459 L HRT ART/GRFT ANGIO: CPT | Performed by: INTERNAL MEDICINE

## 2023-10-11 PROCEDURE — 255N000002 HC RX 255 OP 636: Performed by: INTERNAL MEDICINE

## 2023-10-11 PROCEDURE — 36415 COLL VENOUS BLD VENIPUNCTURE: CPT | Performed by: INTERNAL MEDICINE

## 2023-10-11 PROCEDURE — 85610 PROTHROMBIN TIME: CPT | Performed by: INTERNAL MEDICINE

## 2023-10-11 RX ORDER — LIDOCAINE 40 MG/G
CREAM TOPICAL
Status: DISCONTINUED | OUTPATIENT
Start: 2023-10-11 | End: 2023-10-11 | Stop reason: HOSPADM

## 2023-10-11 RX ORDER — FENTANYL CITRATE 50 UG/ML
25 INJECTION, SOLUTION INTRAMUSCULAR; INTRAVENOUS
Status: DISCONTINUED | OUTPATIENT
Start: 2023-10-11 | End: 2023-10-11 | Stop reason: HOSPADM

## 2023-10-11 RX ORDER — NICOTINE POLACRILEX 4 MG
15-30 LOZENGE BUCCAL
Status: DISCONTINUED | OUTPATIENT
Start: 2023-10-11 | End: 2023-10-11 | Stop reason: HOSPADM

## 2023-10-11 RX ORDER — SODIUM CHLORIDE 9 MG/ML
INJECTION, SOLUTION INTRAVENOUS CONTINUOUS
Status: DISCONTINUED | OUTPATIENT
Start: 2023-10-11 | End: 2023-10-11 | Stop reason: HOSPADM

## 2023-10-11 RX ORDER — OXYCODONE HYDROCHLORIDE 5 MG/1
10 TABLET ORAL EVERY 4 HOURS PRN
Status: DISCONTINUED | OUTPATIENT
Start: 2023-10-11 | End: 2023-10-11 | Stop reason: HOSPADM

## 2023-10-11 RX ORDER — ASPIRIN 81 MG/1
243 TABLET, CHEWABLE ORAL ONCE
Status: COMPLETED | OUTPATIENT
Start: 2023-10-11 | End: 2023-10-11

## 2023-10-11 RX ORDER — HYDRALAZINE HYDROCHLORIDE 20 MG/ML
INJECTION INTRAMUSCULAR; INTRAVENOUS
Status: DISCONTINUED | OUTPATIENT
Start: 2023-10-11 | End: 2023-10-11 | Stop reason: HOSPADM

## 2023-10-11 RX ORDER — NALOXONE HYDROCHLORIDE 0.4 MG/ML
0.4 INJECTION, SOLUTION INTRAMUSCULAR; INTRAVENOUS; SUBCUTANEOUS
Status: DISCONTINUED | OUTPATIENT
Start: 2023-10-11 | End: 2023-10-11 | Stop reason: HOSPADM

## 2023-10-11 RX ORDER — IODIXANOL 320 MG/ML
INJECTION, SOLUTION INTRAVASCULAR
Status: DISCONTINUED | OUTPATIENT
Start: 2023-10-11 | End: 2023-10-11 | Stop reason: HOSPADM

## 2023-10-11 RX ORDER — FENTANYL CITRATE 50 UG/ML
INJECTION, SOLUTION INTRAMUSCULAR; INTRAVENOUS
Status: DISCONTINUED | OUTPATIENT
Start: 2023-10-11 | End: 2023-10-11 | Stop reason: HOSPADM

## 2023-10-11 RX ORDER — ACETAMINOPHEN 325 MG/1
650 TABLET ORAL EVERY 4 HOURS PRN
Status: DISCONTINUED | OUTPATIENT
Start: 2023-10-11 | End: 2023-10-11 | Stop reason: HOSPADM

## 2023-10-11 RX ORDER — FLUMAZENIL 0.1 MG/ML
0.2 INJECTION, SOLUTION INTRAVENOUS
Status: DISCONTINUED | OUTPATIENT
Start: 2023-10-11 | End: 2023-10-11 | Stop reason: HOSPADM

## 2023-10-11 RX ORDER — DEXTROSE MONOHYDRATE 25 G/50ML
25-50 INJECTION, SOLUTION INTRAVENOUS
Status: DISCONTINUED | OUTPATIENT
Start: 2023-10-11 | End: 2023-10-11 | Stop reason: HOSPADM

## 2023-10-11 RX ORDER — NALOXONE HYDROCHLORIDE 0.4 MG/ML
0.2 INJECTION, SOLUTION INTRAMUSCULAR; INTRAVENOUS; SUBCUTANEOUS
Status: DISCONTINUED | OUTPATIENT
Start: 2023-10-11 | End: 2023-10-11 | Stop reason: HOSPADM

## 2023-10-11 RX ORDER — ATROPINE SULFATE 0.1 MG/ML
0.5 INJECTION INTRAVENOUS
Status: DISCONTINUED | OUTPATIENT
Start: 2023-10-11 | End: 2023-10-11 | Stop reason: HOSPADM

## 2023-10-11 RX ORDER — ASPIRIN 325 MG
325 TABLET ORAL ONCE
Status: COMPLETED | OUTPATIENT
Start: 2023-10-11 | End: 2023-10-11

## 2023-10-11 RX ORDER — OXYCODONE HYDROCHLORIDE 5 MG/1
5 TABLET ORAL EVERY 4 HOURS PRN
Status: DISCONTINUED | OUTPATIENT
Start: 2023-10-11 | End: 2023-10-11 | Stop reason: HOSPADM

## 2023-10-11 RX ADMIN — SODIUM CHLORIDE: 9 INJECTION, SOLUTION INTRAVENOUS at 10:00

## 2023-10-11 ASSESSMENT — ACTIVITIES OF DAILY LIVING (ADL)
ADLS_ACUITY_SCORE: 39

## 2023-10-11 ASSESSMENT — EJECTION FRACTION: EF_VALUE: .14

## 2023-10-11 NOTE — INTERVAL H&P NOTE
"I have reviewed the surgical (or preoperative) H&P that is linked to this encounter, and examined the patient. There are no significant changes      INR < 2 by POCT on admission    Clinical Conditions Present on Arrival:  Clinically Significant Risk Factors Present on Admission     # Drug Induced Coagulation Defect: home medication list includes an anticoagulant medication   # DMII: A1C = 8.4 % (Ref range: 0.0 - 5.6 %) within past 6 months  # Obesity: Estimated body mass index is 31.14 kg/m  as calculated from the following:    Height as of 10/9/23: 1.588 m (5' 2.5\").    Weight as of this encounter: 78.5 kg (173 lb).           "

## 2023-10-11 NOTE — DISCHARGE INSTRUCTIONS
Interventional Cardiology  Coronary Angiogram/Angioplasty/Stent/Atherectomy Discharge Instructions -   Femoral Approach    The instructions below are to help you understand how to take care of yourself. There is also information about when to call the doctor or emergency services    **Do not stop your aspirin or platelet inhibitor unless directed by your Cardiologist.  These medications help to prevent platelets in your blood from sticking together and forming a clot.  Examples of these medications are:  Ticagrelor (Brilinta), Clopidigrel (Plavix), Prasugrel (Effient)          For the first 72 hours after your procedure:  No driving for 24 hours  Do not lift more than 15 pounds.  If you usually lift 50 pounds or more daily, please contact your cardiologist  Avoid any hard work or tiring activities, this includes, yard work, jogging, biking, sexual activity  During the day get up and walk around every 2 hours  You may return to work after 72 hours if you are feeling well and your job does not involve heavy lifting          Groin Site / Wound Care / Bleeding    You may take off the dressing on your groin the day after your procedure  Keep the area dry and clean  It is ok to shower with regular soap.  Pat dry, do not rub  You do not need to use a bandage  No tub/pool or hot tub for 1 week  If your groin starts to bleed or begins to swell suddenly after leaving the hospital, lie flat and apply firm pressure above the site for 15 minutes.  If bleeding continues, call 9-1-1  Bruising around the groin area is normal.  It may take 2-3 weeks for this to go away.  It is normal for the bruised area to turn green and/or yellow as it is healing.  A small lump may also be present and may last 2-3 months.  Your leg may be sore or stiff for a few days.  You may take Tylenol or a pain medicine recommended by your doctor  If you have a fever over 100.4, that lasts more than one day - call your cardiologist.            No driving for  24 hours        Your Procedural Physician was: Dr. Sam Boo  the phone number is: [phone]      [facility]:  [phone]  If you are calling after hours, please listen to the entire voicemail, a live  will answer at the end of the message

## 2023-10-11 NOTE — PRE-PROCEDURE
GENERAL PRE-PROCEDURE:   Procedure:  Cor angio grafts, possible intervention  Date/Time:  10/11/2023 11:57 AM    Written consent obtained?: Yes    Risks and benefits: Risks, benefits and alternatives were discussed    Consent given by:  Patient  Patient states understanding of procedure being performed: Yes    Patient's understanding of procedure matches consent: Yes    Procedure consent matches procedure scheduled: Yes    Expected level of sedation:  Moderate  Appropriately NPO:  Yes  ASA Class:  3 (CAD, s/p CAB/stents in past, TIA, Afib, DM, HLD, HTN)  Mallampati  :  Grade 2- soft palate, base of uvula, tonsillar pillars, and portion of posterior pharyngeal wall visible  Lungs:  Lungs clear with good breath sounds bilaterally  Heart:  Normal heart sounds and rate  History & Physical reviewed:  History and physical reviewed and no updates needed  Statement of review:  I have reviewed the lab findings, diagnostic data, medications, and the plan for sedation

## 2023-10-12 ENCOUNTER — DOCUMENTATION ONLY (OUTPATIENT)
Dept: ANTICOAGULATION | Facility: CLINIC | Age: 73
End: 2023-10-12

## 2023-10-12 ENCOUNTER — TELEPHONE (OUTPATIENT)
Dept: FAMILY MEDICINE | Facility: CLINIC | Age: 73
End: 2023-10-12

## 2023-10-12 ENCOUNTER — TELEPHONE (OUTPATIENT)
Dept: CARDIOLOGY | Facility: CLINIC | Age: 73
End: 2023-10-12

## 2023-10-12 NOTE — PROGRESS NOTES
ANTICOAGULATION  MANAGEMENT: Discharge Review    Kemi LUIS F Soto chart reviewed for anticoagulation continuity of care    Outpatient surgery/procedure on 10/11 for angiogram.    Discharge disposition: Home    Results:    Recent labs: (last 7 days)     10/11/23  1102   INR 1.2*     Anticoagulation inpatient management:     not applicable     Anticoagulation discharge instructions:     Warfarin dosing: home regimen continued   Bridging: No   INR goal change: No      Medication changes affecting anticoagulation: No    Additional factors affecting anticoagulation: No     PLAN     No adjustment to anticoagulation plan needed    Patient not contacted - pt was instructed on post procedure recommendations in 10/5/23 [facility]    No adjustment to Anticoagulation Calendar was required    Melissa Hunt RN

## 2023-10-12 NOTE — PROGRESS NOTES
Patient was kept comfortable during post-procedure stay.VSS. Rhythm NSR Pt at baseline neuro status.Denies pain. Right femoral access site remains dry & free from signs of bleeding. Appointments made & included in AVS. Dr. Boo was able to speak with emy post procedure. Post-op instructions given to patient & Daughter. Able to ask questions. Verbalized no concerns. Belongings returned. Discharged in stable condition.

## 2023-10-12 NOTE — TELEPHONE ENCOUNTER
Called patient to review recent elevated blood pressure reading.  Per [place] Community [place] guidelines, patients blood pressure is out of parameters and recheck blood pressure is recommended.    Last Blood Pressure: 154/72  Last Heart Rate: 59  Date: 9/1/23  Location: [facility]    Today's Blood Pressure: 131/63  Today's Heart Rate: 75  Location: Other Specialty    Patient reported blood pressure updated in Epic. Blood pressure falls within [place] Community [place] guidelines.  Patient will follow up as previously advised.     CHRISTIE Lafleur

## 2023-10-12 NOTE — TELEPHONE ENCOUNTER
Spoke with patient. She did not restart warfarin yesterday. Advised booster dose today and then to continue current dose. She elected to schedule next INR 10/26/23.     Anticoag calendar updated.     Melissa Hunt RN

## 2023-10-12 NOTE — TELEPHONE ENCOUNTER
General Call    Contacts         Type Contact Phone/Fax    10/12/2023 02:01 PM CDT Phone (Incoming) Ciarra Soto LUIS F (Self) [phone] (H)          Reason for Call:  medication question    What are your questions or concerns:  patient had a procedure on her heart and has questions about her warfarin.    Date of last appointment with provider: 10-9-23    Could we send this information to you in [facility] or would you prefer to receive a phone call?:   Patient would prefer a phone call   Okay to leave a detailed message?: No at Home number on file [phone] ([place])

## 2023-10-13 NOTE — TELEPHONE ENCOUNTER
----- Message -----  From: Chuck Hernandez MD  Sent: 10/12/2023   5:02 PM CDT  To: Priscila Yoon RN    I will send a MyChart note.    ----- Message from Priscila Yoon RN sent at 10/12/2023  4:17 PM CDT -----  Pt called - asking to speak to you about results.  -moustapha

## 2023-10-17 NOTE — RESULT ENCOUNTER NOTE
Ldl is at goal, triglycerides are too high likely in part related too blood sugar control .Would work on diet and with her primary regarding diabetes management, I sent her a my chart note after her angio, she wrote requesting a call. I did not receive a return response. THEO Hernandez

## 2023-10-18 ENCOUNTER — TELEPHONE (OUTPATIENT)
Dept: FAMILY MEDICINE | Facility: CLINIC | Age: 73
End: 2023-10-18
Payer: COMMERCIAL

## 2023-10-18 NOTE — TELEPHONE ENCOUNTER
Spoke to patient and relayed message from Dr. Galvez.  Scheduled to be seen in clinic next week.    BALAJI ArmasN RN  [facility]

## 2023-10-18 NOTE — TELEPHONE ENCOUNTER
S-(situation): Patient takes insulin daily and is now taking Ozempic. She took her Ozempic last Thursday and did take it the week before.  She has developed a lump and it is painful where she thinks her Ozempic injection was given.    B-(background): Also takes daily insulin.  Administers in abdomen.    A-(assessment): Reports that lump is about a quarter in size and is painful.  Does not believe it it red.  Has no side effects of the Ozempic and tolerates it well.  Reports that she does rotate her sites for her insulin, but all in her abdomen.   Reported she does not check her blood sugars very often so cannot determine if her insulin is working we;;/    R-(recommendations): Patient is wondering if can administer her Ozempic tomorrow.  She has been icing the site. Instructed to call if redness or warmth is extending.    MARLINE Armas RN  [facility]

## 2023-10-18 NOTE — TELEPHONE ENCOUNTER
"Please advise pt to do Ozempic  as instructed but in a different side. Advise to see provider for her \" lump\"     Elaine Galvez MD on 10/18/2023 at 4:22 PM;   "

## 2023-10-20 ENCOUNTER — APPOINTMENT (OUTPATIENT)
Dept: CT IMAGING | Facility: CLINIC | Age: 73
End: 2023-10-20
Attending: EMERGENCY MEDICINE
Payer: COMMERCIAL

## 2023-10-20 ENCOUNTER — TELEPHONE (OUTPATIENT)
Dept: FAMILY MEDICINE | Facility: CLINIC | Age: 73
End: 2023-10-20
Payer: COMMERCIAL

## 2023-10-20 ENCOUNTER — HOSPITAL ENCOUNTER (EMERGENCY)
Facility: CLINIC | Age: 73
Discharge: HOME | End: 2023-10-20
Attending: EMERGENCY MEDICINE | Admitting: EMERGENCY MEDICINE
Payer: COMMERCIAL

## 2023-10-20 VITALS
TEMPERATURE: 98.7 F | WEIGHT: 172 LBS | DIASTOLIC BLOOD PRESSURE: 75 MMHG | HEART RATE: 68 BPM | RESPIRATION RATE: 15 BRPM | OXYGEN SATURATION: 94 % | HEIGHT: 62 IN | SYSTOLIC BLOOD PRESSURE: 198 MMHG | BODY MASS INDEX: 31.65 KG/M2

## 2023-10-20 DIAGNOSIS — L03.311 CELLULITIS OF ABDOMINAL WALL: ICD-10-CM

## 2023-10-20 LAB
ANION GAP SERPL CALCULATED.3IONS-SCNC: 11 MMOL/L (ref 7–15)
ATRIAL RATE - MUSE: 79 BPM
BASO+EOS+MONOS # BLD AUTO: ABNORMAL 10*3/UL
BASO+EOS+MONOS NFR BLD AUTO: ABNORMAL %
BASOPHILS # BLD AUTO: 0.1 10E3/UL (ref 0–0.2)
BASOPHILS NFR BLD AUTO: 0 %
BUN SERPL-MCNC: 16 MG/DL (ref 8–23)
CALCIUM SERPL-MCNC: 9.7 MG/DL (ref 8.8–10.2)
CHLORIDE SERPL-SCNC: 98 MMOL/L (ref 98–107)
CREAT SERPL-MCNC: 0.78 MG/DL (ref 0.51–0.95)
DEPRECATED HCO3 PLAS-SCNC: 29 MMOL/L (ref 22–29)
DIASTOLIC BLOOD PRESSURE - MUSE: NORMAL MMHG
EGFRCR SERPLBLD CKD-EPI 2021: 79 ML/MIN/1.73M2
EOSINOPHIL # BLD AUTO: 0.1 10E3/UL (ref 0–0.7)
EOSINOPHIL NFR BLD AUTO: 1 %
ERYTHROCYTE [DISTWIDTH] IN BLOOD BY AUTOMATED COUNT: 12.7 % (ref 10–15)
GLUCOSE SERPL-MCNC: 198 MG/DL (ref 70–99)
HCT VFR BLD AUTO: 38.1 % (ref 35–47)
HGB BLD-MCNC: 12.7 G/DL (ref 11.7–15.7)
IMM GRANULOCYTES # BLD: 0 10E3/UL
IMM GRANULOCYTES NFR BLD: 0 %
INTERPRETATION ECG - MUSE: NORMAL
LYMPHOCYTES # BLD AUTO: 1 10E3/UL (ref 0.8–5.3)
LYMPHOCYTES NFR BLD AUTO: 7 %
MCH RBC QN AUTO: 30.5 PG (ref 26.5–33)
MCHC RBC AUTO-ENTMCNC: 33.3 G/DL (ref 31.5–36.5)
MCV RBC AUTO: 91 FL (ref 78–100)
MONOCYTES # BLD AUTO: 0.6 10E3/UL (ref 0–1.3)
MONOCYTES NFR BLD AUTO: 4 %
NEUTROPHILS # BLD AUTO: 12.8 10E3/UL (ref 1.6–8.3)
NEUTROPHILS NFR BLD AUTO: 88 %
NRBC # BLD AUTO: 0 10E3/UL
NRBC BLD AUTO-RTO: 0 /100
P AXIS - MUSE: 62 DEGREES
PLATELET # BLD AUTO: 320 10E3/UL (ref 150–450)
POTASSIUM SERPL-SCNC: 3.8 MMOL/L (ref 3.4–5.3)
PR INTERVAL - MUSE: 156 MS
QRS DURATION - MUSE: 96 MS
QT - MUSE: 384 MS
QTC - MUSE: 440 MS
R AXIS - MUSE: 68 DEGREES
RBC # BLD AUTO: 4.17 10E6/UL (ref 3.8–5.2)
SODIUM SERPL-SCNC: 138 MMOL/L (ref 135–145)
SYSTOLIC BLOOD PRESSURE - MUSE: NORMAL MMHG
T AXIS - MUSE: 193 DEGREES
TROPONIN T SERPL HS-MCNC: 10 NG/L
TROPONIN T SERPL HS-MCNC: 12 NG/L
VENTRICULAR RATE- MUSE: 79 BPM
WBC # BLD AUTO: 14.6 10E3/UL (ref 4–11)

## 2023-10-20 PROCEDURE — 250N000011 HC RX IP 250 OP 636: Mod: JZ | Performed by: EMERGENCY MEDICINE

## 2023-10-20 PROCEDURE — 258N000003 HC RX IP 258 OP 636: Performed by: EMERGENCY MEDICINE

## 2023-10-20 PROCEDURE — 96361 HYDRATE IV INFUSION ADD-ON: CPT

## 2023-10-20 PROCEDURE — 74177 CT ABD & PELVIS W/CONTRAST: CPT

## 2023-10-20 PROCEDURE — 85004 AUTOMATED DIFF WBC COUNT: CPT | Performed by: EMERGENCY MEDICINE

## 2023-10-20 PROCEDURE — 250N000013 HC RX MED GY IP 250 OP 250 PS 637: Performed by: EMERGENCY MEDICINE

## 2023-10-20 PROCEDURE — 36415 COLL VENOUS BLD VENIPUNCTURE: CPT | Performed by: EMERGENCY MEDICINE

## 2023-10-20 PROCEDURE — 82310 ASSAY OF CALCIUM: CPT | Performed by: EMERGENCY MEDICINE

## 2023-10-20 PROCEDURE — 99285 EMERGENCY DEPT VISIT HI MDM: CPT | Mod: 25

## 2023-10-20 PROCEDURE — 96374 THER/PROPH/DIAG INJ IV PUSH: CPT | Mod: 59

## 2023-10-20 PROCEDURE — 93005 ELECTROCARDIOGRAM TRACING: CPT | Performed by: EMERGENCY MEDICINE

## 2023-10-20 PROCEDURE — 84484 ASSAY OF TROPONIN QUANT: CPT | Performed by: EMERGENCY MEDICINE

## 2023-10-20 RX ORDER — LOSARTAN POTASSIUM 25 MG/1
100 TABLET ORAL ONCE
Status: COMPLETED | OUTPATIENT
Start: 2023-10-20 | End: 2023-10-20

## 2023-10-20 RX ORDER — IOPAMIDOL 755 MG/ML
90 INJECTION, SOLUTION INTRAVASCULAR ONCE
Status: COMPLETED | OUTPATIENT
Start: 2023-10-20 | End: 2023-10-20

## 2023-10-20 RX ORDER — DOXYCYCLINE HYCLATE 100 MG
100 TABLET ORAL 2 TIMES DAILY
Qty: 14 TABLET | Refills: 0 | Status: SHIPPED | OUTPATIENT
Start: 2023-10-20 | End: 2023-10-20

## 2023-10-20 RX ORDER — DOXYCYCLINE HYCLATE 100 MG
100 TABLET ORAL 2 TIMES DAILY
Qty: 14 TABLET | Refills: 0 | Status: SHIPPED | OUTPATIENT
Start: 2023-10-20 | End: 2023-10-24 | Stop reason: ALTCHOICE

## 2023-10-20 RX ORDER — ONDANSETRON 2 MG/ML
4 INJECTION INTRAMUSCULAR; INTRAVENOUS ONCE
Status: COMPLETED | OUTPATIENT
Start: 2023-10-20 | End: 2023-10-20

## 2023-10-20 RX ORDER — DOXYCYCLINE 100 MG/1
100 CAPSULE ORAL ONCE
Status: COMPLETED | OUTPATIENT
Start: 2023-10-20 | End: 2023-10-20

## 2023-10-20 RX ORDER — ISOSORBIDE MONONITRATE 60 MG/1
60 TABLET, EXTENDED RELEASE ORAL ONCE
Status: COMPLETED | OUTPATIENT
Start: 2023-10-20 | End: 2023-10-20

## 2023-10-20 RX ADMIN — LOSARTAN POTASSIUM 100 MG: 25 TABLET, FILM COATED ORAL at 18:42

## 2023-10-20 RX ADMIN — ONDANSETRON 4 MG: 2 INJECTION INTRAMUSCULAR; INTRAVENOUS at 16:05

## 2023-10-20 RX ADMIN — ISOSORBIDE MONONITRATE 60 MG: 60 TABLET, EXTENDED RELEASE ORAL at 18:47

## 2023-10-20 RX ADMIN — IOPAMIDOL 90 ML: 755 INJECTION, SOLUTION INTRAVENOUS at 15:26

## 2023-10-20 RX ADMIN — SODIUM CHLORIDE 500 ML: 9 INJECTION, SOLUTION INTRAVENOUS at 15:11

## 2023-10-20 RX ADMIN — DOXYCYCLINE 100 MG: 100 CAPSULE ORAL at 17:16

## 2023-10-20 ASSESSMENT — ACTIVITIES OF DAILY LIVING (ADL)
ADLS_ACUITY_SCORE: 33
ADLS_ACUITY_SCORE: 35
ADLS_ACUITY_SCORE: 35

## 2023-10-20 NOTE — TELEPHONE ENCOUNTER
Patients daughter called from [place] to let us know that her mother is coming in via [employer] to be seen by [facility].  Daughter stated that mother is anxious and may need assistance checking in to the clinic.  Patient has history of cardiac issues.  Explained to daughter that it may be more appropriate for her to be seen in ED.

## 2023-10-20 NOTE — DISCHARGE INSTRUCTIONS
Doxycycline can cause changes in your Coumadin level (INR) so be sure to follow-up with your Coumadin clinic on Monday morning.

## 2023-10-20 NOTE — ED TRIAGE NOTES
Patient presents to ED with pain, swelling and warmth to LLQ of abdomen since Wednesday, also having nausea.  Amy Pool RN.......10/20/2023 1:51 PM     Triage Assessment (Adult)       Row Name 10/20/23 134          Triage Assessment    Airway WDL WDL        Respiratory WDL    Respiratory WDL WDL        Skin Circulation/Temperature WDL    Skin Circulation/Temperature WDL WDL        Cardiac WDL    Cardiac WDL WDL        Peripheral/Neurovascular WDL    Peripheral Neurovascular WDL WDL        Cognitive/Neuro/Behavioral WDL    Cognitive/Neuro/Behavioral WDL WDL

## 2023-10-20 NOTE — ED NOTES
Patient states she has not taken either of her BP meds today or yesterday. She takes losartan 100mg and imdur 60mg.

## 2023-10-20 NOTE — ED PROVIDER NOTES
EMERGENCY DEPARTMENT ENCOUNTER      NAME: Kemi Soto  AGE: 73 year old female  YOB: 1950  MRN: [id number]  EVALUATION DATE & TIME: 10/20/2023  2:10 PM    PCP: Elaine Galvez    ED PROVIDER: Hari Tierney D.O.      Chief Complaint   Patient presents with    Cyst    Nausea       FINAL IMPRESSION:  1. Cellulitis of abdominal wall        ED COURSE & MEDICAL DECISION MAKIN:20 PM I met with the patient to gather history and to perform my initial exam. I discussed the plan for care while in the Emergency Department.  4:01 PM I updated the patient on the results. The patient endorsed a weird feeing which receiving contrast but notes that this has has since then resolved. The patient is otherwise feeling better.   4:32 PM I spoke with Dr. Walter (cardiology) about the patient. He states that the patient had a recent CAT scan that didn't show any need for intervention. In addition, he anticipated a  high troponin due to the patients symptoms. I will repeat another troponin to further evaluate the patients results.   4:34 PM I updated the patient.   6:53 PM Updated patient on results. Discussed and reviewed discharge plans, answered all questions. The patient agreed to the plan for discharge. Return precautions discussed.         Pertinent Labs & Imaging studies reviewed. (See chart for details)  73 year old female presents to the Emergency Department for evaluation of a small mass on her abdominal wall in the left lower quadrant.  Upon evaluation was concerning mostly for the likely underlying abscess versus simple induration, however cannot fully rule out hernia.  Therefore based on this, I did decide to perform a CT scan.  CT scan does not show obvious fluid collection of an abscess but CT appears to be consistent with a phlegmon.  She is not septic, and is otherwise stable at this time, therefore I decided to treat her with antibiotics.  While in the emergency department she did develop bleeding.  Chest  pain when ministered the IV contrast.  Did not appear to be significant to an allergic reaction, both her known cardiac history I did decide to perform an EKG and serial troponins.  Both troponins were negative, and EKG did not show any evidence of ischemia arrhythmia, there were some slight changes, however I did discuss the changes with the cardiologist and he did not believe them to be significant.  Therefore at this time, believe the patient is safe for discharge home on antibiotics.  I did instruct her on the need to watch her INR on his antibiotics, but I do believe it to be appropriate for her infection she has had significant skin infections in the past and I believe this should cover both staph and strep more adequately.  She will follow up with her primary care providerPatient is comfortable with this plan.  Return precautions were discussed    Medical Decision Making    History:  Supplemental history from: Documented in chart, if applicable  External Record(s) reviewed: Documented in chart, if applicable.    Work Up:  Chart documentation includes differential considered and any EKGs or imaging independently interpreted by provider, where specified.  In additional to work up documented, I considered the following work up: Documented in chart, if applicable.    External consultation:  Discussion of management with another provider: Cardiology    Complicating factors:  Care impacted by chronic illness: Anticoagulated State, Diabetes, and Hypertension  Care affected by social determinants of health: N/A    Disposition considerations: Discharge. I prescribed additional prescription strength medication(s) as charted. I considered admission, but discharged patient after significant clinical improvement.        At the conclusion of the encounter I discussed the results of all of the tests and the disposition. The questions were answered. The patient or family acknowledged understanding and was agreeable with the  "care plan.        HPI    Patient information was obtained from: patient    Use of : N/A        Kemi Soto is a 73 year old female who presents to the ED via walk in with a pertinent history of type 2 diabetes, hypertension, atrial fibrillation, history of TIA, chronic anticoagulation and type 2 myocardial infarction due to arrhythmia for an evaluation of abdominal pain and nausea    A few days ago the patient endorsed the sudden onset of progressively worsening pain, swelling and warmth to the left lower quadrant of her abdomen, adjacent to her belly button. In addition, she reports that yesterday evening she tried to \"poke\" at the site of her pain. She states that some bleeding occurred after poking at it but otherwise denies any pus or any other discharge. Then today the patient endorsed the sudden onset of nausea and chills which prompted her to the ED for further evaluation. The patient reports that she has never had anything like this happen before. The patient denies any fevers, diarrhea, vomiting, or any other complaints.     REVIEW OF SYSTEMS  Constitutional:  Denies fever, weight loss or weakness. Positive for chills.   Eyes:  No pain, discharge, redness  HENT:  Denies sore throat, ear pain, congestion  Respiratory: No SOB, wheeze or cough  Cardiovascular:  No CP, palpitations  GI:  Denies vomiting, diarrhea. Positive for nausea and left lower quadrant abdominal pain.   : Denies dysuria, hematuria  Musculoskeletal:  Denies any new muscle/joint pain, swelling or loss of function.  Skin:  Denies rash, pallor  Neurologic:  Denies headache, focal weakness or sensory changes  Lymph: Denies swollen nodes    All other systems negative unless noted in HPI.    PAST MEDICAL HISTORY:  Past Medical History:   Diagnosis Date    Coronary artery disease     Diabetes mellitus (H)     Discitis of thoracolumbar region 10/10/2015    Encephalopathy 10/14/2015    Epidural abscess 10/10/2015    Hip pain, right  "    Hyperlipidemia     Sepsis (H) 10/8/2015    Stroke (H) 06/23/2015    Neurology felt that episode was C/W subacute ischemic stroke    TIA (transient ischemic attack) 6/23/2015    UTI (urinary tract infection)     admitted to [facility] with UTI       PAST SURGICAL HISTORY:  Past Surgical History:   Procedure Laterality Date    ANGIOPLASTY  03/30/2016    Drug eluting stent mid LAD, cutting balloon to 1st diagonal    ANGIOPLASTY  2008    BYPASS GRAFT ARTERY CORONARY  12/11/2007    X 3 vessels, LIMA to LAD, saph vein graft to distal RCA, saphvein graft to marginal, mini circuit bypass.  [facility].    CORONARY STENT PLACEMENT  2008    Left main, left circumflex, RCA    CORONARY STENT PLACEMENT  03/2016    CV ANGIOGRAM CORONARY GRAFT N/A 10/11/2023    Procedure: Coronary Angiogram Graft;  Surgeon: Sam Boo MD;  Location: [facility] CATH LAB CV    CV CORONARY ANGIOGRAM N/A 8/1/2018    Procedure: Coronary Angiogram;  Surgeon: Kit Bean MD;  Location: [facility] Cath Lab;  Service:     CV LEFT HEART CATHETERIZATION WITH LEFT VENTRICULOGRAM N/A 8/1/2018    Procedure: Left Heart Catheterization with Left Ventriculogram;  Surgeon: Kit Bean MD;  Location: [facility] Cath Lab;  Service:     INSERT MIDLINE HE  10/31/2015         LUMBAR DISCECTOMY N/A 10/11/2015    Procedure: BILATERAL L1-L5 DECOMPRESSIVE LAMINECTOMY WITH EVACUATION OF EPIDURAL ABSCESS IRRIGATION & DEBRIDEMENT;  Surgeon: Luli Chan MD;  Location: [facility] OR;  Service:     PICC  10/19/2015         RELEASE CARPAL TUNNEL Bilateral          CURRENT MEDICATIONS:    No current facility-administered medications for this encounter.     Current Outpatient Medications   Medication    doxycycline hyclate (VIBRA-TABS) 100 MG tablet    acetaminophen (TYLENOL) 500 MG tablet    atorvastatin (LIPITOR) 80 MG tablet    busPIRone (BUSPAR) 15 MG tablet    clobetasol (TEMOVATE) 0.05 % external cream    ezetimibe  (ZETIA) 10 MG tablet    gabapentin (NEURONTIN) 300 MG capsule    hydrochlorothiazide (HYDRODIURIL) 12.5 MG tablet    hydrOXYzine (ATARAX) 25 MG tablet    insulin glargine (LANTUS SOLOSTAR) 100 UNIT/ML pen    isosorbide mononitrate (IMDUR) 60 MG 24 hr tablet    ketoconazole (NIZORAL) 2 % external cream    losartan (COZAAR) 100 MG tablet    melatonin 1 MG CAPS    Semaglutide, 1 MG/DOSE, (OZEMPIC) 4 MG/3ML pen    sertraline (ZOLOFT) 100 MG tablet    sotalol (BETAPACE) 80 MG tablet    traMADol (ULTRAM) 50 MG tablet    triamcinolone (KENALOG) 0.1 % external cream    warfarin ANTICOAGULANT (COUMADIN) 5 MG tablet     Facility-Administered Medications Ordered in Other Encounters   Medication    dexamethasone PF (DECADRON) injection    iohexol (OMNIPAQUE) 300 mg/mL injection    lidocaine (PF) (XYLOCAINE) 1 % injection         ALLERGIES:  Allergies   Allergen Reactions    Metformin GI Disturbance    Oxycodone Nausea and Vomiting       FAMILY HISTORY:  Family History   Problem Relation Age of Onset    Cerebrovascular Disease Mother     Diabetes Father     Diabetes Sister     Cerebrovascular Disease Sister 81.00    Cerebrovascular Disease Brother     Diabetes Brother        SOCIAL HISTORY:  Social History     Socioeconomic History    Marital status: Single    Number of children: 1   Tobacco Use    Smoking status: Former     Types: Cigarettes     Quit date: 2007     Years since quittin.3     Passive exposure: Past    Smokeless tobacco: Never   Vaping Use    Vaping Use: Never used   Substance and Sexual Activity    Alcohol use: No     Comment: Alcoholic Drinks/day: stopped drinking 2015    Drug use: No    Sexual activity: Never   Social History Narrative    Lives alone with cats and dogs. , one daughter, Tory Lofton.      Social Determinants of Health     Financial Resource Strain: Low Risk  (10/9/2023)    Financial Resource Strain     Within the past 12 months, have you or your family members you live with  "been unable to get utilities (heat, electricity) when it was really needed?: No   Food Insecurity: Low Risk  (10/9/2023)    Food Insecurity     Within the past 12 months, did you worry that your food would run out before you got money to buy more?: No     Within the past 12 months, did the food you bought just not last and you didn t have money to get more?: No   Transportation Needs: Low Risk  (10/9/2023)    Transportation Needs     Within the past 12 months, has lack of transportation kept you from medical appointments, getting your medicines, non-medical meetings or appointments, work, or from getting things that you need?: No   Interpersonal Safety: Low Risk  (10/9/2023)    Interpersonal Safety     Do you feel physically and emotionally safe where you currently live?: Yes     Within the past 12 months, have you been hit, slapped, kicked or otherwise physically hurt by someone?: No     Within the past 12 months, have you been humiliated or emotionally abused in other ways by your partner or ex-partner?: No   Housing Stability: Low Risk  (10/9/2023)    Housing Stability     Do you have housing? : Yes     Are you worried about losing your housing?: No       VITALS:  Patient Vitals for the past 24 hrs:   BP Temp Temp src Pulse Resp SpO2 Height Weight   10/20/23 1800 (!) 198/75 -- -- 68 15 94 % -- --   10/20/23 1715 (!) 172/73 -- -- 69 18 96 % -- --   10/20/23 1700 (!) 143/65 -- -- 67 13 96 % -- --   10/20/23 1645 (!) 156/67 -- -- 69 14 96 % -- --   10/20/23 1630 (!) 189/79 -- -- 71 13 96 % -- --   10/20/23 1604 -- -- -- 76 20 -- -- --   10/20/23 1510 (!) 223/86 -- -- -- -- -- -- --   10/20/23 1506 -- 98.7  F (37.1  C) Oral -- 18 -- -- --   10/20/23 1346 (!) 203/118 (!) 95.9  F (35.5  C) Temporal 72 20 99 % 1.575 m (5' 2\") 78 kg (172 lb)       PHYSICAL EXAM    VITAL SIGNS: BP (!) 198/75   Pulse 68   Temp 98.7  F (37.1  C) (Oral)   Resp 15   Ht 1.575 m (5' 2\")   Wt 78 kg (172 lb)   LMP  (LMP Unknown)   SpO2 94% "   BMI 31.46 kg/m      General Appearance: Well-appearing, well-nourished, no acute distress   Head:  Normocephalic, without obvious abnormality, atraumatic  Eyes:  PERRL, conjunctiva/corneas clear, EOM's intact,  ENT:  Lips, mucosa, and tongue normal, membranes are moist without pallor  Neck:  Normal ROM, symmetrical, trachea midline    Cardio:  Regular rate and rhythm, no murmur, rub or gallop, 2+ pulses symmetric in all extremities  Pulm:  Clear to auscultation bilaterally, respirations unlabored,  Abdomen:  Soft, no rebound or guarding. Left lower quadrant stellate induration with tenderness.   Musculoskeletal: Full ROM, no edema, no cyanosis, good ROM of major joints  Integument:  Warm, Dry, No erythema, No rash.    Neurologic:  Alert & oriented.  No focal deficits appreciated.  Ambulatory.  Psychiatric:  Affect normal, Judgment normal, Mood normal.      LABS  Results for orders placed or performed during the hospital encounter of 10/20/23 (from the past 24 hour(s))   CBC with platelets + differential    Narrative    The following orders were created for panel order CBC with platelets + differential.  Procedure                               Abnormality         Status                     ---------                               -----------         ------                     CBC with platelets and d...[[id number]]  Abnormal            Final result                 Please view results for these tests on the individual orders.   Basic metabolic panel   Result Value Ref Range    Sodium 138 135 - 145 mmol/L    Potassium 3.8 3.4 - 5.3 mmol/L    Chloride 98 98 - 107 mmol/L    Carbon Dioxide (CO2) 29 22 - 29 mmol/L    Anion Gap 11 7 - 15 mmol/L    Urea Nitrogen 16.0 8.0 - 23.0 mg/dL    Creatinine 0.78 0.51 - 0.95 mg/dL    GFR Estimate 79 >60 mL/min/1.73m2    Calcium 9.7 8.8 - 10.2 mg/dL    Glucose 198 (H) 70 - 99 mg/dL   CBC with platelets and differential   Result Value Ref Range    WBC Count 14.6 (H) 4.0 - 11.0 10e3/uL     RBC Count 4.17 3.80 - 5.20 10e6/uL    Hemoglobin 12.7 11.7 - 15.7 g/dL    Hematocrit 38.1 35.0 - 47.0 %    MCV 91 78 - 100 fL    MCH 30.5 26.5 - 33.0 pg    MCHC 33.3 31.5 - 36.5 g/dL    RDW 12.7 10.0 - 15.0 %    Platelet Count 320 150 - 450 10e3/uL    % Neutrophils 88 %    % Lymphocytes 7 %    % Monocytes 4 %    Mids % (Monos, Eos, Basos)      % Eosinophils 1 %    % Basophils 0 %    % Immature Granulocytes 0 %    NRBCs per 100 WBC 0 <1 /100    Absolute Neutrophils 12.8 (H) 1.6 - 8.3 10e3/uL    Absolute Lymphocytes 1.0 0.8 - 5.3 10e3/uL    Absolute Monocytes 0.6 0.0 - 1.3 10e3/uL    Mids Abs (Monos, Eos, Basos)      Absolute Eosinophils 0.1 0.0 - 0.7 10e3/uL    Absolute Basophils 0.1 0.0 - 0.2 10e3/uL    Absolute Immature Granulocytes 0.0 <=0.4 10e3/uL    Absolute NRBCs 0.0 10e3/uL   CT Abdomen Pelvis w Contrast    Narrative    EXAM: CT ABDOMEN PELVIS W CONTRAST  LOCATION: [facility]  DATE: 10/20/2023    INDICATION: Abdominal wall mass LLQ, concern for hernia versus abscess  COMPARISON: None.  TECHNIQUE: CT scan of the abdomen and pelvis was performed following injection of IV contrast. Multiplanar reformats were obtained. Dose reduction techniques were used.  CONTRAST: 90ml Isovue 370    FINDINGS:   LOWER CHEST: Right inferior chest 4 mm aliyah-fissural nodule is most likely a benign intrapulmonary lymph node.    HEPATOBILIARY: Gallstone.    PANCREAS: Normal.    SPLEEN: Normal.    ADRENAL GLANDS: Left adrenal 1.7 x 1.4 cm nodule.    KIDNEYS/BLADDER: No significant mass, stone, or hydronephrosis. Right renal 1.5 cm cyst which does not require further evaluation.    BOWEL: No obstruction or inflammatory change.    LYMPH NODES: Normal.    VASCULATURE: Aortoiliac atherosclerosis. Patent mesenteric vessels and renal arteries.    PELVIC ORGANS: Right adnexal 8 x 5.8 cm multiloculated fluid density lesion immediately adjacent the the uterus. This could arise from the right ovary, or represent a  degenerated subserosal uterine leiomyoma. No ascites or omental cake to suggest   neoplastic spread.    MUSCULOSKELETAL: Left paramedian 2.5 x 2.3 cm nonmarginated subcutaneous hypodensity. The overlying skin is mildly thickened consistent with cellulitis and phlegmon.    Lower lumbar facet arthropathy with grade 1 L5 on S1 anterolisthesis.      Impression    IMPRESSION:   1.  Left abdominal wall subcutaneous 2.5 cm probable phlegmon and cellulitis.  2.  Right adnexal lesion which could arise from ovary or uterus. Ovarian neoplasm possible. Pelvic MRI recommended.  3.  Left adrenal 1.7 cm nodule. Adenoma most likely. Recommend comparison with any prior CT. If none available, 3 month adrenal CT follow-up recommended.     ECG 12-LEAD WITH MUSE (LHE)   Result Value Ref Range    Systolic Blood Pressure  mmHg    Diastolic Blood Pressure  mmHg    Ventricular Rate 79 BPM    Atrial Rate 79 BPM    TN Interval 156 ms    QRS Duration 96 ms     ms    QTc 440 ms    P Axis 62 degrees    R AXIS 68 degrees    T Axis 193 degrees    Interpretation ECG       Sinus rhythm  Septal infarct , age undetermined  ST & T wave abnormality, consider anterolateral ischemia  Abnormal ECG  When compared with ECG of 11-OCT-2023 10:11,  Septal infarct is now Present  ST now depressed in Inferior leads  Confirmed by SEE ED PROVIDER NOTE FOR, ECG INTERPRETATION ([id number]),  Jacinda Santana ([id number]) on 10/20/2023 4:23:02 PM     Troponin T, High Sensitivity (now)   Result Value Ref Range    Troponin T, High Sensitivity 10 <=14 ng/L   Troponin T, High Sensitivity (now)   Result Value Ref Range    Troponin T, High Sensitivity 12 <=14 ng/L         RADIOLOGY  CT Abdomen Pelvis w Contrast   Final Result   IMPRESSION:    1.  Left abdominal wall subcutaneous 2.5 cm probable phlegmon and cellulitis.   2.  Right adnexal lesion which could arise from ovary or uterus. Ovarian neoplasm possible. Pelvic MRI recommended.   3.  Left adrenal 1.7 cm nodule.  Adenoma most likely. Recommend comparison with any prior CT. If none available, 3 month adrenal CT follow-up recommended.                EKG:    Rate: 79 bpm  Rhythm: Normal Sinus Rhythm  Axis: Normal  Interval: Normal  Conduction: Normal  QRS: Normal  ST: Minimally depressed in the inferior leads  T-wave: Normal  QT: Not prolonged  Comparison EKG: Improvement of the T waves in lead V2 as well as slight depression in inferior leads compared to 11 October 2023  Impression:  No acute ischemic change   I have independently reviewed and interpreted today's EKG, pending Cardiologist read      MEDICATIONS GIVEN IN THE EMERGENCY:  Medications   sodium chloride 0.9% BOLUS 500 mL (0 mLs Intravenous Stopped 10/20/23 1550)   iopamidol (ISOVUE-370) solution 90 mL (90 mLs Intravenous $Given 10/20/23 1526)   ondansetron (ZOFRAN) injection 4 mg (4 mg Intravenous $Given 10/20/23 1605)   doxycycline hyclate (VIBRAMYCIN) capsule 100 mg (100 mg Oral $Given 10/20/23 1716)   losartan (COZAAR) tablet 100 mg (100 mg Oral $Given 10/20/23 1842)   isosorbide mononitrate (IMDUR) 24 hr tablet 60 mg (60 mg Oral $Given 10/20/23 1847)       NEW PRESCRIPTIONS STARTED AT TODAY'S ER VISIT  New Prescriptions    DOXYCYCLINE HYCLATE (VIBRA-TABS) 100 MG TABLET    Take 1 tablet (100 mg) by mouth 2 times daily for 7 days        I, Liliane Herrera, am serving as a scribe to document services personally performed by Hari Tierney D.O., based on my observations and the provider's statements to me.  I, Hari Tierney D.O., attest that Liliane Herrera is acting in a scribe capacity, has observed my performance of the services and has documented them in accordance with my direction.     Hari Tierney D.O.  Emergency Medicine  [facility] EMERGENCY ROOM  [address] [id number] [phone]  Dept: [phone]       Hari Tierney,   10/20/23 1938

## 2023-10-21 NOTE — ED PROVIDER NOTES
9:04 AM.  Received a call from the pharmacy concerned about interaction of doxycycline with patient's Coumadin.  Prescription switched to Omnicef 300 mg p.o. twice daily x7 days.     Juaquin Mckinley MD  10/21/23 0904

## 2023-10-23 ENCOUNTER — DOCUMENTATION ONLY (OUTPATIENT)
Dept: ANTICOAGULATION | Facility: CLINIC | Age: 73
End: 2023-10-23
Payer: COMMERCIAL

## 2023-10-23 NOTE — PROGRESS NOTES
"ANTICOAGULATION  MANAGEMENT: Discharge Review    Kemi Soto chart reviewed for anticoagulation continuity of care    Emergency room visit on 10/20/23 for cellulitis of abdominal wall.    Discharge disposition: Home    Results:    No results for input(s): \"INR\", \"[id number]\", \"F2\", \"ALMWH\", \"AAUFH\" in the last 168 hours.  Anticoagulation inpatient management:     not applicable     Anticoagulation discharge instructions:     Warfarin dosing: home regimen continued   Bridging: No   INR goal change: No      Medication changes affecting anticoagulation: Yes: doxycycline    Additional factors affecting anticoagulation: No     PLAN     No adjustment to anticoagulation plan needed    Recommended follow up is scheduled    No adjustment to Anticoagulation Calendar was required    Melissa Hunt RN      "

## 2023-10-24 ENCOUNTER — OFFICE VISIT (OUTPATIENT)
Dept: FAMILY MEDICINE | Facility: CLINIC | Age: 73
End: 2023-10-24
Payer: COMMERCIAL

## 2023-10-24 ENCOUNTER — ANTICOAGULATION THERAPY VISIT (OUTPATIENT)
Dept: ANTICOAGULATION | Facility: CLINIC | Age: 73
End: 2023-10-24

## 2023-10-24 VITALS
HEART RATE: 76 BPM | RESPIRATION RATE: 16 BRPM | SYSTOLIC BLOOD PRESSURE: 124 MMHG | TEMPERATURE: 98.7 F | DIASTOLIC BLOOD PRESSURE: 62 MMHG

## 2023-10-24 DIAGNOSIS — N94.89 ADNEXAL MASS: ICD-10-CM

## 2023-10-24 DIAGNOSIS — I48.0 PAROXYSMAL ATRIAL FIBRILLATION (H): ICD-10-CM

## 2023-10-24 DIAGNOSIS — I48.0 PAROXYSMAL ATRIAL FIBRILLATION (H): Primary | ICD-10-CM

## 2023-10-24 DIAGNOSIS — L02.91 ABSCESS: Primary | ICD-10-CM

## 2023-10-24 DIAGNOSIS — L03.311 CELLULITIS OF ABDOMINAL WALL: ICD-10-CM

## 2023-10-24 LAB — INR BLD: 4.2 (ref 0.9–1.1)

## 2023-10-24 PROCEDURE — 85610 PROTHROMBIN TIME: CPT | Performed by: NURSE PRACTITIONER

## 2023-10-24 PROCEDURE — 36416 COLLJ CAPILLARY BLOOD SPEC: CPT | Performed by: NURSE PRACTITIONER

## 2023-10-24 PROCEDURE — 99213 OFFICE O/P EST LOW 20 MIN: CPT | Performed by: NURSE PRACTITIONER

## 2023-10-24 RX ORDER — CEFDINIR 300 MG/1
300 CAPSULE ORAL 2 TIMES DAILY
COMMUNITY
End: 2023-11-14

## 2023-10-24 ASSESSMENT — PAIN SCALES - GENERAL: PAINLEVEL: EXTREME PAIN (8)

## 2023-10-24 NOTE — PROGRESS NOTES
ANTICOAGULATION MANAGEMENT     Kemi Soto 73 year old female is on warfarin with supratherapeutic INR result. (Goal INR 2.0-3.0)    Recent labs: (last 7 days)     10/24/23  1555   INR 4.2*       ASSESSMENT     Source(s): Chart Review     Warfarin doses taken: Reviewed in chart  Medication/supplement changes:  augmentin 10 day course (dates: 10/24-11/3) subsequent INRs may be increased. Closer INR monitoring recommended.  Doxycycline was stopped today  New illness, injury, or hospitalization: Yes: abdominal cellulitis  Previous result: Subtherapeutic         PLAN     Unable to reach Ciarra today.    Left message to hold warfarin tonight. Request call back for assessment.    Follow up required to confirm warfarin dose taken and assess for changes    Melissa Hunt RN  Anticoagulation Clinic  10/24/2023

## 2023-10-24 NOTE — PROGRESS NOTES
Keith Lafleur is a 73 year old, presenting for the following health issues:  Follow Up (Abd inf is worsening)      10/24/2023     3:25 PM   Additional Questions   Roomed by Cathie Rain CMA       History of Present Illness       Reason for visit:  Follow up    She eats 0-1 servings of fruits and vegetables daily.She consumes 0 sweetened beverage(s) daily.She exercises with enough effort to increase her heart rate 9 or less minutes per day.  She exercises with enough effort to increase her heart rate 3 or less days per week.   She is taking medications regularly.       Review of Systems   Constitutional, HEENT, cardiovascular, pulmonary, gi and gu systems are negative, except as otherwise noted.  Infection on abdomen is gradually worsening.  Has been on antibiotics since 10/20/23 but not helping.  Afebrile.  Pain worsening.          Objective    LMP  (LMP Unknown)   There is no height or weight on file to calculate BMI.  Physical Exam   GENERAL: alert and no distress  RESP: respirations even, nonlabored  ABDOMEN: 3 x 5 cm firm lump with 6 x 9 cm of surrounding erythema  MS: no gross musculoskeletal defects noted  PSYCH: mentation appears normal, affect normal/bright    A/P:  (L02.91) Abscess  (primary encounter diagnosis)  Comment:   Plan: Adult General Surg Referral,         amoxicillin-clavulanate (AUGMENTIN) 875-125 MG         tablet            (N94.89) Adnexal mass  Comment:   Plan: MR Pelvis (GYN) wo & w Contrast            (L03.311) Cellulitis of abdominal wall  Comment:   Plan: Adult General Surg Referral,         amoxicillin-clavulanate (AUGMENTIN) 875-125 MG         tablet            (I48.0) Paroxysmal atrial fibrillation (H)  Comment:   Plan: INR

## 2023-10-24 NOTE — PROGRESS NOTES
ANTICOAGULATION MANAGEMENT     Kemi Soto 73 year old female is on warfarin with supratherapeutic INR result. (Goal INR 2.0-3.0)    Recent labs: (last 7 days)     10/24/23  1555   INR 4.2*       ASSESSMENT     Source(s): Chart Review and Patient/Caregiver Call     Warfarin doses taken: Warfarin taken as instructed  Diet: No new diet changes identified  Medication/supplement changes:  Doxycycline 10/20-10/24, was stopped today.   augmentin 10 day course (dates: 10/24-11/3) subsequent INRs may be increased. Closer INR monitoring recommended.   New illness, injury, or hospitalization: Yes: cellulitis on abdomen - patient states more than cellulitis, likely an abscess that will need to be removed.   Signs or symptoms of bleeding or clotting: No  Previous result: Subtherapeutic  Additional findings: None       PLAN     Recommended plan for temporary change(s) affecting INR     Dosing Instructions: hold 2 doses then continue your current warfarin dose with next INR in 1 week       Summary  As of 10/24/2023      Full warfarin instructions:  10/24: Hold; 10/25: Hold; Otherwise 5 mg every Sun, Tue, Thu; 2.5 mg all other days   Next INR check:  10/31/2023               Telephone call with Ciarra who verbalizes understanding and agrees to plan    Patient offered & declined to schedule next visit - patient wants to figure out when she will be having procedure for abscess removal prior to scheduling next INR.     Education provided:   Goal range and lab monitoring: goal range and significance of current result, Importance of therapeutic range, and Importance of following up at instructed interval    Plan made per [facility] anticoagulation protocol    Melissa Hunt RN  Anticoagulation Clinic  10/24/2023    _______________________________________________________________________     Anticoagulation Episode Summary       Current INR goal:  2.0-3.0   TTR:  27.2% (1 y)   Target end date:  Indefinite   Send INR reminders to:  [facility]    Indications    Paroxysmal atrial fibrillation (H) [I48.0]             Comments:               Anticoagulation Care Providers       Provider Role Specialty Phone number    Chuck Hernandez MD Referring Cardiovascular Disease [phone]

## 2023-10-25 ENCOUNTER — HOSPITAL ENCOUNTER (EMERGENCY)
Facility: CLINIC | Age: 73
Discharge: HOME | End: 2023-10-25
Attending: EMERGENCY MEDICINE | Admitting: EMERGENCY MEDICINE
Payer: COMMERCIAL

## 2023-10-25 VITALS
SYSTOLIC BLOOD PRESSURE: 139 MMHG | HEIGHT: 62 IN | HEART RATE: 74 BPM | DIASTOLIC BLOOD PRESSURE: 61 MMHG | RESPIRATION RATE: 20 BRPM | WEIGHT: 172 LBS | TEMPERATURE: 97.4 F | OXYGEN SATURATION: 97 % | BODY MASS INDEX: 31.65 KG/M2

## 2023-10-25 DIAGNOSIS — L02.211 ABDOMINAL WALL ABSCESS: ICD-10-CM

## 2023-10-25 PROCEDURE — 250N000013 HC RX MED GY IP 250 OP 250 PS 637: Performed by: STUDENT IN AN ORGANIZED HEALTH CARE EDUCATION/TRAINING PROGRAM

## 2023-10-25 PROCEDURE — 87077 CULTURE AEROBIC IDENTIFY: CPT | Performed by: EMERGENCY MEDICINE

## 2023-10-25 PROCEDURE — 99283 EMERGENCY DEPT VISIT LOW MDM: CPT | Mod: 25

## 2023-10-25 PROCEDURE — 87070 CULTURE OTHR SPECIMN AEROBIC: CPT | Performed by: EMERGENCY MEDICINE

## 2023-10-25 PROCEDURE — 10061 I&D ABSCESS COMP/MULTIPLE: CPT

## 2023-10-25 RX ORDER — ACETAMINOPHEN 325 MG/1
650 TABLET ORAL ONCE
Status: COMPLETED | OUTPATIENT
Start: 2023-10-25 | End: 2023-10-25

## 2023-10-25 RX ADMIN — ACETAMINOPHEN 650 MG: 325 TABLET ORAL at 16:48

## 2023-10-25 NOTE — ED PROVIDER NOTES
I, Tana Flores have reviewed the documentation, personally taken the patient's history, performed an exam and agree with the physical finds, diagnosis and management plan.    HPI:    ED Course as of 10/25/23 1612   Wed Oct 25, 2023   1538 Here on 10/20 ct abdpel w phlegmon started on doxy. Yest INR 4.2, switched to augmentin. PCP saw yest and thought she needed I&D, given gen surg referral. Had appt w gen surg today but slept through appt and so is here now.          Physical Exam: Abdominal wall erythema with a area of centralized fluctuance consistent with abscess    MDM: Abdominal wall cellulitis and abscess    ED Course/workup: Incision and drainage performed, please see PA note.  Packing placed, will continue antibiotics for home.        Final Diagnosis:     ICD-10-CM    1. Abdominal wall abscess  L02.211                 I personally saw the patient and performed a substantive portion of the visit including all aspects of the medical decision making.     MD Mark Costa Zabrina N, MD  10/25/23 1612

## 2023-10-25 NOTE — DISCHARGE INSTRUCTIONS
You were seen in the emergency department today for an abscess on the lower left side of your abdomen. Please continue your Augmentin course and finish this in its entirety. Please use moist heat pads to help bring bacteria to the surface. Incision and drainage was performed and the wound was packed with gauze. This can stay in for 2 to 3 days and should be removed by either your primary care provider or you if you are unable to get in within the next 3 days.  You may take Ibuprofen up to 400 mg by mouth every 4-6 hours as needed for pain. Do not exceed 2400 mg/day.  You may take Tylenol 325-1000 mg by mouth every 4-6 hours as needed for pain. Do not exceed 1000mg (1g) in 4 hours or 4 g/day from all sources.  You may combine Tylenol and Ibuprofen- up to 400 mg of ibuprofen and 1000 mg of Tylenol at the same time, up to 3 times a day for the pain    Return to the emergency department if you experience growing redness around the area, you develop fever or chills, or swelling and pus drainage increased.

## 2023-10-25 NOTE — ED PROVIDER NOTES
Emergency Department Encounter   NAME: Kemi Soto ; AGE: 73 year old female ; YOB: 1950 ; MRN: [id number] ; PCP: Elaine Galvez   ED PROVIDER: Shivani Guerrero PA-C    Evaluation Date & Time:   No admission date for patient encounter.    CHIEF COMPLAINT:  Wound Check        Impression and Plan   FINAL IMPRESSION:    ICD-10-CM    1. Abdominal wall abscess  L02.211           MDM:  Ciarra Soto is a 73 year old female with PMH of T2DM, paroxysmal a fib currently on warfarin, TIA, and HTN presenting today for worsening lower left-sided abdominal infection. She was seen here on 10/20/2023 where abdominal CT found a subcutaneous 2.5 cm probable phlegmon and cellulitis and she was started on Augmentin.  She saw her PCP yesterday who referred her to general surgery for I&D and further management. She had an appointment at 1:30 PM today but states she slept through this appointment and is now here to get the abscess removed. Her INR at the anticoagulation clinic yesterday was 4.2. They changed her doxycycline to 10-day course of Augmentin at this time. She had taken 4 days of doxycycline prior to switching antibiotics.    Vitals reviewed, she is afebrile. She is mildly hypotensive with a BP of 107/51 today. On exam she is resting comfortably. There is an area of left lower sided abdominal erythema, edema, and warmth with a central area of fluctuance and surrounding induration, consistent with an abscess.    Differential diagnosis includes but not limited to cellulitis, abscess, NSTI. I have low suspicion for NSTI as she is afebrile with normal vitals and the area is not necrotic and therefore did not order repeat CT today. Given there is an area of central fluctuance with surrounding induration I think this is most consistent with an abdominal wall abscess. A very large amount of malodorous blood and purulent fluid extravasated from the incision. This was massaged until fluid stopped seeping from the incision.  A  hemostat was used to break up loculations inside of the abscess.  Abscess cavity was found to be 4.5 cm deep. Wound cultures were performed, she will be called if she needs to change her abx from Augmentin.  I think she is okay to continue PO Augmentin at this time. The wound was packed with iodoform gauze and a loose dressing was placed over top for any extra drainage. I recommended she call her care provider's office to schedule follow-up in the next 2 to 3 days. If she is unable to get in within the next 2 to 3 days, I have educated her to remove the packing herself.  She will use moist heat packs at home and Tylenol and ibuprofen as needed for pain. I educated her on reasons to return to the emergency department and signs and symptoms of infection worsening infection, is understanding of this.        Medical Decision Making    History:  Supplemental history from: Documented in chart, if applicable  External Record(s) reviewed: I personally performed chart review of ED visit from 10/20/2023 and office visit from 10/24/2023    Work Up:  Chart documentation includes differential considered and any EKGs or imaging independently interpreted by provider, where specified.  In additional to work up documented, I considered the following work up: CT abdomen  External consultation:  Discussion of management with another provider: Dr. Flores    Complicating factors:  Care impacted by chronic illness: Anticoagulated State and Diabetes  Care affected by social determinants of health: N/A    Disposition considerations: Discharge      ED COURSE:  3:20 PM I met and introduced myself to the patient. I gathered initial history and performed my physical exam. We discussed plan for initial workup.   3:30 PM I have staffed the patient with Dr. Flores, ED MD, who has evaluated the patient and agrees with all aspects of today's care.   4:15 PM department to bedside I&D and packing was placed, patient tolerated well.  4:39 PM I rechecked  the patient and discussed results, discharge, follow up, and reasons to return to the ED.     At the conclusion of the encounter I discussed the results of all the tests and the disposition. The questions were answered. The patient or family acknowledged understanding and was agreeable with the care plan.        MEDICATIONS GIVEN IN THE EMERGENCY DEPARTMENT:  Medications   acetaminophen (TYLENOL) tablet 650 mg (650 mg Oral $Given 10/25/23 3466)         NEW PRESCRIPTIONS STARTED AT TODAY'S ED VISIT:  Discharge Medication List as of 10/25/2023  4:39 PM            HPI   Patient information was obtained from: patient  Use of Intrepreter: N/A     Ciarra Soto is a 73 year old female with PMH of T2DM, paroxysmal a fib currently on warfarin, TIA, and HTN presenting today for worsening lower left-sided abdominal infection. She was seen here on 10/20/2023 where abdominal CT found a subcutaneous 2.5 cm probable phlegmon and cellulitis and she was started on Augmentin.  She saw her PCP yesterday who referred her to general surgery for I&D and further management. She had an appointment at 1:30 PM today but states she slept through this appointment and is now here to get the abscess removed. Her INR at the anticoagulation clinic yesterday was 4.2. They changed her doxycycline to 10-day course of Augmentin at this time. She had taken 4 days of doxycycline prior to switching antibiotics.      Medical History     Past Medical History:   Diagnosis Date    Coronary artery disease     Diabetes mellitus (H)     Discitis of thoracolumbar region 10/10/2015    Encephalopathy 10/14/2015    Epidural abscess 10/10/2015    Hip pain, right     Hyperlipidemia     Sepsis (H) 10/8/2015    Stroke (H) 06/23/2015    TIA (transient ischemic attack) 6/23/2015    UTI (urinary tract infection)        Past Surgical History:   Procedure Laterality Date    ANGIOPLASTY  03/30/2016    Drug eluting stent mid LAD, cutting balloon to 1st diagonal    ANGIOPLASTY       BYPASS GRAFT ARTERY CORONARY  12/11/2007    X 3 vessels, LIMA to LAD, saph vein graft to distal RCA, saphvein graft to marginal, mini circuit bypass.  [facility].    CORONARY STENT PLACEMENT      Left main, left circumflex, RCA    CORONARY STENT PLACEMENT  2016    CV ANGIOGRAM CORONARY GRAFT N/A 10/11/2023    Procedure: Coronary Angiogram Graft;  Surgeon: Sam Boo MD;  Location: [facility] CATH LAB CV    CV CORONARY ANGIOGRAM N/A 2018    Procedure: Coronary Angiogram;  Surgeon: Kit Bean MD;  Location: [facility] Cath Lab;  Service:     CV LEFT HEART CATHETERIZATION WITH LEFT VENTRICULOGRAM N/A 2018    Procedure: Left Heart Catheterization with Left Ventriculogram;  Surgeon: Kit Bean MD;  Location: [facility] Cath Lab;  Service:     INSERT MIDLINE HE  10/31/2015         LUMBAR DISCECTOMY N/A 10/11/2015    Procedure: BILATERAL L1-L5 DECOMPRESSIVE LAMINECTOMY WITH EVACUATION OF EPIDURAL ABSCESS IRRIGATION & DEBRIDEMENT;  Surgeon: Luli Chan MD;  Location: [facility] OR;  Service:     PICC  10/19/2015         RELEASE CARPAL TUNNEL Bilateral        Family History   Problem Relation Age of Onset    Cerebrovascular Disease Mother     Diabetes Father     Diabetes Sister     Cerebrovascular Disease Sister 81.00    Cerebrovascular Disease Brother     Diabetes Brother        Social History     Tobacco Use    Smoking status: Former     Types: Cigarettes     Quit date: 2007     Years since quittin.3     Passive exposure: Past    Smokeless tobacco: Never   Vaping Use    Vaping Use: Never used   Substance Use Topics    Alcohol use: No     Comment: Alcoholic Drinks/day: stopped drinking 2015    Drug use: No       acetaminophen (TYLENOL) 500 MG tablet  amoxicillin-clavulanate (AUGMENTIN) 875-125 MG tablet  atorvastatin (LIPITOR) 80 MG tablet  busPIRone (BUSPAR) 15 MG tablet  cefdinir (OMNICEF) 300 MG capsule  clobetasol (TEMOVATE) 0.05 %  "external cream  ezetimibe (ZETIA) 10 MG tablet  gabapentin (NEURONTIN) 300 MG capsule  hydrochlorothiazide (HYDRODIURIL) 12.5 MG tablet  insulin glargine (LANTUS SOLOSTAR) 100 UNIT/ML pen  isosorbide mononitrate (IMDUR) 60 MG 24 hr tablet  ketoconazole (NIZORAL) 2 % external cream  losartan (COZAAR) 100 MG tablet  melatonin 1 MG CAPS  Semaglutide, 1 MG/DOSE, (OZEMPIC) 4 MG/3ML pen  sertraline (ZOLOFT) 100 MG tablet  sotalol (BETAPACE) 80 MG tablet  traMADol (ULTRAM) 50 MG tablet  triamcinolone (KENALOG) 0.1 % external cream  warfarin ANTICOAGULANT (COUMADIN) 5 MG tablet          Physical Exam     First Vitals:  Patient Vitals for the past 24 hrs:   BP Temp Temp src Pulse Resp SpO2 Height Weight   10/25/23 1630 139/61 -- -- 74 -- 97 % -- --   10/25/23 1449 107/51 97.4  F (36.3  C) Temporal 88 20 99 % 1.575 m (5' 2\") 78 kg (172 lb)         PHYSICAL EXAM    General Appearance:  Alert, cooperative, no distress, appears stated age  Respiratory: No distress. Lungs clear to ausculation bilaterally. No wheezes, rhonchi or stridor  Cardiovascular: Regular rate and rhythm, no murmur. Normal cap refill. No peripheral edema  GI: Abdomen soft, nontender  Musculoskeletal: Moving all extremities. No gross deformities  Integument: Warm, dry, no rashes or lesions.  There is a 2 inch area of erythema, edema, and warmth in the left lower abdominal quadrant.  This has an area of central fluctuance and the skin surrounding is indurated. There are 2 overlying pustules. No active drainage. Very tender to the touch.   Neurologic: Alert and orientated x3. No focal deficits.  Psych: Normal mood and affect          Results     LAB:  All pertinent labs reviewed and interpreted  Labs Ordered and Resulted from Time of ED Arrival to Time of ED Departure - No data to display    RADIOLOGY:  No orders to display       PROCEDURES:  PROCEDURE: Incision and Drainage   INDICATIONS: Localized abscess   PROCEDURE PROVIDER: Shivani Guerrero PA-C   SITE: Low " left side abdomen   MEDICATION: 3 mLs of 0.5% Bupivacaine with epinephrine   NOTE: The area was prepped with betadine and draped off in the usual sterile fashion.  Local anesthetic was injected subcutaneously with anesthesia effects demonstrated prior to proceeding.  The area of maximal fluctuance was opened with a # 11 Blade ([place]) using a Single Straight incision to allow for drainage.  The abscess was drained.  The abscess cavity was bluntly explored to separate any loculations. Iodoform Gauze was placed into the abscess cavity.  A sterile dressing was placed over the area.   COMPLEXITY: Complex    Simple = single, furuncle, paronychia, superficial  Complex = multiple or abscess requiring probing, loculations, packing placement   COMPLICATIONS: Patient tolerated procedure well, without complication                 Shivani Guerrero PA-C   Emergency Medicine   [facility] EMERGENCY ROOM       Shivani Guerrero PA-C  10/25/23 6144

## 2023-10-25 NOTE — ED TRIAGE NOTES
"Arrives to ED with c/o worsening abscess to abd. Noticed last Wednesday. Came to ED Friday. Started on antibiotics which had to be changed d/t med interactions. Today having increased pain, redness and swelling. \"It feels like it's going to burst\". Small pustules noted. Afebrile.      Triage Assessment (Adult)       Row Name 10/25/23 5872          Triage Assessment    Airway WDL WDL        Respiratory WDL    Respiratory WDL WDL        Skin Circulation/Temperature WDL    Skin Circulation/Temperature WDL WDL        Cardiac WDL    Cardiac WDL WDL        Peripheral/Neurovascular WDL    Peripheral Neurovascular WDL WDL        Cognitive/Neuro/Behavioral WDL    Cognitive/Neuro/Behavioral WDL WDL                     "

## 2023-10-26 ENCOUNTER — DOCUMENTATION ONLY (OUTPATIENT)
Dept: ANTICOAGULATION | Facility: CLINIC | Age: 73
End: 2023-10-26

## 2023-10-26 NOTE — PROGRESS NOTES
ANTICOAGULATION  MANAGEMENT: Discharge Review    Kemi Soto chart reviewed for anticoagulation continuity of care    Emergency room visit on 10/25 for abdominal wall cellulitis and abscess.    Discharge disposition: Home    Results:    Recent labs: (last 7 days)     10/24/23  1555   INR 4.2*     Anticoagulation inpatient management:     not applicable     Anticoagulation discharge instructions:     Warfarin dosing: home regimen continued   Bridging: No   INR goal change: No      Medication changes affecting anticoagulation: No    Additional factors affecting anticoagulation: Yes: had incision and drainage of abscess. Pt to follow up with PCP in 2-3 days to have packing removed.      PLAN     No adjustment to anticoagulation plan needed    Recommended follow up is scheduled  Patient not contacted    No adjustment to Anticoagulation Calendar was required    Melissa Hunt RN

## 2023-10-27 ENCOUNTER — OFFICE VISIT (OUTPATIENT)
Dept: FAMILY MEDICINE | Facility: CLINIC | Age: 73
End: 2023-10-27
Payer: COMMERCIAL

## 2023-10-27 ENCOUNTER — TELEPHONE (OUTPATIENT)
Dept: FAMILY MEDICINE | Facility: CLINIC | Age: 73
End: 2023-10-27

## 2023-10-27 ENCOUNTER — ALLIED HEALTH/NURSE VISIT (OUTPATIENT)
Dept: FAMILY MEDICINE | Facility: CLINIC | Age: 73
End: 2023-10-27
Payer: COMMERCIAL

## 2023-10-27 VITALS
HEART RATE: 74 BPM | DIASTOLIC BLOOD PRESSURE: 78 MMHG | RESPIRATION RATE: 12 BRPM | SYSTOLIC BLOOD PRESSURE: 126 MMHG | HEIGHT: 62 IN | WEIGHT: 172 LBS | TEMPERATURE: 98.4 F | BODY MASS INDEX: 31.65 KG/M2 | OXYGEN SATURATION: 98 %

## 2023-10-27 DIAGNOSIS — L02.91 ABSCESS: Primary | ICD-10-CM

## 2023-10-27 DIAGNOSIS — L03.90 CELLULITIS, UNSPECIFIED CELLULITIS SITE: ICD-10-CM

## 2023-10-27 PROBLEM — D12.3 BENIGN NEOPLASM OF TRANSVERSE COLON: Status: ACTIVE | Noted: 2018-07-06

## 2023-10-27 PROBLEM — D12.0 BENIGN NEOPLASM OF CECUM: Status: ACTIVE | Noted: 2018-07-06

## 2023-10-27 PROCEDURE — 99214 OFFICE O/P EST MOD 30 MIN: CPT | Performed by: PHYSICIAN ASSISTANT

## 2023-10-27 PROCEDURE — 99207 PR NO CHARGE NURSE ONLY: CPT

## 2023-10-27 RX ORDER — RESPIRATORY SYNCYTIAL VIRUS VACCINE 120MCG/0.5
0.5 KIT INTRAMUSCULAR ONCE
Qty: 1 EACH | Refills: 0 | Status: CANCELLED | OUTPATIENT
Start: 2023-10-27 | End: 2023-10-27

## 2023-10-27 ASSESSMENT — ENCOUNTER SYMPTOMS
DIAPHORESIS: 0
NEUROLOGICAL NEGATIVE: 1
COUGH: 0
ABDOMINAL DISTENTION: 0
CARDIOVASCULAR NEGATIVE: 1
CONSTIPATION: 0
GASTROINTESTINAL NEGATIVE: 1
HEARTBURN: 0
SHORTNESS OF BREATH: 0
VOMITING: 0
FATIGUE: 0
FEVER: 0
NAUSEA: 0
WHEEZING: 0
CHILLS: 0
DIARRHEA: 0

## 2023-10-27 NOTE — TELEPHONE ENCOUNTER
Reason for Call:  Other call back    Detailed comments: States just completed appointment (10/27/23 with Rosendo Lynch PA-C and with RN) was given number for [facility].  The number listed on the AVS is incorrect.  She has made several calls and can not locate the [facility]    Per patient the number on the AVS is for the [facility] at [facility]    Requesting call back.     Phone Number Patient can be reached at: Home number on file [phone] (home)    Best Time: Any    Can we leave a detailed message on this number? YES    Call taken on 10/27/2023 at 4:05 PM by Josie Walker

## 2023-10-27 NOTE — PROGRESS NOTES
Assessment & Plan     Abscess  Cellulitis, unspecified cellulitis site  Was seen in the emergency department on 10/20/2023 and again on 10/25/2023 separate occasions for abscess and cellulitis to her left lower abdomen.  On exam she has a nontender mass to her left lower abdomen.  It is approximately 8 cm x 6 cm.  Last ER visit 10/25/2023 they did drain the wound and packed it.  She did have a CT of her abdomen pelvis 10/20/2023 that showed cellulitis and phlegman.  She has since been on Augmentin.  The redness and warmth have improved.  The abscess continues to drain.  It is packed.  She is not having any fevers or chills.  I would like her to continue Augmentin.  We will have nursing staff change the packing and repacked today.  She is to follow-up on Monday to have this reevaluated and possibly repacked.  She is to continue to change dressing as needed at least once a day over the weekend.  Due to the size I would like her to follow-up with general surgery as this may need to be drained more.  Symptoms for prompt reevaluation were discussed.  CT also showed some other incidental findings which she will be following up with her primary care provider for further evaluation.  - Adult General Surg Referral; Future    CT pelvis 10/20/2023  IMPRESSION:   1.  Left abdominal wall subcutaneous 2.5 cm probable phlegmon and cellulitis.  2.  Right adnexal lesion which could arise from ovary or uterus. Ovarian neoplasm possible. Pelvic MRI recommended.  3.  Left adrenal 1.7 cm nodule. Adenoma most likely. Recommend comparison with any prior CT. If none available, 3 month adrenal CT follow-up recommended.    ER note x2, primary care note reviewed x1, CT abdomen pelvis reviewed x1    AIRAM Arteaga [facility] BENTLEY Lafleur is a 73 year old, presenting for the following health issues:  ER F/U (Pt was seen on 10/25/23 for abscess on abdominal wall. Needs packing removed)      Samantha  "is a pleasant 73-year-old female who presents to the clinic today for ER follow-up.  She was seen in the emergency department on 2 separate occasion for abscess and cellulitis to her left lower abdomen.  She had the abscess drained and packing was placed.  She was initially on Bactrim but antibiotics were switched to Augmentin.  She was having pain, warmth, and mass to her left lower abdomen for the last 1.5 weeks.  Since she has been on the antibiotics the redness and warmth have improved.  She continues to have purulent and bloody discharge from the wound.  She is not having any fevers or chills.  She also had a CT of her abdomen pelvis due to the size of the mass and the symptoms.  CT on 10/20/2023 showed cellulitis and phlegman            Review of Systems   Constitutional:  Negative for chills, diaphoresis, fatigue and fever.   HENT: Negative.     Respiratory:  Negative for cough, shortness of breath and wheezing.    Cardiovascular: Negative.    Gastrointestinal: Negative.  Negative for abdominal distention, constipation, diarrhea, heartburn, nausea and vomiting.   Skin:         8 cm x 6 cm abscess to left lower abdomen.  Purulent/ blood discharge from the wound   Neurological: Negative.             Objective    /78 (BP Location: Right arm, Patient Position: Sitting, Cuff Size: Adult Regular)   Pulse 74   Temp 98.4  F (36.9  C) (Oral)   Resp 12   Ht 1.575 m (5' 2\")   Wt 78 kg (172 lb)   LMP  (LMP Unknown)   SpO2 98%   BMI 31.46 kg/m    Body mass index is 31.46 kg/m .  Physical Exam  Vitals and nursing note reviewed.   Constitutional:       General: She is not in acute distress.     Appearance: Normal appearance. She is not ill-appearing, toxic-appearing or diaphoretic.   HENT:      Head: Normocephalic and atraumatic.   Cardiovascular:      Rate and Rhythm: Normal rate and regular rhythm.      Heart sounds: No murmur heard.     No friction rub. No gallop.   Pulmonary:      Effort: Pulmonary effort " is normal.      Breath sounds: No wheezing, rhonchi or rales.   Skin:     Comments: 8 x 6 cm abscess to left lower abdomen.  Nontender.  No erythematous or warmth.  Purulent/bloody discharge from the wound.  Wound packing was removed and repacked today.   Neurological:      Mental Status: She is alert.   Psychiatric:         Mood and Affect: Mood normal.         Behavior: Behavior normal.         Thought Content: Thought content normal.         Judgment: Judgment normal.

## 2023-10-27 NOTE — PROGRESS NOTES
Packing removed from abdominal wound.  Packing saturated with sanguinous drainage.  Manuela wound intact.  No s/s of infection.     Patient abdominal wound packed with 1/4 inch gauze, covered with ABD and secured with tape. Advised to change ABD through the weekend if needed.    Patient tolerated well.  Scheduled appointment on Monday to have wound packed again.     Patient verbalized understanding.       BALAJI ArmasN RN  [facility]

## 2023-10-30 ENCOUNTER — ANTICOAGULATION THERAPY VISIT (OUTPATIENT)
Dept: ANTICOAGULATION | Facility: CLINIC | Age: 73
End: 2023-10-30

## 2023-10-30 ENCOUNTER — ALLIED HEALTH/NURSE VISIT (OUTPATIENT)
Dept: FAMILY MEDICINE | Facility: CLINIC | Age: 73
End: 2023-10-30
Payer: COMMERCIAL

## 2023-10-30 ENCOUNTER — LAB (OUTPATIENT)
Dept: LAB | Facility: CLINIC | Age: 73
End: 2023-10-30
Payer: COMMERCIAL

## 2023-10-30 DIAGNOSIS — I48.0 PAROXYSMAL ATRIAL FIBRILLATION (H): ICD-10-CM

## 2023-10-30 DIAGNOSIS — L02.91 ABSCESS: Primary | ICD-10-CM

## 2023-10-30 DIAGNOSIS — I48.0 PAROXYSMAL ATRIAL FIBRILLATION (H): Primary | ICD-10-CM

## 2023-10-30 LAB — INR BLD: 1.9 (ref 0.9–1.1)

## 2023-10-30 PROCEDURE — 85610 PROTHROMBIN TIME: CPT

## 2023-10-30 PROCEDURE — 99207 PR NO CHARGE NURSE ONLY: CPT

## 2023-10-30 PROCEDURE — 36416 COLLJ CAPILLARY BLOOD SPEC: CPT

## 2023-10-30 NOTE — PROGRESS NOTES
Drainage from packing removed was serosanguinous.  Manuela wound was slightly reddened.  Drainage on abd had small amount of sero ang drainage.      Abdominal wound was packed with 1/4 inch guaze. Patient tolerated well.    BALAJI ArmasN RN  [facility]

## 2023-10-30 NOTE — PROGRESS NOTES
ANTICOAGULATION MANAGEMENT     Kemi LUIS F Charles 73 year old female is on warfarin with subtherapeutic INR result. (Goal INR 2.0-3.0)    Recent labs: (last 7 days)     10/30/23  1326   INR 1.9*       ASSESSMENT     Source(s): Chart Review and Patient/Caregiver Call     Warfarin doses taken: Held for INR 4.2  recently which may be affecting INR  Diet: No new diet changes identified  Medication/supplement changes:  Still has 4 days of Augmentin left.  New illness, injury, or hospitalization: Yes: was seen in ED has abscess on are abdomen where cellulitis is. Was lanced and is being packed. She has appointment with surgeon tomorrow. She understands to call with any medication changes. Per nurse today that did wound care are looks worse then on Friday when Packed.  Signs or symptoms of bleeding or clotting: No  Previous result: Supratherapeutic  Additional findings:  she had issues at clinic today so decided to try a new clinic to get INR checked.       PLAN     Recommended plan for temporary change(s) and ongoing change(s) affecting INR     Dosing Instructions: Continue your current warfarin dose with next INR in 1 week       Summary  As of 10/30/2023      Full warfarin instructions:  5 mg every Sun, Tue, Thu; 2.5 mg all other days   Next INR check:  11/6/2023               Telephone call with Ciarra who verbalizes understanding and agrees to plan and who agrees to plan and repeated back plan correctly    Lab visit scheduled    Education provided:   Please call back if any changes to your diet, medications or how you've been taking warfarin  Goal range and lab monitoring: goal range and significance of current result and Importance of therapeutic range  Importance of notifying anticoagulation clinic for: changes in medications; a sooner lab recheck maybe needed    Plan made per [facility] anticoagulation protocol    Bianca Cerda, EAMON  Anticoagulation  Clinic  10/30/2023    _______________________________________________________________________     Anticoagulation Episode Summary       Current INR goal:  2.0-3.0   TTR:  27.9% (1 y)   Target end date:  Indefinite   Send INR reminders to:  [facility]    Indications    Paroxysmal atrial fibrillation (H) [I48.0]             Comments:               Anticoagulation Care Providers       Provider Role Specialty Phone number    Chuck Hernandez MD Referring Cardiovascular Disease [phone]

## 2023-10-31 ENCOUNTER — OFFICE VISIT (OUTPATIENT)
Dept: SURGERY | Facility: CLINIC | Age: 73
End: 2023-10-31
Attending: PHYSICIAN ASSISTANT
Payer: COMMERCIAL

## 2023-10-31 VITALS
HEIGHT: 62 IN | DIASTOLIC BLOOD PRESSURE: 60 MMHG | SYSTOLIC BLOOD PRESSURE: 142 MMHG | BODY MASS INDEX: 32.2 KG/M2 | WEIGHT: 175 LBS

## 2023-10-31 DIAGNOSIS — L02.91 ABSCESS: ICD-10-CM

## 2023-10-31 DIAGNOSIS — L03.90 CELLULITIS, UNSPECIFIED CELLULITIS SITE: ICD-10-CM

## 2023-10-31 LAB
BACTERIA ABSC ANAEROBE+AEROBE CULT: ABNORMAL
GRAM STAIN RESULT: ABNORMAL

## 2023-10-31 PROCEDURE — 99203 OFFICE O/P NEW LOW 30 MIN: CPT | Performed by: SURGERY

## 2023-10-31 NOTE — PROGRESS NOTES
General Surgery H&P  Kemi Soto MRN# [id number]   Age/Sex: 73 year old female YOB: 1950     Reason for visit: Abdominal wall abscess       Referring physician: Syed LUTHER                    Assessment and Plan:   Assessment:  Abdominal abscess  Diabetes  History of anticoagulation on Coumadin    73-year-old female presenting with abdominal abscess status post I&D by the ED team.  The abscess is healing as expected at this time.  I do not appreciate any purulent material.    Plan:  -Explained to the patient that she can continue with the Augmentin and completed.    -Provided patient with education and materials for packing daily.    -If there is any worsening of the abscess, patient can contact myself and we can pursue surgical intervention.          Chief Complaint:     Chief Complaint   Patient presents with    Consult     Pt reports that her abscess is not healing. Pt reports her pain level is much less.         History is obtained from the patient    HPI:   Kemi Soto is a 73 year old female who presents to the general surgery team today for evaluation of an abscess of the abdominal wall.  The patient was seen in the ED on 10/20/2023.  She had the abscess drained.  Patient was placed on Augmentin.  Came in for further evaluation.  She has no fevers no chills at this time.  Some mild tenderness over the area.  But no other complaints.          Past Medical History:     Past Medical History:   Diagnosis Date    Coronary artery disease     Diabetes mellitus (H)     Discitis of thoracolumbar region 10/10/2015    Encephalopathy 10/14/2015    Epidural abscess 10/10/2015    Hip pain, right     Hyperlipidemia     Sepsis (H) 10/8/2015    Stroke (H) 06/23/2015    Neurology felt that episode was C/W subacute ischemic stroke    TIA (transient ischemic attack) 6/23/2015    UTI (urinary tract infection)     admitted to [facility] with UTI              Past Surgical History:     Past Surgical  History:   Procedure Laterality Date    ANGIOPLASTY  03/30/2016    Drug eluting stent mid LAD, cutting balloon to 1st diagonal    ANGIOPLASTY  2008    BYPASS GRAFT ARTERY CORONARY  12/11/2007    X 3 vessels, LIMA to LAD, saph vein graft to distal RCA, saphvein graft to marginal, mini circuit bypass.  [facility].    CORONARY STENT PLACEMENT  2008    Left main, left circumflex, RCA    CORONARY STENT PLACEMENT  03/2016    CV ANGIOGRAM CORONARY GRAFT N/A 10/11/2023    Procedure: Coronary Angiogram Graft;  Surgeon: Sam Boo MD;  Location: [facility] CATH LAB CV    CV CORONARY ANGIOGRAM N/A 8/1/2018    Procedure: Coronary Angiogram;  Surgeon: Kit Bean MD;  Location: [facility] Cath Lab;  Service:     CV LEFT HEART CATHETERIZATION WITH LEFT VENTRICULOGRAM N/A 8/1/2018    Procedure: Left Heart Catheterization with Left Ventriculogram;  Surgeon: Kit Bean MD;  Location: [facility] Cath Lab;  Service:     INSERT MIDLINE HE  10/31/2015         LUMBAR DISCECTOMY N/A 10/11/2015    Procedure: BILATERAL L1-L5 DECOMPRESSIVE LAMINECTOMY WITH EVACUATION OF EPIDURAL ABSCESS IRRIGATION & DEBRIDEMENT;  Surgeon: Luli Chan MD;  Location: [facility] OR;  Service:     PICC  10/19/2015         RELEASE CARPAL TUNNEL Bilateral              Social History:    reports that she quit smoking about 16 years ago. Her smoking use included cigarettes. She has been exposed to tobacco smoke. She has never used smokeless tobacco. She reports that she does not drink alcohol and does not use drugs.           Family History:     Family History   Problem Relation Age of Onset    Cerebrovascular Disease Mother     Diabetes Father     Diabetes Sister     Cerebrovascular Disease Sister 81.00    Cerebrovascular Disease Brother     Diabetes Brother               Allergies:     Allergies   Allergen Reactions    Metformin GI Disturbance    Oxycodone Nausea and Vomiting              Medications:     Prior to  Admission medications    Medication Sig Start Date End Date Taking? Authorizing Provider   acetaminophen (TYLENOL) 500 MG tablet Take 1,000 mg by mouth every 12 hours as needed for mild pain   Yes Reported, Patient   amoxicillin-clavulanate (AUGMENTIN) 875-125 MG tablet Take 1 tablet by mouth 2 times daily 10/24/23  Yes Andie Sifuentes CNP   atorvastatin (LIPITOR) 80 MG tablet Take 1 tablet (80 mg) by mouth At Bedtime 4/12/23  Yes Elaine Galvez MD   busPIRone (BUSPAR) 15 MG tablet Take 1 tablet (15 mg) by mouth daily 4/12/23  Yes Elaine Galvez MD   cefdinir (OMNICEF) 300 MG capsule Take 300 mg by mouth 2 times daily   Yes Reported, Patient   clobetasol (TEMOVATE) 0.05 % external cream Apply topically 2 times daily 5/30/23  Yes Reported, Patient   ezetimibe (ZETIA) 10 MG tablet Take 1 tablet (10 mg) by mouth daily 9/1/23  Yes Chuck Hernandez MD   gabapentin (NEURONTIN) 300 MG capsule TAKE 3 CAPSULES BY MOUTH EVERY DAY AT BEDTIME 4/12/23  Yes Elaine Galvez MD   hydrochlorothiazide (HYDRODIURIL) 12.5 MG tablet Take 1 tablet by mouth once daily 8/28/23  Yes Chuck Hernandez MD   insulin glargine (LANTUS SOLOSTAR) 100 UNIT/ML pen Inject 28 Units Subcutaneous 2 times daily INJECT 28 UNITS SUBCUTANEOUSLY TWICE DAILY 4/12/23  Yes Elaine Galvez MD   isosorbide mononitrate (IMDUR) 60 MG 24 hr tablet Take 1 tablet (60 mg) by mouth daily 4/12/23  Yes Elaine Galvez MD   ketoconazole (NIZORAL) 2 % external cream APPLY 1 APPLICATION TOPICALLY TWICE DAILY TO AFFECTED AREAS FOR 4 WEEKS THEN AS NEEDED 5/30/23  Yes Reported, Patient   losartan (COZAAR) 100 MG tablet Take 1 tablet (100 mg) by mouth daily 4/12/23  Yes Elaine Galvez MD   melatonin 1 MG CAPS Take 3 tablets by mouth as needed (at bedtime)   Yes Reported, Patient   Semaglutide, 1 MG/DOSE, (OZEMPIC) 4 MG/3ML pen Inject 1 mg Subcutaneous every 7 days 8/30/23  Yes Elaine Galvez MD   sertraline (ZOLOFT) 100 MG tablet Take 1 tablet (100 mg) by mouth daily 4/12/23  Yes Elaine Galvez MD  "  sotalol (BETAPACE) 80 MG tablet Take 0.5 tablets (40 mg) by mouth 2 times daily 4/12/23  Yes Elaine Galvez MD   traMADol (ULTRAM) 50 MG tablet TAKE 1 TABLET BY MOUTH EVERY 8 HOURS AS NEEDED FOR PAIN OR SEVERE PAIN ON SCALE (7-10) 8/28/21  Yes Mayco Zapata MD   triamcinolone (KENALOG) 0.1 % external cream Apply topically 2 times daily 5/30/23  Yes Reported, Patient   warfarin ANTICOAGULANT (COUMADIN) 5 MG tablet Take 0.5-1 tablets (2.5-5 mg) by mouth daily Adjust dose per INR results as instructed. 4/12/23  Yes Elaine Galvez MD              Review of Systems:   A 12 point Review of Systems is negative other than noted in the HPI            Physical Exam:   Patient Vitals for the past 24 hrs:   BP Height Weight   10/31/23 1003 (!) 142/60 1.575 m (5' 2\") 79.4 kg (175 lb)        [unfilled]   Constitutional:   awake, alert, cooperative, no apparent distress, and appears stated age       Eyes:   PERRL, conjunctiva/corneas clear, EOM's intact; no scleral edema or icterus noted        ENT:   Normocephalic, without obvious abnormality, atraumatic, Lips, mucosa, and tongue normal        Hematologic / Lymphatic:   No lymphadenopathy       Lungs:   Normal respiratory effort, no accessory muscle use       Cardiovascular:   Regular rate and rhythm       Abdomen:   Soft, nondistended, nontender to palpation.     An area on the left inferior side of the umbilicus.  There is a 0.5 cm opening where they did the I&D.  There is no purulent material that can be expressed.  Packing removed.  There is some serosanguineous drainage.  A little area of induration.  No crepitus.       Musculoskeletal:   No obvious swelling, bruising or deformity       Skin:   Skin color and texture normal for patient, no rashes or lesions              Data:         All imaging studies reviewed by me. I reviewed the images, and I agree with a fluid collection/abscess formation of the subcutaneous tissue.      Braden Bah, DO Braden Bah, DO  " General Surgeon  [facility]  Surgery [facility] - [address]  [place] [address]?  Office: [phone]  Employed by - [facility]  Pager: [phone]

## 2023-10-31 NOTE — LETTER
10/31/2023         RE: Kemi Soto  [address] [place] [id number]        Dear Colleague,    Thank you for referring your patient, Kemi Soto, to the [facility] [place]. Please see a copy of my visit note below.    General Surgery H&P  Kemi Soto MRN# [id number]   Age/Sex: 73 year old female YOB: 1950     Reason for visit: Abdominal wall abscess       Referring physician: Syed PAC                    Assessment and Plan:   Assessment:  Abdominal abscess  Diabetes  History of anticoagulation on Coumadin    73-year-old female presenting with abdominal abscess status post I&D by the ED team.  The abscess is healing as expected at this time.  I do not appreciate any purulent material.    Plan:  -Explained to the patient that she can continue with the Augmentin and completed.    -Provided patient with education and materials for packing daily.    -If there is any worsening of the abscess, patient can contact myself and we can pursue surgical intervention.          Chief Complaint:     Chief Complaint   Patient presents with     Consult     Pt reports that her abscess is not healing. Pt reports her pain level is much less.         History is obtained from the patient    HPI:   Kemi Soto is a 73 year old female who presents to the general surgery team today for evaluation of an abscess of the abdominal wall.  The patient was seen in the ED on 10/20/2023.  She had the abscess drained.  Patient was placed on Augmentin.  Came in for further evaluation.  She has no fevers no chills at this time.  Some mild tenderness over the area.  But no other complaints.          Past Medical History:     Past Medical History:   Diagnosis Date     Coronary artery disease      Diabetes mellitus (H)      Discitis of thoracolumbar region 10/10/2015     Encephalopathy 10/14/2015     Epidural abscess 10/10/2015     Hip pain, right      Hyperlipidemia       Sepsis (H) 10/8/2015     Stroke (H) 06/23/2015    Neurology felt that episode was C/W subacute ischemic stroke     TIA (transient ischemic attack) 6/23/2015     UTI (urinary tract infection)     admitted to [facility] with UTI              Past Surgical History:     Past Surgical History:   Procedure Laterality Date     ANGIOPLASTY  03/30/2016    Drug eluting stent mid LAD, cutting balloon to 1st diagonal     ANGIOPLASTY  2008     BYPASS GRAFT ARTERY CORONARY  12/11/2007    X 3 vessels, LIMA to LAD, saph vein graft to distal RCA, saphvein graft to marginal, mini circuit bypass.  [facility].     CORONARY STENT PLACEMENT  2008    Left main, left circumflex, RCA     CORONARY STENT PLACEMENT  03/2016     CV ANGIOGRAM CORONARY GRAFT N/A 10/11/2023    Procedure: Coronary Angiogram Graft;  Surgeon: Sam Boo MD;  Location: [facility] CATH LAB CV     CV CORONARY ANGIOGRAM N/A 8/1/2018    Procedure: Coronary Angiogram;  Surgeon: Kit Bean MD;  Location: [facility] Cath Lab;  Service:      CV LEFT HEART CATHETERIZATION WITH LEFT VENTRICULOGRAM N/A 8/1/2018    Procedure: Left Heart Catheterization with Left Ventriculogram;  Surgeon: Kit Bean MD;  Location: [facility] Cath Lab;  Service:      INSERT MIDLINE HE  10/31/2015          LUMBAR DISCECTOMY N/A 10/11/2015    Procedure: BILATERAL L1-L5 DECOMPRESSIVE LAMINECTOMY WITH EVACUATION OF EPIDURAL ABSCESS IRRIGATION & DEBRIDEMENT;  Surgeon: Luli Chan MD;  Location: [facility] OR;  Service:      PICC  10/19/2015          RELEASE CARPAL TUNNEL Bilateral              Social History:    reports that she quit smoking about 16 years ago. Her smoking use included cigarettes. She has been exposed to tobacco smoke. She has never used smokeless tobacco. She reports that she does not drink alcohol and does not use drugs.           Family History:     Family History   Problem Relation Age of Onset     Cerebrovascular Disease  Mother      Diabetes Father      Diabetes Sister      Cerebrovascular Disease Sister 81.00     Cerebrovascular Disease Brother      Diabetes Brother               Allergies:     Allergies   Allergen Reactions     Metformin GI Disturbance     Oxycodone Nausea and Vomiting              Medications:     Prior to Admission medications    Medication Sig Start Date End Date Taking? Authorizing Provider   acetaminophen (TYLENOL) 500 MG tablet Take 1,000 mg by mouth every 12 hours as needed for mild pain   Yes Reported, Patient   amoxicillin-clavulanate (AUGMENTIN) 875-125 MG tablet Take 1 tablet by mouth 2 times daily 10/24/23  Yes Andie Sifuentes CNP   atorvastatin (LIPITOR) 80 MG tablet Take 1 tablet (80 mg) by mouth At Bedtime 4/12/23  Yes Elaine Galvez MD   busPIRone (BUSPAR) 15 MG tablet Take 1 tablet (15 mg) by mouth daily 4/12/23  Yes Elaine Galvez MD   cefdinir (OMNICEF) 300 MG capsule Take 300 mg by mouth 2 times daily   Yes Reported, Patient   clobetasol (TEMOVATE) 0.05 % external cream Apply topically 2 times daily 5/30/23  Yes Reported, Patient   ezetimibe (ZETIA) 10 MG tablet Take 1 tablet (10 mg) by mouth daily 9/1/23  Yes Chuck Hernandez MD   gabapentin (NEURONTIN) 300 MG capsule TAKE 3 CAPSULES BY MOUTH EVERY DAY AT BEDTIME 4/12/23  Yes Elaine Galvez MD   hydrochlorothiazide (HYDRODIURIL) 12.5 MG tablet Take 1 tablet by mouth once daily 8/28/23  Yes Chuck Hernandez MD   insulin glargine (LANTUS SOLOSTAR) 100 UNIT/ML pen Inject 28 Units Subcutaneous 2 times daily INJECT 28 UNITS SUBCUTANEOUSLY TWICE DAILY 4/12/23  Yes Elaine Galvez MD   isosorbide mononitrate (IMDUR) 60 MG 24 hr tablet Take 1 tablet (60 mg) by mouth daily 4/12/23  Yes Elaine Galvez MD   ketoconazole (NIZORAL) 2 % external cream APPLY 1 APPLICATION TOPICALLY TWICE DAILY TO AFFECTED AREAS FOR 4 WEEKS THEN AS NEEDED 5/30/23  Yes Reported, Patient   losartan (COZAAR) 100 MG tablet Take 1 tablet (100 mg) by mouth daily 4/12/23  Yes Elaine Galvez MD  "  melatonin 1 MG CAPS Take 3 tablets by mouth as needed (at bedtime)   Yes Reported, Patient   Semaglutide, 1 MG/DOSE, (OZEMPIC) 4 MG/3ML pen Inject 1 mg Subcutaneous every 7 days 8/30/23  Yes Elaine Galvez MD   sertraline (ZOLOFT) 100 MG tablet Take 1 tablet (100 mg) by mouth daily 4/12/23  Yes Elaine Galvez MD   sotalol (BETAPACE) 80 MG tablet Take 0.5 tablets (40 mg) by mouth 2 times daily 4/12/23  Yes Elaine Galvez MD   traMADol (ULTRAM) 50 MG tablet TAKE 1 TABLET BY MOUTH EVERY 8 HOURS AS NEEDED FOR PAIN OR SEVERE PAIN ON SCALE (7-10) 8/28/21  Yes Mayco Zapata MD   triamcinolone (KENALOG) 0.1 % external cream Apply topically 2 times daily 5/30/23  Yes Reported, Patient   warfarin ANTICOAGULANT (COUMADIN) 5 MG tablet Take 0.5-1 tablets (2.5-5 mg) by mouth daily Adjust dose per INR results as instructed. 4/12/23  Yes Elaine Galvez MD              Review of Systems:   A 12 point Review of Systems is negative other than noted in the HPI            Physical Exam:   Patient Vitals for the past 24 hrs:   BP Height Weight   10/31/23 1003 (!) 142/60 1.575 m (5' 2\") 79.4 kg (175 lb)        [unfilled]   Constitutional:   awake, alert, cooperative, no apparent distress, and appears stated age       Eyes:   PERRL, conjunctiva/corneas clear, EOM's intact; no scleral edema or icterus noted        ENT:   Normocephalic, without obvious abnormality, atraumatic, Lips, mucosa, and tongue normal        Hematologic / Lymphatic:   No lymphadenopathy       Lungs:   Normal respiratory effort, no accessory muscle use       Cardiovascular:   Regular rate and rhythm       Abdomen:   Soft, nondistended, nontender to palpation.     An area on the left inferior side of the umbilicus.  There is a 0.5 cm opening where they did the I&D.  There is no purulent material that can be expressed.  Packing removed.  There is some serosanguineous drainage.  A little area of induration.  No crepitus.       Musculoskeletal:   No obvious swelling, " bruising or deformity       Skin:   Skin color and texture normal for patient, no rashes or lesions              Data:         All imaging studies reviewed by me. I reviewed the images, and I agree with a fluid collection/abscess formation of the subcutaneous tissue.      DO Braden Alarcon DO  General Surgeon  [facility]  Surgery [address]  [place] [address]?  Office: [phone]  Employed by - [facility] [employer]  Pager: [phone]         Again, thank you for allowing me to participate in the care of your patient.        Sincerely,        Braden Bah DO

## 2023-11-03 ENCOUNTER — TELEPHONE (OUTPATIENT)
Dept: CARDIOLOGY | Facility: CLINIC | Age: 73
End: 2023-11-03
Payer: COMMERCIAL

## 2023-11-03 NOTE — TELEPHONE ENCOUNTER
[place] reviewed.  Patients recent blood pressure is outside of guideline parameters.      Called pt to review, no answer.  Left first voicemail message asking patient to check their blood pressure using a home blood pressure cuff or by going to a [facility].      Patient instructed to then call [phone] ([facility]) and leave a message with their name, date of birth, and blood pressure reading that was completed within the last 24 hours and where it was completed.      Will await call back for further review.     Ciarra SORIANO

## 2023-11-06 ENCOUNTER — LAB (OUTPATIENT)
Dept: LAB | Facility: CLINIC | Age: 73
End: 2023-11-06
Payer: COMMERCIAL

## 2023-11-06 ENCOUNTER — ANTICOAGULATION THERAPY VISIT (OUTPATIENT)
Dept: ANTICOAGULATION | Facility: CLINIC | Age: 73
End: 2023-11-06

## 2023-11-06 DIAGNOSIS — I48.0 PAROXYSMAL ATRIAL FIBRILLATION (H): ICD-10-CM

## 2023-11-06 DIAGNOSIS — I48.0 PAROXYSMAL ATRIAL FIBRILLATION (H): Primary | ICD-10-CM

## 2023-11-06 LAB — INR BLD: 2.7 (ref 0.9–1.1)

## 2023-11-06 PROCEDURE — 36416 COLLJ CAPILLARY BLOOD SPEC: CPT

## 2023-11-06 PROCEDURE — 85610 PROTHROMBIN TIME: CPT

## 2023-11-06 NOTE — PROGRESS NOTES
ANTICOAGULATION MANAGEMENT     Kemi Soto 73 year old female is on warfarin with therapeutic INR result. (Goal INR 2.0-3.0)    Recent labs: (last 7 days)     11/06/23  1302   INR 2.7*       ASSESSMENT     Warfarin Lab Questionnaire    Warfarin Doses Last 7 Days      11/6/2023    12:57 PM   Dose in Tablet or Mg   TAB or MG? milligram (mg)     Pt Rptd Dose SUNDAY MONDAY TUESDAY WED THURS FRIDAY SATURDAY 11/6/2023  12:57 PM 5 2.5 5 2.5 5 2.5 2.5         11/6/2023   Warfarin Lab Questionnaire   Missed doses within past 14 days? No   Changes in diet or alcohol within past 14 days? No   Medication changes since last result? No   Injuries or illness since last result? No - abscess wound is healing appropriately. She is still packing the wound daily.    New shortness of breath, severe headaches or sudden changes in vision since last result? No   Abnormal bleeding since last result? No   Upcoming surgery, procedure? No   Best number to call with results? [phone]     Previous result: Subtherapeutic  Additional findings: None       PLAN     Recommended plan for temporary change(s) affecting INR     Dosing Instructions: Continue your current warfarin dose with next INR in 1-2 weeks       Summary  As of 11/6/2023      Full warfarin instructions:  5 mg every Sun, Tue, Thu; 2.5 mg all other days   Next INR check:  11/20/2023               Telephone call with Ciarra who verbalizes understanding and agrees to plan    Check at provider office visit    Education provided:   Goal range and lab monitoring: goal range and significance of current result and Importance of following up at instructed interval    Plan made per [facility] anticoagulation protocol    Melissa Hunt RN  Anticoagulation Clinic  11/6/2023    _______________________________________________________________________     Anticoagulation Episode Summary       Current INR goal:  2.0-3.0   TTR:  28.2% (1 y)   Target end date:  Indefinite   Send INR reminders to:   [facility] HEART [facility]    Indications    Paroxysmal atrial fibrillation (H) [I48.0]             Comments:               Anticoagulation Care Providers       Provider Role Specialty Phone number    Chuck Hernandez MD Referring Cardiovascular Disease [phone]

## 2023-11-08 ENCOUNTER — PATIENT OUTREACH (OUTPATIENT)
Dept: GASTROENTEROLOGY | Facility: CLINIC | Age: 73
End: 2023-11-08
Payer: COMMERCIAL

## 2023-11-09 NOTE — TELEPHONE ENCOUNTER
Patient returned call and left voicemail message with update blood pressure reading.      Last Blood Pressure: 142/60  Date: 10/31/2023  Location: Other Specialty    Today's Blood Pressure: 153/59  Today's Heart Rate: 80  Location: [facility]    Patient reported blood pressure updated in Epic. Blood pressure continues to fall outside of the [place] Community Measures guidelines.  Message sent to primary cardiology team for further review.    Pt has appt with Dr Hernandez, 11/14/23.

## 2023-11-14 ENCOUNTER — LAB (OUTPATIENT)
Dept: CARDIOLOGY | Facility: CLINIC | Age: 73
End: 2023-11-14
Payer: COMMERCIAL

## 2023-11-14 ENCOUNTER — OFFICE VISIT (OUTPATIENT)
Dept: CARDIOLOGY | Facility: CLINIC | Age: 73
End: 2023-11-14
Payer: COMMERCIAL

## 2023-11-14 ENCOUNTER — ANTICOAGULATION THERAPY VISIT (OUTPATIENT)
Dept: ANTICOAGULATION | Facility: CLINIC | Age: 73
End: 2023-11-14

## 2023-11-14 VITALS
OXYGEN SATURATION: 96 % | WEIGHT: 174.6 LBS | SYSTOLIC BLOOD PRESSURE: 104 MMHG | BODY MASS INDEX: 31.93 KG/M2 | DIASTOLIC BLOOD PRESSURE: 52 MMHG | RESPIRATION RATE: 16 BRPM | HEART RATE: 66 BPM

## 2023-11-14 DIAGNOSIS — I25.708 CORONARY ARTERY DISEASE OF BYPASS GRAFT OF NATIVE HEART WITH STABLE ANGINA PECTORIS (H): Primary | ICD-10-CM

## 2023-11-14 DIAGNOSIS — I48.0 PAROXYSMAL ATRIAL FIBRILLATION (H): ICD-10-CM

## 2023-11-14 DIAGNOSIS — I48.0 PAROXYSMAL ATRIAL FIBRILLATION (H): Primary | ICD-10-CM

## 2023-11-14 LAB — INR POINT OF CARE: 2.8 (ref 0.9–1.1)

## 2023-11-14 PROCEDURE — 36416 COLLJ CAPILLARY BLOOD SPEC: CPT

## 2023-11-14 PROCEDURE — 99214 OFFICE O/P EST MOD 30 MIN: CPT | Performed by: INTERNAL MEDICINE

## 2023-11-14 PROCEDURE — 85610 PROTHROMBIN TIME: CPT

## 2023-11-14 NOTE — PROGRESS NOTES
ANTICOAGULATION MANAGEMENT     Kemi Soto 73 year old female is on warfarin with therapeutic INR result. (Goal INR 2.0-3.0)    Recent labs: (last 7 days)     11/14/23  1355   INR 2.8*       ASSESSMENT     Source(s): Chart Review and Template     Warfarin doses taken: Warfarin taken as instructed  Diet: No new diet changes identified  Medication/supplement changes: None noted  New illness, injury, or hospitalization: No  Signs or symptoms of bleeding or clotting: No  Previous result: Therapeutic last visit; previously outside of goal range  Additional findings: None       PLAN     Recommended plan for no diet, medication or health factor changes affecting INR     Dosing Instructions: Continue your current warfarin dose with next INR in 3 weeks       Summary  As of 11/14/2023      Full warfarin instructions:  5 mg every Sun, Tue, Thu; 2.5 mg all other days   Next INR check:  12/7/2023               Detailed voice message left for Ciarra with dosing instructions and follow up date.   Sent [facility] message with dosing and follow up instructions    Check at provider office visit    Education provided:   Please call back if any changes to your diet, medications or how you've been taking warfarin    Plan made per [facility] anticoagulation protocol    Melissa Hunt RN  Anticoagulation Clinic  11/14/2023    _______________________________________________________________________     Anticoagulation Episode Summary       Current INR goal:  2.0-3.0   TTR:  28.3% (1 y)   Target end date:  Indefinite   Send INR reminders to:  [facility] HEART [facility]    Indications    Paroxysmal atrial fibrillation (H) [I48.0]             Comments:               Anticoagulation Care Providers       Provider Role Specialty Phone number    Chuck Hernandez MD Referring Cardiovascular Disease [phone]

## 2023-11-14 NOTE — LETTER
11/14/2023    Elaine Galvez MD  [address]  [facility] [address]    RE: Kemi Soto       Dear Colleague,     I had the pleasure of seeing Kemi Soto in the [facility].    HEART CARE ENCOUNTER CONSULTATON NOTE      [facility]  [phone]      Assessment/Recommendations   Assessment/Plan:  1.  Coronary artery disease.  Results of recent nuclear stress test and subsequent coronary angiogram and recommendations from my interventional colleagues reviewed.  Echocardiogram suggests preserved left ventricular systolic function.  No complaints of angina.  I reviewed the angiographic findings with her and asked her to monitor for symptoms and continue with the current combination of medications.    2.  History of typical isthmus flutter and atrial fibrillation.  Patient was noted to be in atrial flutter on routine follow-up visit November 2021.  Discussions were had with the EP colleagues regarding ablation procedure but patient preferred medical management.  She has been on sotalol.  In addition she wanted to review the Watchman pamphlet which I did give her to review.  She has not noted any significant tachy arrhythmias.  ECG from October 20, 2023 is reviewed, sinus rhythm, ST-T abnormality with a QTc of 440 ms.  She has not been aware of any palpitations or rapid heartbeat and will continue to monitor.  She has some interest in Watchman device and will keep me informed of her questions and interest.  I have asked her to contact my nurse with any additional questions.  In addition she previously had been on Eliquis but because of the cost and switch to warfarin and is going to look into the cost a few years later.    3.  Dyslipidemia.  Most recent lipids are from April 2023 with a total cholesterol 105, triglycerides 149, LDL of 39.  4.  Hypertension.  Blood pressure when she saw her primary care physician October 27 was 126/78.  Today's blood pressure is  104/52.  5.  Abdominal abscess.  I reviewed the chart record.  I also reviewed the CT scan report from October 20 and reminded her that the CT report recommended a pelvic MRI which she was aware of the encouraged her to schedule this as soon as she can.    Plan continue with current cardiovascular medication regimen  Follow-up 4 to 6 months.       History of Present Illness/Subjective    HPI: Kemi Soto is a 73 year old female who is seen in follow up.she was seen recently in the Er and with her primary.  She was seen in the emergency department on 10/20/2023 and again on 10/25/2023 separate occasions for abscess and cellulitis to her left lower abdomen.  We most recently visited September 2023.  She has a history of coronary artery disease with multivessel intervention with prior history of LIMA to the LAD and vein graft to the obtuse marginal branch.  She underwent nuclear stress testing September 29 that showed a small area of mild ischemia in the distal anterior lateral segments with an increased stress to rest cavity ratio which prompted repeat coronary angiogram October 11.  The results are reviewed today.  Patent LIMA to the LAD, occluded circumflex described as mild in-stent restenosis proximally and mid vessel occluded stent.  Right coronary artery dominant vessel diffusely stented with occluded mid vessel.  LIMA to the LAD patent with 40 to 50% narrowing downstream unchanged from previous.  Known occlusion of the vein graft.  Recommendations for to continue with aggressive risk modification without good targets for PCI and normal left-sided filling pressures.  Surgical notes from October 31 is reviewed.  The abdominal abscess was said to be healing.    She reports no specific anginal chest discomfort, no awareness of palpitations no dizziness or lightheadedness.    Recent Echocardiogram Results:  Study Date: 10/03/2023 01:50 PM  Age: 73 yrs  Gender: Female  Patient Location: [facility]  Reason For Study:  Paroxysmal atrial fibrillation (H), Coronary artery disease  due  Ordering Physician: OTIS PRITCHETT  Referring Physician: OTIS PRITCHETT  Performed By: PENG     BSA: 1.8 m2  Height: 62 in  Weight: 172 lb  HR: 64  BP: 140/58 mmHg  ______________________________________________________________________________  Procedure  Complete Echo Adult. Definity (NDC #51289-197) given intravenously. Lot [id number],  Exp 1 Sep 24.  ______________________________________________________________________________  Interpretation Summary     Left ventricular size, wall motion and function are normal. The ejection  fraction is 60-65%.  Normal right ventricle size and systolic function.  No hemodynamically significant valvular abnormalities on 2D or color flow  imaging.  ______________________________________________________________________________  Left Ventricle  Left ventricular size, wall motion and function are normal. The ejection  fraction is 60-65%. There is normal left ventricular wall thickness. Left  ventricular diastolic function is normal. No regional wall motion  abnormalities noted.     Right Ventricle  Normal right ventricle size and systolic function.     Atria  The left atrium is borderline dilated. Right atrial size is normal. There is  no color Doppler evidence of an atrial shunt.     Mitral Valve  Mitral valve leaflets appear normal. There is no evidence of mitral stenosis  or clinically significant mitral regurgitation.     Tricuspid Valve  Tricuspid valve leaflets appear normal. There is no evidence of tricuspid  stenosis or clinically significant tricuspid regurgitation.     Aortic Valve  There is mild trileaflet aortic sclerosis.     Pulmonic Valve  The pulmonic valve is not well seen, but is grossly normal. This degree of  valvular regurgitation is within normal limits.     Vessels  The aorta root is normal. Normal size ascending aorta. IVC diameter and  respiratory changes fall into an intermediate range suggesting  "an RA pressure  of 8 mmHg.     Pericardium  There is no pericardial effusion.         Recent Coronary Angiogram Results:  Cardiac Catheterization    Height:  1.588 m (5' 2.5\")   Weight:  78.5 kg (173 lb)   Blood Pressure:  118/57   Heart Rate:  68    Date of Study:  10/11/23   Ordering Provider:  Chuck Hernandez MD   Clinical Indications:  Abnormal nuclear stress test [R94.39 (ICD-10-CM)], Coronary artery disease involving native coronary artery of native heart without angina pectoris [I25.10 (ICD-10-CM)], Coronary artery disease involving coronary bypass graft of native heart without angina pectoris [I25.810 (ICD-10-CM)]       Performing Physician  Performing Staff   Primary:  Sam Boo MD    Circulator:  Rhona Scott RN   Scrub Person:  Areli Olivas ARRT   Documenter/Scribe:  Rhona Massey RN   CV Technologist:  Paulina Palacios ARRT   CV Technologist:  Josie Bautista ARRT        Patient Information    Name MRSUSHMA Soto [id number] 73 year old female     Physicians    Panel Physicians Referring Physician Case Authorizing Physician   Sam Boo MD (Primary) Chuck Hernandez MD Garr, Michael D, MD     Procedures    Panel 1    Primary Surgeon:  Sam Boo MD   Procedure:  Coronary Angiogram Graft        Indications    Abnormal nuclear stress test [R94.39 (ICD-10-CM)]   Coronary artery disease involving native coronary artery of native heart without angina pectoris [I25.10 (ICD-10-CM)]   Coronary artery disease involving coronary bypass graft of native heart without angina pectoris [I25.810 (ICD-10-CM)]     Pre Procedure Diagnosis    abnormal stress test       Conclusion    Kemi Soto is a 73 year old old female with CAD, s/p CABG/multiple stents, most recent in '18 to Lcx, TIA, Afib, DM, HLD, HTN who is here for w/up of an abnormal Nuc.     - known native CAD, patent L-LAD, occluded small dLCx is new since '18 but appears to be chronic; normal " L-sided filling pressures  - no good targets for PCI, continue medical therapy  - continue warfarin, rosuva   - continue aggressive risk factor modification              Findings:  LM:no obstruction  LAD: moderate diffuse Ca2+ disease throughout mid-vessel, occluded mid-vessel, fills distally via patent L-LAD  Lcx:proximal mild ISR, mid-vessel occluded stents  RCA:dominant, diffusely stented, occluded mid-vessel     Grafts:  L-LAD: patent, 40-50% narrowing downstream unchanged from prior angio  V's: known occluded     LVEDP:17    EXAM: CT ABDOMEN PELVIS W CONTRAST  LOCATION: [facility]  DATE: 10/20/2023     INDICATION: Abdominal wall mass LLQ, concern for hernia versus abscess  COMPARISON: None.  TECHNIQUE: CT scan of the abdomen and pelvis was performed following injection of IV contrast. Multiplanar reformats were obtained. Dose reduction techniques were used.  CONTRAST: 90ml Isovue 370     FINDINGS:   LOWER CHEST: Right inferior chest 4 mm aliyah-fissural nodule is most likely a benign intrapulmonary lymph node.     HEPATOBILIARY: Gallstone.     PANCREAS: Normal.     SPLEEN: Normal.     ADRENAL GLANDS: Left adrenal 1.7 x 1.4 cm nodule.     KIDNEYS/BLADDER: No significant mass, stone, or hydronephrosis. Right renal 1.5 cm cyst which does not require further evaluation.     BOWEL: No obstruction or inflammatory change.     LYMPH NODES: Normal.     VASCULATURE: Aortoiliac atherosclerosis. Patent mesenteric vessels and renal arteries.     PELVIC ORGANS: Right adnexal 8 x 5.8 cm multiloculated fluid density lesion immediately adjacent the the uterus. This could arise from the right ovary, or represent a degenerated subserosal uterine leiomyoma. No ascites or omental cake to suggest   neoplastic spread.     MUSCULOSKELETAL: Left paramedian 2.5 x 2.3 cm nonmarginated subcutaneous hypodensity. The overlying skin is mildly thickened consistent with cellulitis and phlegmon.     Lower lumbar facet  arthropathy with grade 1 L5 on S1 anterolisthesis.                                                                      IMPRESSION:   1.  Left abdominal wall subcutaneous 2.5 cm probable phlegmon and cellulitis.  2.  Right adnexal lesion which could arise from ovary or uterus. Ovarian neoplasm possible. Pelvic MRI recommended.  3.  Left adrenal 1.7 cm nodule. Adenoma most likely. Recommend comparison with any prior CT. If none available, 3 month adrenal CT follow-up recommended.        Physical Examination  Review of Systems   Vitals: 104/52, weight 174 pounds, heart rate of 66 and regular, O2 sat 96%  Wt Readings from Last 3 Encounters:   10/31/23 79.4 kg (175 lb)   10/27/23 78 kg (172 lb)   10/25/23 78 kg (172 lb)       General Appearance:   no distress, normal body habitus   ENT/Mouth: membranes moist, no oral lesions or bleeding gums.      EYES:  no scleral icterus, normal conjunctivae   Neck: no carotid bruits    Chest/Lungs:   lungs are clear to auscultation, no rales or wheezing, equal chest wall expansion    Cardiovascular:   Regular. Normal first and second heart sounds with no murmurs, rubs, or gallops; the carotid, radial and posterior tibial pulses are intact, Jugular venous pressure within normal limits, no significant edema bilaterally    Abdomen:  no  bruits, ; bowel sounds are present, bandage on the lower portion of her abdomen.   Extremities: no cyanosis or clubbing   Skin: no xanthelasma, warm.    Neurologic:  no tremors     Psychiatric: alert and oriented x3, calm        Please refer above for cardiac ROS details.        Medical History  Surgical History Family History Social History   Past Medical History:   Diagnosis Date    Coronary artery disease     Diabetes mellitus (H)     Discitis of thoracolumbar region 10/10/2015    Encephalopathy 10/14/2015    Epidural abscess 10/10/2015    Hip pain, right     Hyperlipidemia     Sepsis (H) 10/8/2015    Stroke (H) 06/23/2015    Neurology felt that  episode was C/W subacute ischemic stroke    TIA (transient ischemic attack) 6/23/2015    UTI (urinary tract infection)     admitted to [facility] with UTI     Past Surgical History:   Procedure Laterality Date    ANGIOPLASTY  03/30/2016    Drug eluting stent mid LAD, cutting balloon to 1st diagonal    ANGIOPLASTY  2008    BYPASS GRAFT ARTERY CORONARY  12/11/2007    X 3 vessels, LIMA to LAD, saph vein graft to distal RCA, saphvein graft to marginal, mini circuit bypass.  [facility].    CORONARY STENT PLACEMENT  2008    Left main, left circumflex, RCA    CORONARY STENT PLACEMENT  03/2016    CV ANGIOGRAM CORONARY GRAFT N/A 10/11/2023    Procedure: Coronary Angiogram Graft;  Surgeon: Sam Boo MD;  Location[facility] CATH LAB CV    CV CORONARY ANGIOGRAM N/A 8/1/2018    Procedure: Coronary Angiogram;  Surgeon: Kit Bean MD;  Location: [facility] Cath Lab;  Service:     CV LEFT HEART CATHETERIZATION WITH LEFT VENTRICULOGRAM N/A 8/1/2018    Procedure: Left Heart Catheterization with Left Ventriculogram;  Surgeon: Kit Bean MD;  Location: [facility] Cath Lab;  Service:     INSERT MIDLINE HE  10/31/2015         LUMBAR DISCECTOMY N/A 10/11/2015    Procedure: BILATERAL L1-L5 DECOMPRESSIVE LAMINECTOMY WITH EVACUATION OF EPIDURAL ABSCESS IRRIGATION & DEBRIDEMENT;  Surgeon: Luli Chan MD;  Location: [facility] OR;  Service:     PICC  10/19/2015         RELEASE CARPAL TUNNEL Bilateral      Family History   Problem Relation Age of Onset    Cerebrovascular Disease Mother     Diabetes Father     Diabetes Sister     Cerebrovascular Disease Sister 81.00    Cerebrovascular Disease Brother     Diabetes Brother         Social History     Socioeconomic History    Marital status: Single     Spouse name: Not on file    Number of children: 1    Years of education: Not on file    Highest education level: Not on file   Occupational History    Not on file   Tobacco Use    Smoking  status: Former     Types: Cigarettes     Quit date: 2007     Years since quittin.4     Passive exposure: Past    Smokeless tobacco: Never   Vaping Use    Vaping Use: Never used   Substance and Sexual Activity    Alcohol use: No     Comment: Alcoholic Drinks/day: stopped drinking 2015    Drug use: No    Sexual activity: Never   Other Topics Concern    Not on file   Social History Narrative    Lives alone with cats and dogs. , one daughter, Tory Lofton.      Social Determinants of Health     Financial Resource Strain: Low Risk  (10/9/2023)    Financial Resource Strain     Within the past 12 months, have you or your family members you live with been unable to get utilities (heat, electricity) when it was really needed?: No   Food Insecurity: Low Risk  (10/9/2023)    Food Insecurity     Within the past 12 months, did you worry that your food would run out before you got money to buy more?: No     Within the past 12 months, did the food you bought just not last and you didn t have money to get more?: No   Transportation Needs: Low Risk  (10/9/2023)    Transportation Needs     Within the past 12 months, has lack of transportation kept you from medical appointments, getting your medicines, non-medical meetings or appointments, work, or from getting things that you need?: No   Physical Activity: Not on file   Stress: Not on file   Social Connections: Not on file   Interpersonal Safety: Low Risk  (10/9/2023)    Interpersonal Safety     Do you feel physically and emotionally safe where you currently live?: Yes     Within the past 12 months, have you been hit, slapped, kicked or otherwise physically hurt by someone?: No     Within the past 12 months, have you been humiliated or emotionally abused in other ways by your partner or ex-partner?: No   Housing Stability: Low Risk  (10/9/2023)    Housing Stability     Do you have housing? : Yes     Are you worried about losing your housing?: No            Medications  Allergies   Current Outpatient Medications   Medication Sig Dispense Refill    acetaminophen (TYLENOL) 500 MG tablet Take 1,000 mg by mouth every 12 hours as needed for mild pain      amoxicillin-clavulanate (AUGMENTIN) 875-125 MG tablet Take 1 tablet by mouth 2 times daily 20 tablet 0    atorvastatin (LIPITOR) 80 MG tablet Take 1 tablet (80 mg) by mouth At Bedtime 90 tablet 3    busPIRone (BUSPAR) 15 MG tablet Take 1 tablet (15 mg) by mouth daily 90 tablet 3    cefdinir (OMNICEF) 300 MG capsule Take 300 mg by mouth 2 times daily      clobetasol (TEMOVATE) 0.05 % external cream Apply topically 2 times daily      ezetimibe (ZETIA) 10 MG tablet Take 1 tablet (10 mg) by mouth daily 90 tablet 3    gabapentin (NEURONTIN) 300 MG capsule TAKE 3 CAPSULES BY MOUTH EVERY DAY AT BEDTIME 270 capsule 3    hydrochlorothiazide (HYDRODIURIL) 12.5 MG tablet Take 1 tablet by mouth once daily 90 tablet 0    insulin glargine (LANTUS SOLOSTAR) 100 UNIT/ML pen Inject 28 Units Subcutaneous 2 times daily INJECT 28 UNITS SUBCUTANEOUSLY TWICE DAILY 60 mL 3    isosorbide mononitrate (IMDUR) 60 MG 24 hr tablet Take 1 tablet (60 mg) by mouth daily 90 tablet 3    ketoconazole (NIZORAL) 2 % external cream APPLY 1 APPLICATION TOPICALLY TWICE DAILY TO AFFECTED AREAS FOR 4 WEEKS THEN AS NEEDED      losartan (COZAAR) 100 MG tablet Take 1 tablet (100 mg) by mouth daily 90 tablet 3    melatonin 1 MG CAPS Take 3 tablets by mouth as needed (at bedtime)      Semaglutide, 1 MG/DOSE, (OZEMPIC) 4 MG/3ML pen Inject 1 mg Subcutaneous every 7 days 3 mL 3    sertraline (ZOLOFT) 100 MG tablet Take 1 tablet (100 mg) by mouth daily 90 tablet 3    sotalol (BETAPACE) 80 MG tablet Take 0.5 tablets (40 mg) by mouth 2 times daily 90 tablet 3    traMADol (ULTRAM) 50 MG tablet TAKE 1 TABLET BY MOUTH EVERY 8 HOURS AS NEEDED FOR PAIN OR SEVERE PAIN ON SCALE (7-10) 30 tablet 0    triamcinolone (KENALOG) 0.1 % external cream Apply topically 2 times daily    "   warfarin ANTICOAGULANT (COUMADIN) 5 MG tablet Take 0.5-1 tablets (2.5-5 mg) by mouth daily Adjust dose per INR results as instructed. 90 tablet 3       Allergies   Allergen Reactions    Metformin GI Disturbance    Oxycodone Nausea and Vomiting          Lab Results    Chemistry/lipid CBC Cardiac Enzymes/BNP/TSH/INR   Recent Labs   Lab Test 10/11/23  1003   CHOL 105   HDL 36*   LDL 39   TRIG 149     Recent Labs   Lab Test 10/11/23  1003 10/10/23  1410 04/12/23  1617   LDL 39 40 56     Recent Labs   Lab Test 10/20/23  1507      POTASSIUM 3.8   CHLORIDE 98   CO2 29   *   BUN 16.0   CR 0.78   GFRESTIMATED 79   ELLIE 9.7     Recent Labs   Lab Test 10/20/23  1507 10/10/23  1410 04/12/23  1617   CR 0.78 0.96* 0.97*     Recent Labs   Lab Test 08/25/23  1436 04/12/23  1617 04/11/22  1408   A1C 8.4* 10.8* 8.2*          Recent Labs   Lab Test 10/20/23  1507   WBC 14.6*   HGB 12.7   HCT 38.1   MCV 91        Recent Labs   Lab Test 10/20/23  1507 10/10/23  1410 05/05/21  1400   HGB 12.7 12.9 13.0    Recent Labs   Lab Test 11/06/20  0559 11/05/20  0559 11/04/20  2229   TROPONINI 0.18 0.37* 0.28     No results for input(s): \"BNP\", \"NTBNPI\", \"NTBNP\" in the last 55519 hours.  Recent Labs   Lab Test 05/05/21  1400   TSH 0.77     Recent Labs   Lab Test 11/06/23  1302 10/30/23  1326 10/24/23  1555   INR 2.7* 1.9* 4.2*        Chuck Hernandez MD                  Thank you for allowing me to participate in the care of your patient.      Sincerely,     Chuck Hernandez MD     [facility]  cc:   Chuck Hernandez MD  [address]  [address]  [place] [address]      "

## 2023-11-14 NOTE — PATIENT INSTRUCTIONS
Please call with any heart related questions.Let us know if your heart rate is fast.If you want to pursue the Watchman please call Priscila after your abdomen has healed and you have pelvic MRI.Cardiologists to consider include Dr Rebolledo,Dr Choudhary,Dr Walter,Dr Warren or Dr Phipps.Please let us know about the cost of elquis or xarelto if you wish to consider.

## 2023-11-14 NOTE — PROGRESS NOTES
HEART CARE ENCOUNTER CONSULTATON NOTE      [facility]  [phone]      Assessment/Recommendations   Assessment/Plan:  1.  Coronary artery disease.  Results of recent nuclear stress test and subsequent coronary angiogram and recommendations from my interventional colleagues reviewed.  Echocardiogram suggests preserved left ventricular systolic function.  No complaints of angina.  I reviewed the angiographic findings with her and asked her to monitor for symptoms and continue with the current combination of medications.    2.  History of typical isthmus flutter and atrial fibrillation.  Patient was noted to be in atrial flutter on routine follow-up visit November 2021.  Discussions were had with the EP colleagues regarding ablation procedure but patient preferred medical management.  She has been on sotalol.  In addition she wanted to review the Watchman pamphlet which I did give her to review.  She has not noted any significant tachy arrhythmias.  ECG from October 20, 2023 is reviewed, sinus rhythm, ST-T abnormality with a QTc of 440 ms.  She has not been aware of any palpitations or rapid heartbeat and will continue to monitor.  She has some interest in Watchman device and will keep me informed of her questions and interest.  I have asked her to contact my nurse with any additional questions.  In addition she previously had been on Eliquis but because of the cost and switch to warfarin and is going to look into the cost a few years later.    3.  Dyslipidemia.  Most recent lipids are from April 2023 with a total cholesterol 105, triglycerides 149, LDL of 39.  4.  Hypertension.  Blood pressure when she saw her primary care physician October 27 was 126/78.  Today's blood pressure is 104/52.  5.  Abdominal abscess.  I reviewed the chart record.  I also reviewed the CT scan report from October 20 and reminded her that the CT report recommended a pelvic MRI which she was aware of the encouraged her  to schedule this as soon as she can.    Plan continue with current cardiovascular medication regimen  Follow-up 4 to 6 months.       History of Present Illness/Subjective    HPI: Kemi Soto is a 73 year old female who is seen in follow up.she was seen recently in the Er and with her primary.  She was seen in the emergency department on 10/20/2023 and again on 10/25/2023 separate occasions for abscess and cellulitis to her left lower abdomen.  We most recently visited September 2023.  She has a history of coronary artery disease with multivessel intervention with prior history of LIMA to the LAD and vein graft to the obtuse marginal branch.  She underwent nuclear stress testing September 29 that showed a small area of mild ischemia in the distal anterior lateral segments with an increased stress to rest cavity ratio which prompted repeat coronary angiogram October 11.  The results are reviewed today.  Patent LIMA to the LAD, occluded circumflex described as mild in-stent restenosis proximally and mid vessel occluded stent.  Right coronary artery dominant vessel diffusely stented with occluded mid vessel.  LIMA to the LAD patent with 40 to 50% narrowing downstream unchanged from previous.  Known occlusion of the vein graft.  Recommendations for to continue with aggressive risk modification without good targets for PCI and normal left-sided filling pressures.  Surgical notes from October 31 is reviewed.  The abdominal abscess was said to be healing.    She reports no specific anginal chest discomfort, no awareness of palpitations no dizziness or lightheadedness.    Recent Echocardiogram Results:  Study Date: 10/03/2023 01:50 PM  Age: 73 yrs  Gender: Female  Patient Location: [facility]  Reason For Study: Paroxysmal atrial fibrillation (H), Coronary artery disease  due  Ordering Physician: OTIS PRITCHETT  Referring Physician: OTIS PRITCHETT  Performed By: PENG     BSA: 1.8 m2  Height: 62 in  Weight: 172 lb  HR: 64  BP:  "140/58 mmHg  ______________________________________________________________________________  Procedure  Complete Echo Adult. Definity (NDC #55939-580) given intravenously. Lot [id number],  Exp 1 Sep 24.  ______________________________________________________________________________  Interpretation Summary     Left ventricular size, wall motion and function are normal. The ejection  fraction is 60-65%.  Normal right ventricle size and systolic function.  No hemodynamically significant valvular abnormalities on 2D or color flow  imaging.  ______________________________________________________________________________  Left Ventricle  Left ventricular size, wall motion and function are normal. The ejection  fraction is 60-65%. There is normal left ventricular wall thickness. Left  ventricular diastolic function is normal. No regional wall motion  abnormalities noted.     Right Ventricle  Normal right ventricle size and systolic function.     Atria  The left atrium is borderline dilated. Right atrial size is normal. There is  no color Doppler evidence of an atrial shunt.     Mitral Valve  Mitral valve leaflets appear normal. There is no evidence of mitral stenosis  or clinically significant mitral regurgitation.     Tricuspid Valve  Tricuspid valve leaflets appear normal. There is no evidence of tricuspid  stenosis or clinically significant tricuspid regurgitation.     Aortic Valve  There is mild trileaflet aortic sclerosis.     Pulmonic Valve  The pulmonic valve is not well seen, but is grossly normal. This degree of  valvular regurgitation is within normal limits.     Vessels  The aorta root is normal. Normal size ascending aorta. IVC diameter and  respiratory changes fall into an intermediate range suggesting an RA pressure  of 8 mmHg.     Pericardium  There is no pericardial effusion.         Recent Coronary Angiogram Results:  Cardiac Catheterization    Height:  1.588 m (5' 2.5\")   Weight:  78.5 kg (173 lb)   Blood " Pressure:  118/57   Heart Rate:  68    Date of Study:  10/11/23   Ordering Provider:  Chuck Hernandez MD   Clinical Indications:  Abnormal nuclear stress test [R94.39 (ICD-10-CM)], Coronary artery disease involving native coronary artery of native heart without angina pectoris [I25.10 (ICD-10-CM)], Coronary artery disease involving coronary bypass graft of native heart without angina pectoris [I25.810 (ICD-10-CM)]       Performing Physician  Performing Staff   Primary:  Sam Boo MD    Circulator:  Rhona Scott RN   Scrub Person:  Areli Olivas ARRT   Documenter/Scribe:  Rhona Massey RN   CV Technologist:  Paulina Palacios ARRT   CV Technologist:  Josie Bautista ARRT        Patient Information    Name MRN Jeremy Soto [id number] 73 year old female     Physicians    Panel Physicians Referring Physician Case Authorizing Physician   Sam Boo MD (Primary) Chuck Hernandez MD Garr, Michael D, MD     Procedures    Panel 1    Primary Surgeon:  Sam Boo MD   Procedure:  Coronary Angiogram Graft        Indications    Abnormal nuclear stress test [R94.39 (ICD-10-CM)]   Coronary artery disease involving native coronary artery of native heart without angina pectoris [I25.10 (ICD-10-CM)]   Coronary artery disease involving coronary bypass graft of native heart without angina pectoris [I25.810 (ICD-10-CM)]     Pre Procedure Diagnosis    abnormal stress test       Conclusion    Kemi Soto is a 73 year old old female with CAD, s/p CABG/multiple stents, most recent in '18 to Lcx, TIA, Afib, DM, HLD, HTN who is here for w/up of an abnormal Nuc.     - known native CAD, patent L-LAD, occluded small dLCx is new since '18 but appears to be chronic; normal L-sided filling pressures  - no good targets for PCI, continue medical therapy  - continue warfarin, rosuva   - continue aggressive risk factor modification              Findings:  LM:no obstruction  LAD: moderate  diffuse Ca2+ disease throughout mid-vessel, occluded mid-vessel, fills distally via patent L-LAD  Lcx:proximal mild ISR, mid-vessel occluded stents  RCA:dominant, diffusely stented, occluded mid-vessel     Grafts:  L-LAD: patent, 40-50% narrowing downstream unchanged from prior angio  V's: known occluded     LVEDP:17    EXAM: CT ABDOMEN PELVIS W CONTRAST  LOCATION: [facility]  DATE: 10/20/2023     INDICATION: Abdominal wall mass LLQ, concern for hernia versus abscess  COMPARISON: None.  TECHNIQUE: CT scan of the abdomen and pelvis was performed following injection of IV contrast. Multiplanar reformats were obtained. Dose reduction techniques were used.  CONTRAST: 90ml Isovue 370     FINDINGS:   LOWER CHEST: Right inferior chest 4 mm aliyah-fissural nodule is most likely a benign intrapulmonary lymph node.     HEPATOBILIARY: Gallstone.     PANCREAS: Normal.     SPLEEN: Normal.     ADRENAL GLANDS: Left adrenal 1.7 x 1.4 cm nodule.     KIDNEYS/BLADDER: No significant mass, stone, or hydronephrosis. Right renal 1.5 cm cyst which does not require further evaluation.     BOWEL: No obstruction or inflammatory change.     LYMPH NODES: Normal.     VASCULATURE: Aortoiliac atherosclerosis. Patent mesenteric vessels and renal arteries.     PELVIC ORGANS: Right adnexal 8 x 5.8 cm multiloculated fluid density lesion immediately adjacent the the uterus. This could arise from the right ovary, or represent a degenerated subserosal uterine leiomyoma. No ascites or omental cake to suggest   neoplastic spread.     MUSCULOSKELETAL: Left paramedian 2.5 x 2.3 cm nonmarginated subcutaneous hypodensity. The overlying skin is mildly thickened consistent with cellulitis and phlegmon.     Lower lumbar facet arthropathy with grade 1 L5 on S1 anterolisthesis.                                                                      IMPRESSION:   1.  Left abdominal wall subcutaneous 2.5 cm probable phlegmon and cellulitis.  2.   Right adnexal lesion which could arise from ovary or uterus. Ovarian neoplasm possible. Pelvic MRI recommended.  3.  Left adrenal 1.7 cm nodule. Adenoma most likely. Recommend comparison with any prior CT. If none available, 3 month adrenal CT follow-up recommended.        Physical Examination  Review of Systems   Vitals: 104/52, weight 174 pounds, heart rate of 66 and regular, O2 sat 96%  Wt Readings from Last 3 Encounters:   10/31/23 79.4 kg (175 lb)   10/27/23 78 kg (172 lb)   10/25/23 78 kg (172 lb)       General Appearance:   no distress, normal body habitus   ENT/Mouth: membranes moist, no oral lesions or bleeding gums.      EYES:  no scleral icterus, normal conjunctivae   Neck: no carotid bruits    Chest/Lungs:   lungs are clear to auscultation, no rales or wheezing, equal chest wall expansion    Cardiovascular:   Regular. Normal first and second heart sounds with no murmurs, rubs, or gallops; the carotid, radial and posterior tibial pulses are intact, Jugular venous pressure within normal limits, no significant edema bilaterally    Abdomen:  no  bruits, ; bowel sounds are present, bandage on the lower portion of her abdomen.   Extremities: no cyanosis or clubbing   Skin: no xanthelasma, warm.    Neurologic:  no tremors     Psychiatric: alert and oriented x3, calm        Please refer above for cardiac ROS details.        Medical History  Surgical History Family History Social History   Past Medical History:   Diagnosis Date    Coronary artery disease     Diabetes mellitus (H)     Discitis of thoracolumbar region 10/10/2015    Encephalopathy 10/14/2015    Epidural abscess 10/10/2015    Hip pain, right     Hyperlipidemia     Sepsis (H) 10/8/2015    Stroke (H) 06/23/2015    Neurology felt that episode was C/W subacute ischemic stroke    TIA (transient ischemic attack) 6/23/2015    UTI (urinary tract infection)     admitted to [facility] with UTI     Past Surgical History:   Procedure Laterality Date     ANGIOPLASTY  2016    Drug eluting stent mid LAD, cutting balloon to 1st diagonal    ANGIOPLASTY      BYPASS GRAFT ARTERY CORONARY  12/11/2007    X 3 vessels, LIMA to LAD, saph vein graft to distal RCA, saphvein graft to marginal, mini circuit bypass.  [facility].    CORONARY STENT PLACEMENT      Left main, left circumflex, RCA    CORONARY STENT PLACEMENT  2016    CV ANGIOGRAM CORONARY GRAFT N/A 10/11/2023    Procedure: Coronary Angiogram Graft;  Surgeon: Sam Boo MD;  Location: [facility] CATH LAB CV    CV CORONARY ANGIOGRAM N/A 2018    Procedure: Coronary Angiogram;  Surgeon: iKt Bean MD;  Location: [facility] Cath Lab;  Service:     CV LEFT HEART CATHETERIZATION WITH LEFT VENTRICULOGRAM N/A 2018    Procedure: Left Heart Catheterization with Left Ventriculogram;  Surgeon: Kit Bean MD;  Location: [facility] Cath Lab;  Service:     INSERT MIDLINE HE  10/31/2015         LUMBAR DISCECTOMY N/A 10/11/2015    Procedure: BILATERAL L1-L5 DECOMPRESSIVE LAMINECTOMY WITH EVACUATION OF EPIDURAL ABSCESS IRRIGATION & DEBRIDEMENT;  Surgeon: Luli Chan MD;  Location: [facility] OR;  Service:     PICC  10/19/2015         RELEASE CARPAL TUNNEL Bilateral      Family History   Problem Relation Age of Onset    Cerebrovascular Disease Mother     Diabetes Father     Diabetes Sister     Cerebrovascular Disease Sister 81.00    Cerebrovascular Disease Brother     Diabetes Brother         Social History     Socioeconomic History    Marital status: Single     Spouse name: Not on file    Number of children: 1    Years of education: Not on file    Highest education level: Not on file   Occupational History    Not on file   Tobacco Use    Smoking status: Former     Types: Cigarettes     Quit date: 2007     Years since quittin.4     Passive exposure: Past    Smokeless tobacco: Never   Vaping Use    Vaping Use: Never used   Substance and Sexual Activity     Alcohol use: No     Comment: Alcoholic Drinks/day: stopped drinking 9/2015    Drug use: No    Sexual activity: Never   Other Topics Concern    Not on file   Social History Narrative    Lives alone with cats and dogs. , one daughter, Tory Lofton.      Social Determinants of Health     Financial Resource Strain: Low Risk  (10/9/2023)    Financial Resource Strain     Within the past 12 months, have you or your family members you live with been unable to get utilities (heat, electricity) when it was really needed?: No   Food Insecurity: Low Risk  (10/9/2023)    Food Insecurity     Within the past 12 months, did you worry that your food would run out before you got money to buy more?: No     Within the past 12 months, did the food you bought just not last and you didn t have money to get more?: No   Transportation Needs: Low Risk  (10/9/2023)    Transportation Needs     Within the past 12 months, has lack of transportation kept you from medical appointments, getting your medicines, non-medical meetings or appointments, work, or from getting things that you need?: No   Physical Activity: Not on file   Stress: Not on file   Social Connections: Not on file   Interpersonal Safety: Low Risk  (10/9/2023)    Interpersonal Safety     Do you feel physically and emotionally safe where you currently live?: Yes     Within the past 12 months, have you been hit, slapped, kicked or otherwise physically hurt by someone?: No     Within the past 12 months, have you been humiliated or emotionally abused in other ways by your partner or ex-partner?: No   Housing Stability: Low Risk  (10/9/2023)    Housing Stability     Do you have housing? : Yes     Are you worried about losing your housing?: No           Medications  Allergies   Current Outpatient Medications   Medication Sig Dispense Refill    acetaminophen (TYLENOL) 500 MG tablet Take 1,000 mg by mouth every 12 hours as needed for mild pain      amoxicillin-clavulanate (AUGMENTIN)  875-125 MG tablet Take 1 tablet by mouth 2 times daily 20 tablet 0    atorvastatin (LIPITOR) 80 MG tablet Take 1 tablet (80 mg) by mouth At Bedtime 90 tablet 3    busPIRone (BUSPAR) 15 MG tablet Take 1 tablet (15 mg) by mouth daily 90 tablet 3    cefdinir (OMNICEF) 300 MG capsule Take 300 mg by mouth 2 times daily      clobetasol (TEMOVATE) 0.05 % external cream Apply topically 2 times daily      ezetimibe (ZETIA) 10 MG tablet Take 1 tablet (10 mg) by mouth daily 90 tablet 3    gabapentin (NEURONTIN) 300 MG capsule TAKE 3 CAPSULES BY MOUTH EVERY DAY AT BEDTIME 270 capsule 3    hydrochlorothiazide (HYDRODIURIL) 12.5 MG tablet Take 1 tablet by mouth once daily 90 tablet 0    insulin glargine (LANTUS SOLOSTAR) 100 UNIT/ML pen Inject 28 Units Subcutaneous 2 times daily INJECT 28 UNITS SUBCUTANEOUSLY TWICE DAILY 60 mL 3    isosorbide mononitrate (IMDUR) 60 MG 24 hr tablet Take 1 tablet (60 mg) by mouth daily 90 tablet 3    ketoconazole (NIZORAL) 2 % external cream APPLY 1 APPLICATION TOPICALLY TWICE DAILY TO AFFECTED AREAS FOR 4 WEEKS THEN AS NEEDED      losartan (COZAAR) 100 MG tablet Take 1 tablet (100 mg) by mouth daily 90 tablet 3    melatonin 1 MG CAPS Take 3 tablets by mouth as needed (at bedtime)      Semaglutide, 1 MG/DOSE, (OZEMPIC) 4 MG/3ML pen Inject 1 mg Subcutaneous every 7 days 3 mL 3    sertraline (ZOLOFT) 100 MG tablet Take 1 tablet (100 mg) by mouth daily 90 tablet 3    sotalol (BETAPACE) 80 MG tablet Take 0.5 tablets (40 mg) by mouth 2 times daily 90 tablet 3    traMADol (ULTRAM) 50 MG tablet TAKE 1 TABLET BY MOUTH EVERY 8 HOURS AS NEEDED FOR PAIN OR SEVERE PAIN ON SCALE (7-10) 30 tablet 0    triamcinolone (KENALOG) 0.1 % external cream Apply topically 2 times daily      warfarin ANTICOAGULANT (COUMADIN) 5 MG tablet Take 0.5-1 tablets (2.5-5 mg) by mouth daily Adjust dose per INR results as instructed. 90 tablet 3       Allergies   Allergen Reactions    Metformin GI Disturbance    Oxycodone Nausea and  "Vomiting          Lab Results    Chemistry/lipid CBC Cardiac Enzymes/BNP/TSH/INR   Recent Labs   Lab Test 10/11/23  1003   CHOL 105   HDL 36*   LDL 39   TRIG 149     Recent Labs   Lab Test 10/11/23  1003 10/10/23  1410 04/12/23  1617   LDL 39 40 56     Recent Labs   Lab Test 10/20/23  1507      POTASSIUM 3.8   CHLORIDE 98   CO2 29   *   BUN 16.0   CR 0.78   GFRESTIMATED 79   ELLIE 9.7     Recent Labs   Lab Test 10/20/23  1507 10/10/23  1410 04/12/23  1617   CR 0.78 0.96* 0.97*     Recent Labs   Lab Test 08/25/23  1436 04/12/23  1617 04/11/22  1408   A1C 8.4* 10.8* 8.2*          Recent Labs   Lab Test 10/20/23  1507   WBC 14.6*   HGB 12.7   HCT 38.1   MCV 91        Recent Labs   Lab Test 10/20/23  1507 10/10/23  1410 05/05/21  1400   HGB 12.7 12.9 13.0    Recent Labs   Lab Test 11/06/20  0559 11/05/20  0559 11/04/20  2229   TROPONINI 0.18 0.37* 0.28     No results for input(s): \"BNP\", \"NTBNPI\", \"NTBNP\" in the last 16723 hours.  Recent Labs   Lab Test 05/05/21  1400   TSH 0.77     Recent Labs   Lab Test 11/06/23  1302 10/30/23  1326 10/24/23  1555   INR 2.7* 1.9* 4.2*        Chuck Hernandez MD                                    "

## 2023-11-18 ENCOUNTER — HEALTH MAINTENANCE LETTER (OUTPATIENT)
Age: 73
End: 2023-11-18

## 2023-11-20 NOTE — TELEPHONE ENCOUNTER
Seen on 11/14/23, /52 at office visit. No medication adjustments. Follow-up in 4-6 months. LINDSAYRn

## 2023-12-04 NOTE — PROGRESS NOTES
DISCHARGE  Reason for Discharge: Patient chooses to discontinue therapy.    Equipment Issued: NA    Discharge Plan: Patient to continue home program.    Referring Provider:  Chula Garcia

## 2023-12-07 ENCOUNTER — OFFICE VISIT (OUTPATIENT)
Dept: FAMILY MEDICINE | Facility: CLINIC | Age: 73
End: 2023-12-07
Payer: COMMERCIAL

## 2023-12-07 VITALS
OXYGEN SATURATION: 98 % | WEIGHT: 174.4 LBS | HEART RATE: 60 BPM | SYSTOLIC BLOOD PRESSURE: 113 MMHG | BODY MASS INDEX: 32.09 KG/M2 | DIASTOLIC BLOOD PRESSURE: 68 MMHG | TEMPERATURE: 98.2 F | HEIGHT: 62 IN

## 2023-12-07 DIAGNOSIS — N83.8 OVARIAN MASS, RIGHT: ICD-10-CM

## 2023-12-07 DIAGNOSIS — I48.0 PAROXYSMAL ATRIAL FIBRILLATION (H): Primary | ICD-10-CM

## 2023-12-07 DIAGNOSIS — Z79.4 TYPE 2 DIABETES MELLITUS WITH HYPERGLYCEMIA, WITH LONG-TERM CURRENT USE OF INSULIN (H): ICD-10-CM

## 2023-12-07 DIAGNOSIS — Z51.81 ANTICOAGULATION GOAL OF INR 2 TO 3: ICD-10-CM

## 2023-12-07 DIAGNOSIS — Z79.01 ANTICOAGULATION GOAL OF INR 2 TO 3: ICD-10-CM

## 2023-12-07 DIAGNOSIS — D72.828 NEUTROPHILIA: ICD-10-CM

## 2023-12-07 DIAGNOSIS — E11.65 TYPE 2 DIABETES MELLITUS WITH HYPERGLYCEMIA, WITH LONG-TERM CURRENT USE OF INSULIN (H): ICD-10-CM

## 2023-12-07 LAB
ALBUMIN SERPL BCG-MCNC: 3.9 G/DL (ref 3.5–5.2)
ALP SERPL-CCNC: 103 U/L (ref 40–150)
ALT SERPL W P-5'-P-CCNC: 23 U/L (ref 0–50)
ANION GAP SERPL CALCULATED.3IONS-SCNC: 10 MMOL/L (ref 7–15)
AST SERPL W P-5'-P-CCNC: 27 U/L (ref 0–45)
BASOPHILS # BLD AUTO: 0 10E3/UL (ref 0–0.2)
BASOPHILS NFR BLD AUTO: 0 %
BILIRUB SERPL-MCNC: 0.3 MG/DL
BUN SERPL-MCNC: 17.5 MG/DL (ref 8–23)
CALCIUM SERPL-MCNC: 9.4 MG/DL (ref 8.8–10.2)
CHLORIDE SERPL-SCNC: 102 MMOL/L (ref 98–107)
CHOLEST SERPL-MCNC: 118 MG/DL
CREAT SERPL-MCNC: 0.82 MG/DL (ref 0.51–0.95)
CREAT UR-MCNC: 215 MG/DL
DEPRECATED HCO3 PLAS-SCNC: 27 MMOL/L (ref 22–29)
EGFRCR SERPLBLD CKD-EPI 2021: 75 ML/MIN/1.73M2
EOSINOPHIL # BLD AUTO: 0.1 10E3/UL (ref 0–0.7)
EOSINOPHIL NFR BLD AUTO: 1 %
ERYTHROCYTE [DISTWIDTH] IN BLOOD BY AUTOMATED COUNT: 12.5 % (ref 10–15)
FASTING STATUS PATIENT QL REPORTED: ABNORMAL
GLUCOSE SERPL-MCNC: 228 MG/DL (ref 70–99)
HBA1C MFR BLD: 9.5 % (ref 0–5.6)
HCT VFR BLD AUTO: 39.4 % (ref 35–47)
HDLC SERPL-MCNC: 37 MG/DL
HGB BLD-MCNC: 12.9 G/DL (ref 11.7–15.7)
IMM GRANULOCYTES # BLD: 0 10E3/UL
IMM GRANULOCYTES NFR BLD: 0 %
INR PPP: 2.11 (ref 0.85–1.15)
LDLC SERPL CALC-MCNC: 51 MG/DL
LYMPHOCYTES # BLD AUTO: 1.1 10E3/UL (ref 0.8–5.3)
LYMPHOCYTES NFR BLD AUTO: 10 %
MCH RBC QN AUTO: 30 PG (ref 26.5–33)
MCHC RBC AUTO-ENTMCNC: 32.7 G/DL (ref 31.5–36.5)
MCV RBC AUTO: 92 FL (ref 78–100)
MICROALBUMIN UR-MCNC: 460 MG/L
MICROALBUMIN/CREAT UR: 213.95 MG/G CR (ref 0–25)
MONOCYTES # BLD AUTO: 0.6 10E3/UL (ref 0–1.3)
MONOCYTES NFR BLD AUTO: 5 %
NEUTROPHILS # BLD AUTO: 9.7 10E3/UL (ref 1.6–8.3)
NEUTROPHILS NFR BLD AUTO: 84 %
NONHDLC SERPL-MCNC: 81 MG/DL
PLATELET # BLD AUTO: 342 10E3/UL (ref 150–450)
POTASSIUM SERPL-SCNC: 4.4 MMOL/L (ref 3.4–5.3)
PROT SERPL-MCNC: 6.9 G/DL (ref 6.4–8.3)
RBC # BLD AUTO: 4.3 10E6/UL (ref 3.8–5.2)
SODIUM SERPL-SCNC: 139 MMOL/L (ref 135–145)
TRIGL SERPL-MCNC: 148 MG/DL
WBC # BLD AUTO: 11.5 10E3/UL (ref 4–11)

## 2023-12-07 PROCEDURE — 36415 COLL VENOUS BLD VENIPUNCTURE: CPT | Performed by: FAMILY MEDICINE

## 2023-12-07 PROCEDURE — 82570 ASSAY OF URINE CREATININE: CPT | Performed by: FAMILY MEDICINE

## 2023-12-07 PROCEDURE — 82043 UR ALBUMIN QUANTITATIVE: CPT | Performed by: FAMILY MEDICINE

## 2023-12-07 PROCEDURE — 85610 PROTHROMBIN TIME: CPT | Performed by: FAMILY MEDICINE

## 2023-12-07 PROCEDURE — 80061 LIPID PANEL: CPT | Performed by: FAMILY MEDICINE

## 2023-12-07 PROCEDURE — 99214 OFFICE O/P EST MOD 30 MIN: CPT | Performed by: FAMILY MEDICINE

## 2023-12-07 PROCEDURE — 83036 HEMOGLOBIN GLYCOSYLATED A1C: CPT | Performed by: FAMILY MEDICINE

## 2023-12-07 PROCEDURE — 85025 COMPLETE CBC W/AUTO DIFF WBC: CPT | Performed by: FAMILY MEDICINE

## 2023-12-07 PROCEDURE — 80053 COMPREHEN METABOLIC PANEL: CPT | Performed by: FAMILY MEDICINE

## 2023-12-07 RX ORDER — RESPIRATORY SYNCYTIAL VIRUS VACCINE 120MCG/0.5
0.5 KIT INTRAMUSCULAR ONCE
Qty: 1 EACH | Refills: 0 | Status: CANCELLED | OUTPATIENT
Start: 2023-12-07 | End: 2023-12-07

## 2023-12-07 ASSESSMENT — PAIN SCALES - GENERAL: PAINLEVEL: NO PAIN (0)

## 2023-12-07 NOTE — PROGRESS NOTES
"Daughter works as a  at [employer] and lives in [place].      Assessment & Plan     Paroxysmal atrial fibrillation (H)  Controlled. Continue coumadin and we will take over check INR.  - Anticoagulation Clinic Referral    Anticoagulation goal of INR 2 to 3  Controlled. Continue coumadin and we will take over check INR.  - Anticoagulation Clinic Referral  - INR; Future  - INR    Ovarian mass, right  Agree with upcoming MRI and we will determine appropriate follow up based on results.    Type 2 diabetes mellitus with hyperglycemia, with long-term current use of insulin (H)  A1c elevated. Advised healthy lifestyle. She plans to restart Ozempic after January 1st.  - Hemoglobin A1c; Future  - Albumin Random Urine Quantitative with Creat Ratio; Future  - Comprehensive metabolic panel; Future  - Lipid Profile; Future  - CBC with Platelets & Differential; Future  - Hemoglobin A1c  - Albumin Random Urine Quantitative with Creat Ratio  - Comprehensive metabolic panel  - Lipid Profile  - CBC with Platelets & Differential    Neutrophilia  Uncertain cause. Treat with Augmentin for 1 week then recheck CBC in 1 week. If still elevated, we will need to get her in for exam.  - amoxicillin-clavulanate (AUGMENTIN) 875-125 MG tablet; Take 1 tablet by mouth 2 times daily for 7 days  - CBC with Platelets & Differential; Future             BMI:   Estimated body mass index is 31.9 kg/m  as calculated from the following:    Height as of this encounter: 1.575 m (5' 2\").    Weight as of this encounter: 79.1 kg (174 lb 6.4 oz).   Weight management plan: Discussed healthy diet and exercise guidelines        Zaheer Sandra MD  [facility] RASHMI    Keith Lafleur is a 73 year old, presenting for the following health issues:  Establish Care (INR labs today, upcoming MRI for [facility] in abdomen [facility] )        12/7/2023     2:02 PM   Additional Questions   Roomed by [facility], Visit Facilitator     History of Present " "Illness       Reason for visit:  Establish care    She eats 2-3 servings of fruits and vegetables daily.She consumes 0 sweetened beverage(s) daily.She exercises with enough effort to increase her heart rate 9 or less minutes per day.  She exercises with enough effort to increase her heart rate 3 or less days per week.   She is taking medications regularly.     Ovarian mass: Right pelvic mass has grown from 2.5 cm (2015) to 8 x 5.8 cm (10/20/23). She has an MRI pelvis scheduled in 3 days.    DM2: 10.8 on labs 4/18. Currently off Ozempic because of recent abdominal wall infection. She plans to restart Ozempic in about 3 weeks (After January 1st).    A. Fib: She is on coumadin and INR has been controlled.    Neutropenia: Patient was treated for a cellulitis on her abdomen 1.5 months ago with Augmentin. Her white blood cell count was 14.5 at that time. She states that overall she feels like infection is resolved, except for hardening and thickening of the skin around the area she did have infection.            Review of Systems   No fever      Objective    /68 (BP Location: Left arm, Patient Position: Sitting, Cuff Size: Adult Regular)   Pulse 60   Temp 98.2  F (36.8  C) (Oral)   Ht 1.575 m (5' 2\")   Wt 79.1 kg (174 lb 6.4 oz)   LMP  (LMP Unknown)   SpO2 98%   BMI 31.90 kg/m    Body mass index is 31.9 kg/m .  Physical Exam   GENERAL: healthy, alert and no distress  PSYCH: mentation appears normal, affect normal/bright                      "

## 2023-12-08 ENCOUNTER — ANTICOAGULATION THERAPY VISIT (OUTPATIENT)
Dept: ANTICOAGULATION | Facility: CLINIC | Age: 73
End: 2023-12-08
Payer: COMMERCIAL

## 2023-12-08 DIAGNOSIS — Z79.01 ANTICOAGULATION GOAL OF INR 2 TO 3: ICD-10-CM

## 2023-12-08 DIAGNOSIS — I48.0 PAROXYSMAL ATRIAL FIBRILLATION (H): Primary | ICD-10-CM

## 2023-12-08 DIAGNOSIS — Z51.81 ANTICOAGULATION GOAL OF INR 2 TO 3: ICD-10-CM

## 2023-12-10 ENCOUNTER — HOSPITAL ENCOUNTER (OUTPATIENT)
Dept: MRI IMAGING | Facility: CLINIC | Age: 73
Discharge: HOME | End: 2023-12-10
Attending: NURSE PRACTITIONER | Admitting: NURSE PRACTITIONER
Payer: COMMERCIAL

## 2023-12-10 DIAGNOSIS — N94.89 ADNEXAL MASS: ICD-10-CM

## 2023-12-10 PROCEDURE — A9585 GADOBUTROL INJECTION: HCPCS | Mod: JZ | Performed by: NURSE PRACTITIONER

## 2023-12-10 PROCEDURE — 72197 MRI PELVIS W/O & W/DYE: CPT

## 2023-12-10 PROCEDURE — 255N000002 HC RX 255 OP 636: Mod: JZ | Performed by: NURSE PRACTITIONER

## 2023-12-10 RX ORDER — GADOBUTROL 604.72 MG/ML
8 INJECTION INTRAVENOUS ONCE
Status: COMPLETED | OUTPATIENT
Start: 2023-12-10 | End: 2023-12-10

## 2023-12-10 RX ADMIN — GADOBUTROL 8 ML: 604.72 INJECTION INTRAVENOUS at 16:15

## 2023-12-11 ENCOUNTER — MYC MEDICAL ADVICE (OUTPATIENT)
Dept: FAMILY MEDICINE | Facility: CLINIC | Age: 73
End: 2023-12-11
Payer: COMMERCIAL

## 2023-12-11 ENCOUNTER — TELEPHONE (OUTPATIENT)
Dept: FAMILY MEDICINE | Facility: CLINIC | Age: 73
End: 2023-12-11
Payer: COMMERCIAL

## 2023-12-11 ENCOUNTER — PATIENT OUTREACH (OUTPATIENT)
Dept: ONCOLOGY | Facility: CLINIC | Age: 73
End: 2023-12-11
Payer: COMMERCIAL

## 2023-12-11 DIAGNOSIS — N83.8 OVARIAN MASS, RIGHT: Primary | ICD-10-CM

## 2023-12-11 NOTE — TELEPHONE ENCOUNTER
Patient called back. She has appointment with OB/GYN scheduled.    Brigette Cheek RN  [facility]

## 2023-12-11 NOTE — PROGRESS NOTES
New Patient Hematology / Oncology Nurse Navigator Note     Referral Date: 12/11/23    Referring provider: Dr FELICITAS Sandra    Referring Clinic/Organization: [facility]     Referred to: Gynecology Oncology    Requested provider (if applicable): First available - did not specify     Evaluation for : Adnexal mass       Clinical History (per Nurse review of records provided):    12/10/23 MR Pelvis -- BOOKMARKED  12/7/23 clinic visit note with referring provider -- BOOKMARKED    Clinical Assessment / Barriers to Care (Per Nurse):    None identified at time of call, patient stated she would be out of town at holiday time but is anxious to schedule with Gyn Onc for next steps      Records Location: Epic     Records Needed:     See Pre-Visit encounter from CSS team for additional details on records collection. Additional questions on records needed for initial consult can be sent to P ONC ADULT NEW PATIENT RECORDS [[id number]] with a copy to P CANCER CARE NURSE NAVIGATION [[id number]]. Please include diagnosis and urgency in subject line.    Additional testing needed prior to consult:     N/A    Referral updates and Plan:     Triage instructions updated and sent to NPS for completion, call warm transferred to NPS      BALAJI CarsonN, RN, PHN, OCN  Hematology/Oncology Nurse Navigator   [facility]   [phone]   [email or web address]

## 2023-12-11 NOTE — TELEPHONE ENCOUNTER
"LMTRC.     When patient calls back, please route to RN for results note from Andie:    \"Please call patient- recommend consult with gyn for further evaluation of mass on right ovary.\"    May \"done\" encounter if patient reviews results note on MyC.     Brigette Cheek, EAMON  [facility]    "

## 2023-12-11 NOTE — TELEPHONE ENCOUNTER
RECORDS STATUS - ALL OTHER DIAGNOSIS      RECORDS RECEIVED FROM: [facility] - Internal Records   DATE RECEIVED: 12/11

## 2023-12-11 NOTE — TELEPHONE ENCOUNTER
Sending to PCP for review.  Please review and advise on patient MyChart message.    MRI of Pelvis is in patient's chart.     Elizabet Esteves RN  [facility]

## 2023-12-13 ENCOUNTER — LAB (OUTPATIENT)
Dept: LAB | Facility: CLINIC | Age: 73
End: 2023-12-13
Attending: FAMILY MEDICINE
Payer: COMMERCIAL

## 2023-12-13 ENCOUNTER — PRE VISIT (OUTPATIENT)
Dept: ONCOLOGY | Facility: CLINIC | Age: 73
End: 2023-12-13
Payer: COMMERCIAL

## 2023-12-13 ENCOUNTER — ONCOLOGY VISIT (OUTPATIENT)
Dept: ONCOLOGY | Facility: CLINIC | Age: 73
End: 2023-12-13
Attending: FAMILY MEDICINE
Payer: COMMERCIAL

## 2023-12-13 VITALS
BODY MASS INDEX: 32.26 KG/M2 | RESPIRATION RATE: 16 BRPM | DIASTOLIC BLOOD PRESSURE: 70 MMHG | SYSTOLIC BLOOD PRESSURE: 152 MMHG | HEART RATE: 56 BPM | WEIGHT: 175.3 LBS | TEMPERATURE: 98 F | HEIGHT: 62 IN | OXYGEN SATURATION: 97 %

## 2023-12-13 DIAGNOSIS — I25.10 CORONARY ARTERY DISEASE INVOLVING NATIVE CORONARY ARTERY OF NATIVE HEART WITHOUT ANGINA PECTORIS: ICD-10-CM

## 2023-12-13 DIAGNOSIS — N83.8 OVARIAN MASS, RIGHT: ICD-10-CM

## 2023-12-13 DIAGNOSIS — N83.8 OVARIAN MASS, RIGHT: Primary | ICD-10-CM

## 2023-12-13 DIAGNOSIS — E11.65 TYPE 2 DIABETES MELLITUS WITH HYPERGLYCEMIA, WITH LONG-TERM CURRENT USE OF INSULIN (H): ICD-10-CM

## 2023-12-13 DIAGNOSIS — D72.828 NEUTROPHILIA: ICD-10-CM

## 2023-12-13 DIAGNOSIS — Z79.4 TYPE 2 DIABETES MELLITUS WITH HYPERGLYCEMIA, WITH LONG-TERM CURRENT USE OF INSULIN (H): ICD-10-CM

## 2023-12-13 LAB
BASOPHILS # BLD AUTO: 0 10E3/UL (ref 0–0.2)
BASOPHILS NFR BLD AUTO: 0 %
EOSINOPHIL # BLD AUTO: 0.1 10E3/UL (ref 0–0.7)
EOSINOPHIL NFR BLD AUTO: 1 %
ERYTHROCYTE [DISTWIDTH] IN BLOOD BY AUTOMATED COUNT: 12.7 % (ref 10–15)
HCT VFR BLD AUTO: 38.5 % (ref 35–47)
HGB BLD-MCNC: 12.9 G/DL (ref 11.7–15.7)
IMM GRANULOCYTES # BLD: 0 10E3/UL
IMM GRANULOCYTES NFR BLD: 0 %
LYMPHOCYTES # BLD AUTO: 1.5 10E3/UL (ref 0.8–5.3)
LYMPHOCYTES NFR BLD AUTO: 13 %
MCH RBC QN AUTO: 30.3 PG (ref 26.5–33)
MCHC RBC AUTO-ENTMCNC: 33.5 G/DL (ref 31.5–36.5)
MCV RBC AUTO: 90 FL (ref 78–100)
MONOCYTES # BLD AUTO: 0.5 10E3/UL (ref 0–1.3)
MONOCYTES NFR BLD AUTO: 5 %
NEUTROPHILS # BLD AUTO: 9.2 10E3/UL (ref 1.6–8.3)
NEUTROPHILS NFR BLD AUTO: 81 %
NRBC # BLD AUTO: 0 10E3/UL
NRBC BLD AUTO-RTO: 0 /100
PLATELET # BLD AUTO: 346 10E3/UL (ref 150–450)
RBC # BLD AUTO: 4.26 10E6/UL (ref 3.8–5.2)
WBC # BLD AUTO: 11.4 10E3/UL (ref 4–11)

## 2023-12-13 PROCEDURE — G0463 HOSPITAL OUTPT CLINIC VISIT: HCPCS | Performed by: OBSTETRICS & GYNECOLOGY

## 2023-12-13 PROCEDURE — 85025 COMPLETE CBC W/AUTO DIFF WBC: CPT

## 2023-12-13 PROCEDURE — 36415 COLL VENOUS BLD VENIPUNCTURE: CPT

## 2023-12-13 PROCEDURE — 99204 OFFICE O/P NEW MOD 45 MIN: CPT | Performed by: OBSTETRICS & GYNECOLOGY

## 2023-12-13 PROCEDURE — 86301 IMMUNOASSAY TUMOR CA 19-9: CPT

## 2023-12-13 PROCEDURE — 86304 IMMUNOASSAY TUMOR CA 125: CPT

## 2023-12-13 PROCEDURE — 82378 CARCINOEMBRYONIC ANTIGEN: CPT

## 2023-12-13 ASSESSMENT — PAIN SCALES - GENERAL: PAINLEVEL: NO PAIN (0)

## 2023-12-13 NOTE — NURSING NOTE
Chief Complaint   Patient presents with    Labs Only     Labs collected from venipuncture by RN.      Labs collected from venipuncture by RN.     Blank Cortez RN

## 2023-12-13 NOTE — NURSING NOTE
Return 3 months  Pelvic ultrasound ordered to be done a few days prior to md appt  Labs done to day

## 2023-12-13 NOTE — PATIENT INSTRUCTIONS
It was a pleasure seeing you in clinic today-please reach out if there are any further questions that arise following today's visit.  During business hours, you may reach me at [phone].  For urgent/emergent questions after business hours, you may reach the on-call Gynecologic Oncology Resident through the [facility]  at [phone].    All normal test results are usually communicated via letter or [employer] message.  Abnormal results (those that require a change in the previously discussed plan of care) are usually communicated via a phone call.    I recommend signing up for [employer] access if you have not already done so.  This allows you online access to your lab results and also helps you communicate efficiently with your clinic should any questions arise in your care.    Follow up appointment in 3 months with MD scheduling will call you to help make an appointment  referral to radiology for pelvic ultrasound, scheduling will call you to help make an appointment  labs done today, you will be notified via my chart/telephone with test results    Dr Kenji Gaffney RN  Phone:  [phone]  Fax:  [phone]

## 2023-12-13 NOTE — LETTER
2023         RE: Kemi Soto  [address] [facility] [address]        Dear Colleague,    Thank you for referring your patient, Kemi Soto, to the [facility]. Please see a copy of my visit note below.    Gynecologic Oncology Clinic - New Patient    Referring provider:    Zaheer Sandra MD  [address] [place] [address]    Patient: Kemi Soto  : 1950    Date of Visit: Dec 13, 2023     Reason for visit: ovarian mass    History of Present Illness:  Kemi Soto is a 73 year old patient with PMH notable for CAD s/p bypass and multiple stents, Type 2 diabetes with elevated A1c, TIA here due to incidentally noted ovarian mass.     Per Patient Report: presented to ED for subcutaneous infection on the abdomen and had CT which showed ovarian cyst.     No vaginal bleeding, no pelvic pain, No change in bladder or bowel issues.     OB/Gynecologic History:  ,  x 1  Had one episode of vaginal bleeding   Very remote history of cervical cryoablation.    Past Medical History:   Diagnosis Date     Coronary artery disease      Diabetes mellitus (H)      Discitis of thoracolumbar region 10/10/2015     Encephalopathy 10/14/2015     Epidural abscess 10/10/2015     Hip pain, right      Hyperlipidemia      Sepsis (H) 10/8/2015     Stroke (H) 2015    Neurology felt that episode was C/W subacute ischemic stroke     TIA (transient ischemic attack) 2015     UTI (urinary tract infection)     admitted to [facility] with UTI       Past Surgical History:   Procedure Laterality Date     ANGIOPLASTY  2016    Drug eluting stent mid LAD, cutting balloon to 1st diagonal     ANGIOPLASTY  2008     BYPASS GRAFT ARTERY CORONARY  12/11/2007    X 3 vessels, LIMA to LAD, saph vein graft to distal RCA, saphvein graft to marginal, mini circuit bypass.  [facility].     CORONARY STENT PLACEMENT  2008    Left main, left circumflex, RCA      CORONARY STENT PLACEMENT  2016     CV ANGIOGRAM CORONARY GRAFT N/A 10/11/2023    Procedure: Coronary Angiogram Graft;  Surgeon: Sam Boo MD;  Location: [facility] CATH LAB CV     CV CORONARY ANGIOGRAM N/A 2018    Procedure: Coronary Angiogram;  Surgeon: Kit Bean MD;  Location: [facility] Cath Lab;  Service:      CV LEFT HEART CATHETERIZATION WITH LEFT VENTRICULOGRAM N/A 2018    Procedure: Left Heart Catheterization with Left Ventriculogram;  Surgeon: Kit Bean MD;  Location: [facility] Cath Lab;  Service:      INSERT MIDLINE HE  10/31/2015          LUMBAR DISCECTOMY N/A 10/11/2015    BILATERAL L1-L5 DECOMPRESSIVE LAMINECTOMY WITH EVACUATION OF EPIDURAL ABSCESS IRRIGATION & DEBRIDEMENT;  Surgeon: Luli Chan MD;  Location: [facility] OR; Due to sepsis     RELEASE CARPAL TUNNEL Bilateral         Social History     Tobacco Use     Smoking status: Former     Types: Cigarettes     Quit date: 2007     Years since quittin.4     Passive exposure: Past     Smokeless tobacco: Never   Vaping Use     Vaping Use: Never used   Substance Use Topics     Alcohol use: Not Currently     Comment: Stopped drinking 2015     Drug use: No   Retired [employer] officer    Family History   Problem Relation Age of Onset     Cerebrovascular Disease Mother      Diabetes Father      Coronary Artery Disease Father      Diabetes Sister      Cerebrovascular Disease Sister 81     Cerebrovascular Disease Brother      Diabetes Brother      Cancer Paternal Grandfather        Allergies  Allergies   Allergen Reactions     Metformin GI Disturbance     Oxycodone Nausea and Vomiting       Current Outpatient Medications   Medication     acetaminophen (TYLENOL) 500 MG tablet     amoxicillin-clavulanate (AUGMENTIN) 875-125 MG tablet     atorvastatin (LIPITOR) 80 MG tablet     busPIRone (BUSPAR) 15 MG tablet     clobetasol (TEMOVATE) 0.05 % external cream     ezetimibe  "(ZETIA) 10 MG tablet     gabapentin (NEURONTIN) 300 MG capsule     hydrochlorothiazide (HYDRODIURIL) 12.5 MG tablet     insulin glargine (LANTUS SOLOSTAR) 100 UNIT/ML pen     isosorbide mononitrate (IMDUR) 60 MG 24 hr tablet     ketoconazole (NIZORAL) 2 % external cream     losartan (COZAAR) 100 MG tablet     melatonin 1 MG CAPS     sertraline (ZOLOFT) 100 MG tablet     sotalol (BETAPACE) 80 MG tablet     triamcinolone (KENALOG) 0.1 % external cream     warfarin ANTICOAGULANT (COUMADIN) 5 MG tablet     Semaglutide, 1 MG/DOSE, (OZEMPIC) 4 MG/3ML pen     No current facility-administered medications for this visit.     Facility-Administered Medications Ordered in Other Visits   Medication     dexamethasone PF (DECADRON) injection     iohexol (OMNIPAQUE) 300 mg/mL injection     lidocaine (PF) (XYLOCAINE) 1 % injection       Physical Exam:   BP (!) 152/70 (BP Location: Right arm, Patient Position: Sitting, Cuff Size: Adult Regular)   Pulse 56   Temp 98  F (36.7  C) (Oral)   Resp 16   Ht 1.575 m (5' 2.01\")   Wt 79.5 kg (175 lb 4.8 oz)   LMP  (LMP Unknown)   SpO2 97%   BMI 32.05 kg/m    Gen: no acute distress    Labs/Pathology:    Latest Reference Range & Units 12/13/23 14:22    <=38 U/mL 15   Cancer Antigen 19-9 <=35 U/mL <2   CEA ng/mL 3.0      Latest Reference Range & Units 12/07/23 14:52   Hemoglobin A1C 0.0 - 5.6 % 9.5 (H)   (H): Data is abnormally high    Imaging:   Narrative & Impression   EXAM: MR PELVIS (GYN) W/O and W CONTRAST  LOCATION: [facility]  DATE: 12/10/2023     INDICATION: Adnexal mass.  COMPARISON: CT AP 10/20/2023.  TECHNIQUE: Routine MRI female pelvis protocol including T1 in/out phase, diffusion, thin section high resolution multiplane T2, and post contrast T1.  CONTRAST: Gadavist 8mL.     FINDINGS:      UTERUS: Uterus measures 7 x 4 x 3 cm. Cervix is unremarkable. A 1.1 cm subserosal fibroid posterior uterine fundus and three subcentimeter intramural fibroids. "      ENDOMETRIUM: Normal smooth homogeneous endometrium measures 2 mm in double AP thickness. The junctional zone is within normal limits.     RIGHT ADNEXA: Complex cystic right ovarian mass 9.3 cm cc, 8.7 cm AP and 6.7 cm ML. Along the posterior margin of the mass is a longitudinal 7 x 2 x 2 cm. The septations and cystic component.] 2 shows some slow enhancement. The mass is adherent to the   right lateral and posterior aspect of the uterus.     LEFT ADNEXA: No left adnexal mass. Unremarkable left ovary measures 2.8 cm CC, 2.9 cm AP and 1.7 cm ML.     ADDITIONAL FINDINGS: No significant incidental findings. No adenopathy. Trace amount of fluid in the pelvic cul-de-sac.                                                                      IMPRESSION:  1.  Indeterminate 9.3 x 8.7 x 6.7 cm complex cystic right ovarian mass. Recommend gynecologic oncology surgical consultation.  2.  Left ovary unremarkable.  3.  Trace amount of fluid in the pelvic cul-de-sac.  4.  A few diminutive uterine fibroids.  5.  Uterus otherwise unremarkable.     CT 10/20/2023  PELVIC ORGANS: Right adnexal 8 x 5.8 cm multiloculated fluid density lesion immediately adjacent the the uterus. This could arise from the right ovary, or represent a degenerated subserosal uterine leiomyoma. No ascites or omental cake to suggest   neoplastic spread.                                                                     IMPRESSION:   1.  Left abdominal wall subcutaneous 2.5 cm probable phlegmon and cellulitis.  2.  Right adnexal lesion which could arise from ovary or uterus. Ovarian neoplasm possible. Pelvic MRI recommended.  3.  Left adrenal 1.7 cm nodule. Adenoma most likely. Recommend comparison with any prior CT. If none available, 3 month adrenal CT follow-up recommended.      10/2015: 2.5cm right ovarian lesion on CT scan.   On my review of these images I think this measures 3.3x4.5cm.    Assessment:  Kemi Soto is a 73 year old patient with PMH  notable for CAD s/p bypass and multiple stents, Type 2 diabetes with elevated A1c, TIA here due to incidentally noted ovarian mass which has enlarged in size since remote imaging.    Plan:   Ovarian cyst: Ovarian/adnexal mass: We discussed the differential for adnexal (ovarian) masses, which included benign, borderline, and malignant conditions. We reviewed that we cannot know for certain which a mass is without surgery to remove the mass; however, we can use patient factors such as age and family history, imaging, and lab results to guide our differential. Although it has enlarged, this does appear to have been present on prior imaging in 2015 (this was at time of serious illness, so likely is why there wasn't follow-up imaging). Normal tumor markers are also reassuring. These things make malignancy less likely but still possible.   - We discussed options of surgery vs observation. Surgery does carry risks given her elevated hemoglobin A1c and history of cardiac disease. Ideally we would want her Hgba1c below 8 to decrease risks of aliyah-operative complications.   - She is starting a trial of plant based diet through [facility] in January so is interested in possibly trying to avoid surgery anticipating improvement in diabetes with that. After a thorough discussion she has elected for surveillance imaging. We will repeat imaging with an ultrasound in 3 mos and discuss longer term plan at that time.     I spent a total of 45 minutes on the care of Kemi Soto on the day of service including face to face time, care coordination, and documentation on the day of service.     Livan Mcgrath MD     Gynecologic Oncology

## 2023-12-13 NOTE — NURSING NOTE
"Oncology Rooming Note    December 13, 2023 1:05 PM   Kemi Soto is a 73 year old female who presents for:    Chief Complaint   Patient presents with    Oncology Clinic Visit     Ovarian Mass     Initial Vitals: BP (!) 152/70 (BP Location: Right arm, Patient Position: Sitting, Cuff Size: Adult Regular)   Pulse 56   Resp 16   Ht 1.575 m (5' 2.01\")   Wt 79.5 kg (175 lb 4.8 oz)   LMP  (LMP Unknown)   SpO2 97%   BMI 32.05 kg/m   Estimated body mass index is 32.05 kg/m  as calculated from the following:    Height as of this encounter: 1.575 m (5' 2.01\").    Weight as of this encounter: 79.5 kg (175 lb 4.8 oz). Body surface area is 1.86 meters squared.  No Pain (0) Comment: Data Unavailable   No LMP recorded (lmp unknown). Patient is postmenopausal.  Allergies reviewed: Yes  Medications reviewed: Yes    Medications: Medication refills not needed today.  Pharmacy name entered into EPIC:    [place], [place] - [address] [facility] [address] [place] [address] [address]    Clinical concerns: none      Alberta Freeman              "

## 2023-12-13 NOTE — PROGRESS NOTES
Gynecologic Oncology Clinic - New Patient    Referring provider:    Zaheer Sandra MD  [address] [place] [address]    Patient: Kemi Soto  : 1950    Date of Visit: Dec 13, 2023     Reason for visit: ovarian mass    History of Present Illness:  Kemi Soto is a 73 year old patient with PMH notable for CAD s/p bypass and multiple stents, Type 2 diabetes with elevated A1c, TIA here due to incidentally noted ovarian mass.     Per Patient Report: presented to ED for subcutaneous infection on the abdomen and had CT which showed ovarian cyst.     No vaginal bleeding, no pelvic pain, No change in bladder or bowel issues.     OB/Gynecologic History:  ,  x 1  Had one episode of vaginal bleeding   Very remote history of cervical cryoablation.    Past Medical History:   Diagnosis Date    Coronary artery disease     Diabetes mellitus (H)     Discitis of thoracolumbar region 10/10/2015    Encephalopathy 10/14/2015    Epidural abscess 10/10/2015    Hip pain, right     Hyperlipidemia     Sepsis (H) 10/8/2015    Stroke (H) 2015    Neurology felt that episode was C/W subacute ischemic stroke    TIA (transient ischemic attack) 2015    UTI (urinary tract infection)     admitted to [facility] with UTI       Past Surgical History:   Procedure Laterality Date    ANGIOPLASTY  2016    Drug eluting stent mid LAD, cutting balloon to 1st diagonal    ANGIOPLASTY  2008    BYPASS GRAFT ARTERY CORONARY  12/11/2007    X 3 vessels, LIMA to LAD, saph vein graft to distal RCA, saphvein graft to marginal, mini circuit bypass.  [facility].    CORONARY STENT PLACEMENT  2008    Left main, left circumflex, RCA    CORONARY STENT PLACEMENT  2016    CV ANGIOGRAM CORONARY GRAFT N/A 10/11/2023    Procedure: Coronary Angiogram Graft;  Surgeon: Sam Boo MD;  Location: [facility] CATH LAB CV    CV CORONARY ANGIOGRAM N/A 2018    Procedure: Coronary Angiogram;   Surgeon: Kit Bean MD;  Location: [facility] Cath Lab;  Service:     CV LEFT HEART CATHETERIZATION WITH LEFT VENTRICULOGRAM N/A 2018    Procedure: Left Heart Catheterization with Left Ventriculogram;  Surgeon: Kit Bean MD;  Location: [facility] Cath Lab;  Service:     INSERT MIDLINE HE  10/31/2015         LUMBAR DISCECTOMY N/A 10/11/2015    BILATERAL L1-L5 DECOMPRESSIVE LAMINECTOMY WITH EVACUATION OF EPIDURAL ABSCESS IRRIGATION & DEBRIDEMENT;  Surgeon: Luli Chan MD;  Location: [facility] OR; Due to sepsis    RELEASE CARPAL TUNNEL Bilateral         Social History     Tobacco Use    Smoking status: Former     Types: Cigarettes     Quit date: 2007     Years since quittin.4     Passive exposure: Past    Smokeless tobacco: Never   Vaping Use    Vaping Use: Never used   Substance Use Topics    Alcohol use: Not Currently     Comment: Stopped drinking 2015    Drug use: No   Retired [employer]     Family History   Problem Relation Age of Onset    Cerebrovascular Disease Mother     Diabetes Father     Coronary Artery Disease Father     Diabetes Sister     Cerebrovascular Disease Sister 81    Cerebrovascular Disease Brother     Diabetes Brother     Cancer Paternal Grandfather        Allergies  Allergies   Allergen Reactions    Metformin GI Disturbance    Oxycodone Nausea and Vomiting       Current Outpatient Medications   Medication    acetaminophen (TYLENOL) 500 MG tablet    amoxicillin-clavulanate (AUGMENTIN) 875-125 MG tablet    atorvastatin (LIPITOR) 80 MG tablet    busPIRone (BUSPAR) 15 MG tablet    clobetasol (TEMOVATE) 0.05 % external cream    ezetimibe (ZETIA) 10 MG tablet    gabapentin (NEURONTIN) 300 MG capsule    hydrochlorothiazide (HYDRODIURIL) 12.5 MG tablet    insulin glargine (LANTUS SOLOSTAR) 100 UNIT/ML pen    isosorbide mononitrate (IMDUR) 60 MG 24 hr tablet    ketoconazole (NIZORAL) 2 % external cream    losartan (COZAAR) 100 MG  "tablet    melatonin 1 MG CAPS    sertraline (ZOLOFT) 100 MG tablet    sotalol (BETAPACE) 80 MG tablet    triamcinolone (KENALOG) 0.1 % external cream    warfarin ANTICOAGULANT (COUMADIN) 5 MG tablet    Semaglutide, 1 MG/DOSE, (OZEMPIC) 4 MG/3ML pen     No current facility-administered medications for this visit.     Facility-Administered Medications Ordered in Other Visits   Medication    dexamethasone PF (DECADRON) injection    iohexol (OMNIPAQUE) 300 mg/mL injection    lidocaine (PF) (XYLOCAINE) 1 % injection       Physical Exam:   BP (!) 152/70 (BP Location: Right arm, Patient Position: Sitting, Cuff Size: Adult Regular)   Pulse 56   Temp 98  F (36.7  C) (Oral)   Resp 16   Ht 1.575 m (5' 2.01\")   Wt 79.5 kg (175 lb 4.8 oz)   LMP  (LMP Unknown)   SpO2 97%   BMI 32.05 kg/m    Gen: no acute distress    Labs/Pathology:    Latest Reference Range & Units 12/13/23 14:22    <=38 U/mL 15   Cancer Antigen 19-9 <=35 U/mL <2   CEA ng/mL 3.0      Latest Reference Range & Units 12/07/23 14:52   Hemoglobin A1C 0.0 - 5.6 % 9.5 (H)   (H): Data is abnormally high    Imaging:   Narrative & Impression   EXAM: MR PELVIS (GYN) W/O and W CONTRAST  LOCATION: [facility]  DATE: 12/10/2023     INDICATION: Adnexal mass.  COMPARISON: CT AP 10/20/2023.  TECHNIQUE: Routine MRI female pelvis protocol including T1 in/out phase, diffusion, thin section high resolution multiplane T2, and post contrast T1.  CONTRAST: Gadavist 8mL.     FINDINGS:      UTERUS: Uterus measures 7 x 4 x 3 cm. Cervix is unremarkable. A 1.1 cm subserosal fibroid posterior uterine fundus and three subcentimeter intramural fibroids.      ENDOMETRIUM: Normal smooth homogeneous endometrium measures 2 mm in double AP thickness. The junctional zone is within normal limits.     RIGHT ADNEXA: Complex cystic right ovarian mass 9.3 cm cc, 8.7 cm AP and 6.7 cm ML. Along the posterior margin of the mass is a longitudinal 7 x 2 x 2 cm. The " septations and cystic component.] 2 shows some slow enhancement. The mass is adherent to the   right lateral and posterior aspect of the uterus.     LEFT ADNEXA: No left adnexal mass. Unremarkable left ovary measures 2.8 cm CC, 2.9 cm AP and 1.7 cm ML.     ADDITIONAL FINDINGS: No significant incidental findings. No adenopathy. Trace amount of fluid in the pelvic cul-de-sac.                                                                      IMPRESSION:  1.  Indeterminate 9.3 x 8.7 x 6.7 cm complex cystic right ovarian mass. Recommend gynecologic oncology surgical consultation.  2.  Left ovary unremarkable.  3.  Trace amount of fluid in the pelvic cul-de-sac.  4.  A few diminutive uterine fibroids.  5.  Uterus otherwise unremarkable.     CT 10/20/2023  PELVIC ORGANS: Right adnexal 8 x 5.8 cm multiloculated fluid density lesion immediately adjacent the the uterus. This could arise from the right ovary, or represent a degenerated subserosal uterine leiomyoma. No ascites or omental cake to suggest   neoplastic spread.                                                                     IMPRESSION:   1.  Left abdominal wall subcutaneous 2.5 cm probable phlegmon and cellulitis.  2.  Right adnexal lesion which could arise from ovary or uterus. Ovarian neoplasm possible. Pelvic MRI recommended.  3.  Left adrenal 1.7 cm nodule. Adenoma most likely. Recommend comparison with any prior CT. If none available, 3 month adrenal CT follow-up recommended.      10/2015: 2.5cm right ovarian lesion on CT scan.   On my review of these images I think this measures 3.3x4.5cm.    Assessment:  Kemi Soto is a 73 year old patient with PMH notable for CAD s/p bypass and multiple stents, Type 2 diabetes with elevated A1c, TIA here due to incidentally noted ovarian mass which has enlarged in size since remote imaging.    Plan:   Ovarian cyst: Ovarian/adnexal mass: We discussed the differential for adnexal (ovarian) masses, which included  benign, borderline, and malignant conditions. We reviewed that we cannot know for certain which a mass is without surgery to remove the mass; however, we can use patient factors such as age and family history, imaging, and lab results to guide our differential. Although it has enlarged, this does appear to have been present on prior imaging in 2015 (this was at time of serious illness, so likely is why there wasn't follow-up imaging). Normal tumor markers are also reassuring. These things make malignancy less likely but still possible.   - We discussed options of surgery vs observation. Surgery does carry risks given her elevated hemoglobin A1c and history of cardiac disease. Ideally we would want her Hgba1c below 8 to decrease risks of aliyah-operative complications.   - She is starting a trial of plant based diet through [facility] in January so is interested in possibly trying to avoid surgery anticipating improvement in diabetes with that. After a thorough discussion she has elected for surveillance imaging. We will repeat imaging with an ultrasound in 3 mos and discuss longer term plan at that time.     I spent a total of 45 minutes on the care of Kemi Soto on the day of service including face to face time, care coordination, and documentation on the day of service.     Livan Mcgrath MD     Gynecologic Oncology

## 2023-12-14 ENCOUNTER — MYC MEDICAL ADVICE (OUTPATIENT)
Dept: FAMILY MEDICINE | Facility: CLINIC | Age: 73
End: 2023-12-14
Payer: COMMERCIAL

## 2023-12-14 LAB
CANCER AG125 SERPL-ACNC: 15 U/ML
CANCER AG19-9 SERPL IA-ACNC: <2 U/ML
CEA SERPL-MCNC: 3 NG/ML

## 2023-12-15 ENCOUNTER — ANTICOAGULATION THERAPY VISIT (OUTPATIENT)
Dept: ANTICOAGULATION | Facility: CLINIC | Age: 73
End: 2023-12-15

## 2023-12-15 ENCOUNTER — LAB (OUTPATIENT)
Dept: LAB | Facility: CLINIC | Age: 73
End: 2023-12-15
Payer: COMMERCIAL

## 2023-12-15 DIAGNOSIS — I48.0 PAROXYSMAL ATRIAL FIBRILLATION (H): ICD-10-CM

## 2023-12-15 DIAGNOSIS — Z51.81 ANTICOAGULATION GOAL OF INR 2 TO 3: ICD-10-CM

## 2023-12-15 DIAGNOSIS — Z79.01 ANTICOAGULATION GOAL OF INR 2 TO 3: ICD-10-CM

## 2023-12-15 DIAGNOSIS — I48.0 PAROXYSMAL ATRIAL FIBRILLATION (H): Primary | ICD-10-CM

## 2023-12-15 LAB — INR BLD: 2.2 (ref 0.9–1.1)

## 2023-12-15 PROCEDURE — 36416 COLLJ CAPILLARY BLOOD SPEC: CPT

## 2023-12-15 PROCEDURE — 85610 PROTHROMBIN TIME: CPT

## 2023-12-15 NOTE — PROGRESS NOTES
ANTICOAGULATION MANAGEMENT     Kemi Soto 73 year old female is on warfarin with therapeutic INR result. (Goal INR 2.0-3.0)    Recent labs: (last 7 days)     12/15/23  1308   INR 2.2*       ASSESSMENT     Warfarin Lab Questionnaire    Warfarin Doses Last 7 Days      12/15/2023     1:05 PM   Dose in Tablet or Mg   TAB or MG? milligram (mg)     Pt Rptd Dose PATRICIA MONDAY TUESDAY WED THURS FRIDAY SATURDAY   12/15/2023   1:05 PM 5 2.5 5 2.5 5 2.5 2.5         12/15/2023   Warfarin Lab Questionnaire   Missed doses within past 14 days? No   Changes in diet or alcohol within past 14 days? No   Medication changes since last result? No-She finished the Augmentin yesterday.   Injuries or illness since last result? No   New shortness of breath, severe headaches or sudden changes in vision since last result? No   Abnormal bleeding since last result? No   Upcoming surgery, procedure? No   Best number to call with results? [phone]     Previous result: Therapeutic last 2(+) visits  Additional findings:  She had MRI and seen Oncology. They are going to follow up in 3 months with more tests and see if any changes with her mass that was found. Lab work done was normal.       PLAN     Recommended plan for temporary change(s) affecting INR     Dosing Instructions: Continue your current warfarin dose with next INR in 2-3 weeks       Summary  As of 12/15/2023      Full warfarin instructions:  5 mg every Sun, Tue, Thu; 2.5 mg all other days   Next INR check:  1/5/2024               Telephone call with Ciarra who verbalizes understanding and agrees to plan and who agrees to plan and repeated back plan correctly    Lab visit scheduled    Education provided:   Please call back if any changes to your diet, medications or how you've been taking warfarin  Goal range and lab monitoring: goal range and significance of current result and Importance of therapeutic range    Plan made per [facility] anticoagulation protocol    Bianca Cerda,  RN  Anticoagulation Clinic  12/15/2023    _______________________________________________________________________     Anticoagulation Episode Summary       Current INR goal:  2.0-3.0   TTR:  36.7% (1 y)   Target end date:  Indefinite   Send INR reminders to:  TEMO WILLS    Indications    Paroxysmal atrial fibrillation (H) [I48.0]  Anticoagulation goal of INR 2 to 3 [Z51.81  Z79.01]             Comments:               Anticoagulation Care Providers       Provider Role Specialty Phone number    Chuck Hernandez MD Referring Cardiovascular Disease [phone]    Zaheer Sandra MD Referring Family Medicine [phone]

## 2024-01-05 ENCOUNTER — MYC MEDICAL ADVICE (OUTPATIENT)
Dept: FAMILY MEDICINE | Facility: CLINIC | Age: 74
End: 2024-01-05

## 2024-01-05 ENCOUNTER — VIRTUAL VISIT (OUTPATIENT)
Dept: FAMILY MEDICINE | Facility: CLINIC | Age: 74
End: 2024-01-05
Payer: COMMERCIAL

## 2024-01-05 ENCOUNTER — LAB (OUTPATIENT)
Dept: LAB | Facility: CLINIC | Age: 74
End: 2024-01-05
Payer: COMMERCIAL

## 2024-01-05 DIAGNOSIS — J06.9 UPPER RESPIRATORY TRACT INFECTION, UNSPECIFIED TYPE: Primary | ICD-10-CM

## 2024-01-05 DIAGNOSIS — J06.9 UPPER RESPIRATORY TRACT INFECTION, UNSPECIFIED TYPE: ICD-10-CM

## 2024-01-05 LAB
FLUAV AG SPEC QL IA: NEGATIVE
FLUBV AG SPEC QL IA: NEGATIVE
RSV AG SPEC QL: NEGATIVE

## 2024-01-05 PROCEDURE — 87807 RSV ASSAY W/OPTIC: CPT

## 2024-01-05 PROCEDURE — 99442 PR PHYSICIAN TELEPHONE EVALUATION 11-20 MIN: CPT | Mod: 93 | Performed by: PHYSICIAN ASSISTANT

## 2024-01-05 PROCEDURE — 87804 INFLUENZA ASSAY W/OPTIC: CPT | Performed by: PHYSICIAN ASSISTANT

## 2024-01-05 PROCEDURE — 87635 SARS-COV-2 COVID-19 AMP PRB: CPT | Performed by: PHYSICIAN ASSISTANT

## 2024-01-05 ASSESSMENT — ENCOUNTER SYMPTOMS
CHILLS: 0
FEVER: 0
FATIGUE: 1
NERVOUS/ANXIOUS: 0
WHEEZING: 0
APPETITE CHANGE: 1
COUGH: 1
HEADACHES: 0
MYALGIAS: 0
SHORTNESS OF BREATH: 0
SORE THROAT: 0

## 2024-01-05 NOTE — PROGRESS NOTES
Ciarra is a 73 year old who is being evaluated via a billable telephone visit.      What phone number would you like to be contacted at? [phone]   How would you like to obtain your AVS? [email or web address]    Distant Location (provider location):  On-site    Assessment & Plan     Upper respiratory tract infection, unspecified type  Patient is a 73 YOF who presents to virtual visit due to 7 days of ongoing cough, congestion, fatigue.  No fever, dyspnea, chest pain.  No specific sick contacts, but patient traveled significantly over the holiday.  No signs of respiratory distress-patient is able to speak in full sentences.  Discussed that symptoms are consistent with viral URI.  Shared decision making completed.  Patient will complete influenza, RSV, COVID-19 testing.  Discussed expected course of recovery, treatment with OTC decongestant/cough suppressant.  Return precautions discussed/provided.  - RSV rapid antigen; Future  - Symptomatic COVID-19 Virus (Coronavirus) by PCR Nose  - Influenza A/B antigen       See Patient Instructions    Carol Amato PA-C  [facility] RITA Lafleur is a 73 year old, presenting for the following health issues:  Sick      1/5/2024    10:13 AM   Additional Questions   Roomed by Completed by patient via [facility].       History of Present Illness       Reason for visit:  I am ill for  over a weak with congestion, deep coughing & extremely tired.  Symptom onset:  1-2 weeks ago  Symptoms include:  Above  Symptom intensity:  Severe  Symptom progression:  Staying the same  Had these symptoms before:  No    She eats 2-3 servings of fruits and vegetables daily.She consumes 0 sweetened beverage(s) daily.She exercises with enough effort to increase her heart rate 9 or less minutes per day.  She exercises with enough effort to increase her heart rate 3 or less days per week.   She is taking medications regularly.       Patient notes she traveled over the holidays. Since coming  home 7 days ago patient notes feeling ill. She is using OTC medication-Severe cold Equate and Emergen-c for cough and congestion.       Review of Systems   Constitutional:  Positive for appetite change and fatigue. Negative for chills and fever.   HENT:  Positive for congestion. Negative for sore throat.    Respiratory:  Positive for cough. Negative for shortness of breath and wheezing.    Cardiovascular:  Negative for chest pain.   Musculoskeletal:  Negative for myalgias.   Skin:  Negative for rash.   Neurological:  Negative for headaches.   Psychiatric/Behavioral:  The patient is not nervous/anxious.             Objective           Vitals:  No vitals were obtained today due to virtual visit.    Physical Exam   healthy, alert, and no distress  PSYCH: Alert and oriented times 3; coherent speech, normal   rate and volume, able to articulate logical thoughts, able   to abstract reason, no tangential thoughts, no hallucinations   or delusions  Her affect is normal  RESP: Intermittent dry cough. no audible wheezing, able to talk in full sentences  Remainder of exam unable to be completed due to telephone visits            Phone call duration: 13 minutes

## 2024-01-05 NOTE — PATIENT INSTRUCTIONS
Your symptoms are concerning for COVID-19 or other viral illness.  A Flu, RSV, an COVID19 test have been ordered for you.  You must contact your home clinic to schedule your testing.  After completing the test(s), results should be available within 1 day and will be sent to your MyChart or will be called to you if you do not have MyChart.  You may use Cough/Cold medications, Emergen-C, and Tylenol for symptom management.  Make sure to get plenty of rest and stay hydrated.  Keep in mind that most viruses take approximately 10 to 14 days from their full course.     If you have severe symptoms such as chest pain, wheezing, coughing up blood, shortness of breath, or fevers that you cannot control, please go to the emergency department.    If your symptoms or not improving by day 10, please reach out for follow-up or schedule an in person clinic appointment.  Please reach out with any questions or concerns.  Take care,  Carol Amato PA-C

## 2024-01-06 LAB — SARS-COV-2 RNA RESP QL NAA+PROBE: NEGATIVE

## 2024-01-08 DIAGNOSIS — I10 ESSENTIAL HYPERTENSION: ICD-10-CM

## 2024-01-08 NOTE — TELEPHONE ENCOUNTER
Marcit sent.    Subhash Kelly, RN  Triage Nurse  [facility], [facility]

## 2024-01-09 RX ORDER — HYDROCHLOROTHIAZIDE 12.5 MG/1
12.5 TABLET ORAL DAILY
Qty: 90 TABLET | Refills: 0 | Status: SHIPPED | OUTPATIENT
Start: 2024-01-09 | End: 2024-05-13

## 2024-01-11 ENCOUNTER — LAB (OUTPATIENT)
Dept: LAB | Facility: CLINIC | Age: 74
End: 2024-01-11
Payer: COMMERCIAL

## 2024-01-11 ENCOUNTER — TELEPHONE (OUTPATIENT)
Facility: CLINIC | Age: 74
End: 2024-01-11

## 2024-01-11 ENCOUNTER — ANTICOAGULATION THERAPY VISIT (OUTPATIENT)
Dept: ANTICOAGULATION | Facility: CLINIC | Age: 74
End: 2024-01-11

## 2024-01-11 DIAGNOSIS — Z51.81 ANTICOAGULATION GOAL OF INR 2 TO 3: ICD-10-CM

## 2024-01-11 DIAGNOSIS — Z79.01 ANTICOAGULATION GOAL OF INR 2 TO 3: ICD-10-CM

## 2024-01-11 DIAGNOSIS — I48.0 PAROXYSMAL ATRIAL FIBRILLATION (H): Primary | ICD-10-CM

## 2024-01-11 DIAGNOSIS — I48.0 PAROXYSMAL ATRIAL FIBRILLATION (H): ICD-10-CM

## 2024-01-11 LAB — INR BLD: 1.4 (ref 0.9–1.1)

## 2024-01-11 PROCEDURE — 85610 PROTHROMBIN TIME: CPT

## 2024-01-11 PROCEDURE — 36416 COLLJ CAPILLARY BLOOD SPEC: CPT

## 2024-01-11 NOTE — TELEPHONE ENCOUNTER
[facility] Measures Blood Pressure guideline reviewed.  Patients recent blood pressure is outside of guideline parameters.  Called patient to review, patient unable to check their blood pressure at this time and will call back later with updated blood pressure reading. Patient instructed to call [phone] ([facility]) and leave a message with their name, date of birth, and blood pressure reading that was completed within the last 24 hours and where it was completed.  Will await call back for further review.

## 2024-01-11 NOTE — PROGRESS NOTES
ANTICOAGULATION MANAGEMENT     Kemi Soto 73 year old female is on warfarin with subtherapeutic INR result. (Goal INR 2.0-3.0)    Recent labs: (last 7 days)     01/11/24  1351   INR 1.4*       ASSESSMENT     Warfarin Lab Questionnaire    Warfarin Doses Last 7 Days      1/11/2024     1:37 PM   Dose in Tablet or Mg   TAB or MG? milligram (mg)     Pt Rptd Dose SUNDAY MONDAY TUESDAY WED THURS FRIDAY SATURDAY 1/11/2024   1:37 PM 5 2.5 5 2.5 5 2.5 2.5         1/11/2024   Warfarin Lab Questionnaire   Missed doses within past 14 days? No-unsure   Changes in diet or alcohol within past 14 days? No-she was on vacation for 2 weeks. Back on 12/29.   Medication changes since last result? No   Injuries or illness since last result? No-she had URI but tested negative for covid and or influenza.   New shortness of breath, severe headaches or sudden changes in vision since last result? No   Abnormal bleeding since last result? No   Upcoming surgery, procedure? No   Best number to call with results? [phone]     Previous result: Therapeutic last 2(+) visits  Additional findings: None       PLAN     Recommended plan for temporary change(s) affecting INR     Dosing Instructions: booster dose then continue your current warfarin dose with next INR in 5-7 days       Summary  As of 1/11/2024      Full warfarin instructions:  1/11: 10 mg; Otherwise 5 mg every Sun, Tue, Thu; 2.5 mg all other days   Next INR check:  1/18/2024               Telephone call with Ciarra who verbalizes understanding and agrees to plan and who agrees to plan and repeated back plan correctly    Patient offered & declined to schedule next visit    Education provided:   Please call back if any changes to your diet, medications or how you've been taking warfarin  Goal range and lab monitoring: goal range and significance of current result and Importance of therapeutic range    Plan made per [facility] anticoagulation protocol    Bianca Cerda RN  Anticoagulation  Clinic  1/11/2024    _______________________________________________________________________     Anticoagulation Episode Summary       Current INR goal:  2.0-3.0   TTR:  38.6% (1 y)   Target end date:  Indefinite   Send INR reminders to:  TEMO WILLS    Indications    Paroxysmal atrial fibrillation (H) [I48.0]  Anticoagulation goal of INR 2 to 3 [Z51.81  Z79.01]             Comments:               Anticoagulation Care Providers       Provider Role Specialty Phone number    Chuck Hernandez MD Referring Cardiovascular Disease [phone]    Zaheer Sandra MD Referring Family Medicine [phone]

## 2024-01-12 ENCOUNTER — OFFICE VISIT (OUTPATIENT)
Dept: FAMILY MEDICINE | Facility: CLINIC | Age: 74
End: 2024-01-12
Payer: COMMERCIAL

## 2024-01-12 ENCOUNTER — ANTICOAGULATION THERAPY VISIT (OUTPATIENT)
Dept: ANTICOAGULATION | Facility: CLINIC | Age: 74
End: 2024-01-12

## 2024-01-12 VITALS
HEIGHT: 62 IN | TEMPERATURE: 97.9 F | OXYGEN SATURATION: 96 % | SYSTOLIC BLOOD PRESSURE: 128 MMHG | HEART RATE: 52 BPM | WEIGHT: 177.8 LBS | BODY MASS INDEX: 32.72 KG/M2 | DIASTOLIC BLOOD PRESSURE: 63 MMHG

## 2024-01-12 DIAGNOSIS — I48.0 PAROXYSMAL ATRIAL FIBRILLATION (H): Primary | ICD-10-CM

## 2024-01-12 DIAGNOSIS — Z79.4 TYPE 2 DIABETES MELLITUS WITH HYPERGLYCEMIA, WITH LONG-TERM CURRENT USE OF INSULIN (H): Primary | ICD-10-CM

## 2024-01-12 DIAGNOSIS — D72.828 NEUTROPHILIA: ICD-10-CM

## 2024-01-12 DIAGNOSIS — N83.8 OVARIAN MASS, RIGHT: ICD-10-CM

## 2024-01-12 DIAGNOSIS — E11.42 TYPE 2 DIABETES MELLITUS WITH DIABETIC POLYNEUROPATHY, WITH LONG-TERM CURRENT USE OF INSULIN (H): ICD-10-CM

## 2024-01-12 DIAGNOSIS — I25.708 CORONARY ARTERY DISEASE OF BYPASS GRAFT OF NATIVE HEART WITH STABLE ANGINA PECTORIS (H): ICD-10-CM

## 2024-01-12 DIAGNOSIS — E11.65 TYPE 2 DIABETES MELLITUS WITH HYPERGLYCEMIA, WITH LONG-TERM CURRENT USE OF INSULIN (H): Primary | ICD-10-CM

## 2024-01-12 DIAGNOSIS — Z79.4 TYPE 2 DIABETES MELLITUS WITH DIABETIC POLYNEUROPATHY, WITH LONG-TERM CURRENT USE OF INSULIN (H): ICD-10-CM

## 2024-01-12 DIAGNOSIS — I48.0 PAROXYSMAL ATRIAL FIBRILLATION (H): ICD-10-CM

## 2024-01-12 DIAGNOSIS — Z51.81 ANTICOAGULATION GOAL OF INR 2 TO 3: ICD-10-CM

## 2024-01-12 DIAGNOSIS — Z79.01 ANTICOAGULATION GOAL OF INR 2 TO 3: ICD-10-CM

## 2024-01-12 DIAGNOSIS — J20.9 ACUTE BRONCHITIS, UNSPECIFIED ORGANISM: ICD-10-CM

## 2024-01-12 LAB
BASOPHILS # BLD AUTO: 0.1 10E3/UL (ref 0–0.2)
BASOPHILS NFR BLD AUTO: 1 %
EOSINOPHIL # BLD AUTO: 0.1 10E3/UL (ref 0–0.7)
EOSINOPHIL NFR BLD AUTO: 2 %
ERYTHROCYTE [DISTWIDTH] IN BLOOD BY AUTOMATED COUNT: 12.8 % (ref 10–15)
HCT VFR BLD AUTO: 37.5 % (ref 35–47)
HGB BLD-MCNC: 12.3 G/DL (ref 11.7–15.7)
IMM GRANULOCYTES # BLD: 0 10E3/UL
IMM GRANULOCYTES NFR BLD: 0 %
INR BLD: 1.7 (ref 0.9–1.1)
LYMPHOCYTES # BLD AUTO: 2 10E3/UL (ref 0.8–5.3)
LYMPHOCYTES NFR BLD AUTO: 28 %
MCH RBC QN AUTO: 30.2 PG (ref 26.5–33)
MCHC RBC AUTO-ENTMCNC: 32.8 G/DL (ref 31.5–36.5)
MCV RBC AUTO: 92 FL (ref 78–100)
MONOCYTES # BLD AUTO: 0.4 10E3/UL (ref 0–1.3)
MONOCYTES NFR BLD AUTO: 6 %
NEUTROPHILS # BLD AUTO: 4.6 10E3/UL (ref 1.6–8.3)
NEUTROPHILS NFR BLD AUTO: 64 %
PLATELET # BLD AUTO: 364 10E3/UL (ref 150–450)
RBC # BLD AUTO: 4.07 10E6/UL (ref 3.8–5.2)
WBC # BLD AUTO: 7.1 10E3/UL (ref 4–11)

## 2024-01-12 PROCEDURE — 99214 OFFICE O/P EST MOD 30 MIN: CPT | Performed by: FAMILY MEDICINE

## 2024-01-12 PROCEDURE — 85610 PROTHROMBIN TIME: CPT | Performed by: FAMILY MEDICINE

## 2024-01-12 PROCEDURE — 85025 COMPLETE CBC W/AUTO DIFF WBC: CPT | Performed by: FAMILY MEDICINE

## 2024-01-12 PROCEDURE — 36415 COLL VENOUS BLD VENIPUNCTURE: CPT | Performed by: FAMILY MEDICINE

## 2024-01-12 RX ORDER — RESPIRATORY SYNCYTIAL VIRUS VACCINE 120MCG/0.5
0.5 KIT INTRAMUSCULAR ONCE
Qty: 1 EACH | Refills: 0 | Status: CANCELLED | OUTPATIENT
Start: 2024-01-12 | End: 2024-01-12

## 2024-01-12 ASSESSMENT — PAIN SCALES - GENERAL: PAINLEVEL: NO PAIN (0)

## 2024-01-12 NOTE — PROGRESS NOTES
ANTICOAGULATION  MANAGEMENT    Kemi Soto is being discharged from the [facility] Anticoagulation [facility] ([facility]).    Reason for discharge: warfarin replaced by alternate therapy, Eliquis    Anticoagulation episode resolved, [facility] referral closed, and Standing order discontinued    If patient needs warfarin management in the future, please send a new referral    Bianca Cerda RN

## 2024-01-12 NOTE — PROGRESS NOTES
ANTICOAGULATION MANAGEMENT     Kemi Soto 73 year old female is on warfarin with subtherapeutic INR result. (Goal INR 2.0-3.0)    Recent labs: (last 7 days)     01/12/24  1208   INR 1.7*       ASSESSMENT     Warfarin Lab Questionnaire    Warfarin Doses Last 7 Days      1/11/2024     1:37 PM   Dose in Tablet or Mg   TAB or MG? milligram (mg)     Pt Rptd Dose SUNDAY MONDAY TUESDAY WED THURS FRIDAY SATURDAY 1/11/2024   1:37 PM 5 2.5 5 2.5 5 2.5 2.5         1/11/2024   Warfarin Lab Questionnaire   Missed doses within past 14 days? No   Changes in diet or alcohol within past 14 days? No   Medication changes since last result? No   Injuries or illness since last result? No   New shortness of breath, severe headaches or sudden changes in vision since last result? No   Abnormal bleeding since last result? No   Upcoming surgery, procedure? No   Best number to call with results? [phone]     Previous result: Subtherapeutic  Additional findings:  She was given Eliquis today and already picked up from pharmacy. Per Dr Sandra it is okay to stop warfarin and start tonight since INR today is 1.7       PLAN     Recommended plan for ongoing change(s) affecting INR     Dosing Instructions:  stopping warfarin.  with next INR in no longer needed.       Summary  As of 1/12/2024      Full warfarin instructions:  5 mg every Sun, Tue, Thu; 2.5 mg all other days   Next INR check:                 Telephone call with Ciarra who verbalizes understanding and agrees to plan    No longer needed.    Education provided:   No longer needs INR    Plan made per [facility] anticoagulation protocol    Bianca Cerda, RN  Anticoagulation Clinic  1/12/2024    _______________________________________________________________________     Anticoagulation Episode Summary       Current INR goal:  2.0-3.0   TTR:  38.6% (1 y)   Target end date:  Indefinite   Send INR reminders to:  TEMO WILLS    Indications    Paroxysmal atrial fibrillation (H)  [I48.0]  Anticoagulation goal of INR 2 to 3 [Z51.81  Z79.01]             Comments:               Anticoagulation Care Providers       Provider Role Specialty Phone number    Chuck Hernandez MD Referring Cardiovascular Disease [phone]    Zaheer Sandra MD Referring Family Medicine [phone]

## 2024-01-12 NOTE — PROGRESS NOTES
Assessment & Plan     Type 2 diabetes mellitus with hyperglycemia, with long-term current use of insulin (H)  Uncontrolled. Continue Lantus. Restart Ozempic. Follow up with A1c in 3 months.  - semaglutide (OZEMPIC) 2 MG/3ML pen; Inject 0.25 mg Subcutaneous every 7 days    Type 2 diabetes mellitus with diabetic polyneuropathy, with long-term current use of insulin (H)  Uncontrolled. Continue Lantus. Restart Ozempic. Follow up with A1c in 3 months.    Paroxysmal atrial fibrillation (H)  Check INR, if INR is less than 2, then OK to start on Eliquis today, otherwise we will have her hold Wafarin before starting Eliquis based on INR level.  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 Continue following with cardiologist.    Ovarian mass, right  Continue monitoring with GYN oncology in 3 months.    Neutrophilia  Check CBC and notify with results.  - CBC with Platelets & Differential; Future    Acute bronchitis, unspecified organism  Improving over the last 1-2 weeks.  Reassurance that symptoms should continue to improve and eventually resolved.                 Zaheer Sandra MD  [facility]   Ciarra is a 73 year old, presenting for the following health issues:  Follow Up (Discuss ozempic, blood pressure check)        1/12/2024    11:10 AM   Additional Questions   Roomed by [facility], Visit Facilitator       HPI     DM2: A1c 9.5, last month. She has been on insulin since 2008. She is on Lantus 28 mg twice daily. Metformin caused GI upset. She was on Ozempic and went up to 1 mg, then she developed left lower abdominal infection about 3 months ago, discontinued Ozempic at that time.    A.fib: Diagnosed in 2020. She is on Warfarin. She is interested in starting Eliquis.    Right Ovarian: MRI Pelvis  "in October, 9.3 x 8.7 x 6.7 cm complex cystic right ovarian mass, , CEA and cancer antigen 19-9 were all negative.    Bronchitis: Patient developed a cough while traveling in [place] over the holiday.  She was seen at the urgent care 1 week ago and had negative COVID-19, flu, RSV testing.  She states that cough is productive with clear thick mucus and overall improving.            Review of Systems   Constitutional, HEENT, cardiovascular, pulmonary, gi and gu systems are negative, except as otherwise noted.      Objective    /63 (BP Location: Left arm, Patient Position: Sitting, Cuff Size: Adult Regular)   Pulse 52   Temp 97.9  F (36.6  C) (Oral)   Ht 1.575 m (5' 2\")   Wt 80.6 kg (177 lb 12.8 oz)   LMP  (LMP Unknown)   SpO2 96%   BMI 32.52 kg/m    Body mass index is 32.52 kg/m .  Physical Exam  GENERAL: healthy, alert and no distress  RESP: mild rhonchi bilateral upper lobes.                      "

## 2024-02-08 ENCOUNTER — TELEPHONE (OUTPATIENT)
Dept: FAMILY MEDICINE | Facility: CLINIC | Age: 74
End: 2024-02-08
Payer: COMMERCIAL

## 2024-02-08 DIAGNOSIS — E11.65 TYPE 2 DIABETES MELLITUS WITH HYPERGLYCEMIA, WITH LONG-TERM CURRENT USE OF INSULIN (H): ICD-10-CM

## 2024-02-08 DIAGNOSIS — Z79.4 TYPE 2 DIABETES MELLITUS WITH HYPERGLYCEMIA, WITH LONG-TERM CURRENT USE OF INSULIN (H): ICD-10-CM

## 2024-02-08 NOTE — TELEPHONE ENCOUNTER
HARDY-Pt has started a plant based and food based study, and as a diabetic she has a zoom meetings with the pharmacist. She has only been doing it for 2 weeks and she wanted to let the dr know that she may need to adjust her insulin levels. She hasn't started Eliquis after finishing warfarin. She is worried about starting it with low blood sugar. Pt wanted Dr to be aware but she says she is aware when she is hypoglycemic.   -She is wondering if she should start her ozempic?

## 2024-02-09 RX ORDER — INSULIN GLARGINE 100 [IU]/ML
14 INJECTION, SOLUTION SUBCUTANEOUS 2 TIMES DAILY
COMMUNITY
Start: 2024-02-09 | End: 2024-05-14

## 2024-02-09 NOTE — TELEPHONE ENCOUNTER
I'm glad to hear that she is doing a healthy diet.  She should start Ozempic and cut her insulin in half to 14 units, tracking her blood sugars closely.  As blood sugars come down, she could discontinue Lantus.  She can switch over to Eliquis whenever she is ready and her INR is less than 2.    Zaheer Sandra MD

## 2024-02-12 NOTE — TELEPHONE ENCOUNTER
Attempted to call patient regarding provider message.   Left message and callback # for [facility]    Upon patient call back, OKAY to relay message per provider.  Area Flaa-, [facility], February 12, 2024, 10:08 AM

## 2024-02-13 ENCOUNTER — OFFICE VISIT (OUTPATIENT)
Dept: FAMILY MEDICINE | Facility: CLINIC | Age: 74
End: 2024-02-13
Payer: COMMERCIAL

## 2024-02-13 VITALS
TEMPERATURE: 98.1 F | RESPIRATION RATE: 20 BRPM | HEIGHT: 62 IN | WEIGHT: 175 LBS | OXYGEN SATURATION: 100 % | BODY MASS INDEX: 32.2 KG/M2 | SYSTOLIC BLOOD PRESSURE: 152 MMHG | DIASTOLIC BLOOD PRESSURE: 74 MMHG | HEART RATE: 55 BPM

## 2024-02-13 DIAGNOSIS — E11.42 TYPE 2 DIABETES MELLITUS WITH DIABETIC POLYNEUROPATHY, WITH LONG-TERM CURRENT USE OF INSULIN (H): ICD-10-CM

## 2024-02-13 DIAGNOSIS — Z79.4 TYPE 2 DIABETES MELLITUS WITH DIABETIC POLYNEUROPATHY, WITH LONG-TERM CURRENT USE OF INSULIN (H): ICD-10-CM

## 2024-02-13 DIAGNOSIS — M54.50 BILATERAL LOW BACK PAIN WITHOUT SCIATICA, UNSPECIFIED CHRONICITY: Primary | ICD-10-CM

## 2024-02-13 PROCEDURE — 99214 OFFICE O/P EST MOD 30 MIN: CPT | Performed by: FAMILY MEDICINE

## 2024-02-13 RX ORDER — ACYCLOVIR 400 MG/1
TABLET ORAL
Qty: 3 EACH | Refills: 11 | Status: SHIPPED | OUTPATIENT
Start: 2024-02-13 | End: 2024-02-13

## 2024-02-13 RX ORDER — ACYCLOVIR 400 MG/1
TABLET ORAL
Qty: 3 EACH | Refills: 11 | Status: SHIPPED | OUTPATIENT
Start: 2024-02-13 | End: 2024-04-12

## 2024-02-13 RX ORDER — LANCETS
EACH MISCELLANEOUS
Qty: 100 EACH | Refills: 6 | Status: SHIPPED | OUTPATIENT
Start: 2024-02-13 | End: 2024-07-15

## 2024-02-13 ASSESSMENT — ENCOUNTER SYMPTOMS
LEG PAIN: 1
NUMBNESS: 1

## 2024-02-13 NOTE — PROGRESS NOTES
Assessment & Plan     Bilateral low back pain without sciatica, unspecified chronicity  Advised follow up with spine specialist.    Type 2 diabetes mellitus with diabetic polyneuropathy, with long-term current use of insulin (H)  Cut Lantus from 26 units twice daily to once daily. Prescription for Dexcom and prescription for glucometer.  - Continuous Blood Gluc Sensor (DEXCOM G7 SENSOR) MISC; Change every 10 days.                  Keith Lafleur is a 74 year old, presenting for the following health issues:  Leg Pain (Bilateral leg pain 10+ days. Worse when wake in the AM.) and Diabetes (Talk about a DM study she is participating in. )        2/13/2024    12:42 PM   Additional Questions   Roomed by Adali EATON     Leg Pain  This is a chronic problem. The current episode started 1 to 4 weeks ago. The problem occurs intermittently. Associated symptoms include numbness.   History of Present Illness       Reason for visit:  Leg pain  Symptom onset:  3-7 days ago  Symptoms include:  Leg and back pain  Symptom intensity:  Moderate  Symptom progression:  Worsening  Had these symptoms before:  No    She eats 4 or more servings of fruits and vegetables daily.She consumes 0 sweetened beverage(s) daily.She exercises with enough effort to increase her heart rate 9 or less minutes per day.  She exercises with enough effort to increase her heart rate 3 or less days per week.   She is taking medications regularly.     Lower back pain: having posterior leg pain for the last 10+ days, which is a new symptom, taking tylenol and naproxen. She has significant pain in the morning, needing to hold the walls when walking. Started after sitting on the plane from [place]. She has known lumbar spine problems and has seen a spine specialist in the past.    DM2: She is in a study where she is eating a vegan, lowfat diet, with diabetic team/group. She had an episode of hypoglycemia symptoms with sweats/weakness last week but did not have  "a meter to check, so we lowered Lantus from 28 units twice daily to 28 units in the morning. A1c was 10 about 2 weeks ago through DM study.              Review of Systems  Constitutional, HEENT, cardiovascular, pulmonary, gi and gu systems are negative, except as otherwise noted.      Objective    BP (!) 152/74   Pulse 55   Temp 98.1  F (36.7  C)   Resp 20   Ht 1.575 m (5' 2\")   Wt 79.4 kg (175 lb)   LMP  (LMP Unknown)   SpO2 100%   BMI 32.01 kg/m    Body mass index is 32.01 kg/m .  Physical Exam   GENERAL: alert and no distress  PSYCH: mentation appears normal, affect normal/bright            Signed Electronically by: Zaheer Sandra MD    "

## 2024-02-21 ENCOUNTER — OFFICE VISIT (OUTPATIENT)
Dept: PHYSICAL MEDICINE AND REHAB | Facility: CLINIC | Age: 74
End: 2024-02-21
Payer: COMMERCIAL

## 2024-02-21 VITALS — OXYGEN SATURATION: 97 % | DIASTOLIC BLOOD PRESSURE: 67 MMHG | SYSTOLIC BLOOD PRESSURE: 146 MMHG | HEART RATE: 53 BPM

## 2024-02-21 DIAGNOSIS — G89.29 CHRONIC BILATERAL LOW BACK PAIN WITH BILATERAL SCIATICA: Primary | ICD-10-CM

## 2024-02-21 DIAGNOSIS — M54.42 CHRONIC BILATERAL LOW BACK PAIN WITH BILATERAL SCIATICA: Primary | ICD-10-CM

## 2024-02-21 DIAGNOSIS — M48.061 LUMBAR FORAMINAL STENOSIS: ICD-10-CM

## 2024-02-21 DIAGNOSIS — M54.41 CHRONIC BILATERAL LOW BACK PAIN WITH BILATERAL SCIATICA: Primary | ICD-10-CM

## 2024-02-21 DIAGNOSIS — M47.816 LUMBAR FACET ARTHROPATHY: ICD-10-CM

## 2024-02-21 DIAGNOSIS — M54.16 LUMBAR RADICULOPATHY: ICD-10-CM

## 2024-02-21 DIAGNOSIS — M43.16 SPONDYLOLISTHESIS OF LUMBAR REGION: ICD-10-CM

## 2024-02-21 PROCEDURE — 99215 OFFICE O/P EST HI 40 MIN: CPT | Performed by: NURSE PRACTITIONER

## 2024-02-21 NOTE — PATIENT INSTRUCTIONS
~Spine Center Scheduling #[phone].  ~Please call our [facility] Spine Nurse Navigation #[phone] with any questions or concerns about your treatment plan, if symptoms worsen and you would like to be seen urgently, or if you have problems controlling bladder and bowel function.  ~For any future flareups or new symptoms, recommend follow-up in clinic or contact the nurse navigator line.  ~Please note that any My Chart messages may take multiple days for a response due to the high volume of patients seen in clinic.  Anything sent Thursday night or after will be answered the following week when able, as Chula Garcia CNP does not work in clinic on Fridays.   ~Chula Garcia CNP is at the [facility] on the first and third Tuesdays of the month only, otherwise primarily at the [facility].        ~You have been referred for Physical Therapy to [facility]. They will call you to schedule an appointment.      Scheduling phone number is [phone] for [facility], or [place] location.  If you have not heard from the scheduling office within 2 business days, please call [phone] for ALL other locations.    Discussed the importance of core strengthening, ROM, stretching exercises and how each of these entities is important in decreasing pain and improving long term spine health.  The purpose of physical therapy is to teach you an individualized home exercise program.  These exercises need to be performed every day in order to decrease pain and prevent future occurrences of pain.           *You have chronic spondylolisthesis/shifting at the L4-5 and L5-S1 level with nerve compression greatest bilaterally at L5-S1.  Therefore recommend trialing a bilateral L5-S1 epidural steroid injection to see if we can get further relief of your back and leg pain.    An injection has been ordered today to potentially help with your pain  symptoms. These injections do not fix what is going on in your back, therefore they typically do not take away the pain completely, however they can many times help improve symptoms. Injections should always be completed along with other modalities such as physical therapy for the best long term outcomes. If injections alone are done, then pain will likely return.     [facility] Injection Requirements    A  is required for all fluoroscopically-guided injections.  Injection appointments may be cancelled if there are signs/symptoms of an active infection or if the patient is being actively treated with antibiotics for a diagnosed infection.  Patients may have their steroid injection cancelled if they have had another steroid injection within 2 weeks.  Diabetic patients will have their blood glucose levels checked the day of their injection and the appointment will be rescheduled if the blood glucose level is 300 or higher.  Patients with allergies to cortisone, local anesthetics, iodine, or contrast dye should contact the Spine Center to further discuss these considerations.  Patients scheduled for medial branch block diagnostic injections should refrain from taking pain medication the day of the procedure.  The medial branch block injection appointment will be rescheduled if the patient's pain rating is not 5/10 or greater at the time of the procedure.  Patients taking warfarin/Coumadin will have their INR checked the day of the procedure and the procedure may be rescheduled if the INR is greater than 3.0.  Please contact the Spine Center (#[phone]) if you are taking any prescription blood-thinning medications (warfarin, Plavix, Lovenox, Eliquis, Brilinta, Effient, etc.) as special dosing adjustments may need to be made depending on the type of injection you are scheduled to receive.  It is recommended that you delay having your steroid injection if you have received a flu  shot or shingles vaccine within 2 weeks.

## 2024-02-21 NOTE — LETTER
2/21/2024         RE: Kemi Soto  [address] [place] [id number]        Dear Colleague,    Thank you for referring your patient, Kemi Soto, to the [facility]. Please see a copy of my visit note below.      Assessment:     Diagnoses and all orders for this visit:  Chronic bilateral low back pain with bilateral sciatica  -     PAIN Transforaminal DIANNE Inj Lumbosacral Blair; Future  -     Physical Therapy  Referral; Future  Lumbar radiculopathy  -     PAIN Transforaminal DIANNE Inj Lumbosacral Blair; Future  -     Physical Therapy  Referral; Future  Spondylolisthesis of lumbar region  -     PAIN Transforaminal DIANNE Inj Lumbosacral Blair; Future  -     Physical Therapy  Referral; Future  Lumbar foraminal stenosis  -     PAIN Transforaminal DIANNE Inj Lumbosacral Blair; Future  -     Physical Therapy  Referral; Future  Lumbar facet arthropathy  -     Physical Therapy  Referral; Future     Kemi Soto is a 74 year old y.o. female with past medical history significant for hypercholesterolemia, type 2 diabetes mellitus, hypertension, atrial fibrillation, history of lumbar surgery for evacuation of abscess who presents today for follow-up regarding:    -Progressive bilateral low back pain with lumbar radiculopathy greatest in L5 and S1 dermatomal pattern bilaterally, severe in the last 2 weeks.  Patient does have chronic spondylolisthesis at L4-5 and L5-S1 with instability on flexion-extension at both levels.     Plan:     A shared decision making plan was used. The patient's values and choices were respected. Prior medical records were reviewed today. The following represents what was discussed and decided upon by the provider and the patient.        -DIAGNOSTIC TESTS: Images were personally reviewed and interpreted.   -- Consider updated MRI and flexion-extension x-ray down the road if symptoms or not improving with conservative  injection/physical therapy.  --Lumbar spine MRI 7/16/2022 with slight anterolisthesis L4-5 with disc bulging with significant facet arthropathy with mild right foraminal stenosis.  Significant facet arthropathy L5-S1 with lateral recess stenosis.  --Lumbar spine x-ray 4/11/2022 with grade 1 L4-5 spondylolisthesis 3.3 mm in neutral that increases to 8.6 mm in forward flexion.  L5-S1 8 mm spondylolisthesis that increases to 10.5 mm in forward flexion. Chronic laminectomy L2-L5.  Slight disc space narrowing at L2-3.  --Left hip x-ray 6/3/2022 negative for fracture, degenerative changes bilaterally.  Degenerative changes to bilateral SI joints also noted.  -- Bilateral upper extremity EMG with Dr. Kirk 2019 with no evidence of cervical radiculopathy.    -INTERVENTIONS: Ordered a bilateral L5-S1 transforaminal epidural steroid injection to see if we can get further relief of low back and radicular pain.  If no benefit with this injection and she still having leg pain I would recommend trialing a bilateral L4-5 transforaminal epidural steroid injection.  Otherwise if low back pain is most significant down the road she may be a good candidate for medial branch block which we have discussed in the past but she has never completed.,  For consideration of RFA,    -MEDICATIONS: No changes in medications today  Discussed side effects of medications and proper use. Patient verbalized understanding.    -PHYSICAL THERAPY: Referral placed to physical therapy again for reestablishing home exercises for core strengthening and nerve glides.  She does not remember the exercises from before.  Discussed the importance of core strengthening, ROM, stretching exercises with the patient and how each of these entities is important in decreasing pain.  Explained to the patient that the purpose of physical therapy is to teach the patient a home exercise program.  These exercises need to be performed every day in order to decrease pain and  prevent future occurrences of pain.        -PATIENT EDUCATION:  Total time of 47 minutes, on the day of service, spent with the patient, reviewing the chart, placing orders, and documenting.     -FOLLOW UP: Follow-up for injection at the spine center then 2 weeks postinjection  Advised to contact clinic if symptoms worsen or change.    Subjective:     Kemi Soto is a 74 year old female who presents today for follow-up regarding progressive worsening bilateral low back pain with pain radiating to the buttocks and posterior thighs mostly that stops at the knee bilaterally that has been severe now for the last couple of weeks.  She does report that her pain today is a 0/10 with sitting but up to an 8/10 at its worst with sitting for long periods of time as well as walking.  Also is significantly aggravated first thing in the morning.  Currently denies any numbness or tingling sensations.  She does report some feeling some weakness in her legs.    Unfortunately she had a fall at home on her way here where she tripped but she reports that she did not have any weakness in her legs at that time.  She does have scratches on her right forehead and eye area.    *Post surgery neurosurgical evaluation with Dr. Cuello 2016 status post evacuation of spontaneous epidural abscess in the lumbar spine lumbar laminectomy with medical management of cervical epidural abscess.     -Treatment to Date: Decompressive lumbar laminectomy L1-L5 for evacuation of abscess 10/11/2015 Dr. Cuello.  3-month antibiotic course post surgery, patient then had a drug-eluting stent placed 4/16/2016     Bilateral L5-S1 TFESI 5/19/2023 with 100% relief with leg pain, no relief with back pain.  Preprocedure pain 8/10, post 5/10.     -Medications:  Gabapentin 300 mg    Patient Active Problem List   Diagnosis     Obesity     Hypercholesterolemia     Essential hypertension     Type 2 diabetes mellitus with diabetic polyneuropathy, with  long-term current use of insulin (H)     Benign neoplasm of choroid     Paroxysmal atrial fibrillation (H)     Coronary artery disease of bypass graft of native heart with stable angina pectoris (H24)     Bilateral low back pain without sciatica, unspecified chronicity     Benign adenomatous polyp of large intestine     Bilateral hearing loss     Chronic anticoagulation     History of TIA (transient ischemic attack) and stroke     Peripheral neuropathy     Psoriasis, guttate     Type 2 myocardial infarction due to arrhythmia (H)     Urinary incontinence     Abnormal nuclear stress test     Benign neoplasm of cecum     Benign neoplasm of transverse colon     Generalized anxiety disorder     Ovarian mass, right     Anticoagulation goal of INR 2 to 3       Current Outpatient Medications   Medication     acetaminophen (TYLENOL) 500 MG tablet     apixaban ANTICOAGULANT (ELIQUIS ANTICOAGULANT) 5 MG tablet     atorvastatin (LIPITOR) 80 MG tablet     blood glucose (NO BRAND SPECIFIED) test strip     blood glucose monitoring (NO BRAND SPECIFIED) meter device kit     busPIRone (BUSPAR) 15 MG tablet     clobetasol (TEMOVATE) 0.05 % external cream     Continuous Blood Gluc Sensor (DEXCOM G7 SENSOR) MISC     ezetimibe (ZETIA) 10 MG tablet     gabapentin (NEURONTIN) 300 MG capsule     hydrochlorothiazide (HYDRODIURIL) 12.5 MG tablet     insulin glargine (LANTUS SOLOSTAR) 100 UNIT/ML pen     isosorbide mononitrate (IMDUR) 60 MG 24 hr tablet     ketoconazole (NIZORAL) 2 % external cream     losartan (COZAAR) 100 MG tablet     melatonin 1 MG CAPS     sertraline (ZOLOFT) 100 MG tablet     sotalol (BETAPACE) 80 MG tablet     thin (NO BRAND SPECIFIED) lancets     triamcinolone (KENALOG) 0.1 % external cream     No current facility-administered medications for this visit.     Facility-Administered Medications Ordered in Other Visits   Medication     dexamethasone PF (DECADRON) injection     iohexol (OMNIPAQUE) 300 mg/mL injection      lidocaine (PF) (XYLOCAINE) 1 % injection       Allergies   Allergen Reactions     Metformin GI Disturbance     Oxycodone Nausea and Vomiting       Past Medical History:   Diagnosis Date     Coronary artery disease      Diabetes mellitus (H)      Discitis of thoracolumbar region 10/10/2015     Encephalopathy 10/14/2015     Epidural abscess 10/10/2015     Hip pain, right      Hyperlipidemia      Sepsis (H) 10/8/2015     Stroke (H) 06/23/2015    Neurology felt that episode was C/W subacute ischemic stroke     TIA (transient ischemic attack) 6/23/2015     UTI (urinary tract infection)     admitted to [facility] with UTI        Review of Systems  ROS:  Specifically negative for bowel/bladder dysfunction, balance changes, headache, dizziness, foot drop, fevers, chills, appetite changes, nausea/vomiting, unexplained weight loss. Otherwise 13 systems reviewed are negative. Please see the patient's intake questionnaire from today for details.    Reviewed Social, Family, Past Medical and Past Surgical history with patient, no significant changes noted since prior visit.     Objective:     LMP  (LMP Unknown)     PHYSICAL EXAMINATION:    --CONSTITUTIONAL: Well developed, well nourished, healthy appearing individual.  --PSYCHIATRIC: Appropriate mood and affect. No difficulty interacting due to temper, social withdrawal, or memory issues.  --SKIN: Lumbar region is dry and intact.   --RESPIRATORY: Normal rhythm and effort. No abnormal accessory muscle breathing patterns noted.   --MUSCULOSKELETAL:  Normal lumbar lordosis noted, no lateral shift.  --GROSS MOTOR: Easily arises from a seated position. Gait is non-antalgic  --LUMBAR SPINE:  Inspection reveals no evidence of deformity.   --LOWER EXTREMITY MOTOR TESTING:  Plantar flexion left 5/5, right 5/5   Dorsiflexion left 5/5, right 5/5   Great toe MTP extension left 5/5, right 5/5  Knee flexion left 5/5, right 5/5  Knee extension left 5/5, right 5/5   Hip flexion left 5/5,  right 5/5  Hip abduction left 5/5, right 5/5  Hip adduction left 5/5, right 5/5   --HIPS: Full range of motion bilaterally.   --NEUROLOGIC: Bilateral patellar and achilles reflexes are physiologic and symmetric. Sensation to light touch is intact in the bilateral L4, L5, and S1 dermatomes.    RESULTS:   Imaging: Spine imaging was reviewed today. The images were shown to the patient and the findings were explained using a spine model.    Lumbar spine MRI and flexion-extension x-ray from 2022 reviewed.                          Again, thank you for allowing me to participate in the care of your patient.        Sincerely,        Chula Garcia, CNP

## 2024-02-21 NOTE — PROGRESS NOTES
Assessment:     Diagnoses and all orders for this visit:  Chronic bilateral low back pain with bilateral sciatica  -     PAIN Transforaminal DIANNE Inj Lumbosacral Blair; Future  -     Physical Therapy  Referral; Future  Lumbar radiculopathy  -     PAIN Transforaminal DIANNE Inj Lumbosacral Blair; Future  -     Physical Therapy  Referral; Future  Spondylolisthesis of lumbar region  -     PAIN Transforaminal DIANNE Inj Lumbosacral Blair; Future  -     Physical Therapy  Referral; Future  Lumbar foraminal stenosis  -     PAIN Transforaminal DIANNE Inj Lumbosacral Blair; Future  -     Physical Therapy  Referral; Future  Lumbar facet arthropathy  -     Physical Therapy  Referral; Future     Kemi Soto is a 74 year old y.o. female with past medical history significant for hypercholesterolemia, type 2 diabetes mellitus, hypertension, atrial fibrillation, history of lumbar surgery for evacuation of abscess who presents today for follow-up regarding:    -Progressive bilateral low back pain with lumbar radiculopathy greatest in L5 and S1 dermatomal pattern bilaterally, severe in the last 2 weeks.  Patient does have chronic spondylolisthesis at L4-5 and L5-S1 with instability on flexion-extension at both levels.     Plan:     A shared decision making plan was used. The patient's values and choices were respected. Prior medical records were reviewed today. The following represents what was discussed and decided upon by the provider and the patient.        -DIAGNOSTIC TESTS: Images were personally reviewed and interpreted.   -- Consider updated MRI and flexion-extension x-ray down the road if symptoms or not improving with conservative injection/physical therapy.  --Lumbar spine MRI 7/16/2022 with slight anterolisthesis L4-5 with disc bulging with significant facet arthropathy with mild right foraminal stenosis.  Significant facet arthropathy L5-S1 with lateral recess stenosis.  --Lumbar spine  x-ray 4/11/2022 with grade 1 L4-5 spondylolisthesis 3.3 mm in neutral that increases to 8.6 mm in forward flexion.  L5-S1 8 mm spondylolisthesis that increases to 10.5 mm in forward flexion. Chronic laminectomy L2-L5.  Slight disc space narrowing at L2-3.  --Left hip x-ray 6/3/2022 negative for fracture, degenerative changes bilaterally.  Degenerative changes to bilateral SI joints also noted.  -- Bilateral upper extremity EMG with Dr. Kirk 2019 with no evidence of cervical radiculopathy.    -INTERVENTIONS: Ordered a bilateral L5-S1 transforaminal epidural steroid injection to see if we can get further relief of low back and radicular pain.  If no benefit with this injection and she still having leg pain I would recommend trialing a bilateral L4-5 transforaminal epidural steroid injection.  Otherwise if low back pain is most significant down the road she may be a good candidate for medial branch block which we have discussed in the past but she has never completed.,  For consideration of RFA,    -MEDICATIONS: No changes in medications today  Discussed side effects of medications and proper use. Patient verbalized understanding.    -PHYSICAL THERAPY: Referral placed to physical therapy again for reestablishing home exercises for core strengthening and nerve glides.  She does not remember the exercises from before.  Discussed the importance of core strengthening, ROM, stretching exercises with the patient and how each of these entities is important in decreasing pain.  Explained to the patient that the purpose of physical therapy is to teach the patient a home exercise program.  These exercises need to be performed every day in order to decrease pain and prevent future occurrences of pain.        -PATIENT EDUCATION:  Total time of 47 minutes, on the day of service, spent with the patient, reviewing the chart, placing orders, and documenting.     -FOLLOW UP: Follow-up for injection at the spine center then 2 weeks  postinjection  Advised to contact clinic if symptoms worsen or change.    Subjective:     Kemi Soto is a 74 year old female who presents today for follow-up regarding progressive worsening bilateral low back pain with pain radiating to the buttocks and posterior thighs mostly that stops at the knee bilaterally that has been severe now for the last couple of weeks.  She does report that her pain today is a 0/10 with sitting but up to an 8/10 at its worst with sitting for long periods of time as well as walking.  Also is significantly aggravated first thing in the morning.  Currently denies any numbness or tingling sensations.  She does report some feeling some weakness in her legs.    Unfortunately she had a fall at home on her way here where she tripped but she reports that she did not have any weakness in her legs at that time.  She does have scratches on her right forehead and eye area.    *Post surgery neurosurgical evaluation with Dr. Cuello 2016 status post evacuation of spontaneous epidural abscess in the lumbar spine lumbar laminectomy with medical management of cervical epidural abscess.     -Treatment to Date: Decompressive lumbar laminectomy L1-L5 for evacuation of abscess 10/11/2015 Dr. Cuello.  3-month antibiotic course post surgery, patient then had a drug-eluting stent placed 4/16/2016     Bilateral L5-S1 TFESI 5/19/2023 with 100% relief with leg pain, no relief with back pain.  Preprocedure pain 8/10, post 5/10.     -Medications:  Gabapentin 300 mg    Patient Active Problem List   Diagnosis    Obesity    Hypercholesterolemia    Essential hypertension    Type 2 diabetes mellitus with diabetic polyneuropathy, with long-term current use of insulin (H)    Benign neoplasm of choroid    Paroxysmal atrial fibrillation (H)    Coronary artery disease of bypass graft of native heart with stable angina pectoris (H24)    Bilateral low back pain without sciatica, unspecified chronicity     Benign adenomatous polyp of large intestine    Bilateral hearing loss    Chronic anticoagulation    History of TIA (transient ischemic attack) and stroke    Peripheral neuropathy    Psoriasis, guttate    Type 2 myocardial infarction due to arrhythmia (H)    Urinary incontinence    Abnormal nuclear stress test    Benign neoplasm of cecum    Benign neoplasm of transverse colon    Generalized anxiety disorder    Ovarian mass, right    Anticoagulation goal of INR 2 to 3       Current Outpatient Medications   Medication    acetaminophen (TYLENOL) 500 MG tablet    apixaban ANTICOAGULANT (ELIQUIS ANTICOAGULANT) 5 MG tablet    atorvastatin (LIPITOR) 80 MG tablet    blood glucose (NO BRAND SPECIFIED) test strip    blood glucose monitoring (NO BRAND SPECIFIED) meter device kit    busPIRone (BUSPAR) 15 MG tablet    clobetasol (TEMOVATE) 0.05 % external cream    Continuous Blood Gluc Sensor (DEXCOM G7 SENSOR) MISC    ezetimibe (ZETIA) 10 MG tablet    gabapentin (NEURONTIN) 300 MG capsule    hydrochlorothiazide (HYDRODIURIL) 12.5 MG tablet    insulin glargine (LANTUS SOLOSTAR) 100 UNIT/ML pen    isosorbide mononitrate (IMDUR) 60 MG 24 hr tablet    ketoconazole (NIZORAL) 2 % external cream    losartan (COZAAR) 100 MG tablet    melatonin 1 MG CAPS    sertraline (ZOLOFT) 100 MG tablet    sotalol (BETAPACE) 80 MG tablet    thin (NO BRAND SPECIFIED) lancets    triamcinolone (KENALOG) 0.1 % external cream     No current facility-administered medications for this visit.     Facility-Administered Medications Ordered in Other Visits   Medication    dexamethasone PF (DECADRON) injection    iohexol (OMNIPAQUE) 300 mg/mL injection    lidocaine (PF) (XYLOCAINE) 1 % injection       Allergies   Allergen Reactions    Metformin GI Disturbance    Oxycodone Nausea and Vomiting       Past Medical History:   Diagnosis Date    Coronary artery disease     Diabetes mellitus (H)     Discitis of thoracolumbar region 10/10/2015    Encephalopathy  10/14/2015    Epidural abscess 10/10/2015    Hip pain, right     Hyperlipidemia     Sepsis (H) 10/8/2015    Stroke (H) 06/23/2015    Neurology felt that episode was C/W subacute ischemic stroke    TIA (transient ischemic attack) 6/23/2015    UTI (urinary tract infection)     admitted to [facility] with UTI        Review of Systems  ROS:  Specifically negative for bowel/bladder dysfunction, balance changes, headache, dizziness, foot drop, fevers, chills, appetite changes, nausea/vomiting, unexplained weight loss. Otherwise 13 systems reviewed are negative. Please see the patient's intake questionnaire from today for details.    Reviewed Social, Family, Past Medical and Past Surgical history with patient, no significant changes noted since prior visit.     Objective:     LMP  (LMP Unknown)     PHYSICAL EXAMINATION:    --CONSTITUTIONAL: Well developed, well nourished, healthy appearing individual.  --PSYCHIATRIC: Appropriate mood and affect. No difficulty interacting due to temper, social withdrawal, or memory issues.  --SKIN: Lumbar region is dry and intact.   --RESPIRATORY: Normal rhythm and effort. No abnormal accessory muscle breathing patterns noted.   --MUSCULOSKELETAL:  Normal lumbar lordosis noted, no lateral shift.  --GROSS MOTOR: Easily arises from a seated position. Gait is non-antalgic  --LUMBAR SPINE:  Inspection reveals no evidence of deformity.   --LOWER EXTREMITY MOTOR TESTING:  Plantar flexion left 5/5, right 5/5   Dorsiflexion left 5/5, right 5/5   Great toe MTP extension left 5/5, right 5/5  Knee flexion left 5/5, right 5/5  Knee extension left 5/5, right 5/5   Hip flexion left 5/5, right 5/5  Hip abduction left 5/5, right 5/5  Hip adduction left 5/5, right 5/5   --HIPS: Full range of motion bilaterally.   --NEUROLOGIC: Bilateral patellar and achilles reflexes are physiologic and symmetric. Sensation to light touch is intact in the bilateral L4, L5, and S1 dermatomes.    RESULTS:   Imaging:  Spine imaging was reviewed today. The images were shown to the patient and the findings were explained using a spine model.    Lumbar spine MRI and flexion-extension x-ray from 2022 reviewed.

## 2024-03-05 DIAGNOSIS — I48.3 TYPICAL ATRIAL FLUTTER (H): ICD-10-CM

## 2024-03-05 RX ORDER — SOTALOL HYDROCHLORIDE 80 MG/1
40 TABLET ORAL 2 TIMES DAILY
Qty: 90 TABLET | Refills: 3 | Status: SHIPPED | OUTPATIENT
Start: 2024-03-05

## 2024-03-05 RX ORDER — SOTALOL HYDROCHLORIDE 80 MG/1
40 TABLET ORAL
Qty: 90 TABLET | Refills: 0 | OUTPATIENT
Start: 2024-03-05

## 2024-03-06 ENCOUNTER — RADIOLOGY INJECTION OFFICE VISIT (OUTPATIENT)
Dept: PHYSICAL MEDICINE AND REHAB | Facility: CLINIC | Age: 74
End: 2024-03-06
Attending: NURSE PRACTITIONER
Payer: COMMERCIAL

## 2024-03-06 VITALS
HEIGHT: 62 IN | SYSTOLIC BLOOD PRESSURE: 124 MMHG | WEIGHT: 165.4 LBS | RESPIRATION RATE: 16 BRPM | DIASTOLIC BLOOD PRESSURE: 64 MMHG | TEMPERATURE: 97.8 F | HEART RATE: 58 BPM | OXYGEN SATURATION: 98 % | BODY MASS INDEX: 30.44 KG/M2

## 2024-03-06 DIAGNOSIS — M48.061 LUMBAR FORAMINAL STENOSIS: ICD-10-CM

## 2024-03-06 DIAGNOSIS — G89.29 CHRONIC BILATERAL LOW BACK PAIN WITH BILATERAL SCIATICA: ICD-10-CM

## 2024-03-06 DIAGNOSIS — E11.9 DIABETES MELLITUS (H): Primary | ICD-10-CM

## 2024-03-06 DIAGNOSIS — M54.41 CHRONIC BILATERAL LOW BACK PAIN WITH BILATERAL SCIATICA: ICD-10-CM

## 2024-03-06 DIAGNOSIS — M54.16 LUMBAR RADICULOPATHY: ICD-10-CM

## 2024-03-06 DIAGNOSIS — M54.42 CHRONIC BILATERAL LOW BACK PAIN WITH BILATERAL SCIATICA: ICD-10-CM

## 2024-03-06 DIAGNOSIS — M43.16 SPONDYLOLISTHESIS OF LUMBAR REGION: ICD-10-CM

## 2024-03-06 LAB — GLUCOSE SERPL-MCNC: 143 MG/DL (ref 70–99)

## 2024-03-06 PROCEDURE — 82962 GLUCOSE BLOOD TEST: CPT | Performed by: STUDENT IN AN ORGANIZED HEALTH CARE EDUCATION/TRAINING PROGRAM

## 2024-03-06 PROCEDURE — 64483 NJX AA&/STRD TFRM EPI L/S 1: CPT | Mod: 50 | Performed by: STUDENT IN AN ORGANIZED HEALTH CARE EDUCATION/TRAINING PROGRAM

## 2024-03-06 RX ORDER — LIDOCAINE HYDROCHLORIDE 10 MG/ML
INJECTION, SOLUTION EPIDURAL; INFILTRATION; INTRACAUDAL; PERINEURAL
Status: COMPLETED | OUTPATIENT
Start: 2024-03-06 | End: 2024-03-06

## 2024-03-06 RX ORDER — DEXAMETHASONE SODIUM PHOSPHATE 10 MG/ML
INJECTION, SOLUTION INTRAMUSCULAR; INTRAVENOUS
Status: COMPLETED | OUTPATIENT
Start: 2024-03-06 | End: 2024-03-06

## 2024-03-06 RX ADMIN — LIDOCAINE HYDROCHLORIDE 4 ML: 10 INJECTION, SOLUTION EPIDURAL; INFILTRATION; INTRACAUDAL; PERINEURAL at 15:22

## 2024-03-06 RX ADMIN — DEXAMETHASONE SODIUM PHOSPHATE 10 MG: 10 INJECTION, SOLUTION INTRAMUSCULAR; INTRAVENOUS at 15:23

## 2024-03-06 ASSESSMENT — PAIN SCALES - GENERAL
PAINLEVEL: SEVERE PAIN (7)
PAINLEVEL: MILD PAIN (3)

## 2024-03-06 NOTE — PATIENT INSTRUCTIONS
DISCHARGE INSTRUCTIONS    During office hours (8:00 a.m.- 4:00 p.m.) questions or concerns may be answered  by calling Spine Center Navigation Nurses at  [phone].  Messages received after hours will be returned the following business day.      In the case of an emergency, please dial 911 or seek assistance at the nearest Emergency Room/Urgent Care facility.     All Patients:    You may experience an increase in your symptoms for the first 2 days (It may take anywhere between 2 days- 2 weeks for the steroid to have maximum effect).    You may use ice on the injection site, as frequently as 20 minutes each hour if needed.    You may take your pain medicine.    You may continue taking your regular medication after your injection. If you have had a Medial Branch Block you may resume pain medication once your pain diary is completed.    You may shower. No swimming, tub bath or hot tub for 48 hours.  You may remove your bandaid/bandage as soon as you are home.    You may resume light activities, as tolerated.    Resume your usual diet as tolerated.    It is strongly advised that you do not drive for 1-3 hours post injection.    If you have had oral sedation:  Do not drive for 8 hours post injection.      If you have had IV sedation:  Do not drive for 24 hours post injection.  Do not operate hazardous machinery or make important personal/business decisions for 24 hours.      POSSIBLE STEROID SIDE EFFECTS (If steroid/cortisone was used for your procedure)    -If you experience these symptoms, it should only last for a short period    Swelling of the legs              Skin redness (flushing)     Mouth (oral) irritation   Blood sugar (glucose) levels            Sweats                    Mood changes  Headache  Sleeplessness  Weakened immune system for up to 14 days, which could increase the risk of melo the COVID-19 virus and/or experiencing more severe symptoms of the disease, if exposed.  Decreased  effectiveness of the flu vaccine if given within 2 weeks of the steroid.         POSSIBLE PROCEDURE SIDE EFFECTS  -Call the Spine Center if you are concerned  Increased Pain           Increased numbness/tingling      Nausea/Vomiting          Bruising/bleeding at site      Redness or swelling                                              Difficulty walking      Weakness           Fever greater than 100.5    *In the event of a severe headache after an epidural steroid injection that is relieved by lying down, please call the [facility] to speak with a clinical staff member*     Please be advised that your blood glucose levels may be increased for the next 5-7 days as a result of the steroid you received with your injection today.  It is recommended that you monitor your blood glucose levels closely over the next week and direct any additional questions regarding your blood glucose management to your primary doctor.

## 2024-03-07 DIAGNOSIS — F41.1 ANXIETY, GENERALIZED: ICD-10-CM

## 2024-03-08 RX ORDER — GABAPENTIN 300 MG/1
CAPSULE ORAL
Qty: 270 CAPSULE | Refills: 3 | Status: SHIPPED | OUTPATIENT
Start: 2024-03-08

## 2024-03-11 ENCOUNTER — HOSPITAL ENCOUNTER (OUTPATIENT)
Dept: ULTRASOUND IMAGING | Facility: CLINIC | Age: 74
Discharge: HOME | End: 2024-03-11
Attending: OBSTETRICS & GYNECOLOGY | Admitting: OBSTETRICS & GYNECOLOGY
Payer: COMMERCIAL

## 2024-03-11 DIAGNOSIS — N83.8 OVARIAN MASS, RIGHT: ICD-10-CM

## 2024-03-11 PROCEDURE — 76856 US EXAM PELVIC COMPLETE: CPT

## 2024-03-11 PROCEDURE — 76830 TRANSVAGINAL US NON-OB: CPT

## 2024-03-12 ENCOUNTER — HOSPITAL ENCOUNTER (OUTPATIENT)
Dept: ULTRASOUND IMAGING | Facility: HOSPITAL | Age: 74
Discharge: HOME | End: 2024-03-12
Attending: SURGERY | Admitting: SURGERY
Payer: COMMERCIAL

## 2024-03-12 ENCOUNTER — OFFICE VISIT (OUTPATIENT)
Dept: SURGERY | Facility: CLINIC | Age: 74
End: 2024-03-12
Payer: COMMERCIAL

## 2024-03-12 VITALS
BODY MASS INDEX: 30.29 KG/M2 | SYSTOLIC BLOOD PRESSURE: 122 MMHG | HEIGHT: 62 IN | WEIGHT: 164.6 LBS | DIASTOLIC BLOOD PRESSURE: 74 MMHG

## 2024-03-12 DIAGNOSIS — L02.211 ABDOMINAL WALL ABSCESS: ICD-10-CM

## 2024-03-12 DIAGNOSIS — L02.211 ABDOMINAL WALL ABSCESS: Primary | ICD-10-CM

## 2024-03-12 PROCEDURE — 99213 OFFICE O/P EST LOW 20 MIN: CPT | Performed by: SURGERY

## 2024-03-12 PROCEDURE — 76705 ECHO EXAM OF ABDOMEN: CPT

## 2024-03-12 RX ORDER — SULFAMETHOXAZOLE/TRIMETHOPRIM 800-160 MG
1 TABLET ORAL 2 TIMES DAILY
Qty: 28 TABLET | Refills: 0 | Status: SHIPPED | OUTPATIENT
Start: 2024-03-12 | End: 2024-03-26

## 2024-03-12 NOTE — PROGRESS NOTES
General Surgery H&P  Kemi Soto MRN# [id number]   Age/Sex: 74 year old female YOB: 1950     Reason for visit: Abdominal wall abscess        Referring physician: Dr. Sandra                    Assessment and Plan:   Assessment:  Recurrent abdominal wall abscess  Atrial fibrillation  On DOAC -Eliquis    74-year-old female presenting with recurrent abdominal abscess.  However due to the fact that the patient is on a DOAC, we will need her to be off for at least 48 hours.  Patient did not take her DOAC today.    Plan:  -Ultrasound ordered for evaluation of abdominal wall abscess.  If the abdominal stone ultrasound shows that there is a pocket that is drainable, we will have her follow-up with my PA tomorrow for an I&D in clinic.  If the ultrasound shows that there is no abdominal wall abscess, we will keep her on antibiotics for 14 days.    -As explained to the patient, due to the recurrence of the abdominal abscess, she will require a formal excision of the area to prevent recurrence in the future.  Recommendation though is to have it I indeed or heal with conservative management with antibiotics.  The formal excision will be completed 6 weeks after.    -I will have the patient hold off taking Eliquis today and tomorrow.    -Further recommendations once the patient has completion of ultrasound.            Chief Complaint:   Here for surgery     History is obtained from the patient    HPI:   Kemi Soto is a 74 year old female who presents to the general surgery team today for evaluation regarding swelling over the abdominal wall area.  Patient has a recurrent abscess.  Patient states that it has been red and swelling.  There is an area of hardness.  This is the same location where the patient had an abdominal wall abscess last time that we met.  Patient has not noticed any drainage.  Patient states it does hurt.  No signs of fevers or chills.  Patient last took her Eliquis yesterday night.   Patient has atrial fibrillation.          Past Medical History:     Past Medical History:   Diagnosis Date    Coronary artery disease     Diabetes mellitus (H)     Discitis of thoracolumbar region 10/10/2015    Encephalopathy 10/14/2015    Epidural abscess 10/10/2015    Hip pain, right     Hyperlipidemia     Sepsis (H) 10/8/2015    Stroke (H) 06/23/2015    Neurology felt that episode was C/W subacute ischemic stroke    TIA (transient ischemic attack) 6/23/2015    UTI (urinary tract infection)     admitted to [facility] with UTI              Past Surgical History:     Past Surgical History:   Procedure Laterality Date    ANGIOPLASTY  03/30/2016    Drug eluting stent mid LAD, cutting balloon to 1st diagonal    ANGIOPLASTY  01/01/2008    BYPASS GRAFT ARTERY CORONARY  12/11/2007    X 3 vessels, LIMA to LAD, saph vein graft to distal RCA, saphvein graft to marginal, mini circuit bypass.  [facility].    CORONARY STENT PLACEMENT  01/01/2008    Left main, left circumflex, RCA    CORONARY STENT PLACEMENT  03/01/2016    CV ANGIOGRAM CORONARY GRAFT N/A 10/11/2023    Procedure: Coronary Angiogram Graft;  Surgeon: Sam Boo MD;  Location: [facility] CATH LAB CV    CV CORONARY ANGIOGRAM N/A 08/01/2018    Procedure: Coronary Angiogram;  Surgeon: Kit Bean MD;  Location: [facility] Cath Lab;  Service:     CV LEFT HEART CATHETERIZATION WITH LEFT VENTRICULOGRAM N/A 08/01/2018    Procedure: Left Heart Catheterization with Left Ventriculogram;  Surgeon: Kit Bean MD;  Location: [facility] Cath Lab;  Service:     INSERT MIDLINE HE  10/31/2015         LUMBAR DISCECTOMY N/A 10/11/2015    BILATERAL L1-L5 DECOMPRESSIVE LAMINECTOMY WITH EVACUATION OF EPIDURAL ABSCESS IRRIGATION & DEBRIDEMENT;  Surgeon: Luli Chan MD;  Location: [facility] OR; Due to sepsis    RELEASE CARPAL TUNNEL Bilateral              Social History:    reports that she quit smoking about 16 years ago. Her smoking  use included cigarettes. She has been exposed to tobacco smoke. She has never used smokeless tobacco. She reports that she does not currently use alcohol. She reports that she does not use drugs.           Family History:     Family History   Problem Relation Age of Onset    Cerebrovascular Disease Mother     Diabetes Father     Coronary Artery Disease Father     Diabetes Sister     Cerebrovascular Disease Sister 81    Cerebrovascular Disease Brother     Diabetes Brother     Cancer Paternal Grandfather               Allergies:     Allergies   Allergen Reactions    Metformin GI Disturbance    Oxycodone Nausea and Vomiting              Medications:     Prior to Admission medications    Medication Sig Start Date End Date Taking? Authorizing Provider   acetaminophen (TYLENOL) 500 MG tablet Take 1,000 mg by mouth every 12 hours as needed for mild pain   Yes Reported, Patient   apixaban ANTICOAGULANT (ELIQUIS ANTICOAGULANT) 5 MG tablet Take 1 tablet (5 mg) by mouth 2 times daily 1/12/24  Yes Zaheer Sandra MD   atorvastatin (LIPITOR) 80 MG tablet Take 1 tablet (80 mg) by mouth At Bedtime 4/12/23  Yes Elaine Galvez MD   blood glucose (NO BRAND SPECIFIED) test strip Use to test blood sugar 1 times daily or as directed. To accompany: Blood Glucose Monitor Brands: per insurance. 2/13/24  Yes Zaheer Sandra MD   blood glucose monitoring (NO BRAND SPECIFIED) meter device kit Use to test blood sugar 1 times daily or as directed. Preferred blood glucose meter OR supplies to accompany: Blood Glucose Monitor Brands: per insurance. 2/13/24  Yes Zaheer Sandra MD   busPIRone (BUSPAR) 15 MG tablet Take 1 tablet (15 mg) by mouth daily 4/12/23  Yes Elaine Galvez MD   clobetasol (TEMOVATE) 0.05 % external cream Apply topically 2 times daily 5/30/23  Yes Reported, Patient   Continuous Blood Gluc Sensor (DEXCOM G7 SENSOR) MISC Change every 10 days. 2/13/24  Yes Zaheer Sandra MD   ezetimibe (ZETIA) 10 MG tablet Take 1 tablet (10 mg) by mouth  "daily 9/1/23  Yes Chuck Hernandez MD   gabapentin (NEURONTIN) 300 MG capsule TAKE 3 CAPSULES BY MOUTH EVERY DAY AT BEDTIME 3/8/24  Yes Zaheer Sandra MD   hydrochlorothiazide (HYDRODIURIL) 12.5 MG tablet Take 1 tablet by mouth once daily 1/9/24  Yes Deejay Warren DO   insulin glargine (LANTUS SOLOSTAR) 100 UNIT/ML pen Inject 14 Units Subcutaneous 2 times daily INJECT 28 UNITS SUBCUTANEOUSLY TWICE DAILY 2/9/24  Yes Zaheer Sandra MD   isosorbide mononitrate (IMDUR) 60 MG 24 hr tablet Take 1 tablet (60 mg) by mouth daily 4/12/23  Yes Elaine Galvez MD   ketoconazole (NIZORAL) 2 % external cream APPLY 1 APPLICATION TOPICALLY TWICE DAILY TO AFFECTED AREAS FOR 4 WEEKS THEN AS NEEDED 5/30/23  Yes Reported, Patient   losartan (COZAAR) 100 MG tablet Take 1 tablet (100 mg) by mouth daily 4/12/23  Yes Elaine Galvez MD   melatonin 1 MG CAPS Take 3 tablets by mouth as needed (at bedtime)   Yes Reported, Patient   sertraline (ZOLOFT) 100 MG tablet Take 1 tablet (100 mg) by mouth daily 4/12/23  Yes Elaine Galvez MD   sotalol (BETAPACE) 80 MG tablet Take 0.5 tablets (40 mg) by mouth 2 times daily 3/5/24  Yes Zaheer Sandra MD   thin (NO BRAND SPECIFIED) lancets Use with lanceting device. To accompany: Blood Glucose Monitor Brands: per insurance. 2/13/24  Yes Zaheer Sandra MD   triamcinolone (KENALOG) 0.1 % external cream Apply topically 2 times daily 5/30/23  Yes Reported, Patient              Review of Systems:   A 12 point Review of Systems is negative other than noted in the HPI            Physical Exam:   Patient Vitals for the past 24 hrs:   BP Height Weight   03/12/24 1138 122/74 1.575 m (5' 2\") 74.7 kg (164 lb 9.6 oz)        [unfilled]   Constitutional:   awake, alert, cooperative, no apparent distress, and appears stated age       Eyes:   PERRL, conjunctiva/corneas clear, EOM's intact; no scleral edema or icterus noted        ENT:   Normocephalic, without obvious abnormality, atraumatic, Lips, mucosa, and " tongue normal        Hematologic / Lymphatic:   No lymphadenopathy       Lungs:   Normal respiratory effort, no accessory muscle use       Cardiovascular:   Regular rate and rhythm       Abdomen:   Soft, nondistended, nontender to palpation       Musculoskeletal:   No obvious swelling, bruising or deformity       Skin:   Skin color and texture normal for patient, there is an area of induration and erythema approximately 4 x 4 cm on the left side of her umbilicus.  There is an area of induration.  No significant findings of fluctuance.  No crepitus.  Tender to palpation.             Data:            Braden Bah DO  General Surgeon  [facility]  Surgery [facility] - [address] [address]?  Office: [phone]  Employed by - [facility] [employer]  Pager: [phone]          Kemi THOMAS, give verbal consent for a resident to be present in today's visit.

## 2024-03-12 NOTE — LETTER
3/12/2024         RE: Kemi Soto  [address] [place] [id number]        Dear Colleague,    Thank you for referring your patient, Kemi Soto, to the [facility] SURGERY CLINIC AND BARIATRICS CARE [place]. Please see a copy of my visit note below.    General Surgery H&P  Kemi Soto MRN# [id number]   Age/Sex: 74 year old female YOB: 1950     Reason for visit: Abdominal wall abscess        Referring physician: Dr. Sandra                    Assessment and Plan:   Assessment:  Recurrent abdominal wall abscess  Atrial fibrillation  On DOAC -Eliquis    74-year-old female presenting with recurrent abdominal abscess.  However due to the fact that the patient is on a DOAC, we will need her to be off for at least 48 hours.  Patient did not take her DOAC today.    Plan:  -Ultrasound ordered for evaluation of abdominal wall abscess.  If the abdominal stone ultrasound shows that there is a pocket that is drainable, we will have her follow-up with my PA tomorrow for an I&D in clinic.  If the ultrasound shows that there is no abdominal wall abscess, we will keep her on antibiotics for 14 days.    -As explained to the patient, due to the recurrence of the abdominal abscess, she will require a formal excision of the area to prevent recurrence in the future.  Recommendation though is to have it I indeed or heal with conservative management with antibiotics.  The formal excision will be completed 6 weeks after.    -I will have the patient hold off taking Eliquis today and tomorrow.    -Further recommendations once the patient has completion of ultrasound.            Chief Complaint:   Here for surgery     History is obtained from the patient    HPI:   Kemi Soto is a 74 year old female who presents to the general surgery team today for evaluation regarding swelling over the abdominal wall area.  Patient has a recurrent abscess.  Patient states that it has been red and swelling.   There is an area of hardness.  This is the same location where the patient had an abdominal wall abscess last time that we met.  Patient has not noticed any drainage.  Patient states it does hurt.  No signs of fevers or chills.  Patient last took her Eliquis yesterday night.  Patient has atrial fibrillation.          Past Medical History:     Past Medical History:   Diagnosis Date     Coronary artery disease      Diabetes mellitus (H)      Discitis of thoracolumbar region 10/10/2015     Encephalopathy 10/14/2015     Epidural abscess 10/10/2015     Hip pain, right      Hyperlipidemia      Sepsis (H) 10/8/2015     Stroke (H) 06/23/2015    Neurology felt that episode was C/W subacute ischemic stroke     TIA (transient ischemic attack) 6/23/2015     UTI (urinary tract infection)     admitted to [facility] with UTI              Past Surgical History:     Past Surgical History:   Procedure Laterality Date     ANGIOPLASTY  03/30/2016    Drug eluting stent mid LAD, cutting balloon to 1st diagonal     ANGIOPLASTY  01/01/2008     BYPASS GRAFT ARTERY CORONARY  12/11/2007    X 3 vessels, LIMA to LAD, saph vein graft to distal RCA, saphvein graft to marginal, mini circuit bypass.  [facility].     CORONARY STENT PLACEMENT  01/01/2008    Left main, left circumflex, RCA     CORONARY STENT PLACEMENT  03/01/2016     CV ANGIOGRAM CORONARY GRAFT N/A 10/11/2023    Procedure: Coronary Angiogram Graft;  Surgeon: Sam Boo MD;  Location: [facility] CATH [facility] CV     CV CORONARY ANGIOGRAM N/A 08/01/2018    Procedure: Coronary Angiogram;  Surgeon: Kit Bean MD;  Location: [facility] Cath Lab;  Service:      CV LEFT HEART CATHETERIZATION WITH LEFT VENTRICULOGRAM N/A 08/01/2018    Procedure: Left Heart Catheterization with Left Ventriculogram;  Surgeon: Kit Bean MD;  Location: [facility] Cath Lab;  Service:      INSERT MIDLINE HE  10/31/2015          LUMBAR DISCECTOMY N/A 10/11/2015    BILATERAL L1-L5  DECOMPRESSIVE LAMINECTOMY WITH EVACUATION OF EPIDURAL ABSCESS IRRIGATION & DEBRIDEMENT;  Surgeon: Luli Chan MD;  Location: [facility] OR; Due to sepsis     RELEASE CARPAL TUNNEL Bilateral              Social History:    reports that she quit smoking about 16 years ago. Her smoking use included cigarettes. She has been exposed to tobacco smoke. She has never used smokeless tobacco. She reports that she does not currently use alcohol. She reports that she does not use drugs.           Family History:     Family History   Problem Relation Age of Onset     Cerebrovascular Disease Mother      Diabetes Father      Coronary Artery Disease Father      Diabetes Sister      Cerebrovascular Disease Sister 81     Cerebrovascular Disease Brother      Diabetes Brother      Cancer Paternal Grandfather               Allergies:     Allergies   Allergen Reactions     Metformin GI Disturbance     Oxycodone Nausea and Vomiting              Medications:     Prior to Admission medications    Medication Sig Start Date End Date Taking? Authorizing Provider   acetaminophen (TYLENOL) 500 MG tablet Take 1,000 mg by mouth every 12 hours as needed for mild pain   Yes Reported, Patient   apixaban ANTICOAGULANT (ELIQUIS ANTICOAGULANT) 5 MG tablet Take 1 tablet (5 mg) by mouth 2 times daily 1/12/24  Yes Zaheer Sandra MD   atorvastatin (LIPITOR) 80 MG tablet Take 1 tablet (80 mg) by mouth At Bedtime 4/12/23  Yes Elaine Galvez MD   blood glucose (NO BRAND SPECIFIED) test strip Use to test blood sugar 1 times daily or as directed. To accompany: Blood Glucose Monitor Brands: per insurance. 2/13/24  Yes Zaheer Sandra MD   blood glucose monitoring (NO BRAND SPECIFIED) meter device kit Use to test blood sugar 1 times daily or as directed. Preferred blood glucose meter OR supplies to accompany: Blood Glucose Monitor Brands: per insurance. 2/13/24  Yes Zaheer Sandra MD   busPIRone (BUSPAR) 15 MG tablet Take 1 tablet (15 mg) by mouth  "daily 4/12/23  Yes Elaine Galvez MD   clobetasol (TEMOVATE) 0.05 % external cream Apply topically 2 times daily 5/30/23  Yes Reported, Patient   Continuous Blood Gluc Sensor (DEXCOM G7 SENSOR) MISC Change every 10 days. 2/13/24  Yes Zaheer Sandra MD   ezetimibe (ZETIA) 10 MG tablet Take 1 tablet (10 mg) by mouth daily 9/1/23  Yes Chuck Hernandez MD   gabapentin (NEURONTIN) 300 MG capsule TAKE 3 CAPSULES BY MOUTH EVERY DAY AT BEDTIME 3/8/24  Yes Zaheer Sandra MD   hydrochlorothiazide (HYDRODIURIL) 12.5 MG tablet Take 1 tablet by mouth once daily 1/9/24  Yes Deejay Warren DO   insulin glargine (LANTUS SOLOSTAR) 100 UNIT/ML pen Inject 14 Units Subcutaneous 2 times daily INJECT 28 UNITS SUBCUTANEOUSLY TWICE DAILY 2/9/24  Yes Zaheer Sandra MD   isosorbide mononitrate (IMDUR) 60 MG 24 hr tablet Take 1 tablet (60 mg) by mouth daily 4/12/23  Yes Elaine Galvez MD   ketoconazole (NIZORAL) 2 % external cream APPLY 1 APPLICATION TOPICALLY TWICE DAILY TO AFFECTED AREAS FOR 4 WEEKS THEN AS NEEDED 5/30/23  Yes Reported, Patient   losartan (COZAAR) 100 MG tablet Take 1 tablet (100 mg) by mouth daily 4/12/23  Yes Elaine Galvez MD   melatonin 1 MG CAPS Take 3 tablets by mouth as needed (at bedtime)   Yes Reported, Patient   sertraline (ZOLOFT) 100 MG tablet Take 1 tablet (100 mg) by mouth daily 4/12/23  Yes Elaine Galvez MD   sotalol (BETAPACE) 80 MG tablet Take 0.5 tablets (40 mg) by mouth 2 times daily 3/5/24  Yes aZheer Sandra MD   thin (NO BRAND SPECIFIED) lancets Use with lanceting device. To accompany: Blood Glucose Monitor Brands: per insurance. 2/13/24  Yes Zaheer Sandra MD   triamcinolone (KENALOG) 0.1 % external cream Apply topically 2 times daily 5/30/23  Yes Reported, Patient              Review of Systems:   A 12 point Review of Systems is negative other than noted in the HPI            Physical Exam:   Patient Vitals for the past 24 hrs:   BP Height Weight   03/12/24 1138 122/74 1.575 m (5' 2\") 74.7 kg (164 lb " 9.6 oz)        [unfilled]   Constitutional:   awake, alert, cooperative, no apparent distress, and appears stated age       Eyes:   PERRL, conjunctiva/corneas clear, EOM's intact; no scleral edema or icterus noted        ENT:   Normocephalic, without obvious abnormality, atraumatic, Lips, mucosa, and tongue normal        Hematologic / Lymphatic:   No lymphadenopathy       Lungs:   Normal respiratory effort, no accessory muscle use       Cardiovascular:   Regular rate and rhythm       Abdomen:   Soft, nondistended, nontender to palpation       Musculoskeletal:   No obvious swelling, bruising or deformity       Skin:   Skin color and texture normal for patient, there is an area of induration and erythema approximately 4 x 4 cm on the left side of her umbilicus.  There is an area of induration.  No significant findings of fluctuance.  No crepitus.  Tender to palpation.             Data:            Braden Bah DO  General Surgeon  [facility]  Surgery [address] [address]?  Office: [phone]  Employed by - [facility] [employer]  Pager: [phone]          IKemi, give verbal consent for a resident to be present in today's visit.       Again, thank you for allowing me to participate in the care of your patient.        Sincerely,        Braden Bah DO

## 2024-03-13 ENCOUNTER — OFFICE VISIT (OUTPATIENT)
Dept: SURGERY | Facility: CLINIC | Age: 74
End: 2024-03-13
Payer: COMMERCIAL

## 2024-03-13 VITALS — HEIGHT: 62 IN | WEIGHT: 164 LBS | BODY MASS INDEX: 30.18 KG/M2

## 2024-03-13 DIAGNOSIS — L02.211 ABDOMINAL WALL ABSCESS: Primary | ICD-10-CM

## 2024-03-13 PROCEDURE — 10060 I&D ABSCESS SIMPLE/SINGLE: CPT | Performed by: PHYSICIAN ASSISTANT

## 2024-03-13 NOTE — LETTER
"    3/13/2024         RE: Kemi Soto  [address] [place] [address]        Dear Colleague,    Thank you for referring your patient, Kemi Soto, to the [facility] SURGERY CLINIC AND BARIATRICS CARE [place]. Please see a copy of my visit note below.    HPI: Patient is here for follow-up of an abdominal wall abscess. Patient seen on 3/12/24 with a recurrent abdominal wall abscess. Patient on Eliquis for a-fib so I&D procedure was not done and US obtained and demonstrated a 3.4 x 3.2 x 2.2 cm fluid collection within the subcutaneous tissues of the lower lower quadrant suspicious for an abscess. Patient reports it is more tender, red and swollen today compared to yesterday. She is using tylenol and naproxen for pain as well as ice packs. She reports she last took her Eliquis on the evening of 3/12. Denies fever, chills.        LMP  (LMP Unknown)     EXAM:  General: Appears well  Abdomen: Soft, non-tender, non-distended. No rebound or guarding.  Skin: Area of fluctuance over LLQ just lateral to umbilicus with overlying erythema and surrounding induration. Tender to palpation. No drainage noted.       Assessment/Plan: Patient with recurrent abdominal wall abscess. US confirmed fluid collection. Patient holding Eliquis since evening of 3/12. I&D procedure completed with 40 ml of old blood and purulent fluid drained from abscess. See procedure note for further details. Wound packed with 1/2\" Nugauze. Patient instructed to continue taking Bactrim as prescribed. She was also instructed to change wound packing daily and as needed if it falls out or soiling. Patient to continue holding her Eliquis and okay to resume tomorrow, 3/14, evening. Instructed patient to follow-up for wound check next week.     Ana Brar PA-C  [facility] General Surgery  [address] [facility] [address]  [place] [address]       Again, thank you for allowing me to participate in the care of your patient.  "       Sincerely,        GERSON AGUILAR PA-C

## 2024-03-13 NOTE — PROGRESS NOTES
"HPI: Patient is here for follow-up of an abdominal wall abscess. Patient seen on 3/12/24 with a recurrent abdominal wall abscess. Patient on Eliquis for a-fib so I&D procedure was not done and US obtained and demonstrated a 3.4 x 3.2 x 2.2 cm fluid collection within the subcutaneous tissues of the lower lower quadrant suspicious for an abscess. Patient reports it is more tender, red and swollen today compared to yesterday. She is using tylenol and naproxen for pain as well as ice packs. She reports she last took her Eliquis on the evening of 3/12. Denies fever, chills.        LMP  (LMP Unknown)     EXAM:  General: Appears well  Abdomen: Soft, non-tender, non-distended. No rebound or guarding.  Skin: Area of fluctuance over LLQ just lateral to umbilicus with overlying erythema and surrounding induration. Tender to palpation. No drainage noted.       Assessment/Plan: Patient with recurrent abdominal wall abscess. US confirmed fluid collection. Patient holding Eliquis since evening of 3/12. I&D procedure completed with 40 ml of old blood and purulent fluid drained from abscess. See procedure note for further details. Wound packed with 1/2\" Nugauze. Patient instructed to continue taking Bactrim as prescribed. She was also instructed to change wound packing daily and as needed if it falls out or soiling. Patient to continue holding her Eliquis and okay to resume tomorrow, 3/14, evening. Instructed patient to follow-up for wound check next week.     Ana Brar PA-C  [facility] General Surgery  [facility] [address] [address]   "

## 2024-03-13 NOTE — RESULT ENCOUNTER NOTE
Called patient with results. US shows findings concerning for abscess.  Pt is to remain off eliquis and to come into surgery clinic today for InD of abscess.     Braden aBh DO  General Surgeon  [facility]  Surgery Clinic - [address]  [place] [address]?  Office: [phone]  Employed by - [facility]  Pager: [phone]

## 2024-03-13 NOTE — PROCEDURES
"Incision and Drainage Procedure  Indication: Abdominal wall abscess  Location: Left periumbilical region   Completed By: Ana Brar PA-C    Anesthesia: Lidocaine 1% local with epinephrine injection with 1 mLs injected    Note: The area was prepped using betadine solution. The abscess was opened using an 11 blade creating a 1.5 cm incision. There was approximately 40 mL s of old blood and purulent discharge from the wound. The area was flushed with normal saline and probed with a cotton swab to break up loculations and assess for tunneling. The wound was packed with 1/2\" nugauze and was covered with dry 4\" x 4\" gauze.    Comments: The patient tolerated the procedure well. There were no immediate complications.     Informed Consent   Procedure: Incision and drainage of abdominal wall abscess  Note: The patient was determined to be alert and of sound decision-making capacity. Both the risks and benefits of the procedure were explained to the patient in great detail. The patient was allowed to ask questions, which were addressed thoroughly. The patient voiced understanding of both the risks and benefits of the procedure and felt that the benefits outweighed the risks. The patient freely gave verbal consent for procedural treatment.      Ana Brar PA-C  [facility] General Surgery  [facility] [address] [address]     "

## 2024-03-19 ENCOUNTER — OFFICE VISIT (OUTPATIENT)
Dept: SURGERY | Facility: CLINIC | Age: 74
End: 2024-03-19
Payer: COMMERCIAL

## 2024-03-19 DIAGNOSIS — L02.211 ABSCESS OF ABDOMINAL WALL: Primary | ICD-10-CM

## 2024-03-19 PROCEDURE — 99214 OFFICE O/P EST MOD 30 MIN: CPT | Performed by: SURGERY

## 2024-03-19 NOTE — PROGRESS NOTES
General Surgery Clinic - Postop follow up  Kemi Soto MRN# [id number]   Age/Sex: 74 year old female YOB: 1950     Reason for visit: Post op visit                           Assessment and Plan:   Assessment:  Abdominal abscess    Plan:  -Will continue to have her complete the rest of her oral antibiotics  -Continue with local wound care.  I provided her with more wound care products.  -Patient will call me in approximately 4 weeks so we can schedule excision of the abdominal wall abscess  -The patient will require clearance from her medical doctor given that the patient is on anticoagulation.          Chief Complaint:     Chief Complaint   Patient presents with    Post-Op - General Surgery        History is obtained from the patient    HPI:   Kemi Soto is a 74 year old female who presents to the general surgery team today for wound recheck as well as discussion for excision of the abdominal abscess in approximately 5 to 6 weeks.  Patient underwent I&D last week with my PA for the abdominal wall abscess.  Patient is continuing with local wound care.  No fevers no chills.  She has no further complaints at this time.          Past Medical History:     Past Medical History:   Diagnosis Date    Coronary artery disease     Diabetes mellitus (H)     Discitis of thoracolumbar region 10/10/2015    Encephalopathy 10/14/2015    Epidural abscess 10/10/2015    Hip pain, right     Hyperlipidemia     Sepsis (H) 10/8/2015    Stroke (H) 06/23/2015    Neurology felt that episode was C/W subacute ischemic stroke    TIA (transient ischemic attack) 6/23/2015    UTI (urinary tract infection)     admitted to [facility] with UTI              Past Surgical History:     Past Surgical History:   Procedure Laterality Date    ANGIOPLASTY  03/30/2016    Drug eluting stent mid LAD, cutting balloon to 1st diagonal    ANGIOPLASTY  01/01/2008    BYPASS GRAFT ARTERY CORONARY  12/11/2007    X 3 vessels, LIMA to LAD,  saph vein graft to distal RCA, saphvein graft to marginal, mini circuit bypass.  [facility].    CORONARY STENT PLACEMENT  01/01/2008    Left main, left circumflex, RCA    CORONARY STENT PLACEMENT  03/01/2016    CV ANGIOGRAM CORONARY GRAFT N/A 10/11/2023    Procedure: Coronary Angiogram Graft;  Surgeon: Sam Boo MD;  Location: [facility] CATH LAB CV    CV CORONARY ANGIOGRAM N/A 08/01/2018    Procedure: Coronary Angiogram;  Surgeon: Kit Bean MD;  Location: [facility] Cath Lab;  Service:     CV LEFT HEART CATHETERIZATION WITH LEFT VENTRICULOGRAM N/A 08/01/2018    Procedure: Left Heart Catheterization with Left Ventriculogram;  Surgeon: Kit Bean MD;  Location: [facility] Cath Lab;  Service:     INSERT MIDLINE HE  10/31/2015         LUMBAR DISCECTOMY N/A 10/11/2015    BILATERAL L1-L5 DECOMPRESSIVE LAMINECTOMY WITH EVACUATION OF EPIDURAL ABSCESS IRRIGATION & DEBRIDEMENT;  Surgeon: Luli Chan MD;  Location: [facility] OR; Due to sepsis    RELEASE CARPAL TUNNEL Bilateral              Social History:    reports that she quit smoking about 16 years ago. Her smoking use included cigarettes. She has been exposed to tobacco smoke. She has never used smokeless tobacco. She reports that she does not currently use alcohol. She reports that she does not use drugs.           Family History:     Family History   Problem Relation Age of Onset    Cerebrovascular Disease Mother     Diabetes Father     Coronary Artery Disease Father     Diabetes Sister     Cerebrovascular Disease Sister 81    Cerebrovascular Disease Brother     Diabetes Brother     Cancer Paternal Grandfather               Allergies:     Allergies   Allergen Reactions    Metformin GI Disturbance    Oxycodone Nausea and Vomiting              Medications:     Prior to Admission medications    Medication Sig Start Date End Date Taking? Authorizing Provider   acetaminophen (TYLENOL) 500 MG tablet Take 1,000 mg by mouth  every 12 hours as needed for mild pain   Yes Reported, Patient   apixaban ANTICOAGULANT (ELIQUIS ANTICOAGULANT) 5 MG tablet Take 1 tablet (5 mg) by mouth 2 times daily 1/12/24  Yes Zaheer Sandra MD   atorvastatin (LIPITOR) 80 MG tablet Take 1 tablet (80 mg) by mouth At Bedtime 4/12/23  Yes Elaine Galvez MD   blood glucose (NO BRAND SPECIFIED) test strip Use to test blood sugar 1 times daily or as directed. To accompany: Blood Glucose Monitor Brands: per insurance. 2/13/24  Yes Zaheer Sandra MD   blood glucose monitoring (NO BRAND SPECIFIED) meter device kit Use to test blood sugar 1 times daily or as directed. Preferred blood glucose meter OR supplies to accompany: Blood Glucose Monitor Brands: per insurance. 2/13/24  Yes Zaheer Sandra MD   busPIRone (BUSPAR) 15 MG tablet Take 1 tablet (15 mg) by mouth daily 4/12/23  Yes Elaine Galvez MD   clobetasol (TEMOVATE) 0.05 % external cream Apply topically 2 times daily 5/30/23  Yes Reported, Patient   Continuous Blood Gluc Sensor (DEXCOM G7 SENSOR) MISC Change every 10 days. 2/13/24  Yes Zaheer Sandra MD   ezetimibe (ZETIA) 10 MG tablet Take 1 tablet (10 mg) by mouth daily 9/1/23  Yes Chuck Hernandez MD   gabapentin (NEURONTIN) 300 MG capsule TAKE 3 CAPSULES BY MOUTH EVERY DAY AT BEDTIME 3/8/24  Yes Zaheer Sandra MD   hydrochlorothiazide (HYDRODIURIL) 12.5 MG tablet Take 1 tablet by mouth once daily 1/9/24  Yes Deejay Warren DO   insulin glargine (LANTUS SOLOSTAR) 100 UNIT/ML pen Inject 14 Units Subcutaneous 2 times daily INJECT 28 UNITS SUBCUTANEOUSLY TWICE DAILY 2/9/24  Yes Zaheer Sandra MD   isosorbide mononitrate (IMDUR) 60 MG 24 hr tablet Take 1 tablet (60 mg) by mouth daily 4/12/23  Yes Elaine Galvez MD   ketoconazole (NIZORAL) 2 % external cream APPLY 1 APPLICATION TOPICALLY TWICE DAILY TO AFFECTED AREAS FOR 4 WEEKS THEN AS NEEDED 5/30/23  Yes Reported, Patient   losartan (COZAAR) 100 MG tablet Take 1 tablet (100 mg) by mouth daily 4/12/23  Yes Leonor  MD Elaine   melatonin 1 MG CAPS Take 3 tablets by mouth as needed (at bedtime)   Yes Reported, Patient   sertraline (ZOLOFT) 100 MG tablet Take 1 tablet (100 mg) by mouth daily 4/12/23  Yes Elaine Galvez MD   sotalol (BETAPACE) 80 MG tablet Take 0.5 tablets (40 mg) by mouth 2 times daily 3/5/24  Yes Zaheer Sandra MD   sulfamethoxazole-trimethoprim (BACTRIM DS) 800-160 MG tablet Take 1 tablet by mouth 2 times daily for 14 days 3/12/24 3/26/24 Yes Braden Bah DO   thin (NO BRAND SPECIFIED) lancets Use with lanceting device. To accompany: Blood Glucose Monitor Brands: per [employer]. 2/13/24  Yes Zaheer Sandra MD   triamcinolone (KENALOG) 0.1 % external cream Apply topically 2 times daily 5/30/23  Yes Reported, Patient              Review of Systems:   A 12 point Review of Systems is negative other than noted in the HPI            Physical Exam:   No data found.     [unfilled]   Constitutional:   awake, alert, cooperative, no apparent distress, and appears stated age       Eyes:   PERRL, conjunctiva/corneas clear, EOM's intact; no scleral edema or icterus noted        ENT:   Normocephalic, without obvious abnormality, atraumatic, Lips, mucosa, and tongue normal        Hematologic / Lymphatic:   No lymphadenopathy       Lungs:   Normal respiratory effort, no accessory muscle use       Cardiovascular:   Regular rate and rhythm       Abdomen:   Soft, nondistended, nontender to palpation       Musculoskeletal:   No obvious swelling, bruising or deformity       Skin:   Skin color and texture normal for patient, left improvement from previous examination.  Less induration.  Minimal erythema.  No purulent material appreciated.  No crepitus.             Data:       DO Braden Alarcon DO  General Surgeon  [facility]  Surgery [facility] - [address] [address]?  Office: [phone]  Employed by - [employer] [facility]  Pager: [phone]

## 2024-03-19 NOTE — LETTER
3/19/2024         RE: Kemi Soto  [address] [place] [id number]        Dear Colleague,    Thank you for referring your patient, Kemi Soto, to the [facility] SURGERY [facility] AND [facility]IATRICS CARE [place]. Please see a copy of my visit note below.    General Surgery Clinic - Postop follow up  Kemi Soto MRN# [id number]   Age/Sex: 74 year old female YOB: 1950     Reason for visit: Post op visit                           Assessment and Plan:   Assessment:  Abdominal abscess    Plan:  -Will continue to have her complete the rest of her oral antibiotics  -Continue with local wound care.  I provided her with more wound care products.  -Patient will call me in approximately 4 weeks so we can schedule excision of the abdominal wall abscess  -The patient will require clearance from her medical doctor given that the patient is on anticoagulation.          Chief Complaint:     Chief Complaint   Patient presents with     Post-Op - General Surgery        History is obtained from the patient    HPI:   Kemi Soto is a 74 year old female who presents to the general surgery team today for wound recheck as well as discussion for excision of the abdominal abscess in approximately 5 to 6 weeks.  Patient underwent I&D last week with my PA for the abdominal wall abscess.  Patient is continuing with local wound care.  No fevers no chills.  She has no further complaints at this time.          Past Medical History:     Past Medical History:   Diagnosis Date     Coronary artery disease      Diabetes mellitus (H)      Discitis of thoracolumbar region 10/10/2015     Encephalopathy 10/14/2015     Epidural abscess 10/10/2015     Hip pain, right      Hyperlipidemia      Sepsis (H) 10/8/2015     Stroke (H) 06/23/2015    Neurology felt that episode was C/W subacute ischemic stroke     TIA (transient ischemic attack) 6/23/2015     UTI (urinary tract infection)     admitted to [facility]  with UTI              Past Surgical History:     Past Surgical History:   Procedure Laterality Date     ANGIOPLASTY  03/30/2016    Drug eluting stent mid LAD, cutting balloon to 1st diagonal     ANGIOPLASTY  01/01/2008     BYPASS GRAFT ARTERY CORONARY  12/11/2007    X 3 vessels, LIMA to LAD, saph vein graft to distal RCA, saphvein graft to marginal, mini circuit bypass.  [facility].     CORONARY STENT PLACEMENT  01/01/2008    Left main, left circumflex, RCA     CORONARY STENT PLACEMENT  03/01/2016     CV ANGIOGRAM CORONARY GRAFT N/A 10/11/2023    Procedure: Coronary Angiogram Graft;  Surgeon: Sam Boo MD;  Location: [facility] CATH LAB CV     CV CORONARY ANGIOGRAM N/A 08/01/2018    Procedure: Coronary Angiogram;  Surgeon: Kit Bean MD;  Location: [facility] Cath Lab;  Service:      CV LEFT HEART CATHETERIZATION WITH LEFT VENTRICULOGRAM N/A 08/01/2018    Procedure: Left Heart Catheterization with Left Ventriculogram;  Surgeon: Kit Bean MD;  Location: [facility] Cath Lab;  Service:      INSERT MIDLINE HE  10/31/2015          LUMBAR DISCECTOMY N/A 10/11/2015    BILATERAL L1-L5 DECOMPRESSIVE LAMINECTOMY WITH EVACUATION OF EPIDURAL ABSCESS IRRIGATION & DEBRIDEMENT;  Surgeon: Luli Chan MD;  Location: [facility] OR; Due to sepsis     RELEASE CARPAL TUNNEL Bilateral              Social History:    reports that she quit smoking about 16 years ago. Her smoking use included cigarettes. She has been exposed to tobacco smoke. She has never used smokeless tobacco. She reports that she does not currently use alcohol. She reports that she does not use drugs.           Family History:     Family History   Problem Relation Age of Onset     Cerebrovascular Disease Mother      Diabetes Father      Coronary Artery Disease Father      Diabetes Sister      Cerebrovascular Disease Sister 81     Cerebrovascular Disease Brother      Diabetes Brother      Cancer Paternal Grandfather                Allergies:     Allergies   Allergen Reactions     Metformin GI Disturbance     Oxycodone Nausea and Vomiting              Medications:     Prior to Admission medications    Medication Sig Start Date End Date Taking? Authorizing Provider   acetaminophen (TYLENOL) 500 MG tablet Take 1,000 mg by mouth every 12 hours as needed for mild pain   Yes Reported, Patient   apixaban ANTICOAGULANT (ELIQUIS ANTICOAGULANT) 5 MG tablet Take 1 tablet (5 mg) by mouth 2 times daily 1/12/24  Yes Zaheer Sandra MD   atorvastatin (LIPITOR) 80 MG tablet Take 1 tablet (80 mg) by mouth At Bedtime 4/12/23  Yes Elaine Galvez MD   blood glucose (NO BRAND SPECIFIED) test strip Use to test blood sugar 1 times daily or as directed. To accompany: Blood Glucose Monitor Brands: per insurance. 2/13/24  Yes Zaheer Sandra MD   blood glucose monitoring (NO BRAND SPECIFIED) meter device kit Use to test blood sugar 1 times daily or as directed. Preferred blood glucose meter OR supplies to accompany: Blood Glucose Monitor Brands: per insurance. 2/13/24  Yes Zaheer Sandra MD   busPIRone (BUSPAR) 15 MG tablet Take 1 tablet (15 mg) by mouth daily 4/12/23  Yes Elaine Galvez MD   clobetasol (TEMOVATE) 0.05 % external cream Apply topically 2 times daily 5/30/23  Yes Reported, Patient   Continuous Blood Gluc Sensor (DEXCOM G7 SENSOR) MISC Change every 10 days. 2/13/24  Yes Zaheer Sandra MD   ezetimibe (ZETIA) 10 MG tablet Take 1 tablet (10 mg) by mouth daily 9/1/23  Yes Chuck Hernandez MD   gabapentin (NEURONTIN) 300 MG capsule TAKE 3 CAPSULES BY MOUTH EVERY DAY AT BEDTIME 3/8/24  Yes Zaheer Sandra MD   hydrochlorothiazide (HYDRODIURIL) 12.5 MG tablet Take 1 tablet by mouth once daily 1/9/24  Yes Deejay Warren DO   insulin glargine (LANTUS SOLOSTAR) 100 UNIT/ML pen Inject 14 Units Subcutaneous 2 times daily INJECT 28 UNITS SUBCUTANEOUSLY TWICE DAILY 2/9/24  Yes Zaheer Sandra MD   isosorbide mononitrate (IMDUR) 60 MG 24 hr tablet Take 1  tablet (60 mg) by mouth daily 4/12/23  Yes Elaine Galvez MD   ketoconazole (NIZORAL) 2 % external cream APPLY 1 APPLICATION TOPICALLY TWICE DAILY TO AFFECTED AREAS FOR 4 WEEKS THEN AS NEEDED 5/30/23  Yes Reported, Patient   losartan (COZAAR) 100 MG tablet Take 1 tablet (100 mg) by mouth daily 4/12/23  Yes Elaine Galvez MD   melatonin 1 MG CAPS Take 3 tablets by mouth as needed (at bedtime)   Yes Reported, Patient   sertraline (ZOLOFT) 100 MG tablet Take 1 tablet (100 mg) by mouth daily 4/12/23  Yes Elaine Galvez MD   sotalol (BETAPACE) 80 MG tablet Take 0.5 tablets (40 mg) by mouth 2 times daily 3/5/24  Yes Zaheer Sandra MD   sulfamethoxazole-trimethoprim (BACTRIM DS) 800-160 MG tablet Take 1 tablet by mouth 2 times daily for 14 days 3/12/24 3/26/24 Yes Braden Bah,    thin (NO BRAND SPECIFIED) lancets Use with lanceting device. To accompany: Blood Glucose Monitor Brands: per insurance. 2/13/24  Yes Zaheer Sandra MD   triamcinolone (KENALOG) 0.1 % external cream Apply topically 2 times daily 5/30/23  Yes Reported, Patient              Review of Systems:   A 12 point Review of Systems is negative other than noted in the HPI            Physical Exam:   No data found.     [unfilled]   Constitutional:   awake, alert, cooperative, no apparent distress, and appears stated age       Eyes:   PERRL, conjunctiva/corneas clear, EOM's intact; no scleral edema or icterus noted        ENT:   Normocephalic, without obvious abnormality, atraumatic, Lips, mucosa, and tongue normal        Hematologic / Lymphatic:   No lymphadenopathy       Lungs:   Normal respiratory effort, no accessory muscle use       Cardiovascular:   Regular rate and rhythm       Abdomen:   Soft, nondistended, nontender to palpation       Musculoskeletal:   No obvious swelling, bruising or deformity       Skin:   Skin color and texture normal for patient, left improvement from previous examination.  Less induration.  Minimal erythema.  No purulent  material appreciated.  No crepitus.             Data:       DO Braden Alarcon DO  General Surgeon  [facility]  Surgery [address] [address]?  Office: [phone]  Employed by - [facility]  Pager: [phone]     Again, thank you for allowing me to participate in the care of your patient.        Sincerely,        Braden Bah DO

## 2024-03-20 ENCOUNTER — OFFICE VISIT (OUTPATIENT)
Dept: PHYSICAL MEDICINE AND REHAB | Facility: CLINIC | Age: 74
End: 2024-03-20
Payer: COMMERCIAL

## 2024-03-20 ENCOUNTER — TELEPHONE (OUTPATIENT)
Dept: FAMILY MEDICINE | Facility: CLINIC | Age: 74
End: 2024-03-20

## 2024-03-20 DIAGNOSIS — M54.42 CHRONIC BILATERAL LOW BACK PAIN WITH BILATERAL SCIATICA: Primary | ICD-10-CM

## 2024-03-20 DIAGNOSIS — M48.061 LUMBAR FORAMINAL STENOSIS: ICD-10-CM

## 2024-03-20 DIAGNOSIS — M54.16 LUMBAR RADICULOPATHY: ICD-10-CM

## 2024-03-20 DIAGNOSIS — M54.41 CHRONIC BILATERAL LOW BACK PAIN WITH BILATERAL SCIATICA: Primary | ICD-10-CM

## 2024-03-20 DIAGNOSIS — M43.16 SPONDYLOLISTHESIS OF LUMBAR REGION: ICD-10-CM

## 2024-03-20 DIAGNOSIS — G89.29 CHRONIC BILATERAL LOW BACK PAIN WITH BILATERAL SCIATICA: Primary | ICD-10-CM

## 2024-03-20 PROCEDURE — 99215 OFFICE O/P EST HI 40 MIN: CPT | Mod: 24 | Performed by: NURSE PRACTITIONER

## 2024-03-20 NOTE — PATIENT INSTRUCTIONS
~Spine Center Scheduling #[phone].  ~Please call our [facility] Spine Nurse Navigation #[phone] with any questions or concerns about your treatment plan, if symptoms worsen and you would like to be seen urgently, or if you have problems controlling bladder and bowel function.  ~For any future flareups or new symptoms, recommend follow-up in clinic or contact the nurse navigator line.  ~Please note that any My Chart messages may take multiple days for a response due to the high volume of patients seen in clinic.  Anything sent Thursday night or after will be answered the following week when able, as Chula Garcia CNP does not work in clinic on Fridays.   ~Chula Garcia CNP is at the [facility] on the first and third Tuesdays of the month only, otherwise primarily at the [facility].        ~You have been referred for Physical Therapy to [facility]. They will call you to schedule an appointment.      Scheduling phone number is [phone] for [facility], or [place] location.  If you have not heard from the scheduling office within 2 business days, please call [phone] for ALL other locations.    Discussed the importance of core strengthening, ROM, stretching exercises and how each of these entities is important in decreasing pain and improving long term spine health.  The purpose of physical therapy is to teach you an individualized home exercise program.  These exercises need to be performed every day in order to decrease pain and prevent future occurrences of pain.           An injection has been ordered today to potentially help with your pain symptoms. These injections do not fix what is going on in your back, therefore they typically do not take away the pain completely, however they can many times help improve symptoms. Injections should always be completed along with other modalities such as physical therapy  for the best long term outcomes. If injections alone are done, then pain will likely return.     [facility] Injection Requirements    A  is required for all fluoroscopically-guided injections.  Injection appointments may be cancelled if there are signs/symptoms of an active infection or if the patient is being actively treated with antibiotics for a diagnosed infection.  Patients may have their steroid injection cancelled if they have had another steroid injection within 2 weeks.  Diabetic patients will have their blood glucose levels checked the day of their injection and the appointment will be rescheduled if the blood glucose level is 300 or higher.  Patients with allergies to cortisone, local anesthetics, iodine, or contrast dye should contact the Spine Center to further discuss these considerations.  Patients scheduled for medial branch block diagnostic injections should refrain from taking pain medication the day of the procedure.  The medial branch block injection appointment will be rescheduled if the patient's pain rating is not 5/10 or greater at the time of the procedure.  Patients taking warfarin/Coumadin will have their INR checked the day of the procedure and the procedure may be rescheduled if the INR is greater than 3.0.  Please contact the Spine Center (#[phone]) if you are taking any prescription blood-thinning medications (warfarin, Plavix, Lovenox, Eliquis, Brilinta, Effient, etc.) as special dosing adjustments may need to be made depending on the type of injection you are scheduled to receive.  It is recommended that you delay having your steroid injection if you have received a flu shot or shingles vaccine within 2 weeks.

## 2024-03-20 NOTE — TELEPHONE ENCOUNTER
Patient Quality Outreach    Patient is due for the following:   Diabetes -  A1C and Eye Exam  Colonoscopy screening,   Zoster vaccine      Next Steps:   Patient has upcoming appointment, these items will be addressed at that time.    Type of outreach:    Phone, left message for patient/parent to call back.  Is patient not going to [facility] for her care?           Questions for provider review:    None           Rubina Beavers MA  Chart routed to care team.

## 2024-03-20 NOTE — LETTER
3/20/2024         RE: Kemi Soto  [address] [place] [id number]        Dear Colleague,    Thank you for referring your patient, Kemi Soto, to the [facility]. Please see a copy of my visit note below.      Assessment:     Diagnoses and all orders for this visit:  Chronic bilateral low back pain with bilateral sciatica  -     PAIN Transforaminal DIANNE Inj Lumbosacral Blair; Future  Lumbar radiculopathy  -     PAIN Transforaminal DIANNE Inj Lumbosacral Blair; Future  Lumbar foraminal stenosis  -     PAIN Transforaminal DIANNE Inj Lumbosacral Blair; Future  Spondylolisthesis of lumbar region     Kemi Soto is a 74 year old y.o. female with past medical history significant for  hypercholesterolemia, type 2 diabetes mellitus, hypertension, atrial fibrillation, history of lumbar surgery for evacuation of abscess who presents today for follow-up regarding:    -Bilateral low back pain with lumbar radiculopathy with 50% relief post bilateral L5-S1 TFESI 3/6/2024.     Plan:     A shared decision making plan was used. The patient's values and choices were respected. Prior medical records were reviewed today. The following represents what was discussed and decided upon by the provider and the patient.        -DIAGNOSTIC TESTS: Images were personally reviewed and interpreted.   --Lumbar spine MRI 7/16/2022 with slight anterolisthesis L4-5 with disc bulging with significant facet arthropathy with mild right foraminal stenosis.  Significant facet arthropathy L5-S1 with lateral recess stenosis.  --Lumbar spine x-ray 4/11/2022 with grade 1 L4-5 spondylolisthesis 3.3 mm in neutral that increases to 8.6 mm in forward flexion.  L5-S1 8 mm spondylolisthesis that increases to 10.5 mm in forward flexion. Chronic laminectomy L2-L5.  Slight disc space narrowing at L2-3.  --Left hip x-ray 6/3/2022 negative for fracture, degenerative changes bilaterally.  Degenerative changes to bilateral SI joints  also noted.  -- Bilateral upper extremity EMG with Dr. Kirk 2019 with no evidence of cervical radiculopathy.    -INTERVENTIONS: Order placed for bilateral L4-5 transforaminal epidural steroid injection to see if we can further relief of lumbar radiculopathy as she does have bilateral foraminal stenosis at this level.  Patient is currently on antibiotics for the next couple of days but then will be off of them.  She is aware that she needs to be completely off of antibiotics and no concern for infection to do the epidural steroid injection.  Also discussed that if she does have any abdominal surgeries coming forward, the injection needs to be at least 2 weeks before or 2 weeks after surgery.    -MEDICATIONS: No changes in medications today   Discussed side effects of medications and proper use. Patient verbalized understanding.    -PHYSICAL THERAPY: patient is planning on starting physical therapy for core strengthening which is highly recommended.  Discussed the importance of core strengthening, ROM, stretching exercises with the patient and how each of these entities is important in decreasing pain.  Explained to the patient that the purpose of physical therapy is to teach the patient a home exercise program.  These exercises need to be performed every day in order to decrease pain and prevent future occurrences of pain.        -PATIENT EDUCATION:  Total time of 46 minutes, on the day of service, spent with the patient, reviewing the chart, placing orders, and documenting.     -FOLLOW UP: Follow-up for injection at the spine center then 2 weeks postinjection  Advised to contact clinic if symptoms worsen or change.    Subjective:     Kemi Soto is a 74 year old female who presents today for follow-up regarding chronic bilateral low back pain that does radiate to bilateral legs which is typically worse first thing in the morning with walking as well as transition from sit to stand, she reports that pain is a  little bit better when she sitting but it is a constant 6/10, a 9 at its worst and a 3 at its best.  Overall she reports about 50% relief with the recent bilateral L5-S1 TFESI and does report that her pain is better but she still has significant pain in her low back and bilateral lower extremities first thing in the morning that is bothersome for her.  She does report some perceived lower extremity weakness but this is intermittent.  She has had multiple falls in the past as well, last at her follow-up visit 2/21/2024.  Denies bowel or bladder loss control.    *Post surgery neurosurgical evaluation with Dr. Cuello 2016 status post evacuation of spontaneous epidural abscess in the lumbar spine lumbar laminectomy with medical management of cervical epidural abscess.     -Treatment to Date: Decompressive lumbar laminectomy L1-L5 for evacuation of abscess 10/11/2015 Dr. Cuello.  3-month antibiotic course post surgery, patient then had a drug-eluting stent placed 4/16/2016     Bilateral L5-S1 TFESI 5/19/2023 with 100% relief with leg pain, no relief with back pain.  Preprocedure pain 8/10, post 5/10.  Bilateral L5-S1 TFESI 3/6/2024 with 50% relief     -Medications:  Gabapentin 300 mg    Patient Active Problem List   Diagnosis     Obesity     Hypercholesterolemia     Essential hypertension     Type 2 diabetes mellitus with diabetic polyneuropathy, with long-term current use of insulin (H)     Benign neoplasm of choroid     Paroxysmal atrial fibrillation (H)     Coronary artery disease of bypass graft of native heart with stable angina pectoris (H24)     Bilateral low back pain without sciatica, unspecified chronicity     Benign adenomatous polyp of large intestine     Bilateral hearing loss     Chronic anticoagulation     History of TIA (transient ischemic attack) and stroke     Peripheral neuropathy     Psoriasis, guttate     Type 2 myocardial infarction due to arrhythmia (H)     Urinary incontinence      Abnormal nuclear stress test     Benign neoplasm of cecum     Benign neoplasm of transverse colon     Generalized anxiety disorder     Ovarian mass, right     Anticoagulation goal of INR 2 to 3       Current Outpatient Medications   Medication     acetaminophen (TYLENOL) 500 MG tablet     gabapentin (NEURONTIN) 300 MG capsule     apixaban ANTICOAGULANT (ELIQUIS ANTICOAGULANT) 5 MG tablet     atorvastatin (LIPITOR) 80 MG tablet     blood glucose (NO BRAND SPECIFIED) test strip     blood glucose monitoring (NO BRAND SPECIFIED) meter device kit     busPIRone (BUSPAR) 15 MG tablet     clobetasol (TEMOVATE) 0.05 % external cream     Continuous Blood Gluc Sensor (DEXCOM G7 SENSOR) MISC     ezetimibe (ZETIA) 10 MG tablet     hydrochlorothiazide (HYDRODIURIL) 12.5 MG tablet     insulin glargine (LANTUS SOLOSTAR) 100 UNIT/ML pen     isosorbide mononitrate (IMDUR) 60 MG 24 hr tablet     ketoconazole (NIZORAL) 2 % external cream     losartan (COZAAR) 100 MG tablet     melatonin 1 MG CAPS     sertraline (ZOLOFT) 100 MG tablet     sotalol (BETAPACE) 80 MG tablet     sulfamethoxazole-trimethoprim (BACTRIM DS) 800-160 MG tablet     thin (NO BRAND SPECIFIED) lancets     triamcinolone (KENALOG) 0.1 % external cream     No current facility-administered medications for this visit.     Facility-Administered Medications Ordered in Other Visits   Medication     dexamethasone PF (DECADRON) injection     iohexol (OMNIPAQUE) 300 mg/mL injection     lidocaine (PF) (XYLOCAINE) 1 % injection       Allergies   Allergen Reactions     Metformin GI Disturbance     Oxycodone Nausea and Vomiting       Past Medical History:   Diagnosis Date     Coronary artery disease      Diabetes mellitus (H)      Discitis of thoracolumbar region 10/10/2015     Encephalopathy 10/14/2015     Epidural abscess 10/10/2015     Hip pain, right      Hyperlipidemia      Sepsis (H) 10/8/2015     Stroke (H) 06/23/2015    Neurology felt that episode was C/W subacute ischemic  stroke     TIA (transient ischemic attack) 6/23/2015     UTI (urinary tract infection)     admitted to [facility] with UTI        Review of Systems  ROS:  Specifically negative for bowel/bladder dysfunction, balance changes, headache, dizziness, foot drop, fevers, chills, appetite changes, nausea/vomiting, unexplained weight loss. Otherwise 13 systems reviewed are negative. Please see the patient's intake questionnaire from today for details.    Reviewed Social, Family, Past Medical and Past Surgical history with patient, no significant changes noted since prior visit.     Objective:     PHYSICAL EXAMINATION:    --CONSTITUTIONAL: Well developed, well nourished, healthy appearing individual.  --PSYCHIATRIC: Appropriate mood and affect. No difficulty interacting due to temper, social withdrawal, or memory issues.  --SKIN: Lumbar region is dry and intact.   --RESPIRATORY: Normal rhythm and effort. No abnormal accessory muscle breathing patterns noted.   --MUSCULOSKELETAL:  Normal lumbar lordosis noted, no lateral shift.  --GROSS MOTOR: Easily arises from a seated position. Gait is non-antalgic  --LUMBAR SPINE:  Inspection reveals no evidence of deformity.       RESULTS:   Imaging: Spine imaging was reviewed today. The images were shown to the patient and the findings were explained using a spine model.      Lumbar spine MRI and flexion-extension x-ray reviewed                      Again, thank you for allowing me to participate in the care of your patient.        Sincerely,        Chula Garcia, CNP

## 2024-03-20 NOTE — PROGRESS NOTES
Assessment:     Diagnoses and all orders for this visit:  Chronic bilateral low back pain with bilateral sciatica  -     PAIN Transforaminal DIANNE Inj Lumbosacral Blair; Future  Lumbar radiculopathy  -     PAIN Transforaminal DIANNE Inj Lumbosacral Blair; Future  Lumbar foraminal stenosis  -     PAIN Transforaminal DIANNE Inj Lumbosacral Blair; Future  Spondylolisthesis of lumbar region     Kemi Soto is a 74 year old y.o. female with past medical history significant for  hypercholesterolemia, type 2 diabetes mellitus, hypertension, atrial fibrillation, history of lumbar surgery for evacuation of abscess who presents today for follow-up regarding:    -Bilateral low back pain with lumbar radiculopathy with 50% relief post bilateral L5-S1 TFESI 3/6/2024.     Plan:     A shared decision making plan was used. The patient's values and choices were respected. Prior medical records were reviewed today. The following represents what was discussed and decided upon by the provider and the patient.        -DIAGNOSTIC TESTS: Images were personally reviewed and interpreted.   --Lumbar spine MRI 7/16/2022 with slight anterolisthesis L4-5 with disc bulging with significant facet arthropathy with mild right foraminal stenosis.  Significant facet arthropathy L5-S1 with lateral recess stenosis.  --Lumbar spine x-ray 4/11/2022 with grade 1 L4-5 spondylolisthesis 3.3 mm in neutral that increases to 8.6 mm in forward flexion.  L5-S1 8 mm spondylolisthesis that increases to 10.5 mm in forward flexion. Chronic laminectomy L2-L5.  Slight disc space narrowing at L2-3.  --Left hip x-ray 6/3/2022 negative for fracture, degenerative changes bilaterally.  Degenerative changes to bilateral SI joints also noted.  -- Bilateral upper extremity EMG with Dr. Kirk 2019 with no evidence of cervical radiculopathy.    -INTERVENTIONS: Order placed for bilateral L4-5 transforaminal epidural steroid injection to see if we can further relief of lumbar  radiculopathy as she does have bilateral foraminal stenosis at this level.  Patient is currently on antibiotics for the next couple of days but then will be off of them.  She is aware that she needs to be completely off of antibiotics and no concern for infection to do the epidural steroid injection.  Also discussed that if she does have any abdominal surgeries coming forward, the injection needs to be at least 2 weeks before or 2 weeks after surgery.    -MEDICATIONS: No changes in medications today   Discussed side effects of medications and proper use. Patient verbalized understanding.    -PHYSICAL THERAPY: patient is planning on starting physical therapy for core strengthening which is highly recommended.  Discussed the importance of core strengthening, ROM, stretching exercises with the patient and how each of these entities is important in decreasing pain.  Explained to the patient that the purpose of physical therapy is to teach the patient a home exercise program.  These exercises need to be performed every day in order to decrease pain and prevent future occurrences of pain.        -PATIENT EDUCATION:  Total time of 46 minutes, on the day of service, spent with the patient, reviewing the chart, placing orders, and documenting.     -FOLLOW UP: Follow-up for injection at the spine center then 2 weeks postinjection  Advised to contact clinic if symptoms worsen or change.    Subjective:     Kemi Soto is a 74 year old female who presents today for follow-up regarding chronic bilateral low back pain that does radiate to bilateral legs which is typically worse first thing in the morning with walking as well as transition from sit to stand, she reports that pain is a little bit better when she sitting but it is a constant 6/10, a 9 at its worst and a 3 at its best.  Overall she reports about 50% relief with the recent bilateral L5-S1 TFESI and does report that her pain is better but she still has significant  pain in her low back and bilateral lower extremities first thing in the morning that is bothersome for her.  She does report some perceived lower extremity weakness but this is intermittent.  She has had multiple falls in the past as well, last at her follow-up visit 2/21/2024.  Denies bowel or bladder loss control.    *Post surgery neurosurgical evaluation with Dr. Cuello 2016 status post evacuation of spontaneous epidural abscess in the lumbar spine lumbar laminectomy with medical management of cervical epidural abscess.     -Treatment to Date: Decompressive lumbar laminectomy L1-L5 for evacuation of abscess 10/11/2015 Dr. Cuello.  3-month antibiotic course post surgery, patient then had a drug-eluting stent placed 4/16/2016     Bilateral L5-S1 TFESI 5/19/2023 with 100% relief with leg pain, no relief with back pain.  Preprocedure pain 8/10, post 5/10.  Bilateral L5-S1 TFESI 3/6/2024 with 50% relief     -Medications:  Gabapentin 300 mg    Patient Active Problem List   Diagnosis    Obesity    Hypercholesterolemia    Essential hypertension    Type 2 diabetes mellitus with diabetic polyneuropathy, with long-term current use of insulin (H)    Benign neoplasm of choroid    Paroxysmal atrial fibrillation (H)    Coronary artery disease of bypass graft of native heart with stable angina pectoris (H24)    Bilateral low back pain without sciatica, unspecified chronicity    Benign adenomatous polyp of large intestine    Bilateral hearing loss    Chronic anticoagulation    History of TIA (transient ischemic attack) and stroke    Peripheral neuropathy    Psoriasis, guttate    Type 2 myocardial infarction due to arrhythmia (H)    Urinary incontinence    Abnormal nuclear stress test    Benign neoplasm of cecum    Benign neoplasm of transverse colon    Generalized anxiety disorder    Ovarian mass, right    Anticoagulation goal of INR 2 to 3       Current Outpatient Medications   Medication    acetaminophen  (TYLENOL) 500 MG tablet    gabapentin (NEURONTIN) 300 MG capsule    apixaban ANTICOAGULANT (ELIQUIS ANTICOAGULANT) 5 MG tablet    atorvastatin (LIPITOR) 80 MG tablet    blood glucose (NO BRAND SPECIFIED) test strip    blood glucose monitoring (NO BRAND SPECIFIED) meter device kit    busPIRone (BUSPAR) 15 MG tablet    clobetasol (TEMOVATE) 0.05 % external cream    Continuous Blood Gluc Sensor (DEXCOM G7 SENSOR) MISC    ezetimibe (ZETIA) 10 MG tablet    hydrochlorothiazide (HYDRODIURIL) 12.5 MG tablet    insulin glargine (LANTUS SOLOSTAR) 100 UNIT/ML pen    isosorbide mononitrate (IMDUR) 60 MG 24 hr tablet    ketoconazole (NIZORAL) 2 % external cream    losartan (COZAAR) 100 MG tablet    melatonin 1 MG CAPS    sertraline (ZOLOFT) 100 MG tablet    sotalol (BETAPACE) 80 MG tablet    sulfamethoxazole-trimethoprim (BACTRIM DS) 800-160 MG tablet    thin (NO BRAND SPECIFIED) lancets    triamcinolone (KENALOG) 0.1 % external cream     No current facility-administered medications for this visit.     Facility-Administered Medications Ordered in Other Visits   Medication    dexamethasone PF (DECADRON) injection    iohexol (OMNIPAQUE) 300 mg/mL injection    lidocaine (PF) (XYLOCAINE) 1 % injection       Allergies   Allergen Reactions    Metformin GI Disturbance    Oxycodone Nausea and Vomiting       Past Medical History:   Diagnosis Date    Coronary artery disease     Diabetes mellitus (H)     Discitis of thoracolumbar region 10/10/2015    Encephalopathy 10/14/2015    Epidural abscess 10/10/2015    Hip pain, right     Hyperlipidemia     Sepsis (H) 10/8/2015    Stroke (H) 06/23/2015    Neurology felt that episode was C/W subacute ischemic stroke    TIA (transient ischemic attack) 6/23/2015    UTI (urinary tract infection)     admitted to [facility] with UTI        Review of Systems  ROS:  Specifically negative for bowel/bladder dysfunction, balance changes, headache, dizziness, foot drop, fevers, chills, appetite changes,  nausea/vomiting, unexplained weight loss. Otherwise 13 systems reviewed are negative. Please see the patient's intake questionnaire from today for details.    Reviewed Social, Family, Past Medical and Past Surgical history with patient, no significant changes noted since prior visit.     Objective:     PHYSICAL EXAMINATION:    --CONSTITUTIONAL: Well developed, well nourished, healthy appearing individual.  --PSYCHIATRIC: Appropriate mood and affect. No difficulty interacting due to temper, social withdrawal, or memory issues.  --SKIN: Lumbar region is dry and intact.   --RESPIRATORY: Normal rhythm and effort. No abnormal accessory muscle breathing patterns noted.   --MUSCULOSKELETAL:  Normal lumbar lordosis noted, no lateral shift.  --GROSS MOTOR: Easily arises from a seated position. Gait is non-antalgic  --LUMBAR SPINE:  Inspection reveals no evidence of deformity.       RESULTS:   Imaging: Spine imaging was reviewed today. The images were shown to the patient and the findings were explained using a spine model.      Lumbar spine MRI and flexion-extension x-ray reviewed

## 2024-03-27 ENCOUNTER — VIRTUAL VISIT (OUTPATIENT)
Dept: ONCOLOGY | Facility: CLINIC | Age: 74
End: 2024-03-27
Attending: OBSTETRICS & GYNECOLOGY
Payer: COMMERCIAL

## 2024-03-27 ENCOUNTER — TELEPHONE (OUTPATIENT)
Dept: FAMILY MEDICINE | Facility: CLINIC | Age: 74
End: 2024-03-27
Payer: COMMERCIAL

## 2024-03-27 VITALS — HEIGHT: 62 IN | WEIGHT: 162 LBS | BODY MASS INDEX: 29.81 KG/M2

## 2024-03-27 DIAGNOSIS — Z79.4 TYPE 2 DIABETES MELLITUS WITH HYPERGLYCEMIA, WITH LONG-TERM CURRENT USE OF INSULIN (H): ICD-10-CM

## 2024-03-27 DIAGNOSIS — E11.65 TYPE 2 DIABETES MELLITUS WITH HYPERGLYCEMIA, WITH LONG-TERM CURRENT USE OF INSULIN (H): ICD-10-CM

## 2024-03-27 DIAGNOSIS — N83.8 OVARIAN MASS, RIGHT: Primary | ICD-10-CM

## 2024-03-27 DIAGNOSIS — E11.42 TYPE 2 DIABETES MELLITUS WITH DIABETIC POLYNEUROPATHY, WITH LONG-TERM CURRENT USE OF INSULIN (H): Primary | ICD-10-CM

## 2024-03-27 DIAGNOSIS — Z79.4 TYPE 2 DIABETES MELLITUS WITH DIABETIC POLYNEUROPATHY, WITH LONG-TERM CURRENT USE OF INSULIN (H): Primary | ICD-10-CM

## 2024-03-27 DIAGNOSIS — I25.10 CORONARY ARTERY DISEASE INVOLVING NATIVE CORONARY ARTERY OF NATIVE HEART WITHOUT ANGINA PECTORIS: ICD-10-CM

## 2024-03-27 PROCEDURE — 99214 OFFICE O/P EST MOD 30 MIN: CPT | Mod: 95 | Performed by: OBSTETRICS & GYNECOLOGY

## 2024-03-27 ASSESSMENT — PAIN SCALES - GENERAL: PAINLEVEL: SEVERE PAIN (6)

## 2024-03-27 NOTE — NURSING NOTE
Patient denies any changes since echeck-in regarding medication and allergies and states all information entered during echeck-in remains accurate.    Is the patient currently in the state of [place]? YES    Visit mode:VIDEO    If the visit is dropped, the patient can be reconnected by: VIDEO VISIT: Text to cell phone:   Telephone Information:   Mobile [phone]       Will anyone else be joining the visit? NO  (If patient encounters technical issues they should call [phone])    How would you like to obtain your AVS? MyChart    Are changes needed to the allergy or medication list? No, Pt stated no changes to allergies, and Pt stated no med changes    Reason for visit: Follow Up (3 month follow up to discuss recent imaging results. Pt is concerned about growth on uterus. Medications/allergies reviewed by pt via Mychart, pt states she has finished course of antibiotics for mass growth. )    Pepe Bermudez, Visit Facilitator/MA.

## 2024-03-27 NOTE — PROGRESS NOTES
"Virtual Visit Details    Type of service:  Video Visit   Video time: 30 min    Originating Location (pt. Location): Home    Distant Location (provider location):  On-site  Platform used for Video Visit: [facility]    Gynecologic Oncology Clinic - Return Patient    Patient: Kemi Soto  : 1950    Date of Visit: Mar 27, 2024     Reason for visit: ovarian mass    History of Present Illness:  Kemi Soto is a 74 year old patient with PMH notable for CAD s/p bypass and multiple stents, Type 2 diabetes with elevated A1c, TIA here due to incidentally noted ovarian mass.     On a research study for plant based diet related to her chronic medical conditions. Overall that is going well and she is enjoying it. Has PCP appt to check HgbA1c next month. Otherwise no changes to her medical history. She did have a cardiac cath last fall due to abnormal pharmacologic stress test which   - known native CAD, patent L-LAD, occluded small dLCx is new since  but appears to be chronic; normal L-sided filling pressures  - no good targets for PCI, continue medical therapy      Physical Exam:   Ht 1.575 m (5' 2\")   Wt 73.5 kg (162 lb)   LMP  (LMP Unknown)   BMI 29.63 kg/m    Gen: no acute distress    Labs/Pathology:    Latest Reference Range & Units 23 14:22    <=38 U/mL 15   Cancer Antigen 19-9 <=35 U/mL <2   CEA ng/mL 3.0      Latest Reference Range & Units 23 14:52   Hemoglobin A1C 0.0 - 5.6 % 9.5 (H)   (H): Data is abnormally high    Imaging:   Narrative & Impression   EXAM: MR PELVIS (GYN) W/O and W CONTRAST  LOCATION: [facility]  DATE: 12/10/2023     INDICATION: Adnexal mass.  COMPARISON: CT AP 10/20/2023.  TECHNIQUE: Routine MRI female pelvis protocol including T1 in/out phase, diffusion, thin section high resolution multiplane T2, and post contrast T1.  CONTRAST: Gadavist 8mL.     FINDINGS:      UTERUS: Uterus measures 7 x 4 x 3 cm. Cervix is unremarkable. A 1.1 cm " subserosal fibroid posterior uterine fundus and three subcentimeter intramural fibroids.      ENDOMETRIUM: Normal smooth homogeneous endometrium measures 2 mm in double AP thickness. The junctional zone is within normal limits.     RIGHT ADNEXA: Complex cystic right ovarian mass 9.3 cm cc, 8.7 cm AP and 6.7 cm ML. Along the posterior margin of the mass is a longitudinal 7 x 2 x 2 cm. The septations and cystic component.] 2 shows some slow enhancement. The mass is adherent to the   right lateral and posterior aspect of the uterus.     LEFT ADNEXA: No left adnexal mass. Unremarkable left ovary measures 2.8 cm CC, 2.9 cm AP and 1.7 cm ML.     ADDITIONAL FINDINGS: No significant incidental findings. No adenopathy. Trace amount of fluid in the pelvic cul-de-sac.                                                                      IMPRESSION:  1.  Indeterminate 9.3 x 8.7 x 6.7 cm complex cystic right ovarian mass. Recommend gynecologic oncology surgical consultation.  2.  Left ovary unremarkable.  3.  Trace amount of fluid in the pelvic cul-de-sac.  4.  A few diminutive uterine fibroids.  5.  Uterus otherwise unremarkable.     CT 10/20/2023  PELVIC ORGANS: Right adnexal 8 x 5.8 cm multiloculated fluid density lesion immediately adjacent the the uterus. This could arise from the right ovary, or represent a degenerated subserosal uterine leiomyoma. No ascites or omental cake to suggest   neoplastic spread.                                                                     IMPRESSION:   1.  Left abdominal wall subcutaneous 2.5 cm probable phlegmon and cellulitis.  2.  Right adnexal lesion which could arise from ovary or uterus. Ovarian neoplasm possible. Pelvic MRI recommended.  3.  Left adrenal 1.7 cm nodule. Adenoma most likely. Recommend comparison with any prior CT. If none available, 3 month adrenal CT follow-up recommended.      10/2015: 2.5cm right ovarian lesion on CT scan.   On my review of these images I think  this measures 3.3x4.5cm.    Assessment:  Kemi Soto is a 74 year old patient with PMH notable for CAD s/p bypass and multiple stents, Type 2 diabetes with elevated A1c, TIA here due to incidentally noted ovarian mass which has enlarged in size since last imaging.     Plan:   Ovarian cyst:  - Cyst is complex in post-menopausal patient and given the interval change in size, I do recommend considering surgical resection. I know there are risks given her cardiac history, biggest being aliyah-operative cardiac event or stroke. Also increased risks of infection with diabetes and delayed wound healing due to both. Surgery would be at least laparoscopic removal of both tubes and ovaries. Could consider not doing hysterectomy to decrease surgical time and risk, but imaging suggests the mass may be connected to the uterus, thus may want to just plan for hysterectomy. Surgery is done laparoscopically. Usually an outpatient procedure vs. Overnight observation given cardiac history.   - Alternative would be continued monitoring and moving to surgery only if continued growth or change is seen.   - We will move forward with planning for surgery while she thinks about the options. She would need surgical clearance by PAC    I spent a total of 30 minutes on the care of Kemi Soto on the day of service including face to face time, care coordination, and documentation on the day of service.

## 2024-03-27 NOTE — TELEPHONE ENCOUNTER
General Call    Contacts         Type Contact Phone/Fax    03/27/2024 12:20 PM CDT Phone (Incoming) Ciarra Soto (Self) [phone] (M)          Reason for Call:  Orders for labs - [facility]. Needs to do an A1c prior to possible surgery that's coming up. She sees Magdiels on 04/12 and would like the lab sooner. Can an order be put in? Pt is aware dr sarbjit is out and will have to wait.    Could we send this information to you in [employer] or would you prefer to receive a phone call?:   Patient would prefer a phone call   Okay to leave a detailed message?: No at Cell number on file:    Telephone Information:   Mobile [phone]

## 2024-03-27 NOTE — NURSING NOTE
Return appt in  3 months    Pelvic ultrasound orders entered.to be done prior to md appt    Check out note sent to scheduling 3/27/24

## 2024-03-27 NOTE — LETTER
"    3/27/2024         RE: Kemi Soto  [address] [facility] [address]        Dear Colleague,    Thank you for referring your patient, Kemi Soto, to the [facility]. Please see a copy of my visit note below.      Gynecologic Oncology Clinic - Return Patient    Patient: Kemi Soto  : 1950    Date of Visit: Mar 27, 2024     Reason for visit: ovarian mass    History of Present Illness:  Kemi Soto is a 74 year old patient with PMH notable for CAD s/p bypass and multiple stents, Type 2 diabetes with elevated A1c, TIA here due to incidentally noted ovarian mass.     On a research study for plant based diet related to her chronic medical conditions. Overall that is going well and she is enjoying it. Has PCP appt to check HgbA1c next month. Otherwise no changes to her medical history. She did have a cardiac cath last fall due to abnormal pharmacologic stress test which   - known native CAD, patent L-LAD, occluded small dLCx is new since  but appears to be chronic; normal L-sided filling pressures  - no good targets for PCI, continue medical therapy      Physical Exam:   Ht 1.575 m (5' 2\")   Wt 73.5 kg (162 lb)   LMP  (LMP Unknown)   BMI 29.63 kg/m    Gen: no acute distress    Labs/Pathology:    Latest Reference Range & Units 23 14:22    <=38 U/mL 15   Cancer Antigen 19-9 <=35 U/mL <2   CEA ng/mL 3.0      Latest Reference Range & Units 23 14:52   Hemoglobin A1C 0.0 - 5.6 % 9.5 (H)   (H): Data is abnormally high    Imaging:   Narrative & Impression   EXAM: MR PELVIS (GYN) W/O and W CONTRAST  LOCATION: [facility]  DATE: 12/10/2023     INDICATION: Adnexal mass.  COMPARISON: CT AP 10/20/2023.  TECHNIQUE: Routine MRI female pelvis protocol including T1 in/out phase, diffusion, thin section high resolution multiplane T2, and post contrast T1.  CONTRAST: Gadavist 8mL.     FINDINGS:      UTERUS: Uterus measures 7 x 4 x 3 " cm. Cervix is unremarkable. A 1.1 cm subserosal fibroid posterior uterine fundus and three subcentimeter intramural fibroids.      ENDOMETRIUM: Normal smooth homogeneous endometrium measures 2 mm in double AP thickness. The junctional zone is within normal limits.     RIGHT ADNEXA: Complex cystic right ovarian mass 9.3 cm cc, 8.7 cm AP and 6.7 cm ML. Along the posterior margin of the mass is a longitudinal 7 x 2 x 2 cm. The septations and cystic component.] 2 shows some slow enhancement. The mass is adherent to the   right lateral and posterior aspect of the uterus.     LEFT ADNEXA: No left adnexal mass. Unremarkable left ovary measures 2.8 cm CC, 2.9 cm AP and 1.7 cm ML.     ADDITIONAL FINDINGS: No significant incidental findings. No adenopathy. Trace amount of fluid in the pelvic cul-de-sac.                                                                      IMPRESSION:  1.  Indeterminate 9.3 x 8.7 x 6.7 cm complex cystic right ovarian mass. Recommend gynecologic oncology surgical consultation.  2.  Left ovary unremarkable.  3.  Trace amount of fluid in the pelvic cul-de-sac.  4.  A few diminutive uterine fibroids.  5.  Uterus otherwise unremarkable.     CT 10/20/2023  PELVIC ORGANS: Right adnexal 8 x 5.8 cm multiloculated fluid density lesion immediately adjacent the the uterus. This could arise from the right ovary, or represent a degenerated subserosal uterine leiomyoma. No ascites or omental cake to suggest   neoplastic spread.                                                                     IMPRESSION:   1.  Left abdominal wall subcutaneous 2.5 cm probable phlegmon and cellulitis.  2.  Right adnexal lesion which could arise from ovary or uterus. Ovarian neoplasm possible. Pelvic MRI recommended.  3.  Left adrenal 1.7 cm nodule. Adenoma most likely. Recommend comparison with any prior CT. If none available, 3 month adrenal CT follow-up recommended.      10/2015: 2.5cm right ovarian lesion on CT scan.   On  my review of these images I think this measures 3.3x4.5cm.    Assessment:  Kemi Soto is a 74 year old patient with PMH notable for CAD s/p bypass and multiple stents, Type 2 diabetes with elevated A1c, TIA here due to incidentally noted ovarian mass which has enlarged in size since last imaging.     Plan:   Ovarian cyst:  - Cyst is complex in post-menopausal patient and given the interval change in size, I do recommend considering surgical resection. I know there are risks given her cardiac history, biggest being aliyah-operative cardiac event or stroke. Also increased risks of infection with diabetes and delayed wound healing due to both. Surgery would be at least laparoscopic removal of both tubes and ovaries. Could consider not doing hysterectomy to decrease surgical time and risk, but imaging suggests the mass may be connected to the uterus, thus may want to just plan for hysterectomy. Surgery is done laparoscopically. Usually an outpatient procedure vs. Overnight observation given cardiac history.   - Alternative would be continued monitoring and moving to surgery only if continued growth or change is seen.   - We will move forward with planning for surgery while she thinks about the options. She would need surgical clearance by PAC    I spent a total of 30 minutes on the care of Kemi Soto on the day of service including face to face time, care coordination, and documentation on the day of service.       Again, thank you for allowing me to participate in the care of your patient.        Sincerely,        Livan Mcgrath MD

## 2024-03-27 NOTE — PATIENT INSTRUCTIONS
It was a pleasure seeing you in clinic today-please reach out if there are any further questions that arise following today's visit.  During business hours, you may reach me at [phone].  For urgent/emergent questions after business hours, you may reach the on-call Gynecologic Oncology Resident through the [facility]  at [phone].    All normal test results are usually communicated via letter or [employer] message.  Abnormal results (those that require a change in the previously discussed plan of care) are usually communicated via a phone call.    I recommend signing up for Sling access if you have not already done so.  This allows you online access to your lab results and also helps you communicate efficiently with your clinic should any questions arise in your care.    Follow up appointment in 3 months with MD scheduling will call you to help make an appointment  referral to radiology for pelvic ultrasound, scheduling will call you to help make an appointment      Dr Kenji Gaffney RN  Phone:  [phone]  Fax:  [phone]

## 2024-03-28 RX ORDER — HEPARIN SODIUM 5000 [USP'U]/.5ML
5000 INJECTION, SOLUTION INTRAVENOUS; SUBCUTANEOUS
Status: CANCELLED | OUTPATIENT
Start: 2024-03-28

## 2024-03-28 RX ORDER — CEFAZOLIN SODIUM 2 G/50ML
2 SOLUTION INTRAVENOUS SEE ADMIN INSTRUCTIONS
Status: CANCELLED | OUTPATIENT
Start: 2024-03-28

## 2024-03-28 RX ORDER — METRONIDAZOLE 500 MG/100ML
500 INJECTION, SOLUTION INTRAVENOUS
Status: CANCELLED | OUTPATIENT
Start: 2024-03-28

## 2024-03-28 RX ORDER — CEFAZOLIN SODIUM 2 G/50ML
2 SOLUTION INTRAVENOUS
Status: CANCELLED | OUTPATIENT
Start: 2024-03-28

## 2024-03-28 NOTE — TELEPHONE ENCOUNTER
Attempted to call patient regarding lab results/provider message. LMTCB  Upon patient call back, OKAY to relay results/message per provider.    [facility], March 28, 2024, 1:24 PM

## 2024-03-29 ENCOUNTER — LAB (OUTPATIENT)
Dept: LAB | Facility: CLINIC | Age: 74
End: 2024-03-29
Payer: COMMERCIAL

## 2024-03-29 DIAGNOSIS — Z79.4 TYPE 2 DIABETES MELLITUS WITH DIABETIC POLYNEUROPATHY, WITH LONG-TERM CURRENT USE OF INSULIN (H): ICD-10-CM

## 2024-03-29 DIAGNOSIS — E11.42 TYPE 2 DIABETES MELLITUS WITH DIABETIC POLYNEUROPATHY, WITH LONG-TERM CURRENT USE OF INSULIN (H): ICD-10-CM

## 2024-03-29 LAB
ALBUMIN SERPL BCG-MCNC: 4.1 G/DL (ref 3.5–5.2)
ALP SERPL-CCNC: 73 U/L (ref 40–150)
ALT SERPL W P-5'-P-CCNC: 23 U/L (ref 0–50)
ANION GAP SERPL CALCULATED.3IONS-SCNC: 9 MMOL/L (ref 7–15)
AST SERPL W P-5'-P-CCNC: 25 U/L (ref 0–45)
BILIRUB SERPL-MCNC: 0.3 MG/DL
BUN SERPL-MCNC: 17.8 MG/DL (ref 8–23)
CALCIUM SERPL-MCNC: 9.6 MG/DL (ref 8.8–10.2)
CHLORIDE SERPL-SCNC: 98 MMOL/L (ref 98–107)
CHOLEST SERPL-MCNC: 96 MG/DL
CREAT SERPL-MCNC: 1.09 MG/DL (ref 0.51–0.95)
CREAT UR-MCNC: 60.4 MG/DL
DEPRECATED HCO3 PLAS-SCNC: 27 MMOL/L (ref 22–29)
EGFRCR SERPLBLD CKD-EPI 2021: 53 ML/MIN/1.73M2
FASTING STATUS PATIENT QL REPORTED: ABNORMAL
GLUCOSE SERPL-MCNC: 98 MG/DL (ref 70–99)
HBA1C MFR BLD: 8.1 % (ref 0–5.6)
HDLC SERPL-MCNC: 34 MG/DL
LDLC SERPL CALC-MCNC: 36 MG/DL
MICROALBUMIN UR-MCNC: <12 MG/L
MICROALBUMIN/CREAT UR: NORMAL MG/G{CREAT}
NONHDLC SERPL-MCNC: 62 MG/DL
POTASSIUM SERPL-SCNC: 4.7 MMOL/L (ref 3.4–5.3)
PROT SERPL-MCNC: 6.5 G/DL (ref 6.4–8.3)
SODIUM SERPL-SCNC: 134 MMOL/L (ref 135–145)
TRIGL SERPL-MCNC: 128 MG/DL

## 2024-03-29 PROCEDURE — 80061 LIPID PANEL: CPT

## 2024-03-29 PROCEDURE — 83036 HEMOGLOBIN GLYCOSYLATED A1C: CPT

## 2024-03-29 PROCEDURE — 82043 UR ALBUMIN QUANTITATIVE: CPT

## 2024-03-29 PROCEDURE — 36415 COLL VENOUS BLD VENIPUNCTURE: CPT

## 2024-03-29 PROCEDURE — 80053 COMPREHEN METABOLIC PANEL: CPT

## 2024-03-29 PROCEDURE — 82570 ASSAY OF URINE CREATININE: CPT

## 2024-04-04 ENCOUNTER — THERAPY VISIT (OUTPATIENT)
Dept: PHYSICAL THERAPY | Facility: REHABILITATION | Age: 74
End: 2024-04-04
Attending: NURSE PRACTITIONER
Payer: COMMERCIAL

## 2024-04-04 DIAGNOSIS — M54.16 LUMBAR RADICULOPATHY: ICD-10-CM

## 2024-04-04 DIAGNOSIS — M48.061 LUMBAR FORAMINAL STENOSIS: ICD-10-CM

## 2024-04-04 DIAGNOSIS — M47.816 LUMBAR FACET ARTHROPATHY: ICD-10-CM

## 2024-04-04 DIAGNOSIS — M43.16 SPONDYLOLISTHESIS OF LUMBAR REGION: ICD-10-CM

## 2024-04-04 DIAGNOSIS — G89.29 CHRONIC BILATERAL LOW BACK PAIN WITH BILATERAL SCIATICA: ICD-10-CM

## 2024-04-04 DIAGNOSIS — M54.41 CHRONIC BILATERAL LOW BACK PAIN WITH BILATERAL SCIATICA: ICD-10-CM

## 2024-04-04 DIAGNOSIS — M54.42 CHRONIC BILATERAL LOW BACK PAIN WITH BILATERAL SCIATICA: ICD-10-CM

## 2024-04-04 PROCEDURE — 97161 PT EVAL LOW COMPLEX 20 MIN: CPT | Mod: GP

## 2024-04-04 PROCEDURE — 97110 THERAPEUTIC EXERCISES: CPT | Mod: GP

## 2024-04-04 NOTE — PROGRESS NOTES
"PHYSICAL THERAPY EVALUATION  Type of Visit: Evaluation    See electronic medical record for Abuse and Falls Screening details.    Subjective       Fall history: One fall resulted from sandal breaking and tripping her. The other fall occurred on the as she was preparing to go to the spine clinic in February of this year. She reports that her legs gave out while walking down stairs. Then after walking a few more steps, she fell again. Both of them occurred without any reason, her \"legs just gave out.\"  She presents to PT reporting long history of back pain that started approximately 45 years ago due to several falls/near falls. She eventually had a lumbar surgery due an infection in her spine in Fall of 2015. She reports needing \"to learn how to walk again\" following the surgery. She reported doing pretty good with mild back pain up until about 2 years ago, when she had a fall due to tripping on sandals. Later that day she had significant pain in her low back and down her left leg to the knee. Not long before the fall she had tried lifting a heavy suitcase which irritated her back. She went to the ED who referred her to the spine clinic. Ever since then she was been dealing with varying degrees of back pain. She notes significant decreases in activity levels (specifically walking) since then.  She got an injection in February, 2024, which provided approximately 50% relief. She had another injection scheduled for yesterday, but her insurance denied it, and she plans on appealing it. She now presents to PT to work on her strength, mobility, and pain. She said she has extreme difficulty walking first thing in the morning, which gradually gets easier as the day progresses. She reports bilateral posterior thigh pain as well. She denies any numbness or tingling in her legs. She reports long-standing urinary frequency/urgency. She reports increasing difficulty with bowel/and bladder control, though she said that is likely " related to a growing right ovarian/uterine tumor, which is what her OB/oncologist attributes the changes to as well. Worst pain: 10/10. Best pain: 0/10. Current pain: 0/10. She finds some relief with Tylenol and other prescription medications, as well as heat. She reports that extended standing or sitting increases her pain. She has used a walker and cane in the past, but she currently ambulates without an assistive device.  Presenting condition or subjective complaint: I walk with a walker when i first get up.   I saw RENE Roachlissetteemelyn due to a fall almost 2 years ago.My pain has gotten worse so has my mobility. I had a bad fall in February.  I got an injection that helped some.  Bc deniecd a 2nd one for 4/3/24 .  Date of onset: 02/21/24 (Date recorded is date of referral, though patient reports long history of back pain with worsening symptoms over the last several years.)    Relevant medical history: Arthritis; Diabetes; Hearing problems; Heart problems; High blood pressure; Incontinence; Migraines or headaches; Osteoarthritis; Overweight; Pain at night or rest; Stroke   Dates & types of surgery: Multiple   Triple bypass 2007 , stents, back surgery , appendix removed age 6 or 7    Prior diagnostic imaging/testing results: MRI; CT scan     Prior therapy history for the same diagnosis, illness or injury: Yes      Prior Level of Function  Transfers: Independent  Ambulation: Independent, Assistive equipment, walker only first thing in the morning; then independent  ADL: Independent  IADL:  indepenedent    Living Environment  Social support: Alone   Type of home: House; 1 level; Basement   Stairs to enter the home: Yes 2 Is there a railing: No   Ramp: No   Stairs inside the home: Yes 8 Is there a railing: Yes   Help at home: None  Equipment owned: Four-point cane; Walker with wheels     Employment: No    Hobbies/Interests: Reading, travel , my pets , visiting family & friends, bible study    Patient goals for therapy: I  izzy like to be able to walk longer and not have so much pain.    Pain assessment:  see subjective report     Objective      Cognitive Status Examination  Orientation: Oriented to person, place and time   Level of Consciousness: Alert  Follows Commands and Answers Questions: 100% of the time, Follows multi step instructions  Personal Safety and Judgement: Intact  Memory: Intact    OBSERVATION:   INTEGUMENTARY:   POSTURE:   PALPATION:   RANGE OF MOTION:   STRENGTH:     BED MOBILITY:     TRANSFERS:     WHEELCHAIR MOBILITY:     GAIT:   Level of Irwin:   Assistive Device(s):   Gait Deviations:   Gait Distance:   Stairs:     BALANCE:         SENSATION:     REFLEXES:   COORDINATION:   MUSCLE TONE:       LUMBAR SPINE EVALUATION  PAIN: see subjective report  INTEGUMENTARY (edema, incisions):   POSTURE:   GAIT:   Weightbearing Status:   Assistive Device(s): None  Gait Deviations: Base of support increased  Stride length decreased  Erica decreased  BALANCE/PROPRIOCEPTION:   WEIGHTBEARING ALIGNMENT:   NON-WEIGHTBEARING ALIGNMENT:    ROM:   (Degrees) Left AROM Left PROM  Right AROM Right PROM   Hip Flexion       Hip Extension       Hip Abduction       Hip Adduction       Hip Internal Rotation       Hip External Rotation       Knee Flexion       Knee Extension       Lumbar Side glide     Lumbar Flexion Knees (apprehensive)   Lumbar Extension WNL   Lumbar Side-bending Right: proximal thigh. Left: mid-thigh.   Lumbar Rotation Bilateral: 50% to 75%   Pain:   End feel:   PELVIC/SI SCREEN:   STRENGTH:   - Hip adduction and knee flexion: 5/5 bilateral  - Hip abduction: right 5/5, left 5-/5  MYOTOMES:    Left Right   T12-L3 (Hip Flexion) 4 4+   L2-4 (Quads)  5 5   L4 (Ankle DF) 5- 5   L5 (Great Toe Ext) 4+ 5   S1 (Toe Raise) 5 5     DTR S:    Left Right   C5 (Biceps)     C6 (Brachioradialis)     C7 (Triceps)     L4 (Quad) 3 2   S1 (Achilles) 0 0     CORD SIGNS: Granados negative L, Granados negative R, ankle clonus negative  bilaterally  DERMATOMES: WNL  NEURAL TENSION:   FLEXIBILITY:   LUMBAR/HIP Special Tests:    PELVIS/SI SPECIAL TESTS:   FUNCTIONAL TESTS:   PALPATION:   SPINAL SEGMENTAL CONCLUSIONS:       Assessment & Plan   CLINICAL IMPRESSIONS  Medical Diagnosis: Chronic bilateral low back pain with bilateral sciatica  Lumbar radiculopathy  Spondylolisthesis of lumbar region  Lumbar foraminal stenosis  Lumbar facet arthropathy    Treatment Diagnosis: Low back pain with left leg pain and impaired functional balance, gait, mobility, and endurance   Impression/Assessment: Patient is a 74 year old female with chronic low back and left leg pain complaints and related balance and gait impairments.  The following significant findings have been identified: Pain, Decreased ROM/flexibility, Decreased strength, Impaired balance, Impaired gait, Impaired muscle performance, and Decreased activity tolerance. These impairments interfere with their ability to perform self care tasks, recreational activities, household chores, and community mobility as compared to previous level of function.     Clinical Decision Making (Complexity):  Clinical Presentation: Evolving/Changing  Clinical Presentation Rationale: based on medical and personal factors listed in PT evaluation  Clinical Decision Making (Complexity): Low complexity    PLAN OF CARE  Treatment Interventions:  Modalities: Cryotherapy, E-stim, Hot Pack  Interventions: Gait Training, Manual Therapy, Neuromuscular Re-education, Therapeutic Activity, Therapeutic Exercise, Self-Care/Home Management, Standardized Testing    Long Term Goals     PT Goal 1  Goal Identifier: HEP  Goal Description: Ciarra will demonstrate mastery of (yiosyl-xw-hi need for cueing) and compliance with (completion on at least 5/7 days a week) her home exercise program to facilitate increased rate of improvement.  Goal Progress: In progress  Target Date: 05/02/24  PT Goal 2  Goal Identifier: LATRICIA  Goal Description: Ciarra will  demonstrate improved function as evidenced by an improved (decreased) LATRICIA score of less than or equal to 5%.  Goal Progress: 27.5%  Target Date: 06/13/24  PT Goal 3  Goal Identifier: Walking  Goal Description: Ciarra will demonstrate improved tolerance to functional mobility as evidenced by her ability to walk for up to 30 minutes with self-report low back and leg pain no more than 2/10.  Goal Progress: Unable  Target Date: 06/13/24  PT Goal 4  Goal Identifier: FGA  Goal Description: Will assess at next visit.  Goal Progress: Will assess at next visit.  Target Date: 06/13/24      Frequency of Treatment: 1 time per week  Duration of Treatment: 10 weeks    Recommended Referrals to Other Professionals:  none  Education Assessment:   Learner/Method: Patient;Listening;Demonstration;Pictures/Video;No Barriers to Learning    Risks and benefits of evaluation/treatment have been explained.   Patient/Family/caregiver agrees with Plan of Care.     Evaluation Time:     PT Eval, Low Complexity Minutes (12650): 34       Signing Clinician: Twin Garcia, PT      [facility]                                                                                   OUTPATIENT PHYSICAL THERAPY      PLAN OF TREATMENT FOR OUTPATIENT REHABILITATION   Patient's Last Name, First Name, THEORonalBlaise Burnetten  E YOB: 1950   Provider's Name   [facility]   Medical Record No.  [id number]     Onset Date: 02/21/24 (Date recorded is date of referral, though patient reports long history of back pain with worsening symptoms over the last several years.)  Start of Care Date: 04/04/24     Medical Diagnosis:  Chronic bilateral low back pain with bilateral sciatica  Lumbar radiculopathy  Spondylolisthesis of lumbar region  Lumbar foraminal stenosis  Lumbar facet arthropathy      PT Treatment Diagnosis:  Low back pain with left leg pain and impaired functional balance, gait, mobility, and  endurance Plan of Treatment  Frequency/Duration: 1 time per week/ 10 weeks    Certification date from 04/04/24 to 06/13/24         See note for plan of treatment details and functional goals     Twin Garcia, PT                         I CERTIFY THE NEED FOR THESE SERVICES FURNISHED UNDER        THIS PLAN OF TREATMENT AND WHILE UNDER MY CARE     (Physician attestation of this document indicates review and certification of the therapy plan).              Referring Provider:  Chula Garcia    Initial Assessment  See Epic Evaluation- Start of Care Date: 04/04/24

## 2024-04-08 ENCOUNTER — PATIENT OUTREACH (OUTPATIENT)
Dept: ONCOLOGY | Facility: CLINIC | Age: 74
End: 2024-04-08
Payer: COMMERCIAL

## 2024-04-09 NOTE — PROGRESS NOTES
ANTICOAGULATION  MANAGEMENT: Discharge Review    Kemi Soto chart reviewed for anticoagulation continuity of care    Emergency room visit on 6/3/2022   for left knee abrasion sustained after a fall.   presents to the Emergency Department for evaluation of left knee, left buttocks and left hip pain.  She sustained a fall onto cement steps yesterday causing an abrasion and pain over the left knee, and has had acute on chronic left buttocks/sciatic pain which acutely worsened after her fall.    Discharge disposition: Home    Results:    No results for input(s): INR, [id number], F2, ALMWH, AAUFH in the last 168 hours.  Anticoagulation inpatient management:     not applicable     Anticoagulation discharge instructions:     Warfarin dosing: home regimen continued   Bridging: No   INR goal change: No      Medication changes affecting anticoagulation: No    Additional factors affecting anticoagulation: Yes: pain    Plan     No adjustment to anticoagulation plan needed    Recommended follow up is scheduled  Patient not contacted    No adjustment to Anticoagulation Calendar was required    Cony Noonan RN     Yes

## 2024-04-11 NOTE — PROGRESS NOTES
[facility]      OUTPATIENT OCCUPATIONAL THERAPY  EVALUATION  PLAN OF TREATMENT FOR OUTPATIENT REHABILITATION  (COMPLETE FOR INITIAL CLAIMS ONLY)  Patient's Last Name, First Name, M.I.  YOB: 1938  Jori Humphreys                          Provider's Name  [facility] Medical Record No.  [id number]                               Onset Date:  09/03/22   Start of Care Date:  09/05/22     Type:     ___PT   _X_OT   ___SLP Medical Diagnosis:                           OT Diagnosis:  decreased fxl independence/act megan d/t pain and decreased act megan.   Visits from SOC:  1   _________________________________________________________________________________  Plan of Treatment/Functional Goals    Planned Interventions: ADL retraining, transfer training   Goals: See Occupational Therapy Goals on Care Plan in [employer] electronic health record.    Therapy Frequency: Daily  Predicted Duration of Therapy Intervention: 09/09/22  _________________________________________________________________________________    I CERTIFY THE NEED FOR THESE SERVICES FURNISHED UNDER        THIS PLAN OF TREATMENT AND WHILE UNDER MY CARE     (Physician co-signature of this document indicates review and certification of the therapy plan).              Certification date from: 09/05/22, Certification date to: 10/05/22    Referring Physician: Dr. Ricky Gordon            Initial Assessment        See Occupational Therapy evaluation dated 09/05/22 in [employer] electronic health record.                   Spoke with pt   Explained will get information to md  Inquired what daughter might like to know so can give md the information  Per pt daughter wants to discuss surgery  issues    Pt wonders when surgery date might be set up   Reviewed md note and there is conflict with information  Will clarify with md  It appears orders for surgery have been entered  Pt wants to move forward with surgery as mass has enlarged and been there for a long time  Is ok with first available  Will send request to surgery scheduler they will call pt to set up date  Pt also let us know she is getting a 2nd opinion per daughters request    Will get message to md of call request

## 2024-04-11 NOTE — PROGRESS NOTES
Pt left message on voice mail  Daughter Tory would like to talk to md regarding treatment plan  Pt gave verbal permission

## 2024-04-12 ENCOUNTER — OFFICE VISIT (OUTPATIENT)
Dept: FAMILY MEDICINE | Facility: CLINIC | Age: 74
End: 2024-04-12
Attending: FAMILY MEDICINE
Payer: COMMERCIAL

## 2024-04-12 VITALS
RESPIRATION RATE: 16 BRPM | OXYGEN SATURATION: 99 % | HEART RATE: 84 BPM | BODY MASS INDEX: 29.81 KG/M2 | WEIGHT: 162 LBS | DIASTOLIC BLOOD PRESSURE: 64 MMHG | TEMPERATURE: 98 F | HEIGHT: 62 IN | SYSTOLIC BLOOD PRESSURE: 137 MMHG

## 2024-04-12 DIAGNOSIS — N18.31 CHRONIC KIDNEY DISEASE, STAGE 3A (H): ICD-10-CM

## 2024-04-12 DIAGNOSIS — E11.42 TYPE 2 DIABETES MELLITUS WITH DIABETIC POLYNEUROPATHY, WITH LONG-TERM CURRENT USE OF INSULIN (H): Primary | ICD-10-CM

## 2024-04-12 DIAGNOSIS — Z79.4 TYPE 2 DIABETES MELLITUS WITH DIABETIC POLYNEUROPATHY, WITH LONG-TERM CURRENT USE OF INSULIN (H): Primary | ICD-10-CM

## 2024-04-12 DIAGNOSIS — E78.00 HYPERCHOLESTEROLEMIA: ICD-10-CM

## 2024-04-12 PROCEDURE — G2211 COMPLEX E/M VISIT ADD ON: HCPCS | Performed by: FAMILY MEDICINE

## 2024-04-12 PROCEDURE — 99214 OFFICE O/P EST MOD 30 MIN: CPT | Performed by: FAMILY MEDICINE

## 2024-04-12 RX ORDER — RESPIRATORY SYNCYTIAL VIRUS VACCINE 120MCG/0.5
0.5 KIT INTRAMUSCULAR ONCE
Qty: 1 EACH | Refills: 0 | Status: CANCELLED | OUTPATIENT
Start: 2024-04-12 | End: 2024-04-12

## 2024-04-12 ASSESSMENT — PAIN SCALES - GENERAL: PAINLEVEL: NO PAIN (0)

## 2024-04-12 NOTE — PROGRESS NOTES
Assessment & Plan     Type 2 diabetes mellitus with diabetic polyneuropathy, with long-term current use of insulin (H)  Improving. Continue ongoing dietary changes. Follow up in 3 months with labs.  - Comprehensive metabolic panel; Future  - Hemoglobin A1c; Future  - Lipid Profile; Future  - Albumin Random Urine Quantitative with Creat Ratio; Future    Hypercholesterolemia  Controlled LDL. Continue atorvastatin and Zetia.    Chronic kidney disease, stage 3a (H)  Stable. Continue to monitor.        The longitudinal plan of care for the diagnosis(es)/condition(s) as documented were addressed during this visit. Due to the added complexity in care, I will continue to support Ciarra in the subsequent management and with ongoing continuity of care.            Subjective   Ciarra is a 74 year old, presenting for the following health issues:  Follow Up (3 month follow up. 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, Visit Facilitator     History of Present Illness       Diabetes:   She presents for follow up of diabetes.    She is not checking blood glucose.         She has no concerns regarding her diabetes at this time.  She is having numbness in feet.  The patient has not had a diabetic eye exam in the last 12 months.              DM2/HLD/CKD3: She is doing dietary modification because she is an diabetic study group. We have decreased Lantus 26 units twice daily because of hypoglycemia to 14 twice daily, no hypoglycemia. A1c improved from 9.5 to 8.1, LDL 36, GFR 53. She is on atorvastatin 80 mg daily and Zetia 10 mg daily. She is doing plant based diet, has lost 13 pounds in 2 months.              Review of Systems  Constitutional, HEENT, cardiovascular, pulmonary, gi and gu systems are negative,  "except as otherwise noted.      Objective    /64 (BP Location: Left arm, Patient Position: Sitting, Cuff Size: Adult Regular)   Pulse 84   Temp 98  F (36.7  C) (Oral)   Resp 16   Ht 1.575 m (5' 2\")   Wt 73.5 kg (162 lb)   LMP  (LMP Unknown)   SpO2 99%   BMI 29.63 kg/m    Body mass index is 29.63 kg/m .  Physical Exam   GENERAL: alert and no distress  PSYCH: mentation appears normal, affect normal/bright            Signed Electronically by: Zaheer Sandra MD    "

## 2024-04-15 ENCOUNTER — TELEPHONE (OUTPATIENT)
Dept: SURGERY | Facility: CLINIC | Age: 74
End: 2024-04-15

## 2024-04-15 ENCOUNTER — TELEPHONE (OUTPATIENT)
Dept: ONCOLOGY | Facility: CLINIC | Age: 74
End: 2024-04-15

## 2024-04-15 ENCOUNTER — HOSPITAL ENCOUNTER (OUTPATIENT)
Facility: CLINIC | Age: 74
End: 2024-04-15
Attending: OBSTETRICS & GYNECOLOGY | Admitting: OBSTETRICS & GYNECOLOGY
Payer: COMMERCIAL

## 2024-04-15 ENCOUNTER — PATIENT OUTREACH (OUTPATIENT)
Dept: CARE COORDINATION | Facility: CLINIC | Age: 74
End: 2024-04-15

## 2024-04-15 ENCOUNTER — THERAPY VISIT (OUTPATIENT)
Dept: PHYSICAL THERAPY | Facility: REHABILITATION | Age: 74
End: 2024-04-15
Payer: COMMERCIAL

## 2024-04-15 DIAGNOSIS — M43.16 SPONDYLOLISTHESIS OF LUMBAR REGION: ICD-10-CM

## 2024-04-15 DIAGNOSIS — M47.816 LUMBAR FACET ARTHROPATHY: ICD-10-CM

## 2024-04-15 DIAGNOSIS — M54.42 CHRONIC BILATERAL LOW BACK PAIN WITH BILATERAL SCIATICA: Primary | ICD-10-CM

## 2024-04-15 DIAGNOSIS — G89.29 CHRONIC BILATERAL LOW BACK PAIN WITH BILATERAL SCIATICA: Primary | ICD-10-CM

## 2024-04-15 DIAGNOSIS — M54.41 CHRONIC BILATERAL LOW BACK PAIN WITH BILATERAL SCIATICA: Primary | ICD-10-CM

## 2024-04-15 DIAGNOSIS — M54.16 LUMBAR RADICULOPATHY: ICD-10-CM

## 2024-04-15 DIAGNOSIS — M48.061 LUMBAR FORAMINAL STENOSIS: ICD-10-CM

## 2024-04-15 PROCEDURE — 97750 PHYSICAL PERFORMANCE TEST: CPT | Mod: GP

## 2024-04-15 PROCEDURE — 97110 THERAPEUTIC EXERCISES: CPT | Mod: GP

## 2024-04-15 NOTE — TELEPHONE ENCOUNTER
Spoke with the patient and was able to confirm all scheduled information.     Patient is scheduled for surgery with: Dr. Mcgrath    Surgery Date: 5/10 (will try to move to a later time in the day)     Location: [facility]    H&P: to be completed by PAC clinic - scheduled on 4/16     Post-op: will be scheduled by the clinic    Patient will receive surgery arrival and start time from PAC. Patient is aware that if times change after they see PAC, they will receive a call from the pre-admission nurses 1-2 days prior to surgery with updated arrival time and NPO instructions. Surgery schedulers will see if surgery can be scheduled later in the day on 5/10   Patient aware times are subject to change up until day before surgery.     Patient questions/concerns: N/A     Surgery packet was provided to patient during appointment (patient stated that PAC provided the surgery packet.)      Amna Gatica on 4/15/2024 at 11:43 AM

## 2024-04-15 NOTE — TELEPHONE ENCOUNTER
FUTURE VISIT INFORMATION      SURGERY INFORMATION:  TBD with Dr. Mcgrath   Consult: virtual visit    RECORDS REQUESTED FROM:       Primary Care Provider:   Zaheer Sandra MD- [facility]     Pertinent Medical History: hypertension, paroxysmal atrial fibrillation    Most recent EKG+ Tracing: 10/20/23    Most recent ECHO: 10/3/23    Most recent Cardiac Stress Test: 9/29/23    Most recent Coronary Angiogram: 10/11/23

## 2024-04-16 ENCOUNTER — PRE VISIT (OUTPATIENT)
Dept: SURGERY | Facility: CLINIC | Age: 74
End: 2024-04-16

## 2024-04-16 ENCOUNTER — VIRTUAL VISIT (OUTPATIENT)
Dept: SURGERY | Facility: CLINIC | Age: 74
End: 2024-04-16
Payer: COMMERCIAL

## 2024-04-16 VITALS — HEIGHT: 62 IN | WEIGHT: 162 LBS | BODY MASS INDEX: 29.81 KG/M2

## 2024-04-16 DIAGNOSIS — N83.8 OVARIAN MASS: ICD-10-CM

## 2024-04-16 DIAGNOSIS — Z01.818 PREOP EXAMINATION: Primary | ICD-10-CM

## 2024-04-16 PROCEDURE — 99205 OFFICE O/P NEW HI 60 MIN: CPT | Mod: 95 | Performed by: NURSE PRACTITIONER

## 2024-04-16 RX ORDER — NAPROXEN 500 MG/1
500 TABLET ORAL 2 TIMES DAILY WITH MEALS
COMMUNITY

## 2024-04-16 ASSESSMENT — ENCOUNTER SYMPTOMS
DYSRHYTHMIAS: 1
ORTHOPNEA: 0

## 2024-04-16 ASSESSMENT — LIFESTYLE VARIABLES: TOBACCO_USE: 1

## 2024-04-16 NOTE — PROGRESS NOTES
Ciarra is a 74 year old who is being evaluated via a billable video visit.    Manoj Parker   Ciarra is a 74 year old, presenting for the following health issues:  Pre-Op Exam          SUSHMA Holley LPN

## 2024-04-16 NOTE — PATIENT INSTRUCTIONS
Preparing for Your Surgery      Name:  Kemi Soto   MRN:  [id number]   :  1950   Today's Date:  2024       Arriving for surgery:  Surgery date:  5/10/24  Arrival time:  8.30AM    Please come to:     Please come to:       [facility]    [address] [place]  [address]     The [facility] ([facility] [place] Patient/Visitor Ramp is at [address]. Patients and visitors who self-park will receive the reduced hospital parking rate. If the Patient /Visitor Ramp is full, please follow the signs to the [facility] car park located at the main hospital entrance.       parking is available (24 hours/ 7 days a week)      Discounted parking pass options are available for patients and visitors. They can be purchased at the [employer] desk at the main hospital entrance.     -    Stop at the security desk and they will direct surgery patients to the Surgery Check in and Family [facility]. [phone]        - If you need directions, a wheelchair or an escort please stop at the Information/security desk in the lobby.     What can I eat or drink?  -  You may eat and drink normally up to 8 hours prior to arrival time. (Until 12.30AM)  -  You may have clear liquids until 2 hours prior to arrival time. (Until 6.30AM)    Examples of clear liquids:  Water  Clear broth  Juices (apple, white grape, white cranberry  and cider) without pulp  Noncarbonated, powder based beverages  (lemonade and Chase-Aid)  Sodas (Sprite, 7-Up, ginger ale and seltzer)  Coffee or tea (without milk or cream)  Gatorade    -  No Alcohol or cannabis products for at least 24 hours before surgery.     Which medicines can I take?    Hold Aspirin for 7 days before surgery.   Hold Multivitamins for 7 days before surgery.  Hold Supplements for 7 days before surgery.  Hold Ibuprofen (Advil, Motrin) for 1 day(s) before surgery--unless otherwise  directed by surgeon.  Hold Naproxen, Naprosyn (Aleve) for 4 days before surgery.    Hold Eliquis for 3 days before surgery. Last dose is 5/7/24AM  Take ONLY 80% on insulin Glargine (Lantus Solostar) the morning of your surgery.    -  DO NOT take these medications the day of surgery:  Ezetimibe (Zetia), Hydrochlorothiazide (Hydrodiuril), Losartan (Cozaar), External cream.     -  PLEASE TAKE these medications the day of surgery:  Tylenol as needed. Buspirone (Buspar), Gabapentin, Imdur, Sertraline (Zoloft), Sotalol (Betapace).    How do I prepare myself?  - Please take 2 showers (one the night prior to surgery and one the morning of surgery) using Scrubcare or Hibiclens soap.    Use this soap only from the neck to your toes.     Leave the soap on your skin for one minute--then rinse thoroughly.      You may use your own shampoo and conditioner. No other hair products.   - Please remove all jewelry and body piercings.  - No lotions, deodorants or fragrance.  - No makeup or fingernail polish.   - Bring your ID and insurance card.    -If you use a CPAP machine, please bring the CPAP machine, tubing, and mask to hospital.    -If you have a Deep Brain Stimulator, Spinal Cord Stimulator, or any Neuro Stimulator device---you must bring the remote control to the hospital.      ALL PATIENTS GOING HOME THE SAME DAY OF SURGERY ARE REQUIRED TO HAVE A RESPONSIBLE ADULT TO DRIVE AND BE IN ATTENDANCE WITH THEM FOR 24 HOURS FOLLOWING SURGERY.    Covid testing policy as of 12/06/2022  Your surgeon will notify and schedule you for a COVID test if one is needed before surgery--please direct any questions or COVID symptoms to your surgeon      Questions or Concerns:    - For any questions regarding the day of surgery or your hospital stay, please contact the Pre Admission Nursing Office at [phone].       - If you have health changes between today and your surgery, please call your surgeon.       - For questions after surgery, please  call your surgeons office.           Current Visitor Guidelines    You may have 2 visitors in the pre op area.    Visiting hours: 8 a.m. to 8:30 p.m.    Patients confirmed or suspected to have symptoms of COVID 19 or flu:     No visitors allowed for adult patients.   Children (under age 18) can have 1 named visitor.     People who are sick or showing symptoms of COVID 19 or flu:    Are not allowed to visit patients--we can only make exceptions in special situations.       Please follow these guidelines for your visit:          Please maintain social distance          Masking is optional--however at times you may be asked to wear a mask for the safety of yourself and others     Clean your hands with alcohol hand . Do this when you arrive at and leave the building and patient room,    And again after you touch your mask or anything in the room.     Go directly to and from the room you are visiting.     Stay in the patient s room during your visit. Limit going to other places in the hospital as much as possible     Leave bags and jackets at home or in the car.     For everyone s health, please don t come and go during your visit. That includes for smoking   during your visit.

## 2024-04-16 NOTE — H&P
Pre-Operative H & P     CC:  Preoperative exam to assess for increased cardiopulmonary risk while undergoing surgery and anesthesia.    Date of Encounter: 4/16/2024  Primary Care Physician:  Zaheer Sanrda     Reason for visit:   Encounter Diagnoses   Name Primary?    Preop examination Yes    Ovarian mass        HPI  Kemi Soto is a 74 year old female who presents for pre-operative H & P in preparation for  Procedure Information       Case: [id number] Date/Time: 05/10/24 1030    Procedure: Total laparoscopic hysterectomy, bilateral salpingo-oophorectomy, possible staging surgery (Bilateral: Abdomen)    Anesthesia type: General with Block    Diagnosis:       Ovarian mass, right [N83.8]      Type 2 diabetes mellitus with hyperglycemia, with long-term current use of insulin (H) [E11.65, Z79.4]      Coronary artery disease involving native coronary artery of native heart without angina pectoris [I25.10]    Pre-op diagnosis:       Ovarian mass, right [N83.8]      Type 2 diabetes mellitus with hyperglycemia, with long-term current use of insulin (H) [E11.65, Z79.4]      Coronary artery disease involving native coronary artery of native heart without angina pectoris [I25.10]    Location:  OR  /  OR    Providers: Livan Mcgrath MD            Kemi Soto is a 74 year old female with CAD, hypertension, hyperlipidemia, a-fib/flutter, history of TIA, history of CVA, chronic back pain, diabetes, CKD, left adrenal nodule, anxiety, depression and alcohol dependence in remission that has an ovarian mass.  The patient notes that she had a right ovarian cyst noted on abdominal imaging in 2015 and thinks this current ovarian mass might be what was seen then.  More recently a right adnexal lesion was seen on imaging in October 2023 and again on a CT scan done on a pelvic US done on 3/11/24.  The most recent imaging noted that it had increased in size from previous.  She denies any current abdominal/pelvic symptoms or  vaginal bleeding.  She has consulted with Dr. Mcgrath and the above listed procedure has now been recommended for further evaluation and treatment.     History is obtained from the patient and chart review    Hx of abnormal bleeding or anti-platelet use: none    Menstrual history: No LMP recorded (lmp unknown). Patient is postmenopausal.:      Past Medical History  Past Medical History:   Diagnosis Date    Adrenal nodule (H24)     Alcohol dependence in remission (H)     Anxiety and depression     Benign essential hypertension     Chronic atrial fibrillation (H)     Chronic back pain     Coronary artery disease     Diabetes mellitus (H)     Discitis of thoracolumbar region 10/10/2015    Encephalopathy 10/14/2015    Epidural abscess 10/10/2015    Hip pain, right     Hyperlipidemia     Ovarian mass     Sepsis (H) 10/08/2015    Stroke (H) 06/23/2015    Neurology felt that episode was C/W subacute ischemic stroke    TIA (transient ischemic attack) 06/23/2015    UTI (urinary tract infection)     admitted to [facility] with UTI       Past Surgical History  Past Surgical History:   Procedure Laterality Date    ANGIOPLASTY  03/30/2016    Drug eluting stent mid LAD, cutting balloon to 1st diagonal    ANGIOPLASTY  01/01/2008    BYPASS GRAFT ARTERY CORONARY  12/11/2007    X 3 vessels, LIMA to LAD, saph vein graft to distal RCA, saphvein graft to marginal, mini circuit bypass.  [facility].    CORONARY STENT PLACEMENT  01/01/2008    Left main, left circumflex, RCA    CORONARY STENT PLACEMENT  03/01/2016    CV ANGIOGRAM CORONARY GRAFT N/A 10/11/2023    Procedure: Coronary Angiogram Graft;  Surgeon: Sam Boo MD;  Location: [facility] CATH LAB CV    CV CORONARY ANGIOGRAM N/A 08/01/2018    Procedure: Coronary Angiogram;  Surgeon: Kit Bean MD;  Location: [facility] Cath Lab;  Service:     CV LEFT HEART CATHETERIZATION WITH LEFT VENTRICULOGRAM N/A 08/01/2018    Procedure: Left Heart Catheterization with  Left Ventriculogram;  Surgeon: Kit Bean MD;  Location: [facility] Cath Lab;  Service:     INSERT MIDLINE HE  10/31/2015         LUMBAR DISCECTOMY N/A 10/11/2015    BILATERAL L1-L5 DECOMPRESSIVE LAMINECTOMY WITH EVACUATION OF EPIDURAL ABSCESS IRRIGATION & DEBRIDEMENT;  Surgeon: Luli Chan MD;  Location: [facility] OR; Due to sepsis    RELEASE CARPAL TUNNEL Bilateral     UMBILICAL HERNIA REPAIR         Prior to Admission Medications  Current Outpatient Medications   Medication Sig Dispense Refill    acetaminophen (TYLENOL) 500 MG tablet Take 1,000 mg by mouth every 12 hours as needed for mild pain      apixaban ANTICOAGULANT (ELIQUIS ANTICOAGULANT) 5 MG tablet Take 1 tablet (5 mg) by mouth 2 times daily 60 tablet 11    atorvastatin (LIPITOR) 80 MG tablet Take 1 tablet (80 mg) by mouth At Bedtime 90 tablet 3    busPIRone (BUSPAR) 15 MG tablet Take 1 tablet (15 mg) by mouth daily (Patient taking differently: Take 15 mg by mouth every morning) 90 tablet 3    ezetimibe (ZETIA) 10 MG tablet Take 1 tablet (10 mg) by mouth daily (Patient taking differently: Take 10 mg by mouth every morning) 90 tablet 3    gabapentin (NEURONTIN) 300 MG capsule TAKE 3 CAPSULES BY MOUTH EVERY DAY AT BEDTIME (Patient taking differently: Take 900 mg by mouth at bedtime TAKE 3 CAPSULES BY MOUTH EVERY DAY AT BEDTIME) 270 capsule 3    hydrochlorothiazide (HYDRODIURIL) 12.5 MG tablet Take 1 tablet by mouth once daily (Patient taking differently: Take 12.5 mg by mouth every morning) 90 tablet 0    insulin glargine (LANTUS SOLOSTAR) 100 UNIT/ML pen Inject 14 Units Subcutaneous 2 times daily      isosorbide mononitrate (IMDUR) 60 MG 24 hr tablet Take 1 tablet (60 mg) by mouth daily (Patient taking differently: Take 60 mg by mouth every morning) 90 tablet 3    losartan (COZAAR) 100 MG tablet Take 1 tablet (100 mg) by mouth daily (Patient taking differently: Take 100 mg by mouth every morning) 90 tablet 3    naproxen  (NAPROSYN) 500 MG tablet Take 500 mg by mouth 2 times daily (with meals)      sertraline (ZOLOFT) 100 MG tablet Take 1 tablet (100 mg) by mouth daily (Patient taking differently: Take 100 mg by mouth every morning) 90 tablet 3    sotalol (BETAPACE) 80 MG tablet Take 0.5 tablets (40 mg) by mouth 2 times daily 90 tablet 3    blood glucose (NO BRAND SPECIFIED) test strip Use to test blood sugar 1 times daily or as directed. To accompany: Blood Glucose Monitor Brands: per insurance. 100 strip 6    blood glucose monitoring (NO BRAND SPECIFIED) meter device kit Use to test blood sugar 1 times daily or as directed. Preferred blood glucose meter OR supplies to accompany: Blood Glucose Monitor Brands: per insurance. 1 kit 0    clobetasol (TEMOVATE) 0.05 % external cream Apply topically 2 times daily      ketoconazole (NIZORAL) 2 % external cream APPLY 1 APPLICATION TOPICALLY TWICE DAILY TO AFFECTED AREAS FOR 4 WEEKS THEN AS NEEDED      thin (NO BRAND SPECIFIED) lancets Use with lanceting device. To accompany: Blood Glucose Monitor Brands: per insurance. 100 each 6    triamcinolone (KENALOG) 0.1 % external cream Apply topically 2 times daily         Allergies  Allergies   Allergen Reactions    Metformin GI Disturbance    Oxycodone Nausea and Vomiting       Social History  Social History     Socioeconomic History    Marital status:      Spouse name: Not on file    Number of children: 1    Years of education: Not on file    Highest education level: Not on file   Occupational History    Occupation: retired   Tobacco Use    Smoking status: Former     Current packs/day: 0.00     Types: Cigarettes     Quit date: 2007     Years since quittin.8     Passive exposure: Past    Smokeless tobacco: Never   Vaping Use    Vaping status: Never Used   Substance and Sexual Activity    Alcohol use: Not Currently     Comment: Stopped drinking 2015    Drug use: No    Sexual activity: Never   Other Topics Concern    Not on file    Social History Narrative    Lives alone with cats and dogs. , one daughter, Tory Lofton.      Social Determinants of Health     Financial Resource Strain: Low Risk  (1/5/2024)    Financial Resource Strain     Within the past 12 months, have you or your family members you live with been unable to get utilities (heat, electricity) when it was really needed?: No   Food Insecurity: Low Risk  (1/5/2024)    Food Insecurity     Within the past 12 months, did you worry that your food would run out before you got money to buy more?: No     Within the past 12 months, did the food you bought just not last and you didn t have money to get more?: No   Transportation Needs: Low Risk  (1/5/2024)    Transportation Needs     Within the past 12 months, has lack of transportation kept you from medical appointments, getting your medicines, non-medical meetings or appointments, work, or from getting things that you need?: No   Physical Activity: Not on file   Stress: Not on file   Social Connections: Not on file   Interpersonal Safety: Low Risk  (4/12/2024)    Interpersonal Safety     Do you feel physically and emotionally safe where you currently live?: Yes     Within the past 12 months, have you been hit, slapped, kicked or otherwise physically hurt by someone?: No     Within the past 12 months, have you been humiliated or emotionally abused in other ways by your partner or ex-partner?: No   Housing Stability: Low Risk  (1/5/2024)    Housing Stability     Do you have housing? : Yes     Are you worried about losing your housing?: No       Family History  Family History   Problem Relation Age of Onset    Cerebrovascular Disease Mother     Diabetes Father     Coronary Artery Disease Father     Diabetes Sister     Cerebrovascular Disease Sister 81    Cerebrovascular Disease Brother     Diabetes Brother     Cancer Paternal Grandfather     Anesthesia Reaction No family hx of     Thrombosis No family hx of        Review of  Systems  The complete review of systems is negative other than noted in the HPI or here.   Anesthesia Evaluation   Pt has had prior anesthetic.     No history of anesthetic complications       ROS/MED HX  ENT/Pulmonary:     (+)     SUMMER risk factors, snores loudly, hypertension,         tobacco use, Past use,                    (-) recent URI   Neurologic:     (+)    peripheral neuropathy, - hands.     CVA, date: summer 2015, without deficits,  TIA, date: prior to 2015,                 Cardiovascular:     (+) Dyslipidemia hypertension- -  CAD -  CABG-date: 2007. stent-last 2018. multiple Drug Eluting Stent. Taking blood thinners                     dysrhythmias, a-fib and a-flutter,        Previous cardiac testing   Echo: Date: 10/3/23 Results:  Interpretation Summary     Left ventricular size, wall motion and function are normal. The ejection  fraction is 60-65%.  Normal right ventricle size and systolic function.  No hemodynamically significant valvular abnormalities on 2D or color flow  imaging.    Stress Test:  Date: 9/29/23 Results:     1.The nuclear stress test is abnormal.     2.Positive pharmacological regadenoson ECG for ischemia with 2-1/2 to 3 mm of ST segment depression in leads II, III, aVF, and V4 through V6.     3.There is a small area of mild ischemia in the distal anterolateral segment(s) of the left ventricle.  No scar seen.     4.The left ventricular ejection fraction at stress is 63%.  Postthoracotomy septal hypokinesis noted.     5.Stress to rest cavity ratio is 1.34.     6.The patient is at an intermediate risk of future cardiac ischemic events.     A prior study was conducted on 11/6/2020.  The small area of distal lateral ischemia is new.    ECG Reviewed:  Date: 10/20/23 Results:    Sinus rhythm  Septal infarct , age undetermined  ST & T wave abnormality, consider anterolateral ischemia  Abnormal ECG      Cath:  Date: 10/11/2023 Results:  Kemi Soto is a 73 year old old female with CAD,  s/p CABG/multiple stents, most recent in '18 to Lcx, TIA, Afib, DM, HLD, HTN who is here for w/up of an abnormal Nuc.     - known native CAD, patent L-LAD, occluded small dLCx is new since '18 but appears to be chronic; normal L-sided filling pressures  - no good targets for PCI, continue medical therapy  - continue warfarin, rosuva   - continue aggressive risk factor modification              Findings:  LM:no obstruction  LAD: moderate diffuse Ca2+ disease throughout mid-vessel, occluded mid-vessel, fills distally via patent L-LAD  Lcx:proximal mild ISR, mid-vessel occluded stents  RCA:dominant, diffusely stented, occluded mid-vessel     Grafts:  L-LAD: patent, 40-50% narrowing downstream unchanged from prior angio  V's: known occluded   (-) MAYBERRY and orthopnea/PND   METS/Exercise Tolerance: 3 - Able to walk 1-2 blocks without stopping Comment: Not very physically active due to chronic back pain, but does walk at [facility] with a cart to do her grocery shopping.      Denies any exertional dyspnea or angina.     Hematologic:  - neg hematologic  ROS     Musculoskeletal: Comment: History of fall in Feb 2024 - uses a walker only in the morning.     Chronic back pain      GI/Hepatic:  - neg GI/hepatic ROS     Renal/Genitourinary: Comment: Ovarian mass    (+) renal disease, type: CRI,            Endo: Comment: Urinary incontinence    Left adrenal nodule    (+)  type II DM, Last HgA1c: 8.1, date: 3/29/24, Using insulin, - not using insulin pump.   Diabetic complications: nephropathy neuropathy.             Psychiatric/Substance Use:     (+) psychiatric history anxiety and depression alcohol abuse      Infectious Disease:  - neg infectious disease ROS     Malignancy:  - neg malignancy ROS     Other: Comment: Chronic left middle abdominal cyst - reports area is firm, skin is intact, no drainage.       (+)  , H/O Chronic Pain,         Virtual visit -  No vitals were obtained    Physical Exam  Constitutional: Awake, alert,  cooperative, no apparent distress, and appears stated age.  Eyes: Pupils equal  HENT: Normocephalic  Respiratory: non labored breathing   Neurologic: Awake, alert, oriented to name, place and time.   Neuropsychiatric: Calm, cooperative. Normal affect.      Prior Labs/Diagnostic Studies   All labs and imaging personally reviewed     EKG/ stress test - if available please see in ROS above     Component      Latest Ref Rng 1/12/2024  12:08 PM 3/29/2024  10:35 AM   WBC      4.0 - 11.0 10e3/uL 7.1     RBC Count      3.80 - 5.20 10e6/uL 4.07     Hemoglobin      11.7 - 15.7 g/dL 12.3     Hematocrit      35.0 - 47.0 % 37.5     MCV      78 - 100 fL 92     MCH      26.5 - 33.0 pg 30.2     MCHC      31.5 - 36.5 g/dL 32.8     RDW      10.0 - 15.0 % 12.8     Platelet Count      150 - 450 10e3/uL 364     % Neutrophils      % 64     % Lymphocytes      % 28     % Monocytes      % 6     % Eosinophils      % 2     % Basophils      % 1     % Immature Granulocytes      % 0     Absolute Neutrophils      1.6 - 8.3 10e3/uL 4.6     Absolute Lymphocytes      0.8 - 5.3 10e3/uL 2.0     Absolute Monocytes      0.0 - 1.3 10e3/uL 0.4     Absolute Eosinophils      0.0 - 0.7 10e3/uL 0.1     Absolute Basophils      0.0 - 0.2 10e3/uL 0.1     Absolute Immature Granulocytes      <=0.4 10e3/uL 0.0     Sodium      135 - 145 mmol/L  134 (L)    Potassium      3.4 - 5.3 mmol/L  4.7    Carbon Dioxide (CO2)      22 - 29 mmol/L  27    Anion Gap      7 - 15 mmol/L  9    Urea Nitrogen      8.0 - 23.0 mg/dL  17.8    Creatinine      0.51 - 0.95 mg/dL  1.09 (H)    GFR Estimate      >60 mL/min/1.73m2  53 (L)    Calcium      8.8 - 10.2 mg/dL  9.6    Chloride      98 - 107 mmol/L  98    Glucose      70 - 99 mg/dL  98    Alkaline Phosphatase      40 - 150 U/L  73    AST      0 - 45 U/L  25    ALT      0 - 50 U/L  23    Protein Total      6.4 - 8.3 g/dL  6.5    Albumin      3.5 - 5.2 g/dL  4.1    Bilirubin Total      <=1.2 mg/dL  0.3    Hemoglobin A1C      0.0 - 5.6 %   "8.1 (H)       Legend:  (L) Low  (H) High    The patient's records and results personally reviewed by this provider.     Outside records reviewed from: Care Everywhere      Assessment    Kemi Soto is a 74 year old female seen as a PAC referral for risk assessment and optimization for anesthesia.    Plan/Recommendations  Pt will be optimized for the proposed procedure.  See below for details on the assessment, risk, and preoperative recommendations    NEUROLOGY  - History of TIA and CVA    -Post Op delirium risk factors:  Age and High co-morbid index    ENT  - No current airway concerns.  Will need to be reassessed day of surgery.  Mallampati: Unable to assess  TM: Unable to assess    CARDIAC  - following with Dr. Hernandez in cardiology for a-fib/flutter, CAD, hypertension and hyperlipidemia.  She is s/p prior CABG in 2007 and multiple coronary stents (last in 2018).  Cardiac testing was updated this past fall (see all above) and per Dr. Hernandez no further interventional procedures are indicated and she is to continue \"aggressive risk modification.\"  She denies any concerning cardiac symptoms at this time.  - Hold losartan and hydrochlorothiazide DOS.  -ChadsVasc = 8    - METS (Metabolic Equivalents)  Patient CANNOT perform 4 METS exercise without symptoms             Total Score: 1    Functional Capacity: Unable to complete 4 METS      RCRI-High risk: Class 4  >11% complication reate             Total Score: 3    RCRI: CAD    RCRI: Cerebrovascular Disease     RCRI: Diabetes        PULMONARY    SUMMER Medium Risk             Total Score: 3    SUMMER: Snores loudly    SUMMER: Hypertension    SUMMER: Over 50 ys old      - Denies asthma or inhaler use  - Tobacco History    History   Smoking Status    Former    Types: Cigarettes    Quit date: 6/23/2007   Smokeless Tobacco    Never       GI  - denies GERD  PONV High Risk  Total Score: 3           1 AN PONV: Pt is Female    1 AN PONV: Patient is not a current smoker    1 AN PONV: " "Intended Post Op Opioids        /RENAL  - CKD - most recent creatinine 1.09    ENDOCRINE    - BMI: Estimated body mass index is 29.63 kg/m  as calculated from the following:    Height as of this encounter: 1.575 m (5' 2\").    Weight as of this encounter: 73.5 kg (162 lb).  Overweight (BMI 25.0-29.9)  - Diabetes  Diabetes Type 2, insulin dependent. Take 80% of last scheduled dose of long-acting insulin prior to procedure.  Recommend close monitoring of the patient's blood glucose levels throughout the perioperative period.    HEME  VTE Low Risk 0.26%             Total Score: 1    VTE: Greater than 59 yrs old      - last dose of eliquis on 5/7/24 for a full 72 hours (possible neuraxial anesthesia and CrCl <50)      MSK  - chronic back pain.  Consider cautious positioning.  - history of falls.  Consider fall risk precautions.     PSYCH  - continue mental health meds without interruption.  - alcohol dependence in remission since 2015        Different anesthesia methods/types have been discussed with the patient, but they are aware that the final plan will be decided by the assigned anesthesia provider on the date of service.  Patient was discussed with Dr Greco    The patient is optimized for their procedure. AVS with information on surgery time/arrival time, meds and NPO status given by nursing staff. No further diagnostic testing indicated.    Please refer to the physical examination documented by the anesthesiologist in the anesthesia record on the day of surgery.    Video-Visit Details    Type of service:  Video Visit    Provider received verbal consent for a Video Visit from the patient? Yes   Video Start Time:  1357  Video End Time: 1423    Originating Location (pt. Location): Home    Distant Location (provider location):  Off-site  Mode of Communication:  Video Conference via XMOS    On the day of service:     Prep time: 21 minutes  Visit time: 26 minutes  Documentation time: 20 " minutes  ------------------------------------------  Total time: 67 minutes      NANI Reynoso CNP  Preoperative Assessment Center  [facility]  Clinic and Surgery Center  Phone: [phone]  Fax: [phone]

## 2024-04-17 ENCOUNTER — PATIENT OUTREACH (OUTPATIENT)
Dept: ONCOLOGY | Facility: CLINIC | Age: 74
End: 2024-04-17
Payer: COMMERCIAL

## 2024-04-17 ENCOUNTER — TELEPHONE (OUTPATIENT)
Dept: ONCOLOGY | Facility: CLINIC | Age: 74
End: 2024-04-17
Payer: COMMERCIAL

## 2024-04-17 NOTE — TELEPHONE ENCOUNTER
Attempted to reach the patient's daughter to discuss surgery, per patient's request. Left paging system call back number, otherwise I will try to reach her tomorrow.     Livan Mcgrath MD

## 2024-04-17 NOTE — PROGRESS NOTES
Patient left a VM and called triage     Patient stated in her VM that she was told by MD she would 100% stay over night and when she saw PAC she was told it was a same day surgery and this was unacceptable    Patient stated in her VM and told triage she needed a call ASAP to clarifying     RN reached out to patient to help clarify questions and concerns     RN explained that we typically can't book these surgeries as over night stays because insurance will not pay. There are many reasons we would hold you over night and in someone with her history there is a good chance she would stay    Patient does not want surgery if she can't stay over night as she feels this is unsafe as she had heart surgery in the fall     Patient also had questions about restrictions as she has had back surgery and is in PT for this and will need to adjust appointments    RN answered all questions to the best of her ability    MD updated on patients wishes    Rhona Poon RN

## 2024-04-18 ENCOUNTER — TELEPHONE (OUTPATIENT)
Dept: ONCOLOGY | Facility: CLINIC | Age: 74
End: 2024-04-18
Payer: COMMERCIAL

## 2024-04-22 ENCOUNTER — THERAPY VISIT (OUTPATIENT)
Dept: PHYSICAL THERAPY | Facility: REHABILITATION | Age: 74
End: 2024-04-22
Payer: COMMERCIAL

## 2024-04-22 DIAGNOSIS — M48.061 LUMBAR FORAMINAL STENOSIS: ICD-10-CM

## 2024-04-22 DIAGNOSIS — M54.42 CHRONIC BILATERAL LOW BACK PAIN WITH BILATERAL SCIATICA: Primary | ICD-10-CM

## 2024-04-22 DIAGNOSIS — M54.16 LUMBAR RADICULOPATHY: ICD-10-CM

## 2024-04-22 DIAGNOSIS — M43.16 SPONDYLOLISTHESIS OF LUMBAR REGION: ICD-10-CM

## 2024-04-22 DIAGNOSIS — G89.29 CHRONIC BILATERAL LOW BACK PAIN WITH BILATERAL SCIATICA: Primary | ICD-10-CM

## 2024-04-22 DIAGNOSIS — F41.1 ANXIETY, GENERALIZED: ICD-10-CM

## 2024-04-22 DIAGNOSIS — M54.41 CHRONIC BILATERAL LOW BACK PAIN WITH BILATERAL SCIATICA: Primary | ICD-10-CM

## 2024-04-22 DIAGNOSIS — M47.816 LUMBAR FACET ARTHROPATHY: ICD-10-CM

## 2024-04-22 PROCEDURE — 97112 NEUROMUSCULAR REEDUCATION: CPT | Mod: GP

## 2024-04-22 PROCEDURE — 97110 THERAPEUTIC EXERCISES: CPT | Mod: GP

## 2024-04-23 ENCOUNTER — NURSE TRIAGE (OUTPATIENT)
Dept: NURSING | Facility: CLINIC | Age: 74
End: 2024-04-23

## 2024-04-23 ENCOUNTER — E-VISIT (OUTPATIENT)
Dept: URGENT CARE | Facility: CLINIC | Age: 74
End: 2024-04-23
Payer: COMMERCIAL

## 2024-04-23 DIAGNOSIS — Z20.822 EXPOSURE TO COVID-19 VIRUS: Primary | ICD-10-CM

## 2024-04-23 PROCEDURE — 99421 OL DIG E/M SVC 5-10 MIN: CPT | Performed by: FAMILY MEDICINE

## 2024-04-23 RX ORDER — BUSPIRONE HYDROCHLORIDE 15 MG/1
15 TABLET ORAL DAILY
Qty: 90 TABLET | Refills: 0 | Status: SHIPPED | OUTPATIENT
Start: 2024-04-23

## 2024-04-23 RX ORDER — SERTRALINE HYDROCHLORIDE 100 MG/1
100 TABLET, FILM COATED ORAL DAILY
Qty: 90 TABLET | Refills: 0 | Status: SHIPPED | OUTPATIENT
Start: 2024-04-23

## 2024-04-23 NOTE — PATIENT INSTRUCTIONS
Ciarra,    Thank you for choosing us for your care. I have placed an order for a lab test(s). View your full visit summary for details by clicking on the link below. You can schedule a lab only appointment right here in [place], or by calling [phone].    You will receive your lab results and next steps via [place] when the lab results return.    Sincerely,  Liv Bunn MD

## 2024-04-23 NOTE — TELEPHONE ENCOUNTER
Nurse Triage SBAR    Situation:   -Covid exposure    Background:   -Patient calling  -It is okay to call back and leave a detailed message at this number:  [phone]  -Hx of diabetes    Assessment:   -got text today that an event she was at on 4/19/24 two people had covid  -she dose not know who the people were and/or is she was near them  -no current symptoms  -she would like a covid test ordered (we discussed home test, E-visit, and VV)    Recommendation: Call PCP Within 24 Hours   -Call back with and questions, concerns, or any change in symptoms  -care team: Routing to PCP per guidelines. We discussed home care recommendations and she did an E-visit to get ordered. Only need to call patient back  if needed    ROD IVERSON RN on 4/23/2024 at 6:36 PM     Reason for Disposition   [1] COVID-19 EXPOSURE within last 14 days AND [2] NO symptoms   [1] COVID-19 EXPOSURE within last 14 days AND [2] weak immune system (e.g., HIV positive, cancer chemo, splenectomy, organ transplant, chronic steroids) AND [3] NO symptoms    Additional Information   Negative: [1] COVID-19 EXPOSURE within last 14 days AND [2] requests COVID-19 lab test to return to work AND [3] NO symptoms    Protocols used: Coronavirus (COVID-19) Exposure-A-

## 2024-04-24 ENCOUNTER — TELEPHONE (OUTPATIENT)
Dept: ONCOLOGY | Facility: CLINIC | Age: 74
End: 2024-04-24

## 2024-04-24 ENCOUNTER — LAB (OUTPATIENT)
Dept: LAB | Facility: CLINIC | Age: 74
End: 2024-04-24
Attending: FAMILY MEDICINE
Payer: COMMERCIAL

## 2024-04-24 DIAGNOSIS — Z20.822 EXPOSURE TO COVID-19 VIRUS: ICD-10-CM

## 2024-04-24 LAB — SARS-COV-2 RNA RESP QL NAA+PROBE: NEGATIVE

## 2024-04-24 PROCEDURE — 87635 SARS-COV-2 COVID-19 AMP PRB: CPT

## 2024-04-24 NOTE — PROGRESS NOTES
Gave information to md she will reach out to patient daughter  Try to get possible options for times to reach out

## 2024-04-24 NOTE — TELEPHONE ENCOUNTER
Gynecologic Oncology Telephone Note  I called and discussed Ciarra's case with her daughter per her request. Answered questions regarding surgery and plan. We did discuss the risks of surgery given Ciarra's medical co-morbidities, and the hope that we could avoid surgery, but the concern that given the appearance and change in size over a short interval increases the risks of this being a non-benign condition. We briefly discussed plan for surgery.     Livan Mcgrath MD

## 2024-04-24 NOTE — PROGRESS NOTES
Attempted to reach daughter Tory  She is available first am and after 5 pm tomorrow    Above information given to md

## 2024-04-24 NOTE — PROGRESS NOTES
4/17  Pt daughter left message on voice mail  Wants to discuss with md mother's upcoming surgery   Possible risks and any options    4/22  pt daughter left message, missed md call  Please call give options of call

## 2024-04-25 ENCOUNTER — NURSE TRIAGE (OUTPATIENT)
Dept: NURSING | Facility: CLINIC | Age: 74
End: 2024-04-25
Payer: COMMERCIAL

## 2024-04-25 NOTE — TELEPHONE ENCOUNTER
Nurse Triage SBAR    Is this a 2nd Level Triage? YES, LICENSED PRACTITIONER REVIEW IS REQUIRED    Situation:  Elliott RODRIGUEZ requesting an order for imaging; [facility] does not have available appts before 5/2/24, next available is 5/6/24 at [facility].      Per pt, Elliott RODRIGUEZ requests CT Scan of lung, chest, abdomen and pelvis and an additional Ultrasound of abdomen and pelvis     Background: Pt calling as she is scheduled to have surgery 5/10/24 and will have a second opinion from [facility] 5/2/24; the MD she spoke with today from [facility] wants further testing.      Protocol Recommended Disposition:   Discuss With PCP And Callback By Nurse Within 1 Hour    Recommendation: Elliott RODRIGUEZ asked pt to request order for a CT scan of lung, chest, abdomen and pelvis and another Ultrasound of abdomen and pelvis.     Please advise pt, pt can be reached at [phone]     Routed to provider    Thank you  Kianna Hurtado RN  FNA Nurse Advisor    Reason for Disposition   Nursing judgment    Additional Information   Negative: Sounds like a life-threatening emergency to the triager   Negative: Information only call about a Well Adult (no illness or injury)   Negative: Caller can't be reached by phone   Negative: Nursing judgment   Negative: Nursing judgment   Negative: Nursing judgment   Negative: Nursing judgment    Protocols used: No Protocol [id number]

## 2024-04-27 DIAGNOSIS — E78.00 HYPERCHOLESTEROLEMIA: ICD-10-CM

## 2024-04-29 ENCOUNTER — PATIENT OUTREACH (OUTPATIENT)
Dept: CARE COORDINATION | Facility: CLINIC | Age: 74
End: 2024-04-29
Payer: COMMERCIAL

## 2024-04-29 RX ORDER — ATORVASTATIN CALCIUM 80 MG/1
80 TABLET, FILM COATED ORAL AT BEDTIME
Qty: 90 TABLET | Refills: 2 | Status: SHIPPED | OUTPATIENT
Start: 2024-04-29 | End: 2024-07-10

## 2024-04-30 NOTE — TELEPHONE ENCOUNTER
Spoke with the patient and was able to confirm all scheduled information.     Patient is schedule for surgery with: Dr. Mcgrath    Surgery Date: 5/24/2024     Location: [facility]    H&P: already completed 4/16/24 PAC; surgeon will complete and/or update on day of surgery as needed      Post-op: will be scheduled by the clinic    Patient will receive a phone call from pre-admission nurses 1-2 days prior to surgery with arrival time and NPO instructions. Patient aware times are subject to change up until day before surgery.     Patient questions/concerns: N/A     Surgery packet was provided to patient during appointment      Eric Momin on 4/30/2024 at 8:35 AM

## 2024-05-07 ENCOUNTER — RADIOLOGY INJECTION OFFICE VISIT (OUTPATIENT)
Dept: PHYSICAL MEDICINE AND REHAB | Facility: CLINIC | Age: 74
End: 2024-05-07
Attending: NURSE PRACTITIONER
Payer: COMMERCIAL

## 2024-05-07 VITALS
HEIGHT: 62 IN | HEART RATE: 53 BPM | RESPIRATION RATE: 16 BRPM | WEIGHT: 159 LBS | OXYGEN SATURATION: 96 % | SYSTOLIC BLOOD PRESSURE: 128 MMHG | TEMPERATURE: 98 F | BODY MASS INDEX: 29.26 KG/M2 | DIASTOLIC BLOOD PRESSURE: 56 MMHG

## 2024-05-07 DIAGNOSIS — M54.41 CHRONIC BILATERAL LOW BACK PAIN WITH BILATERAL SCIATICA: ICD-10-CM

## 2024-05-07 DIAGNOSIS — E11.9 DIABETES MELLITUS (H): Primary | ICD-10-CM

## 2024-05-07 DIAGNOSIS — G89.29 CHRONIC BILATERAL LOW BACK PAIN WITH BILATERAL SCIATICA: ICD-10-CM

## 2024-05-07 DIAGNOSIS — M54.16 LUMBAR RADICULOPATHY: ICD-10-CM

## 2024-05-07 DIAGNOSIS — M48.061 LUMBAR FORAMINAL STENOSIS: ICD-10-CM

## 2024-05-07 DIAGNOSIS — M54.42 CHRONIC BILATERAL LOW BACK PAIN WITH BILATERAL SCIATICA: ICD-10-CM

## 2024-05-07 LAB — GLUCOSE SERPL-MCNC: 262 MG/DL (ref 70–99)

## 2024-05-07 PROCEDURE — 64483 NJX AA&/STRD TFRM EPI L/S 1: CPT | Mod: 50 | Performed by: STUDENT IN AN ORGANIZED HEALTH CARE EDUCATION/TRAINING PROGRAM

## 2024-05-07 PROCEDURE — 82962 GLUCOSE BLOOD TEST: CPT | Performed by: STUDENT IN AN ORGANIZED HEALTH CARE EDUCATION/TRAINING PROGRAM

## 2024-05-07 RX ORDER — BUPIVACAINE HYDROCHLORIDE 2.5 MG/ML
INJECTION, SOLUTION EPIDURAL; INFILTRATION; INTRACAUDAL
Status: COMPLETED | OUTPATIENT
Start: 2024-05-07 | End: 2024-05-07

## 2024-05-07 RX ORDER — DEXAMETHASONE SODIUM PHOSPHATE 10 MG/ML
INJECTION, SOLUTION INTRAMUSCULAR; INTRAVENOUS
Status: COMPLETED | OUTPATIENT
Start: 2024-05-07 | End: 2024-05-07

## 2024-05-07 RX ORDER — LIDOCAINE HYDROCHLORIDE 10 MG/ML
INJECTION, SOLUTION EPIDURAL; INFILTRATION; INTRACAUDAL; PERINEURAL
Status: COMPLETED | OUTPATIENT
Start: 2024-05-07 | End: 2024-05-07

## 2024-05-07 RX ADMIN — DEXAMETHASONE SODIUM PHOSPHATE 10 MG: 10 INJECTION, SOLUTION INTRAMUSCULAR; INTRAVENOUS at 15:47

## 2024-05-07 RX ADMIN — BUPIVACAINE HYDROCHLORIDE 3 ML: 2.5 INJECTION, SOLUTION EPIDURAL; INFILTRATION; INTRACAUDAL at 15:47

## 2024-05-07 RX ADMIN — LIDOCAINE HYDROCHLORIDE 2 ML: 10 INJECTION, SOLUTION EPIDURAL; INFILTRATION; INTRACAUDAL; PERINEURAL at 15:46

## 2024-05-07 ASSESSMENT — PAIN SCALES - GENERAL
PAINLEVEL: SEVERE PAIN (6)
PAINLEVEL: NO PAIN (0)

## 2024-05-07 NOTE — PATIENT INSTRUCTIONS
Follow-up visit with Chula Garcia CNP in 2 weeks to discuss injection outcome and determine care plan going forward.    Please be advised that your blood glucose levels may be increased for the next 5-7 days as a result of the steroid you received with your injection today.  It is recommended that you monitor your blood glucose levels closely over the next week and direct any additional questions regarding your blood glucose management to your primary doctor.       DISCHARGE INSTRUCTIONS    During office hours (8:00 a.m.- 4:00 p.m.) questions or concerns may be answered  by calling [facility] at  [phone].  Messages received after hours will be returned the following business day.      In the case of an emergency, please dial 911 or seek assistance at the nearest Emergency Room/Urgent Care facility.     All Patients:    You may experience an increase in your symptoms for the first 2 days (It may take anywhere between 2 days- 2 weeks for the steroid to have maximum effect).    You may use ice on the injection site, as frequently as 20 minutes each hour if needed.    You may take your pain medicine.    You may continue taking your regular medication after your injection. If you have had a Medial Branch Block you may resume pain medication once your pain diary is completed.    You may shower. No swimming, tub bath or hot tub for 48 hours.  You may remove your bandaid/bandage as soon as you are home.    You may resume light activities, as tolerated.    Resume your usual diet as tolerated.    It is strongly advised that you do not drive for 1-3 hours post injection.    If you have had oral sedation:  Do not drive for 8 hours post injection.      If you have had IV sedation:  Do not drive for 24 hours post injection.  Do not operate hazardous machinery or make important personal/business decisions for 24 hours.      POSSIBLE STEROID SIDE EFFECTS (If steroid/cortisone was used for your  procedure)    -If you experience these symptoms, it should only last for a short period    Swelling of the legs              Skin redness (flushing)     Mouth (oral) irritation   Blood sugar (glucose) levels            Sweats                    Mood changes  Headache  Sleeplessness  Weakened immune system for up to 14 days, which could increase the risk of melo the COVID-19 virus and/or experiencing more severe symptoms of the disease, if exposed.  Decreased effectiveness of the flu vaccine if given within 2 weeks of the steroid.         POSSIBLE PROCEDURE SIDE EFFECTS  -Call the [facility] if you are concerned  Increased Pain           Increased numbness/tingling      Nausea/Vomiting          Bruising/bleeding at site      Redness or swelling                                              Difficulty walking      Weakness           Fever greater than 100.5    *In the event of a severe headache after an epidural steroid injection that is relieved by lying down, please call the [facility] to speak with a clinical staff member*

## 2024-05-11 DIAGNOSIS — E11.65 TYPE 2 DIABETES MELLITUS WITH HYPERGLYCEMIA, WITH LONG-TERM CURRENT USE OF INSULIN (H): ICD-10-CM

## 2024-05-11 DIAGNOSIS — Z79.4 TYPE 2 DIABETES MELLITUS WITH HYPERGLYCEMIA, WITH LONG-TERM CURRENT USE OF INSULIN (H): ICD-10-CM

## 2024-05-13 DIAGNOSIS — I10 ESSENTIAL HYPERTENSION: ICD-10-CM

## 2024-05-13 RX ORDER — HYDROCHLOROTHIAZIDE 12.5 MG/1
12.5 TABLET ORAL DAILY
Qty: 90 TABLET | Refills: 0 | Status: SHIPPED | OUTPATIENT
Start: 2024-05-13 | End: 2024-07-10

## 2024-05-14 ENCOUNTER — THERAPY VISIT (OUTPATIENT)
Dept: PHYSICAL THERAPY | Facility: REHABILITATION | Age: 74
End: 2024-05-14
Payer: COMMERCIAL

## 2024-05-14 DIAGNOSIS — M54.16 LUMBAR RADICULOPATHY: ICD-10-CM

## 2024-05-14 DIAGNOSIS — M54.41 CHRONIC BILATERAL LOW BACK PAIN WITH BILATERAL SCIATICA: Primary | ICD-10-CM

## 2024-05-14 DIAGNOSIS — M43.16 SPONDYLOLISTHESIS OF LUMBAR REGION: ICD-10-CM

## 2024-05-14 DIAGNOSIS — M48.061 LUMBAR FORAMINAL STENOSIS: ICD-10-CM

## 2024-05-14 DIAGNOSIS — G89.29 CHRONIC BILATERAL LOW BACK PAIN WITH BILATERAL SCIATICA: Primary | ICD-10-CM

## 2024-05-14 DIAGNOSIS — M54.42 CHRONIC BILATERAL LOW BACK PAIN WITH BILATERAL SCIATICA: Primary | ICD-10-CM

## 2024-05-14 DIAGNOSIS — M47.816 LUMBAR FACET ARTHROPATHY: ICD-10-CM

## 2024-05-14 PROCEDURE — 97110 THERAPEUTIC EXERCISES: CPT | Mod: GP

## 2024-05-14 RX ORDER — INSULIN GLARGINE 100 [IU]/ML
INJECTION, SOLUTION SUBCUTANEOUS
Qty: 45 ML | Refills: 3 | Status: SHIPPED | OUTPATIENT
Start: 2024-05-14 | End: 2024-07-15

## 2024-05-14 NOTE — PROGRESS NOTES
DISCHARGE  Reason for Discharge: Patient chooses to discontinue therapy in order to pursue surgery.    Equipment Issued: N/A    Discharge Plan: Patient to continue home program up until surgery and then defer to surgeon's guidelines.  Other services: seeking surgery.    Referring Provider:  Chula Garcia           05/14/24 0500   Appointment Info   Signing clinician's name / credentials Twin Garcia, PT   Visits Used 4   Medical Diagnosis Chronic bilateral low back pain with bilateral sciatica  Lumbar radiculopathy  Spondylolisthesis of lumbar region  Lumbar foraminal stenosis  Lumbar facet arthropathy   PT Tx Diagnosis Low back pain with left leg pain and impaired functional balance, gait, mobility, and endurance   Progress Note/Certification   Start of Care Date 04/04/24   Onset of illness/injury or Date of Surgery 02/21/24  (Date recorded is date of referral, though patient reports long history of back pain with worsening symptoms over the last several years.)   Therapy Frequency 1 time per week   Predicted Duration 10 weeks   Certification date from 04/04/24   Certification date to 06/13/24   Progress Note Completed Date 05/14/24   PT Goal 1   Goal Identifier HEP   Goal Description Ciarra will demonstrate mastery of ([id number] need for cueing) and compliance with (completion on at least 5/7 days a week) her home exercise program to facilitate increased rate of improvement.   Goal Progress 3 times per week   Target Date 05/02/24   PT Goal 2   Goal Identifier LATRICIA   Goal Description Ciarra will demonstrate improved function as evidenced by an improved (decreased) LATRICIA score of less than or equal to 5%.   Goal Progress 27.5%   Target Date 06/13/24   PT Goal 3   Goal Identifier Walking   Goal Description Ciarra will demonstrate improved tolerance to functional mobility as evidenced by her ability to walk for up to 30 minutes with self-report low back and leg pain no more than 2/10.   Goal Progress Up to 5  "minutes with pain 2/10 or less   Target Date 06/13/24   PT Goal 4   Goal Identifier FGA   Goal Description Ciarra will demonstrate improved safety and independence with functional mobility and a decreased fall risk as evidenced by an improved FGA score of at least 19/30.   Goal Progress 15/30   Target Date 06/13/24   Subjective Report   Subjective Report Ciarra reported at start of visit that she \"just want(s) to talk for a minute, because everything is changing.\" She reports that she has been having multiple medical appointments and second opinions, and she will be getting a much more involved surgery than initially planned. Her surgeon that she would not be able to exercise for 6 weeks following the surgery, so she reports wanting to \"just review the exercises and have today be the last day\" for now.   Objective Measures   Objective Measures Objective Measure 1;Objective Measure 2;Objective Measure 3;Objective Measure 4   Objective Measure 1   Objective Measure Lumbar Spine AROM   Details Flexion: proximal shins (guarded). Extension: WNL (\"pull\"). Side-bending: distal thigh and painful bilateral. Rotation: 75% bilateral.   Objective Measure 2   Objective Measure Lower Extremity Strength   Details Hip flexion: right 3+/5, left 5-/5. Ankle dorsflextion: right 5/5, left 5-/5. Hip abduction/adduction, knee flexion/extension, great toe extension, and plantar flexion: 5/5 bilaterally.   Objective Measure 3   Objective Measure Worst Pain   Details Last two weeks: 7-8/10   Objective Measure 4   Objective Measure FGA   Details 4/15/24: 15/30   Objective Measure 5   Objective Measure Continue Lumbar Exam   Details 4/15/24: Neural tension: slump and SLR negative bilateral. Hip extension strength: significant hip drop with single leg lift bilateral from bridge position. Core strength: unable to perform supine-to-sit without upper extremity assist.   Therapeutic Procedure/Exercise   Therapeutic Procedures: strength, endurance, " ROM, flexibility minutes (34696) 38   Ther Proc 1 Nustep + continued subjective report + discussion regarding goals/progress   Ther Proc 1 - Details 7 minutes (reports pain-free)   Ther Proc 2 Lower trunk rotation   Ther Proc 2 - Details 1 minute   Ther Proc 3 Standing hip strengthening   Ther Proc 3 - Details Flexion, abduction, and extension: 2 x 10 each bilateral (cueing to avoid trunk lean, knee flexion, and hip rotation)   Ther Proc 4 Mini squats   Ther Proc 4 - Details 2 x 10 (cueing to avoid anterior knee translation and to unlock hips)   Ther Proc 5 Seated piriformis stretch   Ther Proc 5 - Details 2 x 45 seconds bilateral   Skilled Intervention Exercises to increase low back mobility and hip/core strength. Continued lumbar exam to guide further treatment.   Patient Response/Progress Added mini squats and piriformis stretch; increased sets. Ciarra tolerated all exercises and progressions well without increased symptoms and with cueing for proper form.   Ther Proc 6 Subjective report + coordination of care   Ther Proc 6 - Details See subjective report. Discussed with the patient that she would need to follow with her surgeon regarding any exercise/activity related precautions and restrictions following surgery and that at this time therapist (writer) is unable to give her exercises for her post-op recovery.   Neuromuscular Re-education   Neuromuscular re-ed of mvmt, balance, coord, kinesthetic sense, posture, proprioception minutes (00625) 8   Neuro Re-ed 1 TA set   Neuro Re-ed 1 - Details 1 x 20 + 1 x 10 with 5 second holds (cueing to keep breathing)   Neuro Re-ed 2 TA set with march   Neuro Re-ed 2 - Details 2 x 10 (cueing to keep breathing and maintain core brace)   Skilled Intervention Exercises to increase core muscle awareness/control   Patient Response/Progress Ciarra tolerated exercises well without any increase in symptoms and with cueing for proper form.   Education   Learner/Method  "Patient;Listening;Demonstration;Pictures/Video;No Barriers to Learning   Plan   Home program See PTRx.   Plan for next session Continue to progress core, low back, and hip flexibility/mobility and strength/stability exercises as tolerated. Introduce balance and gait training as appropriate.   Comments   Comments Assessment: Ciarra returns to physical therapy after several weeks away due to focus on other medical issues and seeking second opinions for an upcoming surgery.  On presentation to visit today, she reports that she \"just want(s) to talk for a minute, because everything is changing.\"  Surgery that she is now planning on getting is more involved than she initially thought, and her surgeon does not want her to do any exercises for up to 6 weeks postoperatively.  Due to changes in her plan, she reports wanting to \"just review the exercises and have today be the last day\" for now.  Given that her next scheduled PT visit is before her surgery, therapist asked if she wanted to keep that visit but she declined.  As noted above, she asked that the focus of today's visit be on review of her home exercises, and this was completed with patient verbalizing good understanding.  She did ask if she could continue to do these after her surgery, but therapist emphasized multiple times that specific rehab following with activity/exercise precautions/limitations would need to be discussed directly with her surgeon.  She reports being able to walk up to 5 minutes with pain at a 2/10 or less, indicating minimally improved tolerance to functional mobility.  On testing, she does demonstrate improved lumbar active range of motion and lower extremity strength.  FGA was not assessed today given patient wanted to focus on exercises review.  She will now be discharged to a home exercise program leading up to her surgery.   Total Session Time   Timed Code Treatment Minutes 46   Total Treatment Time (sum of timed and untimed services) 46 "

## 2024-05-15 ENCOUNTER — PATIENT OUTREACH (OUTPATIENT)
Dept: ONCOLOGY | Facility: CLINIC | Age: 74
End: 2024-05-15
Payer: COMMERCIAL

## 2024-05-15 ENCOUNTER — PATIENT OUTREACH (OUTPATIENT)
Dept: GASTROENTEROLOGY | Facility: CLINIC | Age: 74
End: 2024-05-15
Payer: COMMERCIAL

## 2024-05-15 DIAGNOSIS — Z12.11 SPECIAL SCREENING FOR MALIGNANT NEOPLASMS, COLON: Primary | ICD-10-CM

## 2024-05-15 NOTE — PROGRESS NOTES
"[facility] Screening Colonoscopy Referral Review    Patient meets the inclusion criteria for screening colonoscopy standing order.    Ordering/Referring Provider:  Zaheer Sandra      BMI: Estimated body mass index is 29.08 kg/m  as calculated from the following:    Height as of 5/7/24: 1.575 m (5' 2\").    Weight as of 5/7/24: 72.1 kg (159 lb).     Sedation:  Does patient have any of the following conditions affecting sedation?  No medical conditions affecting sedation.    Previous Scopes:  Any previous recommendations or follow up needs based on previous scope?  na / No recommendations.    Medical Concerns to Postpone Order:  Does patient have any of the following medical concerns that should postpone/delay colonoscopy referral?  No medical conditions affecting colonoscopy referral.    Final Referral Details:  Based on patient's medical history patient is appropriate for referral order with moderate sedation. If patient's BMI > 50 do not schedule in [facility].  "

## 2024-05-16 ENCOUNTER — TELEPHONE (OUTPATIENT)
Dept: ONCOLOGY | Facility: CLINIC | Age: 74
End: 2024-05-16
Payer: COMMERCIAL

## 2024-05-16 NOTE — TELEPHONE ENCOUNTER
----- Message from Colette Laguna RN sent at 5/15/2024  5:21 PM CDT -----  Cancel surgery for 5/24  Pt has decided to get care at [facility]    ambrosio

## 2024-05-16 NOTE — PROGRESS NOTES
Spoke with pt   Per pt she has no surgery questions but wanted clinic and md to know that she is canceling her surgery and appts here and going to be treated at [facility]  Wanted to thank md for all information and time

## 2024-05-21 NOTE — PROGRESS NOTES
Assessment:     Diagnoses and all orders for this visit:  Chronic bilateral low back pain with bilateral sciatica  Lumbar radiculopathy  Lumbar foraminal stenosis  Spondylolisthesis of lumbar region  History of lumbar surgery     Kemi Soto is a 74 year old y.o. female with past medical history significant for hypercholesterolemia, type 2 diabetes mellitus, hypertension, atrial fibrillation, history of lumbar surgery for evacuation of abscess who presents today for follow-up regarding:    -Chronic bilateral low back pain with lumbar radiculopathy with no lasting relief post bilateral L4-5 TFESI 5/7/2024.  However current pain is more specific to low back versus radicular component.    -Patient is having open abdominal surgery at [facility] 5/31/2024 for pelvic mass removal.     Plan:     A shared decision making plan was used. The patient's values and choices were respected. Prior medical records were reviewed today. The following represents what was discussed and decided upon by the provider and the patient.        -DIAGNOSTIC TESTS: Images were personally reviewed and interpreted.   --Lumbar spine MRI 7/16/2022 with slight anterolisthesis L4-5 with disc bulging with significant facet arthropathy with mild right foraminal stenosis.  Significant facet arthropathy L5-S1 with lateral recess stenosis.  --Lumbar spine x-ray 4/11/2022 with grade 1 L4-5 spondylolisthesis 3.3 mm in neutral that increases to 8.6 mm in forward flexion.  L5-S1 8 mm spondylolisthesis that increases to 10.5 mm in forward flexion. Chronic laminectomy L2-L5.  Slight disc space narrowing at L2-3.  --Left hip x-ray 6/3/2022 negative for fracture, degenerative changes bilaterally.  Degenerative changes to bilateral SI joints also noted.  -- Bilateral upper extremity EMG with Dr. Kirk 2019 with no evidence of cervical radiculopathy.    -INTERVENTIONS: Did discuss that after she is healed from her abdominal surgery and off of lifting  restrictions we could consider potential bilateral L3, L4, L5 medial branch block to diagnostically determine how much of her back pain is facet mediated.  She may be a good candidate for RFA.  If she is having more pain radiating into the legs however then I would recommend updated MRI and potential surgical evaluation.    -MEDICATIONS: No changes in medications  Discussed side effects of medications and proper use. Patient verbalized understanding.    -PHYSICAL THERAPY: Consider revisiting physical therapy after she is healed from her abdominal surgery and off every 6 treatments.  No PT recommended at this time.  Discussed the importance of core strengthening, ROM, stretching exercises with the patient and how each of these entities is important in decreasing pain.  Explained to the patient that the purpose of physical therapy is to teach the patient a home exercise program.  These exercises need to be performed every day in order to decrease pain and prevent future occurrences of pain.        -PATIENT EDUCATION:  Total time of 34 minutes, on the day of service, spent with the patient, reviewing the chart, placing orders, and documenting.     -FOLLOW UP: Follow-up after she has been healed from her abdominal surgery.  Advised to contact clinic if symptoms worsen or change.    Subjective:     Kemi Soto is a 74 year old female who presents today for follow-up regarding ongoing chronic bilateral low back pain lower lumbar spine which previously was rating into the lower extremities however currently she reports the pain is more specific in her low back.  She got no lasting relief she reports from recent epidural steroid injection but again is not having the leg symptoms like she was previously.  Her back pain felt great for the first couple of days but she received again no lasting benefit.  Currently pain is a 4/10, worsens with activity.    *Post surgery neurosurgical evaluation with Dr. Cuello 2016  status post evacuation of spontaneous epidural abscess in the lumbar spine lumbar laminectomy with medical management of cervical epidural abscess.     -Treatment to Date: Decompressive lumbar laminectomy L1-L5 for evacuation of abscess 10/11/2015 Dr. Cuello.  3-month antibiotic course post surgery, patient then had a drug-eluting stent placed 4/16/2016     Bilateral L5-S1 TFESI 5/19/2023 with 100% relief with leg pain, no relief with back pain.  Preprocedure pain 8/10, post 5/10.  Bilateral L5-S1 TFESI 3/6/2024 with 50% relief.  Bilateral L4-5 TFESI 5/7/2024 with significant relief for 2 days, no lasting benefit.      -Medications:  Gabapentin 300 mg    Patient Active Problem List   Diagnosis    Obesity    Hypercholesterolemia    Essential hypertension    Type 2 diabetes mellitus with diabetic polyneuropathy, with long-term current use of insulin (H)    Benign neoplasm of choroid    Paroxysmal atrial fibrillation (H)    Coronary artery disease of bypass graft of native heart with stable angina pectoris (H24)    Bilateral low back pain without sciatica, unspecified chronicity    Benign adenomatous polyp of large intestine    Bilateral hearing loss    Chronic anticoagulation    History of TIA (transient ischemic attack) and stroke    Peripheral neuropathy    Psoriasis, guttate    Type 2 myocardial infarction due to arrhythmia (H)    Urinary incontinence    Abnormal nuclear stress test    Benign neoplasm of cecum    Benign neoplasm of transverse colon    Generalized anxiety disorder    Ovarian mass, right    Anticoagulation goal of INR 2 to 3    Chronic bilateral low back pain with bilateral sciatica    Lumbar radiculopathy    Spondylolisthesis of lumbar region    Lumbar foraminal stenosis    Lumbar facet arthropathy    Chronic kidney disease, stage 3a (H)       Current Outpatient Medications   Medication Sig Dispense Refill    acetaminophen (TYLENOL) 500 MG tablet Take 1,000 mg by mouth every 12 hours as  needed for mild pain      gabapentin (NEURONTIN) 300 MG capsule TAKE 3 CAPSULES BY MOUTH EVERY DAY AT BEDTIME (Patient taking differently: Take 900 mg by mouth at bedtime TAKE 3 CAPSULES BY MOUTH EVERY DAY AT BEDTIME) 270 capsule 3    naproxen (NAPROSYN) 500 MG tablet Take 500 mg by mouth 2 times daily (with meals)      apixaban ANTICOAGULANT (ELIQUIS ANTICOAGULANT) 5 MG tablet Take 1 tablet (5 mg) by mouth 2 times daily 60 tablet 11    atorvastatin (LIPITOR) 80 MG tablet TAKE 1 TABLET BY MOUTH AT BEDTIME 90 tablet 2    blood glucose (NO BRAND SPECIFIED) test strip Use to test blood sugar 1 times daily or as directed. To accompany: Blood Glucose Monitor Brands: per insurance. 100 strip 6    blood glucose monitoring (NO BRAND SPECIFIED) meter device kit Use to test blood sugar 1 times daily or as directed. Preferred blood glucose meter OR supplies to accompany: Blood Glucose Monitor Brands: per insurance. 1 kit 0    busPIRone (BUSPAR) 15 MG tablet Take 1 tablet by mouth once daily 90 tablet 0    clobetasol (TEMOVATE) 0.05 % external cream Apply topically 2 times daily      ezetimibe (ZETIA) 10 MG tablet Take 1 tablet (10 mg) by mouth daily (Patient taking differently: Take 10 mg by mouth every morning) 90 tablet 3    hydroCHLOROthiazide 12.5 MG tablet Take 1 tablet (12.5 mg) by mouth daily -- appt needed for further refills. 90 tablet 0    isosorbide mononitrate (IMDUR) 60 MG 24 hr tablet Take 1 tablet (60 mg) by mouth daily (Patient taking differently: Take 60 mg by mouth every morning) 90 tablet 3    ketoconazole (NIZORAL) 2 % external cream APPLY 1 APPLICATION TOPICALLY TWICE DAILY TO AFFECTED AREAS FOR 4 WEEKS THEN AS NEEDED      LANTUS SOLOSTAR 100 UNIT/ML soln INJECT 28 UNITS SUBCUTANEOUSLY TWICE DAILY 45 mL 3    losartan (COZAAR) 100 MG tablet Take 1 tablet (100 mg) by mouth daily (Patient taking differently: Take 100 mg by mouth every morning) 90 tablet 3    sertraline (ZOLOFT) 100 MG tablet Take 1 tablet by  mouth once daily 90 tablet 0    sotalol (BETAPACE) 80 MG tablet Take 0.5 tablets (40 mg) by mouth 2 times daily 90 tablet 3    thin (NO BRAND SPECIFIED) lancets Use with lanceting device. To accompany: Blood Glucose Monitor Brands: per insurance. 100 each 6    triamcinolone (KENALOG) 0.1 % external cream Apply topically 2 times daily       No current facility-administered medications for this visit.     Facility-Administered Medications Ordered in Other Visits   Medication Dose Route Frequency Provider Last Rate Last Admin    dexamethasone PF (DECADRON) injection    Once PRN Ady Garsia, DO   20 mg at 05/19/23 1326    iohexol (OMNIPAQUE) 300 mg/mL injection    Once PRN Ady Garsia, DO   1 mL at 05/19/23 1327    lidocaine (PF) (XYLOCAINE) 1 % injection    Once PRN Ady Garsia, DO   4 mL at 05/19/23 1326       Allergies   Allergen Reactions    Metformin GI Disturbance    Oxycodone Nausea and Vomiting       Past Medical History:   Diagnosis Date    Adrenal nodule (H24)     Alcohol dependence in remission (H)     Anxiety and depression     Benign essential hypertension     Chronic atrial fibrillation (H)     Chronic back pain     Coronary artery disease     Diabetes mellitus (H)     Discitis of thoracolumbar region 10/10/2015    Encephalopathy 10/14/2015    Epidural abscess 10/10/2015    Hip pain, right     Hyperlipidemia     Ovarian mass     Sepsis (H) 10/08/2015    Stroke (H) 06/23/2015    Neurology felt that episode was C/W subacute ischemic stroke    TIA (transient ischemic attack) 06/23/2015    UTI (urinary tract infection)     admitted to [facility] with UTI        Review of Systems  ROS:  Specifically negative for bowel/bladder dysfunction, balance changes, headache, dizziness, foot drop, fevers, chills, appetite changes, nausea/vomiting, unexplained weight loss. Otherwise 13 systems reviewed are negative. Please see the patient's intake questionnaire from today for  "details.    Reviewed Social, Family, Past Medical and Past Surgical history with patient, no significant changes noted since prior visit.     Objective:     Ht 5' 2\" (1.575 m)   Wt 145 lb (65.8 kg)   LMP  (LMP Unknown)   BMI 26.52 kg/m      PHYSICAL EXAMINATION:    --CONSTITUTIONAL: Well developed, well nourished, healthy appearing individual.  --PSYCHIATRIC: Appropriate mood and affect. No difficulty interacting due to temper, social withdrawal, or memory issues.  --SKIN: Lumbar region is dry and intact.   --RESPIRATORY: Normal rhythm and effort. No abnormal accessory muscle breathing patterns noted.   --MUSCULOSKELETAL:  Normal lumbar lordosis noted, no lateral shift.  --GROSS MOTOR: Easily arises from a seated position. Gait is non-antalgic  --LUMBAR SPINE:  Inspection reveals no evidence of deformity. Range of motion is not limited in lumbar flexion,  increased pain with lumbar extension and lateral rotation simultaneously bilaterally.    RESULTS:   Imaging: Spine imaging was reviewed today. The images were shown to the patient and the findings were explained using a spine model.      Lumbar spine MRI reviewed                      "

## 2024-05-22 ENCOUNTER — OFFICE VISIT (OUTPATIENT)
Dept: PHYSICAL MEDICINE AND REHAB | Facility: CLINIC | Age: 74
End: 2024-05-22
Payer: COMMERCIAL

## 2024-05-22 VITALS
BODY MASS INDEX: 26.68 KG/M2 | HEIGHT: 62 IN | SYSTOLIC BLOOD PRESSURE: 158 MMHG | DIASTOLIC BLOOD PRESSURE: 71 MMHG | HEART RATE: 52 BPM | WEIGHT: 145 LBS

## 2024-05-22 DIAGNOSIS — M54.41 CHRONIC BILATERAL LOW BACK PAIN WITH BILATERAL SCIATICA: Primary | ICD-10-CM

## 2024-05-22 DIAGNOSIS — M54.42 CHRONIC BILATERAL LOW BACK PAIN WITH BILATERAL SCIATICA: Primary | ICD-10-CM

## 2024-05-22 DIAGNOSIS — M48.061 LUMBAR FORAMINAL STENOSIS: ICD-10-CM

## 2024-05-22 DIAGNOSIS — G89.29 CHRONIC BILATERAL LOW BACK PAIN WITH BILATERAL SCIATICA: Primary | ICD-10-CM

## 2024-05-22 DIAGNOSIS — Z98.890 HISTORY OF LUMBAR SURGERY: ICD-10-CM

## 2024-05-22 DIAGNOSIS — M54.16 LUMBAR RADICULOPATHY: ICD-10-CM

## 2024-05-22 DIAGNOSIS — M43.16 SPONDYLOLISTHESIS OF LUMBAR REGION: ICD-10-CM

## 2024-05-22 PROCEDURE — 99214 OFFICE O/P EST MOD 30 MIN: CPT | Performed by: NURSE PRACTITIONER

## 2024-05-22 ASSESSMENT — PAIN SCALES - GENERAL: PAINLEVEL: MODERATE PAIN (4)

## 2024-05-22 NOTE — LETTER
5/22/2024         RE: Kemi Soto  [address] [place] [id number]        Dear Colleague,    Thank you for referring your patient, Kemi Soto, to the [facility]. Please see a copy of my visit note below.      Assessment:     Diagnoses and all orders for this visit:  Chronic bilateral low back pain with bilateral sciatica  Lumbar radiculopathy  Lumbar foraminal stenosis  Spondylolisthesis of lumbar region  History of lumbar surgery     Kemi Soto is a 74 year old y.o. female with past medical history significant for hypercholesterolemia, type 2 diabetes mellitus, hypertension, atrial fibrillation, history of lumbar surgery for evacuation of abscess who presents today for follow-up regarding:    -Chronic bilateral low back pain with lumbar radiculopathy with no lasting relief post bilateral L4-5 TFESI 5/7/2024.  However current pain is more specific to low back versus radicular component.    -Patient is having open abdominal surgery at [facility] 5/31/2024 for pelvic mass removal.     Plan:     A shared decision making plan was used. The patient's values and choices were respected. Prior medical records were reviewed today. The following represents what was discussed and decided upon by the provider and the patient.        -DIAGNOSTIC TESTS: Images were personally reviewed and interpreted.   --Lumbar spine MRI 7/16/2022 with slight anterolisthesis L4-5 with disc bulging with significant facet arthropathy with mild right foraminal stenosis.  Significant facet arthropathy L5-S1 with lateral recess stenosis.  --Lumbar spine x-ray 4/11/2022 with grade 1 L4-5 spondylolisthesis 3.3 mm in neutral that increases to 8.6 mm in forward flexion.  L5-S1 8 mm spondylolisthesis that increases to 10.5 mm in forward flexion. Chronic laminectomy L2-L5.  Slight disc space narrowing at L2-3.  --Left hip x-ray 6/3/2022 negative for fracture, degenerative changes bilaterally.   Degenerative changes to bilateral SI joints also noted.  -- Bilateral upper extremity EMG with Dr. Kirk 2019 with no evidence of cervical radiculopathy.    -INTERVENTIONS: Did discuss that after she is healed from her abdominal surgery and off of lifting restrictions we could consider potential bilateral L3, L4, L5 medial branch block to diagnostically determine how much of her back pain is facet mediated.  She may be a good candidate for RFA.  If she is having more pain radiating into the legs however then I would recommend updated MRI and potential surgical evaluation.    -MEDICATIONS: No changes in medications  Discussed side effects of medications and proper use. Patient verbalized understanding.    -PHYSICAL THERAPY: Consider revisiting physical therapy after she is healed from her abdominal surgery and off every 6 treatments.  No PT recommended at this time.  Discussed the importance of core strengthening, ROM, stretching exercises with the patient and how each of these entities is important in decreasing pain.  Explained to the patient that the purpose of physical therapy is to teach the patient a home exercise program.  These exercises need to be performed every day in order to decrease pain and prevent future occurrences of pain.        -PATIENT EDUCATION:  Total time of 34 minutes, on the day of service, spent with the patient, reviewing the chart, placing orders, and documenting.     -FOLLOW UP: Follow-up after she has been healed from her abdominal surgery.  Advised to contact clinic if symptoms worsen or change.    Subjective:     Kemi Soto is a 74 year old female who presents today for follow-up regarding ongoing chronic bilateral low back pain lower lumbar spine which previously was rating into the lower extremities however currently she reports the pain is more specific in her low back.  She got no lasting relief she reports from recent epidural steroid injection but again is not having the  leg symptoms like she was previously.  Her back pain felt great for the first couple of days but she received again no lasting benefit.  Currently pain is a 4/10, worsens with activity.    *Post surgery neurosurgical evaluation with Dr. Cuello 2016 status post evacuation of spontaneous epidural abscess in the lumbar spine lumbar laminectomy with medical management of cervical epidural abscess.     -Treatment to Date: Decompressive lumbar laminectomy L1-L5 for evacuation of abscess 10/11/2015 Dr. Cuello.  3-month antibiotic course post surgery, patient then had a drug-eluting stent placed 4/16/2016     Bilateral L5-S1 TFESI 5/19/2023 with 100% relief with leg pain, no relief with back pain.  Preprocedure pain 8/10, post 5/10.  Bilateral L5-S1 TFESI 3/6/2024 with 50% relief.  Bilateral L4-5 TFESI 5/7/2024 with significant relief for 2 days, no lasting benefit.      -Medications:  Gabapentin 300 mg    Patient Active Problem List   Diagnosis     Obesity     Hypercholesterolemia     Essential hypertension     Type 2 diabetes mellitus with diabetic polyneuropathy, with long-term current use of insulin (H)     Benign neoplasm of choroid     Paroxysmal atrial fibrillation (H)     Coronary artery disease of bypass graft of native heart with stable angina pectoris (H24)     Bilateral low back pain without sciatica, unspecified chronicity     Benign adenomatous polyp of large intestine     Bilateral hearing loss     Chronic anticoagulation     History of TIA (transient ischemic attack) and stroke     Peripheral neuropathy     Psoriasis, guttate     Type 2 myocardial infarction due to arrhythmia (H)     Urinary incontinence     Abnormal nuclear stress test     Benign neoplasm of cecum     Benign neoplasm of transverse colon     Generalized anxiety disorder     Ovarian mass, right     Anticoagulation goal of INR 2 to 3     Chronic bilateral low back pain with bilateral sciatica     Lumbar radiculopathy      Spondylolisthesis of lumbar region     Lumbar foraminal stenosis     Lumbar facet arthropathy     Chronic kidney disease, stage 3a (H)       Current Outpatient Medications   Medication Sig Dispense Refill     acetaminophen (TYLENOL) 500 MG tablet Take 1,000 mg by mouth every 12 hours as needed for mild pain       gabapentin (NEURONTIN) 300 MG capsule TAKE 3 CAPSULES BY MOUTH EVERY DAY AT BEDTIME (Patient taking differently: Take 900 mg by mouth at bedtime TAKE 3 CAPSULES BY MOUTH EVERY DAY AT BEDTIME) 270 capsule 3     naproxen (NAPROSYN) 500 MG tablet Take 500 mg by mouth 2 times daily (with meals)       apixaban ANTICOAGULANT (ELIQUIS ANTICOAGULANT) 5 MG tablet Take 1 tablet (5 mg) by mouth 2 times daily 60 tablet 11     atorvastatin (LIPITOR) 80 MG tablet TAKE 1 TABLET BY MOUTH AT BEDTIME 90 tablet 2     blood glucose (NO BRAND SPECIFIED) test strip Use to test blood sugar 1 times daily or as directed. To accompany: Blood Glucose Monitor Brands: per insurance. 100 strip 6     blood glucose monitoring (NO BRAND SPECIFIED) meter device kit Use to test blood sugar 1 times daily or as directed. Preferred blood glucose meter OR supplies to accompany: Blood Glucose Monitor Brands: per insurance. 1 kit 0     busPIRone (BUSPAR) 15 MG tablet Take 1 tablet by mouth once daily 90 tablet 0     clobetasol (TEMOVATE) 0.05 % external cream Apply topically 2 times daily       ezetimibe (ZETIA) 10 MG tablet Take 1 tablet (10 mg) by mouth daily (Patient taking differently: Take 10 mg by mouth every morning) 90 tablet 3     hydroCHLOROthiazide 12.5 MG tablet Take 1 tablet (12.5 mg) by mouth daily -- appt needed for further refills. 90 tablet 0     isosorbide mononitrate (IMDUR) 60 MG 24 hr tablet Take 1 tablet (60 mg) by mouth daily (Patient taking differently: Take 60 mg by mouth every morning) 90 tablet 3     ketoconazole (NIZORAL) 2 % external cream APPLY 1 APPLICATION TOPICALLY TWICE DAILY TO AFFECTED AREAS FOR 4 WEEKS THEN AS  NEEDED       LANTUS SOLOSTAR 100 UNIT/ML soln INJECT 28 UNITS SUBCUTANEOUSLY TWICE DAILY 45 mL 3     losartan (COZAAR) 100 MG tablet Take 1 tablet (100 mg) by mouth daily (Patient taking differently: Take 100 mg by mouth every morning) 90 tablet 3     sertraline (ZOLOFT) 100 MG tablet Take 1 tablet by mouth once daily 90 tablet 0     sotalol (BETAPACE) 80 MG tablet Take 0.5 tablets (40 mg) by mouth 2 times daily 90 tablet 3     thin (NO BRAND SPECIFIED) lancets Use with lanceting device. To accompany: Blood Glucose Monitor Brands: per insurance. 100 each 6     triamcinolone (KENALOG) 0.1 % external cream Apply topically 2 times daily       No current facility-administered medications for this visit.     Facility-Administered Medications Ordered in Other Visits   Medication Dose Route Frequency Provider Last Rate Last Admin     dexamethasone PF (DECADRON) injection    Once PRN Ady Garsia, DO   20 mg at 05/19/23 1326     iohexol (OMNIPAQUE) 300 mg/mL injection    Once PRN Ady Garsia DO   1 mL at 05/19/23 1327     lidocaine (PF) (XYLOCAINE) 1 % injection    Once PRN Ady Garsia, DO   4 mL at 05/19/23 1326       Allergies   Allergen Reactions     Metformin GI Disturbance     Oxycodone Nausea and Vomiting       Past Medical History:   Diagnosis Date     Adrenal nodule (H24)      Alcohol dependence in remission (H)      Anxiety and depression      Benign essential hypertension      Chronic atrial fibrillation (H)      Chronic back pain      Coronary artery disease      Diabetes mellitus (H)      Discitis of thoracolumbar region 10/10/2015     Encephalopathy 10/14/2015     Epidural abscess 10/10/2015     Hip pain, right      Hyperlipidemia      Ovarian mass      Sepsis (H) 10/08/2015     Stroke (H) 06/23/2015    Neurology felt that episode was C/W subacute ischemic stroke     TIA (transient ischemic attack) 06/23/2015     UTI (urinary tract infection)     admitted to [facility] with UTI  "       Review of Systems  ROS:  Specifically negative for bowel/bladder dysfunction, balance changes, headache, dizziness, foot drop, fevers, chills, appetite changes, nausea/vomiting, unexplained weight loss. Otherwise 13 systems reviewed are negative. Please see the patient's intake questionnaire from today for details.    Reviewed Social, Family, Past Medical and Past Surgical history with patient, no significant changes noted since prior visit.     Objective:     Ht 5' 2\" (1.575 m)   Wt 145 lb (65.8 kg)   LMP  (LMP Unknown)   BMI 26.52 kg/m      PHYSICAL EXAMINATION:    --CONSTITUTIONAL: Well developed, well nourished, healthy appearing individual.  --PSYCHIATRIC: Appropriate mood and affect. No difficulty interacting due to temper, social withdrawal, or memory issues.  --SKIN: Lumbar region is dry and intact.   --RESPIRATORY: Normal rhythm and effort. No abnormal accessory muscle breathing patterns noted.   --MUSCULOSKELETAL:  Normal lumbar lordosis noted, no lateral shift.  --GROSS MOTOR: Easily arises from a seated position. Gait is non-antalgic  --LUMBAR SPINE:  Inspection reveals no evidence of deformity. Range of motion is not limited in lumbar flexion,  increased pain with lumbar extension and lateral rotation simultaneously bilaterally.    RESULTS:   Imaging: Spine imaging was reviewed today. The images were shown to the patient and the findings were explained using a spine model.      Lumbar spine MRI reviewed                        Again, thank you for allowing me to participate in the care of your patient.        Sincerely,        Chula Garcia, CNP  " Normal rate, regular rhythm.  Heart sounds S1, S2.  No murmurs, rubs or gallops.

## 2024-05-23 NOTE — TELEPHONE ENCOUNTER
Patient Quality Outreach    Patient is due for the following:   Diabetes -  Diabetic Follow-Up Visit  Physical Annual Wellness Visit    Next Steps:   Schedule a Annual Wellness Visit    Type of outreach:    Phone, left message for patient/parent to call back.    Next Steps:  Reach out within 90 days via Phone.    Max number of attempts reached: No. Will try again in 90 days if patient still on fail list.    Questions for provider review:    None           Elsy Parisi CMA  Chart routed to Care Team. PCP is Dr. Zaheer Sandra, pt may not be seen at our clinic anymore.

## 2024-06-12 ENCOUNTER — TELEPHONE (OUTPATIENT)
Dept: SURGERY | Facility: CLINIC | Age: 74
End: 2024-06-12
Payer: COMMERCIAL

## 2024-06-12 NOTE — TELEPHONE ENCOUNTER
Patient was seen last on 3/19/2024 with Dr. 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 I&D completed on 3/14/2024 and was to schedule a follow-up visit in 4 weeks for excision. 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    She is currently set for surgery for a exploratory laparotomy regarding the mass in her pelvis on 6/19/2024.    Patient reports that for the past week she's noticed a concerning area about an inch or so to the right of her umbilicus. She states the symptoms are starting the same as when she had the left sided abdominal wall abscess, in which she has tenderness and mild pain when palpating the area. She has lost a good amount of weight since February after following a plant based diet, and due to this has noted that this protrusion also sticks out and is about marble to quarter size in width. She denies any fever/chills, nausea, emesis, or difficulties with eating or passing bowel movements.     Patient is concerned that she has another abdominal wall abscess but on the right side. No concerns with the left side of her abdomen.     Patient did reach out to [place] regarding this concern and was advised to be seen for further evaluation and get started on antibiotics. Patient requesting if Dr. Bah would be able to advise on next steps, if an US could be ordered and get her started on antibiotics. Patient was not open to seeing her PCP or scheduling a visit with Dr. Bah on 6/18 as she needs to figure this out prior to her upcoming surgery. If anything, if Dr. Bah is unable to help she would go to .     Will route to Dr. Bah to advise.    Brittney MANSFIELD RN, BSN

## 2024-06-13 ENCOUNTER — OFFICE VISIT (OUTPATIENT)
Dept: PEDIATRICS | Facility: CLINIC | Age: 74
End: 2024-06-13
Payer: COMMERCIAL

## 2024-06-13 ENCOUNTER — HOSPITAL ENCOUNTER (OUTPATIENT)
Dept: ULTRASOUND IMAGING | Facility: HOSPITAL | Age: 74
Discharge: HOME | End: 2024-06-13
Attending: FAMILY MEDICINE
Payer: COMMERCIAL

## 2024-06-13 ENCOUNTER — OFFICE VISIT (OUTPATIENT)
Dept: FAMILY MEDICINE | Facility: CLINIC | Age: 74
End: 2024-06-13
Payer: COMMERCIAL

## 2024-06-13 VITALS
RESPIRATION RATE: 16 BRPM | DIASTOLIC BLOOD PRESSURE: 66 MMHG | OXYGEN SATURATION: 95 % | HEART RATE: 64 BPM | SYSTOLIC BLOOD PRESSURE: 110 MMHG | TEMPERATURE: 98.3 F

## 2024-06-13 VITALS
HEART RATE: 67 BPM | BODY MASS INDEX: 27.62 KG/M2 | TEMPERATURE: 98.3 F | WEIGHT: 151 LBS | SYSTOLIC BLOOD PRESSURE: 95 MMHG | OXYGEN SATURATION: 99 % | RESPIRATION RATE: 16 BRPM | DIASTOLIC BLOOD PRESSURE: 64 MMHG

## 2024-06-13 DIAGNOSIS — R19.03 RIGHT LOWER QUADRANT ABDOMINAL MASS: Primary | ICD-10-CM

## 2024-06-13 DIAGNOSIS — D64.9 ANEMIA, UNSPECIFIED TYPE: ICD-10-CM

## 2024-06-13 DIAGNOSIS — N83.8 OVARIAN MASS, RIGHT: ICD-10-CM

## 2024-06-13 DIAGNOSIS — L02.211 ABDOMINAL WALL ABSCESS: Primary | ICD-10-CM

## 2024-06-13 LAB
ANION GAP SERPL CALCULATED.3IONS-SCNC: 9 MMOL/L (ref 3–14)
BASOPHILS # BLD AUTO: 0 10E3/UL (ref 0–0.2)
BASOPHILS NFR BLD AUTO: 0 %
BUN SERPL-MCNC: 16 MG/DL (ref 7–30)
CALCIUM SERPL-MCNC: 9.2 MG/DL (ref 8.5–10.1)
CHLORIDE BLD-SCNC: 100 MMOL/L (ref 94–109)
CO2 SERPL-SCNC: 27 MMOL/L (ref 20–32)
CREAT SERPL-MCNC: 0.9 MG/DL (ref 0.52–1.04)
EGFRCR SERPLBLD CKD-EPI 2021: 66 ML/MIN/1.73M2
EOSINOPHIL # BLD AUTO: 0.1 10E3/UL (ref 0–0.7)
EOSINOPHIL NFR BLD AUTO: 1 %
ERYTHROCYTE [DISTWIDTH] IN BLOOD BY AUTOMATED COUNT: 13 % (ref 10–15)
GLUCOSE BLD-MCNC: 242 MG/DL (ref 70–99)
HCT VFR BLD AUTO: 36.8 % (ref 35–47)
HGB BLD-MCNC: 12.4 G/DL (ref 11.7–15.7)
IMM GRANULOCYTES # BLD: 0 10E3/UL
IMM GRANULOCYTES NFR BLD: 0 %
LYMPHOCYTES # BLD AUTO: 1.9 10E3/UL (ref 0.8–5.3)
LYMPHOCYTES NFR BLD AUTO: 20 %
MCH RBC QN AUTO: 30.3 PG (ref 26.5–33)
MCHC RBC AUTO-ENTMCNC: 33.7 G/DL (ref 31.5–36.5)
MCV RBC AUTO: 90 FL (ref 78–100)
MONOCYTES # BLD AUTO: 0.5 10E3/UL (ref 0–1.3)
MONOCYTES NFR BLD AUTO: 5 %
NEUTROPHILS # BLD AUTO: 7.1 10E3/UL (ref 1.6–8.3)
NEUTROPHILS NFR BLD AUTO: 74 %
PLATELET # BLD AUTO: 317 10E3/UL (ref 150–450)
POTASSIUM BLD-SCNC: 4.2 MMOL/L (ref 3.4–5.3)
RBC # BLD AUTO: 4.09 10E6/UL (ref 3.8–5.2)
SODIUM SERPL-SCNC: 136 MMOL/L (ref 135–145)
WBC # BLD AUTO: 9.6 10E3/UL (ref 4–11)

## 2024-06-13 PROCEDURE — 85025 COMPLETE CBC W/AUTO DIFF WBC: CPT | Performed by: FAMILY MEDICINE

## 2024-06-13 PROCEDURE — 76830 TRANSVAGINAL US NON-OB: CPT

## 2024-06-13 PROCEDURE — 76705 ECHO EXAM OF ABDOMEN: CPT

## 2024-06-13 PROCEDURE — 99207 REFERRAL TO ACUTE AND DIAGNOSTIC SERVICES: CPT | Performed by: PHYSICIAN ASSISTANT

## 2024-06-13 PROCEDURE — 99214 OFFICE O/P EST MOD 30 MIN: CPT | Performed by: FAMILY MEDICINE

## 2024-06-13 PROCEDURE — 80048 BASIC METABOLIC PNL TOTAL CA: CPT | Performed by: FAMILY MEDICINE

## 2024-06-13 PROCEDURE — 76856 US EXAM PELVIC COMPLETE: CPT

## 2024-06-13 PROCEDURE — 36415 COLL VENOUS BLD VENIPUNCTURE: CPT | Performed by: FAMILY MEDICINE

## 2024-06-13 RX ORDER — SULFAMETHOXAZOLE/TRIMETHOPRIM 800-160 MG
1 TABLET ORAL 2 TIMES DAILY
Qty: 14 TABLET | Refills: 0 | Status: SHIPPED | OUTPATIENT
Start: 2024-06-13 | End: 2024-06-20

## 2024-06-13 NOTE — TELEPHONE ENCOUNTER
Wu Lugo.  Since I'm still ACS this week, I'll be unable to see her.  Please have her go to an UC or emergency room. Thanks.

## 2024-06-13 NOTE — PROGRESS NOTES
"Chief Complaint   Patient presents with    Cyst     HAS CYST ON ABD FOR 1 WEEK BECOMING MORE TENDER TO TOUCH        ASSESSMENT/PLAN:  Ciarra was seen today for cyst.    Diagnoses and all orders for this visit:    Right lower quadrant abdominal mass    Nonfluctuant, firm mass in the right lower abdomen with no overlying erythema.    Patient is upset that I am not giving her an antibiotic, telling me that is with the nurse or doctor at [facility] told her she needed in order to do surgery.  Discussed with her she needs more evaluation that I can offer in the urgent care setting and would recommend going to the [facility].  She asked why she went just go to the emergency room and discussed the advantages of the [facility] but she is more than welcome to go to the emergency room as well.  Ultimately patient agrees to go to the [facility].    Referral to Acute and Diagnostic Services    [phone] ([address], [place]  [address]    Transition to Acute & Diagnostic Services Clinic has been discussed with patient, and she agrees with next level of care.   Patient understands that evaluation/treatment at [facility] typically takes significantly longer than in clinic/urgent care (>2 hours).  The [facility] has been contacted by provider/staff to confirm patient acceptance.         Special issues:      None                     The following provider has assessed this patient for intervention at [facility], and directed the patient for referral: AIRAM Ramos PA-C      SUBJECTIVE:  Kemi is a 74 year old female who presents to urgent care for an \"antibiotic\".  She noticed a mass in the right lower quadrant that is tender.  She states it feels similar to her previous incidence of abdominal abscesses that have been seen by Dr. Bah and general surgery, last 1 being seen in March of this year for an I&D.  She also has upcoming exploratory " laparotomy, salpingo oophorectomy, dilation and curettaged and hysteroscopy for a uterine mass on 6/19/24 at [facility].  She had to reschedule this surgery 3 times.  She called the team at [facility] and she reports they told her that she should be on antibiotic before doing surgery so that is what she came here take it.     ROS: Pertinent ROS neg other than the symptoms noted above in the HPI.     OBJECTIVE:  BP 95/64   Pulse 67   Temp 98.3  F (36.8  C) (Oral)   Resp 16   Wt 68.5 kg (151 lb)   LMP  (LMP Unknown)   SpO2 99%   BMI 27.62 kg/m     GENERAL: alert and no distress  ABDOMEN: soft, firm mass felt just right of the umbilicus that is tender, no overlying erythema, no fluctuance    DIAGNOSTICS    No results found for any visits on 06/13/24.     Current Outpatient Medications   Medication Sig Dispense Refill    apixaban ANTICOAGULANT (ELIQUIS ANTICOAGULANT) 5 MG tablet Take 1 tablet (5 mg) by mouth 2 times daily 60 tablet 11    atorvastatin (LIPITOR) 80 MG tablet TAKE 1 TABLET BY MOUTH AT BEDTIME 90 tablet 2    busPIRone (BUSPAR) 15 MG tablet Take 1 tablet by mouth once daily 90 tablet 0    ezetimibe (ZETIA) 10 MG tablet Take 1 tablet (10 mg) by mouth daily (Patient taking differently: Take 10 mg by mouth every morning) 90 tablet 3    gabapentin (NEURONTIN) 300 MG capsule TAKE 3 CAPSULES BY MOUTH EVERY DAY AT BEDTIME (Patient taking differently: Take 900 mg by mouth at bedtime TAKE 3 CAPSULES BY MOUTH EVERY DAY AT BEDTIME) 270 capsule 3    hydroCHLOROthiazide 12.5 MG tablet Take 1 tablet (12.5 mg) by mouth daily -- appt needed for further refills. 90 tablet 0    isosorbide mononitrate (IMDUR) 60 MG 24 hr tablet Take 1 tablet (60 mg) by mouth daily (Patient taking differently: Take 60 mg by mouth every morning) 90 tablet 3    LANTUS SOLOSTAR 100 UNIT/ML soln INJECT 28 UNITS SUBCUTANEOUSLY TWICE DAILY 45 mL 3    losartan (COZAAR) 100 MG tablet Take 1 tablet (100 mg) by mouth daily (Patient taking differently: Take  100 mg by mouth every morning) 90 tablet 3    sertraline (ZOLOFT) 100 MG tablet Take 1 tablet by mouth once daily 90 tablet 0    sotalol (BETAPACE) 80 MG tablet Take 0.5 tablets (40 mg) by mouth 2 times daily 90 tablet 3    acetaminophen (TYLENOL) 500 MG tablet Take 1,000 mg by mouth every 12 hours as needed for mild pain      blood glucose (NO BRAND SPECIFIED) test strip Use to test blood sugar 1 times daily or as directed. To accompany: Blood Glucose Monitor Brands: per insurance. 100 strip 6    blood glucose monitoring (NO BRAND SPECIFIED) meter device kit Use to test blood sugar 1 times daily or as directed. Preferred blood glucose meter OR supplies to accompany: Blood Glucose Monitor Brands: per insurance. 1 kit 0    clobetasol (TEMOVATE) 0.05 % external cream Apply topically 2 times daily      ketoconazole (NIZORAL) 2 % external cream APPLY 1 APPLICATION TOPICALLY TWICE DAILY TO AFFECTED AREAS FOR 4 WEEKS THEN AS NEEDED      naproxen (NAPROSYN) 500 MG tablet Take 500 mg by mouth 2 times daily (with meals)      thin (NO BRAND SPECIFIED) lancets Use with lanceting device. To accompany: Blood Glucose Monitor Brands: per insurance. 100 each 6    triamcinolone (KENALOG) 0.1 % external cream Apply topically 2 times daily       No current facility-administered medications for this visit.     Facility-Administered Medications Ordered in Other Visits   Medication Dose Route Frequency Provider Last Rate Last Admin    dexamethasone PF (DECADRON) injection    Once PRN Ady Garsia, DO   20 mg at 05/19/23 1326    iohexol (OMNIPAQUE) 300 mg/mL injection    Once PRN Ady Garsia DO   1 mL at 05/19/23 1327    lidocaine (PF) (XYLOCAINE) 1 % injection    Once PRN Ady Garsia, DO   4 mL at 05/19/23 1326      Patient Active Problem List   Diagnosis    Obesity    Hypercholesterolemia    Essential hypertension    Type 2 diabetes mellitus with diabetic polyneuropathy, with long-term current use of insulin (H)     Benign neoplasm of choroid    Paroxysmal atrial fibrillation (H)    Coronary artery disease of bypass graft of native heart with stable angina pectoris (H24)    Bilateral low back pain without sciatica, unspecified chronicity    Benign adenomatous polyp of large intestine    Bilateral hearing loss    Chronic anticoagulation    History of TIA (transient ischemic attack) and stroke    Peripheral neuropathy    Psoriasis, guttate    Type 2 myocardial infarction due to arrhythmia (H)    Urinary incontinence    Abnormal nuclear stress test    Benign neoplasm of cecum    Benign neoplasm of transverse colon    Generalized anxiety disorder    Ovarian mass, right    Anticoagulation goal of INR 2 to 3    Chronic bilateral low back pain with bilateral sciatica    Lumbar radiculopathy    Spondylolisthesis of lumbar region    Lumbar foraminal stenosis    Lumbar facet arthropathy    Chronic kidney disease, stage 3a (H)      Past Medical History:   Diagnosis Date    Adrenal nodule (H24)     Alcohol dependence in remission (H)     Anxiety and depression     Benign essential hypertension     Chronic atrial fibrillation (H)     Chronic back pain     Coronary artery disease     Diabetes mellitus (H)     Discitis of thoracolumbar region 10/10/2015    Encephalopathy 10/14/2015    Epidural abscess 10/10/2015    Hip pain, right     Hyperlipidemia     Ovarian mass     Sepsis (H) 10/08/2015    Stroke (H) 06/23/2015    Neurology felt that episode was C/W subacute ischemic stroke    TIA (transient ischemic attack) 06/23/2015    UTI (urinary tract infection)     admitted to [facility] with UTI     Past Surgical History:   Procedure Laterality Date    ANGIOPLASTY  03/30/2016    Drug eluting stent mid LAD, cutting balloon to 1st diagonal    ANGIOPLASTY  01/01/2008    BYPASS GRAFT ARTERY CORONARY  12/11/2007    X 3 vessels, LIMA to LAD, saph vein graft to distal RCA, saphvein graft to marginal, mini circuit bypass.  [facility].     CORONARY STENT PLACEMENT  2008    Left main, left circumflex, RCA    CORONARY STENT PLACEMENT  2016    CV ANGIOGRAM CORONARY GRAFT N/A 10/11/2023    Procedure: Coronary Angiogram Graft;  Surgeon: Sam Boo MD;  Location: [facility] CATH LAB CV    CV CORONARY ANGIOGRAM N/A 2018    Procedure: Coronary Angiogram;  Surgeon: Kit Bean MD;  Location: [facility] Cath Lab;  Service:     CV LEFT HEART CATHETERIZATION WITH LEFT VENTRICULOGRAM N/A 2018    Procedure: Left Heart Catheterization with Left Ventriculogram;  Surgeon: Kit Bean MD;  Location: [facility] Cath Lab;  Service:     INSERT MIDLINE HE  10/31/2015         LUMBAR DISCECTOMY N/A 10/11/2015    BILATERAL L1-L5 DECOMPRESSIVE LAMINECTOMY WITH EVACUATION OF EPIDURAL ABSCESS IRRIGATION & DEBRIDEMENT;  Surgeon: Luli Chan MD;  Location: [facility] OR; Due to sepsis    RELEASE CARPAL TUNNEL Bilateral     UMBILICAL HERNIA REPAIR       Family History   Problem Relation Age of Onset    Cerebrovascular Disease Mother     Diabetes Father     Coronary Artery Disease Father     Diabetes Sister     Cerebrovascular Disease Sister 81    Cerebrovascular Disease Brother     Diabetes Brother     Cancer Paternal Grandfather     Anesthesia Reaction No family hx of     Thrombosis No family hx of      Social History     Tobacco Use    Smoking status: Former     Current packs/day: 0.00     Types: Cigarettes     Quit date: 2007     Years since quittin.9     Passive exposure: Past    Smokeless tobacco: Never   Substance Use Topics    Alcohol use: Not Currently     Comment: Stopped drinking 2015              The plan of care was discussed with the patient. They understand and agree with the course of treatment prescribed. A printed summary was given including instructions and medications.  The use of Dragon/Intuit dictation services may have been used to construct the content in this note; any grammatical  or spelling errors are non-intentional. Please contact the author of this note directly if you are in need of any clarification.

## 2024-06-13 NOTE — PROGRESS NOTES
"Acute and Diagnostic Services Clinic Visit    Assessment & Plan     Abdominal wall abscess  Small fluid collection 1.3 cmx0.6cm noted on ultrasound  Treating with Bactrim twice daily for 7 days  If her symptoms are worsening recommend trying to get back into [facility] on Monday where we can reassess with ultrasound and talk to surgery if needed.  If worse over the weekend she can proceed to the emergency room.  - sulfamethoxazole-trimethoprim (BACTRIM DS) 800-160 MG tablet; Take 1 tablet by mouth 2 times daily for 7 days      Ovarian mass, right  Plan for surgery June 19  - CBC with platelets differential; Future  - Basic metabolic panel; Future  - US Abdomen Limited; Future  - US Pelvic Complete with Transvaginal; Future  - Basic metabolic panel  - CBC with platelets differential    Anemia, unspecified type  Improved after iron infusion to 12.4 hemoglobin  - CBC with platelets differential; Future  - CBC with platelets differential    Over 30  minutes were spent doing chart review, history and exam, documentation and further activities per the note.      BMI  Estimated body mass index is 27.62 kg/m  as calculated from the following:    Height as of 5/22/24: 1.575 m (5' 2\").    Weight as of an earlier encounter on 6/13/24: 68.5 kg (151 lb).   Weight management plan: Patient is currently on a research diet.  Plant-based and low-fat.          Return in about 4 days (around 6/17/2024), or if symptoms worsen or fail to improve, for Follow up.    Keith Lafleur is a 74 year old, presenting for the following health issues:  Abdominal Pain (R ABD PAIN)    HPI   Patient having right sided abdominal pain starting late last week and worsening Monday. Swollen.     Patient is a 74-year-old female with a known right adnexal mass.  She is scheduled for surgery to remove this mass in 6 days on June 19.    She recently noticed a tender spot on the right side of her abdomen.  She has concern about abscess because in the recent past " she has had 2 abdominal abscesses on the left side of her abdomen, 1 of which required intervention from a surgeon.  The area had to be opened and packed.    She denies fevers and chills.  She does have a low blood pressure today and initially denied feeling lightheaded and dizzy but when my nurse got her up to go down for ultrasound she did notice dizziness.  She has generally been feeling low energy.  She had an iron infusion done Jessica 3 to help improve her anemia in preparation for her surgery.    In February she started a low-fat, plant-based diet and as of 3 days ago she had been down 20 pounds but now notices she is down another 4 pounds.  She has had a little bit of diarrhea.    Her surgeon wanted her to come in to get assessed today because of this tender spot on her abdomen.  They wanted her to be evaluated and then possibly treated with antibiotics.    Chart reviewed including recent abdominal ultrasound and CT from early May.    Review of Systems  Negative except as listed in HPI      Objective    /66 (BP Location: Left arm, Patient Position: Sitting, Cuff Size: Adult Regular)   Pulse 64   Temp 98.3  F (36.8  C) (Oral)   Resp 16   LMP  (LMP Unknown)   SpO2 95%   There is no height or weight on file to calculate BMI.  Physical Exam   Vitals are noted and within normal limits except for low blood pressure today.  In general she is alert, oriented, and in no acute distress  Heart is regular rate and rhythm with no murmurs and lungs are clear to auscultation bilaterally with good air entry  Abdomen has normal bowel sounds.  Soft and nondistended.  There is no area of erythema or swelling.  2 cm lateral on the right side of the umbilicus there is a 1 cm firm round tender area subcutaneously.  No fluctuance.  Nonmobile.  CBC with differential: Within normal limits  BMP: Normal except for glucose of 242 (patient diabetic)  Pelvic ultrasound: No new findings.  Still with enlarging multiseptated  mass  Limited abdominal ultrasound: Tiny fluid pocket 1.3 cm x 0.6 cm in the subcutaneous fat  Results for orders placed or performed during the hospital encounter of 06/13/24   US Pelvic Complete with Transvaginal     Status: None    Narrative    EXAM: US PELVIC TRANSABDOMINAL AND TRANSVAGINAL  LOCATION: [facility]  DATE: 6/13/2024    INDICATION: Pelvic pain. Known Ovarian mass, right  COMPARISON: Ultrasound 3/11/2024 CT 10/20/2023 and MRI pelvis 12/10/2023  TECHNIQUE: Transabdominal scans were performed. Endovaginal ultrasound was performed to better visualize the adnexa.    FINDINGS:    UTERUS: 7.1 x 5.5 x 5.4 cm. Normal in size and position with no masses.    ENDOMETRIUM: 2 mm. Normal smooth endometrium.    RIGHT OVARY: Large multi septated cystic mass right ovary measures 14.2 x 12.6 x 9.6 cm. (prior ultrasound 11.3 x 8.7 x 7.0 cm).    LEFT OVARY: Not seen due to bowel gas.  No significant free fluid.      Impression    IMPRESSION:  1.  Enlarging multi septated mass with some solid components right ovary most consistent with an enlarging cystic ovarian tumor.    2.  No significant new findings.         Results for orders placed or performed during the hospital encounter of 06/13/24   US Abdomen Limited     Status: None    Narrative    EXAM: US ABDOMEN LIMITED  LOCATION: [facility]  DATE: 6/13/2024    INDICATION: tender, firm area [id number] in size located 2cm to the right of the umbilicus.  COMPARISON: None.  TECHNIQUE: Limited abdominal ultrasound.    FINDINGS:    Ultrasound over the palpable area demonstrates a hypoechoic region measuring 1.3 x 0.6 cm appearance suggestive of a residual tiny fluid pocket at site of previous infection. By history this has been previously iodine.       Impression    IMPRESSION:  1.  Tiny residual fluid pocket within the subcutaneous fat measuring 1.3 x 0.6 cm.       Results for orders placed or performed in visit on 06/13/24    Basic metabolic panel     Status: Abnormal   Result Value Ref Range    Sodium 136 135 - 145 mmol/L    Potassium 4.2 3.4 - 5.3 mmol/L    Chloride 100 94 - 109 mmol/L    Carbon Dioxide (CO2) 27 20 - 32 mmol/L    Anion Gap 9 3 - 14 mmol/L    Urea Nitrogen 16 7 - 30 mg/dL    Creatinine 0.90 0.52 - 1.04 mg/dL    GFR Estimate 66 >60 mL/min/1.73m2    Calcium 9.2 8.5 - 10.1 mg/dL    Glucose 242 (H) 70 - 99 mg/dL   CBC with platelets and differential     Status: None   Result Value Ref Range    WBC Count 9.6 4.0 - 11.0 10e3/uL    RBC Count 4.09 3.80 - 5.20 10e6/uL    Hemoglobin 12.4 11.7 - 15.7 g/dL    Hematocrit 36.8 35.0 - 47.0 %    MCV 90 78 - 100 fL    MCH 30.3 26.5 - 33.0 pg    MCHC 33.7 31.5 - 36.5 g/dL    RDW 13.0 10.0 - 15.0 %    Platelet Count 317 150 - 450 10e3/uL    % Neutrophils 74 %    % Lymphocytes 20 %    % Monocytes 5 %    % Eosinophils 1 %    % Basophils 0 %    % Immature Granulocytes 0 %    Absolute Neutrophils 7.1 1.6 - 8.3 10e3/uL    Absolute Lymphocytes 1.9 0.8 - 5.3 10e3/uL    Absolute Monocytes 0.5 0.0 - 1.3 10e3/uL    Absolute Eosinophils 0.1 0.0 - 0.7 10e3/uL    Absolute Basophils 0.0 0.0 - 0.2 10e3/uL    Absolute Immature Granulocytes 0.0 <=0.4 10e3/uL   CBC with platelets differential     Status: None    Narrative    The following orders were created for panel order CBC with platelets differential.  Procedure                               Abnormality         Status                     ---------                               -----------         ------                     CBC with platelets and d...[[id number]]                      Final result                 Please view results for these tests on the individual orders.                 Signed Electronically by: Marva Shen MD

## 2024-06-13 NOTE — TELEPHONE ENCOUNTER
RN called patient to relay Dr. Bah's advisement. Patient was very understanding and will go to  for further evaluation. No further questions or concerns.     Brittney MANSFIELD RN, BSN

## 2024-06-15 ENCOUNTER — HEALTH MAINTENANCE LETTER (OUTPATIENT)
Age: 74
End: 2024-06-15

## 2024-06-16 ENCOUNTER — OFFICE VISIT (OUTPATIENT)
Dept: FAMILY MEDICINE | Facility: CLINIC | Age: 74
End: 2024-06-16
Payer: COMMERCIAL

## 2024-06-16 VITALS
WEIGHT: 151 LBS | TEMPERATURE: 98.1 F | HEART RATE: 59 BPM | HEIGHT: 62 IN | DIASTOLIC BLOOD PRESSURE: 64 MMHG | OXYGEN SATURATION: 100 % | SYSTOLIC BLOOD PRESSURE: 146 MMHG | BODY MASS INDEX: 27.79 KG/M2 | RESPIRATION RATE: 16 BRPM

## 2024-06-16 DIAGNOSIS — L02.211 ABSCESS OF SKIN OF ABDOMEN: Primary | ICD-10-CM

## 2024-06-16 PROCEDURE — 87076 CULTURE ANAEROBE IDENT EACH: CPT | Performed by: PHYSICIAN ASSISTANT

## 2024-06-16 PROCEDURE — 99213 OFFICE O/P EST LOW 20 MIN: CPT | Mod: 25 | Performed by: PHYSICIAN ASSISTANT

## 2024-06-16 PROCEDURE — 96372 THER/PROPH/DIAG INJ SC/IM: CPT | Mod: 59 | Performed by: PHYSICIAN ASSISTANT

## 2024-06-16 PROCEDURE — 87181 SC STD AGAR DILUTION PER AGT: CPT | Performed by: PHYSICIAN ASSISTANT

## 2024-06-16 PROCEDURE — 87077 CULTURE AEROBIC IDENTIFY: CPT | Performed by: PHYSICIAN ASSISTANT

## 2024-06-16 PROCEDURE — 87070 CULTURE OTHR SPECIMN AEROBIC: CPT | Performed by: PHYSICIAN ASSISTANT

## 2024-06-16 PROCEDURE — 10060 I&D ABSCESS SIMPLE/SINGLE: CPT | Performed by: PHYSICIAN ASSISTANT

## 2024-06-16 RX ORDER — CEFTRIAXONE SODIUM 1 G
500 VIAL (EA) INJECTION ONCE
Status: COMPLETED | OUTPATIENT
Start: 2024-06-16 | End: 2024-06-16

## 2024-06-16 RX ADMIN — Medication 500 MG: at 17:45

## 2024-06-16 NOTE — PROGRESS NOTES
Abscess of skin of abdomen  - DRAIN SKIN ABSCESS SIMPLE/SINGLE  - cefTRIAXone (ROCEPHIN) in lidocaine 1% (PF) for IM administration only 500 mg    Prior to the procedure I discussed with the patient the risks involved if draining and abdominal abscess.  The patient understood and agreed.  We also discussed that the abscess appears to be fairly small and may not have any significant amount of purulent discharge within.  If I do the procedure today and purulent discharge is not easily excreted, we will continue with antibiotics along and have the patient follow-up with general surgery within the week.     Effected area cleaned with betadine x 3 then wiped away with isopropyl alcohol.  2 pecent lidocaine with epineprhine used to infiltrate the area with good anethesia.  Number 11 scapel used to make a stab incision.  Purlent drainage was removed. The wound was packed with a small amount of guaze as cavity was fairly small. Appropriate wound care dressing applied.  Pt tolerated preocedure well.  Patient is already familiar with wound care instructions as well as packing instructions.  It is likely that the wound will only need to be repacked for the next week.    Patient was instructed to take antibiotic medication as prescribed and to monitor for sign of infection in the next week.  I have attempted to take a wound culture and I am awaiting the results.  It is possible that the patient was not reacting to the Bactrim alone because there was a capsule around the infection.  We discussed potentially going on a different oral antibiotic versus simply staying on the Bactrim and adding a Rocephin injection.  We collectively decided to do the latter.    Patient educated on red flag symptoms and asked to go directly to the ED if these symptoms present themselves.     40 minutes spent by me on the date of the encounter doing chart review, history and exam, documentation and further activities per the note    Kaushik Rosenberg,  AIRAM VENEGAS [facility]    Subjective   74 year old who presents to clinic today for the following health issues:    Cyst       HPI     Patient presents today for an abdominal abscess of her right lower abdomen.  Patient was seen for this in the acute diagnostic center on the 13th of this month.  Ultrasound did not seem to demonstrate any connections to underlying tissue.  An incision and drainage was not done at that time but I am unsure as to why.  Patient does not seem to know either.  Patient was placed on Bactrim and asked to follow-up in the emergency room if not having any improvement.  The patient has been on Bactrim for about 2 and half days and has not had much improvement and states that the pain is a little bit worse.  She denies any recent discharge from the mass.  She denies any fever, chills, body aches, fatigue, nausea, or vomiting.  Patient has had an abscess like this that was successfully incised and drained by outpatient general surgery back in March.  Patient was placed on Bactrim at that time and had good improvement.    Review of Systems   Review of Systems   See HPI    Objective    Temp: 98.1  F (36.7  C) Temp src: Oral BP: (!) 146/64 Pulse: 59   Resp: 16 SpO2: 100 %       Physical Exam   Physical Exam  Constitutional:       General: She is not in acute distress.     Appearance: Normal appearance. She is normal weight. She is not ill-appearing, toxic-appearing or diaphoretic.   HENT:      Head: Normocephalic and atraumatic.   Cardiovascular:      Rate and Rhythm: Normal rate.      Pulses: Normal pulses.   Pulmonary:      Effort: Pulmonary effort is normal. No respiratory distress.   Abdominal:          Comments: The area shown above is erythematous, warm to touch, firm, and fluctuant.  It measures roughly 1 x 2 cm.  It is moderately tender to touch.  The rest of the abdomen does not appear to be tender to touch and bowel sounds are normal.   Neurological:      General: No focal  deficit present.      Mental Status: She is alert and oriented to person, place, and time. Mental status is at baseline.      Gait: Gait normal.   Psychiatric:         Mood and Affect: Mood normal.         Behavior: Behavior normal.         Thought Content: Thought content normal.         Judgment: Judgment normal.          No results found for this or any previous visit (from the past 24 hour(s)).

## 2024-06-20 ENCOUNTER — TELEPHONE (OUTPATIENT)
Dept: SURGERY | Facility: CLINIC | Age: 74
End: 2024-06-20
Payer: COMMERCIAL

## 2024-06-20 NOTE — TELEPHONE ENCOUNTER
Patient called to report that her surgery for exploratory laparotomy for 6/19/2024 was cancelled because of her abdominal mass concern in the RLQ. Patient was seen in ADS and UC on 6/13/2024 and 6/16/2024 respectively, in which she ws given antibiotics and then eventually had an I&D of the site.     Today is patient's last day of antibiotics, and she reports that the right abdomen does not cause her significant pain, but she continues to have concerns about the way that it looks. Patient was not able to provide much details other than it continues to not look how it did previously when she had I&D with Dr. Bah. She denies any fever/chills, discharge, foul odor, body aches, nausea or emesis.    Patient would like to schedule a follow-up visit with Dr. Bah for further evaluation and discuss possible plans of excising the mass to prevent reoccurrence in the future. Also, so that she may also complete her other surgery with [place].    Appointment scheduled for 6/25/2024 with Dr. Bah, and patient was also advised if she develops new or worsening symptoms to be seen more urgently in UC/ED. Patient verbalized good understanding.     Brittney MANSFIELD   RN, BSN

## 2024-06-21 ENCOUNTER — TELEPHONE (OUTPATIENT)
Dept: FAMILY MEDICINE | Facility: CLINIC | Age: 74
End: 2024-06-21
Payer: COMMERCIAL

## 2024-06-21 DIAGNOSIS — L03.90 CELLULITIS, UNSPECIFIED CELLULITIS SITE: Primary | ICD-10-CM

## 2024-06-21 LAB
BACTERIA ABSC ANAEROBE+AEROBE CULT: ABNORMAL

## 2024-06-21 PROCEDURE — 99207 PR NON-BILLABLE SERV PER CHARTING: CPT

## 2024-06-21 RX ORDER — CEPHALEXIN 500 MG/1
500 CAPSULE ORAL 3 TIMES DAILY
Qty: 21 CAPSULE | Refills: 0 | Status: SHIPPED | OUTPATIENT
Start: 2024-06-21 | End: 2024-06-28

## 2024-06-21 NOTE — TELEPHONE ENCOUNTER
Will start keflex for strep coverage, eikenella is also susceptible to cephalosporins (and the bactrim they were already on)   Keep apt with pcp in 4 days which is scheduled for 6/25 for further evaluation and treatment but follow up in person in ED or UC if not improving in next 48 hours on this new antibiotic

## 2024-06-21 NOTE — TELEPHONE ENCOUNTER
Call and spoke with patient and relayed message. No further questions. Patient will keep appt with Dr. Bah.    Andie Nielsen on 6/21/2024 at 4:16 PM

## 2024-06-21 NOTE — TELEPHONE ENCOUNTER
----- Message from Ashley Coffey sent at 6/21/2024  2:28 PM CDT -----  Please follow up with patient to see if infection is improving?   If infection seems better no changes needed to antibiotics     4 bacteria grew out of culture  ----- Message -----  From: Lab, Background User  Sent: 6/17/2024   1:33 PM CDT  To: [facility]

## 2024-06-21 NOTE — TELEPHONE ENCOUNTER
Call and left provider's message on how patient is doing and how infection is doing. Callback number provided for patient to callback with update.    Andie Nielsen on 6/21/2024 at 2:58 PM

## 2024-06-21 NOTE — TELEPHONE ENCOUNTER
Finished antibiotic yesterday, pt states not improving and would like to know if she needs to start a different medication or if she needs to get seen.    Infection is still swollen (has gotten bigger than before) and and area tender. Has appt with Dr. Bah next Tuesday.    Informed patient will route message to provider and call patient back as soon as possible.    Patient request that any new meds be sent to [facility] in [place] on [address] Curtis Nielsen on 6/21/2024 at 3:58 PM

## 2024-06-24 PROBLEM — R19.00 PELVIC MASS: Status: ACTIVE | Noted: 2024-05-09

## 2024-06-24 PROBLEM — D50.9 IRON DEFICIENCY ANEMIA: Status: ACTIVE | Noted: 2024-05-29

## 2024-06-24 PROBLEM — E11.9 DIABETES MELLITUS (H): Status: ACTIVE | Noted: 2024-05-09

## 2024-06-24 PROBLEM — R94.39 ABNORMAL RESULT OF OTHER CARDIOVASCULAR FUNCTION STUDY: Status: ACTIVE | Noted: 2023-10-11

## 2024-06-25 ENCOUNTER — OFFICE VISIT (OUTPATIENT)
Dept: SURGERY | Facility: CLINIC | Age: 74
End: 2024-06-25
Payer: COMMERCIAL

## 2024-06-25 VITALS — HEIGHT: 62 IN | WEIGHT: 150 LBS | BODY MASS INDEX: 27.6 KG/M2

## 2024-06-25 DIAGNOSIS — L02.211 ABDOMINAL WALL ABSCESS: Primary | ICD-10-CM

## 2024-06-25 PROCEDURE — 99214 OFFICE O/P EST MOD 30 MIN: CPT | Performed by: SURGERY

## 2024-06-25 NOTE — PROGRESS NOTES
General Surgery H&P  Kemi Soto MRN# [id number]   Age/Sex: 74 year old female YOB: 1950     Reason for visit: Skin abscess       Referring physician: Dr. Sandra                    Assessment and Plan:   Assessment:  Skin abscess  Right ovarian mass    74-year-old female presenting with recurrent skin abscesses.  It is unclear why the patient continues to have these skin abscesses recur.  This skin abscess is actually on the right side at the level of the umbilicus.  Whereas her previous one that I saw was on the left side.  The 1 on the left side has since resolved.  There is an area of approximately 4 x 4 cm where there is a ball of hard tissue where it is possibly indurated.  This does not correlate to the 1 cm abscess collection was identified on ultrasound on 6/13/2024.    The main dilemma is not necessarily a skin abscess.  The main situation is that the patient has a right sided ovarian mass that needs further workup and surgery.  The patient has already been seen by [facility] and they have agreed to have the patient undergo the surgery.  However the problem is that they will not pursue the surgery if the patient has what appears to be an active skin infection.    Given the size of the skin infection, I cannot forward tell how long you will take until it fully heals.  This could take several weeks to months.  Since the wound has already undergone incision and drainage and still is worsening.  On palpation, it actually extends very deep down into the subcutaneous tissue.  In addition, the bacterial wound cultures have shown strep constellatus, eikenella corrodens, strep intermedius, and actinomyces.     If the patient wants this out immediately in order for the patient to proceed with her main surgery which is the exploratory laparotomy for the right ovarian mass, she could potentially lose a large portion of her abdominal subcutaneous tissue as well as her umbilical stalk.  This would be  the fastest method of source control of this abscess.    Plan:  -I am attempting to get the CT images of the abdomen pelvis from [facility] on 5/6/2024.  If I cannot obtain these images, we may have to proceed with another CT for preoperative planning to see how much tissue loss she really will encounter for the surgery.  In addition I want to ensure that the patient does not have any other sources intra-abdominal he that are leading to the development of these abdominal wall abscesses.    -further recs to follow once images reviewed, or more images obtained.           Chief Complaint:     Chief Complaint   Patient presents with    RECHECK     Recurrent abdominal abscess of her right lower abdomen. Recent  visit 6/16 for skin abscess I&D.        History is obtained from the patient    HPI:   Kemi Soto is a 74 year old female who presents to the general surgery team for reevaluation of abdominal wall abscesses.  Patient states that she had redeveloped recurrent abdominal wall abscesses.  The last one that I saw was about a year ago and that was on the left side of the patient's umbilicus.  Now the patient has developed 1 on the right side.  After I&D, this initial abscess now has grown to a size of 4 x 4 cm.  Patient does have some pain there but her main concern is that because of this ongoing infection, [facility] we will not pursue there exploratory surgery for her right ovarian mass.  Patient has no further complaints at this time.          Past Medical History:     Past Medical History:   Diagnosis Date    Adrenal nodule (H24)     Alcohol dependence in remission (H)     Anxiety and depression     Benign essential hypertension     Chronic atrial fibrillation (H)     Chronic back pain     Coronary artery disease     Diabetes mellitus (H)     Discitis of thoracolumbar region 10/10/2015    Encephalopathy 10/14/2015    Epidural abscess 10/10/2015    Hip pain, right     Hyperlipidemia     Ovarian mass      Sepsis (H) 10/08/2015    Stroke (H) 06/23/2015    Neurology felt that episode was C/W subacute ischemic stroke    TIA (transient ischemic attack) 06/23/2015    UTI (urinary tract infection)     admitted to [facility] with UTI              Past Surgical History:     Past Surgical History:   Procedure Laterality Date    ANGIOPLASTY  03/30/2016    Drug eluting stent mid LAD, cutting balloon to 1st diagonal    ANGIOPLASTY  01/01/2008    BYPASS GRAFT ARTERY CORONARY  12/11/2007    X 3 vessels, LIMA to LAD, saph vein graft to distal RCA, saphvein graft to marginal, mini circuit bypass.  [facility].    CORONARY STENT PLACEMENT  01/01/2008    Left main, left circumflex, RCA    CORONARY STENT PLACEMENT  03/01/2016    CV ANGIOGRAM CORONARY GRAFT N/A 10/11/2023    Procedure: Coronary Angiogram Graft;  Surgeon: Sam Boo MD;  Location: [facility] CATH LAB CV    CV CORONARY ANGIOGRAM N/A 08/01/2018    Procedure: Coronary Angiogram;  Surgeon: Kit Bean MD;  Location: [facility] Cath Lab;  Service:     CV LEFT HEART CATHETERIZATION WITH LEFT VENTRICULOGRAM N/A 08/01/2018    Procedure: Left Heart Catheterization with Left Ventriculogram;  Surgeon: Kit Bean MD;  Location: [facility] Cath Lab;  Service:     INSERT MIDLINE HE  10/31/2015         LUMBAR DISCECTOMY N/A 10/11/2015    BILATERAL L1-L5 DECOMPRESSIVE LAMINECTOMY WITH EVACUATION OF EPIDURAL ABSCESS IRRIGATION & DEBRIDEMENT;  Surgeon: Luli Chan MD;  Location: [facility] OR; Due to sepsis    RELEASE CARPAL TUNNEL Bilateral     UMBILICAL HERNIA REPAIR               Social History:    reports that she quit smoking about 17 years ago. Her smoking use included cigarettes. She has been exposed to tobacco smoke. She has never used smokeless tobacco. She reports that she does not currently use alcohol. She reports that she does not use drugs.           Family History:     Family History   Problem Relation Age of Onset     Cerebrovascular Disease Mother     Diabetes Father     Coronary Artery Disease Father     Diabetes Sister     Cerebrovascular Disease Sister 81    Cerebrovascular Disease Brother     Diabetes Brother     Cancer Paternal Grandfather     Anesthesia Reaction No family hx of     Thrombosis No family hx of               Allergies:     Allergies   Allergen Reactions    Metformin GI Disturbance    Oxycodone Nausea and Vomiting              Medications:     Prior to Admission medications    Medication Sig Start Date End Date Taking? Authorizing Provider   acetaminophen (TYLENOL) 500 MG tablet Take 1,000 mg by mouth every 12 hours as needed for mild pain   Yes Reported, Patient   apixaban ANTICOAGULANT (ELIQUIS ANTICOAGULANT) 5 MG tablet Take 1 tablet (5 mg) by mouth 2 times daily 1/12/24  Yes Zaheer Sandra MD   atorvastatin (LIPITOR) 80 MG tablet TAKE 1 TABLET BY MOUTH AT BEDTIME 4/29/24  Yes Elaine Galvez MD   blood glucose (NO BRAND SPECIFIED) test strip Use to test blood sugar 1 times daily or as directed. To accompany: Blood Glucose Monitor Brands: per insurance. 2/13/24  Yes Zaheer Sandra MD   blood glucose monitoring (NO BRAND SPECIFIED) meter device kit Use to test blood sugar 1 times daily or as directed. Preferred blood glucose meter OR supplies to accompany: Blood Glucose Monitor Brands: per insurance. 2/13/24  Yes Zaheer Sandra MD   busPIRone (BUSPAR) 15 MG tablet Take 1 tablet by mouth once daily 4/23/24  Yes Elaine Galvez MD   cephALEXin (KEFLEX) 500 MG capsule Take 1 capsule (500 mg) by mouth 3 times daily for 7 days 6/21/24 6/28/24 Yes Ashley Coffey APRN CNP   clobetasol (TEMOVATE) 0.05 % external cream Apply topically 2 times daily 5/30/23  Yes Reported, Patient   ezetimibe (ZETIA) 10 MG tablet Take 1 tablet (10 mg) by mouth daily  Patient taking differently: Take 10 mg by mouth every morning 9/1/23  Yes Chuck Hernandez MD   gabapentin (NEURONTIN) 300 MG capsule TAKE 3 CAPSULES BY MOUTH EVERY DAY AT  "BEDTIME  Patient taking differently: Take 900 mg by mouth at bedtime TAKE 3 CAPSULES BY MOUTH EVERY DAY AT BEDTIME 3/8/24  Yes Zaheer Sandra MD   hydroCHLOROthiazide 12.5 MG tablet Take 1 tablet (12.5 mg) by mouth daily -- appt needed for further refills. 5/13/24  Yes Deejay Warren DO   isosorbide mononitrate (IMDUR) 60 MG 24 hr tablet Take 1 tablet (60 mg) by mouth daily  Patient taking differently: Take 60 mg by mouth every morning 4/12/23  Yes Elaine Gavlez MD   ketoconazole (NIZORAL) 2 % external cream APPLY 1 APPLICATION TOPICALLY TWICE DAILY TO AFFECTED AREAS FOR 4 WEEKS THEN AS NEEDED 5/30/23  Yes Reported, Patient   LANTUS SOLOSTAR 100 UNIT/ML soln INJECT 28 UNITS SUBCUTANEOUSLY TWICE DAILY 5/14/24  Yes Zaheer Sandra MD   losartan (COZAAR) 100 MG tablet Take 1 tablet (100 mg) by mouth daily  Patient taking differently: Take 100 mg by mouth every morning 4/12/23  Yes Elaine Galvez MD   naproxen (NAPROSYN) 500 MG tablet Take 500 mg by mouth 2 times daily (with meals)   Yes Reported, Patient   sertraline (ZOLOFT) 100 MG tablet Take 1 tablet by mouth once daily 4/23/24  Yes Elaine Galvez MD   sotalol (BETAPACE) 80 MG tablet Take 0.5 tablets (40 mg) by mouth 2 times daily 3/5/24  Yes Zaheer Sandra MD   thin (NO BRAND SPECIFIED) lancets Use with lanceting device. To accompany: Blood Glucose Monitor Brands: per insurance. 2/13/24  Yes Zaheer Sandra MD   triamcinolone (KENALOG) 0.1 % external cream Apply topically 2 times daily 5/30/23  Yes Reported, Patient              Review of Systems:   A 12 point Review of Systems is negative other than noted in the HPI            Physical Exam:   Patient Vitals for the past 24 hrs:   Height Weight   06/25/24 1315 1.575 m (5' 2\") 68 kg (150 lb)        [unfilled]   Constitutional:   awake, alert, cooperative, no apparent distress, and appears stated age       Eyes:   PERRL, conjunctiva/corneas clear, EOM's intact; no scleral edema or icterus noted  "       ENT:   Normocephalic, without obvious abnormality, atraumatic, Lips, mucosa, and tongue normal        Hematologic / Lymphatic:   No lymphadenopathy       Lungs:   Normal respiratory effort, no accessory muscle use       Cardiovascular:   Regular rate and rhythm       Abdomen:   Soft, nondistended, nontender to palpation       Musculoskeletal:   No obvious swelling, bruising or deformity       Skin:   Skin color and texture normal for patient, there is an area of 4 x 4 cm to the right of the umbilicus.  This mass is very hard mobile.  In the center of this mass is the stab incision which is healed.  There is no drainage from the area.  There is no fluctuance.  This mass does encroach to the lateral side of the umbilical stalk.             Data:         All imaging studies reviewed by me. I reviewed the images, and I agree with findings of fluid collection on US.       DO Braden Alarcon DO  General Surgeon  [facility]  Surgery [address] [address]?  Office: [phone]  Employed by - [facility]  Pager: [phone]

## 2024-06-25 NOTE — LETTER
6/25/2024      Kemi Soto  [address] [place] [id number]      Dear Colleague,    Thank you for referring your patient, Kemi Soto, to the [facility] [place]. Please see a copy of my visit note below.    General Surgery H&P  Kemi Soto MRN# [id number]   Age/Sex: 74 year old female YOB: 1950     Reason for visit: Skin abscess       Referring physician: Dr. Sandra                    Assessment and Plan:   Assessment:  Skin abscess  Right ovarian mass    74-year-old female presenting with recurrent skin abscesses.  It is unclear why the patient continues to have these skin abscesses recur.  This skin abscess is actually on the right side at the level of the umbilicus.  Whereas her previous one that I saw was on the left side.  The 1 on the left side has since resolved.  There is an area of approximately 4 x 4 cm where there is a ball of hard tissue where it is possibly indurated.  This does not correlate to the 1 cm abscess collection was identified on ultrasound on 6/13/2024.    The main dilemma is not necessarily a skin abscess.  The main situation is that the patient has a right sided ovarian mass that needs further workup and surgery.  The patient has already been seen by [facility] and they have agreed to have the patient undergo the surgery.  However the problem is that they will not pursue the surgery if the patient has what appears to be an active skin infection.    Given the size of the skin infection, I cannot forward tell how long you will take until it fully heals.  This could take several weeks to months.  Since the wound has already undergone incision and drainage and still is worsening.  On palpation, it actually extends very deep down into the subcutaneous tissue.  In addition, the bacterial wound cultures have shown strep constellatus, eikenella corrodens, strep intermedius, and actinomyces.     If the patient wants this  out immediately in order for the patient to proceed with her main surgery which is the exploratory laparotomy for the right ovarian mass, she could potentially lose a large portion of her abdominal subcutaneous tissue as well as her umbilical stalk.  This would be the fastest method of source control of this abscess.    Plan:  -I am attempting to get the CT images of the abdomen pelvis from [facility] on 5/6/2024.  If I cannot obtain these images, we may have to proceed with another CT for preoperative planning to see how much tissue loss she really will encounter for the surgery.  In addition I want to ensure that the patient does not have any other sources intra-abdominal he that are leading to the development of these abdominal wall abscesses.    -further recs to follow once images reviewed, or more images obtained.           Chief Complaint:     Chief Complaint   Patient presents with     RECHECK     Recurrent abdominal abscess of her right lower abdomen. Recent  visit 6/16 for skin abscess I&D.        History is obtained from the patient    HPI:   Kemi Soto is a 74 year old female who presents to the general surgery team for reevaluation of abdominal wall abscesses.  Patient states that she had redeveloped recurrent abdominal wall abscesses.  The last one that I saw was about a year ago and that was on the left side of the patient's umbilicus.  Now the patient has developed 1 on the right side.  After I&D, this initial abscess now has grown to a size of 4 x 4 cm.  Patient does have some pain there but her main concern is that because of this ongoing infection, [facility] we will not pursue there exploratory surgery for her right ovarian mass.  Patient has no further complaints at this time.          Past Medical History:     Past Medical History:   Diagnosis Date     Adrenal nodule (H24)      Alcohol dependence in remission (H)      Anxiety and depression      Benign essential hypertension       Chronic atrial fibrillation (H)      Chronic back pain      Coronary artery disease      Diabetes mellitus (H)      Discitis of thoracolumbar region 10/10/2015     Encephalopathy 10/14/2015     Epidural abscess 10/10/2015     Hip pain, right      Hyperlipidemia      Ovarian mass      Sepsis (H) 10/08/2015     Stroke (H) 06/23/2015    Neurology felt that episode was C/W subacute ischemic stroke     TIA (transient ischemic attack) 06/23/2015     UTI (urinary tract infection)     admitted to [facility] with UTI              Past Surgical History:     Past Surgical History:   Procedure Laterality Date     ANGIOPLASTY  03/30/2016    Drug eluting stent mid LAD, cutting balloon to 1st diagonal     ANGIOPLASTY  01/01/2008     BYPASS GRAFT ARTERY CORONARY  12/11/2007    X 3 vessels, LIMA to LAD, saph vein graft to distal RCA, saphvein graft to marginal, mini circuit bypass.  [facility].     CORONARY STENT PLACEMENT  01/01/2008    Left main, left circumflex, RCA     CORONARY STENT PLACEMENT  03/01/2016     CV ANGIOGRAM CORONARY GRAFT N/A 10/11/2023    Procedure: Coronary Angiogram Graft;  Surgeon: Sam Boo MD;  Location: [facility] CATH LAB CV     CV CORONARY ANGIOGRAM N/A 08/01/2018    Procedure: Coronary Angiogram;  Surgeon: Kit Bean MD;  Location: [facility] Cath Lab;  Service:      CV LEFT HEART CATHETERIZATION WITH LEFT VENTRICULOGRAM N/A 08/01/2018    Procedure: Left Heart Catheterization with Left Ventriculogram;  Surgeon: Kit Bean MD;  Location: [facility] Cath Lab;  Service:      INSERT MIDLINE HE  10/31/2015          LUMBAR DISCECTOMY N/A 10/11/2015    BILATERAL L1-L5 DECOMPRESSIVE LAMINECTOMY WITH EVACUATION OF EPIDURAL ABSCESS IRRIGATION & DEBRIDEMENT;  Surgeon: Luli Chan MD;  Location: [facility] OR; Due to sepsis     RELEASE CARPAL TUNNEL Bilateral      UMBILICAL HERNIA REPAIR               Social History:    reports that she quit smoking about 17  years ago. Her smoking use included cigarettes. She has been exposed to tobacco smoke. She has never used smokeless tobacco. She reports that she does not currently use alcohol. She reports that she does not use drugs.           Family History:     Family History   Problem Relation Age of Onset     Cerebrovascular Disease Mother      Diabetes Father      Coronary Artery Disease Father      Diabetes Sister      Cerebrovascular Disease Sister 81     Cerebrovascular Disease Brother      Diabetes Brother      Cancer Paternal Grandfather      Anesthesia Reaction No family hx of      Thrombosis No family hx of               Allergies:     Allergies   Allergen Reactions     Metformin GI Disturbance     Oxycodone Nausea and Vomiting              Medications:     Prior to Admission medications    Medication Sig Start Date End Date Taking? Authorizing Provider   acetaminophen (TYLENOL) 500 MG tablet Take 1,000 mg by mouth every 12 hours as needed for mild pain   Yes Reported, Patient   apixaban ANTICOAGULANT (ELIQUIS ANTICOAGULANT) 5 MG tablet Take 1 tablet (5 mg) by mouth 2 times daily 1/12/24  Yes Zaheer Sandra MD   atorvastatin (LIPITOR) 80 MG tablet TAKE 1 TABLET BY MOUTH AT BEDTIME 4/29/24  Yes Elaine Galvez MD   blood glucose (NO BRAND SPECIFIED) test strip Use to test blood sugar 1 times daily or as directed. To accompany: Blood Glucose Monitor Brands: per insurance. 2/13/24  Yes Zaheer Sandra MD   blood glucose monitoring (NO BRAND SPECIFIED) meter device kit Use to test blood sugar 1 times daily or as directed. Preferred blood glucose meter OR supplies to accompany: Blood Glucose Monitor Brands: per insurance. 2/13/24  Yes Zaheer Sandra MD   busPIRone (BUSPAR) 15 MG tablet Take 1 tablet by mouth once daily 4/23/24  Yes Elaine Galvez MD   cephALEXin (KEFLEX) 500 MG capsule Take 1 capsule (500 mg) by mouth 3 times daily for 7 days 6/21/24 6/28/24 Yes Ashley Coffey APRN CNP   clobetasol (TEMOVATE) 0.05 % external cream  Apply topically 2 times daily 5/30/23  Yes Reported, Patient   ezetimibe (ZETIA) 10 MG tablet Take 1 tablet (10 mg) by mouth daily  Patient taking differently: Take 10 mg by mouth every morning 9/1/23  Yes Chuck Hernandez MD   gabapentin (NEURONTIN) 300 MG capsule TAKE 3 CAPSULES BY MOUTH EVERY DAY AT BEDTIME  Patient taking differently: Take 900 mg by mouth at bedtime TAKE 3 CAPSULES BY MOUTH EVERY DAY AT BEDTIME 3/8/24  Yes Zaheer Sandra MD   hydroCHLOROthiazide 12.5 MG tablet Take 1 tablet (12.5 mg) by mouth daily -- appt needed for further refills. 5/13/24  Yes Deejay Warren DO   isosorbide mononitrate (IMDUR) 60 MG 24 hr tablet Take 1 tablet (60 mg) by mouth daily  Patient taking differently: Take 60 mg by mouth every morning 4/12/23  Yes Elaine Galvez MD   ketoconazole (NIZORAL) 2 % external cream APPLY 1 APPLICATION TOPICALLY TWICE DAILY TO AFFECTED AREAS FOR 4 WEEKS THEN AS NEEDED 5/30/23  Yes Reported, Patient   LANTUS SOLOSTAR 100 UNIT/ML soln INJECT 28 UNITS SUBCUTANEOUSLY TWICE DAILY 5/14/24  Yes Zaheer Sandra MD   losartan (COZAAR) 100 MG tablet Take 1 tablet (100 mg) by mouth daily  Patient taking differently: Take 100 mg by mouth every morning 4/12/23  Yes Elaine Galvez MD   naproxen (NAPROSYN) 500 MG tablet Take 500 mg by mouth 2 times daily (with meals)   Yes Reported, Patient   sertraline (ZOLOFT) 100 MG tablet Take 1 tablet by mouth once daily 4/23/24  Yes Elaine Galvez MD   sotalol (BETAPACE) 80 MG tablet Take 0.5 tablets (40 mg) by mouth 2 times daily 3/5/24  Yes Zaheer Sandra MD   thin (NO BRAND SPECIFIED) lancets Use with lanceting device. To accompany: Blood Glucose Monitor Brands: per insurance. 2/13/24  Yes Zaheer Sandra MD   triamcinolone (KENALOG) 0.1 % external cream Apply topically 2 times daily 5/30/23  Yes Reported, Patient              Review of Systems:   A 12 point Review of Systems is negative other than noted in the HPI            Physical Exam:   Patient Vitals for the  "past 24 hrs:   Height Weight   06/25/24 1315 1.575 m (5' 2\") 68 kg (150 lb)        [unfilled]   Constitutional:   awake, alert, cooperative, no apparent distress, and appears stated age       Eyes:   PERRL, conjunctiva/corneas clear, EOM's intact; no scleral edema or icterus noted        ENT:   Normocephalic, without obvious abnormality, atraumatic, Lips, mucosa, and tongue normal        Hematologic / Lymphatic:   No lymphadenopathy       Lungs:   Normal respiratory effort, no accessory muscle use       Cardiovascular:   Regular rate and rhythm       Abdomen:   Soft, nondistended, nontender to palpation       Musculoskeletal:   No obvious swelling, bruising or deformity       Skin:   Skin color and texture normal for patient, there is an area of 4 x 4 cm to the right of the umbilicus.  This mass is very hard mobile.  In the center of this mass is the stab incision which is healed.  There is no drainage from the area.  There is no fluctuance.  This mass does encroach to the lateral side of the umbilical stalk.             Data:         All imaging studies reviewed by me. I reviewed the images, and I agree with findings of fluid collection on US.       DO Braden Alarcon DO  General Surgeon  [facility]  Surgery [facility] - [address]  [address] [place] [address]?  Office: [phone]  Employed by - [facility] [employer]  Pager: [phone]     Again, thank you for allowing me to participate in the care of your patient.        Sincerely,        Braden Bah DO  "

## 2024-06-26 ENCOUNTER — MYC MEDICAL ADVICE (OUTPATIENT)
Dept: FAMILY MEDICINE | Facility: CLINIC | Age: 74
End: 2024-06-26
Payer: COMMERCIAL

## 2024-06-26 DIAGNOSIS — L02.211 ABDOMINAL WALL ABSCESS: Primary | ICD-10-CM

## 2024-06-26 NOTE — PROGRESS NOTES
Evaluated the CT scan from [facility].  The patient did have cellulitic changes both sides of the abdomen at the level of the umbilicus.  Given that the right side now has significantly worsened and there is 4 cm mass, my recommendation is to proceed with excision of the area and placement of drain due to the site being infected.  In addition this will help expedite the patient's healing time and obtain source control in order for the patient to proceed with her gynecological surgery with [facility] in July.        Plan  -Will place order for excision of abdominal wall mass with placement of drain  -Patient is to follow-up with her primary care team to determine which meds not to take before surgery  -I will perform the H&P the day of surgery  -Patient is told to hold Eliquis 3 days prior to surgery    Braden Bah DO  General Surgeon  [facility]  Surgery Clinic - [address] [address]?  Office: [phone]  Employed by - [facility]  Pager: [phone]

## 2024-06-27 ENCOUNTER — TELEPHONE (OUTPATIENT)
Dept: SURGERY | Facility: CLINIC | Age: 74
End: 2024-06-27
Payer: COMMERCIAL

## 2024-06-27 PROBLEM — L02.211 ABDOMINAL WALL ABSCESS: Status: ACTIVE | Noted: 2024-06-26

## 2024-06-27 NOTE — LETTER
Pre-op Physical: Dr. Bah will do your pre op physical the day of surgery    Surgery Date: 7/1/2024     Location: [facility] [address] [place] [address]    Approximate Arrival Time: 1:30 pm  (Unless instructed differently by the pre-op call nurse)     Post op Appointment: 7/15/2024 at   2:30 pm  with  a Physician Assistant . [facility], [address] [place] [address] [address] [address].    Pre-Surgical Tasks:     Review all medications with your primary care or prescribing physician; they will advise you which meds to stop and when, and when you can resume taking.  Certain medications like blood thinners and weight loss medications need to be stopped in advance of surgery to proceed safely.      Blood thinners including but not exclusive to drugs like Xarelto, Eliquis, Warfarin and Aspirin, should be stopped five days before surgery, if your prescribing provider agrees. Follow your provider's advice on stopping blood thinners because they know you best.  If you are unsure if your medication is a blood thinner, ask your prescribing provider.    Weight loss medications: There are multiple medications being used for weight management and diabetes today, and the list is growing.  Phentermine, Ozempic, Wegovy, Trulicity, and other similar medications need to be stopped one week before surgery to avoid being cancelled.  Victoza and Saxenda can be continued longer but must be stopped one full day before surgery.  Please ask your prescribing provider for advice.    Diabetic medications: in addition to the medications talked about above that are used for either weight loss or diabetes, some people are on insulin that may require adjustment.  Please discuss managing diabetic medications with your prescribing doctor as these medications may require modification prior to surgery.     Please shower the evening before and morning of surgery with  Hibiclens soap.  This can be found at your local pharmacy.     Fasting instructions will be provided by the pre-op nurse who will call you 1-3 days before surgery.  Typically, we advise normal food up to 8 hours before you arrive for surgery. Clear liquids only from then until 2 hours before you arrive surgery, then nothing at all by mouth.  The nurse will review your specific instructions with you at the call.      Smoking impacts your body's ability to heal properly so we advise patients to quit if possible before surgery.  Plastic Surgery patients are required to be nicotine free for at least 8 weeks before surgery.      You will need an adult to drive you home and stay with you 24 hours after surgery. Public transportation or Medical Van Services are not permitted.    Visitor restrictions are subject to change, please verify with the pre-op nurse when they call how many people are permitted to accompany you.    We always encourage you to notify your insurance any time you have medical tests or procedures scheduled including surgery. The number is usually right on the back of your insurance card. To obtain pricing for surgery, please call [facility] Cost of Care at [phone] or email [email or web address].        Call our office if you have any questions! Thank you!       Karolina Wallace MA  [facility]   (General Surgery/ ENT/ Plastics)  Direct Office: [phone]

## 2024-06-27 NOTE — TELEPHONE ENCOUNTER
Spoke with patient today regarding surgery scheduling      Went over details/instructions. Patient already went over meds with her pcp and when to stop them  Spoke with Ange @[facility] who confirmed 7/1 surgery date.    Surgery Letter sent via [employer]  (Please see LETTERS TAB in chart to retrieve a copy of this letter)

## 2024-06-28 ENCOUNTER — ANESTHESIA EVENT (OUTPATIENT)
Dept: SURGERY | Facility: AMBULATORY SURGERY CENTER | Age: 74
End: 2024-06-28
Payer: COMMERCIAL

## 2024-07-01 ENCOUNTER — ANESTHESIA (OUTPATIENT)
Dept: SURGERY | Facility: AMBULATORY SURGERY CENTER | Age: 74
End: 2024-07-01
Payer: COMMERCIAL

## 2024-07-01 ENCOUNTER — HOSPITAL ENCOUNTER (OUTPATIENT)
Facility: AMBULATORY SURGERY CENTER | Age: 74
Discharge: HOME | End: 2024-07-01
Attending: SURGERY
Payer: COMMERCIAL

## 2024-07-01 ENCOUNTER — TRANSFERRED RECORDS (OUTPATIENT)
Dept: HEALTH INFORMATION MANAGEMENT | Facility: CLINIC | Age: 74
End: 2024-07-01

## 2024-07-01 VITALS
TEMPERATURE: 97.6 F | DIASTOLIC BLOOD PRESSURE: 75 MMHG | HEART RATE: 54 BPM | SYSTOLIC BLOOD PRESSURE: 161 MMHG | OXYGEN SATURATION: 99 % | RESPIRATION RATE: 16 BRPM

## 2024-07-01 DIAGNOSIS — L02.211 ABDOMINAL WALL ABSCESS: Primary | ICD-10-CM

## 2024-07-01 LAB
BACTERIA SPEC CULT: ABNORMAL
GLUCOSE BLDC GLUCOMTR-MCNC: 133 MG/DL (ref 70–99)
GLUCOSE SERPL-MCNC: 135 MG/DL (ref 70–99)
GRAM STAIN RESULT: ABNORMAL
GRAM STAIN RESULT: ABNORMAL

## 2024-07-01 PROCEDURE — 87075 CULTR BACTERIA EXCEPT BLOOD: CPT | Performed by: SURGERY

## 2024-07-01 PROCEDURE — 22903 EXC ABD LES SC 3 CM/>: CPT | Performed by: SURGERY

## 2024-07-01 PROCEDURE — 87077 CULTURE AEROBIC IDENTIFY: CPT | Performed by: SURGERY

## 2024-07-01 PROCEDURE — 87070 CULTURE OTHR SPECIMN AEROBIC: CPT | Performed by: SURGERY

## 2024-07-01 PROCEDURE — 87205 SMEAR GRAM STAIN: CPT | Performed by: SURGERY

## 2024-07-01 RX ORDER — DEXAMETHASONE SODIUM PHOSPHATE 4 MG/ML
4 INJECTION, SOLUTION INTRA-ARTICULAR; INTRALESIONAL; INTRAMUSCULAR; INTRAVENOUS; SOFT TISSUE
Status: DISCONTINUED | OUTPATIENT
Start: 2024-07-01 | End: 2024-07-02 | Stop reason: HOSPADM

## 2024-07-01 RX ORDER — ONDANSETRON 4 MG/1
4 TABLET, ORALLY DISINTEGRATING ORAL EVERY 30 MIN PRN
Status: DISCONTINUED | OUTPATIENT
Start: 2024-07-01 | End: 2024-07-02 | Stop reason: HOSPADM

## 2024-07-01 RX ORDER — PROPOFOL 10 MG/ML
INJECTION, EMULSION INTRAVENOUS CONTINUOUS PRN
Status: DISCONTINUED | OUTPATIENT
Start: 2024-07-01 | End: 2024-07-01

## 2024-07-01 RX ORDER — HYDROMORPHONE HCL IN WATER/PF 6 MG/30 ML
0.4 PATIENT CONTROLLED ANALGESIA SYRINGE INTRAVENOUS EVERY 5 MIN PRN
Status: DISCONTINUED | OUTPATIENT
Start: 2024-07-01 | End: 2024-07-02 | Stop reason: HOSPADM

## 2024-07-01 RX ORDER — NALOXONE HYDROCHLORIDE 0.4 MG/ML
0.1 INJECTION, SOLUTION INTRAMUSCULAR; INTRAVENOUS; SUBCUTANEOUS
Status: DISCONTINUED | OUTPATIENT
Start: 2024-07-01 | End: 2024-07-02 | Stop reason: HOSPADM

## 2024-07-01 RX ORDER — LIDOCAINE 40 MG/G
CREAM TOPICAL
Status: DISCONTINUED | OUTPATIENT
Start: 2024-07-01 | End: 2024-07-02 | Stop reason: HOSPADM

## 2024-07-01 RX ORDER — FENTANYL CITRATE 0.05 MG/ML
25 INJECTION, SOLUTION INTRAMUSCULAR; INTRAVENOUS
Status: DISCONTINUED | OUTPATIENT
Start: 2024-07-01 | End: 2024-07-02 | Stop reason: HOSPADM

## 2024-07-01 RX ORDER — OXYCODONE HYDROCHLORIDE 5 MG/1
5 TABLET ORAL
Status: DISCONTINUED | OUTPATIENT
Start: 2024-07-01 | End: 2024-07-02 | Stop reason: HOSPADM

## 2024-07-01 RX ORDER — OXYCODONE HYDROCHLORIDE 10 MG/1
10 TABLET ORAL
Status: DISCONTINUED | OUTPATIENT
Start: 2024-07-01 | End: 2024-07-02 | Stop reason: HOSPADM

## 2024-07-01 RX ORDER — FENTANYL CITRATE 0.05 MG/ML
50 INJECTION, SOLUTION INTRAMUSCULAR; INTRAVENOUS EVERY 5 MIN PRN
Status: DISCONTINUED | OUTPATIENT
Start: 2024-07-01 | End: 2024-07-02 | Stop reason: HOSPADM

## 2024-07-01 RX ORDER — FENTANYL CITRATE 50 UG/ML
INJECTION, SOLUTION INTRAMUSCULAR; INTRAVENOUS PRN
Status: DISCONTINUED | OUTPATIENT
Start: 2024-07-01 | End: 2024-07-01

## 2024-07-01 RX ORDER — HYDROMORPHONE HCL IN WATER/PF 6 MG/30 ML
0.2 PATIENT CONTROLLED ANALGESIA SYRINGE INTRAVENOUS EVERY 5 MIN PRN
Status: DISCONTINUED | OUTPATIENT
Start: 2024-07-01 | End: 2024-07-02 | Stop reason: HOSPADM

## 2024-07-01 RX ORDER — CEFAZOLIN SODIUM 2 G/100ML
2 INJECTION, SOLUTION INTRAVENOUS
Status: COMPLETED | OUTPATIENT
Start: 2024-07-01 | End: 2024-07-01

## 2024-07-01 RX ORDER — SODIUM CHLORIDE, SODIUM LACTATE, POTASSIUM CHLORIDE, CALCIUM CHLORIDE 600; 310; 30; 20 MG/100ML; MG/100ML; MG/100ML; MG/100ML
INJECTION, SOLUTION INTRAVENOUS CONTINUOUS
Status: DISCONTINUED | OUTPATIENT
Start: 2024-07-01 | End: 2024-07-02 | Stop reason: HOSPADM

## 2024-07-01 RX ORDER — ONDANSETRON 2 MG/ML
4 INJECTION INTRAMUSCULAR; INTRAVENOUS EVERY 30 MIN PRN
Status: DISCONTINUED | OUTPATIENT
Start: 2024-07-01 | End: 2024-07-02 | Stop reason: HOSPADM

## 2024-07-01 RX ORDER — LIDOCAINE HYDROCHLORIDE AND EPINEPHRINE 10; 10 MG/ML; UG/ML
INJECTION, SOLUTION INFILTRATION; PERINEURAL PRN
Status: DISCONTINUED | OUTPATIENT
Start: 2024-07-01 | End: 2024-07-01 | Stop reason: HOSPADM

## 2024-07-01 RX ORDER — DEXAMETHASONE SODIUM PHOSPHATE 4 MG/ML
INJECTION, SOLUTION INTRA-ARTICULAR; INTRALESIONAL; INTRAMUSCULAR; INTRAVENOUS; SOFT TISSUE PRN
Status: DISCONTINUED | OUTPATIENT
Start: 2024-07-01 | End: 2024-07-01

## 2024-07-01 RX ORDER — ACETAMINOPHEN 325 MG/1
975 TABLET ORAL ONCE
Status: COMPLETED | OUTPATIENT
Start: 2024-07-01 | End: 2024-07-01

## 2024-07-01 RX ORDER — GLYCOPYRROLATE 0.2 MG/ML
INJECTION, SOLUTION INTRAMUSCULAR; INTRAVENOUS PRN
Status: DISCONTINUED | OUTPATIENT
Start: 2024-07-01 | End: 2024-07-01

## 2024-07-01 RX ORDER — DOCUSATE SODIUM 100 MG/1
100 CAPSULE, LIQUID FILLED ORAL 2 TIMES DAILY
Qty: 30 CAPSULE | Refills: 0 | Status: SHIPPED | OUTPATIENT
Start: 2024-07-01

## 2024-07-01 RX ORDER — ONDANSETRON 2 MG/ML
INJECTION INTRAMUSCULAR; INTRAVENOUS PRN
Status: DISCONTINUED | OUTPATIENT
Start: 2024-07-01 | End: 2024-07-01

## 2024-07-01 RX ORDER — FENTANYL CITRATE 0.05 MG/ML
25 INJECTION, SOLUTION INTRAMUSCULAR; INTRAVENOUS EVERY 5 MIN PRN
Status: DISCONTINUED | OUTPATIENT
Start: 2024-07-01 | End: 2024-07-02 | Stop reason: HOSPADM

## 2024-07-01 RX ORDER — HYDROCODONE BITARTRATE AND ACETAMINOPHEN 5; 325 MG/1; MG/1
1 TABLET ORAL EVERY 6 HOURS PRN
Qty: 7 TABLET | Refills: 0 | Status: SHIPPED | OUTPATIENT
Start: 2024-07-01 | End: 2024-07-04

## 2024-07-01 RX ORDER — HYDROCODONE BITARTRATE AND ACETAMINOPHEN 5; 325 MG/1; MG/1
1 TABLET ORAL ONCE
Status: COMPLETED | OUTPATIENT
Start: 2024-07-01 | End: 2024-07-01

## 2024-07-01 RX ORDER — LIDOCAINE HYDROCHLORIDE 20 MG/ML
INJECTION, SOLUTION INFILTRATION; PERINEURAL PRN
Status: DISCONTINUED | OUTPATIENT
Start: 2024-07-01 | End: 2024-07-01

## 2024-07-01 RX ORDER — CEFAZOLIN SODIUM 2 G/100ML
2 INJECTION, SOLUTION INTRAVENOUS SEE ADMIN INSTRUCTIONS
Status: DISCONTINUED | OUTPATIENT
Start: 2024-07-01 | End: 2024-07-02 | Stop reason: HOSPADM

## 2024-07-01 RX ADMIN — PROPOFOL 200 MCG/KG/MIN: 10 INJECTION, EMULSION INTRAVENOUS at 15:06

## 2024-07-01 RX ADMIN — ACETAMINOPHEN 650 MG: 325 TABLET ORAL at 14:24

## 2024-07-01 RX ADMIN — FENTANYL CITRATE 50 MCG: 50 INJECTION, SOLUTION INTRAMUSCULAR; INTRAVENOUS at 15:06

## 2024-07-01 RX ADMIN — Medication 50 MCG: at 15:40

## 2024-07-01 RX ADMIN — DEXAMETHASONE SODIUM PHOSPHATE 4 MG: 4 INJECTION, SOLUTION INTRA-ARTICULAR; INTRALESIONAL; INTRAMUSCULAR; INTRAVENOUS; SOFT TISSUE at 15:10

## 2024-07-01 RX ADMIN — SODIUM CHLORIDE, SODIUM LACTATE, POTASSIUM CHLORIDE, CALCIUM CHLORIDE: 600; 310; 30; 20 INJECTION, SOLUTION INTRAVENOUS at 14:50

## 2024-07-01 RX ADMIN — LIDOCAINE HYDROCHLORIDE 2 ML: 20 INJECTION, SOLUTION INFILTRATION; PERINEURAL at 15:06

## 2024-07-01 RX ADMIN — GLYCOPYRROLATE 0.2 MG: 0.2 INJECTION, SOLUTION INTRAMUSCULAR; INTRAVENOUS at 15:06

## 2024-07-01 RX ADMIN — CEFAZOLIN SODIUM 2 G: 2 INJECTION, SOLUTION INTRAVENOUS at 15:01

## 2024-07-01 RX ADMIN — ONDANSETRON 4 MG: 2 INJECTION INTRAMUSCULAR; INTRAVENOUS at 15:10

## 2024-07-01 RX ADMIN — FENTANYL CITRATE 25 MCG: 50 INJECTION, SOLUTION INTRAMUSCULAR; INTRAVENOUS at 15:15

## 2024-07-01 RX ADMIN — FENTANYL CITRATE 25 MCG: 50 INJECTION, SOLUTION INTRAMUSCULAR; INTRAVENOUS at 15:16

## 2024-07-01 RX ADMIN — HYDROCODONE BITARTRATE AND ACETAMINOPHEN 1 TABLET: 5; 325 TABLET ORAL at 16:24

## 2024-07-01 NOTE — ANESTHESIA PROCEDURE NOTES
Airway       Patient location during procedure: OR  Staff -        Anesthesiologist:  Jose Mello MD       CRNA: Yadi Childs APRN CRNA       Performed By: CRNAIndications and Patient Condition       Indications for airway management: aliyah-procedural       Induction type:intravenous       Mask difficulty assessment: 1 - vent by mask    Final Airway Details       Final airway type: supraglottic airway    Supraglottic Airway Details        Type: LMA       LMA size: 4    Post intubation assessment        Placement verified by: capnometry, equal breath sounds and chest rise        Number of attempts at approach: 1       Ease of procedure: easy       Dentition: Intact and Unchanged

## 2024-07-01 NOTE — ANESTHESIA PREPROCEDURE EVALUATION
Anesthesia Pre-Procedure Evaluation    Patient: Kemi Soto   MRN: [id number] : 1950        Procedure : Procedure(s):  EXCISION ABDOMINAL WALL MASS AND PLACEMENT OF DRAIN          Past Medical History:   Diagnosis Date    Adrenal nodule (H24)     Alcohol dependence in remission (H)     Anemia     Antiplatelet or antithrombotic long-term use     Anxiety and depression     Arrhythmia     Benign essential hypertension     Chronic atrial fibrillation (H)     Chronic back pain     Chronic infection     Coronary artery disease     Diabetes mellitus (H)     Difficulty walking     Discitis of thoracolumbar region 10/10/2015    Encephalopathy 10/14/2015    Epidural abscess 10/10/2015    Hip pain, right     History of angina     Hyperlipidemia     Irregular heart beat     Other chronic pain     Ovarian mass     Sepsis (H) 10/08/2015    Stented coronary artery     Stroke (H) 2015    Neurology felt that episode was C/W subacute ischemic stroke    TIA (transient ischemic attack) 2015    UTI (urinary tract infection)     admitted to [facility] with UTI    Walking troubles       Past Surgical History:   Procedure Laterality Date    ANGIOPLASTY  2016    Drug eluting stent mid LAD, cutting balloon to 1st diagonal    ANGIOPLASTY  2008    BYPASS GRAFT ARTERY CORONARY  12/11/2007    X 3 vessels, LIMA to LAD, saph vein graft to distal RCA, saphvein graft to marginal, mini circuit bypass.  [facility].    CORONARY STENT PLACEMENT  2008    Left main, left circumflex, RCA    CORONARY STENT PLACEMENT  2016    CV ANGIOGRAM CORONARY GRAFT N/A 10/11/2023    Procedure: Coronary Angiogram Graft;  Surgeon: Sam Boo MD;  Location: [facility] CATH LAB CV    CV CORONARY ANGIOGRAM N/A 2018    Procedure: Coronary Angiogram;  Surgeon: Kit Bean MD;  Location: [facility] Cath Lab;  Service:     CV LEFT HEART CATHETERIZATION WITH LEFT VENTRICULOGRAM N/A 2018     Procedure: Left Heart Catheterization with Left Ventriculogram;  Surgeon: Kit Bean MD;  Location: [facility] Cath Lab;  Service:     INSERT MIDLINE HE  10/31/2015         LUMBAR DISCECTOMY N/A 10/11/2015    BILATERAL L1-L5 DECOMPRESSIVE LAMINECTOMY WITH EVACUATION OF EPIDURAL ABSCESS IRRIGATION & DEBRIDEMENT;  Surgeon: Luli Chan MD;  Location: [facility] OR; Due to sepsis    RELEASE CARPAL TUNNEL Bilateral     UMBILICAL HERNIA REPAIR        Allergies   Allergen Reactions    Metformin GI Disturbance    Oxycodone Nausea and Vomiting      Social History     Tobacco Use    Smoking status: Former     Current packs/day: 0.00     Types: Cigarettes     Quit date: 2007     Years since quittin.0     Passive exposure: Past    Smokeless tobacco: Never   Substance Use Topics    Alcohol use: Not Currently     Comment: Stopped drinking 2015      Wt Readings from Last 1 Encounters:   24 68 kg (150 lb)        Anesthesia Evaluation   Pt has had prior anesthetic.     No history of anesthetic complications       ROS/MED HX  ENT/Pulmonary:  - neg pulmonary ROS     Neurologic:     (+)    peripheral neuropathy,      CVA,                      Cardiovascular:     (+) Dyslipidemia hypertension- -  CAD -  CABG- stent-   Taking blood thinners  Instructions Given to patient: last dose of eliquis 3 days ago.                                 METS/Exercise Tolerance:     Hematologic:  - neg hematologic  ROS     Musculoskeletal:   (+)  arthritis,             GI/Hepatic: Comment: Abdominal wall abscess       Renal/Genitourinary: Comment: Ovarian mass      Endo:     (+)  type II DM,                    Psychiatric/Substance Use:     (+) psychiatric history anxiety and depression alcohol abuse      Infectious Disease:       Malignancy:  - neg malignancy ROS     Other:  - neg other ROS          Physical Exam    Airway  airway exam normal      Mallampati: II   TM distance: > 3 FB   Neck ROM: full   Mouth  "opening: > 3 cm    Respiratory Devices and Support         Dental  no notable dental history     (+) Minor Abnormalities - some fillings, tiny chips      Cardiovascular   cardiovascular exam normal       Rhythm and rate: regular and normal     Pulmonary   pulmonary exam normal        breath sounds clear to auscultation           OUTSIDE LABS:  CBC:   Lab Results   Component Value Date    WBC 9.6 06/13/2024    WBC 7.1 01/12/2024    HGB 12.4 06/13/2024    HGB 12.3 01/12/2024    HCT 36.8 06/13/2024    HCT 37.5 01/12/2024     06/13/2024     01/12/2024     BMP:   Lab Results   Component Value Date     06/13/2024     (L) 03/29/2024    POTASSIUM 4.2 06/13/2024    POTASSIUM 4.7 03/29/2024    CHLORIDE 100 06/13/2024    CHLORIDE 98 03/29/2024    CO2 27 06/13/2024    CO2 27 03/29/2024    BUN 16 06/13/2024    BUN 17.8 03/29/2024    CR 0.90 06/13/2024    CR 1.09 (H) 03/29/2024     (H) 06/13/2024     (A) 05/07/2024     COAGS:   Lab Results   Component Value Date    PTT 41 (H) 08/13/2018    INR 1.7 (H) 01/12/2024     POC: No results found for: \"BGM\", \"HCG\", \"HCGS\"  HEPATIC:   Lab Results   Component Value Date    ALBUMIN 4.1 03/29/2024    PROTTOTAL 6.5 03/29/2024    ALT 23 03/29/2024    AST 25 03/29/2024    ALKPHOS 73 03/29/2024    BILITOTAL 0.3 03/29/2024     OTHER:   Lab Results   Component Value Date    A1C 8.1 (H) 03/29/2024    ELLIE 9.2 06/13/2024    MAG 2.0 11/07/2020    TSH 0.77 05/05/2021       Anesthesia Plan    ASA Status:  3    NPO Status:  Will be NPO Appropriate at ... 7/1/2024 3:00 PM   Anesthesia Type: MAC.     - Reason for MAC: straight local not clinically adequate              Consents    Anesthesia Plan(s) and associated risks, benefits, and realistic alternatives discussed. Questions answered and patient/representative(s) expressed understanding.     - Discussed:     - Discussed with:  Patient            Postoperative Care    Pain management: IV analgesics, Oral pain " "medications, Multi-modal analgesia.   PONV prophylaxis: Ondansetron (or other 5HT-3), Dexamethasone or Solumedrol, Droperidol or Haldol     Comments:               Jose Mello MD    I have reviewed the pertinent notes and labs in the chart from the past 30 days and (re)examined the patient.  Any updates or changes from those notes are reflected in this note.            # Drug Induced Coagulation Defect: home medication list includes an anticoagulant medication   # DMII: A1C = N/A within past 6 months  # Overweight: Estimated body mass index is 27.44 kg/m  as calculated from the following:    Height as of 6/25/24: 1.575 m (5' 2\").    Weight as of 6/25/24: 68 kg (150 lb).      "

## 2024-07-01 NOTE — H&P
General Surgery H&P  Kemi Soto MRN# [id number]   Age/Sex: 74 year old female YOB: 1950     Reason for visit: Abdominal wall abscess        Referring physician: Dr. Sandra                    Assessment and Plan:   Assessment:   Abdominal wall abscess  Right ovarian mass.     Plan:  - to the OR for excision of abdominal wall abscess and drain placement   - The risks and benefits of the procedure were explained detail to the patient. The risks include infection, bleeding, damage to the surrounding structures. Patient verbalized understanding provided consent to undergo the procedure above.            Chief Complaint:   Here for surgery     History is obtained from the patient    HPI:   Kemi Soto is a 74 year old female who presents for excision of abdominal wall abscess and drain placement. No new complaints.  Stopped eliquis last Friday.  Pt noted to have at breakfast at 630 am this morning.  She had small sips of clear liquids afterwards.            Past Medical History:     Past Medical History:   Diagnosis Date    Adrenal nodule (H24)     Alcohol dependence in remission (H)     Anemia     Antiplatelet or antithrombotic long-term use     Anxiety and depression     Arrhythmia     Benign essential hypertension     Chronic atrial fibrillation (H)     Chronic back pain     Chronic infection     Coronary artery disease     Diabetes mellitus (H)     Difficulty walking     Discitis of thoracolumbar region 10/10/2015    Encephalopathy 10/14/2015    Epidural abscess 10/10/2015    Hip pain, right     History of angina     Hyperlipidemia     Irregular heart beat     Other chronic pain     Ovarian mass     Sepsis (H) 10/08/2015    Stented coronary artery     Stroke (H) 06/23/2015    Neurology felt that episode was C/W subacute ischemic stroke    TIA (transient ischemic attack) 06/23/2015    UTI (urinary tract infection)     admitted to [facility] with UTI    Walking troubles               Past  Surgical History:     Past Surgical History:   Procedure Laterality Date    ANGIOPLASTY  03/30/2016    Drug eluting stent mid LAD, cutting balloon to 1st diagonal    ANGIOPLASTY  01/01/2008    BYPASS GRAFT ARTERY CORONARY  12/11/2007    X 3 vessels, LIMA to LAD, saph vein graft to distal RCA, saphvein graft to marginal, mini circuit bypass.  [facility].    CORONARY STENT PLACEMENT  01/01/2008    Left main, left circumflex, RCA    CORONARY STENT PLACEMENT  03/01/2016    CV ANGIOGRAM CORONARY GRAFT N/A 10/11/2023    Procedure: Coronary Angiogram Graft;  Surgeon: Sam Boo MD;  Location: [facility] CATH LAB CV    CV CORONARY ANGIOGRAM N/A 08/01/2018    Procedure: Coronary Angiogram;  Surgeon: Kit Bean MD;  Location: [facility] Cath Lab;  Service:     CV LEFT HEART CATHETERIZATION WITH LEFT VENTRICULOGRAM N/A 08/01/2018    Procedure: Left Heart Catheterization with Left Ventriculogram;  Surgeon: Kit Bean MD;  Location: [facility] Cath Lab;  Service:     INSERT MIDLINE HE  10/31/2015         LUMBAR DISCECTOMY N/A 10/11/2015    BILATERAL L1-L5 DECOMPRESSIVE LAMINECTOMY WITH EVACUATION OF EPIDURAL ABSCESS IRRIGATION & DEBRIDEMENT;  Surgeon: Luil Chan MD;  Location: [facility] OR; Due to sepsis    RELEASE CARPAL TUNNEL Bilateral     UMBILICAL HERNIA REPAIR               Social History:    reports that she quit smoking about 17 years ago. Her smoking use included cigarettes. She has been exposed to tobacco smoke. She has never used smokeless tobacco. She reports that she does not currently use alcohol. She reports that she does not use drugs.           Family History:     Family History   Problem Relation Age of Onset    Cerebrovascular Disease Mother     Diabetes Father     Coronary Artery Disease Father     Diabetes Sister     Cerebrovascular Disease Sister 81    Cerebrovascular Disease Brother     Diabetes Brother     Cancer Paternal Grandfather     Anesthesia  Reaction No family hx of     Thrombosis No family hx of               Allergies:     Allergies   Allergen Reactions    Metformin GI Disturbance    Oxycodone Nausea and Vomiting              Medications:     Prior to Admission medications    Medication Sig Start Date End Date Taking? Authorizing Provider   acetaminophen (TYLENOL) 500 MG tablet Take 1,000 mg by mouth every 12 hours as needed for mild pain   Yes Reported, Patient   apixaban ANTICOAGULANT (ELIQUIS ANTICOAGULANT) 5 MG tablet Take 1 tablet (5 mg) by mouth 2 times daily 1/12/24  Yes Zaheer Sandra MD   atorvastatin (LIPITOR) 80 MG tablet TAKE 1 TABLET BY MOUTH AT BEDTIME 4/29/24  Yes Elaine Galvez MD   blood glucose monitoring (NO BRAND SPECIFIED) meter device kit Use to test blood sugar 1 times daily or as directed. Preferred blood glucose meter OR supplies to accompany: Blood Glucose Monitor Brands: per insurance. 2/13/24  Yes Zaheer Sandra MD   clobetasol (TEMOVATE) 0.05 % external cream Apply topically 2 times daily 5/30/23  Yes Reported, Patient   ezetimibe (ZETIA) 10 MG tablet Take 1 tablet (10 mg) by mouth daily  Patient taking differently: Take 10 mg by mouth every morning 9/1/23  Yes Chuck Hernandez MD   gabapentin (NEURONTIN) 300 MG capsule TAKE 3 CAPSULES BY MOUTH EVERY DAY AT BEDTIME  Patient taking differently: Take 900 mg by mouth at bedtime TAKE 3 CAPSULES BY MOUTH EVERY DAY AT BEDTIME 3/8/24  Yes Zaheer Sandra MD   hydroCHLOROthiazide 12.5 MG tablet Take 1 tablet (12.5 mg) by mouth daily -- appt needed for further refills. 5/13/24  Yes Deejay Warren DO   isosorbide mononitrate (IMDUR) 60 MG 24 hr tablet Take 1 tablet (60 mg) by mouth daily  Patient taking differently: Take 60 mg by mouth every morning 4/12/23  Yes Elaine Galvez MD   ketoconazole (NIZORAL) 2 % external cream APPLY 1 APPLICATION TOPICALLY TWICE DAILY TO AFFECTED AREAS FOR 4 WEEKS THEN AS NEEDED 5/30/23  Yes Reported, Patient   LANTUS SOLOSTAR 100 UNIT/ML soln INJECT  28 UNITS SUBCUTANEOUSLY TWICE DAILY 5/14/24  Yes Zaheer Sandra MD   losartan (COZAAR) 100 MG tablet Take 1 tablet (100 mg) by mouth daily  Patient taking differently: Take 100 mg by mouth every morning 4/12/23  Yes Elaine Galvez MD   naproxen (NAPROSYN) 500 MG tablet Take 500 mg by mouth 2 times daily (with meals)   Yes Reported, Patient   sertraline (ZOLOFT) 100 MG tablet Take 1 tablet by mouth once daily 4/23/24  Yes Elaine Galvez MD   sotalol (BETAPACE) 80 MG tablet Take 0.5 tablets (40 mg) by mouth 2 times daily 3/5/24  Yes Zaheer Sandra MD   blood glucose (NO BRAND SPECIFIED) test strip Use to test blood sugar 1 times daily or as directed. To accompany: Blood Glucose Monitor Brands: per insurance. 2/13/24   Zaheer Sandra MD   busPIRone (BUSPAR) 15 MG tablet Take 1 tablet by mouth once daily 4/23/24   Elaine Galvez MD   thin (NO BRAND SPECIFIED) lancets Use with lanceting device. To accompany: Blood Glucose Monitor Brands: per insurance. 2/13/24   Zaheer Sandra MD   triamcinolone (KENALOG) 0.1 % external cream Apply topically 2 times daily 5/30/23   Reported, Patient              Review of Systems:   A 12 point Review of Systems is negative other than noted in the HPI            Physical Exam:   No data found.     No intake or output data in the 24 hours ending 07/01/24 0934   Constitutional:   awake, alert, cooperative, no apparent distress, and appears stated age       Eyes:   PERRL, conjunctiva/corneas clear, EOM's intact; no scleral edema or icterus noted        ENT:   Normocephalic, without obvious abnormality, atraumatic, Lips, mucosa, and tongue normal        Hematologic / Lymphatic:   No lymphadenopathy       Lungs:   Normal respiratory effort, no accessory muscle use       Cardiovascular:   Regular rate and rhythm       Abdomen:   Soft, nondistended, nontender to palpation       Musculoskeletal:   No obvious swelling, bruising or deformity       Skin:   Skin color and texture normal for patient, large  abdominal wall induration.              Data:            Braden Bah DO  General Surgeon  [facility]  Surgery [facility] - [address]  [place] [address]?  Office: [phone]  Employed by - [facility]  Pager: [phone]

## 2024-07-01 NOTE — ANESTHESIA CARE TRANSFER NOTE
Patient: Kemi Soto    Procedure: Procedure(s):  EXCISION ABDOMINAL WALL MASS AND PLACEMENT OF DRAIN       Diagnosis: Abdominal wall abscess [L02.211]  Diagnosis Additional Information: No value filed.    Anesthesia Type:   MAC     Note:    Oropharynx: oropharynx clear of all foreign objects  Level of Consciousness: drowsy  Oxygen Supplementation: face mask  Level of Supplemental Oxygen (L/min / FiO2): 6  Independent Airway: airway patency satisfactory and stable  Dentition: dentition unchanged  Vital Signs Stable: post-procedure vital signs reviewed and stable  Report to RN Given: handoff report given  Patient transferred to: PACU    Handoff Report: Identifed the Patient, Identified the Reponsible Provider, Reviewed the pertinent medical history, Discussed the surgical course, Reviewed Intra-OP anesthesia mangement and issues during anesthesia, Set expectations for post-procedure period and Allowed opportunity for questions and acknowledgement of understanding      Vitals:  Vitals Value Taken Time   /69 07/01/24 1554   Temp 37.1  C (98.8  F) 07/01/24 1554   Pulse 59 07/01/24 1554   Resp 16 07/01/24 1554   SpO2 100 % 07/01/24 1554       Electronically Signed By: NANI Marte CRNA  July 1, 2024  3:57 PM

## 2024-07-01 NOTE — ANESTHESIA POSTPROCEDURE EVALUATION
Patient: Kemi Soto    Procedure: Procedure(s):  EXCISION ABDOMINAL WALL MASS AND PLACEMENT OF DRAIN       Anesthesia Type:  MAC    Note:  Disposition: Outpatient   Postop Pain Control: Uneventful            Sign Out: Well controlled pain   PONV: No   Neuro/Psych: Uneventful            Sign Out: Acceptable/Baseline neuro status   Airway/Respiratory: Uneventful            Sign Out: Acceptable/Baseline resp. status   CV/Hemodynamics: Uneventful            Sign Out: Acceptable CV status; No obvious hypovolemia; No obvious fluid overload   Other NRE: NONE   DID A NON-ROUTINE EVENT OCCUR? No           Last vitals:  Vitals Value Taken Time   /66 07/01/24 1607   Temp 98.8  F (37.1  C) 07/01/24 1554   Pulse 58 07/01/24 1608   Resp 16 07/01/24 1607   SpO2 98 % 07/01/24 1608   Vitals shown include unfiled device data.    Electronically Signed By: Jose Mello MD  July 1, 2024  4:17 PM

## 2024-07-01 NOTE — DISCHARGE INSTRUCTIONS
** restart eliquis on morning of 7/3/2024 **     Follow up: Please call us at [phone] to schedule an appointment at your convenience.      If you would prefer to follow up with us by phone please let us know so that we may contact you 2-3 weeks following your procedure.         Diet: Regular diet.  Foods high infiber are recommended with 8 to 10 glasses of fluids each day.     Patients can have difficulty with constipation following surgery, due in part to the administration of narcotic medications.  If you are suffering with constipation, you should avoid foods such as hard cheeses or red meat.      Activity: You should continue to be active at home, including ambulating frequently.  If possible try to limit the amount of time spent in bed.     Restrictions: You have no lifting restrictions post operatively, but may wish to avoid strenuous physical activity for 1-2 weeks.  You should limit your physical activity if it causes you discomfort; however, this should resolve within 1-2 weeks.   Walking does not count as strenuous physical activity.  You are safe to walk up and down stairs.  Following 2 weeks you may resume all normal physical activity.     Wound / drain care: Your incisions are closed using absorbale sutures.  The skin is sealed with a surgical glue.  Do not peal the glue off.  Please allow the glue to peal off on its own.      Drain care - please log how much drain over 24 hrs or as needed.  Log the appearance of the drainage as well.  Drain will be removed in clinic.     Bathing: You may shower after 24 hours from surgery.  It is ok to get your incisions wet, but avoid rubbing them.  Avoid soaking in bath tubs, or swimming in lakes, pools, or streams for 4 weeks following surgery.        If you have any questions or concerns regarding your procedure, please contact Dr. Bah, his office number is [phone].     You have received 650 mg of Acetaminophen (Tylenol) at 2:24 PM. Please do not take an  additional dose of Tylenol until after 8:24 PM.     Do not exceed 4,000 mg of acetaminophen during a 24 hour period and keep in mind that acetaminophen can also be found in many over-the-counter cold medications as well as narcotics that may be given for pain.     [facility]   Adult Discharge Orders & Instructions     For 24 hours after surgery    Get plenty of rest.  A responsible adult must stay with you for at least 24 hours after you leave the hospital.   Do not drive or use heavy equipment.  If you have weakness or tingling, don't drive or use heavy equipment until this feeling goes away.  Do not drink alcohol.  Avoid strenuous or risky activities.  Ask for help when climbing stairs.   You may feel lightheaded.  IF so, sit for a few minutes before standing.  Have someone help you get up.   If you have nausea (feel sick to your stomach): Drink only clear liquids such as apple juice, ginger ale, broth or 7-Up.  Rest may also help.  Be sure to drink enough fluids.  Move to a regular diet as you feel able.  You may have a slight fever. Call the doctor if your fever is over 100 F (37.7 C) (taken under the tongue) or lasts longer than 24 hours.  You may have a dry mouth, a sore throat, muscle aches or trouble sleeping.  These should go away after 24 hours.  Do not make important or legal decisions.   Call your doctor for any of the followin.  Signs of infection (fever, growing tenderness at the surgery site, a large amount of drainage or bleeding, severe pain, foul-smelling drainage, redness, swelling).    2. It has been over 8 to 10 hours since surgery and you are still not able to urinate (pass water).    3.  Headache for over 24 hours.

## 2024-07-01 NOTE — OP NOTE
[facility]    Operative Note    Pre-operative diagnosis: Abdominal wall abscess [L02.211]  Post-operative diagnosis Same as pre-operative diagnosis    Procedure: EXCISION ABDOMINAL WALL MASS AND PLACEMENT OF DRAIN, N/A - Trunk    Surgeon: Surgeons and Role:     * Braden Bah DO - Primary  Anesthesia: MAC   Estimated Blood Loss: 5 mL     Drains: 19 Hebrew Denny drain placed underneath the subcutaneous tissue  Specimens:   ID Type Source Tests Collected by Time Destination   1 : Abdominal wall mass Tissue Abdomen SURGICAL PATHOLOGY EXAM Braden Bah DO 7/1/2024  3:28 PM    A :  Wound Abdomen ANAEROBIC BACTERIAL CULTURE ROUTINE, GRAM STAIN, AEROBIC BACTERIAL CULTURE ROUTINE Braden Bah DO 7/1/2024  3:27 PM      Findings:     -11 x 7 x 3 centimeter abscess cavity of the abdominal wall.  -Removal of the umbilical stalk  -Abscess wound culture    Complications: None.  Implants: * No implants in log *    Indication 74-year-old female with recurrent anterior abdominal wall abscesses.  The patient also has a large right ovarian mass concerning for a tumor.  As result, the patient has had evaluation from [facility] and they do recommend that the patient have an exploratory operation for the right ovarian mass tumor.  However they will not proceed with surgery unless the patient has resolution of her recurrent abscess.  Given the large size and induration of the recurrent abscess, we could proceed with conservative management and incision and drainage with formal excision thereafter.  However given that her main concern is proceeding with the exploratory laparotomy for the right ovarian tumor, I gave her the option for formal excision of the abscess cavity with drain placement and removal of the umbilical stalk given its close proximity.  The patient verbalized understanding provided consent to undergo the procedure above.  Risks including infection, bleeding, damage to the surrounding structures.    Procedure:  Patient was brought to the operative and placed on the operating table in the supine position.  Patient was started off as MAC sedation however decision was changed to LMA per discussion with anesthesia.  The patient's abdomen was then prepped and draped in the usual sterile fashion.  Prior to initiating the procedure, a timeout was completed.  All present were in agreement.    The patient had an elliptical skin incision completed using a 15 blade over the area of concern which also included the excision of the umbilical stalk.  Dissection through subcutaneous tissue was done using electrocautery.  I started to palpate around the entire mass.  The dissection through the subcutaneous tissue was carefully done down towards the fascia.  Once on the fascia, I can actually identify that the patient had significant adhesions of the abscess cavity onto the fascia.  The subcutaneous tissue as well as the abscess cavity was carefully peeled off of the fascia however I did enter the actual cavity.  There was some mild spillage of purulent material.  The wound cultures were then obtained.  All of the drainage was then suctioned out.  The rest of the cavity was then dissected free from the surrounding subcutaneous tissue with electrocautery.  The mass measured 11 x 7 x 3 cm in size.    Due to the spillage of the drainage from the abscess cavity, Betadine was then used to irrigate throughout the subcutaneous tissue and surgical wound bed.  The surgical wound bed was then irrigated with copious amounts of sterile saline.  A 3-0 Vicryl suture was then used to close the fascia of the umbilical stalk.  A 19 Czech Denny drain was then placed in the subcutaneous tissue exiting out of the the drain was then placed inside the surgical wound bed on top of the fascia.  The subcutaneous tissue was then closed.  Sebastián's and camper's fascia was then closed individually using a 3-0 Vicryl suture in a running stitch.  3-0 Vicryl suture was  then used to perform deep dermal sutures to reapproximate the incision.  Surgical glue was then used to reinforce the surgical incision.  A 2-0 silk stitch was then used to anchor the drain into position.  At the end of the procedure, a final count was completed.  I was told that all sharps, sponges, instruments were accounted for.  The patient was extubated and brought back to the PACU in stable condition.  Patient tolerated the procedure well with no complications    Braden Bah DO  General Surgeon  [facility]  Surgery [address] [address]?  Office: [phone]  Employed by - [employer]  Pager: [phone]

## 2024-07-02 ENCOUNTER — TELEPHONE (OUTPATIENT)
Dept: SURGERY | Facility: CLINIC | Age: 74
End: 2024-07-02
Payer: COMMERCIAL

## 2024-07-02 NOTE — TELEPHONE ENCOUNTER
Referral placed to [facility] through online portal for scheduling. She was placed on the pt tracking list for any further follow up.

## 2024-07-02 NOTE — TELEPHONE ENCOUNTER
Patient had surgery 6/27 and has a drain.  Her PO is 7/15 she is wanting to know if the drain can be removed before then.    Please call and advise.    Thanks!

## 2024-07-02 NOTE — TELEPHONE ENCOUNTER
Pt is s/p excision of an abdominal wall mass and placement of drain on 7/1/24. Pt is scheduled for a post op visit with Dr. Bah on 7/9/24. This was moved up by a week per pt request. She is wondering how long the drain needs to be in place. If possible, she would like to travel by plane on 7/10/24 however does not want to book airlines tickets if the drain is not out before this. We discussed how to log her drain outputs and monitor the color and consistency of her drainage. Discussed that the drainage should continue to decrease over the next several days and I can discuss removal with Dr. Bah and call her back.

## 2024-07-03 LAB — BACTERIA TISS BX CULT: ABNORMAL

## 2024-07-08 LAB — BACTERIA TISS BX CULT: NORMAL

## 2024-07-09 ENCOUNTER — OFFICE VISIT (OUTPATIENT)
Dept: SURGERY | Facility: CLINIC | Age: 74
End: 2024-07-09
Payer: COMMERCIAL

## 2024-07-09 VITALS — BODY MASS INDEX: 27.6 KG/M2 | HEIGHT: 62 IN | WEIGHT: 150 LBS

## 2024-07-09 DIAGNOSIS — L02.211 ABDOMINAL WALL ABSCESS: Primary | ICD-10-CM

## 2024-07-09 PROCEDURE — 99024 POSTOP FOLLOW-UP VISIT: CPT | Performed by: SURGERY

## 2024-07-09 NOTE — LETTER
7/9/2024      Kemi Soto  [address] [place] [id number]      Dear Colleague,    Thank you for referring your patient, Kemi Soto, to the [facility] SURGERY CLINIC AND BARIATRICS CARE [place]. Please see a copy of my visit note below.    I, Kemi Soto, give verbal consent for a resident to be present in today's visit.     General Surgery Clinic - Postop follow up  Kemi Soto MRN# [id number]   Age/Sex: 74 year old female YOB: 1950     Reason for visit: Post op visit                           Assessment and Plan:   Assessment:  1.  Abdominal wall abscess    74-year-old female status post excision of abdominal wall abscess and umbilicus with drain placement done on 7/1/2024.  The patient's wound is healed.  She has minimal output from the abdominal drain.  Patient's micro has shown strep which was already treated with source control from abscess removal as well as antibiotics that was given previously.    Plan:  -No further surgical intervention.  Abdominal drain was pulled today.  From the general surgery standpoint, the patient is cleared for her exploratory abdominal surgery with the [facility].    -Patient can follow-up as needed in the future.       Chief Complaint:     Chief Complaint   Patient presents with     Post-Op - General Surgery     excision of abdominal wall mass and drain removal            History is obtained from the patient    HPI:   Kemi Soto is a 74 year old female who presents general surgery team today for evaluation of abdominal drain and incision status post abdominal wall abscess excision with drain placement on 7/1/2024.  Patient states that she is doing well.  Minimal drain output.  Daily output has been just serosanguineous.  The drain output has decreased in size volume.  Patient has no further complaints at this time.  No fevers no chills.  No chest pain or shortness of breath.          Past Medical History:     Past Medical  History:   Diagnosis Date     Adrenal nodule (H24)      Alcohol dependence in remission (H)      Anemia      Antiplatelet or antithrombotic long-term use      Anxiety and depression      Arrhythmia      Benign essential hypertension      Chronic atrial fibrillation (H)      Chronic back pain      Chronic infection      Coronary artery disease      Diabetes mellitus (H)      Difficulty walking      Discitis of thoracolumbar region 10/10/2015     Encephalopathy 10/14/2015     Epidural abscess 10/10/2015     Hip pain, right      History of angina      Hyperlipidemia      Irregular heart beat      Other chronic pain      Ovarian mass      Sepsis (H) 10/08/2015     Stented coronary artery      Stroke (H) 06/23/2015    Neurology felt that episode was C/W subacute ischemic stroke     TIA (transient ischemic attack) 06/23/2015     UTI (urinary tract infection)     admitted to [facility] with UTI     Walking troubles               Past Surgical History:     Past Surgical History:   Procedure Laterality Date     ANGIOPLASTY  03/30/2016    Drug eluting stent mid LAD, cutting balloon to 1st diagonal     ANGIOPLASTY  01/01/2008     BYPASS GRAFT ARTERY CORONARY  12/11/2007    X 3 vessels, LIMA to LAD, saph vein graft to distal RCA, saphvein graft to marginal, mini circuit bypass.  [facility].     CORONARY STENT PLACEMENT  01/01/2008    Left main, left circumflex, RCA     CORONARY STENT PLACEMENT  03/01/2016     CV ANGIOGRAM CORONARY GRAFT N/A 10/11/2023    Procedure: Coronary Angiogram Graft;  Surgeon: Sam Boo MD;  Location: [facility] CATH [facility] CV     CV CORONARY ANGIOGRAM N/A 08/01/2018    Procedure: Coronary Angiogram;  Surgeon: Kit Bean MD;  Location: [facility] Cath Lab;  Service:      CV LEFT HEART CATHETERIZATION WITH LEFT VENTRICULOGRAM N/A 08/01/2018    Procedure: Left Heart Catheterization with Left Ventriculogram;  Surgeon: Kit Bean MD;  Location: [facility] Cath Lab;  Service:       EXCISE MASS TRUNK N/A 7/1/2024    Procedure: EXCISION ABDOMINAL WALL MASS AND PLACEMENT OF DRAIN;  Surgeon: Braden Bah DO;  Location: [facility] Main OR     INSERT MIDLINE HE  10/31/2015          LUMBAR DISCECTOMY N/A 10/11/2015    BILATERAL L1-L5 DECOMPRESSIVE LAMINECTOMY WITH EVACUATION OF EPIDURAL ABSCESS IRRIGATION & DEBRIDEMENT;  Surgeon: Luli Chan MD;  Location: [facility] Main OR; Due to sepsis     RELEASE CARPAL TUNNEL Bilateral      UMBILICAL HERNIA REPAIR               Social History:    reports that she quit smoking about 17 years ago. Her smoking use included cigarettes. She has been exposed to tobacco smoke. She has never used smokeless tobacco. She reports that she does not currently use alcohol. She reports that she does not use drugs.           Family History:     Family History   Problem Relation Age of Onset     Cerebrovascular Disease Mother      Diabetes Father      Coronary Artery Disease Father      Diabetes Sister      Cerebrovascular Disease Sister 81     Cerebrovascular Disease Brother      Diabetes Brother      Cancer Paternal Grandfather      Anesthesia Reaction No family hx of      Thrombosis No family hx of               Allergies:     Allergies   Allergen Reactions     Metformin GI Disturbance     Oxycodone Nausea and Vomiting              Medications:     Prior to Admission medications    Medication Sig Start Date End Date Taking? Authorizing Provider   acetaminophen (TYLENOL) 500 MG tablet Take 1,000 mg by mouth every 12 hours as needed for mild pain    Reported, Patient   apixaban ANTICOAGULANT (ELIQUIS ANTICOAGULANT) 5 MG tablet Take 1 tablet (5 mg) by mouth 2 times daily 1/12/24   Zaheer Sandra MD   atorvastatin (LIPITOR) 80 MG tablet TAKE 1 TABLET BY MOUTH AT BEDTIME 4/29/24   Elaine Galvez MD   blood glucose (NO BRAND SPECIFIED) test strip Use to test blood sugar 1 times daily or as directed. To accompany: Blood Glucose Monitor Brands: per insurance. 2/13/24    Zaheer Sandra MD   blood glucose monitoring (NO BRAND SPECIFIED) meter device kit Use to test blood sugar 1 times daily or as directed. Preferred blood glucose meter OR supplies to accompany: Blood Glucose Monitor Brands: per insurance. 2/13/24   Zaheer Sandra MD   busPIRone (BUSPAR) 15 MG tablet Take 1 tablet by mouth once daily 4/23/24   Elaine Galvez MD   clobetasol (TEMOVATE) 0.05 % external cream Apply topically 2 times daily 5/30/23   Reported, Patient   docusate sodium (COLACE) 100 MG capsule Take 1 capsule (100 mg) by mouth 2 times daily 7/1/24   Braden Bah DO   ezetimibe (ZETIA) 10 MG tablet Take 1 tablet (10 mg) by mouth daily  Patient taking differently: Take 10 mg by mouth every morning 9/1/23   Chuck Hernandez MD   gabapentin (NEURONTIN) 300 MG capsule TAKE 3 CAPSULES BY MOUTH EVERY DAY AT BEDTIME  Patient taking differently: Take 900 mg by mouth at bedtime TAKE 3 CAPSULES BY MOUTH EVERY DAY AT BEDTIME 3/8/24   Zaheer Sandra MD   hydroCHLOROthiazide 12.5 MG tablet Take 1 tablet (12.5 mg) by mouth daily -- appt needed for further refills. 5/13/24   Deejay Warren,    isosorbide mononitrate (IMDUR) 60 MG 24 hr tablet Take 1 tablet (60 mg) by mouth daily  Patient taking differently: Take 60 mg by mouth every morning 4/12/23   Elaine Galvez MD   ketoconazole (NIZORAL) 2 % external cream APPLY 1 APPLICATION TOPICALLY TWICE DAILY TO AFFECTED AREAS FOR 4 WEEKS THEN AS NEEDED 5/30/23   Reported, Patient   LANTUS SOLOSTAR 100 UNIT/ML soln INJECT 28 UNITS SUBCUTANEOUSLY TWICE DAILY 5/14/24   Zaheer Sandra MD   losartan (COZAAR) 100 MG tablet Take 1 tablet (100 mg) by mouth daily  Patient taking differently: Take 100 mg by mouth every morning 4/12/23   Elaine Galvez MD   naproxen (NAPROSYN) 500 MG tablet Take 500 mg by mouth 2 times daily (with meals)    Reported, Patient   sertraline (ZOLOFT) 100 MG tablet Take 1 tablet by mouth once daily 4/23/24   Elaine Galvez MD   sotalol (BETAPACE) 80 MG tablet  "Take 0.5 tablets (40 mg) by mouth 2 times daily 3/5/24   Zaheer Sandra MD   thin (NO BRAND SPECIFIED) lancets Use with lanceting device. To accompany: Blood Glucose Monitor Brands: per insurance. 2/13/24   Zaheer Sandra MD   triamcinolone (KENALOG) 0.1 % external cream Apply topically 2 times daily 5/30/23   Reported, Patient              Review of Systems:   A 12 point Review of Systems is negative other than noted in the HPI            Physical Exam:   Patient Vitals for the past 24 hrs:   Height Weight   07/09/24 1327 1.575 m (5' 2\") 68 kg (150 lb)        [unfilled]   Constitutional:   awake, alert, cooperative, no apparent distress, and appears stated age       Eyes:   PERRL, conjunctiva/corneas clear, EOM's intact; no scleral edema or icterus noted        ENT:   Normocephalic, without obvious abnormality, atraumatic, Lips, mucosa, and tongue normal        Hematologic / Lymphatic:   No lymphadenopathy       Lungs:   Normal respiratory effort, no accessory muscle use       Cardiovascular:   Regular rate and rhythm       Abdomen:   Soft, nondistended, nontender to palpation.  Mid abdominal horizontal incision healed.  Abdominal drain has minimal serosanguineous output.  No cloudiness.       Musculoskeletal:   No obvious swelling, bruising or deformity       Skin:   Skin color and texture normal for patient, no rashes or lesions              Data:          Micro shows Streptococcus constellatus     DO Braden Alarcon DO  General Surgeon  [facility]  Surgery [facility] - [address]  [address] [place] [address]?  Office: [phone]  Employed by - [employer]  Pager: [phone]       Again, thank you for allowing me to participate in the care of your patient.        Sincerely,        Braden Bah DO  "

## 2024-07-09 NOTE — PROGRESS NOTES
General Surgery Clinic - Postop follow up  Kemi Soto MRN# [id number]   Age/Sex: 74 year old female YOB: 1950     Reason for visit: Post op visit                           Assessment and Plan:   Assessment:  1.  Abdominal wall abscess    74-year-old female status post excision of abdominal wall abscess and umbilicus with drain placement done on 7/1/2024.  The patient's wound is healed.  She has minimal output from the abdominal drain.  Patient's micro has shown strep which was already treated with source control from abscess removal as well as antibiotics that was given previously.    Plan:  -No further surgical intervention.  Abdominal drain was pulled today.  From the general surgery standpoint, the patient is cleared for her exploratory abdominal surgery with the [facility].    -Patient can follow-up as needed in the future.       Chief Complaint:     Chief Complaint   Patient presents with    Post-Op - General Surgery     excision of abdominal wall mass and drain removal            History is obtained from the patient    HPI:   Kemi Soto is a 74 year old female who presents general surgery team today for evaluation of abdominal drain and incision status post abdominal wall abscess excision with drain placement on 7/1/2024.  Patient states that she is doing well.  Minimal drain output.  Daily output has been just serosanguineous.  The drain output has decreased in size volume.  Patient has no further complaints at this time.  No fevers no chills.  No chest pain or shortness of breath.          Past Medical History:     Past Medical History:   Diagnosis Date    Adrenal nodule (H24)     Alcohol dependence in remission (H)     Anemia     Antiplatelet or antithrombotic long-term use     Anxiety and depression     Arrhythmia     Benign essential hypertension     Chronic atrial fibrillation (H)     Chronic back pain     Chronic infection     Coronary artery disease     Diabetes mellitus (H)      Difficulty walking     Discitis of thoracolumbar region 10/10/2015    Encephalopathy 10/14/2015    Epidural abscess 10/10/2015    Hip pain, right     History of angina     Hyperlipidemia     Irregular heart beat     Other chronic pain     Ovarian mass     Sepsis (H) 10/08/2015    Stented coronary artery     Stroke (H) 06/23/2015    Neurology felt that episode was C/W subacute ischemic stroke    TIA (transient ischemic attack) 06/23/2015    UTI (urinary tract infection)     admitted to [facility] with UTI    Walking troubles               Past Surgical History:     Past Surgical History:   Procedure Laterality Date    ANGIOPLASTY  03/30/2016    Drug eluting stent mid LAD, cutting balloon to 1st diagonal    ANGIOPLASTY  01/01/2008    BYPASS GRAFT ARTERY CORONARY  12/11/2007    X 3 vessels, LIMA to LAD, saph vein graft to distal RCA, saphvein graft to marginal, mini circuit bypass.  [facility].    CORONARY STENT PLACEMENT  01/01/2008    Left main, left circumflex, RCA    CORONARY STENT PLACEMENT  03/01/2016    CV ANGIOGRAM CORONARY GRAFT N/A 10/11/2023    Procedure: Coronary Angiogram Graft;  Surgeon: Sam Boo MD;  Location: [facility] CATH LAB CV    CV CORONARY ANGIOGRAM N/A 08/01/2018    Procedure: Coronary Angiogram;  Surgeon: Kit Bean MD;  Location: [facility] Cath Lab;  Service:     CV LEFT HEART CATHETERIZATION WITH LEFT VENTRICULOGRAM N/A 08/01/2018    Procedure: Left Heart Catheterization with Left Ventriculogram;  Surgeon: Kit Bean MD;  Location: [facility] Cath Lab;  Service:     EXCISE MASS TRUNK N/A 7/1/2024    Procedure: EXCISION ABDOMINAL WALL MASS AND PLACEMENT OF DRAIN;  Surgeon: Braden Bah DO;  Location: [facility] Main OR    INSERT MIDLINE HE  10/31/2015         LUMBAR DISCECTOMY N/A 10/11/2015    BILATERAL L1-L5 DECOMPRESSIVE LAMINECTOMY WITH EVACUATION OF EPIDURAL ABSCESS IRRIGATION & DEBRIDEMENT;  Surgeon: Luli Chan MD;  Location: [facility]; Due to sepsis    RELEASE CARPAL TUNNEL Bilateral     UMBILICAL HERNIA REPAIR               Social History:    reports that she quit smoking about 17 years ago. Her smoking use included cigarettes. She has been exposed to tobacco smoke. She has never used smokeless tobacco. She reports that she does not currently use alcohol. She reports that she does not use drugs.           Family History:     Family History   Problem Relation Age of Onset    Cerebrovascular Disease Mother     Diabetes Father     Coronary Artery Disease Father     Diabetes Sister     Cerebrovascular Disease Sister 81    Cerebrovascular Disease Brother     Diabetes Brother     Cancer Paternal Grandfather     Anesthesia Reaction No family hx of     Thrombosis No family hx of               Allergies:     Allergies   Allergen Reactions    Metformin GI Disturbance    Oxycodone Nausea and Vomiting              Medications:     Prior to Admission medications    Medication Sig Start Date End Date Taking? Authorizing Provider   acetaminophen (TYLENOL) 500 MG tablet Take 1,000 mg by mouth every 12 hours as needed for mild pain    Reported, Patient   apixaban ANTICOAGULANT (ELIQUIS ANTICOAGULANT) 5 MG tablet Take 1 tablet (5 mg) by mouth 2 times daily 1/12/24   Zaheer Sandra MD   atorvastatin (LIPITOR) 80 MG tablet TAKE 1 TABLET BY MOUTH AT BEDTIME 4/29/24   Elaine Galvez MD   blood glucose (NO BRAND SPECIFIED) test strip Use to test blood sugar 1 times daily or as directed. To accompany: Blood Glucose Monitor Brands: per insurance. 2/13/24   Zaheer Sandra MD   blood glucose monitoring (NO BRAND SPECIFIED) meter device kit Use to test blood sugar 1 times daily or as directed. Preferred blood glucose meter OR supplies to accompany: Blood Glucose Monitor Brands: per insurance. 2/13/24   Zaheer Sandra MD   busPIRone (BUSPAR) 15 MG tablet Take 1 tablet by mouth once daily 4/23/24   Elaine Galvez MD   clobetasol (TEMOVATE) 0.05 % external cream  Apply topically 2 times daily 5/30/23   Reported, Patient   docusate sodium (COLACE) 100 MG capsule Take 1 capsule (100 mg) by mouth 2 times daily 7/1/24   Braden Bah DO   ezetimibe (ZETIA) 10 MG tablet Take 1 tablet (10 mg) by mouth daily  Patient taking differently: Take 10 mg by mouth every morning 9/1/23   Chuck Hernandez MD   gabapentin (NEURONTIN) 300 MG capsule TAKE 3 CAPSULES BY MOUTH EVERY DAY AT BEDTIME  Patient taking differently: Take 900 mg by mouth at bedtime TAKE 3 CAPSULES BY MOUTH EVERY DAY AT BEDTIME 3/8/24   Zaheer Sandra MD   hydroCHLOROthiazide 12.5 MG tablet Take 1 tablet (12.5 mg) by mouth daily -- appt needed for further refills. 5/13/24   Deejay Warren,    isosorbide mononitrate (IMDUR) 60 MG 24 hr tablet Take 1 tablet (60 mg) by mouth daily  Patient taking differently: Take 60 mg by mouth every morning 4/12/23   Elaine Galvez MD   ketoconazole (NIZORAL) 2 % external cream APPLY 1 APPLICATION TOPICALLY TWICE DAILY TO AFFECTED AREAS FOR 4 WEEKS THEN AS NEEDED 5/30/23   Reported, Patient   LANTUS SOLOSTAR 100 UNIT/ML soln INJECT 28 UNITS SUBCUTANEOUSLY TWICE DAILY 5/14/24   Zaheer Sandra MD   losartan (COZAAR) 100 MG tablet Take 1 tablet (100 mg) by mouth daily  Patient taking differently: Take 100 mg by mouth every morning 4/12/23   Elaine Galvez MD   naproxen (NAPROSYN) 500 MG tablet Take 500 mg by mouth 2 times daily (with meals)    Reported, Patient   sertraline (ZOLOFT) 100 MG tablet Take 1 tablet by mouth once daily 4/23/24   Elaine Galvez MD   sotalol (BETAPACE) 80 MG tablet Take 0.5 tablets (40 mg) by mouth 2 times daily 3/5/24   Zaheer Sandra MD   thin (NO BRAND SPECIFIED) lancets Use with lanceting device. To accompany: Blood Glucose Monitor Brands: per insurance. 2/13/24   Zaheer Sandra MD   triamcinolone (KENALOG) 0.1 % external cream Apply topically 2 times daily 5/30/23   Reported, Patient              Review of Systems:   A 12 point Review of Systems is negative  "other than noted in the HPI            Physical Exam:   Patient Vitals for the past 24 hrs:   Height Weight   07/09/24 1327 1.575 m (5' 2\") 68 kg (150 lb)        [unfilled]   Constitutional:   awake, alert, cooperative, no apparent distress, and appears stated age       Eyes:   PERRL, conjunctiva/corneas clear, EOM's intact; no scleral edema or icterus noted        ENT:   Normocephalic, without obvious abnormality, atraumatic, Lips, mucosa, and tongue normal        Hematologic / Lymphatic:   No lymphadenopathy       Lungs:   Normal respiratory effort, no accessory muscle use       Cardiovascular:   Regular rate and rhythm       Abdomen:   Soft, nondistended, nontender to palpation.  Mid abdominal horizontal incision healed.  Abdominal drain has minimal serosanguineous output.  No cloudiness.       Musculoskeletal:   No obvious swelling, bruising or deformity       Skin:   Skin color and texture normal for patient, no rashes or lesions              Data:          Micro shows Streptococcus constellatus     DO Braden Alarcon DO  General Surgeon  [facility]  Surgery [facility] - [address] [address]?  Office: [phone]  Employed by - [employer]  Pager: [phone]         "

## 2024-07-10 ENCOUNTER — OFFICE VISIT (OUTPATIENT)
Dept: CARDIOLOGY | Facility: CLINIC | Age: 74
End: 2024-07-10
Attending: INTERNAL MEDICINE
Payer: COMMERCIAL

## 2024-07-10 VITALS
SYSTOLIC BLOOD PRESSURE: 134 MMHG | RESPIRATION RATE: 16 BRPM | HEART RATE: 58 BPM | OXYGEN SATURATION: 97 % | DIASTOLIC BLOOD PRESSURE: 60 MMHG

## 2024-07-10 DIAGNOSIS — Z79.01 CHRONIC ANTICOAGULATION: ICD-10-CM

## 2024-07-10 DIAGNOSIS — E78.00 HYPERCHOLESTEROLEMIA: ICD-10-CM

## 2024-07-10 DIAGNOSIS — I10 ESSENTIAL HYPERTENSION: ICD-10-CM

## 2024-07-10 DIAGNOSIS — I25.83 CORONARY ARTERY DISEASE DUE TO LIPID RICH PLAQUE: ICD-10-CM

## 2024-07-10 DIAGNOSIS — I48.0 PAROXYSMAL ATRIAL FIBRILLATION (H): ICD-10-CM

## 2024-07-10 DIAGNOSIS — I25.708 CORONARY ARTERY DISEASE OF BYPASS GRAFT OF NATIVE HEART WITH STABLE ANGINA PECTORIS (H): Primary | ICD-10-CM

## 2024-07-10 DIAGNOSIS — I25.10 CORONARY ARTERY DISEASE DUE TO LIPID RICH PLAQUE: ICD-10-CM

## 2024-07-10 PROCEDURE — 99214 OFFICE O/P EST MOD 30 MIN: CPT | Performed by: STUDENT IN AN ORGANIZED HEALTH CARE EDUCATION/TRAINING PROGRAM

## 2024-07-10 PROCEDURE — G2211 COMPLEX E/M VISIT ADD ON: HCPCS | Performed by: STUDENT IN AN ORGANIZED HEALTH CARE EDUCATION/TRAINING PROGRAM

## 2024-07-10 RX ORDER — LOSARTAN POTASSIUM 100 MG/1
100 TABLET ORAL DAILY
Qty: 90 TABLET | Refills: 3 | Status: SHIPPED | OUTPATIENT
Start: 2024-07-10

## 2024-07-10 RX ORDER — HYDROCHLOROTHIAZIDE 12.5 MG/1
12.5 TABLET ORAL DAILY
Qty: 90 TABLET | Refills: 3 | Status: SHIPPED | OUTPATIENT
Start: 2024-07-10

## 2024-07-10 RX ORDER — ATORVASTATIN CALCIUM 80 MG/1
80 TABLET, FILM COATED ORAL AT BEDTIME
Qty: 90 TABLET | Refills: 3 | Status: SHIPPED | OUTPATIENT
Start: 2024-07-10

## 2024-07-10 RX ORDER — EZETIMIBE 10 MG/1
10 TABLET ORAL DAILY
Qty: 90 TABLET | Refills: 3 | Status: SHIPPED | OUTPATIENT
Start: 2024-07-10

## 2024-07-10 RX ORDER — ISOSORBIDE MONONITRATE 60 MG/1
60 TABLET, EXTENDED RELEASE ORAL DAILY
Qty: 90 TABLET | Refills: 3 | Status: SHIPPED | OUTPATIENT
Start: 2024-07-10

## 2024-07-10 NOTE — PATIENT INSTRUCTIONS
It was a pleasure meeting you today    In summary:    We will keep things the same    Please call my nurse Delfino Gaytan at [phone] if you have any questions or issues.    We will schedule a follow up visit in  6 months      Seven Huang DO [facility]  Non-invasive Cardiologist  [facility]

## 2024-07-10 NOTE — PROGRESS NOTES
[facility] HEART CARE   [address]  [place] [address]   [email or web address]   OFFICE: [phone]     CARDIOLOGY CLINIC NOTE     Thank you, Zaheer Walton, for asking the [facility] Heart Care team to see Ms. Kemi Soto to evaluate New Patient          History of Present Illness   Ms. Kemi Soto is a 74 year old female with a significant past history of CAD s/p LIMA to the LAD and vein graft to the obtuse marginal branch (occluded) with known occlusion of the Lcx and RCA, afib/flutter, HLD, HTN, who presents for routine follow up.  She previously followed with Dr. Hernandez.  She has been doing well from a heart perspective.  She does note a limited exercise/functional capacity due to back issues.  She denies any palpitations or chest symptoms.  She recently underwent removal of an abdominal wall cyst and has a possible ovarian mass removal next week at [facility].  She is still waiting to hear if this is on.  She had preoperative risk stratification at [facility] last month and no further ischemic testing was planned.           Medications  Allergies   Current Outpatient Medications   Medication Sig Dispense Refill    acetaminophen (TYLENOL) 500 MG tablet Take 1,000 mg by mouth every 12 hours as needed for mild pain      apixaban ANTICOAGULANT (ELIQUIS ANTICOAGULANT) 5 MG tablet Take 1 tablet (5 mg) by mouth 2 times daily 60 tablet 11    atorvastatin (LIPITOR) 80 MG tablet Take 1 tablet (80 mg) by mouth at bedtime 90 tablet 3    blood glucose (NO BRAND SPECIFIED) test strip Use to test blood sugar 1 times daily or as directed. To accompany: Blood Glucose Monitor Brands: per insurance. 100 strip 6    blood glucose monitoring (NO BRAND SPECIFIED) meter device kit Use to test blood sugar 1 times daily or as directed. Preferred blood glucose meter OR supplies to accompany: Blood Glucose Monitor Brands: per insurance. 1 kit 0    busPIRone (BUSPAR) 15 MG tablet Take 1 tablet  by mouth once daily 90 tablet 0    clobetasol (TEMOVATE) 0.05 % external cream Apply topically 2 times daily      docusate sodium (COLACE) 100 MG capsule Take 1 capsule (100 mg) by mouth 2 times daily 30 capsule 0    ezetimibe (ZETIA) 10 MG tablet Take 1 tablet (10 mg) by mouth daily 90 tablet 3    gabapentin (NEURONTIN) 300 MG capsule TAKE 3 CAPSULES BY MOUTH EVERY DAY AT BEDTIME (Patient taking differently: Take 900 mg by mouth at bedtime TAKE 3 CAPSULES BY MOUTH EVERY DAY AT BEDTIME) 270 capsule 3    hydroCHLOROthiazide 12.5 MG tablet Take 1 tablet (12.5 mg) by mouth daily -- appt needed for further refills. 90 tablet 3    isosorbide mononitrate (IMDUR) 60 MG 24 hr tablet Take 1 tablet (60 mg) by mouth daily 90 tablet 3    ketoconazole (NIZORAL) 2 % external cream APPLY 1 APPLICATION TOPICALLY TWICE DAILY TO AFFECTED AREAS FOR 4 WEEKS THEN AS NEEDED      LANTUS SOLOSTAR 100 UNIT/ML soln INJECT 28 UNITS SUBCUTANEOUSLY TWICE DAILY 45 mL 3    losartan (COZAAR) 100 MG tablet Take 1 tablet (100 mg) by mouth daily 90 tablet 3    naproxen (NAPROSYN) 500 MG tablet Take 500 mg by mouth 2 times daily (with meals)      sertraline (ZOLOFT) 100 MG tablet Take 1 tablet by mouth once daily 90 tablet 0    sotalol (BETAPACE) 80 MG tablet Take 0.5 tablets (40 mg) by mouth 2 times daily 90 tablet 3    thin (NO BRAND SPECIFIED) lancets Use with lanceting device. To accompany: Blood Glucose Monitor Brands: per insurance. 100 each 6    triamcinolone (KENALOG) 0.1 % external cream Apply topically 2 times daily        Allergies   Allergen Reactions    Metformin GI Disturbance    Oxycodone Nausea and Vomiting        Physical Examination Review of Systems   Vitals: /60 (BP Location: Right arm, Patient Position: Sitting, Cuff Size: Adult Regular)   Pulse 58   Resp 16   LMP  (LMP Unknown)   SpO2 97%   BMI= There is no height or weight on file to calculate BMI.  Wt Readings from Last 3 Encounters:   07/09/24 68 kg (150 lb)    24 68 kg (150 lb)   24 68.5 kg (151 lb)       General: pleasant female. No acute distress.   Neck: No JVD  Lungs: clear to auscultation  COR:  regular rate and rhythm, No murmurs, rubs, or gallops  Extrem: No edema        Please refer above for cardiac ROS details.       Past History     Family History:   Family History   Problem Relation Age of Onset    Cerebrovascular Disease Mother     Diabetes Father     Coronary Artery Disease Father     Diabetes Sister     Cerebrovascular Disease Sister 81    Cerebrovascular Disease Brother     Diabetes Brother     Cancer Paternal Grandfather     Anesthesia Reaction No family hx of     Thrombosis No family hx of         Social History:   Social History     Socioeconomic History    Marital status:      Spouse name: Not on file    Number of children: 1    Years of education: Not on file    Highest education level: Not on file   Occupational History    Occupation: retired   Tobacco Use    Smoking status: Former     Current packs/day: 0.00     Types: Cigarettes     Quit date: 2007     Years since quittin.0     Passive exposure: Past    Smokeless tobacco: Never   Vaping Use    Vaping status: Never Used   Substance and Sexual Activity    Alcohol use: Not Currently     Comment: Stopped drinking 2015    Drug use: No    Sexual activity: Never   Other Topics Concern    Not on file   Social History Narrative    Lives alone with cats and dogs. , one daughter, Tory Lofton.      Social Determinants of Health     Financial Resource Strain: Low Risk  (2024)    Financial Resource Strain     Within the past 12 months, have you or your family members you live with been unable to get utilities (heat, electricity) when it was really needed?: No   Food Insecurity: No Food Insecurity (2024)    Received from [facility]    Hunger Vital Sign     Worried About Running Out of Food in the Last Year: Never true     Ran Out of Food in the Last Year: Never  true   Transportation Needs: No Transportation Needs (5/8/2024)    Received from [facility]    PRAPARE - Transportation     Lack of Transportation (Medical): No     Lack of Transportation (Non-Medical): No   Physical Activity: Inactive (5/8/2024)    Received from [facility]    Exercise Vital Sign     Days of Exercise per Week: 0 days     Minutes of Exercise per Session: 0 min   Stress: Not on file   Social Connections: Not on file   Interpersonal Safety: Low Risk  (4/12/2024)    Interpersonal Safety     Do you feel physically and emotionally safe where you currently live?: Yes     Within the past 12 months, have you been hit, slapped, kicked or otherwise physically hurt by someone?: No     Within the past 12 months, have you been humiliated or emotionally abused in other ways by your partner or ex-partner?: No   Housing Stability: Low Risk  (5/8/2024)    Received from [facility]    Housing Stability     What is your living situation today?: I have a steady place to live            Lab Results    Chemistry/lipid CBC Cardiac Enzymes/BNP/TSH/INR   Lab Results   Component Value Date    CHOL 96 03/29/2024    HDL 34 (L) 03/29/2024    TRIG 128 03/29/2024    BUN 16 06/13/2024     06/13/2024    CO2 27 06/13/2024    Lab Results   Component Value Date    WBC 9.6 06/13/2024    HGB 12.4 06/13/2024    HCT 36.8 06/13/2024    MCV 90 06/13/2024     06/13/2024    Lab Results   Component Value Date    CKMB 1 08/14/2018    TROPONINI 0.18 11/06/2020    TSH 0.77 05/05/2021    INR 1.7 (H) 01/12/2024          Cardiac Problems and Cardiac Diagnostics     Most Recent Cardiac testing:  ECG Personal interpretation  10/20/2023  NSR  Septal infarct  Nonspecific STT    ECHO 10/3/2023  Interpretation Summary     Left ventricular size, wall motion and function are normal. The ejection  fraction is 60-65%.  Normal right ventricle size and systolic function.  No hemodynamically significant valvular abnormalities on 2D or color  flow  imaging.    Stress test 9/29/2023:     1.The nuclear stress test is abnormal.    2.Positive pharmacological regadenoson ECG for ischemia with 2-1/2 to 3 mm of ST segment depression in leads II, III, aVF, and V4 through V6.    3.There is a small area of mild ischemia in the distal anterolateral segment(s) of the left ventricle.  No scar seen.    4.The left ventricular ejection fraction at stress is 63%.  Postthoracotomy septal hypokinesis noted.    5.Stress to rest cavity ratio is 1.34.    6.The patient is at an intermediate risk of future cardiac ischemic events.    A prior study was conducted on 11/6/2020.  The small area of distal lateral ischemia is new.    Cardiac cath 10/11/2023:  Findings:  LM:no obstruction  LAD: moderate diffuse Ca2+ disease throughout mid-vessel, occluded mid-vessel, fills distally via patent L-LAD  Lcx:proximal mild ISR, mid-vessel occluded stents  RCA:dominant, diffusely stented, occluded mid-vessel     Grafts:  L-LAD: patent, 40-50% narrowing downstream unchanged from prior angio  V's: known occluded       Assessment/Recommendations   Assessment:    Ms. Kemi Soto is a 74 year old female with a significant past history of CAD s/p LIMA to the LAD and vein graft to the obtuse marginal branch (occluded) with known occlusion of the Lcx and RCA, afib/flutter, HLD, HTN, who presents for routine follow up.     CAD   - Patent LIMA-LAD with 40 to 50% narrowing downstream unchanged from previous    - Cont imdur   - LDL 36.  Continue atorvastatin 80mg and zetia   - Cont eliquis    Afib/flutter   - Controlled with sotalol   - CHADS-Vasc 8.  Continue Eliquis 5mg BID.  She is interested in the Watchman but with upcoming surgery will need to be discussed in the future.    HTN   - Well controlled   - Continue current management    RTC 6 months    The longitudinal plan of care for CAD was addressed during this visit. Due to the added complexity in care, I will continue to support Kemi KERNS  Charles  in the subsequent management of this condition(s) and with the ongoing continuity of care of this condition(s).           Seven Huang DO [facility]  Non-invasive Cardiologist  [facility]

## 2024-07-10 NOTE — LETTER
7/10/2024    Zaheer Sandra MD  [address]  [facility] [address]    RE: Kemi Soto       Dear Colleague,     I had the pleasure of seeing Kemi Soto in the [facility].    [facility] HEART CARE   [address]  [place] [address]   [email or web address]   OFFICE: [phone]     CARDIOLOGY CLINIC NOTE     Thank you, Zaheer Walton, for asking the [facility] Heart Care team to see Ms. Kemi Soto to evaluate New Patient          History of Present Illness   Ms. Kemi Soto is a 74 year old female with a significant past history of CAD s/p LIMA to the LAD and vein graft to the obtuse marginal branch (occluded) with known occlusion of the Lcx and RCA, afib/flutter, HLD, HTN, who presents for routine follow up.  She previously followed with Dr. Hernandez.  She has been doing well from a heart perspective.  She does note a limited exercise/functional capacity due to back issues.  She denies any palpitations or chest symptoms.  She recently underwent removal of an abdominal wall cyst and has a possible ovarian mass removal next week at [facility].  She is still waiting to hear if this is on.  She had preoperative risk stratification at [facility] last month and no further ischemic testing was planned.           Medications  Allergies   Current Outpatient Medications   Medication Sig Dispense Refill    acetaminophen (TYLENOL) 500 MG tablet Take 1,000 mg by mouth every 12 hours as needed for mild pain      apixaban ANTICOAGULANT (ELIQUIS ANTICOAGULANT) 5 MG tablet Take 1 tablet (5 mg) by mouth 2 times daily 60 tablet 11    atorvastatin (LIPITOR) 80 MG tablet Take 1 tablet (80 mg) by mouth at bedtime 90 tablet 3    blood glucose (NO BRAND SPECIFIED) test strip Use to test blood sugar 1 times daily or as directed. To accompany: Blood Glucose Monitor Brands: per insurance. 100 strip 6    blood glucose monitoring (NO BRAND SPECIFIED) meter device kit Use to test  blood sugar 1 times daily or as directed. Preferred blood glucose meter OR supplies to accompany: Blood Glucose Monitor Brands: per insurance. 1 kit 0    busPIRone (BUSPAR) 15 MG tablet Take 1 tablet by mouth once daily 90 tablet 0    clobetasol (TEMOVATE) 0.05 % external cream Apply topically 2 times daily      docusate sodium (COLACE) 100 MG capsule Take 1 capsule (100 mg) by mouth 2 times daily 30 capsule 0    ezetimibe (ZETIA) 10 MG tablet Take 1 tablet (10 mg) by mouth daily 90 tablet 3    gabapentin (NEURONTIN) 300 MG capsule TAKE 3 CAPSULES BY MOUTH EVERY DAY AT BEDTIME (Patient taking differently: Take 900 mg by mouth at bedtime TAKE 3 CAPSULES BY MOUTH EVERY DAY AT BEDTIME) 270 capsule 3    hydroCHLOROthiazide 12.5 MG tablet Take 1 tablet (12.5 mg) by mouth daily -- appt needed for further refills. 90 tablet 3    isosorbide mononitrate (IMDUR) 60 MG 24 hr tablet Take 1 tablet (60 mg) by mouth daily 90 tablet 3    ketoconazole (NIZORAL) 2 % external cream APPLY 1 APPLICATION TOPICALLY TWICE DAILY TO AFFECTED AREAS FOR 4 WEEKS THEN AS NEEDED      LANTUS SOLOSTAR 100 UNIT/ML soln INJECT 28 UNITS SUBCUTANEOUSLY TWICE DAILY 45 mL 3    losartan (COZAAR) 100 MG tablet Take 1 tablet (100 mg) by mouth daily 90 tablet 3    naproxen (NAPROSYN) 500 MG tablet Take 500 mg by mouth 2 times daily (with meals)      sertraline (ZOLOFT) 100 MG tablet Take 1 tablet by mouth once daily 90 tablet 0    sotalol (BETAPACE) 80 MG tablet Take 0.5 tablets (40 mg) by mouth 2 times daily 90 tablet 3    thin (NO BRAND SPECIFIED) lancets Use with lanceting device. To accompany: Blood Glucose Monitor Brands: per insurance. 100 each 6    triamcinolone (KENALOG) 0.1 % external cream Apply topically 2 times daily        Allergies   Allergen Reactions    Metformin GI Disturbance    Oxycodone Nausea and Vomiting        Physical Examination Review of Systems   Vitals: /60 (BP Location: Right arm, Patient Position: Sitting, Cuff Size: Adult  Regular)   Pulse 58   Resp 16   LMP  (LMP Unknown)   SpO2 97%   BMI= There is no height or weight on file to calculate BMI.  Wt Readings from Last 3 Encounters:   24 68 kg (150 lb)   24 68 kg (150 lb)   24 68.5 kg (151 lb)       General: pleasant female. No acute distress.   Neck: No JVD  Lungs: clear to auscultation  COR:  regular rate and rhythm, No murmurs, rubs, or gallops  Extrem: No edema        Please refer above for cardiac ROS details.       Past History     Family History:   Family History   Problem Relation Age of Onset    Cerebrovascular Disease Mother     Diabetes Father     Coronary Artery Disease Father     Diabetes Sister     Cerebrovascular Disease Sister 81    Cerebrovascular Disease Brother     Diabetes Brother     Cancer Paternal Grandfather     Anesthesia Reaction No family hx of     Thrombosis No family hx of         Social History:   Social History     Socioeconomic History    Marital status:      Spouse name: Not on file    Number of children: 1    Years of education: Not on file    Highest education level: Not on file   Occupational History    Occupation: retired   Tobacco Use    Smoking status: Former     Current packs/day: 0.00     Types: Cigarettes     Quit date: 2007     Years since quittin.0     Passive exposure: Past    Smokeless tobacco: Never   Vaping Use    Vaping status: Never Used   Substance and Sexual Activity    Alcohol use: Not Currently     Comment: Stopped drinking 2015    Drug use: No    Sexual activity: Never   Other Topics Concern    Not on file   Social History Narrative    Lives alone with cats and dogs. , one daughter, Tory Lofton.      Social Determinants of Health     Financial Resource Strain: Low Risk  (2024)    Financial Resource Strain     Within the past 12 months, have you or your family members you live with been unable to get utilities (heat, electricity) when it was really needed?: No   Food Insecurity: No  Food Insecurity (5/8/2024)    Received from [facility]    Hunger Vital Sign     Worried About Running Out of Food in the Last Year: Never true     Ran Out of Food in the Last Year: Never true   Transportation Needs: No Transportation Needs (5/8/2024)    Received from [facility]    PRAPARE - Transportation     Lack of Transportation (Medical): No     Lack of Transportation (Non-Medical): No   Physical Activity: Inactive (5/8/2024)    Received from [facility]    Exercise Vital Sign     Days of Exercise per Week: 0 days     Minutes of Exercise per Session: 0 min   Stress: Not on file   Social Connections: Not on file   Interpersonal Safety: Low Risk  (4/12/2024)    Interpersonal Safety     Do you feel physically and emotionally safe where you currently live?: Yes     Within the past 12 months, have you been hit, slapped, kicked or otherwise physically hurt by someone?: No     Within the past 12 months, have you been humiliated or emotionally abused in other ways by your partner or ex-partner?: No   Housing Stability: Low Risk  (5/8/2024)    Received from [facility]    Housing Stability     What is your living situation today?: I have a steady place to live            Lab Results    Chemistry/lipid CBC Cardiac Enzymes/BNP/TSH/INR   Lab Results   Component Value Date    CHOL 96 03/29/2024    HDL 34 (L) 03/29/2024    TRIG 128 03/29/2024    BUN 16 06/13/2024     06/13/2024    CO2 27 06/13/2024    Lab Results   Component Value Date    WBC 9.6 06/13/2024    HGB 12.4 06/13/2024    HCT 36.8 06/13/2024    MCV 90 06/13/2024     06/13/2024    Lab Results   Component Value Date    CKMB 1 08/14/2018    TROPONINI 0.18 11/06/2020    TSH 0.77 05/05/2021    INR 1.7 (H) 01/12/2024          Cardiac Problems and Cardiac Diagnostics     Most Recent Cardiac testing:  ECG Personal interpretation  10/20/2023  NSR  Septal infarct  Nonspecific STT    ECHO 10/3/2023  Interpretation Summary     Left ventricular size, wall motion  and function are normal. The ejection  fraction is 60-65%.  Normal right ventricle size and systolic function.  No hemodynamically significant valvular abnormalities on 2D or color flow  imaging.    Stress test 9/29/2023:     1.The nuclear stress test is abnormal.    2.Positive pharmacological regadenoson ECG for ischemia with 2-1/2 to 3 mm of ST segment depression in leads II, III, aVF, and V4 through V6.    3.There is a small area of mild ischemia in the distal anterolateral segment(s) of the left ventricle.  No scar seen.    4.The left ventricular ejection fraction at stress is 63%.  Postthoracotomy septal hypokinesis noted.    5.Stress to rest cavity ratio is 1.34.    6.The patient is at an intermediate risk of future cardiac ischemic events.    A prior study was conducted on 11/6/2020.  The small area of distal lateral ischemia is new.    Cardiac cath 10/11/2023:  Findings:  LM:no obstruction  LAD: moderate diffuse Ca2+ disease throughout mid-vessel, occluded mid-vessel, fills distally via patent L-LAD  Lcx:proximal mild ISR, mid-vessel occluded stents  RCA:dominant, diffusely stented, occluded mid-vessel     Grafts:  L-LAD: patent, 40-50% narrowing downstream unchanged from prior angio  V's: known occluded       Assessment/Recommendations   Assessment:    Ms. Kemi Soto is a 74 year old female with a significant past history of CAD s/p LIMA to the LAD and vein graft to the obtuse marginal branch (occluded) with known occlusion of the Lcx and RCA, afib/flutter, HLD, HTN, who presents for routine follow up.     CAD   - Patent LIMA-LAD with 40 to 50% narrowing downstream unchanged from previous    - Cont imdur   - LDL 36.  Continue atorvastatin 80mg and zetia   - Cont eliquis    Afib/flutter   - Controlled with sotalol   - CHADS-Vasc 8.  Continue Eliquis 5mg BID.  She is interested in the Watchman but with upcoming surgery will need to be discussed in the future.    HTN   - Well controlled   - Continue  current management    RTC 6 months    The longitudinal plan of care for CAD was addressed during this visit. Due to the added complexity in care, I will continue to support Kemi Soto  in the subsequent management of this condition(s) and with the ongoing continuity of care of this condition(s).           Seven Huang DO [facility]  Non-invasive Cardiologist  [facility] Heart Care         Thank you for allowing me to participate in the care of your patient.      Sincerely,     Seven Huang DO     [facility] Heart Care  cc:   Chuck Hernandez MD

## 2024-07-15 ENCOUNTER — OFFICE VISIT (OUTPATIENT)
Dept: FAMILY MEDICINE | Facility: CLINIC | Age: 74
End: 2024-07-15
Payer: COMMERCIAL

## 2024-07-15 ENCOUNTER — TELEPHONE (OUTPATIENT)
Dept: FAMILY MEDICINE | Facility: CLINIC | Age: 74
End: 2024-07-15

## 2024-07-15 VITALS
TEMPERATURE: 98 F | HEIGHT: 62 IN | OXYGEN SATURATION: 99 % | HEART RATE: 56 BPM | RESPIRATION RATE: 20 BRPM | WEIGHT: 153 LBS | SYSTOLIC BLOOD PRESSURE: 165 MMHG | DIASTOLIC BLOOD PRESSURE: 66 MMHG | BODY MASS INDEX: 28.16 KG/M2

## 2024-07-15 DIAGNOSIS — L02.211 ABDOMINAL WALL ABSCESS: ICD-10-CM

## 2024-07-15 DIAGNOSIS — Z79.4 TYPE 2 DIABETES MELLITUS WITH DIABETIC POLYNEUROPATHY, WITH LONG-TERM CURRENT USE OF INSULIN (H): Primary | ICD-10-CM

## 2024-07-15 DIAGNOSIS — E11.65 TYPE 2 DIABETES MELLITUS WITH HYPERGLYCEMIA, WITH LONG-TERM CURRENT USE OF INSULIN (H): ICD-10-CM

## 2024-07-15 DIAGNOSIS — N18.31 CHRONIC KIDNEY DISEASE, STAGE 3A (H): ICD-10-CM

## 2024-07-15 DIAGNOSIS — Z79.4 TYPE 2 DIABETES MELLITUS WITH DIABETIC POLYNEUROPATHY, WITH LONG-TERM CURRENT USE OF INSULIN (H): ICD-10-CM

## 2024-07-15 DIAGNOSIS — E11.42 TYPE 2 DIABETES MELLITUS WITH DIABETIC POLYNEUROPATHY, WITH LONG-TERM CURRENT USE OF INSULIN (H): Primary | ICD-10-CM

## 2024-07-15 DIAGNOSIS — R29.898 LEG WEAKNESS, BILATERAL: ICD-10-CM

## 2024-07-15 DIAGNOSIS — Z79.4 TYPE 2 DIABETES MELLITUS WITH HYPERGLYCEMIA, WITH LONG-TERM CURRENT USE OF INSULIN (H): ICD-10-CM

## 2024-07-15 DIAGNOSIS — E11.42 TYPE 2 DIABETES MELLITUS WITH DIABETIC POLYNEUROPATHY, WITH LONG-TERM CURRENT USE OF INSULIN (H): ICD-10-CM

## 2024-07-15 DIAGNOSIS — R29.6 FALLS FREQUENTLY: ICD-10-CM

## 2024-07-15 DIAGNOSIS — N83.8 OVARIAN MASS, RIGHT: ICD-10-CM

## 2024-07-15 LAB
ALBUMIN UR-MCNC: 100 MG/DL
APPEARANCE UR: CLEAR
BASOPHILS # BLD AUTO: 0 10E3/UL (ref 0–0.2)
BASOPHILS NFR BLD AUTO: 0 %
BILIRUB UR QL STRIP: ABNORMAL
COLOR UR AUTO: YELLOW
EOSINOPHIL # BLD AUTO: 0.2 10E3/UL (ref 0–0.7)
EOSINOPHIL NFR BLD AUTO: 2 %
ERYTHROCYTE [DISTWIDTH] IN BLOOD BY AUTOMATED COUNT: 12.7 % (ref 10–15)
GLUCOSE UR STRIP-MCNC: NEGATIVE MG/DL
HBA1C MFR BLD: 7.5 % (ref 0–5.6)
HCT VFR BLD AUTO: 35.5 % (ref 35–47)
HGB BLD-MCNC: 11.7 G/DL (ref 11.7–15.7)
HGB UR QL STRIP: NEGATIVE
IMM GRANULOCYTES # BLD: 0 10E3/UL
IMM GRANULOCYTES NFR BLD: 0 %
KETONES UR STRIP-MCNC: 15 MG/DL
LEUKOCYTE ESTERASE UR QL STRIP: ABNORMAL
LYMPHOCYTES # BLD AUTO: 1.2 10E3/UL (ref 0.8–5.3)
LYMPHOCYTES NFR BLD AUTO: 12 %
MCH RBC QN AUTO: 30.7 PG (ref 26.5–33)
MCHC RBC AUTO-ENTMCNC: 33 G/DL (ref 31.5–36.5)
MCV RBC AUTO: 93 FL (ref 78–100)
MONOCYTES # BLD AUTO: 0.5 10E3/UL (ref 0–1.3)
MONOCYTES NFR BLD AUTO: 5 %
NEUTROPHILS # BLD AUTO: 8.5 10E3/UL (ref 1.6–8.3)
NEUTROPHILS NFR BLD AUTO: 82 %
NITRATE UR QL: NEGATIVE
PH UR STRIP: 6 [PH] (ref 5–8)
PLATELET # BLD AUTO: 315 10E3/UL (ref 150–450)
RBC # BLD AUTO: 3.81 10E6/UL (ref 3.8–5.2)
SP GR UR STRIP: 1.02 (ref 1–1.03)
UROBILINOGEN UR STRIP-ACNC: 1 E.U./DL
WBC # BLD AUTO: 10.4 10E3/UL (ref 4–11)

## 2024-07-15 PROCEDURE — 99207 PR FOOT EXAM NO CHARGE: CPT | Performed by: FAMILY MEDICINE

## 2024-07-15 PROCEDURE — 80061 LIPID PANEL: CPT | Performed by: FAMILY MEDICINE

## 2024-07-15 PROCEDURE — 83036 HEMOGLOBIN GLYCOSYLATED A1C: CPT | Performed by: FAMILY MEDICINE

## 2024-07-15 PROCEDURE — G2211 COMPLEX E/M VISIT ADD ON: HCPCS | Performed by: FAMILY MEDICINE

## 2024-07-15 PROCEDURE — 99214 OFFICE O/P EST MOD 30 MIN: CPT | Mod: 24 | Performed by: FAMILY MEDICINE

## 2024-07-15 PROCEDURE — 85025 COMPLETE CBC W/AUTO DIFF WBC: CPT | Performed by: FAMILY MEDICINE

## 2024-07-15 PROCEDURE — 80053 COMPREHEN METABOLIC PANEL: CPT | Performed by: FAMILY MEDICINE

## 2024-07-15 PROCEDURE — 87086 URINE CULTURE/COLONY COUNT: CPT | Performed by: FAMILY MEDICINE

## 2024-07-15 PROCEDURE — 81003 URINALYSIS AUTO W/O SCOPE: CPT | Performed by: FAMILY MEDICINE

## 2024-07-15 PROCEDURE — 36415 COLL VENOUS BLD VENIPUNCTURE: CPT | Performed by: FAMILY MEDICINE

## 2024-07-15 RX ORDER — LANCETS
EACH MISCELLANEOUS
Qty: 100 EACH | Refills: 6 | Status: SHIPPED | OUTPATIENT
Start: 2024-07-15

## 2024-07-15 RX ORDER — INSULIN GLARGINE 100 [IU]/ML
28 INJECTION, SOLUTION SUBCUTANEOUS EVERY MORNING
Qty: 45 ML | Refills: 3 | Status: SHIPPED | OUTPATIENT
Start: 2024-07-15

## 2024-07-15 RX ORDER — RESPIRATORY SYNCYTIAL VIRUS VACCINE 120MCG/0.5
0.5 KIT INTRAMUSCULAR ONCE
Qty: 1 EACH | Refills: 0 | Status: CANCELLED | OUTPATIENT
Start: 2024-07-15 | End: 2024-07-15

## 2024-07-15 RX ORDER — LANCETS
EACH MISCELLANEOUS
Qty: 100 EACH | Refills: 6 | Status: SHIPPED | OUTPATIENT
Start: 2024-07-15 | End: 2024-07-15

## 2024-07-15 ASSESSMENT — PAIN SCALES - GENERAL: PAINLEVEL: NO PAIN (0)

## 2024-07-15 NOTE — TELEPHONE ENCOUNTER
Medication: blood glucose (NO BRAND SPECIFIED) test strip, blood glucose monitoring (NO BRAND SPECIFIED) meter device kit , thin (NO BRAND SPECIFIED) lancets     Pharmacy Comments: Can't say as directed. Need how many they are testing per day on script per Medicare. This was written separate on all three scripts.    Pharmacy:    [id number] - [place], [place] - [phone]

## 2024-07-15 NOTE — PROGRESS NOTES
Assessment & Plan     Type 2 diabetes mellitus with diabetic polyneuropathy, with long-term current use of insulin (H)  Improving.  Continue Lantus.  New glucose supplies sent to pharmacy.  Check labs and notify with results.  - Adult Eye  Referral; Future  - LANTUS SOLOSTAR 100 UNIT/ML soln; Inject 28 Units subcutaneously every morning  - thin (NO BRAND SPECIFIED) lancets; Use with lanceting device. To accompany: Blood Glucose Monitor Brands: per insurance.  - blood glucose monitoring (NO BRAND SPECIFIED) meter device kit; Use to test blood sugar 1 times daily or as directed. Preferred blood glucose meter OR supplies to accompany: Blood Glucose Monitor Brands: per insurance.  - blood glucose (NO BRAND SPECIFIED) test strip; Use to test blood sugar 1 times daily or as directed. To accompany: Blood Glucose Monitor Brands: per insurance.  - Hemoglobin A1c; Future  - Comprehensive metabolic panel; Future  - CBC with Platelets & Differential; Future  - FOOT EXAM    Type 2 diabetes mellitus with hyperglycemia, with long-term current use of insulin (H)  Improving.  Continue Lantus.  New glucose supplies sent to pharmacy.  Check labs and notify with results.  - Adult Eye  Referral; Future  - LANTUS SOLOSTAR 100 UNIT/ML soln; Inject 28 Units subcutaneously every morning  - thin (NO BRAND SPECIFIED) lancets; Use with lanceting device. To accompany: Blood Glucose Monitor Brands: per insurance.  - blood glucose monitoring (NO BRAND SPECIFIED) meter device kit; Use to test blood sugar 1 times daily or as directed. Preferred blood glucose meter OR supplies to accompany: Blood Glucose Monitor Brands: per insurance.  - blood glucose (NO BRAND SPECIFIED) test strip; Use to test blood sugar 1 times daily or as directed. To accompany: Blood Glucose Monitor Brands: per insurance.  - FOOT EXAM    Chronic kidney disease, stage 3a (H)  Improved.  Check labs and notify with results.  - UA Macroscopic with reflex to  Microscopic and Culture; Future    Ovarian mass, right  Agree with needing upcoming surgery.    Abdominal wall abscess  Appears to be healing well, no concerns.    Leg weakness, bilateral  Prescription for four-wheel walker with seat, per her request.  - Walker Order for DME - ONLY FOR DME    Falls frequently  Prescription for four-wheel walker with seat, per her request.  - Walker Order for DME - ONLY FOR DME      The longitudinal plan of care for the diagnosis(es)/condition(s) as documented were addressed during this visit. Due to the added complexity in care, I will continue to support Ciarra in the subsequent management and with ongoing continuity of care.            Keith Lafleur is a 74 year old, presenting for the following health issues:  Diabetes (3 month follow up for diabetes), Cyst (Wants to discuss cyst removal from 07/01/2024), Ankle/Foot right (Fell yesterday, right foot pain, swelling), and Research Study (Patient is participating in a research study - diet, plant based foods)        7/15/2024    12:59 PM   Additional Questions   Roomed by [facility], Visit Facilitator     History of Present Illness       Diabetes:   She presents for follow up of diabetes.    She is not checking blood glucose.         She has no concerns regarding her diabetes at this time.  She is having redness, sores, or blisters on feet and excessive thirst.  The patient has not had a diabetic eye exam in the last 12 months.          She eats 2-3 servings of fruits and vegetables daily.She consumes 0 sweetened beverage(s) daily.She exercises with enough effort to increase her heart rate 9 or less minutes per day.  She exercises with enough effort to increase her heart rate 3 or less days per week.   She is taking medications regularly.    Abdominal wall abscess: She had surgical I&D 2 weeks ago and completed antibiotics. She denies any drainage or discomfort around drainage tube wound.    Ovarian mass: Having surgery at [facility]  "in 3 days.    DM2: A1c 7.9, done 1.5 months ago. She has been in a plant based diet through a study.  Patient has lowered her Lantus dose from 28 units twice daily to 28 units once daily.    CKD3: Last GFR was improved from 53 to 66.    Frequent falls/leg weakness: She had a fall yesterday and slightly injured right foot, although there is no significant pain  when walking. Patient uses front wheel walker in the morning and uses a cane when leaving the house because she feels unsteady on her feet.  She has not been able to do physical therapy because of surgeries recently.            Review of Systems  Constitutional, HEENT, cardiovascular, pulmonary, gi and gu systems are negative, except as otherwise noted.      Objective    BP (!) 165/66 (BP Location: Left arm, Patient Position: Sitting, Cuff Size: Adult Regular)   Pulse 56   Temp 98  F (36.7  C) (Oral)   Resp 20   Ht 1.575 m (5' 2\")   Wt 69.4 kg (153 lb)   LMP  (LMP Unknown)   SpO2 99%   BMI 27.98 kg/m    Body mass index is 27.98 kg/m .  Physical Exam   GENERAL: alert and no distress  PSYCH: mentation appears normal, affect normal/bright  Diabetic foot exam: normal DP and PT pulses, no trophic changes or ulcerative lesions, and normal sensory exam  SKIN: Left lower abdomen with wound, no drainage, no edema, minimal surrounding erythema, no tenderness palpation.  Patient has eschar formed and central portion of wound without any drainage.  RIGHT ANKLE: No tenderness to palpation, no effusion or edema.          Signed Electronically by: Zaheer Sandra MD    "

## 2024-07-16 LAB
ALBUMIN SERPL BCG-MCNC: 3.9 G/DL (ref 3.5–5.2)
ALP SERPL-CCNC: 94 U/L (ref 40–150)
ALT SERPL W P-5'-P-CCNC: 21 U/L (ref 0–50)
ANION GAP SERPL CALCULATED.3IONS-SCNC: 8 MMOL/L (ref 7–15)
AST SERPL W P-5'-P-CCNC: 21 U/L (ref 0–45)
BACTERIA UR CULT: NORMAL
BILIRUB SERPL-MCNC: 0.3 MG/DL
BUN SERPL-MCNC: 11.1 MG/DL (ref 8–23)
CALCIUM SERPL-MCNC: 9.4 MG/DL (ref 8.8–10.4)
CHLORIDE SERPL-SCNC: 101 MMOL/L (ref 98–107)
CHOLEST SERPL-MCNC: 119 MG/DL
CREAT SERPL-MCNC: 0.77 MG/DL (ref 0.51–0.95)
EGFRCR SERPLBLD CKD-EPI 2021: 80 ML/MIN/1.73M2
FASTING STATUS PATIENT QL REPORTED: NO
FASTING STATUS PATIENT QL REPORTED: NO
GLUCOSE SERPL-MCNC: 100 MG/DL (ref 70–99)
HCO3 SERPL-SCNC: 30 MMOL/L (ref 22–29)
HDLC SERPL-MCNC: 37 MG/DL
LDLC SERPL CALC-MCNC: 49 MG/DL
NONHDLC SERPL-MCNC: 82 MG/DL
POTASSIUM SERPL-SCNC: 4.6 MMOL/L (ref 3.4–5.3)
PROT SERPL-MCNC: 6.3 G/DL (ref 6.4–8.3)
SODIUM SERPL-SCNC: 139 MMOL/L (ref 135–145)
TRIGL SERPL-MCNC: 163 MG/DL

## 2024-07-22 ENCOUNTER — MEDICAL CORRESPONDENCE (OUTPATIENT)
Dept: HEALTH INFORMATION MANAGEMENT | Facility: CLINIC | Age: 74
End: 2024-07-22

## 2024-07-22 ENCOUNTER — TELEPHONE (OUTPATIENT)
Dept: FAMILY MEDICINE | Facility: CLINIC | Age: 74
End: 2024-07-22
Payer: COMMERCIAL

## 2024-07-22 NOTE — TELEPHONE ENCOUNTER
Left a message for Dimple, the home care nurse.  Relayed that Dr. Veloz's agrees with home care orders.

## 2024-07-22 NOTE — TELEPHONE ENCOUNTER
Home Care is calling regarding an established patient with [facility].       Requesting orders from: Zaheer Sandra  Provider is following patient: Yes  Is this a 60-day recertification request?  Yes    Orders Requested    Skilled Nursing  Request for recertification   Frequency:   SN 2 x week for 3 weeks    1 visit a week for 2 weeks     2 prn visits      Post op monitoring & wound management      Orders Requested    Social Work  Request for initial evaluation and treatment (one time)       Confirmed ok to leave a detailed message with call back.  Contact information confirmed and updated as needed.        Dimple KNUTSON [facility] [phone]     Perla Quinn, EAMON

## 2024-08-01 ENCOUNTER — TELEPHONE (OUTPATIENT)
Dept: FAMILY MEDICINE | Facility: CLINIC | Age: 74
End: 2024-08-01
Payer: COMMERCIAL

## 2024-08-01 ENCOUNTER — MYC MEDICAL ADVICE (OUTPATIENT)
Dept: CARDIOLOGY | Facility: CLINIC | Age: 74
End: 2024-08-01
Payer: COMMERCIAL

## 2024-08-01 NOTE — TELEPHONE ENCOUNTER
Home Care is calling regarding an established patient with [facility].       Requesting orders from: Zaheer Sandra  Provider is following patient: Yes  Is this a 60-day recertification request?  No    Orders Requested    Physical Therapy  Request for initial evaluation and treatment (one time)       Verbal orders given.  Home Care will send orders for provider to sign.  Confirmed ok to leave a detailed message with call back.  Contact information confirmed and updated as needed.    Greer Tang RN

## 2024-08-02 ENCOUNTER — TELEPHONE (OUTPATIENT)
Dept: FAMILY MEDICINE | Facility: CLINIC | Age: 74
End: 2024-08-02
Payer: COMMERCIAL

## 2024-08-02 ENCOUNTER — MEDICAL CORRESPONDENCE (OUTPATIENT)
Dept: HEALTH INFORMATION MANAGEMENT | Facility: CLINIC | Age: 74
End: 2024-08-02

## 2024-08-02 DIAGNOSIS — Z53.9 DIAGNOSIS NOT YET DEFINED: Primary | ICD-10-CM

## 2024-08-02 PROCEDURE — G0180 MD CERTIFICATION HHA PATIENT: HCPCS | Performed by: FAMILY MEDICINE

## 2024-08-02 NOTE — TELEPHONE ENCOUNTER
Faxed 8/2/24 @ [facility] [phone]  Carolina Elias MA

## 2024-08-07 NOTE — TELEPHONE ENCOUNTER
Per [place] Community Measures guidelines, patients blood pressure is out of parameters and recheck blood pressure is recommended.    Last Blood Pressure: 165/66  Last Heart Rate: 56  Date: 7/15/24  Location: Other Specialty    Today's Blood Pressure: 124/62  Today's Heart Rate: Not provided  Location: Home BP    Patient reported blood pressure updated in Epic. Blood pressure falls within [place] Community [place] guidelines.  Patient will follow up as previously advised.

## 2024-08-09 ENCOUNTER — TELEPHONE (OUTPATIENT)
Dept: FAMILY MEDICINE | Facility: CLINIC | Age: 74
End: 2024-08-09
Payer: COMMERCIAL

## 2024-08-09 NOTE — TELEPHONE ENCOUNTER
Home Care is calling regarding an established patient with [facility].    Requesting orders from: Zaheer Sandra  Provider is following patient: Yes  Is this a 60-day recertification request?  No    Verbal Orders Requested    Physical Therapy  Request for initial certification (first set of orders)   Frequency:  2x/wk for 1 wks  then 1x/wk for 4 wks      Confirmed ok to leave a detailed message with call back.  Contact information confirmed and updated as needed.    Anand Abdullahi  [phone]    Dimple Joshi RN

## 2024-08-12 ENCOUNTER — MEDICAL CORRESPONDENCE (OUTPATIENT)
Dept: HEALTH INFORMATION MANAGEMENT | Facility: CLINIC | Age: 74
End: 2024-08-12
Payer: COMMERCIAL

## 2024-08-16 ENCOUNTER — TELEPHONE (OUTPATIENT)
Dept: CARDIOLOGY | Facility: CLINIC | Age: 74
End: 2024-08-16
Payer: COMMERCIAL

## 2024-08-16 DIAGNOSIS — Z79.01 CHRONIC ANTICOAGULATION: Primary | ICD-10-CM

## 2024-08-16 NOTE — TELEPHONE ENCOUNTER
PC to patient, and review. OV arranged on 9/13/24 at [facility] to discuss Watchman. Pt reports that Eliquis is costly. Denies use of 30 day free voucher. Will check with pharmacy liasion on Monday, if not used will call into [facility] for next 30 day supply, and call patient to inform. Pt has approximately 10-12 days remaining of Eliquis. Pt inquired if she could quit Eliquis. Instructed to continue the medication as prescribed until a physician gives the okay to stop. Verbalized understanding. LINDSAY,RN

## 2024-08-16 NOTE — TELEPHONE ENCOUNTER
Dr. Huang, please review patient question. Surgery completed at [facility] 7/18/24. Please advise. Thank you EAMON MONTOYA

## 2024-08-16 NOTE — TELEPHONE ENCOUNTER
===View-only below this line===  ----- Message -----  From: Seven Huang DO  Sent: 8/16/2024   1:01 PM CDT  To: Delfino Gaytan RN    Sure - can schedule 1st available with me

## 2024-08-19 ENCOUNTER — TELEPHONE (OUTPATIENT)
Dept: PHARMACY | Facility: HOSPITAL | Age: 74
End: 2024-08-19
Payer: COMMERCIAL

## 2024-08-21 ENCOUNTER — TELEPHONE (OUTPATIENT)
Dept: PHYSICAL MEDICINE AND REHAB | Facility: CLINIC | Age: 74
End: 2024-08-21
Payer: COMMERCIAL

## 2024-08-21 DIAGNOSIS — M54.16 LUMBAR RADICULOPATHY: ICD-10-CM

## 2024-08-21 DIAGNOSIS — M54.42 CHRONIC BILATERAL LOW BACK PAIN WITH BILATERAL SCIATICA: Primary | ICD-10-CM

## 2024-08-21 DIAGNOSIS — M54.41 CHRONIC BILATERAL LOW BACK PAIN WITH BILATERAL SCIATICA: Primary | ICD-10-CM

## 2024-08-21 DIAGNOSIS — M43.16 SPONDYLOLISTHESIS OF LUMBAR REGION: ICD-10-CM

## 2024-08-21 DIAGNOSIS — Z98.890 HISTORY OF LUMBAR SURGERY: ICD-10-CM

## 2024-08-21 DIAGNOSIS — G89.29 CHRONIC BILATERAL LOW BACK PAIN WITH BILATERAL SCIATICA: Primary | ICD-10-CM

## 2024-08-21 DIAGNOSIS — M48.061 LUMBAR FORAMINAL STENOSIS: ICD-10-CM

## 2024-08-21 NOTE — TELEPHONE ENCOUNTER
[facility]    Phone Message    May a detailed message be left on voicemail: yes     Reason for Call: Other: Patient was told to call back when she is ready for pt. She is now ready for pt and would like a call back for next step.     Action Taken: [facility]    Travel Screening: Not Applicable     Date of Service:

## 2024-08-22 NOTE — TELEPHONE ENCOUNTER
Pt returned call. Pt reports she had her abdominal surgery on 7/18. She states she is now on the tail end of her recovery and she is wanting to get on the schedule sometime mid-September to start outpatient physical therapy for her back.   She requests to go to [facility] location for this.   Informed her PSP would be updated with her request.

## 2024-08-28 ENCOUNTER — TELEPHONE (OUTPATIENT)
Dept: FAMILY MEDICINE | Facility: CLINIC | Age: 74
End: 2024-08-28
Payer: COMMERCIAL

## 2024-08-28 NOTE — TELEPHONE ENCOUNTER
S-(situation):   Home health notifying PCP about pt's fall    B-(background):   09/26/2024, unwitnessed fall at home    A-(assessment):   Unwitnessed fall  Denies hitting head  No injury    R-(recommendations):   Home care is required to update provider.     Anusha Herzog RN

## 2024-08-29 NOTE — TELEPHONE ENCOUNTER
Noted.  Please have home health update us if there are any changes or recurrent falls.    Zaheer Sandra MD

## 2024-08-29 NOTE — TELEPHONE ENCOUNTER
Pt has not used a voucher for Eliquis in the past. $145/30 day supply as she is in the gap. Verbalized understanding.  Will call in the voucher for 30 day supply. EAMON MONTOYA     PC to [facility] pharmacy. Talked with pharmacy tech. Relayed 30 day voucher     RxBin: [id number]  RxPCN: [id number]  Grp: [id number]  ID: [id number]    Card effective. Return call to patient and informed of voucher success. CMM,Rn

## 2024-09-05 ENCOUNTER — NURSE TRIAGE (OUTPATIENT)
Dept: FAMILY MEDICINE | Facility: CLINIC | Age: 74
End: 2024-09-05
Payer: COMMERCIAL

## 2024-09-05 NOTE — TELEPHONE ENCOUNTER
Writer called pt and left voicemail, requesting call back to relay PCP message below.    Anusha Herzog RN

## 2024-09-05 NOTE — TELEPHONE ENCOUNTER
"Nurse Triage SBAR    Is this a 2nd Level Triage? YES, LICENSED PRACTITIONER REVIEW IS REQUIRED    Situation: Weakness on right side.    Background: States she does have a history of TIA    Assessment: States she has previously had weakness in her right leg, but since having her surgery on 7/18, she has noticed weakness in her right hand as well. States her little finger on the right hand does not seem to work. States she has been dropping things more often and feels like her balance is worse since the surgery. Denies any chest pain, SOB, palpitations, facial droop, slurred speech or word finding difficulty.     Protocol Recommended Disposition:   Call  Now    Recommendation: Instructed patient that she should call 911 or present to the emergency department if she feels she had a stroke or TIA. She refuses and wants to know if Dr Sandra could order some imaging or if he has any other advice for her on next steps.      Routed to provider    Does the patient meet one of the following criteria for ADS visit consideration? 16+ years old, with an [facility] PCP     TIP  Providers, please consider if this condition is appropriate for management at one of our Acute and Diagnostic Services sites.     If patient is a good candidate, please use dotphrase <dot>triageresponse and select Refer to ADS to document.      Reason for Disposition   New neurologic deficit that is present NOW, sudden onset of ANY of the following: * Weakness of the face, arm, or leg on one side of the body* Numbness of the face, arm, or leg on one side of the body* Loss of speech or garbled speech    Additional Information   Negative: Difficult to awaken or acting confused (e.g., disoriented, slurred speech)    Answer Assessment - Initial Assessment Questions  1. SYMPTOM: \"What is the main symptom you are concerned about?\" (e.g., weakness, numbness)      Weakness on right side in leg and hand  2. ONSET: \"When did this start?\" (minutes, hours, days; " "while sleeping)      End of July  3. LAST NORMAL: \"When was the last time you (the patient) were normal (no symptoms)?\"      Before surgery on 7/21  4. PATTERN \"Does this come and go, or has it been constant since it started?\"  \"Is it present now?\"      Constant   5. CARDIAC SYMPTOMS: \"Have you had any of the following symptoms: chest pain, difficulty breathing, palpitations?\"      denies  6. NEUROLOGIC SYMPTOMS: \"Have you had any of the following symptoms: headache, dizziness, vision loss, double vision, changes in speech, unsteady on your feet?\"      Balance off, but not new.  7. OTHER SYMPTOMS: \"Do you have any other symptoms?\"      Right little finger \"drooping\"  8. PREGNANCY: \"Is there any chance you are pregnant?\" \"When was your last menstrual period?\"      N/A    Protocols used: Neurologic Deficit-A-[place]    "

## 2024-09-05 NOTE — TELEPHONE ENCOUNTER
She should be seen ASAP at the ER, it is hard to say what is going on, so they will need to evaluate her and decide on imaging. If this is a sign of a stroke, it is a time sensitive situation.  Imaging of the brain can take days to get in and it might not even be the correct testing without evaluating her.    Zaheer Sandra MD

## 2024-09-06 ENCOUNTER — OFFICE VISIT (OUTPATIENT)
Dept: FAMILY MEDICINE | Facility: CLINIC | Age: 74
End: 2024-09-06
Payer: COMMERCIAL

## 2024-09-06 ENCOUNTER — TELEPHONE (OUTPATIENT)
Dept: FAMILY MEDICINE | Facility: CLINIC | Age: 74
End: 2024-09-06

## 2024-09-06 VITALS
SYSTOLIC BLOOD PRESSURE: 130 MMHG | BODY MASS INDEX: 26.65 KG/M2 | WEIGHT: 144.8 LBS | HEIGHT: 62 IN | HEART RATE: 63 BPM | OXYGEN SATURATION: 100 % | TEMPERATURE: 97.1 F | DIASTOLIC BLOOD PRESSURE: 60 MMHG

## 2024-09-06 DIAGNOSIS — L40.4 PSORIASIS, GUTTATE: ICD-10-CM

## 2024-09-06 DIAGNOSIS — G56.21 ULNAR NEUROPATHY AT ELBOW, RIGHT: Primary | ICD-10-CM

## 2024-09-06 DIAGNOSIS — L40.4 PSORIASIS, GUTTATE: Primary | ICD-10-CM

## 2024-09-06 PROBLEM — R19.00 PELVIC MASS: Status: RESOLVED | Noted: 2024-05-09 | Resolved: 2024-09-06

## 2024-09-06 PROBLEM — N18.31 CHRONIC KIDNEY DISEASE, STAGE 3A (H): Status: RESOLVED | Noted: 2024-04-12 | Resolved: 2024-09-06

## 2024-09-06 PROBLEM — N83.8 OVARIAN MASS, RIGHT: Status: RESOLVED | Noted: 2023-12-07 | Resolved: 2024-09-06

## 2024-09-06 PROCEDURE — 99213 OFFICE O/P EST LOW 20 MIN: CPT | Performed by: FAMILY MEDICINE

## 2024-09-06 PROCEDURE — G2211 COMPLEX E/M VISIT ADD ON: HCPCS | Performed by: FAMILY MEDICINE

## 2024-09-06 RX ORDER — CLOBETASOL PROPIONATE 0.5 MG/G
CREAM TOPICAL 2 TIMES DAILY
Qty: 60 G | Refills: 2 | Status: SHIPPED | OUTPATIENT
Start: 2024-09-06

## 2024-09-06 RX ORDER — CLOBETASOL PROPIONATE 0.5 MG/G
CREAM TOPICAL 2 TIMES DAILY
Qty: 60 G | Refills: 3 | Status: SHIPPED | OUTPATIENT
Start: 2024-09-06

## 2024-09-06 NOTE — PROGRESS NOTES
"  Assessment & Plan     Ulnar neuropathy at elbow, right  Nerve pattern is consistent with ulnar neuropathy. Referral to hand specialist.  - Orthopedic  Referral; Future    Psoriasis, guttate  Refill clobetasol 0.05% cream.  - clobetasol propionate (TEMOVATE) 0.05 % external cream; Apply topically 2 times daily. Apply to rash on torso      The longitudinal plan of care for the diagnosis(es)/condition(s) as documented were addressed during this visit. Due to the added complexity in care, I will continue to support Ciarra in the subsequent management and with ongoing continuity of care.          Subjective   Ciarra is a 74 year old, presenting for the following health issues:  Tia (Transient Ischemic Attack) (Assessment for TIA, hx of this and has new inset weakness in right hand. )        9/6/2024     3:26 PM   Additional Questions   Roomed by Rhea LÓPEZ MA     History of Present Illness       Reason for visit:  Assessment for TIA     Right hand weakness: Has been present for about 4-6 weeks, sometimes after her pelvic mass surgery. She is having numbness in 4th and 5th right fingers with some hand weakness.  Patient had her first TIA in 2015. Her mother had multiple strokes/TIA's.    Psoriasis: She has been prescribed clobetasol 0.05% cream for rashes on torso, prescribed by dermatologist, she is requesting refill.              Review of Systems  No fever.      Objective    /60 (BP Location: Left arm, Patient Position: Standing, Cuff Size: Adult Regular)   Pulse 63   Temp 97.1  F (36.2  C) (Temporal)   Ht 1.575 m (5' 2\")   Wt 65.7 kg (144 lb 12.8 oz)   LMP  (LMP Unknown)   SpO2 100%   BMI 26.48 kg/m    Body mass index is 26.48 kg/m .  Physical Exam   GENERAL: alert and no distress  MS: Right 4th and 5th fingers with decreased sensation and 4/5 strength. Tenderness with palpation over right ulnar nerve around the elbow.  PSYCH: mentation appears normal, affect normal/bright            Signed " Electronically by: Zaheer Sandra MD

## 2024-09-06 NOTE — TELEPHONE ENCOUNTER
Medication Question or Refill    What medication are you calling about (include dose and sig)?:     clobetasol propionate (TEMOVATE) 0.05 % external cream   Apply topically 2 times daily. Apply to rash on torso     Preferred Pharmacy:   [id number] - [place], [place] - [address] [address]  Phone: [phone] Fax: [phone]    Who prescribed the medication?: Dr. Sandra    Do you need a refill? No    Do you have any questions or concerns?  Yes: Pharmacy is requesting to know how much medication patient should apply to rash 2 times daily?    Could we send this information to you in [facility] or would you prefer to receive a phone call?:   Patient would prefer a phone call   Okay to leave a detailed message?: Yes at Other phone number:  [phone]     Pricilla Dillon RN

## 2024-09-13 ENCOUNTER — OFFICE VISIT (OUTPATIENT)
Dept: CARDIOLOGY | Facility: CLINIC | Age: 74
End: 2024-09-13
Payer: COMMERCIAL

## 2024-09-13 VITALS
WEIGHT: 148 LBS | HEART RATE: 57 BPM | SYSTOLIC BLOOD PRESSURE: 130 MMHG | OXYGEN SATURATION: 99 % | DIASTOLIC BLOOD PRESSURE: 63 MMHG | BODY MASS INDEX: 27.07 KG/M2

## 2024-09-13 DIAGNOSIS — I25.10 CORONARY ARTERY DISEASE DUE TO LIPID RICH PLAQUE: ICD-10-CM

## 2024-09-13 DIAGNOSIS — Z79.01 CHRONIC ANTICOAGULATION: ICD-10-CM

## 2024-09-13 DIAGNOSIS — I48.20 CHRONIC A-FIB (H): Primary | ICD-10-CM

## 2024-09-13 DIAGNOSIS — I25.83 CORONARY ARTERY DISEASE DUE TO LIPID RICH PLAQUE: ICD-10-CM

## 2024-09-13 DIAGNOSIS — E78.00 HYPERCHOLESTEROLEMIA: ICD-10-CM

## 2024-09-13 PROCEDURE — 99214 OFFICE O/P EST MOD 30 MIN: CPT | Performed by: STUDENT IN AN ORGANIZED HEALTH CARE EDUCATION/TRAINING PROGRAM

## 2024-09-13 PROCEDURE — G2211 COMPLEX E/M VISIT ADD ON: HCPCS | Performed by: STUDENT IN AN ORGANIZED HEALTH CARE EDUCATION/TRAINING PROGRAM

## 2024-09-13 NOTE — PROGRESS NOTES
[facility] HEART CARE   [address]  [place] [address]   [email or web address]   OFFICE: [phone]     CARDIOLOGY CLINIC NOTE     Thank you, Zaheer Walton, for asking the [facility] Heart Care team to see Ms. Kemi Soto to evaluate Follow Up          History of Present Illness   Ms. Kemi Soto is a 74 year old female with a significant past history of  CAD s/p LIMA to the LAD and vein graft to the obtuse marginal branch (occluded) with known occlusion of the Lcx and RCA, afib/flutter, HLD, HTN, who presents for routine follow up.  A few months ago she underwent salpingo-oophorectomy at [facility] for what turned out to be a benign tumor.  She has been doing well overall and feels essentially back to her baseline.  She is interested in getting off of Eliquis due to her bleeding risk and also the prohibitive cost.  She has a history of falls and NSAID use for back pain.          Medications  Allergies   Current Outpatient Medications   Medication Sig Dispense Refill    acetaminophen (TYLENOL) 500 MG tablet Take 1,000 mg by mouth every 12 hours as needed for mild pain      apixaban ANTICOAGULANT (ELIQUIS ANTICOAGULANT) 5 MG tablet Take 1 tablet (5 mg) by mouth 2 times daily 60 tablet 11    atorvastatin (LIPITOR) 80 MG tablet Take 1 tablet (80 mg) by mouth at bedtime 90 tablet 3    blood glucose (NO BRAND SPECIFIED) test strip Use to test blood sugar 1 times daily. To accompany: Blood Glucose Monitor Brands: per insurance. 100 strip 6    blood glucose monitoring (NO BRAND SPECIFIED) meter device kit Use to test blood sugar 1 times daily. Preferred blood glucose meter OR supplies to accompany: Blood Glucose Monitor Brands: per insurance. 1 kit 0    busPIRone (BUSPAR) 15 MG tablet Take 1 tablet by mouth once daily 90 tablet 0    clobetasol propionate (TEMOVATE) 0.05 % external cream Apply topically 2 times daily. Apply to rash on torso 60 g 2    clobetasol  propionate (TEMOVATE) 0.05 % external cream Apply topically 2 times daily. Apply pea size amount to rashes on torso 60 g 3    docusate sodium (COLACE) 100 MG capsule Take 1 capsule (100 mg) by mouth 2 times daily 30 capsule 0    ezetimibe (ZETIA) 10 MG tablet Take 1 tablet (10 mg) by mouth daily 90 tablet 3    gabapentin (NEURONTIN) 300 MG capsule TAKE 3 CAPSULES BY MOUTH EVERY DAY AT BEDTIME (Patient taking differently: Take 900 mg by mouth at bedtime. TAKE 3 CAPSULES BY MOUTH EVERY DAY AT BEDTIME) 270 capsule 3    hydroCHLOROthiazide 12.5 MG tablet Take 1 tablet (12.5 mg) by mouth daily -- appt needed for further refills. 90 tablet 3    isosorbide mononitrate (IMDUR) 60 MG 24 hr tablet Take 1 tablet (60 mg) by mouth daily 90 tablet 3    ketoconazole (NIZORAL) 2 % external cream APPLY 1 APPLICATION TOPICALLY TWICE DAILY TO AFFECTED AREAS FOR 4 WEEKS THEN AS NEEDED      LANTUS SOLOSTAR 100 UNIT/ML soln Inject 28 Units subcutaneously every morning 45 mL 3    losartan (COZAAR) 100 MG tablet Take 1 tablet (100 mg) by mouth daily 90 tablet 3    naproxen (NAPROSYN) 500 MG tablet Take 500 mg by mouth 2 times daily (with meals)      sertraline (ZOLOFT) 100 MG tablet Take 1 tablet by mouth once daily 90 tablet 0    sotalol (BETAPACE) 80 MG tablet Take 0.5 tablets (40 mg) by mouth 2 times daily 90 tablet 3    thin (NO BRAND SPECIFIED) lancets Use with lanceting device. To accompany: Blood Glucose Monitor Brands: per insurance. 100 each 6    triamcinolone (KENALOG) 0.1 % external cream Apply topically 2 times daily        Allergies   Allergen Reactions    Metformin GI Disturbance    Oxycodone Nausea and Vomiting        Physical Examination Review of Systems   Vitals: LMP  (LMP Unknown)   BMI= There is no height or weight on file to calculate BMI.  Wt Readings from Last 3 Encounters:   09/06/24 65.7 kg (144 lb 12.8 oz)   07/15/24 69.4 kg (153 lb)   07/09/24 68 kg (150 lb)       General: pleasant female. No acute distress.    Neck: No JVD  Lungs: clear to auscultation  COR:  regular rate and rhythm, No murmurs, rubs, or gallops  Extrem: No edema        Please refer above for cardiac ROS details.       Past History     Family History:   Family History   Problem Relation Age of Onset    Cerebrovascular Disease Mother     Diabetes Father     Coronary Artery Disease Father     Diabetes Sister     Cerebrovascular Disease Sister 81    Cerebrovascular Disease Brother     Diabetes Brother     Cancer Paternal Grandfather     Anesthesia Reaction No family hx of     Thrombosis No family hx of         Social History:   Social History     Socioeconomic History    Marital status:      Spouse name: Not on file    Number of children: 1    Years of education: Not on file    Highest education level: Not on file   Occupational History    Occupation: retired   Tobacco Use    Smoking status: Former     Current packs/day: 0.00     Types: Cigarettes     Quit date: 2007     Years since quittin.2     Passive exposure: Past    Smokeless tobacco: Never   Vaping Use    Vaping status: Never Used   Substance and Sexual Activity    Alcohol use: Not Currently     Comment: Stopped drinking 2015    Drug use: No    Sexual activity: Never   Other Topics Concern    Not on file   Social History Narrative    Lives alone with cats and dogs. , one daughter, Tory Lofton.      Social Determinants of Health     Financial Resource Strain: Low Risk  (2024)    Financial Resource Strain     Within the past 12 months, have you or your family members you live with been unable to get utilities (heat, electricity) when it was really needed?: No   Food Insecurity: No Food Insecurity (2024)    Received from [facility]    Hunger Vital Sign     Worried About Running Out of Food in the Last Year: Never true     Ran Out of Food in the Last Year: Never true   Transportation Needs: No Transportation Needs (2024)    Received from [facility]    PRAPARE -  Transportation     Lack of Transportation (Medical): No     Lack of Transportation (Non-Medical): No   Physical Activity: Inactive (5/8/2024)    Received from [facility]    Exercise Vital Sign     Days of Exercise per Week: 0 days     Minutes of Exercise per Session: 0 min   Stress: Not on file   Social Connections: Not on file   Interpersonal Safety: Not At Risk (7/18/2024)    Received from [facility]    Humiliation, Afraid, Rape, and Kick questionnaire     Fear of Current or Ex-Partner: No     Emotionally Abused: No     Physically Abused: No     Sexually Abused: No   Housing Stability: Low Risk  (7/18/2024)    Received from [facility]    Housing Stability     What is your living situation today?: I have a steady place to live            Lab Results    Chemistry/lipid CBC Cardiac Enzymes/BNP/TSH/INR   Lab Results   Component Value Date    CHOL 119 07/15/2024    HDL 37 (L) 07/15/2024    TRIG 163 (H) 07/15/2024    BUN 11.1 07/15/2024     07/15/2024    CO2 30 (H) 07/15/2024    Lab Results   Component Value Date    WBC 10.4 07/15/2024    HGB 11.7 07/15/2024    HCT 35.5 07/15/2024    MCV 93 07/15/2024     07/15/2024    Lab Results   Component Value Date    CKMB 1 08/14/2018    TROPONINI 0.18 11/06/2020    TSH 0.77 05/05/2021    INR 1.7 (H) 01/12/2024          Cardiac Problems and Cardiac Diagnostics     Most Recent Cardiac testing:  ECG Personal interpretation  10/20/2023  NSR  Septal infarct  Nonspecific STT     ECHO 10/3/2023  Interpretation Summary     Left ventricular size, wall motion and function are normal. The ejection  fraction is 60-65%.  Normal right ventricle size and systolic function.  No hemodynamically significant valvular abnormalities on 2D or color flow  imaging.     Stress test 9/29/2023:     1.The nuclear stress test is abnormal.    2.Positive pharmacological regadenoson ECG for ischemia with 2-1/2 to 3 mm of ST segment depression in leads II, III, aVF, and V4 through V6.    3.There  is a small area of mild ischemia in the distal anterolateral segment(s) of the left ventricle.  No scar seen.    4.The left ventricular ejection fraction at stress is 63%.  Postthoracotomy septal hypokinesis noted.    5.Stress to rest cavity ratio is 1.34.    6.The patient is at an intermediate risk of future cardiac ischemic events.    A prior study was conducted on 11/6/2020.  The small area of distal lateral ischemia is new.     Cardiac cath 10/11/2023:  Findings:  LM:no obstruction  LAD: moderate diffuse Ca2+ disease throughout mid-vessel, occluded mid-vessel, fills distally via patent L-LAD  Lcx:proximal mild ISR, mid-vessel occluded stents  RCA:dominant, diffusely stented, occluded mid-vessel     Grafts:  L-LAD: patent, 40-50% narrowing downstream unchanged from prior angio  V's: known occluded       Assessment/Recommendations   Assessment:    Ms. Kemi Soto is a 74 year old female with a significant past history of  CAD s/p LIMA to the LAD and vein graft to the obtuse marginal branch (occluded) with known occlusion of the Lcx and RCA, afib/flutter, HLD, HTN, who presents for routine follow up.     CAD   - Patent LIMA-LAD with 40 to 50% narrowing downstream unchanged from previous    - Cont imdur   - LDL 36.  Continue atorvastatin 80mg and zetia   - Cont eliquis     Afib/flutter   - Controlled with sotalol   - CHADS-Vasc 8.  Continue Eliquis 5mg BID.  She is interested in the Watchman.  We discussed risks and benefits. She would benefit from bleeding risk reduction.       HTN   - Well controlled   - Continue current management     RTC 6 months    (Patient) Has documented nonvalvular atrial fibrillation (NVAF) and is currently on oral anticoagulant therapy Eliquis   [id number] -VASc = 8 (sex, age, HTN, CVA, CAD,  DM,)   HAS-BLED risk score: 3 (falls, age, NSAID use)  Indication(s) to consider non-oral anticoagulation therapy: Increased bleeding risk  Pt has no contra-indication to come off oral anti-coagulant  therapy if LAAC device is successfully placed.     I have personally reviewed the patients chart and discussed the following with the patient/family; 1) The choices available for reducing stroke risk from atrial fibrillation, 2) Treatment options available including respective risk/benefits, and 3) What factors are most important for the patient in making their decision.  The ACC shared decision making tool https://www.cardiosmart.org/SDM/Decision-Aids/Find-Decision-Aids/Atrial-Fibrillation  was used to guide this conversation.   The patient was counseled that their decision could be made at this time or in the future if more time was needed to consider their decision.       The patient is an appropriate candidate to proceed with left atrial appendage screening and implant.        The longitudinal plan of care for the diagnosis(es)/condition(s) as documented were addressed during this visit. Due to the added complexity in care, I will continue to support Ciarra in the subsequent management and with ongoing continuity of care.         Seven Huang DO [facility]  Non-invasive Cardiologist  [facility]

## 2024-09-13 NOTE — LETTER
9/13/2024    Zaheer Sandra MD  [address]  [facility] [address]    RE: Kemi Soto       Dear Colleague,     I had the pleasure of seeing Kemi Soto in the [facility].    [facility] HEART CARE   [address]  [place] [address]   [email or web address]   OFFICE: [phone]     CARDIOLOGY CLINIC NOTE     Thank you, Zaheer Walton, for asking the [facility] Heart Care team to see Ms. Kemi Soto to evaluate Follow Up          History of Present Illness   Ms. Kemi Soto is a 74 year old female with a significant past history of  CAD s/p LIMA to the LAD and vein graft to the obtuse marginal branch (occluded) with known occlusion of the Lcx and RCA, afib/flutter, HLD, HTN, who presents for routine follow up.  A few months ago she underwent salpingo-oophorectomy at [place] for what turned out to be a benign tumor.  She has been doing well overall and feels essentially back to her baseline.  She is interested in getting off of Eliquis due to her bleeding risk and also the prohibitive cost.  She has a history of falls and NSAID use for back pain.          Medications  Allergies   Current Outpatient Medications   Medication Sig Dispense Refill     acetaminophen (TYLENOL) 500 MG tablet Take 1,000 mg by mouth every 12 hours as needed for mild pain       apixaban ANTICOAGULANT (ELIQUIS ANTICOAGULANT) 5 MG tablet Take 1 tablet (5 mg) by mouth 2 times daily 60 tablet 11     atorvastatin (LIPITOR) 80 MG tablet Take 1 tablet (80 mg) by mouth at bedtime 90 tablet 3     blood glucose (NO BRAND SPECIFIED) test strip Use to test blood sugar 1 times daily. To accompany: Blood Glucose Monitor Brands: per insurance. 100 strip 6     blood glucose monitoring (NO BRAND SPECIFIED) meter device kit Use to test blood sugar 1 times daily. Preferred blood glucose meter OR supplies to accompany: Blood Glucose Monitor Brands: per insurance. 1 kit 0     busPIRone  (BUSPAR) 15 MG tablet Take 1 tablet by mouth once daily 90 tablet 0     clobetasol propionate (TEMOVATE) 0.05 % external cream Apply topically 2 times daily. Apply to rash on torso 60 g 2     clobetasol propionate (TEMOVATE) 0.05 % external cream Apply topically 2 times daily. Apply pea size amount to rashes on torso 60 g 3     docusate sodium (COLACE) 100 MG capsule Take 1 capsule (100 mg) by mouth 2 times daily 30 capsule 0     ezetimibe (ZETIA) 10 MG tablet Take 1 tablet (10 mg) by mouth daily 90 tablet 3     gabapentin (NEURONTIN) 300 MG capsule TAKE 3 CAPSULES BY MOUTH EVERY DAY AT BEDTIME (Patient taking differently: Take 900 mg by mouth at bedtime. TAKE 3 CAPSULES BY MOUTH EVERY DAY AT BEDTIME) 270 capsule 3     hydroCHLOROthiazide 12.5 MG tablet Take 1 tablet (12.5 mg) by mouth daily -- appt needed for further refills. 90 tablet 3     isosorbide mononitrate (IMDUR) 60 MG 24 hr tablet Take 1 tablet (60 mg) by mouth daily 90 tablet 3     ketoconazole (NIZORAL) 2 % external cream APPLY 1 APPLICATION TOPICALLY TWICE DAILY TO AFFECTED AREAS FOR 4 WEEKS THEN AS NEEDED       LANTUS SOLOSTAR 100 UNIT/ML soln Inject 28 Units subcutaneously every morning 45 mL 3     losartan (COZAAR) 100 MG tablet Take 1 tablet (100 mg) by mouth daily 90 tablet 3     naproxen (NAPROSYN) 500 MG tablet Take 500 mg by mouth 2 times daily (with meals)       sertraline (ZOLOFT) 100 MG tablet Take 1 tablet by mouth once daily 90 tablet 0     sotalol (BETAPACE) 80 MG tablet Take 0.5 tablets (40 mg) by mouth 2 times daily 90 tablet 3     thin (NO BRAND SPECIFIED) lancets Use with lanceting device. To accompany: Blood Glucose Monitor Brands: per insurance. 100 each 6     triamcinolone (KENALOG) 0.1 % external cream Apply topically 2 times daily        Allergies   Allergen Reactions     Metformin GI Disturbance     Oxycodone Nausea and Vomiting        Physical Examination Review of Systems   Vitals: LMP  (LMP Unknown)   BMI= There is no height  or weight on file to calculate BMI.  Wt Readings from Last 3 Encounters:   24 65.7 kg (144 lb 12.8 oz)   07/15/24 69.4 kg (153 lb)   24 68 kg (150 lb)       General: pleasant female. No acute distress.   Neck: No JVD  Lungs: clear to auscultation  COR:  regular rate and rhythm, No murmurs, rubs, or gallops  Extrem: No edema        Please refer above for cardiac ROS details.       Past History     Family History:   Family History   Problem Relation Age of Onset     Cerebrovascular Disease Mother      Diabetes Father      Coronary Artery Disease Father      Diabetes Sister      Cerebrovascular Disease Sister 81     Cerebrovascular Disease Brother      Diabetes Brother      Cancer Paternal Grandfather      Anesthesia Reaction No family hx of      Thrombosis No family hx of         Social History:   Social History     Socioeconomic History     Marital status:      Spouse name: Not on file     Number of children: 1     Years of education: Not on file     Highest education level: Not on file   Occupational History     Occupation: retired   Tobacco Use     Smoking status: Former     Current packs/day: 0.00     Types: Cigarettes     Quit date: 2007     Years since quittin.2     Passive exposure: Past     Smokeless tobacco: Never   Vaping Use     Vaping status: Never Used   Substance and Sexual Activity     Alcohol use: Not Currently     Comment: Stopped drinking 2015     Drug use: No     Sexual activity: Never   Other Topics Concern     Not on file   Social History Narrative    Lives alone with cats and dogs. , one daughter, Tory Lofton.      Social Determinants of Health     Financial Resource Strain: Low Risk  (2024)    Financial Resource Strain      Within the past 12 months, have you or your family members you live with been unable to get utilities (heat, electricity) when it was really needed?: No   Food Insecurity: No Food Insecurity (2024)    Received from [facility]     Hunger Vital Sign      Worried About Running Out of Food in the Last Year: Never true      Ran Out of Food in the Last Year: Never true   Transportation Needs: No Transportation Needs (7/18/2024)    Received from [facility]    PRAPARE - Transportation      Lack of Transportation (Medical): No      Lack of Transportation (Non-Medical): No   Physical Activity: Inactive (5/8/2024)    Received from [facility]    Exercise Vital Sign      Days of Exercise per Week: 0 days      Minutes of Exercise per Session: 0 min   Stress: Not on file   Social Connections: Not on file   Interpersonal Safety: Not At Risk (7/18/2024)    Received from [facility]    Humiliation, Afraid, Rape, and Kick questionnaire      Fear of Current or Ex-Partner: No      Emotionally Abused: No      Physically Abused: No      Sexually Abused: No   Housing Stability: Low Risk  (7/18/2024)    Received from [facility]    Housing Stability      What is your living situation today?: I have a steady place to live            Lab Results    Chemistry/lipid CBC Cardiac Enzymes/BNP/TSH/INR   Lab Results   Component Value Date    CHOL 119 07/15/2024    HDL 37 (L) 07/15/2024    TRIG 163 (H) 07/15/2024    BUN 11.1 07/15/2024     07/15/2024    CO2 30 (H) 07/15/2024    Lab Results   Component Value Date    WBC 10.4 07/15/2024    HGB 11.7 07/15/2024    HCT 35.5 07/15/2024    MCV 93 07/15/2024     07/15/2024    Lab Results   Component Value Date    CKMB 1 08/14/2018    TROPONINI 0.18 11/06/2020    TSH 0.77 05/05/2021    INR 1.7 (H) 01/12/2024          Cardiac Problems and Cardiac Diagnostics     Most Recent Cardiac testing:  ECG Personal interpretation  10/20/2023  NSR  Septal infarct  Nonspecific STT     ECHO 10/3/2023  Interpretation Summary     Left ventricular size, wall motion and function are normal. The ejection  fraction is 60-65%.  Normal right ventricle size and systolic function.  No hemodynamically significant valvular abnormalities on 2D  or color flow  imaging.     Stress test 9/29/2023:      1.The nuclear stress test is abnormal.     2.Positive pharmacological regadenoson ECG for ischemia with 2-1/2 to 3 mm of ST segment depression in leads II, III, aVF, and V4 through V6.     3.There is a small area of mild ischemia in the distal anterolateral segment(s) of the left ventricle.  No scar seen.     4.The left ventricular ejection fraction at stress is 63%.  Postthoracotomy septal hypokinesis noted.     5.Stress to rest cavity ratio is 1.34.     6.The patient is at an intermediate risk of future cardiac ischemic events.     A prior study was conducted on 11/6/2020.  The small area of distal lateral ischemia is new.     Cardiac cath 10/11/2023:  Findings:  LM:no obstruction  LAD: moderate diffuse Ca2+ disease throughout mid-vessel, occluded mid-vessel, fills distally via patent L-LAD  Lcx:proximal mild ISR, mid-vessel occluded stents  RCA:dominant, diffusely stented, occluded mid-vessel     Grafts:  L-LAD: patent, 40-50% narrowing downstream unchanged from prior angio  V's: known occluded       Assessment/Recommendations   Assessment:    Ms. Kemi Soto is a 74 year old female with a significant past history of  CAD s/p LIMA to the LAD and vein graft to the obtuse marginal branch (occluded) with known occlusion of the Lcx and RCA, afib/flutter, HLD, HTN, who presents for routine follow up.     CAD   - Patent LIMA-LAD with 40 to 50% narrowing downstream unchanged from previous    - Cont imdur   - LDL 36.  Continue atorvastatin 80mg and zetia   - Cont eliquis     Afib/flutter   - Controlled with sotalol   - CHADS-Vasc 8.  Continue Eliquis 5mg BID.  She is interested in the Watchman.  We discussed risks and benefits. She would benefit from bleeding risk reduction.       HTN   - Well controlled   - Continue current management     RTC 6 months    (Patient) Has documented nonvalvular atrial fibrillation (NVAF) and is currently on oral anticoagulant  therapy Eliquis   [id number] -VASc = 8 (sex, age, HTN, CVA, CAD,  DM,)   HAS-BLED risk score: 3 (falls, age, NSAID use)  Indication(s) to consider non-oral anticoagulation therapy: Increased bleeding risk  Pt has no contra-indication to come off oral anti-coagulant therapy if LAAC device is successfully placed.     I have personally reviewed the patients chart and discussed the following with the patient/family; 1) The choices available for reducing stroke risk from atrial fibrillation, 2) Treatment options available including respective risk/benefits, and 3) What factors are most important for the patient in making their decision.  The ACC shared decision making tool https://www.cardiosmart.org/SDM/Decision-Aids/Find-Decision-Aids/Atrial-Fibrillation  was used to guide this conversation.   The patient was counseled that their decision could be made at this time or in the future if more time was needed to consider their decision.       The patient is an appropriate candidate to proceed with left atrial appendage screening and implant.        The longitudinal plan of care for the diagnosis(es)/condition(s) as documented were addressed during this visit. Due to the added complexity in care, I will continue to support Ciarra in the subsequent management and with ongoing continuity of care.         Seven Huang DO [facility]  Non-invasive Cardiologist  [facility]         Thank you for allowing me to participate in the care of your patient.      Sincerely,     Seven Huang DO     [facility]  cc:   Seven Huang DO  [address]  [place] [address]

## 2024-09-17 ENCOUNTER — OFFICE VISIT (OUTPATIENT)
Dept: CARDIOLOGY | Facility: CLINIC | Age: 74
End: 2024-09-17
Payer: COMMERCIAL

## 2024-09-17 VITALS
WEIGHT: 149 LBS | BODY MASS INDEX: 27.42 KG/M2 | HEART RATE: 59 BPM | DIASTOLIC BLOOD PRESSURE: 56 MMHG | RESPIRATION RATE: 20 BRPM | SYSTOLIC BLOOD PRESSURE: 134 MMHG | HEIGHT: 62 IN

## 2024-09-17 DIAGNOSIS — I10 ESSENTIAL HYPERTENSION: ICD-10-CM

## 2024-09-17 DIAGNOSIS — I48.0 PAROXYSMAL ATRIAL FIBRILLATION (H): Primary | ICD-10-CM

## 2024-09-17 DIAGNOSIS — I25.10 CORONARY ARTERY DISEASE DUE TO LIPID RICH PLAQUE: ICD-10-CM

## 2024-09-17 DIAGNOSIS — I25.83 CORONARY ARTERY DISEASE DUE TO LIPID RICH PLAQUE: ICD-10-CM

## 2024-09-17 PROCEDURE — 99215 OFFICE O/P EST HI 40 MIN: CPT | Performed by: PHYSICIAN ASSISTANT

## 2024-09-17 NOTE — PATIENT INSTRUCTIONS
Kemi Soto,    It was a pleasure to see you today in the clinic regarding your Watchman Consult.     My recommendations after this visit include:     - no medication changes today   - schedule the procedure if interested in moving forward. Our team will contact with you to help arrange     If you have questions or concerns, please call using the numbers below:    After Hours/Scheduling  [phone]    Otherwise you can dial the nurse directly at:                EAMON Cho RN  [phone]    Chichi Shepherd PA-C  Structural Heart Program  [facility]

## 2024-09-17 NOTE — PROGRESS NOTES
HEART CARE ENCOUNTER NOTE       [facility]  [phone]      Assessment/Recommendations   1.  Paroxysmal atrial fibrillation/atrial flutter: I have personally reviewed this patient's chart and have spoken with the patient about the treatment options, including SHAVON device.  She has a [id number] score of at least 7 for age ? 74, female, HTN, TIA, CAD.  She has a HAS-BLED score of 3 for age, bleeding predisposition, medication use.   She is not a good candidate for long-term anticoagulation due to prolonged bleeding if she accidentally cuts herself, risk for falls, NSAID use, prohibitive cost of Eliquis.    Once scheduled, the patient will stay on Eliquis up until implant. 2 weeks prior to the procedure She will also start aspirin 81 mg daily. After implant, the patient will remain on aspirin 81 mg daily indefinitely and Eliquis for 6 weeks.  The patient will then stop Eliquis and start Plavix 75 mg daily for approximately 4 months. She will then stop Plavix and remain on aspirin 81 mg daily indefinitely.  She will have a ANNE or CTA approximately 3 months and 1 year post-implant.  She understands that the risks of the procedure are <2% and include, but are not limited to device embolization, air embolism, myocardial perforation, device thrombosis, ASD, stroke, or death.  We discussed expected recovery and follow-up.       The patient is a good candidate for proceeding with left atrial appendage  implant.  Her questions were answered to her satisfaction.     2.  Coronary artery disease -status post CABG/multiple stents -angiogram 2023 showed patent LIMA-LAD, VG's known to be occluded, mid vessel occluded stents in the Lcx. Currently denies angina.  Continue Imdur 60 mg daily    3.  Hyperlipidemia -LDL49.  Continue Lipitor 80 mg daily, Zetia 10 mg daily    4.  Hypertension -blood pressure is controlled.  Continue losartan 100 mg daily, hydrochlorothiazide 12.5 mg daily       History of Present  "Illness/Subjective    Kemi Soto is a 74 year old female who comes in today for Watchman Consult    Kemi Soto has a past history of paroxysmal atrial fibrillation/atrial flutter, hypertension, coronary artery disease status post CABG x 4, hyperlipidemia, type 2 diabetes mellitus, history of TIA, peripheral neuropathy    She has had a diagnosis of atrial fibrillation since at least 2015.  She is currently anticoagulated with Eliquis for stroke prophylaxis.  She reports Eliquis is quite expensive for her.  She denies bruising issues.  She does notice that if she accidentally cuts or scratches herself she will have prolonged bleeding.  She does use a cane to assist with ambulation.  She reports having several falls in the past.  She does report chronic back issues which decreases her mobility.  She does take naproxen on a regular basis for her back pain.  She denies painful or difficulty swallowing.  She reports a previous history of TIA back in June 2015 where she had some right sided numbness at the time.  She denies any residual symptoms or deficits from this.  She denies a previous history of DVT or PE.  She denies any immediate family history of prolonged bleeding or frequent nosebleeds. She denies chest discomfort, shortness of breath, dizziness, lightheadedness    Medical, surgical, family, social history, and medications were reviewed and updated as necessary.    ECHO results (from 10/3/2023):  Interpretation Summary     Left ventricular size, wall motion and function are normal. The ejection  fraction is 60-65%.  Normal right ventricle size and systolic function.  No hemodynamically significant valvular abnormalities on 2D or color flow  imaging.       Physical Examination Review of Systems   Vitals: /56 (BP Location: Right arm, Patient Position: Sitting, Cuff Size: Adult Regular)   Pulse 59   Resp 20   Ht 1.575 m (5' 2\")   Wt 67.6 kg (149 lb)   LMP  (LMP Unknown)   BMI 27.25 kg/m  "   BMI= Body mass index is 27.25 kg/m .  Wt Readings from Last 3 Encounters:   09/17/24 67.6 kg (149 lb)   09/13/24 67.1 kg (148 lb)   09/06/24 65.7 kg (144 lb 12.8 oz)       General Appearance:   Alert, cooperative and in no acute distress   ENT/Mouth: membranes moist, no oral lesions or bleeding gums.      EYES:  no scleral icterus, normal conjunctivae   Neck: Thyroid not visualized   Chest/Lungs:   lungs are clear to auscultation, no rales or wheezing   Cardiovascular:   Regular . Normal first and second heart sounds with no murmurs, rubs or gallops; the carotid, radial and posterior tibial pulses are intact, no edema bilaterally    Abdomen:  Soft and nontender. Bowel sounds are present in all quadrants   Extremities: no cyanosis or clubbing   Skin: no xanthelasma, warm.    Neurologic: normal gait, normal  bilateral, no tremors   Psychiatric: Normal mood and affect       Please refer above for cardiac ROS details.      Medical History  Surgical History Family History Social History   Past Medical History:   Diagnosis Date    Adrenal nodule (H24)     Alcohol dependence in remission (H)     Anemia     Antiplatelet or antithrombotic long-term use     Anxiety and depression     Arrhythmia     Benign essential hypertension     Chronic atrial fibrillation (H)     Chronic back pain     Chronic infection     Coronary artery disease     Diabetes mellitus (H)     Difficulty walking     Discitis of thoracolumbar region 10/10/2015    Encephalopathy 10/14/2015    Epidural abscess 10/10/2015    Hip pain, right     History of angina     Hyperlipidemia     Irregular heart beat     Other chronic pain     Ovarian mass     Sepsis (H) 10/08/2015    Stented coronary artery     Stroke (H) 06/23/2015    Neurology felt that episode was C/W subacute ischemic stroke    TIA (transient ischemic attack) 06/23/2015    UTI (urinary tract infection)     admitted to [facility] with UTI    Walking troubles      Past Surgical History:    Procedure Laterality Date    ANGIOPLASTY  03/30/2016    Drug eluting stent mid LAD, cutting balloon to 1st diagonal    ANGIOPLASTY  01/01/2008    BYPASS GRAFT ARTERY CORONARY  12/11/2007    X 3 vessels, LIMA to LAD, saph vein graft to distal RCA, saphvein graft to marginal, mini circuit bypass.  [facility].    CORONARY STENT PLACEMENT  01/01/2008    Left main, left circumflex, RCA    CORONARY STENT PLACEMENT  03/01/2016    CV ANGIOGRAM CORONARY GRAFT N/A 10/11/2023    Procedure: Coronary Angiogram Graft;  Surgeon: Sam Boo MD;  Location: [facility] CATH LAB CV    CV CORONARY ANGIOGRAM N/A 08/01/2018    Procedure: Coronary Angiogram;  Surgeon: Kit Bean MD;  Location: [facility] Cath Lab;  Service:     CV LEFT HEART CATHETERIZATION WITH LEFT VENTRICULOGRAM N/A 08/01/2018    Procedure: Left Heart Catheterization with Left Ventriculogram;  Surgeon: Kit Bean MD;  Location: [facility] Cath Lab;  Service:     EXCISE MASS TRUNK N/A 7/1/2024    Procedure: EXCISION ABDOMINAL WALL MASS AND PLACEMENT OF DRAIN;  Surgeon: Braden Bah DO;  Location: [facility] OR    INSERT MIDLINE HE  10/31/2015         LUMBAR DISCECTOMY N/A 10/11/2015    BILATERAL L1-L5 DECOMPRESSIVE LAMINECTOMY WITH EVACUATION OF EPIDURAL ABSCESS IRRIGATION & DEBRIDEMENT;  Surgeon: Luli Chan MD;  Location: [facility] OR; Due to sepsis    RELEASE CARPAL TUNNEL Bilateral     UMBILICAL HERNIA REPAIR       Family History   Problem Relation Age of Onset    Cerebrovascular Disease Mother     Diabetes Father     Coronary Artery Disease Father     Diabetes Sister     Cerebrovascular Disease Sister 81    Cerebrovascular Disease Brother     Diabetes Brother     Cancer Paternal Grandfather     Anesthesia Reaction No family hx of     Thrombosis No family hx of     Social History     Socioeconomic History    Marital status:      Spouse name: Not on file    Number of children: 1    Years of education: Not  on file    Highest education level: Not on file   Occupational History    Occupation: retired   Tobacco Use    Smoking status: Former     Current packs/day: 0.00     Types: Cigarettes     Quit date: 2007     Years since quittin.2     Passive exposure: Past    Smokeless tobacco: Never   Vaping Use    Vaping status: Never Used   Substance and Sexual Activity    Alcohol use: Not Currently     Comment: Stopped drinking 2015    Drug use: No    Sexual activity: Never   Other Topics Concern    Not on file   Social History Narrative    Lives alone with cats and dogs. , one daughter, Tory Lofton.      Social Determinants of Health     Financial Resource Strain: Low Risk  (2024)    Financial Resource Strain     Within the past 12 months, have you or your family members you live with been unable to get utilities (heat, electricity) when it was really needed?: No   Food Insecurity: No Food Insecurity (2024)    Received from [facility]    Hunger Vital Sign     Worried About Running Out of Food in the Last Year: Never true     Ran Out of Food in the Last Year: Never true   Transportation Needs: No Transportation Needs (2024)    Received from [facility]    PRAPARE - Transportation     Lack of Transportation (Medical): No     Lack of Transportation (Non-Medical): No   Physical Activity: Inactive (2024)    Received from [facility]    Exercise Vital Sign     Days of Exercise per Week: 0 days     Minutes of Exercise per Session: 0 min   Stress: Not on file   Social Connections: Not on file   Interpersonal Safety: Not At Risk (2024)    Received from [facility]    Humiliation, Afraid, Rape, and Kick questionnaire     Fear of Current or Ex-Partner: No     Emotionally Abused: No     Physically Abused: No     Sexually Abused: No   Housing Stability: Low Risk  (2024)    Received from [facility]    Housing Stability     What is your living situation today?: I have a steady place to live           Medications  Allergies   Current Outpatient Medications   Medication Sig Dispense Refill    acetaminophen (TYLENOL) 500 MG tablet Take 1,000 mg by mouth every 12 hours as needed for mild pain      apixaban ANTICOAGULANT (ELIQUIS ANTICOAGULANT) 5 MG tablet Take 1 tablet (5 mg) by mouth 2 times daily 60 tablet 11    atorvastatin (LIPITOR) 80 MG tablet Take 1 tablet (80 mg) by mouth at bedtime 90 tablet 3    blood glucose (NO BRAND SPECIFIED) test strip Use to test blood sugar 1 times daily. To accompany: Blood Glucose Monitor Brands: per insurance. 100 strip 6    blood glucose monitoring (NO BRAND SPECIFIED) meter device kit Use to test blood sugar 1 times daily. Preferred blood glucose meter OR supplies to accompany: Blood Glucose Monitor Brands: per insurance. 1 kit 0    busPIRone (BUSPAR) 15 MG tablet Take 1 tablet by mouth once daily 90 tablet 0    clobetasol propionate (TEMOVATE) 0.05 % external cream Apply topically 2 times daily. Apply to rash on torso 60 g 2    clobetasol propionate (TEMOVATE) 0.05 % external cream Apply topically 2 times daily. Apply pea size amount to rashes on torso 60 g 3    docusate sodium (COLACE) 100 MG capsule Take 1 capsule (100 mg) by mouth 2 times daily 30 capsule 0    ezetimibe (ZETIA) 10 MG tablet Take 1 tablet (10 mg) by mouth daily 90 tablet 3    gabapentin (NEURONTIN) 300 MG capsule TAKE 3 CAPSULES BY MOUTH EVERY DAY AT BEDTIME (Patient taking differently: Take 900 mg by mouth at bedtime. TAKE 3 CAPSULES BY MOUTH EVERY DAY AT BEDTIME) 270 capsule 3    hydroCHLOROthiazide 12.5 MG tablet Take 1 tablet (12.5 mg) by mouth daily -- appt needed for further refills. 90 tablet 3    isosorbide mononitrate (IMDUR) 60 MG 24 hr tablet Take 1 tablet (60 mg) by mouth daily 90 tablet 3    ketoconazole (NIZORAL) 2 % external cream APPLY 1 APPLICATION TOPICALLY TWICE DAILY TO AFFECTED AREAS FOR 4 WEEKS THEN AS NEEDED      LANTUS SOLOSTAR 100 UNIT/ML soln Inject 28 Units subcutaneously  "every morning 45 mL 3    losartan (COZAAR) 100 MG tablet Take 1 tablet (100 mg) by mouth daily 90 tablet 3    naproxen (NAPROSYN) 500 MG tablet Take 500 mg by mouth 2 times daily (with meals)      sertraline (ZOLOFT) 100 MG tablet Take 1 tablet by mouth once daily 90 tablet 0    sotalol (BETAPACE) 80 MG tablet Take 0.5 tablets (40 mg) by mouth 2 times daily 90 tablet 3    thin (NO BRAND SPECIFIED) lancets Use with lanceting device. To accompany: Blood Glucose Monitor Brands: per insurance. 100 each 6    triamcinolone (KENALOG) 0.1 % external cream Apply topically 2 times daily      Allergies   Allergen Reactions    Metformin GI Disturbance    Oxycodone Nausea and Vomiting         Lab Results    Chemistry/lipid CBC Cardiac Enzymes/BNP/TSH/INR   Recent Labs   Lab Test 07/15/24  1359   CHOL 119   HDL 37*   LDL 49   TRIG 163*     Recent Labs   Lab Test 07/15/24  1359 03/29/24  1035 12/07/23  1452   LDL 49 36 51     Recent Labs   Lab Test 07/15/24  1359      POTASSIUM 4.6   CHLORIDE 101   CO2 30*   *   BUN 11.1   CR 0.77   GFRESTIMATED 80   ELLIE 9.4     Recent Labs   Lab Test 07/15/24  1359 06/13/24  1501 03/29/24  1035   CR 0.77 0.90 1.09*     Recent Labs   Lab Test 07/15/24  1359 03/29/24  1035 12/07/23  1452   A1C 7.5* 8.1* 9.5*    Recent Labs   Lab Test 07/15/24  1359   WBC 10.4   HGB 11.7   HCT 35.5   MCV 93        Recent Labs   Lab Test 07/15/24  1359 06/13/24  1501 01/12/24  1208   HGB 11.7 12.4 12.3    Recent Labs   Lab Test 11/06/20  0559 11/05/20  0559 11/04/20  2229   TROPONINI 0.18 0.37* 0.28     No results for input(s): \"BNP\", \"NTBNPI\", \"NTBNP\" in the last [id number] hours.  Recent Labs   Lab Test 05/05/21  1400   TSH 0.77     Recent Labs   Lab Test 01/12/24  1208 01/11/24  1351 12/15/23  1308   INR 1.7* 1.4* 2.2*        45 minutes spent on the date of encounter doing education, chart prep/review, review of outside records, review of test results, interpretation with above tests, patient visit, " and documentation.      This note has been dictated using voice recognition software. Any grammatical or context distortions are unintentional and inherent to the software.    Chichi Shepherd PA-C  Structural Heart Program  [facility]

## 2024-09-17 NOTE — LETTER
9/17/2024    Zaheer Sandra MD  [address]  [facility] [address]    RE: Kemi Soto       Dear Colleague,     I had the pleasure of seeing Kemi Soto in the [facility].  HEART CARE ENCOUNTER NOTE       [facility]  [phone]      Assessment/Recommendations   1.  Paroxysmal atrial fibrillation/atrial flutter: I have personally reviewed this patient's chart and have spoken with the patient about the treatment options, including SHAVON device.  She has a [id number] score of at least 7 for age = 74, female, HTN, TIA, CAD.  She has a HAS-BLED score of 3 for age, bleeding predisposition, medication use.   She is not a good candidate for long-term anticoagulation due to prolonged bleeding if she accidentally cuts herself, risk for falls, NSAID use, prohibitive cost of Eliquis.    Once scheduled, the patient will stay on Eliquis up until implant. 2 weeks prior to the procedure She will also start aspirin 81 mg daily. After implant, the patient will remain on aspirin 81 mg daily indefinitely and Eliquis for 6 weeks.  The patient will then stop Eliquis and start Plavix 75 mg daily for approximately 4 months. She will then stop Plavix and remain on aspirin 81 mg daily indefinitely.  She will have a ANNE or CTA approximately 3 months and 1 year post-implant.  She understands that the risks of the procedure are <2% and include, but are not limited to device embolization, air embolism, myocardial perforation, device thrombosis, ASD, stroke, or death.  We discussed expected recovery and follow-up.       The patient is a good candidate for proceeding with left atrial appendage  implant.  Her questions were answered to her satisfaction.     2.  Coronary artery disease -status post CABG/multiple stents -angiogram 2023 showed patent LIMA-LAD, VG's known to be occluded, mid vessel occluded stents in the Lcx. Currently denies angina.  Continue Imdur 60 mg daily    3.  Hyperlipidemia  -LDL49.  Continue Lipitor 80 mg daily, Zetia 10 mg daily    4.  Hypertension -blood pressure is controlled.  Continue losartan 100 mg daily, hydrochlorothiazide 12.5 mg daily       History of Present Illness/Subjective    Kemi Soto is a 74 year old female who comes in today for Watchman Consult    Kemi Soto has a past history of paroxysmal atrial fibrillation/atrial flutter, hypertension, coronary artery disease status post CABG x 4, hyperlipidemia, type 2 diabetes mellitus, history of TIA, peripheral neuropathy    She has had a diagnosis of atrial fibrillation since at least 2015.  She is currently anticoagulated with Eliquis for stroke prophylaxis.  She reports Eliquis is quite expensive for her.  She denies bruising issues.  She does notice that if she accidentally cuts or scratches herself she will have prolonged bleeding.  She does use a cane to assist with ambulation.  She reports having several falls in the past.  She does report chronic back issues which decreases her mobility.  She does take naproxen on a regular basis for her back pain.  She denies painful or difficulty swallowing.  She reports a previous history of TIA back in June 2015 where she had some right sided numbness at the time.  She denies any residual symptoms or deficits from this.  She denies a previous history of DVT or PE.  She denies any immediate family history of prolonged bleeding or frequent nosebleeds. She denies chest discomfort, shortness of breath, dizziness, lightheadedness    Medical, surgical, family, social history, and medications were reviewed and updated as necessary.    ECHO results (from 10/3/2023):  Interpretation Summary     Left ventricular size, wall motion and function are normal. The ejection  fraction is 60-65%.  Normal right ventricle size and systolic function.  No hemodynamically significant valvular abnormalities on 2D or color flow  imaging.       Physical Examination Review of Systems   Vitals: BP  "134/56 (BP Location: Right arm, Patient Position: Sitting, Cuff Size: Adult Regular)   Pulse 59   Resp 20   Ht 1.575 m (5' 2\")   Wt 67.6 kg (149 lb)   LMP  (LMP Unknown)   BMI 27.25 kg/m    BMI= Body mass index is 27.25 kg/m .  Wt Readings from Last 3 Encounters:   09/17/24 67.6 kg (149 lb)   09/13/24 67.1 kg (148 lb)   09/06/24 65.7 kg (144 lb 12.8 oz)       General Appearance:   Alert, cooperative and in no acute distress   ENT/Mouth: membranes moist, no oral lesions or bleeding gums.      EYES:  no scleral icterus, normal conjunctivae   Neck: Thyroid not visualized   Chest/Lungs:   lungs are clear to auscultation, no rales or wheezing   Cardiovascular:   Regular . Normal first and second heart sounds with no murmurs, rubs or gallops; the carotid, radial and posterior tibial pulses are intact, no edema bilaterally    Abdomen:  Soft and nontender. Bowel sounds are present in all quadrants   Extremities: no cyanosis or clubbing   Skin: no xanthelasma, warm.    Neurologic: normal gait, normal  bilateral, no tremors   Psychiatric: Normal mood and affect       Please refer above for cardiac ROS details.      Medical History  Surgical History Family History Social History   Past Medical History:   Diagnosis Date     Adrenal nodule (H24)      Alcohol dependence in remission (H)      Anemia      Antiplatelet or antithrombotic long-term use      Anxiety and depression      Arrhythmia      Benign essential hypertension      Chronic atrial fibrillation (H)      Chronic back pain      Chronic infection      Coronary artery disease      Diabetes mellitus (H)      Difficulty walking      Discitis of thoracolumbar region 10/10/2015     Encephalopathy 10/14/2015     Epidural abscess 10/10/2015     Hip pain, right      History of angina      Hyperlipidemia      Irregular heart beat      Other chronic pain      Ovarian mass      Sepsis (H) 10/08/2015     Stented coronary artery      Stroke (H) 06/23/2015    Neurology felt " that episode was C/W subacute ischemic stroke     TIA (transient ischemic attack) 06/23/2015     UTI (urinary tract infection)     admitted to [facility] with UTI     Walking troubles      Past Surgical History:   Procedure Laterality Date     ANGIOPLASTY  03/30/2016    Drug eluting stent mid LAD, cutting balloon to 1st diagonal     ANGIOPLASTY  01/01/2008     BYPASS GRAFT ARTERY CORONARY  12/11/2007    X 3 vessels, LIMA to LAD, saph vein graft to distal RCA, saphvein graft to marginal, mini circuit bypass.  [facility].     CORONARY STENT PLACEMENT  01/01/2008    Left main, left circumflex, RCA     CORONARY STENT PLACEMENT  03/01/2016     CV ANGIOGRAM CORONARY GRAFT N/A 10/11/2023    Procedure: Coronary Angiogram Graft;  Surgeon: Sam Boo MD;  Location: [facility] CATH LAB CV     CV CORONARY ANGIOGRAM N/A 08/01/2018    Procedure: Coronary Angiogram;  Surgeon: Kit Bean MD;  Location: [facility] Cath Lab;  Service:      CV LEFT HEART CATHETERIZATION WITH LEFT VENTRICULOGRAM N/A 08/01/2018    Procedure: Left Heart Catheterization with Left Ventriculogram;  Surgeon: Kit Bean MD;  Location: [facility] Cath Lab;  Service:      EXCISE MASS TRUNK N/A 7/1/2024    Procedure: EXCISION ABDOMINAL WALL MASS AND PLACEMENT OF DRAIN;  Surgeon: Braden Bah DO;  Location: [facility] OR     INSERT MIDLINE HE  10/31/2015          LUMBAR DISCECTOMY N/A 10/11/2015    BILATERAL L1-L5 DECOMPRESSIVE LAMINECTOMY WITH EVACUATION OF EPIDURAL ABSCESS IRRIGATION & DEBRIDEMENT;  Surgeon: Luli Chan MD;  Location: [facility] OR; Due to sepsis     RELEASE CARPAL TUNNEL Bilateral      UMBILICAL HERNIA REPAIR       Family History   Problem Relation Age of Onset     Cerebrovascular Disease Mother      Diabetes Father      Coronary Artery Disease Father      Diabetes Sister      Cerebrovascular Disease Sister 81     Cerebrovascular Disease Brother      Diabetes Brother      Cancer  Paternal Grandfather      Anesthesia Reaction No family hx of      Thrombosis No family hx of     Social History     Socioeconomic History     Marital status:      Spouse name: Not on file     Number of children: 1     Years of education: Not on file     Highest education level: Not on file   Occupational History     Occupation: retired   Tobacco Use     Smoking status: Former     Current packs/day: 0.00     Types: Cigarettes     Quit date: 2007     Years since quittin.2     Passive exposure: Past     Smokeless tobacco: Never   Vaping Use     Vaping status: Never Used   Substance and Sexual Activity     Alcohol use: Not Currently     Comment: Stopped drinking 2015     Drug use: No     Sexual activity: Never   Other Topics Concern     Not on file   Social History Narrative    Lives alone with cats and dogs. , one daughter, Tory Lofton.      Social Determinants of Health     Financial Resource Strain: Low Risk  (2024)    Financial Resource Strain      Within the past 12 months, have you or your family members you live with been unable to get utilities (heat, electricity) when it was really needed?: No   Food Insecurity: No Food Insecurity (2024)    Received from [facility]    Hunger Vital Sign      Worried About Running Out of Food in the Last Year: Never true      Ran Out of Food in the Last Year: Never true   Transportation Needs: No Transportation Needs (2024)    Received from [facility]    PRAPARE - Transportation      Lack of Transportation (Medical): No      Lack of Transportation (Non-Medical): No   Physical Activity: Inactive (2024)    Received from [facility]    Exercise Vital Sign      Days of Exercise per Week: 0 days      Minutes of Exercise per Session: 0 min   Stress: Not on file   Social Connections: Not on file   Interpersonal Safety: Not At Risk (2024)    Received from [facility]    Humiliation, Afraid, Rape, and Kick questionnaire      Fear of  Current or Ex-Partner: No      Emotionally Abused: No      Physically Abused: No      Sexually Abused: No   Housing Stability: Low Risk  (7/18/2024)    Received from [facility]    Housing Stability      What is your living situation today?: I have a steady place to live          Medications  Allergies   Current Outpatient Medications   Medication Sig Dispense Refill     acetaminophen (TYLENOL) 500 MG tablet Take 1,000 mg by mouth every 12 hours as needed for mild pain       apixaban ANTICOAGULANT (ELIQUIS ANTICOAGULANT) 5 MG tablet Take 1 tablet (5 mg) by mouth 2 times daily 60 tablet 11     atorvastatin (LIPITOR) 80 MG tablet Take 1 tablet (80 mg) by mouth at bedtime 90 tablet 3     blood glucose (NO BRAND SPECIFIED) test strip Use to test blood sugar 1 times daily. To accompany: Blood Glucose Monitor Brands: per insurance. 100 strip 6     blood glucose monitoring (NO BRAND SPECIFIED) meter device kit Use to test blood sugar 1 times daily. Preferred blood glucose meter OR supplies to accompany: Blood Glucose Monitor Brands: per insurance. 1 kit 0     busPIRone (BUSPAR) 15 MG tablet Take 1 tablet by mouth once daily 90 tablet 0     clobetasol propionate (TEMOVATE) 0.05 % external cream Apply topically 2 times daily. Apply to rash on torso 60 g 2     clobetasol propionate (TEMOVATE) 0.05 % external cream Apply topically 2 times daily. Apply pea size amount to rashes on torso 60 g 3     docusate sodium (COLACE) 100 MG capsule Take 1 capsule (100 mg) by mouth 2 times daily 30 capsule 0     ezetimibe (ZETIA) 10 MG tablet Take 1 tablet (10 mg) by mouth daily 90 tablet 3     gabapentin (NEURONTIN) 300 MG capsule TAKE 3 CAPSULES BY MOUTH EVERY DAY AT BEDTIME (Patient taking differently: Take 900 mg by mouth at bedtime. TAKE 3 CAPSULES BY MOUTH EVERY DAY AT BEDTIME) 270 capsule 3     hydroCHLOROthiazide 12.5 MG tablet Take 1 tablet (12.5 mg) by mouth daily -- appt needed for further refills. 90 tablet 3     isosorbide  "mononitrate (IMDUR) 60 MG 24 hr tablet Take 1 tablet (60 mg) by mouth daily 90 tablet 3     ketoconazole (NIZORAL) 2 % external cream APPLY 1 APPLICATION TOPICALLY TWICE DAILY TO AFFECTED AREAS FOR 4 WEEKS THEN AS NEEDED       LANTUS SOLOSTAR 100 UNIT/ML soln Inject 28 Units subcutaneously every morning 45 mL 3     losartan (COZAAR) 100 MG tablet Take 1 tablet (100 mg) by mouth daily 90 tablet 3     naproxen (NAPROSYN) 500 MG tablet Take 500 mg by mouth 2 times daily (with meals)       sertraline (ZOLOFT) 100 MG tablet Take 1 tablet by mouth once daily 90 tablet 0     sotalol (BETAPACE) 80 MG tablet Take 0.5 tablets (40 mg) by mouth 2 times daily 90 tablet 3     thin (NO BRAND SPECIFIED) lancets Use with lanceting device. To accompany: Blood Glucose Monitor Brands: per insurance. 100 each 6     triamcinolone (KENALOG) 0.1 % external cream Apply topically 2 times daily      Allergies   Allergen Reactions     Metformin GI Disturbance     Oxycodone Nausea and Vomiting         Lab Results    Chemistry/lipid CBC Cardiac Enzymes/BNP/TSH/INR   Recent Labs   Lab Test 07/15/24  1359   CHOL 119   HDL 37*   LDL 49   TRIG 163*     Recent Labs   Lab Test 07/15/24  1359 03/29/24  1035 12/07/23  1452   LDL 49 36 51     Recent Labs   Lab Test 07/15/24  1359      POTASSIUM 4.6   CHLORIDE 101   CO2 30*   *   BUN 11.1   CR 0.77   GFRESTIMATED 80   ELLIE 9.4     Recent Labs   Lab Test 07/15/24  1359 06/13/24  1501 03/29/24  1035   CR 0.77 0.90 1.09*     Recent Labs   Lab Test 07/15/24  1359 03/29/24  1035 12/07/23  1452   A1C 7.5* 8.1* 9.5*    Recent Labs   Lab Test 07/15/24  1359   WBC 10.4   HGB 11.7   HCT 35.5   MCV 93        Recent Labs   Lab Test 07/15/24  1359 06/13/24  1501 01/12/24  1208   HGB 11.7 12.4 12.3    Recent Labs   Lab Test 11/06/20  0559 11/05/20  0559 11/04/20  2229   TROPONINI 0.18 0.37* 0.28     No results for input(s): \"BNP\", \"NTBNPI\", \"NTBNP\" in the last 24877 hours.  Recent Labs   Lab Test " 05/05/21  1400   TSH 0.77     Recent Labs   Lab Test 01/12/24  1208 01/11/24  1351 12/15/23  1308   INR 1.7* 1.4* 2.2*        45 minutes spent on the date of encounter doing education, chart prep/review, review of outside records, review of test results, interpretation with above tests, patient visit, and documentation.      This note has been dictated using voice recognition software. Any grammatical or context distortions are unintentional and inherent to the software.    Chichi Shepherd PA-C  Structural Heart Program  [facility]       Thank you for allowing me to participate in the care of your patient.      Sincerely,     Chichi Shepherd PA-C     [facility]  cc:   Seven Huang DO  [address]  [place] [address]

## 2024-09-18 ENCOUNTER — OFFICE VISIT (OUTPATIENT)
Dept: ORTHOPEDICS | Facility: CLINIC | Age: 74
End: 2024-09-18
Attending: FAMILY MEDICINE
Payer: COMMERCIAL

## 2024-09-18 DIAGNOSIS — I48.0 PAROXYSMAL ATRIAL FIBRILLATION (H): ICD-10-CM

## 2024-09-18 DIAGNOSIS — Z86.73 HISTORY OF TIA (TRANSIENT ISCHEMIC ATTACK) AND STROKE: ICD-10-CM

## 2024-09-18 DIAGNOSIS — Z79.4 TYPE 2 DIABETES MELLITUS WITH DIABETIC POLYNEUROPATHY, WITH LONG-TERM CURRENT USE OF INSULIN (H): ICD-10-CM

## 2024-09-18 DIAGNOSIS — R29.898 RIGHT HAND WEAKNESS: Primary | ICD-10-CM

## 2024-09-18 DIAGNOSIS — E11.42 TYPE 2 DIABETES MELLITUS WITH DIABETIC POLYNEUROPATHY, WITH LONG-TERM CURRENT USE OF INSULIN (H): ICD-10-CM

## 2024-09-18 PROCEDURE — 99203 OFFICE O/P NEW LOW 30 MIN: CPT | Mod: 24 | Performed by: PHYSICIAN ASSISTANT

## 2024-09-18 NOTE — PATIENT INSTRUCTIONS
Today we discussed the underlying etiology/pathology of patient's   1. Right hand weakness    2. History of TIA (transient ischemic attack) and stroke, numerous with last in 2015 affecting right side    3. Type 2 diabetes mellitus with diabetic polyneuropathy, with long-term current use of insulin (H)    4. Paroxysmal atrial fibrillation (H)      Discussed diagnosis and treatment options with the patient today. A shared decision making model was used. The patient's values and choices were respected. The following represents what was discussed and decided upon by the provider and the patient.   -We discussed the patient is voicing acute onset of right hand weakness affecting daily activities including writing.  She states symptoms began around 07/26/2024 without any known particular injury.  We discussed recent past medical history including surgery at [facility] for tumor removal of the abdomen/pelvis region and patient was in a right lateral decubitus position for that procedure which may have contributed to some type of compression syndrome of the right upper extremity.  - We discussed patient also has a history of numerous mini strokes/TIAs with the last one documented in 2015 which affected her right upper extremity.  Patient states that she never had post TIA rehab program for the right upper extremity.  Patient also has a history of cardiac issues including A-fib and patient is trying to get a Watchman device implanted.  - We discussed the patient has no pain symptoms of the right upper extremity.  Physical exam does not show a clear etiology other than the patient voicing more discomfort involving the fifth and fourth digits of the right hand but also cannot rule out the third digit being involved.  She denies any symptoms really proximal to Guyon's canal of the wrist.  Patient has slight weakness of the right hand intrinsic muscles as well as weakness of the wrist flexor and extensor musculature of unknown  duration.  - At this time we will send the patient for upper extremity EMG testing on the right side to help give a better clinical picture about possible chronic changes related to possible history of TIAs as well as possible acute changes that began in July 2024.  - We discussed the patient again has no pain issues as well as full range of motion of the shoulders elbow and wrist otherwise and does not require any medication management, immobilization or rehab at this time.  -I will contact the patient when EMG/nerve conduction study results are known and we will update treatment plan at that time.     -Call direct clinic number [[phone]] at any time with questions or concerns in regards to your recent office visit with me.     Jay Jones PA-C  [facility]    This note was completed in part using a voice recognition software, any grammatical or context distortion are unintentional and inherent to the software.

## 2024-09-18 NOTE — LETTER
9/18/2024      Kemi Soto  [address] [place] [id number]      Dear Colleague,    Thank you for referring your patient, Kemi Soto, to the [facility]. Please see a copy of my visit note below.    ASSESSMENT & PLAN       Today we discussed the underlying etiology/pathology of patient's   1. Right hand weakness    2. History of TIA (transient ischemic attack) and stroke, numerous with last in 2015 affecting right side    3. Type 2 diabetes mellitus with diabetic polyneuropathy, with long-term current use of insulin (H)    4. Paroxysmal atrial fibrillation (H)      Discussed diagnosis and treatment options with the patient today. A shared decision making model was used. The patient's values and choices were respected. The following represents what was discussed and decided upon by the provider and the patient.   -We discussed the patient is voicing acute onset of right hand weakness affecting daily activities including writing.  She states symptoms began around 07/26/2024 without any known particular injury.  We discussed recent past medical history including surgery at [place] for tumor removal of the abdomen/pelvis region and patient was in a right lateral decubitus position for that procedure which may have contributed to some type of compression syndrome of the right upper extremity.  - We discussed patient also has a history of numerous mini strokes/TIAs with the last one documented in 2015 which affected her right upper extremity.  Patient states that she never had post TIA rehab program for the right upper extremity.  Patient also has a history of cardiac issues including A-fib and patient is trying to get a Watchman device implanted.  - We discussed the patient has no pain symptoms of the right upper extremity.  Physical exam does not show a clear etiology other than the patient voicing more discomfort involving the fifth and fourth digits of the  right hand but also cannot rule out the third digit being involved.  She denies any symptoms really proximal to Guyon's canal of the wrist.  Patient has slight weakness of the right hand intrinsic muscles as well as weakness of the wrist flexor and extensor musculature of unknown duration.  - At this time we will send the patient for upper extremity EMG testing on the right side to help give a better clinical picture about possible chronic changes related to possible history of TIAs as well as possible acute changes that began in July 2024.  - We discussed the patient again has no pain issues as well as full range of motion of the shoulders elbow and wrist otherwise and does not require any medication management, immobilization or rehab at this time.  -I will contact the patient when EMG/nerve conduction study results are known and we will update treatment plan at that time.     -Call direct clinic number [[phone]] at any time with questions or concerns in regards to your recent office visit with me.     Jay Jones PA-C  [facility]    This note was completed in part using a voice recognition software, any grammatical or context distortion are unintentional and inherent to the software.         SUBJECTIVE  Kemi Soto is a/an 74 year old female who is seen in consultation at the request of  Zaheer Sandra M.D. for evaluation of right hand weakness the patient is seen by themselves.    Onset: 2 month(s) ago. Reports insidious onset.  Reports happened after pelvic mass surgery July 21st.  Patient was in a lateral decubitus position self-reported for her procedure.  Patient did not recall any particular abnormality until least 7 to 10 days after returning home from her [facility] pelvic procedure.  Location of Pain: (numbness/tingling) right digits 4-5, maybe the 3rd  Rating of Pain at worst: 0/10  Rating of Pain Currently: 0/10  Worsened by: nothing specific  Better with: nothing  "  Treatments tried: no treatment tried to date  Quality: numb  Associated symptoms: numbness and tingling of digits 4-5, weakness of right hand.   Orthopedic history: NO,  left elbow injury though.   2015- right \"mini-stroke\"   Relevant surgical history: NO  Social history: social history: retired, uses cane in right hand on occasion.     Past Medical History:   Diagnosis Date     Adrenal nodule (H24)      Alcohol dependence in remission (H)      Anemia      Antiplatelet or antithrombotic long-term use      Anxiety and depression      Arrhythmia      Benign essential hypertension      Chronic atrial fibrillation (H)      Chronic back pain      Chronic infection      Coronary artery disease      Diabetes mellitus (H)      Difficulty walking      Discitis of thoracolumbar region 10/10/2015     Encephalopathy 10/14/2015     Epidural abscess 10/10/2015     Hip pain, right      History of angina      Hyperlipidemia      Irregular heart beat      Other chronic pain      Ovarian mass      Sepsis (H) 10/08/2015     Stented coronary artery      Stroke (H) 2015    Neurology felt that episode was C/W subacute ischemic stroke     TIA (transient ischemic attack) 2015     UTI (urinary tract infection)     admitted to [facility] with UTI     Walking troubles      Social History     Socioeconomic History     Marital status:      Number of children: 1   Occupational History     Occupation: retired   Tobacco Use     Smoking status: Former     Current packs/day: 0.00     Types: Cigarettes     Quit date: 2007     Years since quittin.2     Passive exposure: Past     Smokeless tobacco: Never   Vaping Use     Vaping status: Never Used   Substance and Sexual Activity     Alcohol use: Not Currently     Comment: Stopped drinking 2015     Drug use: No     Sexual activity: Never   Social History Narrative    Lives alone with cats and dogs. , one daughter, Tory Lofton.      Social Determinants of " Health     Financial Resource Strain: Low Risk  (1/5/2024)    Financial Resource Strain      Within the past 12 months, have you or your family members you live with been unable to get utilities (heat, electricity) when it was really needed?: No   Food Insecurity: No Food Insecurity (7/18/2024)    Received from [facility]    Hunger Vital Sign      Worried About Running Out of Food in the Last Year: Never true      Ran Out of Food in the Last Year: Never true   Transportation Needs: No Transportation Needs (7/18/2024)    Received from [facility]    PRAPARE - Transportation      Lack of Transportation (Medical): No      Lack of Transportation (Non-Medical): No   Physical Activity: Inactive (5/8/2024)    Received from [facility]    Exercise Vital Sign      Days of Exercise per Week: 0 days      Minutes of Exercise per Session: 0 min   Interpersonal Safety: Not At Risk (7/18/2024)    Received from [facility]    Humiliation, Afraid, Rape, and Kick questionnaire      Fear of Current or Ex-Partner: No      Emotionally Abused: No      Physically Abused: No      Sexually Abused: No   Housing Stability: Low Risk  (7/18/2024)    Received from [facility]    Housing Stability      What is your living situation today?: I have a steady place to live         Patient's past medical, surgical, social, and family histories were personally reviewed today and no changes are noted.    REVIEW OF SYSTEMS:  10 point ROS is negative other than symptoms noted above in HPI, Past Medical History or as stated below  Constitutional: NEGATIVE for fever, chills, change in weight  Skin: NEGATIVE for worrisome rashes, moles or lesions  GI/: NEGATIVE for bowel or bladder changes  Neuro: NEGATIVE for weakness, dizziness or paresthesias    OBJECTIVE:  Vital signs as noted in EPIC for 9/18/2024  General: healthy, alert and in no distress  HEENT: no scleral icterus or conjunctival erythema  Skin: no suspicious lesions or rash. No jaundice.  CV: no  pedal edema  Resp: normal respiratory effort without conversational dyspnea   Psych: normal mood and affect  Gait: normal steady gait with appropriate coordination and balance  Neuro: Normal light sensory exam of lower extremity      MSK:  Exam shows a pleasant 74-year-old female who presents full weightbearing without assistive device.  She is alert and orientated x 3.  Examination of the right upper extremity compared to the left shows no obvious bruising, swelling or ecchymosis.  No william atrophy.  Patient has full range of motion of the elbows without limitation.  No particular abnormality or presentation of symptoms with percussion of the cubital tunnel on the right elbow.  Prolonged flexion test does not reproduce symptoms.  Patient does have some slight arthritic deformity of the fifth pinky digit and has a slight flexion positioning of the fourth and fifth digits possibly indicating intrinsic muscle weakness.  Patient does have weakness of intrinsic muscles with opposition testing of the thumb to the index finger.  No thenar or hypothenar atrophy.  Patient can make a closed fist and open hand fully.  She does have some mild weakness on the right compared to the left with wrist flexion and extension against resistance.  Tinel and Phalen's at the wrist are negative.  Slightly decreased radial pulse on the right side compared to the left but adequate tissue perfusion distally.  Cap refill less than 2 seconds.  No abnormality of the nails.  Patient shows no tenderness to palpation throughout the entire length of the radius and the ulna.  No pain through the carpus of the wrist.  Motor strength appears to be normal with the biceps and the triceps.            Patient's conditions were thoroughly discussed during today's visit with total time reviewing patient's previous medical records/history/radiology, face-to-face examination and discussion and plan of care with the patient and documentation being 30 minutes  for today's clinical visit  Jay Jones PA-C  [facility]    This note was completed in part using a voice recognition software, any grammatical or context distortion are unintentional and inherent to the software.       Again, thank you for allowing me to participate in the care of your patient.        Sincerely,        Jay Jones PA-C

## 2024-09-19 ENCOUNTER — TELEPHONE (OUTPATIENT)
Dept: CARDIOLOGY | Facility: CLINIC | Age: 74
End: 2024-09-19
Payer: COMMERCIAL

## 2024-09-19 DIAGNOSIS — I48.0 PAROXYSMAL ATRIAL FIBRILLATION (H): Primary | ICD-10-CM

## 2024-09-19 NOTE — TELEPHONE ENCOUNTER
H&P:  Card OV  Date:  Structural LU [x] LAAC  Order Case Req Y  Order Set: to be placed on completion of pre-op Intra-Op ANNE Order? Y 3 month post-LAAC imaging order? Y     AC Eliquis   Antiplatelet ASA 81-Start two weeks prior to procedure   DM/GLP-1 DM Meds-  TAKE 1/2 LANTUS DOSE MORNING OF  GLP-1- None   ED Meds NONE - NONE     Kemi Soto, 1950, [id number]  Home:[phone] (home) Cell:[phone] (mobile)  Emergency Contact: EDELJASPER   PCP: Zaheer Sandra, [phone]    Important patient information for CSC/Cath Lab staff : None    LAAC Cath Lab Procedure Order   LAAC Type:  [facility]-WM  Implanting Provider: Next available (no preference)  Date Ordered and Prepped: 9/19/2024 Priscila Merchant RN    Scheduling Information:  Anticipated Case Per Day:  Standard WM (4 cases per day)   Scheduling Timeframe:  Next Available  Scheduling Restrictions: Pt to start antiplatelet aspirin 81 mg two weeks prior to procedure, please schedule accordingly to allow time for this.  and Patient is diabetic.   Shared Decision Making Completed?: Done 9/13/24 by Dr Huang  Current Device/Device Co Needed for Procedure:  None - None  Intra-Op ANNE needed?: Yes, order placed  Anesthesia: General Anesthesia  Overnight stay required?:[facility] - WM case, no overnight stay anticipated      [facility] EP Cath Lab Prep   H&P:  Completed by cardiology on 9/17/24 if scheduled within 30 days, pt will need RN education and Labs appt within 3 days of procedure. If pts LAAC scheduled outside of 30 days, pt to schedule H&P with Structural LU, RN Teach, and Labs within 30 days of LAAC  Pre-op Labs: CBC, BMP, Lab 276 (T&S), and INR if on Warfarin, if not completed at pre-op H&P within 7 days of procedure.  Medical Records Pertinent for Procedure:  NONE  Iodinated Contrast Dye Allergies (Does not include Shellfish, Egg, and/or Iodine Allergy): No known allergy to contrast  GLP-1 Protocol: If on Dulaglutide (Trulicity) (weekly)- Injection hold 7 days prior  to procedure  , Exenatide extended release (Bydureon bcise) (weekly)- Injection hold 7 days prior to procedure, Exenatide (Byetta) (twice daily)- Oral Tablet hold day prior and morning of procedure and for Injection hold 7 days prior to procedure, Semaglutide (Ozempic) (weekly)- Injection and Oral hold 7 days prior to procedure, Liraglutide (Victoza, Saxenda) (daily)- Injection hold day prior and morning of procedure  Post-LAAC RN Phone Call: [facility] - WM: please place on LAAC RN schedule for the day following LAAC, time based on case order  Post-LAAC Follow Up Plan: All patients, regardless of vendor, to be seen at 45 days post-LAAC with Structural LU in clinic  Post-LAAC Imaging?: CT Pulm Vein at 3 months, and again at one year post-implant.      Allergies   Allergen Reactions    Metformin GI Disturbance    Oxycodone Nausea and Vomiting       Current Outpatient Medications:     acetaminophen (TYLENOL) 500 MG tablet, Take 1,000 mg by mouth every 12 hours as needed for mild pain, Disp: , Rfl:     apixaban ANTICOAGULANT (ELIQUIS ANTICOAGULANT) 5 MG tablet, Take 1 tablet (5 mg) by mouth 2 times daily, Disp: 60 tablet, Rfl: 11    atorvastatin (LIPITOR) 80 MG tablet, Take 1 tablet (80 mg) by mouth at bedtime, Disp: 90 tablet, Rfl: 3    blood glucose (NO BRAND SPECIFIED) test strip, Use to test blood sugar 1 times daily. To accompany: Blood Glucose Monitor Brands: per insurance., Disp: 100 strip, Rfl: 6    blood glucose monitoring (NO BRAND SPECIFIED) meter device kit, Use to test blood sugar 1 times daily. Preferred blood glucose meter OR supplies to accompany: Blood Glucose Monitor Brands: per insurance., Disp: 1 kit, Rfl: 0    busPIRone (BUSPAR) 15 MG tablet, Take 1 tablet by mouth once daily, Disp: 90 tablet, Rfl: 0    clobetasol propionate (TEMOVATE) 0.05 % external cream, Apply topically 2 times daily. Apply to rash on torso, Disp: 60 g, Rfl: 2    clobetasol propionate (TEMOVATE) 0.05 % external cream, Apply  topically 2 times daily. Apply pea size amount to rashes on torso, Disp: 60 g, Rfl: 3    docusate sodium (COLACE) 100 MG capsule, Take 1 capsule (100 mg) by mouth 2 times daily, Disp: 30 capsule, Rfl: 0    ezetimibe (ZETIA) 10 MG tablet, Take 1 tablet (10 mg) by mouth daily, Disp: 90 tablet, Rfl: 3    gabapentin (NEURONTIN) 300 MG capsule, TAKE 3 CAPSULES BY MOUTH EVERY DAY AT BEDTIME (Patient taking differently: Take 900 mg by mouth at bedtime. TAKE 3 CAPSULES BY MOUTH EVERY DAY AT BEDTIME), Disp: 270 capsule, Rfl: 3    hydroCHLOROthiazide 12.5 MG tablet, Take 1 tablet (12.5 mg) by mouth daily -- appt needed for further refills., Disp: 90 tablet, Rfl: 3    isosorbide mononitrate (IMDUR) 60 MG 24 hr tablet, Take 1 tablet (60 mg) by mouth daily, Disp: 90 tablet, Rfl: 3    ketoconazole (NIZORAL) 2 % external cream, APPLY 1 APPLICATION TOPICALLY TWICE DAILY TO AFFECTED AREAS FOR 4 WEEKS THEN AS NEEDED, Disp: , Rfl:     LANTUS SOLOSTAR 100 UNIT/ML soln, Inject 28 Units subcutaneously every morning, Disp: 45 mL, Rfl: 3    losartan (COZAAR) 100 MG tablet, Take 1 tablet (100 mg) by mouth daily, Disp: 90 tablet, Rfl: 3    naproxen (NAPROSYN) 500 MG tablet, Take 500 mg by mouth 2 times daily (with meals), Disp: , Rfl:     sertraline (ZOLOFT) 100 MG tablet, Take 1 tablet by mouth once daily, Disp: 90 tablet, Rfl: 0    sotalol (BETAPACE) 80 MG tablet, Take 0.5 tablets (40 mg) by mouth 2 times daily, Disp: 90 tablet, Rfl: 3    thin (NO BRAND SPECIFIED) lancets, Use with lanceting device. To accompany: Blood Glucose Monitor Brands: per insurance., Disp: 100 each, Rfl: 6    triamcinolone (KENALOG) 0.1 % external cream, Apply topically 2 times daily, Disp: , Rfl:   No current facility-administered medications for this visit.    Facility-Administered Medications Ordered in Other Visits:     dexamethasone PF (DECADRON) injection, , , Once PRN, Ady Garsia DO, 20 mg at 05/19/23 1326    iohexol (OMNIPAQUE) 300 mg/mL  injection, , , Once PRN, Ady Garsia DO, 1 mL at 05/19/23 1327    lidocaine (PF) (XYLOCAINE) 1 % injection, , , Once PRN, Ady Garsia DO, 4 mL at 05/19/23 1326    Documentation Date:9/19/2024 2:51 PM  Priscila Merchant, RN

## 2024-09-19 NOTE — TELEPHONE ENCOUNTER
----- Message from Chichi Shepherd sent at 9/19/2024  1:21 PM CDT -----  Hi, patient is a candidate for watchman and I believe she is interested in moving forward with the procedure.  She should be okay for CT scan postprocedure as long as her kidney function remains stable. SDM on file from 9/13/2024 with Dr. Huang    Thank you

## 2024-09-25 NOTE — PATIENT INSTRUCTIONS
It was a pleasure meeting you today    In summary:    We will get you referred for the Watchman device.    Please call my nurse Delfino Gaytan at [phone] if you have any questions or issues.    We will schedule a follow up visit in  6 months      Seven Huang DO [facility]  Non-invasive Cardiologist  [facility]     Negative

## 2024-09-30 RX ORDER — ASPIRIN 81 MG/1
81 TABLET ORAL DAILY
COMMUNITY
Start: 2024-10-17

## 2024-10-01 ENCOUNTER — TRANSFERRED RECORDS (OUTPATIENT)
Dept: HEALTH INFORMATION MANAGEMENT | Facility: CLINIC | Age: 74
End: 2024-10-01
Payer: COMMERCIAL

## 2024-10-03 ENCOUNTER — THERAPY VISIT (OUTPATIENT)
Dept: PHYSICAL THERAPY | Facility: REHABILITATION | Age: 74
End: 2024-10-03
Attending: NURSE PRACTITIONER
Payer: COMMERCIAL

## 2024-10-03 DIAGNOSIS — M54.42 CHRONIC BILATERAL LOW BACK PAIN WITH BILATERAL SCIATICA: ICD-10-CM

## 2024-10-03 DIAGNOSIS — M54.16 LUMBAR RADICULOPATHY: ICD-10-CM

## 2024-10-03 DIAGNOSIS — M43.16 SPONDYLOLISTHESIS OF LUMBAR REGION: ICD-10-CM

## 2024-10-03 DIAGNOSIS — M54.41 CHRONIC BILATERAL LOW BACK PAIN WITH BILATERAL SCIATICA: ICD-10-CM

## 2024-10-03 DIAGNOSIS — M48.061 LUMBAR FORAMINAL STENOSIS: ICD-10-CM

## 2024-10-03 DIAGNOSIS — Z74.09 IMPAIRED FUNCTIONAL MOBILITY, BALANCE, GAIT, AND ENDURANCE: Primary | ICD-10-CM

## 2024-10-03 DIAGNOSIS — G89.29 CHRONIC BILATERAL LOW BACK PAIN WITH BILATERAL SCIATICA: ICD-10-CM

## 2024-10-03 DIAGNOSIS — Z98.890 HISTORY OF LUMBAR SURGERY: ICD-10-CM

## 2024-10-03 PROCEDURE — 97110 THERAPEUTIC EXERCISES: CPT | Mod: GP

## 2024-10-03 PROCEDURE — 97162 PT EVAL MOD COMPLEX 30 MIN: CPT | Mod: GP

## 2024-10-03 NOTE — PROGRESS NOTES
"PHYSICAL THERAPY EVALUATION  Type of Visit: Evaluation       Fall Risk Screen:  Fall screen completed by: PT  Have you fallen 2 or more times in the past year?: Yes (Estimates about 5 falls in last year.)  Have you fallen and had an injury in the past year?: Yes (See previous 2024 PT episode of care.)  Is patient a fall risk?: Yes  Fall screen comments: In home (reports occasionally on carpet).    Keith Lafleur returns to PT following an abdominal cyst and tumor removal within a week of each other. She had been attending PT earlier this year prior to the surgeries due to low back pain and bilateral radicular symptoms, but she took a brake from PT to pursue her general surgeries. Since then and with surgery recover (had some home PT), she feels like she has made progress. She doesn't need to use her walker in the morning to walk anymore, but she still feels unsteady. She reports that both her low back and leg symptoms have also improved, but she still has difficulty with extended standing and walking. She finds relief with sitting. She uses a quad cane or shopping cart for longer ambulation, but she goes shorter distances without an assistive device. Worst pain over last several months: 8/10. Best pain: 2/10. Current pain: 2/10. She also finds some relief with some of her home PT \"stretches.\" She wakes up at night with pain. She denies any recent bowel/bladder changes, saddle anesthesia, and lower extremity numbness/tingling. She reports some newer (after surgery) right 4th and 5th finger weakness for which she is working with orthopedics and sports medicine. She also notes feeling like her right leg is weaker than her left.  Presenting condition or subjective complaint: Mobility  Date of onset: 08/26/24 (Date recorded is date of referral, but symptoms are chronic and have lasted over 1 year.)    Relevant medical history: Diabetes; Hearing problems; High blood pressure; Incontinence; Osteoarthritis; Stroke "   Dates & types of surgery: It is on my chart; last ones 7/1/24 cyst removal [facility]; 7/18/24 tumor on uterus [place]    Prior diagnostic imaging/testing results:       Prior therapy history for the same diagnosis, illness or injury: Yes Last spring; with same therapist; in home 8/24-9/24    Prior Level of Function  Transfers: Independent  Ambulation:  Intermittent use of assistive device, especially for longer distances.  ADL: Independent  IADL:  Independent    Living Environment  Social support: Alone   Type of home: House; 1 level   Stairs to enter the home: Yes 1 Is there a railing: No     Ramp: No   Stairs inside the home: Yes   Is there a railing: Yes     Help at home: None  Equipment owned: Four-point cane; Walker     Employment: No Retired  Hobbies/Interests: Reading, "[employer]s, animal advocacy    Patient goals for therapy: Walk longer    Pain assessment:  see subjective report     Objective   LUMBAR SPINE EVALUATION  PAIN: see subjective report  INTEGUMENTARY (edema, incisions):   POSTURE:   GAIT:   Weightbearing Status: WBAT  Assistive Device(s): None  Gait Deviations:   BALANCE/PROPRIOCEPTION:   WEIGHTBEARING ALIGNMENT:   NON-WEIGHTBEARING ALIGNMENT:    ROM:   (Degrees) Left AROM Left PROM  Right AROM Right PROM   Hip Flexion  Min limited  Min limited (low back relief)   Hip Extension       Hip Abduction       Hip Adduction       Hip Internal Rotation  Mod limited  Mod limited   Hip External Rotation  WNL  WNL   Knee Flexion       Knee Extension       Lumbar Side-bending Knee Mid-thigh (painful)   Lumbar Flexion Distal shins   Lumbar Extension WFL   Lumbar Rotation Left: WNL. Right: 75%.   Pain:   End feel:   PELVIC/SI SCREEN:   STRENGTH:   - Hip abduction and adduction: left 5/5, right 5-/5  - Knee flexion: 5/5 bilaterally  MYOTOMES:    Left Right   T12-L3 (Hip Flexion) 4 3   L2-4 (Quads)  5 5-   L4 (Ankle DF) 5 5   L5 (Great Toe Ext) 5 5   S1 (Toe Raise) 5 5     DTR S:    Left Right   C5 (Biceps)     C6  (Brachioradialis)     C7 (Triceps)     L4 (Quad) 3 2   S1 (Achilles) 0 0     CORD SIGNS: Granados negative L, Granados negative R, ankle clonus negative bilaterally  DERMATOMES: WNL  NEURAL TENSION:  slump positive on left, negative on right; SLR negative bilaterally  FLEXIBILITY:  hamstring limited bilaterally  LUMBAR/HIP Special Tests:    PELVIS/SI SPECIAL TESTS:   FUNCTIONAL TESTS:   PALPATION:   SPINAL SEGMENTAL CONCLUSIONS:       Assessment & Plan   CLINICAL IMPRESSIONS  Medical Diagnosis: Chronic bilateral low back pain with bilateral sciatica  Lumbar radiculopathy  Lumbar foraminal stenosis  Spondylolisthesis of lumbar region  History of lumbar surgery    Treatment Diagnosis: Chronic low back pain with mobility and strength deficits, as well as impaired functional mobility and balance   Impression/Assessment: Patient is a 74 year old female with chronic low back and bilateral lower extremity pain complaints.  The following significant findings have been identified: Pain, Decreased ROM/flexibility, Decreased strength, Impaired gait, Impaired muscle performance, and Decreased activity tolerance. These impairments interfere with their ability to perform self care tasks, recreational activities, household chores, household mobility, and community mobility as compared to previous level of function.     Clinical Decision Making (Complexity):  Clinical Presentation: Stable/Uncomplicated  Clinical Presentation Rationale: based on medical and personal factors listed in PT evaluation  Clinical Decision Making (Complexity): Moderate complexity    PLAN OF CARE  Treatment Interventions:  Modalities: Cryotherapy, Dry Needling, E-stim, Hot Pack, Mechanical Traction  Interventions: Manual Therapy, Neuromuscular Re-education, Therapeutic Activity, Therapeutic Exercise, Self-Care/Home Management, Standardized Testing    Long Term Goals     PT Goal 1  Goal Identifier: HEP  Goal Description: Ciarra will demonstrate mastery of  ([id number] need for cueing) and compliance with (completion at least 5/7 days/week) her home exercise program to facilitate increased rate of improvement.  Goal Progress: In progress  Target Date: 10/31/24  PT Goal 2  Goal Identifier: LATRICIA  Goal Description: Ciarra will demonstrate significantly improved function as evidenced by an improved (decreased) LATRICIA score of less than or equal to 7.14%.  Goal Progress: 37.14%  Target Date: 12/12/24  PT Goal 3  Goal Identifier: Walking  Goal Description: Ciarra will demonstrate improved tolerance to functional mobility as evidenced by her abilityto walk for up to 20 minutes with self-report low back and leg pain no higher than 2/10.  Goal Progress: Unable  Target Date: 12/12/24  PT Goal 4  Goal Identifier: FGA  Goal Description: Will assess at next visit.  Goal Progress: Will assess at next visit.  Target Date: 12/12/24      Frequency of Treatment: 1 time per week  Duration of Treatment: 10 weeks    Recommended Referrals to Other Professionals:   Education Assessment:   Learner/Method: Patient;Listening;Demonstration;Pictures/Video;No Barriers to Learning    Risks and benefits of evaluation/treatment have been explained.   Patient/Family/caregiver agrees with Plan of Care.     Evaluation Time:     PT Eval, Moderate Complexity Minutes (19080): 27       Signing Clinician: Twin Garcia, PT        [facility]                                                                                   OUTPATIENT PHYSICAL THERAPY      PLAN OF TREATMENT FOR OUTPATIENT REHABILITATION   Patient's Last Name, First Name, MYOSVANY  Blaise Soton  E YOB: 1950   Provider's Name   [facility]   Medical Record No.  [id number]     Onset Date: 08/26/24 (Date recorded is date of referral, but symptoms are chronic and have lasted over 1 year.)  Start of Care Date: 10/03/24     Medical Diagnosis:  Chronic bilateral low back pain with  bilateral sciatica  Lumbar radiculopathy  Lumbar foraminal stenosis  Spondylolisthesis of lumbar region  History of lumbar surgery      PT Treatment Diagnosis:  Chronic low back pain with mobility and strength deficits, as well as impaired functional mobility and balance Plan of Treatment  Frequency/Duration: 1 time per week/ 10 weeks    Certification date from 10/03/24 to 12/12/24         See note for plan of treatment details and functional goals     Twin Garcia, PT                         I CERTIFY THE NEED FOR THESE SERVICES FURNISHED UNDER        THIS PLAN OF TREATMENT AND WHILE UNDER MY CARE     (Physician attestation of this document indicates review and certification of the therapy plan).              Referring Provider:  Chula Garcia    Initial Assessment  See Epic Evaluation- Start of Care Date: 10/03/24

## 2024-10-08 ENCOUNTER — THERAPY VISIT (OUTPATIENT)
Dept: PHYSICAL THERAPY | Facility: REHABILITATION | Age: 74
End: 2024-10-08
Payer: COMMERCIAL

## 2024-10-08 DIAGNOSIS — M54.16 LUMBAR RADICULOPATHY: ICD-10-CM

## 2024-10-08 DIAGNOSIS — M48.061 LUMBAR FORAMINAL STENOSIS: ICD-10-CM

## 2024-10-08 DIAGNOSIS — Z98.890 HISTORY OF LUMBAR SURGERY: ICD-10-CM

## 2024-10-08 DIAGNOSIS — M54.41 CHRONIC BILATERAL LOW BACK PAIN WITH BILATERAL SCIATICA: Primary | ICD-10-CM

## 2024-10-08 DIAGNOSIS — G89.29 CHRONIC BILATERAL LOW BACK PAIN WITH BILATERAL SCIATICA: Primary | ICD-10-CM

## 2024-10-08 DIAGNOSIS — Z74.09 IMPAIRED FUNCTIONAL MOBILITY, BALANCE, GAIT, AND ENDURANCE: ICD-10-CM

## 2024-10-08 DIAGNOSIS — M43.16 SPONDYLOLISTHESIS OF LUMBAR REGION: ICD-10-CM

## 2024-10-08 DIAGNOSIS — M54.42 CHRONIC BILATERAL LOW BACK PAIN WITH BILATERAL SCIATICA: Primary | ICD-10-CM

## 2024-10-08 PROCEDURE — 97110 THERAPEUTIC EXERCISES: CPT | Mod: GP

## 2024-10-08 PROCEDURE — 97750 PHYSICAL PERFORMANCE TEST: CPT | Mod: GP

## 2024-10-08 NOTE — PROGRESS NOTES
PLAN  Continue therapy per current plan of care.    Beginning/End Dates of Progress Note Reporting Period:  10/03/2024 to 10/08/2024    Referring Provider:  Chula Garcia           10/08/24 0500   Appointment Info   Signing clinician's name / credentials Twin Garcia, PT   Visits Used 2   Medical Diagnosis Chronic bilateral low back pain with bilateral sciatica  Lumbar radiculopathy  Lumbar foraminal stenosis  Spondylolisthesis of lumbar region  History of lumbar surgery   PT Tx Diagnosis Chronic low back pain with mobility and strength deficits, as well as impaired functional mobility and balance   Progress Note/Certification   Start of Care Date 10/03/24   Onset of illness/injury or Date of Surgery 08/26/24  (Date recorded is date of referral, but symptoms are chronic and have lasted over 1 year.)   Therapy Frequency 1 time per week   Predicted Duration 10 weeks   Certification date from 10/03/24   Certification date to 12/12/24   Progress Note Due Date 10/31/24   PT Goal 1   Goal Identifier HEP   Goal Description Ciarra will demonstrate mastery of ([id number] need for cueing) and compliance with (completion at least 5/7 days/week) her home exercise program to facilitate increased rate of improvement.   Goal Progress In progress   Target Date 10/31/24   PT Goal 2   Goal Identifier LATRICIA   Goal Description Ciarra will demonstrate significantly improved function as evidenced by an improved (decreased) LATRICIA score of less than or equal to 7.14%.   Goal Progress 37.14%   Target Date 12/12/24   PT Goal 3   Goal Identifier Walking   Goal Description Ciarra will demonstrate improved tolerance to functional mobility as evidenced by her abilityto walk for up to 20 minutes with self-report low back and leg pain no higher than 2/10.   Goal Progress Unable   Target Date 12/12/24   PT Goal 4   Goal Identifier FGA   Goal Description Ciarra will demonstrate improved safety and independence with functional mobility and a  decreased fall risk as evidenced by an improved FGA score of at least 15/30.   Goal Progress 12/30   Target Date 12/12/24   Subjective Report   Subjective Report Ciarra returns to physical therapy reporting slight improvement since last visit. She reports that she feels better and more positive about her recovery now that she is back in physical therapy.   Objective Measures   Objective Measures Objective Measure 1;Objective Measure 2;Objective Measure 3;Objective Measure 4   Objective Measure 1   Objective Measure Worst Pain   Details Since last visit: 6.5/10   Objective Measure 2   Objective Measure Lower Extremity Strength   Details Initial evaluation: Hip flexion: Left 4/5, right 3/5.  Hip abduction and adduction: Left 5/5, right 5-/5.  Knee flexion: 5/5 bilaterally.  Knee extension: Left 5/5, right 5-/5.  Ankle dorsiflexion and plantarflexion: 5/5 bilaterally.  Great toe extension: 5/5 bilaterally.   Objective Measure 3   Objective Measure Slump   Details Initial evaluation: Left: Positive.  Right: Negative.   Therapeutic Procedure/Exercise   Therapeutic Procedures: strength, endurance, ROM, flexibility minutes (35553) 21   Ther Proc 1 Treadmill + subjective report   Ther Proc 1 - Details 6 minutes. See subjective report.   Ther Proc 2 Lower trunk rotation   Ther Proc 2 - Details 2 minutes (cueing for appropriate speed)   Ther Proc 3 Single knee-to-chest   Ther Proc 3 - Details 2 x 45 seconds bilateral   Ther Proc 4 Glute set   Ther Proc 4 - Details Supine: 1 x 10 with 10-second holds   Skilled Intervention Exercises to improve core, low back, and hip/lower extremity flexibility/mobility and strength/stability.   Patient Response/Progress Added clam shells. Ciarra tolerated all exercises and progressions well without increased symptoms and with cueing for proper form.   Ther Proc 5 Clam shell   Ther Proc 5 - Details 2 x 10 bilateral (verbal and tactile cueing to avoid posterior trunk rotation)   Neuromuscular  Re-education   Neuromuscular re-ed of mvmt, balance, coord, kinesthetic sense, posture, proprioception minutes (41536) 5   Neuro Re-ed 1 Seated sciatic nerve glides (knee extension + ankle dorsiflexion)   Neuro Re-ed 1 - Details 1 x 15 bilateral   Skilled Intervention Nerve glides to increase nerve mobility and decrease radicular symptoms   Patient Response/Progress Ciarra tolerated nerve glides well with cueing for proper form.   Physical Performance Test/measures   Physical Performance Test/Measurement, Minutes (50412) 12   Skilled Intervention FGA + explanation/setup + discussion of results   Patient Response/Progress See objective measures.   Education   Learner/Method Patient;Listening;Demonstration;Pictures/Video;No Barriers to Learning   Plan   Home program See PTRx.   Plan for next session Continue to progress core, low back, and hip/lower extremity flexibility/mobility and strength/stability exercises. Progress nerve glides as tolerated. Introduce balance training. Trial manual therapy as appropriate.   Comments   Comments Impression/Assessment: Ciarra returns to physical therapy reporting that she feels more positive now that she is back in physical therapy. The FGA was adminstered to further evaluate her balance and gait, and she scored 12/30, indicating impaired functional mobility and increased fall risk, and this will be addressed with balance and gait training as appropriate at future visits. Therapy today consisted primarily of review and progression of her core mobility and stability exercises, all of which she tolerated well. Nerve glides were also introduced with good patient tolerance. She will continue to benefit from physical therapy to progress toward her goals and improve her function.   Total Session Time   Timed Code Treatment Minutes 38   Total Treatment Time (sum of timed and untimed services) 38

## 2024-10-14 ENCOUNTER — TELEPHONE (OUTPATIENT)
Dept: CARDIOLOGY | Facility: CLINIC | Age: 74
End: 2024-10-14
Payer: COMMERCIAL

## 2024-10-14 NOTE — TELEPHONE ENCOUNTER
90 day supply sent.  Message sent to scheduling.  Pt last seen 11/14/23 - rec 4-6 mo follow-up.  -ral   [facility]

## 2024-10-14 NOTE — TELEPHONE ENCOUNTER
LVM - staff were previously notified that she is needing to spend the night, and we aren't sure what time she will be discharged the next, but will need to find a ride as there is no driving for 3 days post-procedure, and an Uber is not acceptable.  -ral    ----- Message from Lexi DUFF sent at 10/14/2024 12:24 PM CDT -----  Hey,  Can you call to discuss post op care/driving with patient?     Thanks!  Lexi

## 2024-10-20 DIAGNOSIS — F41.1 ANXIETY, GENERALIZED: ICD-10-CM

## 2024-10-22 ENCOUNTER — THERAPY VISIT (OUTPATIENT)
Dept: PHYSICAL THERAPY | Facility: REHABILITATION | Age: 74
End: 2024-10-22
Payer: COMMERCIAL

## 2024-10-22 ENCOUNTER — TELEPHONE (OUTPATIENT)
Dept: PHYSICAL THERAPY | Facility: REHABILITATION | Age: 74
End: 2024-10-22

## 2024-10-22 DIAGNOSIS — G89.29 CHRONIC BILATERAL LOW BACK PAIN WITH BILATERAL SCIATICA: Primary | ICD-10-CM

## 2024-10-22 DIAGNOSIS — M54.16 LUMBAR RADICULOPATHY: ICD-10-CM

## 2024-10-22 DIAGNOSIS — M54.41 CHRONIC BILATERAL LOW BACK PAIN WITH BILATERAL SCIATICA: Primary | ICD-10-CM

## 2024-10-22 DIAGNOSIS — M43.16 SPONDYLOLISTHESIS OF LUMBAR REGION: ICD-10-CM

## 2024-10-22 DIAGNOSIS — M54.42 CHRONIC BILATERAL LOW BACK PAIN WITH BILATERAL SCIATICA: Primary | ICD-10-CM

## 2024-10-22 DIAGNOSIS — Z98.890 HISTORY OF LUMBAR SURGERY: ICD-10-CM

## 2024-10-22 DIAGNOSIS — M48.061 LUMBAR FORAMINAL STENOSIS: ICD-10-CM

## 2024-10-22 PROCEDURE — 97112 NEUROMUSCULAR REEDUCATION: CPT | Mod: GP

## 2024-10-22 PROCEDURE — 97110 THERAPEUTIC EXERCISES: CPT | Mod: GP

## 2024-10-24 NOTE — TELEPHONE ENCOUNTER
Pt does currently have stock of medication. Pt did not run out. They will be out in a few days. Pt has not missed doses.    Anusha Herzog RN

## 2024-10-25 DIAGNOSIS — I48.0 PAROXYSMAL ATRIAL FIBRILLATION (H): Primary | ICD-10-CM

## 2024-10-25 RX ORDER — BUSPIRONE HYDROCHLORIDE 15 MG/1
15 TABLET ORAL DAILY
Qty: 90 TABLET | Refills: 3 | Status: SHIPPED | OUTPATIENT
Start: 2024-10-25

## 2024-10-25 NOTE — TELEPHONE ENCOUNTER
VM left for writer by patient with questions regarding upcoming procedure. Phone call to patient, LM for return call. MYRA

## 2024-10-27 LAB
ABO/RH(D): NORMAL
ANTIBODY SCREEN: NEGATIVE
SPECIMEN EXPIRATION DATE: NORMAL

## 2024-10-28 ENCOUNTER — PREP FOR PROCEDURE (OUTPATIENT)
Dept: CARDIOLOGY | Facility: CLINIC | Age: 74
End: 2024-10-28

## 2024-10-28 ENCOUNTER — OFFICE VISIT (OUTPATIENT)
Dept: CARDIOLOGY | Facility: CLINIC | Age: 74
End: 2024-10-28
Payer: COMMERCIAL

## 2024-10-28 ENCOUNTER — DOCUMENTATION ONLY (OUTPATIENT)
Dept: CARDIOLOGY | Facility: CLINIC | Age: 74
End: 2024-10-28

## 2024-10-28 ENCOUNTER — APPOINTMENT (OUTPATIENT)
Dept: CARDIOLOGY | Facility: CLINIC | Age: 74
End: 2024-10-28
Payer: COMMERCIAL

## 2024-10-28 ENCOUNTER — ALLIED HEALTH/NURSE VISIT (OUTPATIENT)
Dept: CARDIOLOGY | Facility: CLINIC | Age: 74
End: 2024-10-28
Payer: COMMERCIAL

## 2024-10-28 VITALS
HEART RATE: 62 BPM | DIASTOLIC BLOOD PRESSURE: 58 MMHG | WEIGHT: 148 LBS | OXYGEN SATURATION: 98 % | SYSTOLIC BLOOD PRESSURE: 132 MMHG | RESPIRATION RATE: 16 BRPM | HEIGHT: 62 IN | BODY MASS INDEX: 27.23 KG/M2

## 2024-10-28 DIAGNOSIS — Z86.73 HISTORY OF TIA (TRANSIENT ISCHEMIC ATTACK) AND STROKE: ICD-10-CM

## 2024-10-28 DIAGNOSIS — I25.708 CORONARY ARTERY DISEASE OF BYPASS GRAFT OF NATIVE HEART WITH STABLE ANGINA PECTORIS (H): ICD-10-CM

## 2024-10-28 DIAGNOSIS — I48.0 PAROXYSMAL ATRIAL FIBRILLATION (H): Primary | ICD-10-CM

## 2024-10-28 DIAGNOSIS — F41.1 GENERALIZED ANXIETY DISORDER: ICD-10-CM

## 2024-10-28 DIAGNOSIS — I10 ESSENTIAL HYPERTENSION: Primary | ICD-10-CM

## 2024-10-28 DIAGNOSIS — Z79.4 TYPE 2 DIABETES MELLITUS WITH DIABETIC POLYNEUROPATHY, WITH LONG-TERM CURRENT USE OF INSULIN (H): ICD-10-CM

## 2024-10-28 DIAGNOSIS — E11.42 TYPE 2 DIABETES MELLITUS WITH DIABETIC POLYNEUROPATHY, WITH LONG-TERM CURRENT USE OF INSULIN (H): ICD-10-CM

## 2024-10-28 DIAGNOSIS — I48.0 PAROXYSMAL ATRIAL FIBRILLATION (H): ICD-10-CM

## 2024-10-28 PROBLEM — Z79.01 ANTICOAGULATION GOAL OF INR 2 TO 3: Status: RESOLVED | Noted: 2023-12-07 | Resolved: 2024-10-28

## 2024-10-28 PROBLEM — Z51.81 ANTICOAGULATION GOAL OF INR 2 TO 3: Status: RESOLVED | Noted: 2023-12-07 | Resolved: 2024-10-28

## 2024-10-28 LAB
ANION GAP SERPL CALCULATED.3IONS-SCNC: 9 MMOL/L (ref 7–15)
ATRIAL RATE - MUSE: 60 BPM
BUN SERPL-MCNC: 10.3 MG/DL (ref 8–23)
CALCIUM SERPL-MCNC: 9.2 MG/DL (ref 8.8–10.4)
CHLORIDE SERPL-SCNC: 99 MMOL/L (ref 98–107)
CREAT SERPL-MCNC: 0.69 MG/DL (ref 0.51–0.95)
DIASTOLIC BLOOD PRESSURE - MUSE: NORMAL MMHG
EGFRCR SERPLBLD CKD-EPI 2021: 90 ML/MIN/1.73M2
ERYTHROCYTE [DISTWIDTH] IN BLOOD BY AUTOMATED COUNT: 12.5 % (ref 10–15)
GLUCOSE SERPL-MCNC: 200 MG/DL (ref 70–99)
HCO3 SERPL-SCNC: 30 MMOL/L (ref 22–29)
HCT VFR BLD AUTO: 37.8 % (ref 35–47)
HGB BLD-MCNC: 12.2 G/DL (ref 11.7–15.7)
INTERPRETATION ECG - MUSE: NORMAL
MCH RBC QN AUTO: 29.5 PG (ref 26.5–33)
MCHC RBC AUTO-ENTMCNC: 32.3 G/DL (ref 31.5–36.5)
MCV RBC AUTO: 91 FL (ref 78–100)
P AXIS - MUSE: 7 DEGREES
PLATELET # BLD AUTO: 290 10E3/UL (ref 150–450)
POTASSIUM SERPL-SCNC: 5.1 MMOL/L (ref 3.4–5.3)
PR INTERVAL - MUSE: 164 MS
QRS DURATION - MUSE: 110 MS
QT - MUSE: 436 MS
QTC - MUSE: 436 MS
R AXIS - MUSE: 43 DEGREES
RBC # BLD AUTO: 4.14 10E6/UL (ref 3.8–5.2)
SODIUM SERPL-SCNC: 138 MMOL/L (ref 135–145)
SYSTOLIC BLOOD PRESSURE - MUSE: NORMAL MMHG
T AXIS - MUSE: 129 DEGREES
VENTRICULAR RATE- MUSE: 60 BPM
WBC # BLD AUTO: 6.3 10E3/UL (ref 4–11)

## 2024-10-28 PROCEDURE — 99215 OFFICE O/P EST HI 40 MIN: CPT | Performed by: INTERNAL MEDICINE

## 2024-10-28 PROCEDURE — 99207 PR NO CHARGE NURSE ONLY: CPT

## 2024-10-28 PROCEDURE — 93000 ELECTROCARDIOGRAM COMPLETE: CPT | Performed by: STUDENT IN AN ORGANIZED HEALTH CARE EDUCATION/TRAINING PROGRAM

## 2024-10-28 PROCEDURE — 86900 BLOOD TYPING SEROLOGIC ABO: CPT | Performed by: INTERNAL MEDICINE

## 2024-10-28 PROCEDURE — 86850 RBC ANTIBODY SCREEN: CPT | Performed by: INTERNAL MEDICINE

## 2024-10-28 PROCEDURE — 86901 BLOOD TYPING SEROLOGIC RH(D): CPT | Performed by: INTERNAL MEDICINE

## 2024-10-28 PROCEDURE — 80048 BASIC METABOLIC PNL TOTAL CA: CPT | Performed by: INTERNAL MEDICINE

## 2024-10-28 PROCEDURE — 36415 COLL VENOUS BLD VENIPUNCTURE: CPT | Performed by: INTERNAL MEDICINE

## 2024-10-28 PROCEDURE — 85027 COMPLETE CBC AUTOMATED: CPT | Performed by: INTERNAL MEDICINE

## 2024-10-28 RX ORDER — NICOTINE POLACRILEX 4 MG
15-30 LOZENGE BUCCAL
Status: CANCELLED | OUTPATIENT
Start: 2024-10-31

## 2024-10-28 RX ORDER — CEFAZOLIN SODIUM/WATER 2 G/20 ML
2 SYRINGE (ML) INTRAVENOUS
Status: CANCELLED | OUTPATIENT
Start: 2024-10-31

## 2024-10-28 RX ORDER — ASPIRIN 81 MG/1
81 TABLET ORAL ONCE
Status: CANCELLED | OUTPATIENT
Start: 2024-10-28 | End: 2024-10-28

## 2024-10-28 RX ORDER — LIDOCAINE 40 MG/G
CREAM TOPICAL
Status: CANCELLED | OUTPATIENT
Start: 2024-10-28

## 2024-10-28 RX ORDER — DEXTROSE MONOHYDRATE 25 G/50ML
25-50 INJECTION, SOLUTION INTRAVENOUS
Status: CANCELLED | OUTPATIENT
Start: 2024-10-31

## 2024-10-28 NOTE — PROGRESS NOTES
Kemi Soto  [address] [place] [id number]  [phone] (home)     Patient in to see RN for Pre-LAAC visit on 10/28/24    All pre-procedure labs drawn: 10/28/24   EKG obtained: 10/28/24   Labs reviewed: pending  Renal Issues: No  Diabetic?: Yes  Device?: No        Pre-procedure instructions  Patient instructed to be NPO after midnight the evening prior to procedure.  Patient instructed to shower the evening before or the morning of the procedure.  Leave all valuables at home (jewlery, rings, watches, large amounts of money).  Patient understands there are two visitors allowed during patients stay.    Patient instructed to arrange for transportation home following procedure from a responsible family member of friend. No driving for at least 72 hours post-procedure.  Patient instructed to have a responsible adult with them for 24 hours post-procedure.  Post-procedure follow up process.    Patient instructed on medications:   Take Eliquis, isosorbide, sotalol, losartan, buspar, sertraline, and 1/2 dose of Lantus evening before procedure.    Aspirin 81mg morning of procedure: To be given in pre-procedure area    Education was given to patient regarding what to expect pre-procedure.     Patient was informed procedure will be done at [facility], [address] [place] [address]. They have been instructed to check in at the  at the Hospital and their arrival time is at 6:00 AM    LAAC procedure, ANNE  and blood consent signed at the time of the appt: yes    All questions were answered to family and patient by RN.    Patient present at the time of appointment. Patient will be present day of procedure.    Priscila Merchant RN  Structural Heart Coordinator   [facility]  [phone]    Patient Active Problem List   Diagnosis    Obesity    Hypercholesterolemia    Essential hypertension    Type 2 diabetes mellitus with diabetic polyneuropathy, with  long-term current use of insulin (H)    Benign neoplasm of choroid    Paroxysmal atrial fibrillation (H)    Coronary artery disease of bypass graft of native heart with stable angina pectoris (H)    Bilateral low back pain without sciatica, unspecified chronicity    Benign adenomatous polyp of large intestine    Bilateral hearing loss    Chronic anticoagulation    History of TIA (transient ischemic attack) and stroke    Peripheral neuropathy    Psoriasis, guttate    Type 2 myocardial infarction due to arrhythmia (H)    Urinary incontinence    Abnormal nuclear stress test    Benign neoplasm of cecum    Benign neoplasm of transverse colon    Generalized anxiety disorder    Anticoagulation goal of INR 2 to 3    Chronic bilateral low back pain with bilateral sciatica    Lumbar radiculopathy    Spondylolisthesis of lumbar region    Lumbar foraminal stenosis    Lumbar facet arthropathy    Abnormal result of other cardiovascular function study    Encounter for long-term (current) use of insulin (H)    Iron deficiency anemia    Abdominal wall abscess    History of lumbar surgery     Current Outpatient Medications   Medication Sig Dispense Refill    acetaminophen (TYLENOL) 500 MG tablet Take 1,000 mg by mouth every 12 hours as needed for mild pain      apixaban ANTICOAGULANT (ELIQUIS ANTICOAGULANT) 5 MG tablet Take 1 tablet (5 mg) by mouth 2 times daily 60 tablet 11    aspirin 81 MG EC tablet Take 1 tablet (81 mg) by mouth daily.      atorvastatin (LIPITOR) 80 MG tablet Take 1 tablet (80 mg) by mouth at bedtime 90 tablet 3    blood glucose (NO BRAND SPECIFIED) test strip Use to test blood sugar 1 times daily. To accompany: Blood Glucose Monitor Brands: per insurance. 100 strip 6    blood glucose monitoring (NO BRAND SPECIFIED) meter device kit Use to test blood sugar 1 times daily. Preferred blood glucose meter OR supplies to accompany: Blood Glucose Monitor Brands: per insurance. 1 kit 0    busPIRone (BUSPAR) 15 MG tablet  Take 1 tablet by mouth once daily 90 tablet 3    clobetasol propionate (TEMOVATE) 0.05 % external cream Apply topically 2 times daily. Apply to rash on torso 60 g 2    clobetasol propionate (TEMOVATE) 0.05 % external cream Apply topically 2 times daily. Apply pea size amount to rashes on torso 60 g 3    ezetimibe (ZETIA) 10 MG tablet Take 1 tablet (10 mg) by mouth daily 90 tablet 3    gabapentin (NEURONTIN) 300 MG capsule TAKE 3 CAPSULES BY MOUTH EVERY DAY AT BEDTIME 270 capsule 3    hydroCHLOROthiazide 12.5 MG tablet Take 1 tablet (12.5 mg) by mouth daily -- appt needed for further refills. 90 tablet 3    isosorbide mononitrate (IMDUR) 60 MG 24 hr tablet Take 1 tablet (60 mg) by mouth daily 90 tablet 3    ketoconazole (NIZORAL) 2 % external cream APPLY 1 APPLICATION TOPICALLY TWICE DAILY TO AFFECTED AREAS FOR 4 WEEKS THEN AS NEEDED (Patient not taking: Reported on 10/28/2024)      LANTUS SOLOSTAR 100 UNIT/ML soln Inject 28 Units subcutaneously every morning (Patient taking differently: Inject 28 Units subcutaneously every evening.) 45 mL 3    losartan (COZAAR) 100 MG tablet Take 1 tablet (100 mg) by mouth daily 90 tablet 3    naproxen (NAPROSYN) 500 MG tablet Take 500 mg by mouth 2 times daily (with meals)      sertraline (ZOLOFT) 100 MG tablet Take 1 tablet by mouth once daily 90 tablet 0    sotalol (BETAPACE) 80 MG tablet Take 0.5 tablets (40 mg) by mouth 2 times daily 90 tablet 3    thin (NO BRAND SPECIFIED) lancets Use with lanceting device. To accompany: Blood Glucose Monitor Brands: per insurance. 100 each 6    triamcinolone (KENALOG) 0.1 % external cream Apply topically 2 times daily (Patient not taking: Reported on 10/28/2024)       No current facility-administered medications for this visit.     Facility-Administered Medications Ordered in Other Visits   Medication Dose Route Frequency Provider Last Rate Last Admin    dexamethasone PF (DECADRON) injection    Once PRN Ady Garsia DO   20 mg at  05/19/23 1326    iohexol (OMNIPAQUE) 300 mg/mL injection    Once PRN Ady Garsia, DO   1 mL at 05/19/23 1327    lidocaine (PF) (XYLOCAINE) 1 % injection    Once PRN Ady Garsia, DO   4 mL at 05/19/23 1326      Allergies   Allergen Reactions    Metformin GI Disturbance    Oxycodone Nausea and Vomiting

## 2024-10-28 NOTE — LETTER
10/28/2024    Zaheer Sandra MD  [address] [facility] [address]    RE: Kemi Soto       Dear Colleague,     I had the pleasure of seeing Kemi Soto in the [facility].  HEART CARE ENCOUNTER NOTE       [facility]  [phone]    I was present with the physician assistant student who participated in the service and in the documentation of the note. I have verified the history and personally performed the physical exam and medical decision making. I agree with the assessment and plan of care as documented in the note.    I personally reviewed vital signs, medications, and labs.     Her consents have been signed and witnessed by me.   Appears to have a sacral dimple (sounds like a hx of pilonidal cyst) - crevice measures 1 mm x 1 mm x 0.5 mm.  No redness or draining with slight maceration along edges    Patient to stay overnight due to no transportation    Date of Service (when I saw the patient): 10/28/24    Luzmaria Max PA-C, [facility]  Structural Heart Program  [facility]        Assessment/Recommendations   1.  Paroxysmal atrial fibrillation: I have personally reviewed this patient's chart and have spoken with the patient about the treatment options, including SHAVON device.  She has a NTH1GB3-XDBc score of 8 for  age, HTN, TIA, CAD, DM and gender.  She has a HAS-BLED score of 3 for age, bleeding predisposition, and use of NSAIDs.   She is not a good candidate for long-term anticoagulation due to fall risk, concurrent NSAID use, and cost.  She is scheduled for the Watchman left atrial appendage occlusion device on 10/31/24 with Dr. Garsia.     The patient will stay on her current Eliquis regimen.  She started taking aspirin 81 mg daily on 10/17/24. She will stay on these 2 agents for 6 weeks after implant. After 6 weeks, she will stop the eliquis and start plavix 75 mg daily.  She will continue DAPT for the remainder of 6 months, at  which time she will stop the Plavix and continue long term ASA therapy.  3-months post-implant, she will have either a CTA or ANNE to evaluate the device for leaks and/or DRT.  She understands that the risks of the procedure are <2% and include, but are not limited to device embolization, air embolism, myocardial perforation, device thrombosis, ASD, stroke, or death.  We discussed expected recovery and follow-up.       The patient is a good candidate for proceeding with left atrial appendage implant.  Her questions were answered to her satisfaction.     2.  Coronary artery disease -status post CABG/multiple stents -angiogram 2023 showed patent LIMA-LAD, VG's known to be occluded, mid vessel occluded stents in the Lcx. Currently denies angina.  Continue Imdur 60 mg daily, atorvastatin and ASA     3.  Hyperlipidemia - Last LDL 49 on 7/15/24.  Continue Lipitor 80 mg daily, Zetia 10 mg daily     4.  Hypertension - BP is controlled.  She should continue losartan 100 mg daily, hydrochlorothiazide 12.5 mg daily and imdur 60 mg daily    5. Type II DM - insulin dependent, on Lantus with last Hgb A1C 7.5 in July.        History of Present Illness/Subjective    Kemi Soto is a 74 year old female who comes in today for history and physical prior to insertion of left atrial appendage occulsion device on 10/31 with Dr. Garsia.    Kemi Soto has a past history of paroxysmal atrial fibrillation/atrial flutter, hypertension, coronary artery disease status post CABG x 4, hyperlipidemia, type 2 diabetes mellitus, history of TIA, and peripheral neuropathy.    She has had a diagnosis of atrial fibrillation since at least 2015 without ablation or cardioversion. She denies bruising issues on eliquis but reports prolonged bleeding.  She does use a cane to assist with ambulation and has had several falls in the past.  She does report chronic back issues which decreases her mobility.  She does take naproxen on a regular basis for  "her back pain.  She reports a previous history of TIA back in June 2015 where she had some right sided numbness at the time but all symptoms have resolved. She denies a previous history of DVT or PE.     Patient endorses some concern laying flat on her back after procedure due to chronic back pain and presence of a sore on her gluteal cleft.    Kemi Soto denies chest discomfort, palpitations, shortness of breath, paroxysmal nocturnal dyspnea, orthopnea, lightheadedness, dizziness, pre-syncope, or syncope.  Kemi Soto also denies any weight loss, changes in appetite, nausea or vomiting.     Medical, surgical, family, social history, and medications were reviewed and updated as necessary.    ECHO results (from 10/3/23):  Interpretation Summary     Left ventricular size, wall motion and function are normal. The ejection  fraction is 60-65%.  Normal right ventricle size and systolic function.  No hemodynamically significant valvular abnormalities on 2D or color flow  imaging.    EKG (personally reviewed and interpreted):  10/28/24: NSR with rate of 60 bpm. , , QT/QTc 436/436     Physical Examination Review of Systems   Vitals: /58 (BP Location: Left arm, Patient Position: Sitting)   Pulse 62   Resp 16   Ht 1.575 m (5' 2\")   Wt 67.1 kg (148 lb)   LMP  (LMP Unknown)   SpO2 98%   BMI 27.07 kg/m    BMI= Body mass index is 27.07 kg/m .  Wt Readings from Last 3 Encounters:   10/28/24 67.1 kg (148 lb)   09/17/24 67.6 kg (149 lb)   09/13/24 67.1 kg (148 lb)       General Appearance:   Alert, cooperative and in no acute distress   ENT/Mouth: membranes moist, no oral lesions or bleeding gums.      EYES:  no scleral icterus, normal conjunctivae   Neck: Thyroid not visualized   Chest/Lungs:   lungs are clear to auscultation, no rales or wheezing   Cardiovascular:   Regular rate and rhythm . Normal first and second heart sounds with no murmurs, rubs or gallops; the carotid, radial and posterior " tibial pulses are intact, no edema bilaterally    Abdomen:  Soft and nontender. Bowel sounds are present in all quadrants   Extremities: no cyanosis or clubbing   Skin: no xanthelasma, warm.    Neurologic: normal gait, normal  bilateral, no tremors   Psychiatric: Normal mood and affect       Please refer above for cardiac ROS details.      Medical History  Surgical History Family History Social History   Past Medical History:   Diagnosis Date     Adrenal nodule (H)      Alcohol dependence in remission (H)      Anemia      Antiplatelet or antithrombotic long-term use      Anxiety and depression      Arrhythmia      Benign essential hypertension      Chronic atrial fibrillation (H)      Chronic back pain      Chronic infection      Coronary artery disease      Diabetes mellitus (H)      Difficulty walking      Discitis of thoracolumbar region 10/10/2015     Encephalopathy 10/14/2015     Epidural abscess 10/10/2015     Hip pain, right      History of angina      Hyperlipidemia      Irregular heart beat      Other chronic pain      Ovarian mass      Sepsis (H) 10/08/2015     Stented coronary artery      Stroke (H) 06/23/2015    Neurology felt that episode was C/W subacute ischemic stroke     TIA (transient ischemic attack) 06/23/2015     UTI (urinary tract infection)     admitted to [facility] with UTI     Walking troubles      Past Surgical History:   Procedure Laterality Date     ANGIOPLASTY  03/30/2016    Drug eluting stent mid LAD, cutting balloon to 1st diagonal     ANGIOPLASTY  01/01/2008     BYPASS GRAFT ARTERY CORONARY  12/11/2007    X 3 vessels, LIMA to LAD, saph vein graft to distal RCA, saphvein graft to marginal, mini circuit bypass.  [facility].     CORONARY STENT PLACEMENT  01/01/2008    Left main, left circumflex, RCA     CORONARY STENT PLACEMENT  03/01/2016     CV ANGIOGRAM CORONARY GRAFT N/A 10/11/2023    Procedure: Coronary Angiogram Graft;  Surgeon: Sam Boo MD;  Location:  [facility] CATH LAB CV     CV CORONARY ANGIOGRAM N/A 2018    Procedure: Coronary Angiogram;  Surgeon: Kit Bean MD;  Location: [facility] Cath Lab;  Service:      CV LEFT HEART CATHETERIZATION WITH LEFT VENTRICULOGRAM N/A 2018    Procedure: Left Heart Catheterization with Left Ventriculogram;  Surgeon: Kit Bean MD;  Location: [facility] Cath Lab;  Service:      EXCISE MASS TRUNK N/A 2024    Procedure: EXCISION ABDOMINAL WALL MASS AND PLACEMENT OF DRAIN;  Surgeon: Braden Bah DO;  Location: [place] [facility] OR     INSERT MIDLINE HE  10/31/2015          LUMBAR DISCECTOMY N/A 10/11/2015    BILATERAL L1-L5 DECOMPRESSIVE LAMINECTOMY WITH EVACUATION OF EPIDURAL ABSCESS IRRIGATION & DEBRIDEMENT;  Surgeon: Luli Chan MD;  Location: [facility] OR; Due to sepsis     RELEASE CARPAL TUNNEL Bilateral      UMBILICAL HERNIA REPAIR       Family History   Problem Relation Age of Onset     Cerebrovascular Disease Mother      Diabetes Father      Coronary Artery Disease Father      Diabetes Sister      Cerebrovascular Disease Sister 81     Cerebrovascular Disease Brother      Diabetes Brother      Cancer Paternal Grandfather      Anesthesia Reaction No family hx of      Thrombosis No family hx of     Social History     Socioeconomic History     Marital status:      Spouse name: Not on file     Number of children: 1     Years of education: Not on file     Highest education level: Not on file   Occupational History     Occupation: retired   Tobacco Use     Smoking status: Former     Current packs/day: 0.00     Types: Cigarettes     Quit date: 2007     Years since quittin.3     Passive exposure: Past     Smokeless tobacco: Never   Vaping Use     Vaping status: Never Used   Substance and Sexual Activity     Alcohol use: Not Currently     Comment: Stopped drinking 2015     Drug use: No     Sexual activity: Never   Other Topics Concern     Not on file   Social History  Narrative    Lives alone with cats and dogs. , one daughter, Tory Lofton.      Social Drivers of Health     Financial Resource Strain: Low Risk  (1/5/2024)    Financial Resource Strain      Within the past 12 months, have you or your family members you live with been unable to get utilities (heat, electricity) when it was really needed?: No   Food Insecurity: No Food Insecurity (7/18/2024)    Received from [facility]    Hunger Vital Sign      Worried About Running Out of Food in the Last Year: Never true      Ran Out of Food in the Last Year: Never true   Transportation Needs: No Transportation Needs (7/18/2024)    Received from [facility]    PRAPARE - Transportation      Lack of Transportation (Medical): No      Lack of Transportation (Non-Medical): No   Physical Activity: Inactive (5/8/2024)    Received from [facility]    Exercise Vital Sign      Days of Exercise per Week: 0 days      Minutes of Exercise per Session: 0 min   Stress: Not on file   Social Connections: Not on file   Interpersonal Safety: Not At Risk (7/18/2024)    Received from [facility]    Humiliation, Afraid, Rape, and Kick questionnaire      Fear of Current or Ex-Partner: No      Emotionally Abused: No      Physically Abused: No      Sexually Abused: No   Housing Stability: Low Risk  (7/18/2024)    Received from [facility]    Housing Stability      What is your living situation today?: I have a steady place to live          Medications  Allergies   Current Outpatient Medications   Medication Sig Dispense Refill     acetaminophen (TYLENOL) 500 MG tablet Take 1,000 mg by mouth every 12 hours as needed for mild pain       apixaban ANTICOAGULANT (ELIQUIS ANTICOAGULANT) 5 MG tablet Take 1 tablet (5 mg) by mouth 2 times daily 60 tablet 11     aspirin 81 MG EC tablet Take 1 tablet (81 mg) by mouth daily.       atorvastatin (LIPITOR) 80 MG tablet Take 1 tablet (80 mg) by mouth at bedtime 90 tablet 3     blood glucose (NO BRAND SPECIFIED) test  strip Use to test blood sugar 1 times daily. To accompany: Blood Glucose Monitor Brands: per insurance. 100 strip 6     blood glucose monitoring (NO BRAND SPECIFIED) meter device kit Use to test blood sugar 1 times daily. Preferred blood glucose meter OR supplies to accompany: Blood Glucose Monitor Brands: per insurance. 1 kit 0     busPIRone (BUSPAR) 15 MG tablet Take 1 tablet by mouth once daily 90 tablet 3     clobetasol propionate (TEMOVATE) 0.05 % external cream Apply topically 2 times daily. Apply to rash on torso 60 g 2     clobetasol propionate (TEMOVATE) 0.05 % external cream Apply topically 2 times daily. Apply pea size amount to rashes on torso 60 g 3     ezetimibe (ZETIA) 10 MG tablet Take 1 tablet (10 mg) by mouth daily 90 tablet 3     gabapentin (NEURONTIN) 300 MG capsule TAKE 3 CAPSULES BY MOUTH EVERY DAY AT BEDTIME 270 capsule 3     hydroCHLOROthiazide 12.5 MG tablet Take 1 tablet (12.5 mg) by mouth daily -- appt needed for further refills. 90 tablet 3     isosorbide mononitrate (IMDUR) 60 MG 24 hr tablet Take 1 tablet (60 mg) by mouth daily 90 tablet 3     LANTUS SOLOSTAR 100 UNIT/ML soln Inject 28 Units subcutaneously every morning (Patient taking differently: Inject 28 Units subcutaneously every evening.) 45 mL 3     losartan (COZAAR) 100 MG tablet Take 1 tablet (100 mg) by mouth daily 90 tablet 3     naproxen (NAPROSYN) 500 MG tablet Take 500 mg by mouth 2 times daily (with meals)       sertraline (ZOLOFT) 100 MG tablet Take 1 tablet by mouth once daily 90 tablet 0     sotalol (BETAPACE) 80 MG tablet Take 0.5 tablets (40 mg) by mouth 2 times daily 90 tablet 3     thin (NO BRAND SPECIFIED) lancets Use with lanceting device. To accompany: Blood Glucose Monitor Brands: per insurance. 100 each 6     ketoconazole (NIZORAL) 2 % external cream APPLY 1 APPLICATION TOPICALLY TWICE DAILY TO AFFECTED AREAS FOR 4 WEEKS THEN AS NEEDED (Patient not taking: Reported on 10/28/2024)       triamcinolone (KENALOG)  "0.1 % external cream Apply topically 2 times daily (Patient not taking: Reported on 10/28/2024)      Allergies   Allergen Reactions     Metformin GI Disturbance     Oxycodone Nausea and Vomiting         Lab Results    Chemistry/lipid CBC Cardiac Enzymes/BNP/TSH/INR   Recent Labs   Lab Test 07/15/24  1359   CHOL 119   HDL 37*   LDL 49   TRIG 163*     Recent Labs   Lab Test 07/15/24  1359 03/29/24  1035 12/07/23  1452   LDL 49 36 51     Recent Labs   Lab Test 07/15/24  1359      POTASSIUM 4.6   CHLORIDE 101   CO2 30*   *   BUN 11.1   CR 0.77   GFRESTIMATED 80   ELLIE 9.4     Recent Labs   Lab Test 07/15/24  1359 06/13/24  1501 03/29/24  1035   CR 0.77 0.90 1.09*     Recent Labs   Lab Test 07/15/24  1359 03/29/24  1035 12/07/23  1452   A1C 7.5* 8.1* 9.5*    Recent Labs   Lab Test 07/15/24  1359   WBC 10.4   HGB 11.7   HCT 35.5   MCV 93        Recent Labs   Lab Test 07/15/24  1359 06/13/24  1501 01/12/24  1208   HGB 11.7 12.4 12.3    Recent Labs   Lab Test 11/06/20  0559 11/05/20  0559 11/04/20  2229   TROPONINI 0.18 0.37* 0.28     No results for input(s): \"BNP\", \"NTBNPI\", \"NTBNP\" in the last [id number] hours.  Recent Labs   Lab Test 05/05/21  1400   TSH 0.77     Recent Labs   Lab Test 01/12/24  1208 01/11/24  1351 12/15/23  1308   INR 1.7* 1.4* 2.2*        45 minutes spent on the date of encounter doing education, consent signing, chart prep/review, review of test results, interpretation with above tests, patient visit, and documentation.      EVE Glass-S      Thank you for allowing me to participate in the care of your patient.      Sincerely,     AMPARO GERBER PA-C     [facility]  cc:   No referring provider defined for this encounter.      "

## 2024-10-28 NOTE — PROGRESS NOTES
HEART CARE ENCOUNTER NOTE       [facility]  [phone]    I was present with the physician assistant student who participated in the service and in the documentation of the note. I have verified the history and personally performed the physical exam and medical decision making. I agree with the assessment and plan of care as documented in the note.    I personally reviewed vital signs, medications, and labs.     Her consents have been signed and witnessed by me.   Appears to have a sacral dimple (sounds like a hx of pilonidal cyst) - crevice measures 1 mm x 1 mm x 0.5 mm.  No redness or draining with slight maceration along edges    Patient to stay overnight due to no transportation    Date of Service (when I saw the patient): 10/28/24    Luzmaria Max PA-C, [facility]  Structural Heart Program  [facility] [place]        Assessment/Recommendations   1.  Paroxysmal atrial fibrillation: I have personally reviewed this patient's chart and have spoken with the patient about the treatment options, including SHAVON device.  She has a [id number] score of 8 for  age, HTN, TIA, CAD, DM and gender.  She has a HAS-BLED score of 3 for age, bleeding predisposition, and use of NSAIDs.   She is not a good candidate for long-term anticoagulation due to fall risk, concurrent NSAID use, and cost.  She is scheduled for the Watchman left atrial appendage occlusion device on 10/31/24 with Dr. Garsia.     The patient will stay on her current Eliquis regimen.  She started taking aspirin 81 mg daily on 10/17/24. She will stay on these 2 agents for 6 weeks after implant. After 6 weeks, she will stop the eliquis and start plavix 75 mg daily.  She will continue DAPT for the remainder of 6 months, at which time she will stop the Plavix and continue long term ASA therapy.  3-months post-implant, she will have either a CTA or ANNE to evaluate the device for leaks and/or DRT.  She understands that the  risks of the procedure are <2% and include, but are not limited to device embolization, air embolism, myocardial perforation, device thrombosis, ASD, stroke, or death.  We discussed expected recovery and follow-up.       The patient is a good candidate for proceeding with left atrial appendage implant.  Her questions were answered to her satisfaction.     2.  Coronary artery disease -status post CABG/multiple stents -angiogram 2023 showed patent LIMA-LAD, VG's known to be occluded, mid vessel occluded stents in the Lcx. Currently denies angina.  Continue Imdur 60 mg daily, atorvastatin and ASA     3.  Hyperlipidemia - Last LDL 49 on 7/15/24.  Continue Lipitor 80 mg daily, Zetia 10 mg daily     4.  Hypertension - BP is controlled.  She should continue losartan 100 mg daily, hydrochlorothiazide 12.5 mg daily and imdur 60 mg daily    5. Type II DM - insulin dependent, on Lantus with last Hgb A1C 7.5 in July.        History of Present Illness/Subjective    Kemi Soto is a 74 year old female who comes in today for history and physical prior to insertion of left atrial appendage occulsion device on 10/31 with Dr. Garsia.    Kemi Soto has a past history of paroxysmal atrial fibrillation/atrial flutter, hypertension, coronary artery disease status post CABG x 4, hyperlipidemia, type 2 diabetes mellitus, history of TIA, and peripheral neuropathy.    She has had a diagnosis of atrial fibrillation since at least 2015 without ablation or cardioversion. She denies bruising issues on eliquis but reports prolonged bleeding.  She does use a cane to assist with ambulation and has had several falls in the past.  She does report chronic back issues which decreases her mobility.  She does take naproxen on a regular basis for her back pain.  She reports a previous history of TIA back in June 2015 where she had some right sided numbness at the time but all symptoms have resolved. She denies a previous history of DVT or PE.  "    Patient endorses some concern laying flat on her back after procedure due to chronic back pain and presence of a sore on her gluteal cleft.    Kemi Soto denies chest discomfort, palpitations, shortness of breath, paroxysmal nocturnal dyspnea, orthopnea, lightheadedness, dizziness, pre-syncope, or syncope.  Kemi Soto also denies any weight loss, changes in appetite, nausea or vomiting.     Medical, surgical, family, social history, and medications were reviewed and updated as necessary.    ECHO results (from 10/3/23):  Interpretation Summary     Left ventricular size, wall motion and function are normal. The ejection  fraction is 60-65%.  Normal right ventricle size and systolic function.  No hemodynamically significant valvular abnormalities on 2D or color flow  imaging.    EKG (personally reviewed and interpreted):  10/28/24: NSR with rate of 60 bpm. , , QT/QTc 436/436     Physical Examination Review of Systems   Vitals: /58 (BP Location: Left arm, Patient Position: Sitting)   Pulse 62   Resp 16   Ht 1.575 m (5' 2\")   Wt 67.1 kg (148 lb)   LMP  (LMP Unknown)   SpO2 98%   BMI 27.07 kg/m    BMI= Body mass index is 27.07 kg/m .  Wt Readings from Last 3 Encounters:   10/28/24 67.1 kg (148 lb)   09/17/24 67.6 kg (149 lb)   09/13/24 67.1 kg (148 lb)       General Appearance:   Alert, cooperative and in no acute distress   ENT/Mouth: membranes moist, no oral lesions or bleeding gums.      EYES:  no scleral icterus, normal conjunctivae   Neck: Thyroid not visualized   Chest/Lungs:   lungs are clear to auscultation, no rales or wheezing   Cardiovascular:   Regular rate and rhythm . Normal first and second heart sounds with no murmurs, rubs or gallops; the carotid, radial and posterior tibial pulses are intact, no edema bilaterally    Abdomen:  Soft and nontender. Bowel sounds are present in all quadrants   Extremities: no cyanosis or clubbing   Skin: no xanthelasma, warm.  "   Neurologic: normal gait, normal  bilateral, no tremors   Psychiatric: Normal mood and affect       Please refer above for cardiac ROS details.      Medical History  Surgical History Family History Social History   Past Medical History:   Diagnosis Date    Adrenal nodule (H)     Alcohol dependence in remission (H)     Anemia     Antiplatelet or antithrombotic long-term use     Anxiety and depression     Arrhythmia     Benign essential hypertension     Chronic atrial fibrillation (H)     Chronic back pain     Chronic infection     Coronary artery disease     Diabetes mellitus (H)     Difficulty walking     Discitis of thoracolumbar region 10/10/2015    Encephalopathy 10/14/2015    Epidural abscess 10/10/2015    Hip pain, right     History of angina     Hyperlipidemia     Irregular heart beat     Other chronic pain     Ovarian mass     Sepsis (H) 10/08/2015    Stented coronary artery     Stroke (H) 06/23/2015    Neurology felt that episode was C/W subacute ischemic stroke    TIA (transient ischemic attack) 06/23/2015    UTI (urinary tract infection)     admitted to [facility] with UTI    Walking troubles      Past Surgical History:   Procedure Laterality Date    ANGIOPLASTY  03/30/2016    Drug eluting stent mid LAD, cutting balloon to 1st diagonal    ANGIOPLASTY  01/01/2008    BYPASS GRAFT ARTERY CORONARY  12/11/2007    X 3 vessels, LIMA to LAD, saph vein graft to distal RCA, saphvein graft to marginal, mini circuit bypass.  [facility].    CORONARY STENT PLACEMENT  01/01/2008    Left main, left circumflex, RCA    CORONARY STENT PLACEMENT  03/01/2016    CV ANGIOGRAM CORONARY GRAFT N/A 10/11/2023    Procedure: Coronary Angiogram Graft;  Surgeon: Sam Boo MD;  Location: [facility] CATH LAB CV    CV CORONARY ANGIOGRAM N/A 08/01/2018    Procedure: Coronary Angiogram;  Surgeon: Kit Bean MD;  Location: [facility] Cath Lab;  Service:     CV LEFT HEART CATHETERIZATION WITH LEFT  VENTRICULOGRAM N/A 2018    Procedure: Left Heart Catheterization with Left Ventriculogram;  Surgeon: Kit Bean MD;  Location: [facility] Cath Lab;  Service:     EXCISE MASS TRUNK N/A 2024    Procedure: EXCISION ABDOMINAL WALL MASS AND PLACEMENT OF DRAIN;  Surgeon: Braden Bah DO;  Location: [facility] Main OR    INSERT MIDLINE HE  10/31/2015         LUMBAR DISCECTOMY N/A 10/11/2015    BILATERAL L1-L5 DECOMPRESSIVE LAMINECTOMY WITH EVACUATION OF EPIDURAL ABSCESS IRRIGATION & DEBRIDEMENT;  Surgeon: Luli Chan MD;  Location: [facility] Main OR; Due to sepsis    RELEASE CARPAL TUNNEL Bilateral     UMBILICAL HERNIA REPAIR       Family History   Problem Relation Age of Onset    Cerebrovascular Disease Mother     Diabetes Father     Coronary Artery Disease Father     Diabetes Sister     Cerebrovascular Disease Sister 81    Cerebrovascular Disease Brother     Diabetes Brother     Cancer Paternal Grandfather     Anesthesia Reaction No family hx of     Thrombosis No family hx of     Social History     Socioeconomic History    Marital status:      Spouse name: Not on file    Number of children: 1    Years of education: Not on file    Highest education level: Not on file   Occupational History    Occupation: retired   Tobacco Use    Smoking status: Former     Current packs/day: 0.00     Types: Cigarettes     Quit date: 2007     Years since quittin.3     Passive exposure: Past    Smokeless tobacco: Never   Vaping Use    Vaping status: Never Used   Substance and Sexual Activity    Alcohol use: Not Currently     Comment: Stopped drinking 2015    Drug use: No    Sexual activity: Never   Other Topics Concern    Not on file   Social History Narrative    Lives alone with cats and dogs. , one daughter, Tory Lofton.      Social Drivers of Health     Financial Resource Strain: Low Risk  (2024)    Financial Resource Strain     Within the past 12 months, have you or your  family members you live with been unable to get utilities (heat, electricity) when it was really needed?: No   Food Insecurity: No Food Insecurity (7/18/2024)    Received from [facility]    Hunger Vital Sign     Worried About Running Out of Food in the Last Year: Never true     Ran Out of Food in the Last Year: Never true   Transportation Needs: No Transportation Needs (7/18/2024)    Received from [facility]    PRAPARE - Transportation     Lack of Transportation (Medical): No     Lack of Transportation (Non-Medical): No   Physical Activity: Inactive (5/8/2024)    Received from [facility]    Exercise Vital Sign     Days of Exercise per Week: 0 days     Minutes of Exercise per Session: 0 min   Stress: Not on file   Social Connections: Not on file   Interpersonal Safety: Not At Risk (7/18/2024)    Received from [facility]    Humiliation, Afraid, Rape, and Kick questionnaire     Fear of Current or Ex-Partner: No     Emotionally Abused: No     Physically Abused: No     Sexually Abused: No   Housing Stability: Low Risk  (7/18/2024)    Received from [facility]    Housing Stability     What is your living situation today?: I have a steady place to live          Medications  Allergies   Current Outpatient Medications   Medication Sig Dispense Refill    acetaminophen (TYLENOL) 500 MG tablet Take 1,000 mg by mouth every 12 hours as needed for mild pain      apixaban ANTICOAGULANT (ELIQUIS ANTICOAGULANT) 5 MG tablet Take 1 tablet (5 mg) by mouth 2 times daily 60 tablet 11    aspirin 81 MG EC tablet Take 1 tablet (81 mg) by mouth daily.      atorvastatin (LIPITOR) 80 MG tablet Take 1 tablet (80 mg) by mouth at bedtime 90 tablet 3    blood glucose (NO BRAND SPECIFIED) test strip Use to test blood sugar 1 times daily. To accompany: Blood Glucose Monitor Brands: per insurance. 100 strip 6    blood glucose monitoring (NO BRAND SPECIFIED) meter device kit Use to test blood sugar 1 times daily. Preferred blood glucose meter OR  supplies to accompany: Blood Glucose Monitor Brands: per insurance. 1 kit 0    busPIRone (BUSPAR) 15 MG tablet Take 1 tablet by mouth once daily 90 tablet 3    clobetasol propionate (TEMOVATE) 0.05 % external cream Apply topically 2 times daily. Apply to rash on torso 60 g 2    clobetasol propionate (TEMOVATE) 0.05 % external cream Apply topically 2 times daily. Apply pea size amount to rashes on torso 60 g 3    ezetimibe (ZETIA) 10 MG tablet Take 1 tablet (10 mg) by mouth daily 90 tablet 3    gabapentin (NEURONTIN) 300 MG capsule TAKE 3 CAPSULES BY MOUTH EVERY DAY AT BEDTIME 270 capsule 3    hydroCHLOROthiazide 12.5 MG tablet Take 1 tablet (12.5 mg) by mouth daily -- appt needed for further refills. 90 tablet 3    isosorbide mononitrate (IMDUR) 60 MG 24 hr tablet Take 1 tablet (60 mg) by mouth daily 90 tablet 3    LANTUS SOLOSTAR 100 UNIT/ML soln Inject 28 Units subcutaneously every morning (Patient taking differently: Inject 28 Units subcutaneously every evening.) 45 mL 3    losartan (COZAAR) 100 MG tablet Take 1 tablet (100 mg) by mouth daily 90 tablet 3    naproxen (NAPROSYN) 500 MG tablet Take 500 mg by mouth 2 times daily (with meals)      sertraline (ZOLOFT) 100 MG tablet Take 1 tablet by mouth once daily 90 tablet 0    sotalol (BETAPACE) 80 MG tablet Take 0.5 tablets (40 mg) by mouth 2 times daily 90 tablet 3    thin (NO BRAND SPECIFIED) lancets Use with lanceting device. To accompany: Blood Glucose Monitor Brands: per insurance. 100 each 6    ketoconazole (NIZORAL) 2 % external cream APPLY 1 APPLICATION TOPICALLY TWICE DAILY TO AFFECTED AREAS FOR 4 WEEKS THEN AS NEEDED (Patient not taking: Reported on 10/28/2024)      triamcinolone (KENALOG) 0.1 % external cream Apply topically 2 times daily (Patient not taking: Reported on 10/28/2024)      Allergies   Allergen Reactions    Metformin GI Disturbance    Oxycodone Nausea and Vomiting         Lab Results    Chemistry/lipid CBC Cardiac Enzymes/BNP/TSH/INR  "  Recent Labs   Lab Test 07/15/24  1359   CHOL 119   HDL 37*   LDL 49   TRIG 163*     Recent Labs   Lab Test 07/15/24  1359 03/29/24  1035 12/07/23  1452   LDL 49 36 51     Recent Labs   Lab Test 07/15/24  1359      POTASSIUM 4.6   CHLORIDE 101   CO2 30*   *   BUN 11.1   CR 0.77   GFRESTIMATED 80   ELLIE 9.4     Recent Labs   Lab Test 07/15/24  1359 06/13/24  1501 03/29/24  1035   CR 0.77 0.90 1.09*     Recent Labs   Lab Test 07/15/24  1359 03/29/24  1035 12/07/23  1452   A1C 7.5* 8.1* 9.5*    Recent Labs   Lab Test 07/15/24  1359   WBC 10.4   HGB 11.7   HCT 35.5   MCV 93        Recent Labs   Lab Test 07/15/24  1359 06/13/24  1501 01/12/24  1208   HGB 11.7 12.4 12.3    Recent Labs   Lab Test 11/06/20  0559 11/05/20  0559 11/04/20  2229   TROPONINI 0.18 0.37* 0.28     No results for input(s): \"BNP\", \"NTBNPI\", \"NTBNP\" in the last [id number] hours.  Recent Labs   Lab Test 05/05/21  1400   TSH 0.77     Recent Labs   Lab Test 01/12/24  1208 01/11/24  1351 12/15/23  1308   INR 1.7* 1.4* 2.2*        45 minutes spent on the date of encounter doing education, consent signing, chart prep/review, review of test results, interpretation with above tests, patient visit, and documentation.      [facility] [employer]  "

## 2024-10-28 NOTE — H&P (VIEW-ONLY)
HEART CARE ENCOUNTER NOTE       [facility]  [phone]    I was present with the physician assistant student who participated in the service and in the documentation of the note. I have verified the history and personally performed the physical exam and medical decision making. I agree with the assessment and plan of care as documented in the note.    I personally reviewed vital signs, medications, and labs.     Her consents have been signed and witnessed by me.   Appears to have a sacral dimple (sounds like a hx of pilonidal cyst) - crevice measures 1 mm x 1 mm x 0.5 mm.  No redness or draining with slight maceration along edges    Patient to stay overnight due to no transportation    Date of Service (when I saw the patient): 10/28/24    Luzmaria Max PA-C, [facility]  Structural Heart Program  [facility] [place]        Assessment/Recommendations   1.  Paroxysmal atrial fibrillation: I have personally reviewed this patient's chart and have spoken with the patient about the treatment options, including SHAVON device.  She has a APT2TU1-WQCw score of 8 for  age, HTN, TIA, CAD, DM and gender.  She has a HAS-BLED score of 3 for age, bleeding predisposition, and use of NSAIDs.   She is not a good candidate for long-term anticoagulation due to fall risk, concurrent NSAID use, and cost.  She is scheduled for the Watchman left atrial appendage occlusion device on 10/31/24 with Dr. Garsia.     The patient will stay on her current Eliquis regimen.  She started taking aspirin 81 mg daily on 10/17/24. She will stay on these 2 agents for 6 weeks after implant. After 6 weeks, she will stop the eliquis and start plavix 75 mg daily.  She will continue DAPT for the remainder of 6 months, at which time she will stop the Plavix and continue long term ASA therapy.  3-months post-implant, she will have either a CTA or ANNE to evaluate the device for leaks and/or DRT.  She understands that the  risks of the procedure are <2% and include, but are not limited to device embolization, air embolism, myocardial perforation, device thrombosis, ASD, stroke, or death.  We discussed expected recovery and follow-up.       The patient is a good candidate for proceeding with left atrial appendage implant.  Her questions were answered to her satisfaction.     2.  Coronary artery disease -status post CABG/multiple stents -angiogram 2023 showed patent LIMA-LAD, VG's known to be occluded, mid vessel occluded stents in the Lcx. Currently denies angina.  Continue Imdur 60 mg daily, atorvastatin and ASA     3.  Hyperlipidemia - Last LDL 49 on 7/15/24.  Continue Lipitor 80 mg daily, Zetia 10 mg daily     4.  Hypertension - BP is controlled.  She should continue losartan 100 mg daily, hydrochlorothiazide 12.5 mg daily and imdur 60 mg daily    5. Type II DM - insulin dependent, on Lantus with last Hgb A1C 7.5 in July.        History of Present Illness/Subjective    Kemi Soto is a 74 year old female who comes in today for history and physical prior to insertion of left atrial appendage occulsion device on 10/31 with Dr. Garsia.    Kemi Soto has a past history of paroxysmal atrial fibrillation/atrial flutter, hypertension, coronary artery disease status post CABG x 4, hyperlipidemia, type 2 diabetes mellitus, history of TIA, and peripheral neuropathy.    She has had a diagnosis of atrial fibrillation since at least 2015 without ablation or cardioversion. She denies bruising issues on eliquis but reports prolonged bleeding.  She does use a cane to assist with ambulation and has had several falls in the past.  She does report chronic back issues which decreases her mobility.  She does take naproxen on a regular basis for her back pain.  She reports a previous history of TIA back in June 2015 where she had some right sided numbness at the time but all symptoms have resolved. She denies a previous history of DVT or PE.  "    Patient endorses some concern laying flat on her back after procedure due to chronic back pain and presence of a sore on her gluteal cleft.    Kemi Soto denies chest discomfort, palpitations, shortness of breath, paroxysmal nocturnal dyspnea, orthopnea, lightheadedness, dizziness, pre-syncope, or syncope.  Kemi Soto also denies any weight loss, changes in appetite, nausea or vomiting.     Medical, surgical, family, social history, and medications were reviewed and updated as necessary.    ECHO results (from 10/3/23):  Interpretation Summary     Left ventricular size, wall motion and function are normal. The ejection  fraction is 60-65%.  Normal right ventricle size and systolic function.  No hemodynamically significant valvular abnormalities on 2D or color flow  imaging.    EKG (personally reviewed and interpreted):  10/28/24: NSR with rate of 60 bpm. , , QT/QTc 436/436     Physical Examination Review of Systems   Vitals: /58 (BP Location: Left arm, Patient Position: Sitting)   Pulse 62   Resp 16   Ht 1.575 m (5' 2\")   Wt 67.1 kg (148 lb)   LMP  (LMP Unknown)   SpO2 98%   BMI 27.07 kg/m    BMI= Body mass index is 27.07 kg/m .  Wt Readings from Last 3 Encounters:   10/28/24 67.1 kg (148 lb)   09/17/24 67.6 kg (149 lb)   09/13/24 67.1 kg (148 lb)       General Appearance:   Alert, cooperative and in no acute distress   ENT/Mouth: membranes moist, no oral lesions or bleeding gums.      EYES:  no scleral icterus, normal conjunctivae   Neck: Thyroid not visualized   Chest/Lungs:   lungs are clear to auscultation, no rales or wheezing   Cardiovascular:   Regular rate and rhythm . Normal first and second heart sounds with no murmurs, rubs or gallops; the carotid, radial and posterior tibial pulses are intact, no edema bilaterally    Abdomen:  Soft and nontender. Bowel sounds are present in all quadrants   Extremities: no cyanosis or clubbing   Skin: no xanthelasma, warm.  "   Neurologic: normal gait, normal  bilateral, no tremors   Psychiatric: Normal mood and affect       Please refer above for cardiac ROS details.      Medical History  Surgical History Family History Social History   Past Medical History:   Diagnosis Date    Adrenal nodule (H)     Alcohol dependence in remission (H)     Anemia     Antiplatelet or antithrombotic long-term use     Anxiety and depression     Arrhythmia     Benign essential hypertension     Chronic atrial fibrillation (H)     Chronic back pain     Chronic infection     Coronary artery disease     Diabetes mellitus (H)     Difficulty walking     Discitis of thoracolumbar region 10/10/2015    Encephalopathy 10/14/2015    Epidural abscess 10/10/2015    Hip pain, right     History of angina     Hyperlipidemia     Irregular heart beat     Other chronic pain     Ovarian mass     Sepsis (H) 10/08/2015    Stented coronary artery     Stroke (H) 06/23/2015    Neurology felt that episode was C/W subacute ischemic stroke    TIA (transient ischemic attack) 06/23/2015    UTI (urinary tract infection)     admitted to [facility] with UTI    Walking troubles      Past Surgical History:   Procedure Laterality Date    ANGIOPLASTY  03/30/2016    Drug eluting stent mid LAD, cutting balloon to 1st diagonal    ANGIOPLASTY  01/01/2008    BYPASS GRAFT ARTERY CORONARY  12/11/2007    X 3 vessels, LIMA to LAD, saph vein graft to distal RCA, saphvein graft to marginal, mini circuit bypass.  [facility].    CORONARY STENT PLACEMENT  01/01/2008    Left main, left circumflex, RCA    CORONARY STENT PLACEMENT  03/01/2016    CV ANGIOGRAM CORONARY GRAFT N/A 10/11/2023    Procedure: Coronary Angiogram Graft;  Surgeon: Sam Boo MD;  Location: [facility] CATH LAB CV    CV CORONARY ANGIOGRAM N/A 08/01/2018    Procedure: Coronary Angiogram;  Surgeon: Kit Bean MD;  Location: [facility] Cath Lab;  Service:     CV LEFT HEART CATHETERIZATION WITH LEFT  VENTRICULOGRAM N/A 2018    Procedure: Left Heart Catheterization with Left Ventriculogram;  Surgeon: Kit Bean MD;  Location: [facility] Cath Lab;  Service:     EXCISE MASS TRUNK N/A 2024    Procedure: EXCISION ABDOMINAL WALL MASS AND PLACEMENT OF DRAIN;  Surgeon: Braden Bah DO;  Location: [place] Main OR    INSERT MIDLINE HE  10/31/2015         LUMBAR DISCECTOMY N/A 10/11/2015    BILATERAL L1-L5 DECOMPRESSIVE LAMINECTOMY WITH EVACUATION OF EPIDURAL ABSCESS IRRIGATION & DEBRIDEMENT;  Surgeon: Luli Chan MD;  Location: [facility] Main OR; Due to sepsis    RELEASE CARPAL TUNNEL Bilateral     UMBILICAL HERNIA REPAIR       Family History   Problem Relation Age of Onset    Cerebrovascular Disease Mother     Diabetes Father     Coronary Artery Disease Father     Diabetes Sister     Cerebrovascular Disease Sister 81    Cerebrovascular Disease Brother     Diabetes Brother     Cancer Paternal Grandfather     Anesthesia Reaction No family hx of     Thrombosis No family hx of     Social History     Socioeconomic History    Marital status:      Spouse name: Not on file    Number of children: 1    Years of education: Not on file    Highest education level: Not on file   Occupational History    Occupation: retired   Tobacco Use    Smoking status: Former     Current packs/day: 0.00     Types: Cigarettes     Quit date: 2007     Years since quittin.3     Passive exposure: Past    Smokeless tobacco: Never   Vaping Use    Vaping status: Never Used   Substance and Sexual Activity    Alcohol use: Not Currently     Comment: Stopped drinking 2015    Drug use: No    Sexual activity: Never   Other Topics Concern    Not on file   Social History Narrative    Lives alone with cats and dogs. , one daughter, Tory Lofton.      Social Drivers of Health     Financial Resource Strain: Low Risk  (2024)    Financial Resource Strain     Within the past 12 months, have you or your  family members you live with been unable to get utilities (heat, electricity) when it was really needed?: No   Food Insecurity: No Food Insecurity (7/18/2024)    Received from [facility]    Hunger Vital Sign     Worried About Running Out of Food in the Last Year: Never true     Ran Out of Food in the Last Year: Never true   Transportation Needs: No Transportation Needs (7/18/2024)    Received from [facility]    PRAPARE - Transportation     Lack of Transportation (Medical): No     Lack of Transportation (Non-Medical): No   Physical Activity: Inactive (5/8/2024)    Received from [facility]    Exercise Vital Sign     Days of Exercise per Week: 0 days     Minutes of Exercise per Session: 0 min   Stress: Not on file   Social Connections: Not on file   Interpersonal Safety: Not At Risk (7/18/2024)    Received from [facility]    Humiliation, Afraid, Rape, and Kick questionnaire     Fear of Current or Ex-Partner: No     Emotionally Abused: No     Physically Abused: No     Sexually Abused: No   Housing Stability: Low Risk  (7/18/2024)    Received from [facility]    Housing Stability     What is your living situation today?: I have a steady place to live          Medications  Allergies   Current Outpatient Medications   Medication Sig Dispense Refill    acetaminophen (TYLENOL) 500 MG tablet Take 1,000 mg by mouth every 12 hours as needed for mild pain      apixaban ANTICOAGULANT (ELIQUIS ANTICOAGULANT) 5 MG tablet Take 1 tablet (5 mg) by mouth 2 times daily 60 tablet 11    aspirin 81 MG EC tablet Take 1 tablet (81 mg) by mouth daily.      atorvastatin (LIPITOR) 80 MG tablet Take 1 tablet (80 mg) by mouth at bedtime 90 tablet 3    blood glucose (NO BRAND SPECIFIED) test strip Use to test blood sugar 1 times daily. To accompany: Blood Glucose Monitor Brands: per insurance. 100 strip 6    blood glucose monitoring (NO BRAND SPECIFIED) meter device kit Use to test blood sugar 1 times daily. Preferred blood glucose meter OR  supplies to accompany: Blood Glucose Monitor Brands: per insurance. 1 kit 0    busPIRone (BUSPAR) 15 MG tablet Take 1 tablet by mouth once daily 90 tablet 3    clobetasol propionate (TEMOVATE) 0.05 % external cream Apply topically 2 times daily. Apply to rash on torso 60 g 2    clobetasol propionate (TEMOVATE) 0.05 % external cream Apply topically 2 times daily. Apply pea size amount to rashes on torso 60 g 3    ezetimibe (ZETIA) 10 MG tablet Take 1 tablet (10 mg) by mouth daily 90 tablet 3    gabapentin (NEURONTIN) 300 MG capsule TAKE 3 CAPSULES BY MOUTH EVERY DAY AT BEDTIME 270 capsule 3    hydroCHLOROthiazide 12.5 MG tablet Take 1 tablet (12.5 mg) by mouth daily -- appt needed for further refills. 90 tablet 3    isosorbide mononitrate (IMDUR) 60 MG 24 hr tablet Take 1 tablet (60 mg) by mouth daily 90 tablet 3    LANTUS SOLOSTAR 100 UNIT/ML soln Inject 28 Units subcutaneously every morning (Patient taking differently: Inject 28 Units subcutaneously every evening.) 45 mL 3    losartan (COZAAR) 100 MG tablet Take 1 tablet (100 mg) by mouth daily 90 tablet 3    naproxen (NAPROSYN) 500 MG tablet Take 500 mg by mouth 2 times daily (with meals)      sertraline (ZOLOFT) 100 MG tablet Take 1 tablet by mouth once daily 90 tablet 0    sotalol (BETAPACE) 80 MG tablet Take 0.5 tablets (40 mg) by mouth 2 times daily 90 tablet 3    thin (NO BRAND SPECIFIED) lancets Use with lanceting device. To accompany: Blood Glucose Monitor Brands: per insurance. 100 each 6    ketoconazole (NIZORAL) 2 % external cream APPLY 1 APPLICATION TOPICALLY TWICE DAILY TO AFFECTED AREAS FOR 4 WEEKS THEN AS NEEDED (Patient not taking: Reported on 10/28/2024)      triamcinolone (KENALOG) 0.1 % external cream Apply topically 2 times daily (Patient not taking: Reported on 10/28/2024)      Allergies   Allergen Reactions    Metformin GI Disturbance    Oxycodone Nausea and Vomiting         Lab Results    Chemistry/lipid CBC Cardiac Enzymes/BNP/TSH/INR  "  Recent Labs   Lab Test 07/15/24  1359   CHOL 119   HDL 37*   LDL 49   TRIG 163*     Recent Labs   Lab Test 07/15/24  1359 03/29/24  1035 12/07/23  1452   LDL 49 36 51     Recent Labs   Lab Test 07/15/24  1359      POTASSIUM 4.6   CHLORIDE 101   CO2 30*   *   BUN 11.1   CR 0.77   GFRESTIMATED 80   ELLIE 9.4     Recent Labs   Lab Test 07/15/24  1359 06/13/24  1501 03/29/24  1035   CR 0.77 0.90 1.09*     Recent Labs   Lab Test 07/15/24  1359 03/29/24  1035 12/07/23  1452   A1C 7.5* 8.1* 9.5*    Recent Labs   Lab Test 07/15/24  1359   WBC 10.4   HGB 11.7   HCT 35.5   MCV 93        Recent Labs   Lab Test 07/15/24  1359 06/13/24  1501 01/12/24  1208   HGB 11.7 12.4 12.3    Recent Labs   Lab Test 11/06/20  0559 11/05/20  0559 11/04/20  2229   TROPONINI 0.18 0.37* 0.28     No results for input(s): \"BNP\", \"NTBNPI\", \"NTBNP\" in the last 10288 hours.  Recent Labs   Lab Test 05/05/21  1400   TSH 0.77     Recent Labs   Lab Test 01/12/24  1208 01/11/24  1351 12/15/23  1308   INR 1.7* 1.4* 2.2*        45 minutes spent on the date of encounter doing education, consent signing, chart prep/review, review of test results, interpretation with above tests, patient visit, and documentation.      SINAI GlassS  "

## 2024-10-28 NOTE — PROGRESS NOTES
MODIFIED GRACE SCALE   Timepoint: Pre-LAAC    Previous score: initial GRACE    Score Description   0 No symptoms at all   1 No significant disability despite symptoms; able to carry out all usual duties and activities   2 Slight disability; unable to carry out all previous activities, but able to look after own affairs without assistance   3 Moderate disability; requiring some help, but able to walk without assistance   4 Moderately severe disability; unable to walk without assistance and unable to attend to own bodily needs without assistance   5 Severe disability; bedridden, incontinent and requiring constant nursing care and attention   6 Dead    Total score (0 - 6):  0    Change in score if s/p LAAC? N/A  If yes, notify implanting cardiologist.    Priscila Merchant RN  Structural Heart Coordinator   [facility]  [phone]

## 2024-10-28 NOTE — DISCHARGE INSTRUCTIONS
Going home after Left Atrial Appendage Occlusion Device Implant    Once Home:  Make sure you are taking all of your medications as directed in your discharge instructions.  Do not stop taking these medications without speaking to your provider.   Increase fluid intake for two days    No lifting more than 10 pounds for 5 days  No aggressive exercise for 5 days  No driving x 3 day  You may shower tomorrow; no bath x 5 days  Return to work in 3 days if you have a sedentary job or 5 days if you do manual labor      Care of groin site  It is normal to have a small bruise or lump at the site.  Do not scrub the site.  For the first 2 days: Do not stoop or squat. When you cough, sneeze or move your bowels, hold your hand over the puncture site and press gently.  Do not use lotion or powder near the puncture site for 3 days.  Ok to use ice packs at groin sites 20 minutes at a time for groin discomfort    If you start bleeding from the site in your groin, lie down flat and press firmly on the site. Call your doctor as soon as you can.    Call your doctor if:  You have a large or growing hard lump around the site.  The site is red, swollen, hot or tender.  Blood or fluid is draining from the site.  You have chills or a fever greater than 101 F (38 C).  Your leg or arm feels numb or cool.  You have hives, a rash or unusual itching.    To reduce the risk of infection, avoid dental procedures (including cleanings) for the first 6 weeks.  Contact your cardiology clinic for an antibiotic SHOULD YOU need to see the dentist prior to that 6 week waiting period.    Dial 911 if you have bleeding that is heavy or does not stop OR for any NEW signs/symptoms of a stroke:  Visual disturbance  Problems with talking  Smile only occurs on half your face  Numbness on one side of your face or body  Sudden headache  Confusion  Problems with walking or balance    Follow up appointments:   6 Week Post Implant Visit Date: December 3 at 11:10 am with  Chichi Shepherd PA-C  At [facility]  [address], [address]  [place] [address]    Post-Procedure CTA:  Date: Feb 5 at 2 pm -  Location: [facility]      Your Procedural Physician was: Dr. Garsia     To reach the [facility] nurse coordinators please call:   [phone]

## 2024-10-28 NOTE — PATIENT INSTRUCTIONS
Kemi Soto,    It was a pleasure to see you today in the clinic regarding your upcoming Watchman implant.     My recommendations after this visit include:     - report to [facility] on Thursday morning at the instructed time          If you have questions or concerns, please call using the numbers below:    SHAVON (Watchman/Amulet) clinic phone   [phone]       After Hours/Scheduling  [phone]    Otherwise you can dial the nurse directly at:                  Tea Garcia RN  [phone]    Priscila Merchant RN  [phone]

## 2024-10-29 ENCOUNTER — THERAPY VISIT (OUTPATIENT)
Dept: PHYSICAL THERAPY | Facility: REHABILITATION | Age: 74
End: 2024-10-29
Payer: COMMERCIAL

## 2024-10-29 DIAGNOSIS — M54.16 LUMBAR RADICULOPATHY: ICD-10-CM

## 2024-10-29 DIAGNOSIS — G89.29 CHRONIC BILATERAL LOW BACK PAIN WITH BILATERAL SCIATICA: Primary | ICD-10-CM

## 2024-10-29 DIAGNOSIS — M43.16 SPONDYLOLISTHESIS OF LUMBAR REGION: ICD-10-CM

## 2024-10-29 DIAGNOSIS — M54.42 CHRONIC BILATERAL LOW BACK PAIN WITH BILATERAL SCIATICA: Primary | ICD-10-CM

## 2024-10-29 DIAGNOSIS — Z98.890 HISTORY OF LUMBAR SURGERY: ICD-10-CM

## 2024-10-29 DIAGNOSIS — M54.41 CHRONIC BILATERAL LOW BACK PAIN WITH BILATERAL SCIATICA: Primary | ICD-10-CM

## 2024-10-29 DIAGNOSIS — M48.061 LUMBAR FORAMINAL STENOSIS: ICD-10-CM

## 2024-10-29 PROCEDURE — 97112 NEUROMUSCULAR REEDUCATION: CPT | Mod: GP

## 2024-10-29 PROCEDURE — 97110 THERAPEUTIC EXERCISES: CPT | Mod: GP

## 2024-10-29 PROCEDURE — 97750 PHYSICAL PERFORMANCE TEST: CPT | Mod: GP

## 2024-10-29 NOTE — PROGRESS NOTES
PLAN  Continue therapy per current plan of care.    Beginning/End Dates of Progress Note Reporting Period:  10/03/2024 to 10/29/2024    Referring Provider:  Chula Garcia           10/29/24 0500   Appointment Info   Signing clinician's name / credentials Twin Garcia, RUBY   Visits Used 4   Medical Diagnosis Chronic bilateral low back pain with bilateral sciatica  Lumbar radiculopathy  Lumbar foraminal stenosis  Spondylolisthesis of lumbar region  History of lumbar surgery   PT Tx Diagnosis Chronic low back pain with mobility and strength deficits, as well as impaired functional mobility and balance   Progress Note/Certification   Start of Care Date 10/03/24   Onset of illness/injury or Date of Surgery 08/26/24  (Date recorded is date of referral, but symptoms are chronic and have lasted over 1 year.)   Therapy Frequency 1 time per week   Predicted Duration 10 weeks   Certification date from 10/03/24   Certification date to 12/12/24   Progress Note Due Date 11/26/24   Progress Note Completed Date 10/29/24   PT Goal 1   Goal Identifier HEP   Goal Description Ciarra will demonstrate mastery of ([id number] need for cueing) and compliance with (completion at least 5/7 days/week) her home exercise program to facilitate increased rate of improvement.   Goal Progress Every other day   Target Date 10/31/24   PT Goal 2   Goal Identifier LATRICIA   Goal Description iCarra will demonstrate significantly improved function as evidenced by an improved (decreased) LATRICIA score of less than or equal to 7.14%.   Goal Progress 25%   Target Date 12/12/24   PT Goal 3   Goal Identifier Walking   Goal Description Ciarra will demonstrate improved tolerance to functional mobility as evidenced by her abilitto walk for up to 20 minutes with self-report low back and leg pain no higher than 2/10.   Goal Progress Up to 15 minutes with pain no higher than 2/10   Target Date 12/12/24   PT Goal 4   Goal Identifier FGA   Goal Description Ciarra will  "demonstrate improved safety and independence with functional mobility and a decreased fall risk as evidenced by an improved FGA score of at least 15/30.   Goal Progress 13/30   Target Date 12/12/24   Subjective Report   Subjective Report Ciarra said that she is having a \"Watchmen device\" put into her heart in a couple of days, and she is supposed to avoid strenuous activity for 3 days afterward. She said that the Naproxen really helps control her back and leg symptoms. Overall she notes that her back and leg pain have been lower over the past week.   Objective Measures   Objective Measures Objective Measure 1;Objective Measure 2;Objective Measure 3;Objective Measure 4   Objective Measure 1   Objective Measure Worst Pain   Details Since last visit: 6/10   Objective Measure 2   Objective Measure Lower Extremity Strength   Details Hip flexion: Left 4+/5, right 4-/5.  Hip adduction: Left 5/5, right 5-/5.  Hip abduction: 5/5 bilaterally. Knee flexion: 5/5 bilaterally.  Knee extension: Left 5/5, right 5-/5.  Ankle dorsiflexion and plantarflexion: 5/5 bilaterally.  Great toe extension: 5/5 bilaterally.   Objective Measure 3   Objective Measure Slump   Details Negative bilateral   Objective Measure 4   Objective Measure FGA   Details 13/30   Therapeutic Procedure/Exercise   Ther Proc 1 Nustep + subjective report   Ther Proc 1 - Details 8 minutes. See subjective report, goals, and assessment.   Ther Proc 2 Lower trunk rotation   Ther Proc 2 - Details 2 minutes (requires continued cueing for appropriate speed).   Ther Proc 5 Objective measures   Ther Proc 5 - Details See objective measures.   Skilled Intervention Exercises to improve core, low back, and hip/lower extremity flexibility/mobility and strength/stability.   Patient Response/Progress Added standing hip exercises as alternatives to bed-based exercises, and added sit-to-stand. Ciarra tolerated all exercises and progressions well without increased symptoms and with " cueing for proper form.   Neuromuscular Re-education   Neuromuscular re-ed of mvmt, balance, coord, kinesthetic sense, posture, proprioception minutes (01116) 10   Neuro Re-ed 1 TA set+   Neuro Re-ed 1 - Details 2 x 10 bilateral (cueing to maintain core brace and keep breathing)   Neuro Re-ed 2 TA set   Neuro Re-ed 2 - Details 1 x 10 with 5-second holds (cueing to keep breathing)   Skilled Intervention Exercises to increase core awareness/control/endurance   Patient Response/Progress Progressed TA set. Ciarra tolerated nerve glides well with cueing for proper form.   Physical Performance Test/measures   Physical Performance Test/Measurement, Minutes (70679) 9   Skilled Intervention FGA + explanation/setup + discussion of results   Patient Response/Progress See objective measures.   Education   Learner/Method Patient;Listening;Demonstration;Pictures/Video;No Barriers to Learning   Plan   Home program See PTRx.   Plan for next session Continue to progress core, low back, and hip/lower extremity flexibility/mobility and strength/stability exercises. Progress nerve glides as tolerated. Introduce balance training. Trial manual therapy as appropriate.   Comments   Comments Impression/Assessment: Ciarra reports continued progress, approximating 15%-20% improvement overall since start of physical therapy. She reports that she is now able to walk up to 15 minutes with pain no higher than 2/10. She improved her FGA score to 13/30, indicating marginally improved safe and independent functional mobility, and a decreased, though still remaining fall risk. She improved (decreased) her LATRICIA score to 25%, which also indicates improved function. She also demonstrates improved lowere extremity strength, and she now tests negative bilaterally with neural tension testing. She will continue to benefit from physical therapy to   Total Session Time   Timed Code Treatment Minutes 19   Total Treatment Time (sum of timed and untimed services)  19

## 2024-10-31 ENCOUNTER — ANESTHESIA EVENT (OUTPATIENT)
Dept: CARDIOLOGY | Facility: HOSPITAL | Age: 74
DRG: 274 | End: 2024-10-31
Payer: COMMERCIAL

## 2024-10-31 ENCOUNTER — ANESTHESIA (OUTPATIENT)
Dept: CARDIOLOGY | Facility: HOSPITAL | Age: 74
DRG: 274 | End: 2024-10-31
Payer: COMMERCIAL

## 2024-10-31 ENCOUNTER — HOSPITAL ENCOUNTER (OUTPATIENT)
Dept: CARDIOLOGY | Facility: HOSPITAL | Age: 74
Discharge: HOME | DRG: 274 | End: 2024-10-31
Attending: PHYSICIAN ASSISTANT | Admitting: INTERNAL MEDICINE
Payer: COMMERCIAL

## 2024-10-31 ENCOUNTER — HOSPITAL ENCOUNTER (INPATIENT)
Facility: HOSPITAL | Age: 74
LOS: 1 days | Discharge: HOME | DRG: 274 | End: 2024-11-01
Attending: INTERNAL MEDICINE | Admitting: INTERNAL MEDICINE
Payer: COMMERCIAL

## 2024-10-31 DIAGNOSIS — I48.0 PAROXYSMAL ATRIAL FIBRILLATION (H): ICD-10-CM

## 2024-10-31 PROBLEM — Z95.818 PRESENCE OF WATCHMAN LEFT ATRIAL APPENDAGE CLOSURE DEVICE: Status: ACTIVE | Noted: 2024-10-31

## 2024-10-31 LAB
ACT BLD: 337 SECONDS (ref 74–150)
BLD PROD TYP BPU: NORMAL
BLD PROD TYP BPU: NORMAL
BLOOD COMPONENT TYPE: NORMAL
BLOOD COMPONENT TYPE: NORMAL
CODING SYSTEM: NORMAL
CODING SYSTEM: NORMAL
CREAT SERPL-MCNC: 0.74 MG/DL (ref 0.51–0.95)
CROSSMATCH: NORMAL
CROSSMATCH: NORMAL
EGFRCR SERPLBLD CKD-EPI 2021: 84 ML/MIN/1.73M2
EST. AVERAGE GLUCOSE BLD GHB EST-MCNC: 194 MG/DL
GLUCOSE BLDC GLUCOMTR-MCNC: 102 MG/DL (ref 70–99)
GLUCOSE BLDC GLUCOMTR-MCNC: 106 MG/DL (ref 70–99)
GLUCOSE BLDC GLUCOMTR-MCNC: 117 MG/DL (ref 70–99)
GLUCOSE BLDC GLUCOMTR-MCNC: 137 MG/DL (ref 70–99)
GLUCOSE BLDC GLUCOMTR-MCNC: 190 MG/DL (ref 70–99)
HBA1C MFR BLD: 8.4 %
HGB BLD-MCNC: 10.6 G/DL (ref 11.7–15.7)
LVEF ECHO: NORMAL
UNIT ABO/RH: NORMAL
UNIT ABO/RH: NORMAL
UNIT NUMBER: NORMAL
UNIT NUMBER: NORMAL
UNIT STATUS: NORMAL
UNIT STATUS: NORMAL
UNIT TYPE ISBT: 6200
UNIT TYPE ISBT: 6200

## 2024-10-31 PROCEDURE — 250N000013 HC RX MED GY IP 250 OP 250 PS 637: Performed by: NURSE ANESTHETIST, CERTIFIED REGISTERED

## 2024-10-31 PROCEDURE — 33340 PERQ CLSR TCAT L ATR APNDGE: CPT | Performed by: INTERNAL MEDICINE

## 2024-10-31 PROCEDURE — 82565 ASSAY OF CREATININE: CPT | Performed by: INTERNAL MEDICINE

## 2024-10-31 PROCEDURE — 93355 ECHO TRANSESOPHAGEAL (TEE): CPT

## 2024-10-31 PROCEDURE — 250N000013 HC RX MED GY IP 250 OP 250 PS 637: Performed by: INTERNAL MEDICINE

## 2024-10-31 PROCEDURE — 83036 HEMOGLOBIN GLYCOSYLATED A1C: CPT | Performed by: INTERNAL MEDICINE

## 2024-10-31 PROCEDURE — 120N000001 HC R&B MED SURG/OB

## 2024-10-31 PROCEDURE — 250N000011 HC RX IP 250 OP 636: Performed by: NURSE ANESTHETIST, CERTIFIED REGISTERED

## 2024-10-31 PROCEDURE — 255N000002 HC RX 255 OP 636: Performed by: INTERNAL MEDICINE

## 2024-10-31 PROCEDURE — 33340 PERQ CLSR TCAT L ATR APNDGE: CPT | Performed by: NURSE ANESTHETIST, CERTIFIED REGISTERED

## 2024-10-31 PROCEDURE — 250N000011 HC RX IP 250 OP 636: Performed by: INTERNAL MEDICINE

## 2024-10-31 PROCEDURE — 33340 PERQ CLSR TCAT L ATR APNDGE: CPT | Performed by: ANESTHESIOLOGY

## 2024-10-31 PROCEDURE — 99100 ANES PT EXTEME AGE<1 YR&>70: CPT | Performed by: NURSE ANESTHETIST, CERTIFIED REGISTERED

## 2024-10-31 PROCEDURE — C1889 IMPLANT/INSERT DEVICE, NOC: HCPCS | Performed by: INTERNAL MEDICINE

## 2024-10-31 PROCEDURE — 370N000017 HC ANESTHESIA TECHNICAL FEE, PER MIN: Performed by: INTERNAL MEDICINE

## 2024-10-31 PROCEDURE — 93355 ECHO TRANSESOPHAGEAL (TEE): CPT | Performed by: INTERNAL MEDICINE

## 2024-10-31 PROCEDURE — 250N000009 HC RX 250: Performed by: NURSE ANESTHETIST, CERTIFIED REGISTERED

## 2024-10-31 PROCEDURE — 02L73DK OCCLUSION OF LEFT ATRIAL APPENDAGE WITH INTRALUMINAL DEVICE, PERCUTANEOUS APPROACH: ICD-10-PCS | Performed by: INTERNAL MEDICINE

## 2024-10-31 PROCEDURE — 250N000012 HC RX MED GY IP 250 OP 636 PS 637: Performed by: INTERNAL MEDICINE

## 2024-10-31 PROCEDURE — 85347 COAGULATION TIME ACTIVATED: CPT

## 2024-10-31 PROCEDURE — 33340 PERQ CLSR TCAT L ATR APNDGE: CPT | Mod: Q0 | Performed by: INTERNAL MEDICINE

## 2024-10-31 PROCEDURE — 86923 COMPATIBILITY TEST ELECTRIC: CPT | Performed by: INTERNAL MEDICINE

## 2024-10-31 PROCEDURE — C1894 INTRO/SHEATH, NON-LASER: HCPCS | Performed by: INTERNAL MEDICINE

## 2024-10-31 PROCEDURE — 85018 HEMOGLOBIN: CPT | Performed by: INTERNAL MEDICINE

## 2024-10-31 PROCEDURE — 258N000003 HC RX IP 258 OP 636: Performed by: INTERNAL MEDICINE

## 2024-10-31 PROCEDURE — 710N000010 HC RECOVERY PHASE 1, LEVEL 2, PER MIN

## 2024-10-31 PROCEDURE — 258N000003 HC RX IP 258 OP 636: Performed by: NURSE ANESTHETIST, CERTIFIED REGISTERED

## 2024-10-31 PROCEDURE — 272N000001 HC OR GENERAL SUPPLY STERILE: Performed by: INTERNAL MEDICINE

## 2024-10-31 PROCEDURE — 36415 COLL VENOUS BLD VENIPUNCTURE: CPT | Performed by: INTERNAL MEDICINE

## 2024-10-31 DEVICE — OCCLUDER CV WATCHMAN FLX OD24 MM M635WU50240: Type: IMPLANTABLE DEVICE | Site: HEART | Status: FUNCTIONAL

## 2024-10-31 RX ORDER — FENTANYL CITRATE 50 UG/ML
INJECTION, SOLUTION INTRAMUSCULAR; INTRAVENOUS PRN
Status: DISCONTINUED | OUTPATIENT
Start: 2024-10-31 | End: 2024-10-31

## 2024-10-31 RX ORDER — PROPOFOL 10 MG/ML
INJECTION, EMULSION INTRAVENOUS PRN
Status: DISCONTINUED | OUTPATIENT
Start: 2024-10-31 | End: 2024-10-31

## 2024-10-31 RX ORDER — BUSPIRONE HYDROCHLORIDE 15 MG/1
15 TABLET ORAL DAILY
Status: DISCONTINUED | OUTPATIENT
Start: 2024-11-01 | End: 2024-11-01 | Stop reason: HOSPADM

## 2024-10-31 RX ORDER — LIDOCAINE HYDROCHLORIDE AND EPINEPHRINE 10; 10 MG/ML; UG/ML
INJECTION, SOLUTION INFILTRATION; PERINEURAL
Status: DISCONTINUED | OUTPATIENT
Start: 2024-10-31 | End: 2024-10-31 | Stop reason: HOSPADM

## 2024-10-31 RX ORDER — DEXTROSE MONOHYDRATE 25 G/50ML
25-50 INJECTION, SOLUTION INTRAVENOUS
Status: DISCONTINUED | OUTPATIENT
Start: 2024-10-31 | End: 2024-11-01 | Stop reason: HOSPADM

## 2024-10-31 RX ORDER — GABAPENTIN 300 MG/1
600 CAPSULE ORAL AT BEDTIME
Status: DISCONTINUED | OUTPATIENT
Start: 2024-10-31 | End: 2024-11-01 | Stop reason: HOSPADM

## 2024-10-31 RX ORDER — LOSARTAN POTASSIUM 50 MG/1
100 TABLET ORAL DAILY
Status: DISCONTINUED | OUTPATIENT
Start: 2024-11-01 | End: 2024-11-01 | Stop reason: HOSPADM

## 2024-10-31 RX ORDER — ONDANSETRON 2 MG/ML
4 INJECTION INTRAMUSCULAR; INTRAVENOUS EVERY 6 HOURS PRN
Status: DISCONTINUED | OUTPATIENT
Start: 2024-10-31 | End: 2024-11-01 | Stop reason: HOSPADM

## 2024-10-31 RX ORDER — ACETAMINOPHEN 325 MG/1
650 TABLET ORAL EVERY 4 HOURS PRN
Status: DISCONTINUED | OUTPATIENT
Start: 2024-10-31 | End: 2024-11-01 | Stop reason: HOSPADM

## 2024-10-31 RX ORDER — NALOXONE HYDROCHLORIDE 0.4 MG/ML
0.4 INJECTION, SOLUTION INTRAMUSCULAR; INTRAVENOUS; SUBCUTANEOUS
Status: DISCONTINUED | OUTPATIENT
Start: 2024-10-31 | End: 2024-11-01 | Stop reason: HOSPADM

## 2024-10-31 RX ORDER — HEPARIN SODIUM 1000 [USP'U]/ML
INJECTION, SOLUTION INTRAVENOUS; SUBCUTANEOUS
Status: DISCONTINUED | OUTPATIENT
Start: 2024-10-31 | End: 2024-10-31 | Stop reason: HOSPADM

## 2024-10-31 RX ORDER — HYDROCHLOROTHIAZIDE 12.5 MG/1
12.5 TABLET ORAL DAILY
Status: DISCONTINUED | OUTPATIENT
Start: 2024-11-01 | End: 2024-11-01 | Stop reason: HOSPADM

## 2024-10-31 RX ORDER — CEFAZOLIN SODIUM/WATER 2 G/20 ML
2 SYRINGE (ML) INTRAVENOUS
Status: DISCONTINUED | OUTPATIENT
Start: 2024-10-31 | End: 2024-10-31 | Stop reason: HOSPADM

## 2024-10-31 RX ORDER — NAPROXEN 500 MG/1
500 TABLET ORAL 2 TIMES DAILY WITH MEALS
Status: DISCONTINUED | OUTPATIENT
Start: 2024-10-31 | End: 2024-11-01 | Stop reason: HOSPADM

## 2024-10-31 RX ORDER — OXYCODONE HYDROCHLORIDE 5 MG/1
5 TABLET ORAL EVERY 4 HOURS PRN
Status: DISCONTINUED | OUTPATIENT
Start: 2024-10-31 | End: 2024-11-01 | Stop reason: HOSPADM

## 2024-10-31 RX ORDER — LIDOCAINE 40 MG/G
CREAM TOPICAL
Status: DISCONTINUED | OUTPATIENT
Start: 2024-10-31 | End: 2024-10-31 | Stop reason: HOSPADM

## 2024-10-31 RX ORDER — KETAMINE HYDROCHLORIDE 10 MG/ML
INJECTION INTRAMUSCULAR; INTRAVENOUS PRN
Status: DISCONTINUED | OUTPATIENT
Start: 2024-10-31 | End: 2024-10-31

## 2024-10-31 RX ORDER — CEFAZOLIN SODIUM/WATER 2 G/20 ML
SYRINGE (ML) INTRAVENOUS PRN
Status: DISCONTINUED | OUTPATIENT
Start: 2024-10-31 | End: 2024-10-31

## 2024-10-31 RX ORDER — SODIUM CHLORIDE 9 MG/ML
INJECTION, SOLUTION INTRAVENOUS CONTINUOUS PRN
Status: DISCONTINUED | OUTPATIENT
Start: 2024-10-31 | End: 2024-10-31

## 2024-10-31 RX ORDER — ALBUTEROL SULFATE 90 UG/1
INHALANT RESPIRATORY (INHALATION) PRN
Status: DISCONTINUED | OUTPATIENT
Start: 2024-10-31 | End: 2024-10-31

## 2024-10-31 RX ORDER — SERTRALINE HYDROCHLORIDE 100 MG/1
100 TABLET, FILM COATED ORAL DAILY
Status: DISCONTINUED | OUTPATIENT
Start: 2024-11-01 | End: 2024-11-01 | Stop reason: HOSPADM

## 2024-10-31 RX ORDER — PROTAMINE SULFATE 10 MG/ML
INJECTION, SOLUTION INTRAVENOUS PRN
Status: DISCONTINUED | OUTPATIENT
Start: 2024-10-31 | End: 2024-10-31

## 2024-10-31 RX ORDER — ASPIRIN 81 MG/1
81 TABLET ORAL DAILY
Status: DISCONTINUED | OUTPATIENT
Start: 2024-11-01 | End: 2024-11-01 | Stop reason: HOSPADM

## 2024-10-31 RX ORDER — EZETIMIBE 10 MG/1
10 TABLET ORAL DAILY
Status: DISCONTINUED | OUTPATIENT
Start: 2024-11-01 | End: 2024-11-01 | Stop reason: HOSPADM

## 2024-10-31 RX ORDER — LIDOCAINE HYDROCHLORIDE 10 MG/ML
INJECTION, SOLUTION INFILTRATION; PERINEURAL PRN
Status: DISCONTINUED | OUTPATIENT
Start: 2024-10-31 | End: 2024-10-31

## 2024-10-31 RX ORDER — HYDROCHLOROTHIAZIDE 25 MG/1
25 TABLET ORAL ONCE
Status: COMPLETED | OUTPATIENT
Start: 2024-10-31 | End: 2024-10-31

## 2024-10-31 RX ORDER — NICOTINE POLACRILEX 4 MG
15-30 LOZENGE BUCCAL
Status: DISCONTINUED | OUTPATIENT
Start: 2024-10-31 | End: 2024-11-01 | Stop reason: HOSPADM

## 2024-10-31 RX ORDER — NALOXONE HYDROCHLORIDE 0.4 MG/ML
0.2 INJECTION, SOLUTION INTRAMUSCULAR; INTRAVENOUS; SUBCUTANEOUS
Status: DISCONTINUED | OUTPATIENT
Start: 2024-10-31 | End: 2024-11-01 | Stop reason: HOSPADM

## 2024-10-31 RX ORDER — ONDANSETRON 2 MG/ML
INJECTION INTRAMUSCULAR; INTRAVENOUS PRN
Status: DISCONTINUED | OUTPATIENT
Start: 2024-10-31 | End: 2024-10-31

## 2024-10-31 RX ORDER — ISOSORBIDE MONONITRATE 30 MG/1
60 TABLET, EXTENDED RELEASE ORAL DAILY
Status: DISCONTINUED | OUTPATIENT
Start: 2024-11-01 | End: 2024-11-01 | Stop reason: HOSPADM

## 2024-10-31 RX ORDER — ONDANSETRON 4 MG/1
4 TABLET, ORALLY DISINTEGRATING ORAL EVERY 6 HOURS PRN
Status: DISCONTINUED | OUTPATIENT
Start: 2024-10-31 | End: 2024-11-01 | Stop reason: HOSPADM

## 2024-10-31 RX ORDER — ATORVASTATIN CALCIUM 40 MG/1
80 TABLET, FILM COATED ORAL AT BEDTIME
Status: DISCONTINUED | OUTPATIENT
Start: 2024-10-31 | End: 2024-11-01 | Stop reason: HOSPADM

## 2024-10-31 RX ORDER — ASPIRIN 81 MG/1
81 TABLET ORAL ONCE
Status: COMPLETED | OUTPATIENT
Start: 2024-10-31 | End: 2024-10-31

## 2024-10-31 RX ORDER — NICOTINE POLACRILEX 4 MG
15-30 LOZENGE BUCCAL
Status: DISCONTINUED | OUTPATIENT
Start: 2024-10-31 | End: 2024-10-31 | Stop reason: HOSPADM

## 2024-10-31 RX ORDER — SODIUM CHLORIDE 9 MG/ML
INJECTION, SOLUTION INTRAVENOUS CONTINUOUS
Status: ACTIVE | OUTPATIENT
Start: 2024-10-31 | End: 2024-10-31

## 2024-10-31 RX ORDER — OXYCODONE HYDROCHLORIDE 5 MG/1
10 TABLET ORAL EVERY 4 HOURS PRN
Status: DISCONTINUED | OUTPATIENT
Start: 2024-10-31 | End: 2024-11-01 | Stop reason: HOSPADM

## 2024-10-31 RX ORDER — IODIXANOL 320 MG/ML
INJECTION, SOLUTION INTRAVASCULAR
Status: DISCONTINUED | OUTPATIENT
Start: 2024-10-31 | End: 2024-10-31 | Stop reason: HOSPADM

## 2024-10-31 RX ORDER — DEXTROSE MONOHYDRATE 25 G/50ML
25-50 INJECTION, SOLUTION INTRAVENOUS
Status: DISCONTINUED | OUTPATIENT
Start: 2024-10-31 | End: 2024-10-31 | Stop reason: HOSPADM

## 2024-10-31 RX ADMIN — Medication 2 G: at 09:00

## 2024-10-31 RX ADMIN — SOTALOL HYDROCHLORIDE 40 MG: 80 TABLET ORAL at 20:30

## 2024-10-31 RX ADMIN — ONDANSETRON 4 MG: 2 INJECTION INTRAMUSCULAR; INTRAVENOUS at 08:50

## 2024-10-31 RX ADMIN — APIXABAN 5 MG: 5 TABLET, FILM COATED ORAL at 20:30

## 2024-10-31 RX ADMIN — ALBUTEROL SULFATE 2 PUFF: 90 AEROSOL, METERED RESPIRATORY (INHALATION) at 09:38

## 2024-10-31 RX ADMIN — FENTANYL CITRATE 50 MCG: 50 INJECTION, SOLUTION INTRAMUSCULAR; INTRAVENOUS at 08:44

## 2024-10-31 RX ADMIN — LIDOCAINE HYDROCHLORIDE 5 ML: 10 INJECTION, SOLUTION INFILTRATION; PERINEURAL at 09:04

## 2024-10-31 RX ADMIN — PROPOFOL 90 MG: 10 INJECTION, EMULSION INTRAVENOUS at 09:04

## 2024-10-31 RX ADMIN — NAPROXEN 500 MG: 500 TABLET ORAL at 18:14

## 2024-10-31 RX ADMIN — SODIUM CHLORIDE 500 ML: 9 INJECTION, SOLUTION INTRAVENOUS at 06:48

## 2024-10-31 RX ADMIN — PROTAMINE SULFATE 50 MG: 10 INJECTION, SOLUTION INTRAVENOUS at 09:33

## 2024-10-31 RX ADMIN — GABAPENTIN 600 MG: 300 CAPSULE ORAL at 22:24

## 2024-10-31 RX ADMIN — ATORVASTATIN CALCIUM 80 MG: 40 TABLET, FILM COATED ORAL at 22:24

## 2024-10-31 RX ADMIN — ROCURONIUM BROMIDE 50 MG: 50 INJECTION, SOLUTION INTRAVENOUS at 09:05

## 2024-10-31 RX ADMIN — ALBUTEROL SULFATE 2 PUFF: 90 AEROSOL, METERED RESPIRATORY (INHALATION) at 09:40

## 2024-10-31 RX ADMIN — HYDROCHLOROTHIAZIDE 25 MG: 25 TABLET ORAL at 14:30

## 2024-10-31 RX ADMIN — SODIUM CHLORIDE: 0.9 INJECTION, SOLUTION INTRAVENOUS at 09:13

## 2024-10-31 RX ADMIN — ASPIRIN 81 MG: 81 TABLET, COATED ORAL at 06:50

## 2024-10-31 RX ADMIN — KETAMINE HYDROCHLORIDE 20 MG: 10 INJECTION INTRAMUSCULAR; INTRAVENOUS at 08:44

## 2024-10-31 RX ADMIN — SODIUM CHLORIDE: 0.9 INJECTION, SOLUTION INTRAVENOUS at 08:44

## 2024-10-31 RX ADMIN — SUGAMMADEX 400 MG: 100 INJECTION, SOLUTION INTRAVENOUS at 09:34

## 2024-10-31 RX ADMIN — NAPROXEN 500 MG: 500 TABLET ORAL at 11:27

## 2024-10-31 RX ADMIN — PHENYLEPHRINE HYDROCHLORIDE 150 MCG: 10 INJECTION INTRAVENOUS at 09:19

## 2024-10-31 RX ADMIN — INSULIN GLARGINE 28 UNITS: 100 INJECTION, SOLUTION SUBCUTANEOUS at 20:31

## 2024-10-31 RX ADMIN — KETAMINE HYDROCHLORIDE 30 MG: 10 INJECTION INTRAMUSCULAR; INTRAVENOUS at 09:04

## 2024-10-31 ASSESSMENT — ACTIVITIES OF DAILY LIVING (ADL)
ADLS_ACUITY_SCORE: 0

## 2024-10-31 NOTE — PROGRESS NOTES
Patient is kept comfortable during post-procedure stay. VSS. Shoulder pain improving. Right femoral access site remains dry & free from signs of bleeding. Tolerated fluids. Ambulated without issues.  Dr. Garsia was able to speak with patient post procedure.  Able to ask questions. Verbalized no concerns. Belongings returned. Transferred to sliding scale 7 in stable condition. Reprt called to Short Stay RN.

## 2024-10-31 NOTE — Clinical Note
[facility] system 12 lead EKG, hemodynamics 5 lead, pulse oximetery, NIBP, Physiocontrol hands off defibrillator/external pacer, with 3 monitoring leads to patient. Baseline assessment done.

## 2024-10-31 NOTE — ANESTHESIA POSTPROCEDURE EVALUATION
Patient: Kemi Soto    Procedure: Procedure(s):  [facility]       Anesthesia Type:  General    Note:  Disposition: Outpatient   Postop Pain Control: Uneventful            Sign Out: Well controlled pain   PONV: No   Neuro/Psych: Uneventful            Sign Out: Acceptable/Baseline neuro status   Airway/Respiratory: Uneventful            Sign Out: Acceptable/Baseline resp. status   CV/Hemodynamics: Uneventful            Sign Out: Acceptable CV status; No obvious hypovolemia; No obvious fluid overload   Other NRE: NONE   DID A NON-ROUTINE EVENT OCCUR? No           Last vitals:  Vitals Value Taken Time   /75 10/31/24 1349   Temp 36.5  C (97.7  F) 10/31/24 1345   Pulse 81 10/31/24 1437   Resp 18 10/31/24 1345   SpO2 99 % 10/31/24 1348   Vitals shown include unfiled device data.    Electronically Signed By: Alyson Johnson MD  October 31, 2024  2:38 PM

## 2024-10-31 NOTE — ANESTHESIA CARE TRANSFER NOTE
Patient: Kemi Soto    Procedure: Procedure(s):  Left Atrial Appendage Closure - BSC        Diagnosis: other  Diagnosis Additional Information: No value filed.    Anesthesia Type:   No value filed.     Note:    Oropharynx: oropharynx clear of all foreign objects and spontaneously breathing  Level of Consciousness: awake and drowsy  Oxygen Supplementation: face mask  Level of Supplemental Oxygen (L/min / FiO2): 8  Independent Airway: airway patency satisfactory and stable  Dentition: dentition unchanged  Vital Signs Stable: post-procedure vital signs reviewed and stable    Patient transferred to: [facility]          Vitals:  Vitals Value Taken Time   /76 10/31/24 0951   Temp 35.9    Pulse 57 10/31/24 0950   Resp 16    SpO2 100 % 10/31/24 0950   Vitals shown include unfiled device data.    Electronically Signed By: NANI Ramsey CRNA  October 31, 2024  9:52 AM

## 2024-10-31 NOTE — Clinical Note
0 : 16. 45 : 17. 90 : 17.5. 135 : 17. 0 : 18. 45 : 22. 90 : 21. 135 : 24. 0 : 18.3 . 45 : 17.9 . 90 : 18.3 . 135 : 16.5 . SHAVON type used: BroccoliPosition: Passed. [place]: Passed. Size: Accepted. Seal: Complete. Jet: 0.[place] FLX  24mm  GTIN:[id number]  REF: [id number]  LOT:[id number]  EXP: 02/27/2027.

## 2024-10-31 NOTE — ANESTHESIA PREPROCEDURE EVALUATION
Anesthesia Pre-Procedure Evaluation    Patient: Kemi Soto   MRN: [id number] : 1950        Procedure : Procedure(s):  Left Atrial Appendage Closure - BSC [facility]          Past Medical History:   Diagnosis Date     Adrenal nodule (H)      Alcohol dependence in remission (H)      Anemia      Antiplatelet or antithrombotic long-term use      Anxiety and depression      Arrhythmia      Benign essential hypertension      Chronic atrial fibrillation (H)      Chronic back pain      Chronic infection      Coronary artery disease      Diabetes mellitus (H)      Difficulty walking      Discitis of thoracolumbar region 10/10/2015     Encephalopathy 10/14/2015     Epidural abscess 10/10/2015     Hip pain, right      History of angina      Hyperlipidemia      Irregular heart beat      Other chronic pain      Ovarian mass      Sepsis (H) 10/08/2015     Stented coronary artery      Stroke (H) 2015    Neurology felt that episode was C/W subacute ischemic stroke     TIA (transient ischemic attack) 2015     UTI (urinary tract infection)     admitted to [facility] with UTI     Walking troubles       Past Surgical History:   Procedure Laterality Date     ANGIOPLASTY  2016    Drug eluting stent mid LAD, cutting balloon to 1st diagonal     ANGIOPLASTY  2008     BYPASS GRAFT ARTERY CORONARY  12/11/2007    X 3 vessels, LIMA to LAD, saph vein graft to distal RCA, saphvein graft to marginal, mini circuit bypass.  [facility].     CORONARY STENT PLACEMENT  2008    Left main, left circumflex, RCA     CORONARY STENT PLACEMENT  2016     CV ANGIOGRAM CORONARY GRAFT N/A 10/11/2023    Procedure: Coronary Angiogram Graft;  Surgeon: Sam Boo MD;  Location: [facility] CATH LAB CV     CV CORONARY ANGIOGRAM N/A 2018    Procedure: Coronary Angiogram;  Surgeon: Kit Bean MD;  Location: [facility] Cath Lab;  Service:      CV LEFT HEART CATHETERIZATION WITH LEFT VENTRICULOGRAM  N/A 2018    Procedure: Left Heart Catheterization with Left Ventriculogram;  Surgeon: Kit Bean MD;  Location: [facility] Cath Lab;  Service:      EXCISE MASS TRUNK N/A 2024    Procedure: EXCISION ABDOMINAL WALL MASS AND PLACEMENT OF DRAIN;  Surgeon: Braden Bah DO;  Location: [facility] Main OR     INSERT MIDLINE HE  10/31/2015          LUMBAR DISCECTOMY N/A 10/11/2015    BILATERAL L1-L5 DECOMPRESSIVE LAMINECTOMY WITH EVACUATION OF EPIDURAL ABSCESS IRRIGATION & DEBRIDEMENT;  Surgeon: Luli Chan MD;  Location: [facility] Main OR; Due to sepsis     RELEASE CARPAL TUNNEL Bilateral      UMBILICAL HERNIA REPAIR        Allergies   Allergen Reactions     Metformin GI Disturbance     Oxycodone Nausea and Vomiting      Social History     Tobacco Use     Smoking status: Former     Current packs/day: 0.00     Types: Cigarettes     Quit date: 2007     Years since quittin.3     Passive exposure: Past     Smokeless tobacco: Never   Substance Use Topics     Alcohol use: Not Currently     Comment: Stopped drinking 2015      Wt Readings from Last 1 Encounters:   10/31/24 67.1 kg (148 lb)        Anesthesia Evaluation   Pt has had prior anesthetic.     No history of anesthetic complications       ROS/MED HX  ENT/Pulmonary:       Neurologic:     (+)          CVA,    TIA,                  Cardiovascular:     (+)  hypertension- -  CAD angina-  - -   Taking blood thinners                                   METS/Exercise Tolerance:     Hematologic:       Musculoskeletal:       GI/Hepatic:       Renal/Genitourinary:       Endo:     (+) type I DM,              Obesity,       Psychiatric/Substance Use:       Infectious Disease:       Malignancy:       Other:      (+)  , H/O Chronic Pain,         Physical Exam    Airway        Mallampati: II    Neck ROM: full     Respiratory Devices and Support         Dental       (+) Minor Abnormalities - some fillings, tiny chips      Cardiovascular         "  Rhythm and rate: irregular     Pulmonary   pulmonary exam normal              OUTSIDE LABS:  CBC:   Lab Results   Component Value Date    WBC 6.3 10/28/2024    WBC 10.4 07/15/2024    HGB 12.2 10/28/2024    HGB 11.7 07/15/2024    HCT 37.8 10/28/2024    HCT 35.5 07/15/2024     10/28/2024     07/15/2024     BMP:   Lab Results   Component Value Date     10/28/2024     07/15/2024    POTASSIUM 5.1 10/28/2024    POTASSIUM 4.6 07/15/2024    CHLORIDE 99 10/28/2024    CHLORIDE 101 07/15/2024    CO2 30 (H) 10/28/2024    CO2 30 (H) 07/15/2024    BUN 10.3 10/28/2024    BUN 11.1 07/15/2024    CR 0.69 10/28/2024    CR 0.77 07/15/2024     (H) 10/31/2024     (H) 10/28/2024     COAGS:   Lab Results   Component Value Date    PTT 41 (H) 08/13/2018    INR 1.7 (H) 01/12/2024     POC: No results found for: \"BGM\", \"HCG\", \"HCGS\"  HEPATIC:   Lab Results   Component Value Date    ALBUMIN 3.9 07/15/2024    PROTTOTAL 6.3 (L) 07/15/2024    ALT 21 07/15/2024    AST 21 07/15/2024    ALKPHOS 94 07/15/2024    BILITOTAL 0.3 07/15/2024     OTHER:   Lab Results   Component Value Date    A1C 7.5 (H) 07/15/2024    ELLIE 9.2 10/28/2024    MAG 2.0 11/07/2020    TSH 0.77 05/05/2021       Anesthesia Plan    ASA Status:  3       Anesthesia Type: General.              Consents            Postoperative Care            Comments:               Alyson Johnson MD    I have reviewed the pertinent notes and labs in the chart from the past 30 days and (re)examined the patient.  Any updates or changes from those notes are reflected in this note.            # Drug Induced Coagulation Defect: home medication list includes an anticoagulant medication  # Drug Induced Platelet Defect: home medication list includes an antiplatelet medication   # Hypertension: Noted on problem list        # DMII: A1C = N/A within past 6 months   # Overweight: Estimated body mass index is 27.07 kg/m  as calculated from the following:    Height as of this " "encounter: 1.575 m (5' 2\").    Weight as of this encounter: 67.1 kg (148 lb).        # History of CABG: noted on surgical history      "

## 2024-10-31 NOTE — PLAN OF CARE
Goal Outcome Evaluation:      Plan of Care Reviewed With: patient    Overall Patient Progress: no changeOverall Patient Progress: no change    Assumed cares: [id number]  Vitals: VSS on RA  Pain: Pt reports chronic pain, declined any pain medication  Neuro: A&Ox4  Cardiac: SR with BBB  Respiratory: WDL  GI/: WDL  Skin: R groin surgical site- CDI  IV/Drains: L and R PIV- SL  Activity: Independent  Behavior: Pt is calm and cooperative    Plan of Care: Follow patient plan of care

## 2024-10-31 NOTE — PLAN OF CARE
Pt prepped and ready for cardiac procedure. Sb on monitor. Asymptomatic at this time. Pt would like daughter Tory called with updates. Pt verbalized no friends or family available at this time to take care of her at home and will need to stay the night. Provider and leadership aware.

## 2024-10-31 NOTE — ANESTHESIA PROCEDURE NOTES
Airway       Patient location during procedure: OR       Procedure Start/Stop Times: 10/31/2024 9:07 AM  Staff -        Anesthesiologist:  Alyson Johnson MD       CRNA: Raleigh New APRN CRNA       Performed By: CRNA  Consent for Airway        Urgency: elective  Indications and Patient Condition       Indications for airway management: aliyah-procedural       Induction type:intravenous       Mask difficulty assessment: 1 - vent by mask    Final Airway Details       Final airway type: endotracheal airway       Successful airway: ETT - single  Endotracheal Airway Details        ETT size (mm): 7.0       Cuffed: yes       Cuff volume (mL): 10       Successful intubation technique: video laryngoscopy       VL Blade Size: Glidescope 3       Grade View of Cords: 1       Adjucts: stylet       Position: Right       Measured from: lips       Secured at (cm): 20       Bite block used: None    Post intubation assessment        Placement verified by: capnometry, equal breath sounds and chest rise        Number of attempts at approach: 1       Number of other approaches attempted: 0       Secured with: tape       Ease of procedure: easy       Dentition: Intact and Unchanged    Medication(s) Administered   Medication Administration Time: 10/31/2024 9:07 AM

## 2024-10-31 NOTE — PROGRESS NOTES
Patient admitted to room 07 at approximately 1345 via wheel chair from surgery.  Reason for Admission:   Report received from:   Patient was accompanied by Self.  Discharge transportation provided by:  Patient ambulated/transferred:  independently. self.  Patient is alert and orientated x 3.  Outpatient Observation education provided to: (patient, family, friend)  MDRO Education done if applicable (MRSA, VRE, etc)  Safety risks were identified during admission:  none.   Yellow risk/fall band applied:  No  Home meds sent home: No  Home meds sent to pharmacy:No IF YES add 1/2 sheet laminated page reminder to chart/clipboard   Detailed Belongings: cell phone and , purse, backpack, clothing, black glasses, shoes, jacket

## 2024-10-31 NOTE — PHARMACY-ADMISSION MEDICATION HISTORY
Pharmacist Admission Medication History    Admission medication history is complete. The information provided in this note is only as accurate as the sources available at the time of the update.    Information Source(s): Patient via in-person    Pertinent Information: Patient took 14 units of Lantus last night in prep for today's procedure    Changes made to PTA medication list:  Added: None  Deleted: clobetasol (duplicate)  Changed: Lantus - From AM to HS    Allergies reviewed with patient and updates made in EHR: yes    Medication History Completed By: Jean-Claude Coronado [facility] 10/31/2024 10:48 AM    PTA Med List   Medication Sig Last Dose/Taking    acetaminophen (TYLENOL) 500 MG tablet Take 1,000 mg by mouth every 12 hours as needed for mild pain Past Week    apixaban ANTICOAGULANT (ELIQUIS ANTICOAGULANT) 5 MG tablet Take 1 tablet (5 mg) by mouth 2 times daily 10/31/2024 Morning    aspirin 81 MG EC tablet Take 1 tablet (81 mg) by mouth daily. 10/30/2024    atorvastatin (LIPITOR) 80 MG tablet Take 1 tablet (80 mg) by mouth at bedtime 10/30/2024    busPIRone (BUSPAR) 15 MG tablet Take 1 tablet by mouth once daily 10/31/2024 Morning    clobetasol propionate (TEMOVATE) 0.05 % external cream Apply topically 2 times daily. Apply pea size amount to rashes on torso 10/30/2024    ezetimibe (ZETIA) 10 MG tablet Take 1 tablet (10 mg) by mouth daily 10/30/2024    gabapentin (NEURONTIN) 300 MG capsule TAKE 3 CAPSULES BY MOUTH EVERY DAY AT BEDTIME 10/30/2024    hydroCHLOROthiazide 12.5 MG tablet Take 1 tablet (12.5 mg) by mouth daily -- appt needed for further refills. 10/30/2024    insulin glargine (LANTUS PEN) 100 UNIT/ML pen Inject 28 Units subcutaneously at bedtime. 10/30/2024 Bedtime    isosorbide mononitrate (IMDUR) 60 MG 24 hr tablet Take 1 tablet (60 mg) by mouth daily 10/31/2024 Morning    ketoconazole (NIZORAL) 2 % external cream  More than a month    losartan (COZAAR) 100 MG tablet Take 1 tablet (100 mg) by mouth daily  10/31/2024 Morning    naproxen (NAPROSYN) 500 MG tablet Take 500 mg by mouth 2 times daily (with meals) 10/30/2024    sertraline (ZOLOFT) 100 MG tablet Take 1 tablet by mouth once daily 10/31/2024 Morning    sotalol (BETAPACE) 80 MG tablet Take 0.5 tablets (40 mg) by mouth 2 times daily 10/31/2024 Morning    triamcinolone (KENALOG) 0.1 % external cream Apply topically 2 times daily. More than a month

## 2024-10-31 NOTE — INTERVAL H&P NOTE
"I have reviewed the surgical (or preoperative) H&P that is linked to this encounter, and examined the patient. There are no significant changes    Needs to stay overnight.  No changes since I saw her in the clinic    Clinical Conditions Present on Arrival:  Clinically Significant Risk Factors Present on Admission     # Drug Induced Coagulation Defect: home medication list includes an anticoagulant medication  # Drug Induced Platelet Defect: home medication list includes an antiplatelet medication      # DMII: A1C = N/A within past 6 months  # Overweight: Estimated body mass index is 27.07 kg/m  as calculated from the following:    Height as of 10/28/24: 1.575 m (5' 2\").    Weight as of 10/28/24: 67.1 kg (148 lb).           "

## 2024-11-01 ENCOUNTER — MYC MEDICAL ADVICE (OUTPATIENT)
Dept: CARDIOLOGY | Facility: CLINIC | Age: 74
End: 2024-11-01

## 2024-11-01 ENCOUNTER — TELEPHONE (OUTPATIENT)
Dept: CARDIOLOGY | Facility: CLINIC | Age: 74
End: 2024-11-01

## 2024-11-01 VITALS
OXYGEN SATURATION: 98 % | SYSTOLIC BLOOD PRESSURE: 147 MMHG | HEIGHT: 62 IN | HEART RATE: 55 BPM | BODY MASS INDEX: 27.23 KG/M2 | WEIGHT: 148 LBS | DIASTOLIC BLOOD PRESSURE: 63 MMHG | TEMPERATURE: 97.9 F | RESPIRATION RATE: 18 BRPM

## 2024-11-01 DIAGNOSIS — Z95.818 PRESENCE OF WATCHMAN LEFT ATRIAL APPENDAGE CLOSURE DEVICE: Primary | ICD-10-CM

## 2024-11-01 LAB
GLUCOSE BLDC GLUCOMTR-MCNC: 87 MG/DL (ref 70–99)
GLUCOSE BLDC GLUCOMTR-MCNC: 91 MG/DL (ref 70–99)

## 2024-11-01 PROCEDURE — 99232 SBSQ HOSP IP/OBS MODERATE 35: CPT | Performed by: INTERNAL MEDICINE

## 2024-11-01 PROCEDURE — 250N000013 HC RX MED GY IP 250 OP 250 PS 637: Performed by: INTERNAL MEDICINE

## 2024-11-01 RX ADMIN — EZETIMIBE 10 MG: 10 TABLET ORAL at 09:05

## 2024-11-01 RX ADMIN — NAPROXEN 500 MG: 500 TABLET ORAL at 09:04

## 2024-11-01 RX ADMIN — HYDROCHLOROTHIAZIDE 12.5 MG: 12.5 TABLET ORAL at 09:07

## 2024-11-01 RX ADMIN — APIXABAN 5 MG: 5 TABLET, FILM COATED ORAL at 09:06

## 2024-11-01 RX ADMIN — SOTALOL HYDROCHLORIDE 40 MG: 80 TABLET ORAL at 09:03

## 2024-11-01 RX ADMIN — ISOSORBIDE MONONITRATE 60 MG: 30 TABLET, EXTENDED RELEASE ORAL at 09:05

## 2024-11-01 RX ADMIN — LOSARTAN POTASSIUM 100 MG: 50 TABLET, FILM COATED ORAL at 09:04

## 2024-11-01 RX ADMIN — ASPIRIN 81 MG: 81 TABLET, COATED ORAL at 09:05

## 2024-11-01 RX ADMIN — BUSPIRONE HYDROCHLORIDE 15 MG: 15 TABLET ORAL at 09:03

## 2024-11-01 RX ADMIN — SERTRALINE HYDROCHLORIDE 100 MG: 100 TABLET ORAL at 09:05

## 2024-11-01 ASSESSMENT — ACTIVITIES OF DAILY LIVING (ADL)
ADLS_ACUITY_SCORE: 0

## 2024-11-01 NOTE — PLAN OF CARE
Problem: Adult Inpatient Plan of Care  Goal: Plan of Care Review  Description: The Plan of Care Review/Shift note should be completed every shift.  The Outcome Evaluation is a brief statement about your assessment that the patient is improving, declining, or no change.  This information will be displayed automatically on your shift  note.  Outcome: Progressing  Flowsheets (Taken 11/1/2024 0643)  Plan of Care Reviewed With: patient  Overall Patient Progress: improving     Problem: Adult Inpatient Plan of Care  Goal: Optimal Comfort and Wellbeing  Intervention: Monitor Pain and Promote Comfort  Recent Flowsheet Documentation  Taken 10/31/2024 2312 by Elham Vázquez RN  Pain Management Interventions:   repositioned   rest     Problem: Cardiovascular Surgery  Goal: Optimal Cerebral Tissue Perfusion  Intervention: Protect and Optimize Cerebral Perfusion  Recent Flowsheet Documentation  Taken 10/31/2024 2005 by Elham Vázquez RN  Head of Bed (HOB) Positioning: HOB at 20-30 degrees   Goal Outcome Evaluation:      Plan of Care Reviewed With: patient    Overall Patient Progress: improvingOverall Patient Progress: improving    VSS in RA except bradycardic 50's. Tele - SR. R groin dressing CDI. [id number] at 0300. Ambulates independent.     Elham Vázquez RN

## 2024-11-01 NOTE — DISCHARGE SUMMARY
HEART CARE INPATIENT ENCOUNTER NOTE      Structural Discharge Summary    Primary Care Physician:  Zaheer Sandra    Discharge Provider: AMPARO GERBER PA-C     Admission Date: 10/31/2024. Admission Diagnoses: Paroxysmal atrial fibrillation (H) [I48.0]   Discharge Date: November 1, 2024   Disposition: Home   Condition at Discharge: Stable  Code Status: Full Code     Principal Diagnosis:  Paroxysmal atrial fibrillation    Discharge Diagnoses:  Active Problems:    Obesity    Hypercholesterolemia    Essential hypertension    Type 2 diabetes mellitus with diabetic polyneuropathy, with long-term current use of insulin (H)    Paroxysmal atrial fibrillation (H)    Chronic anticoagulation    History of TIA (transient ischemic attack) and stroke    Peripheral neuropathy    Presence of Watchman left atrial appendage closure device      Consult/s: none  Significant Diagnostic Studies:   Procedural ANNE - Interpretation Summary     Left ventricular size, wall motion and function are normal. The ejection  fraction is 60-65%.  Normal right ventricle size and systolic function.  Watchman device noted in left atrial appendage. The device is well seated with  no leakage by color flow Doppler imaging.  There is no pericardial effusion.       Treatments: procedures: LAAO implant (Watchman FLX)    Discharge Medications:   Current Discharge Medication List        CONTINUE these medications which have NOT CHANGED    Details   acetaminophen (TYLENOL) 500 MG tablet Take 1,000 mg by mouth every 12 hours as needed for mild pain      apixaban ANTICOAGULANT (ELIQUIS ANTICOAGULANT) 5 MG tablet Take 1 tablet (5 mg) by mouth 2 times daily  Qty: 60 tablet, Refills: 11    Associated Diagnoses: Paroxysmal atrial fibrillation (H)      aspirin 81 MG EC tablet Take 1 tablet (81 mg) by mouth daily.    Associated Diagnoses: Paroxysmal atrial fibrillation (H)      atorvastatin (LIPITOR) 80 MG tablet Take 1 tablet (80 mg) by mouth at bedtime  Qty: 90  tablet, Refills: 3    Associated Diagnoses: Hypercholesterolemia      busPIRone (BUSPAR) 15 MG tablet Take 1 tablet by mouth once daily  Qty: 90 tablet, Refills: 3    Associated Diagnoses: Anxiety, generalized      clobetasol propionate (TEMOVATE) 0.05 % external cream Apply topically 2 times daily. Apply pea size amount to rashes on torso  Qty: 60 g, Refills: 3    Associated Diagnoses: Psoriasis, guttate      ezetimibe (ZETIA) 10 MG tablet Take 1 tablet (10 mg) by mouth daily  Qty: 90 tablet, Refills: 3    Associated Diagnoses: Coronary artery disease due to lipid rich plaque; Hypercholesterolemia      gabapentin (NEURONTIN) 300 MG capsule TAKE 3 CAPSULES BY MOUTH EVERY DAY AT BEDTIME  Qty: 270 capsule, Refills: 3    Associated Diagnoses: Anxiety, generalized      hydroCHLOROthiazide 12.5 MG tablet Take 1 tablet (12.5 mg) by mouth daily -- appt needed for further refills.  Qty: 90 tablet, Refills: 3    Associated Diagnoses: Essential hypertension      insulin glargine (LANTUS PEN) 100 UNIT/ML pen Inject 28 Units subcutaneously at bedtime.      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      blood glucose (NO BRAND SPECIFIED) test strip Use to test blood sugar 1 times daily. To accompany: Blood Glucose Monitor Brands: per insurance.  Qty: 100 strip, Refills: 6    Associated Diagnoses: Type 2 diabetes mellitus with diabetic polyneuropathy, with long-term current use of insulin (H); Type 2 diabetes mellitus with hyperglycemia, with long-term current use of insulin (H)      blood glucose monitoring (NO BRAND SPECIFIED) meter device kit Use to test blood sugar 1 times daily. Preferred blood glucose meter OR supplies to accompany: Blood Glucose Monitor Brands: per insurance.  Qty: 1 kit, Refills: 0    Associated Diagnoses: Type 2 diabetes mellitus with diabetic polyneuropathy, with long-term current use of insulin (H); Type 2 diabetes mellitus with hyperglycemia, with long-term current use of insulin (H)      thin (NO BRAND SPECIFIED) lancets Use with lanceting device. To accompany: Blood Glucose Monitor Brands: per insurance.  Qty: 100 each, Refills: 6    Associated Diagnoses: Type 2 diabetes mellitus with diabetic polyneuropathy, with long-term current use of insulin (H); Type 2 diabetes mellitus with hyperglycemia, with long-term current use of insulin (H)             Discharge Instructions:  Follow up appointment with Chichi Shepherd PA-C in 45 days (December 3)    Follow up CTA in ~3 months (Feb 5)    Diet: Regular diet. Increase fluids over the next 2 days.   Activity: Activity as tolerated   Restrictions: No lifting >10 lbs or vigorous exercise for 5 days. No driving for 3 days. May return to work in 5 days  Wound / drain care: OK to shower next day. Keep wound clean and dry. Ok to use Ice packs PRN for discomfort. No  "baths, hot tubs or swimming pools for 5 days.    Hospital Summary:   Ms. Kemi Soto is a 74 year old female who underwent successful LAAO implant with a 24 mm Watchman FLX device. [place] [facility] [place] and then was transferred to [facility] stay for overnight.      She dangled at the edge of bed and ambulated; she voided without difficulty.  The vascular access site has remained stable.  Post procedure the patient denies chest pain/pressure, palpitations, or shortness of breath.      BP has been on the high side, but she takes her medications at different times at home.  She also says her HR is lower than normal, but took her sotalol differently yesterday.     Repeat labs were reviewed and were stable.  Activity restrictions and reportable signs and symptoms were discussed with the patient who verbalizes understanding.  She will continue taking Eliquis and ASA daily for the next 6 weeks.  After 6 weeks, she will come off Eliquis and take DAPT (ASA/Plavix) through May 1. At that time she will be on single antiplatelet therapy indefinitely.     Follow up is arranged as above.   Asked her to take BP daily x 5 days and call if it's not normalizing once home and in her own environment       Physical Examination   BP (!) 155/59 (BP Location: Left arm)   Pulse 55   Temp 98.3  F (36.8  C) (Oral)   Resp 18   Ht 1.575 m (5' 2\")   Wt 67.1 kg (148 lb)   LMP  (LMP Unknown)   SpO2 98%   BMI 27.07 kg/m    Body mass index is 27.07 kg/m .  Wt Readings from Last 3 Encounters:   10/31/24 67.1 kg (148 lb)   10/28/24 67.1 kg (148 lb)   09/17/24 67.6 kg (149 lb)       Intake/Output Summary (Last 24 hours) at 11/1/2024 0656  Last data filed at 10/31/2024 2048  Gross per 24 hour   Intake 1280 ml   Output --   Net 1280 ml     General Appearance:   no distress, normal body habitus   Chest/Lungs:   Normal respiratory effort. Respirations are even and unlabored. Lungs are clear to auscultation, no " rales or wheezing. No chest wall tenderness.    Cardiovascular:   Regular. Normal S1, S2 with no murmurs, rubs, or gallops; the carotid, radial, dorsalis pedis and posterior tibial pulses are intact   Abdomen:  obese, soft, non-tender to palpation, non-distended. + bowel sounds.   Extremities: No edema    Skin: Right groin site WNL   Neurologic: Alert and oriented x3, calm and able to move all 4 extremities appropriately            Lab Results    Chemistry/lipid CBC    Creat 10/31/2024 - 0.74 Hgb 10/31/2024 - 10.6      Hemoglobin A1C   Date Value Ref Range Status   10/31/2024 8.4 (H) <5.7 % Final     Comment:     Normal <5.7%   Prediabetes 5.7-6.4%    Diabetes 6.5% or higher     Note: Adopted from ADA consensus guidelines.

## 2024-11-01 NOTE — PLAN OF CARE
Goal Outcome Evaluation:       Patient discharged to home at 1000 via Private Car.  Accompanied by friend and staff.  Discharge instructions were reviewed with patient, opportunity offered to ask questions.    Prescriptions N/A.  Access discontinued: Yes  Care plan and education discontinued: Yes  Home meds retrieved from pharmacy: Yes  Belongings were sent home with patient/family:  Cell phone/electronics:  , Clothing: Shirt(s):   and Pants:  , Purse/Wallet:  , and Shoes:   .

## 2024-11-01 NOTE — TELEPHONE ENCOUNTER
Pt called to find out when she can have elective dental work.  Pt s/p LAAC 10/31/24 - 6 weeks post is 12/12/24.  Pt verbalized understanding and had no additional questions.  -ral

## 2024-11-08 ENCOUNTER — THERAPY VISIT (OUTPATIENT)
Dept: PHYSICAL THERAPY | Facility: REHABILITATION | Age: 74
End: 2024-11-08
Payer: COMMERCIAL

## 2024-11-08 DIAGNOSIS — G89.29 CHRONIC BILATERAL LOW BACK PAIN WITH BILATERAL SCIATICA: Primary | ICD-10-CM

## 2024-11-08 DIAGNOSIS — M54.16 LUMBAR RADICULOPATHY: ICD-10-CM

## 2024-11-08 DIAGNOSIS — M54.41 CHRONIC BILATERAL LOW BACK PAIN WITH BILATERAL SCIATICA: Primary | ICD-10-CM

## 2024-11-08 DIAGNOSIS — Z98.890 HISTORY OF LUMBAR SURGERY: ICD-10-CM

## 2024-11-08 DIAGNOSIS — M48.061 LUMBAR FORAMINAL STENOSIS: ICD-10-CM

## 2024-11-08 DIAGNOSIS — M43.16 SPONDYLOLISTHESIS OF LUMBAR REGION: ICD-10-CM

## 2024-11-08 DIAGNOSIS — M54.42 CHRONIC BILATERAL LOW BACK PAIN WITH BILATERAL SCIATICA: Primary | ICD-10-CM

## 2024-11-08 PROCEDURE — 97112 NEUROMUSCULAR REEDUCATION: CPT | Mod: GP

## 2024-11-08 PROCEDURE — 97110 THERAPEUTIC EXERCISES: CPT | Mod: GP

## 2024-11-08 PROCEDURE — 97140 MANUAL THERAPY 1/> REGIONS: CPT | Mod: GP

## 2024-11-11 ENCOUNTER — PATIENT OUTREACH (OUTPATIENT)
Dept: CARE COORDINATION | Facility: CLINIC | Age: 74
End: 2024-11-11
Payer: COMMERCIAL

## 2024-11-12 ENCOUNTER — DOCUMENTATION ONLY (OUTPATIENT)
Dept: OTHER | Facility: CLINIC | Age: 74
End: 2024-11-12

## 2024-11-19 ENCOUNTER — THERAPY VISIT (OUTPATIENT)
Dept: PHYSICAL THERAPY | Facility: REHABILITATION | Age: 74
End: 2024-11-19
Payer: COMMERCIAL

## 2024-11-19 DIAGNOSIS — M54.16 LUMBAR RADICULOPATHY: ICD-10-CM

## 2024-11-19 DIAGNOSIS — M54.41 CHRONIC BILATERAL LOW BACK PAIN WITH BILATERAL SCIATICA: Primary | ICD-10-CM

## 2024-11-19 DIAGNOSIS — M48.061 LUMBAR FORAMINAL STENOSIS: ICD-10-CM

## 2024-11-19 DIAGNOSIS — Z98.890 HISTORY OF LUMBAR SURGERY: ICD-10-CM

## 2024-11-19 DIAGNOSIS — M43.16 SPONDYLOLISTHESIS OF LUMBAR REGION: ICD-10-CM

## 2024-11-19 DIAGNOSIS — G89.29 CHRONIC BILATERAL LOW BACK PAIN WITH BILATERAL SCIATICA: Primary | ICD-10-CM

## 2024-11-19 DIAGNOSIS — M54.42 CHRONIC BILATERAL LOW BACK PAIN WITH BILATERAL SCIATICA: Primary | ICD-10-CM

## 2024-11-19 PROCEDURE — 97750 PHYSICAL PERFORMANCE TEST: CPT | Mod: GP

## 2024-11-19 PROCEDURE — 97110 THERAPEUTIC EXERCISES: CPT | Mod: GP

## 2024-11-19 NOTE — PROGRESS NOTES
PLAN  Discharge pending 1-month trial of HEP.    Beginning/End Dates of Progress Note Reporting Period:  10/29/2024  to 11/19/2024    Referring Provider:  Chula Garcia           11/19/24 0500   Appointment Info   Signing clinician's name / credentials Twin Garcia, RUBY   Visits Used 6   Medical Diagnosis Chronic bilateral low back pain with bilateral sciatica  Lumbar radiculopathy  Lumbar foraminal stenosis  Spondylolisthesis of lumbar region  History of lumbar surgery   PT Tx Diagnosis Chronic low back pain with mobility and strength deficits, as well as impaired functional mobility and balance   Progress Note/Certification   Start of Care Date 10/03/24   Onset of illness/injury or Date of Surgery 08/26/24  (Date recorded is date of referral, but symptoms are chronic and have lasted over 1 year.)   Therapy Frequency 1 time per week   Predicted Duration 10 weeks   Certification date from 10/03/24   Certification date to 12/12/24   Progress Note Due Date 11/26/24   Progress Note Completed Date 11/19/24   PT Goal 1   Goal Identifier HEP   Goal Description Ciarra will demonstrate mastery of ([id number] need for cueing) and compliance with (completion at least 5/7 days/week) her home exercise program to facilitate increased rate of improvement.   Goal Progress Inconsistent   Target Date 10/31/24   PT Goal 2   Goal Identifier LATRICIA   Goal Description Ciarra will demonstrate significantly improved function as evidenced by an improved (decreased) LATRICIA score of less than or equal to 7.14%.   Goal Progress 25%   Target Date 12/12/24   PT Goal 3   Goal Identifier Walking   Goal Description Ciarra will demonstrate improved tolerance to functional mobility as evidenced by her abilitto walk for up to 20 minutes with self-report low back and leg pain no higher than 2/10.   Goal Progress Up to 20 minutes with a cart and pain no higher than 2/10. Up to 10 minutes unassisted.   Target Date 12/12/24   PT Goal 4   Goal Identifier  "FGA   Goal Description Ciarra will demonstrate improved safety and independence with functional mobility and a decreased fall risk as evidenced by an improved FGA score of at least 15/30.   Goal Progress 14/30   Target Date 12/12/24   Subjective Report   Subjective Report Ciarra reports that she's feeling a little better today, but the weather still affects how she feels on any given day.   Objective Measures   Objective Measures Objective Measure 1;Objective Measure 2;Objective Measure 3;Objective Measure 4   Objective Measure 1   Objective Measure Worst Pain   Details Since last visit: 7-8/10   Objective Measure 2   Objective Measure Lower Extremity Strength   Details Hip flexion: Left 4+/5, right 4-/5.  Hip adduction: left 5/5, right 5-/5.  Hip abduction: 5/5 bilaterally. Knee extension: Left 5/5, right 5-/5.  Knee flexion: 5/5 bilaterally. Ankle dorsiflexion and plantarflexion: 5/5 bilaterally.  Great toe extension: 5/5 bilaterally.   Objective Measure 3   Objective Measure Slump   Details Very mild \"pull\" on right. Negative on left.   Objective Measure 4   Objective Measure FGA   Details 14/30   Therapeutic Procedure/Exercise   Therapeutic Procedures: strength, endurance, ROM, flexibility minutes (12643) 21   Ther Proc 1 Nustep + subjective report   Ther Proc 1 - Details 6 minutes. See subjective report.   Ther Proc 2 Lower trunk rotation   Ther Proc 2 - Details 2 minutes (requires continued cueing for appropriate speed).   Ther Proc 3 Quadratus lumborum stretch   Ther Proc 3 - Details 2 x 45 seconds bilateral   Ther Proc 4 Pelvic rocking   Ther Proc 4 - Details 1 x 20 anterior and posterior (cueing to avoid lower extremity compensation and to avoid pushing into pain)   Ther Proc 5 Bridge   Ther Proc 5 - Details 2 x 10 (reported that it \"felt better\")   Skilled Intervention Exercises to improve core, low back, and hip/lower extremity flexibility/mobility and strength/stability.   Patient Response/Progress Added " quadratus lumborum stretch, pelvic rocks, and bridge. Ciarra tolerated all exercises and progressions well with cueing for proper form.   Physical Performance Test/measures   Physical Performance Test/Measurement, Minutes (46568) 9   Skilled Intervention FGA + explanation/setup + discussion of results   Patient Response/Progress See objective measures.   Education   Learner/Method Patient;Listening;Demonstration;Pictures/Video;No Barriers to Learning   Plan   Home program See PTRx.   Plan for next session Continue to progress core, low back, and hip/lower extremity flexibility/mobility and strength/stability exercises. Progress nerve glides as tolerated. Introduce balance training. Continue manual therapy as appropriate.   Comments   Comments Impression/Assessment: Ciarra returns to physical therapy reporting that she will be leaving for [place] for the winter soon, and she wants to discharge from physical therapy. On testing today, she demonstrates improved lower extremity strength compared to initial evaluation, neural tension testing is also less provocative. She reports being able to walk for up to 20 minutes with minimal pain with a shopping cart, but she is only able to tolerated up to 10 minutes with low pain without an assistive device. She has improved her FGA score from 12/30 at initial evaluation to 14/30 today, indicating improved (though not clinically signficant) function. She notes that she want to trial a home exercise program prior to leaving for [place] in a few weeks. If she does not keep/schedule a new appointment within the next month, she will be formally discharged.   Total Session Time   Timed Code Treatment Minutes 30   Total Treatment Time (sum of timed and untimed services) 30

## 2024-11-26 ENCOUNTER — LAB (OUTPATIENT)
Dept: LAB | Facility: CLINIC | Age: 74
End: 2024-11-26

## 2024-11-26 ENCOUNTER — MYC MEDICAL ADVICE (OUTPATIENT)
Dept: FAMILY MEDICINE | Facility: CLINIC | Age: 74
End: 2024-11-26

## 2024-11-26 DIAGNOSIS — E11.42 TYPE 2 DIABETES MELLITUS WITH DIABETIC POLYNEUROPATHY, WITH LONG-TERM CURRENT USE OF INSULIN (H): ICD-10-CM

## 2024-11-26 DIAGNOSIS — E11.42 TYPE 2 DIABETES MELLITUS WITH DIABETIC POLYNEUROPATHY, WITH LONG-TERM CURRENT USE OF INSULIN (H): Primary | ICD-10-CM

## 2024-11-26 DIAGNOSIS — Z79.4 TYPE 2 DIABETES MELLITUS WITH DIABETIC POLYNEUROPATHY, WITH LONG-TERM CURRENT USE OF INSULIN (H): Primary | ICD-10-CM

## 2024-11-26 DIAGNOSIS — Z79.4 TYPE 2 DIABETES MELLITUS WITH DIABETIC POLYNEUROPATHY, WITH LONG-TERM CURRENT USE OF INSULIN (H): ICD-10-CM

## 2024-11-26 LAB
EST. AVERAGE GLUCOSE BLD GHB EST-MCNC: 180 MG/DL
HBA1C MFR BLD: 7.9 % (ref 0–5.6)

## 2024-11-26 PROCEDURE — 36415 COLL VENOUS BLD VENIPUNCTURE: CPT

## 2024-11-26 PROCEDURE — 83036 HEMOGLOBIN GLYCOSYLATED A1C: CPT

## 2024-12-03 ENCOUNTER — OFFICE VISIT (OUTPATIENT)
Dept: CARDIOLOGY | Facility: CLINIC | Age: 74
End: 2024-12-03
Payer: COMMERCIAL

## 2024-12-03 ENCOUNTER — TELEPHONE (OUTPATIENT)
Dept: CARDIOLOGY | Facility: CLINIC | Age: 74
End: 2024-12-03

## 2024-12-03 VITALS
RESPIRATION RATE: 16 BRPM | DIASTOLIC BLOOD PRESSURE: 52 MMHG | BODY MASS INDEX: 26.68 KG/M2 | WEIGHT: 145 LBS | HEART RATE: 57 BPM | SYSTOLIC BLOOD PRESSURE: 130 MMHG | OXYGEN SATURATION: 95 % | HEIGHT: 62 IN

## 2024-12-03 DIAGNOSIS — Z95.818 PRESENCE OF WATCHMAN LEFT ATRIAL APPENDAGE CLOSURE DEVICE: Primary | ICD-10-CM

## 2024-12-03 DIAGNOSIS — I10 ESSENTIAL HYPERTENSION: ICD-10-CM

## 2024-12-03 DIAGNOSIS — I48.0 PAROXYSMAL ATRIAL FIBRILLATION (H): ICD-10-CM

## 2024-12-03 DIAGNOSIS — I25.708 CORONARY ARTERY DISEASE OF BYPASS GRAFT OF NATIVE HEART WITH STABLE ANGINA PECTORIS (H): ICD-10-CM

## 2024-12-03 DIAGNOSIS — E78.00 HYPERCHOLESTEROLEMIA: ICD-10-CM

## 2024-12-03 PROCEDURE — 99214 OFFICE O/P EST MOD 30 MIN: CPT | Performed by: PHYSICIAN ASSISTANT

## 2024-12-03 PROCEDURE — G2211 COMPLEX E/M VISIT ADD ON: HCPCS | Performed by: PHYSICIAN ASSISTANT

## 2024-12-03 RX ORDER — CLOPIDOGREL BISULFATE 75 MG/1
75 TABLET ORAL DAILY
Qty: 70 TABLET | Refills: 1 | Status: SHIPPED | OUTPATIENT
Start: 2024-12-13 | End: 2025-05-01

## 2024-12-03 NOTE — PROGRESS NOTES
HEART CARE ENCOUNTER NOTE       [facility]  [phone]      Assessment/Recommendations   1.  Paroxysmal atrial fibrillation: Status post LAAO with 24 mm Watchman FLX device on 10/31/2024.  She should continue aspirin 81 mg daily indefinitely.  She should take her last dose of Eliquis evening of December 12 and the following day start taking Plavix 75 mg daily until 5/1/2025.  She should continue her current dose of sotalol. She has CT scan scheduled for February, but this may need to be rescheduled as [place]. We will see her again in April for Watchman follow-up    MODIFIED GRACE SCALE   Timepoint: 4-6 wk Post-LAAC    Previous score: 0    Score Description   0 No symptoms at all   1 No significant disability despite symptoms; able to carry out all usual duties and activities   2 Slight disability; unable to carry out all previous activities, but able to look after own affairs without assistance   3 Moderate disability; requiring some help, but able to walk without assistance   4 Moderately severe disability; unable to walk without assistance and unable to attend to own bodily needs without assistance   5 Severe disability; bedridden, incontinent and requiring constant nursing care and attention   6 Dead    Total score (0 - 6):  0    Change in score if s/p LAAC? No     2.  Coronary artery disease -status post CABG and multiple stents.  Her coronary angiogram in 2023 showed patent LIMA-LAD; VG's known to be occluded; mid vessel occluded stents to the LCx.    3.  Hyperlipidemia -LDL 49.  She should continue Lipitor 80 mg daily and Zetia 10 mg daily    4.  Hypertension -controlled.  She should continue losartan 100 mg daily, hydrochlorothiazide 12.5 mg daily    5. History of TIA - in 2015       History of Present Illness/Subjective    Kemi Soto is a 74 year old female who comes in today for 6-week follow-up after watchman implant    Kemi Soto has a past history  "of paroxysmal atrial fibrillation/atrial flutter, hypertension, coronary artery disease status post CABG x 4, hyperlipidemia, type 2 diabetes mellitus, history of TIA, peripheral neuropathy     She is currently taking Eliquis and aspirin.  She denies stroke since watchman implant.  She reports a small sensation in her chest that symptoms feels like a tickle.  She denies chest discomfort or anginal symptoms as she had when she needed her bypass surgery.  She denies exertional symptoms.  She denies shortness of breath, leg swelling, orthopnea, PND, dizziness, lightheadedness.  Her weight and appetite have been stable.    Medical, surgical, family, social history, and medications were reviewed and updated as necessary.    ECHO results (from 10/31/2024):  Interpretation Summary     Left ventricular size, wall motion and function are normal. The ejection  fraction is 60-65%.  Normal right ventricle size and systolic function.  Watchman device noted in left atrial appendage. The device is well seated with  no leakage by color flow Doppler imaging.  There is no pericardial effusion.       Physical Examination Review of Systems   Vitals: /52 (BP Location: Right arm, Patient Position: Sitting, Cuff Size: Adult Regular)   Pulse 57   Resp 16   Ht 1.575 m (5' 2\")   Wt 65.8 kg (145 lb)   LMP  (LMP Unknown)   SpO2 95%   BMI 26.52 kg/m    BMI= Body mass index is 26.52 kg/m .  Wt Readings from Last 3 Encounters:   12/03/24 65.8 kg (145 lb)   10/31/24 67.1 kg (148 lb)   10/28/24 67.1 kg (148 lb)       General Appearance:   Alert, cooperative and in no acute distress   ENT/Mouth: membranes moist, no oral lesions or bleeding gums.      EYES:  no scleral icterus, normal conjunctivae   Neck: Thyroid not visualized   Chest/Lungs:   lungs are clear to auscultation, no rales or wheezing   Cardiovascular:   Regular . Normal first and second heart sounds with no murmurs, rubs or gallops;  no edema bilaterally    Abdomen:  Soft " and nontender.    Extremities: no cyanosis or clubbing   Skin: no xanthelasma, warm.    Neurologic: normal gait, normal  bilateral, no tremors   Psychiatric: Normal mood and affect       Please refer above for cardiac ROS details.      Medical History  Surgical History Family History Social History   Past Medical History:   Diagnosis Date    Adrenal nodule (H)     Alcohol dependence in remission (H)     Anemia     Antiplatelet or antithrombotic long-term use     Anxiety and depression     Arrhythmia     Benign essential hypertension     Chronic atrial fibrillation (H)     Chronic back pain     Chronic infection     Coronary artery disease     Diabetes mellitus (H)     Difficulty walking     Discitis of thoracolumbar region 10/10/2015    Encephalopathy 10/14/2015    Epidural abscess 10/10/2015    Hip pain, right     History of angina     Hyperlipidemia     Irregular heart beat     Other chronic pain     Ovarian mass     Sepsis (H) 10/08/2015    Stented coronary artery     Stroke (H) 06/23/2015    Neurology felt that episode was C/W subacute ischemic stroke    TIA (transient ischemic attack) 06/23/2015    UTI (urinary tract infection)     admitted to [facility] with UTI    Walking troubles      Past Surgical History:   Procedure Laterality Date    ANGIOPLASTY  03/30/2016    Drug eluting stent mid LAD, cutting balloon to 1st diagonal    ANGIOPLASTY  01/01/2008    BYPASS GRAFT ARTERY CORONARY  12/11/2007    X 3 vessels, LIMA to LAD, saph vein graft to distal RCA, saphvein graft to marginal, mini circuit bypass.  [facility].    CORONARY STENT PLACEMENT  01/01/2008    Left main, left circumflex, RCA    CORONARY STENT PLACEMENT  03/01/2016    CV ANGIOGRAM CORONARY GRAFT N/A 10/11/2023    Procedure: Coronary Angiogram Graft;  Surgeon: Sam Boo MD;  Location: [facility] CATH LAB CV    CV CORONARY ANGIOGRAM N/A 08/01/2018    Procedure: Coronary Angiogram;  Surgeon: Kit Bean MD;  Location:   [facility] Cath Lab;  Service:     CV LEFT ATRIAL APPENDAGE CLOSURE N/A 10/31/2024    Procedure: Left Atrial Appendage Closure - B[facility] WM;  Surgeon: Gualberto Garsia MD;  Location: [facility] CATH LAB CV    CV LEFT HEART CATHETERIZATION WITH LEFT VENTRICULOGRAM N/A 2018    Procedure: Left Heart Catheterization with Left Ventriculogram;  Surgeon: Kit Bean MD;  Location: [facility] Cath Lab;  Service:     EXCISE MASS TRUNK N/A 2024    Procedure: EXCISION ABDOMINAL WALL MASS AND PLACEMENT OF DRAIN;  Surgeon: Braden Bah DO;  Location: [facility] OR    INSERT MIDLINE HE  10/31/2015         LUMBAR DISCECTOMY N/A 10/11/2015    BILATERAL L1-L5 DECOMPRESSIVE LAMINECTOMY WITH EVACUATION OF EPIDURAL ABSCESS IRRIGATION & DEBRIDEMENT;  Surgeon: Luli Chan MD;  Location: [facility] OR; Due to sepsis    RELEASE CARPAL TUNNEL Bilateral     UMBILICAL HERNIA REPAIR       Family History   Problem Relation Age of Onset    Cerebrovascular Disease Mother     Diabetes Father     Coronary Artery Disease Father     Diabetes Sister     Cerebrovascular Disease Sister 81    Cerebrovascular Disease Brother     Diabetes Brother     Cancer Paternal Grandfather     Anesthesia Reaction No family hx of     Thrombosis No family hx of     Social History     Socioeconomic History    Marital status:      Spouse name: Not on file    Number of children: 1    Years of education: Not on file    Highest education level: Not on file   Occupational History    Occupation: retired   Tobacco Use    Smoking status: Former     Current packs/day: 0.00     Types: Cigarettes     Quit date: 2007     Years since quittin.4     Passive exposure: Past    Smokeless tobacco: Never   Vaping Use    Vaping status: Never Used   Substance and Sexual Activity    Alcohol use: Not Currently     Comment: Stopped drinking 2015    Drug use: No    Sexual activity: Never   Other Topics Concern    Not on file   Social  History Narrative    Lives alone with cats and dogs. , one daughter, Tory Lofton.      Social Drivers of Health     Financial Resource Strain: Low Risk  (10/31/2024)    Financial Resource Strain     Within the past 12 months, have you or your family members you live with been unable to get utilities (heat, electricity) when it was really needed?: No   Food Insecurity: Low Risk  (10/31/2024)    Food Insecurity     Within the past 12 months, did you worry that your food would run out before you got money to buy more?: No     Within the past 12 months, did the food you bought just not last and you didn t have money to get more?: No   Transportation Needs: Low Risk  (10/31/2024)    Transportation Needs     Within the past 12 months, has lack of transportation kept you from medical appointments, getting your medicines, non-medical meetings or appointments, work, or from getting things that you need?: No   Physical Activity: Inactive (5/8/2024)    Received from [facility]    Exercise Vital Sign     Days of Exercise per Week: 0 days     Minutes of Exercise per Session: 0 min   Stress: Not on file   Social Connections: Not on file   Interpersonal Safety: Low Risk  (10/31/2024)    Interpersonal Safety     Do you feel physically and emotionally safe where you currently live?: Yes     Within the past 12 months, have you been hit, slapped, kicked or otherwise physically hurt by someone?: No     Within the past 12 months, have you been humiliated or emotionally abused in other ways by your partner or ex-partner?: No   Housing Stability: Low Risk  (10/31/2024)    Housing Stability     Do you have housing? : Yes     Are you worried about losing your housing?: No          Medications  Allergies   Current Outpatient Medications   Medication Sig Dispense Refill    acetaminophen (TYLENOL) 500 MG tablet Take 1,000 mg by mouth every 12 hours as needed for mild pain      apixaban ANTICOAGULANT (ELIQUIS ANTICOAGULANT) 5 MG tablet  Take 1 tablet (5 mg) by mouth 2 times daily 60 tablet 11    aspirin 81 MG EC tablet Take 1 tablet (81 mg) by mouth daily.      atorvastatin (LIPITOR) 80 MG tablet Take 1 tablet (80 mg) by mouth at bedtime 90 tablet 3    blood glucose (NO BRAND SPECIFIED) test strip Use to test blood sugar 1 times daily. To accompany: Blood Glucose Monitor Brands: per insurance. 100 strip 6    blood glucose monitoring (NO BRAND SPECIFIED) meter device kit Use to test blood sugar 1 times daily. Preferred blood glucose meter OR supplies to accompany: Blood Glucose Monitor Brands: per insurance. 1 kit 0    busPIRone (BUSPAR) 15 MG tablet Take 1 tablet by mouth once daily 90 tablet 3    clobetasol propionate (TEMOVATE) 0.05 % external cream Apply topically 2 times daily. Apply pea size amount to rashes on torso 60 g 3    ezetimibe (ZETIA) 10 MG tablet Take 1 tablet (10 mg) by mouth daily 90 tablet 3    gabapentin (NEURONTIN) 300 MG capsule TAKE 3 CAPSULES BY MOUTH EVERY DAY AT BEDTIME 270 capsule 3    hydroCHLOROthiazide 12.5 MG tablet Take 1 tablet (12.5 mg) by mouth daily -- appt needed for further refills. 90 tablet 3    insulin glargine (LANTUS PEN) 100 UNIT/ML pen Inject 28 Units subcutaneously at bedtime.      isosorbide mononitrate (IMDUR) 60 MG 24 hr tablet Take 1 tablet (60 mg) by mouth daily 90 tablet 3    ketoconazole (NIZORAL) 2 % external cream       losartan (COZAAR) 100 MG tablet Take 1 tablet (100 mg) by mouth daily 90 tablet 3    naproxen (NAPROSYN) 500 MG tablet Take 500 mg by mouth 2 times daily (with meals)      sertraline (ZOLOFT) 100 MG tablet Take 1 tablet by mouth once daily 90 tablet 0    sotalol (BETAPACE) 80 MG tablet Take 0.5 tablets (40 mg) by mouth 2 times daily 90 tablet 3    thin (NO BRAND SPECIFIED) lancets Use with lanceting device. To accompany: Blood Glucose Monitor Brands: per insurance. 100 each 6    triamcinolone (KENALOG) 0.1 % external cream Apply topically 2 times daily.      Allergies   Allergen  "Reactions    Metformin GI Disturbance    Oxycodone Nausea and Vomiting         Lab Results    Chemistry/lipid CBC Cardiac Enzymes/BNP/TSH/INR   Recent Labs   Lab Test 07/15/24  1359   CHOL 119   HDL 37*   LDL 49   TRIG 163*     Recent Labs   Lab Test 07/15/24  1359 03/29/24  1035 12/07/23  1452   LDL 49 36 51     Recent Labs   Lab Test 11/01/24  0921 10/31/24  1016 10/31/24  1011 10/31/24  0641 10/28/24  1552   NA  --   --   --   --  138   POTASSIUM  --   --   --   --  5.1   CHLORIDE  --   --   --   --  99   CO2  --   --   --   --  30*   GLC 91   < >  --    < > 200*   BUN  --   --   --   --  10.3   CR  --   --  0.74  --  0.69   GFRESTIMATED  --   --  84  --  90   ELLIE  --   --   --   --  9.2    < > = values in this interval not displayed.     Recent Labs   Lab Test 10/31/24  1011 10/28/24  1552 07/15/24  1359   CR 0.74 0.69 0.77     Recent Labs   Lab Test 11/26/24  1319 10/31/24  1011 07/15/24  1359   A1C 7.9* 8.4* 7.5*    Recent Labs   Lab Test 10/31/24  1011 10/28/24  1552   WBC  --  6.3   HGB 10.6* 12.2   HCT  --  37.8   MCV  --  91   PLT  --  290     Recent Labs   Lab Test 10/31/24  1011 10/28/24  1552 07/15/24  1359   HGB 10.6* 12.2 11.7    Recent Labs   Lab Test 11/06/20  0559 11/05/20  0559 11/04/20  2229   TROPONINI 0.18 0.37* 0.28     No results for input(s): \"BNP\", \"NTBNPI\", \"NTBNP\" in the last 84912 hours.  Recent Labs   Lab Test 05/05/21  1400   TSH 0.77     Recent Labs   Lab Test 01/12/24  1208 01/11/24  1351 12/15/23  1308   INR 1.7* 1.4* 2.2*        35 minutes spent on the date of encounter doing education, chart prep/review, review of outside records, review of test results, interpretation with above tests, patient visit, and documentation.      This note has been dictated using voice recognition software. Any grammatical or context distortions are unintentional and inherent to the software.    Chichi Shepherd PA-C  Structural Heart Program  [facility]"

## 2024-12-03 NOTE — LETTER
12/3/2024    Zaheer Sandra MD  [address]  [facility] [address]    RE: Kemi Soto       Dear Colleague,     I had the pleasure of seeing Kemi [facility].  HEART CARE ENCOUNTER NOTE       [facility]  [phone]      Assessment/Recommendations   1.  Paroxysmal atrial fibrillation: Status post LAAO with 24 mm Watchman FLX device on 10/31/2024.  She should continue aspirin 81 mg daily indefinitely.  She should take her last dose of Eliquis evening of December 12 and the following day start taking Plavix 75 mg daily until 5/1/2025.  She should continue her current dose of sotalol. She has CT scan scheduled for February, but this may need to be rescheduled as [place]. We will see her again in April for Watchman follow-up    MODIFIED GRACE SCALE   Timepoint: 4-6 wk Post-LAAC    Previous score: 0    Score Description   0 No symptoms at all   1 No significant disability despite symptoms; able to carry out all usual duties and activities   2 Slight disability; unable to carry out all previous activities, but able to look after own affairs without assistance   3 Moderate disability; requiring some help, but able to walk without assistance   4 Moderately severe disability; unable to walk without assistance and unable to attend to own bodily needs without assistance   5 Severe disability; bedridden, incontinent and requiring constant nursing care and attention   6 Dead    Total score (0 - 6):  0    Change in score if s/p LAAC? No     2.  Coronary artery disease -status post CABG and multiple stents.  Her coronary angiogram in 2023 showed patent LIMA-LAD; VG's known to be occluded; mid vessel occluded stents to the LCx.    3.  Hyperlipidemia -LDL 49.  She should continue Lipitor 80 mg daily and Zetia 10 mg daily    4.  Hypertension -controlled.  She should continue losartan 100 mg daily, hydrochlorothiazide 12.5 mg daily    5. History of  "TIA - in 2015       History of Present Illness/Subjective    Kemi Soto is a 74 year old female who comes in today for 6-week follow-up after watchman implant    Kemi Soto has a past history of paroxysmal atrial fibrillation/atrial flutter, hypertension, coronary artery disease status post CABG x 4, hyperlipidemia, type 2 diabetes mellitus, history of TIA, peripheral neuropathy     She is currently taking Eliquis and aspirin.  She denies stroke since watchman implant.  She reports a small sensation in her chest that symptoms feels like a tickle.  She denies chest discomfort or anginal symptoms as she had when she needed her bypass surgery.  She denies exertional symptoms.  She denies shortness of breath, leg swelling, orthopnea, PND, dizziness, lightheadedness.  Her weight and appetite have been stable.    Medical, surgical, family, social history, and medications were reviewed and updated as necessary.    ECHO results (from 10/31/2024):  Interpretation Summary     Left ventricular size, wall motion and function are normal. The ejection  fraction is 60-65%.  Normal right ventricle size and systolic function.  Watchman device noted in left atrial appendage. The device is well seated with  no leakage by color flow Doppler imaging.  There is no pericardial effusion.       Physical Examination Review of Systems   Vitals: /52 (BP Location: Right arm, Patient Position: Sitting, Cuff Size: Adult Regular)   Pulse 57   Resp 16   Ht 1.575 m (5' 2\")   Wt 65.8 kg (145 lb)   LMP  (LMP Unknown)   SpO2 95%   BMI 26.52 kg/m    BMI= Body mass index is 26.52 kg/m .  Wt Readings from Last 3 Encounters:   12/03/24 65.8 kg (145 lb)   10/31/24 67.1 kg (148 lb)   10/28/24 67.1 kg (148 lb)       General Appearance:   Alert, cooperative and in no acute distress   ENT/Mouth: membranes moist, no oral lesions or bleeding gums.      EYES:  no scleral icterus, normal conjunctivae   Neck: Thyroid not visualized "   Chest/Lungs:   lungs are clear to auscultation, no rales or wheezing   Cardiovascular:   Regular . Normal first and second heart sounds with no murmurs, rubs or gallops;  no edema bilaterally    Abdomen:  Soft and nontender.    Extremities: no cyanosis or clubbing   Skin: no xanthelasma, warm.    Neurologic: normal gait, normal  bilateral, no tremors   Psychiatric: Normal mood and affect       Please refer above for cardiac ROS details.      Medical History  Surgical History Family History Social History   Past Medical History:   Diagnosis Date     Adrenal nodule (H)      Alcohol dependence in remission (H)      Anemia      Antiplatelet or antithrombotic long-term use      Anxiety and depression      Arrhythmia      Benign essential hypertension      Chronic atrial fibrillation (H)      Chronic back pain      Chronic infection      Coronary artery disease      Diabetes mellitus (H)      Difficulty walking      Discitis of thoracolumbar region 10/10/2015     Encephalopathy 10/14/2015     Epidural abscess 10/10/2015     Hip pain, right      History of angina      Hyperlipidemia      Irregular heart beat      Other chronic pain      Ovarian mass      Sepsis (H) 10/08/2015     Stented coronary artery      Stroke (H) 06/23/2015    Neurology felt that episode was C/W subacute ischemic stroke     TIA (transient ischemic attack) 06/23/2015     UTI (urinary tract infection)     admitted to [facility] with UTI     Walking troubles      Past Surgical History:   Procedure Laterality Date     ANGIOPLASTY  03/30/2016    Drug eluting stent mid LAD, cutting balloon to 1st diagonal     ANGIOPLASTY  01/01/2008     BYPASS GRAFT ARTERY CORONARY  12/11/2007    X 3 vessels, LIMA to LAD, saph vein graft to distal RCA, saphvein graft to marginal, mini circuit bypass.  [facility].     CORONARY STENT PLACEMENT  01/01/2008    Left main, left circumflex, RCA     CORONARY STENT PLACEMENT  03/01/2016     CV ANGIOGRAM CORONARY  GRAFT N/A 10/11/2023    Procedure: Coronary Angiogram Graft;  Surgeon: Sam Boo MD;  Location: [facility] CATH LAB CV     CV CORONARY ANGIOGRAM N/A 2018    Procedure: Coronary Angiogram;  Surgeon: Kit Bean MD;  Location: [facility] Cath Lab;  Service:      CV LEFT ATRIAL APPENDAGE CLOSURE N/A 10/31/2024    Procedure: Left Atrial Appendage Closure - [facility];  Surgeon: Gualberto Garsia MD;  Location: [facility] CATH LAB CV     CV LEFT HEART CATHETERIZATION WITH LEFT VENTRICULOGRAM N/A 2018    Procedure: Left Heart Catheterization with Left Ventriculogram;  Surgeon: Kit Bean MD;  Location: [facility] Cath Lab;  Service:      EXCISE MASS TRUNK N/A 2024    Procedure: EXCISION ABDOMINAL WALL MASS AND PLACEMENT OF DRAIN;  Surgeon: Braden Bah DO;  Location: [facility] OR     INSERT MIDLINE HE  10/31/2015          LUMBAR DISCECTOMY N/A 10/11/2015    BILATERAL L1-L5 DECOMPRESSIVE LAMINECTOMY WITH EVACUATION OF EPIDURAL ABSCESS IRRIGATION & DEBRIDEMENT;  Surgeon: Luli Chan MD;  Location: [facility] OR; Due to sepsis     RELEASE CARPAL TUNNEL Bilateral      UMBILICAL HERNIA REPAIR       Family History   Problem Relation Age of Onset     Cerebrovascular Disease Mother      Diabetes Father      Coronary Artery Disease Father      Diabetes Sister      Cerebrovascular Disease Sister 81     Cerebrovascular Disease Brother      Diabetes Brother      Cancer Paternal Grandfather      Anesthesia Reaction No family hx of      Thrombosis No family hx of     Social History     Socioeconomic History     Marital status:      Spouse name: Not on file     Number of children: 1     Years of education: Not on file     Highest education level: Not on file   Occupational History     Occupation: retired   Tobacco Use     Smoking status: Former     Current packs/day: 0.00     Types: Cigarettes     Quit date: 2007     Years since quittin.4     Passive  exposure: Past     Smokeless tobacco: Never   Vaping Use     Vaping status: Never Used   Substance and Sexual Activity     Alcohol use: Not Currently     Comment: Stopped drinking 9/2015     Drug use: No     Sexual activity: Never   Other Topics Concern     Not on file   Social History Narrative    Lives alone with cats and dogs. , one daughter, Tory Lofton.      Social Drivers of Health     Financial Resource Strain: Low Risk  (10/31/2024)    Financial Resource Strain      Within the past 12 months, have you or your family members you live with been unable to get utilities (heat, electricity) when it was really needed?: No   Food Insecurity: Low Risk  (10/31/2024)    Food Insecurity      Within the past 12 months, did you worry that your food would run out before you got money to buy more?: No      Within the past 12 months, did the food you bought just not last and you didn t have money to get more?: No   Transportation Needs: Low Risk  (10/31/2024)    Transportation Needs      Within the past 12 months, has lack of transportation kept you from medical appointments, getting your medicines, non-medical meetings or appointments, work, or from getting things that you need?: No   Physical Activity: Inactive (5/8/2024)    Received from [facility]    Exercise Vital Sign      Days of Exercise per Week: 0 days      Minutes of Exercise per Session: 0 min   Stress: Not on file   Social Connections: Not on file   Interpersonal Safety: Low Risk  (10/31/2024)    Interpersonal Safety      Do you feel physically and emotionally safe where you currently live?: Yes      Within the past 12 months, have you been hit, slapped, kicked or otherwise physically hurt by someone?: No      Within the past 12 months, have you been humiliated or emotionally abused in other ways by your partner or ex-partner?: No   Housing Stability: Low Risk  (10/31/2024)    Housing Stability      Do you have housing? : Yes      Are you worried about  losing your housing?: No          Medications  Allergies   Current Outpatient Medications   Medication Sig Dispense Refill     acetaminophen (TYLENOL) 500 MG tablet Take 1,000 mg by mouth every 12 hours as needed for mild pain       apixaban ANTICOAGULANT (ELIQUIS ANTICOAGULANT) 5 MG tablet Take 1 tablet (5 mg) by mouth 2 times daily 60 tablet 11     aspirin 81 MG EC tablet Take 1 tablet (81 mg) by mouth daily.       atorvastatin (LIPITOR) 80 MG tablet Take 1 tablet (80 mg) by mouth at bedtime 90 tablet 3     blood glucose (NO BRAND SPECIFIED) test strip Use to test blood sugar 1 times daily. To accompany: Blood Glucose Monitor Brands: per insurance. 100 strip 6     blood glucose monitoring (NO BRAND SPECIFIED) meter device kit Use to test blood sugar 1 times daily. Preferred blood glucose meter OR supplies to accompany: Blood Glucose Monitor Brands: per insurance. 1 kit 0     busPIRone (BUSPAR) 15 MG tablet Take 1 tablet by mouth once daily 90 tablet 3     clobetasol propionate (TEMOVATE) 0.05 % external cream Apply topically 2 times daily. Apply pea size amount to rashes on torso 60 g 3     ezetimibe (ZETIA) 10 MG tablet Take 1 tablet (10 mg) by mouth daily 90 tablet 3     gabapentin (NEURONTIN) 300 MG capsule TAKE 3 CAPSULES BY MOUTH EVERY DAY AT BEDTIME 270 capsule 3     hydroCHLOROthiazide 12.5 MG tablet Take 1 tablet (12.5 mg) by mouth daily -- appt needed for further refills. 90 tablet 3     insulin glargine (LANTUS PEN) 100 UNIT/ML pen Inject 28 Units subcutaneously at bedtime.       isosorbide mononitrate (IMDUR) 60 MG 24 hr tablet Take 1 tablet (60 mg) by mouth daily 90 tablet 3     ketoconazole (NIZORAL) 2 % external cream        losartan (COZAAR) 100 MG tablet Take 1 tablet (100 mg) by mouth daily 90 tablet 3     naproxen (NAPROSYN) 500 MG tablet Take 500 mg by mouth 2 times daily (with meals)       sertraline (ZOLOFT) 100 MG tablet Take 1 tablet by mouth once daily 90 tablet 0     sotalol (BETAPACE) 80  "MG tablet Take 0.5 tablets (40 mg) by mouth 2 times daily 90 tablet 3     thin (NO BRAND SPECIFIED) lancets Use with lanceting device. To accompany: Blood Glucose Monitor Brands: per insurance. 100 each 6     triamcinolone (KENALOG) 0.1 % external cream Apply topically 2 times daily.      Allergies   Allergen Reactions     Metformin GI Disturbance     Oxycodone Nausea and Vomiting         Lab Results    Chemistry/lipid CBC Cardiac Enzymes/BNP/TSH/INR   Recent Labs   Lab Test 07/15/24  1359   CHOL 119   HDL 37*   LDL 49   TRIG 163*     Recent Labs   Lab Test 07/15/24  1359 03/29/24  1035 12/07/23  1452   LDL 49 36 51     Recent Labs   Lab Test 11/01/24  0921 10/31/24  1016 10/31/24  1011 10/31/24  0641 10/28/24  1552   NA  --   --   --   --  138   POTASSIUM  --   --   --   --  5.1   CHLORIDE  --   --   --   --  99   CO2  --   --   --   --  30*   GLC 91   < >  --    < > 200*   BUN  --   --   --   --  10.3   CR  --   --  0.74  --  0.69   GFRESTIMATED  --   --  84  --  90   ELLIE  --   --   --   --  9.2    < > = values in this interval not displayed.     Recent Labs   Lab Test 10/31/24  1011 10/28/24  1552 07/15/24  1359   CR 0.74 0.69 0.77     Recent Labs   Lab Test 11/26/24  1319 10/31/24  1011 07/15/24  1359   A1C 7.9* 8.4* 7.5*    Recent Labs   Lab Test 10/31/24  1011 10/28/24  1552   WBC  --  6.3   HGB 10.6* 12.2   HCT  --  37.8   MCV  --  91   PLT  --  290     Recent Labs   Lab Test 10/31/24  1011 10/28/24  1552 07/15/24  1359   HGB 10.6* 12.2 11.7    Recent Labs   Lab Test 11/06/20  0559 11/05/20  0559 11/04/20  2229   TROPONINI 0.18 0.37* 0.28     No results for input(s): \"BNP\", \"NTBNPI\", \"NTBNP\" in the last 70472 hours.  Recent Labs   Lab Test 05/05/21  1400   TSH 0.77     Recent Labs   Lab Test 01/12/24  1208 01/11/24  1351 12/15/23  1308   INR 1.7* 1.4* 2.2*        35 minutes spent on the date of encounter doing education, chart prep/review, review of outside records, review of test results, interpretation with " above tests, patient visit, and documentation.      This note has been dictated using voice recognition software. Any grammatical or context distortions are unintentional and inherent to the software.    Chichi Shepherd PA-C  Structural Heart Program  [facility]       Thank you for allowing me to participate in the care of your patient.      Sincerely,     Chichi Shepherd PA-C     [facility]  cc:   No referring provider defined for this encounter.

## 2024-12-03 NOTE — PATIENT INSTRUCTIONS
Kemi Soto,    It was a pleasure to see you today in the clinic regarding your Watchman follow-up.     My recommendations after this visit include:     -  take your last dose of Eliquis evening of December 12 and the following day start taking Plavix 75 mg daily until 5/1/2025   - continue taking aspirin 81 mg daily indefinitely   - no other medication changes today   - plan if for CT scan in February - but we may have to reschedule if you will be gone   - we will see you again for Watchman follow-up in April       If you have questions or concerns, please call using the numbers below:    After Hours/Scheduling  [phone]    Otherwise you can dial the nurse directly at:                EAMON Cho RN  [phone]    Chichi Shepherd PA-C  Structural Heart Program  [facility]

## 2024-12-03 NOTE — TELEPHONE ENCOUNTER
Return call to pharmacy.  Discussed start of clopidogrel and discontinuation of Eliquis.  -ral    ----- Message from Faye LAL sent at 12/3/2024 12:17 PM CST -----  Regarding: PIOTR  General phone call:    Caller: [facility]  Primary cardiologist: PIOTR  Detailed reason for call: NEED CLARIFICATION ON CLOPIDOGREL   Best phone number: [phone]  Best time to contact: any  Ok to leave a detailedmessage? yes

## 2024-12-10 ENCOUNTER — OFFICE VISIT (OUTPATIENT)
Dept: FAMILY MEDICINE | Facility: CLINIC | Age: 74
End: 2024-12-10
Payer: COMMERCIAL

## 2024-12-10 VITALS
DIASTOLIC BLOOD PRESSURE: 78 MMHG | HEIGHT: 62 IN | SYSTOLIC BLOOD PRESSURE: 130 MMHG | RESPIRATION RATE: 16 BRPM | TEMPERATURE: 98.1 F | HEART RATE: 54 BPM | OXYGEN SATURATION: 99 % | BODY MASS INDEX: 26.68 KG/M2 | WEIGHT: 145 LBS

## 2024-12-10 DIAGNOSIS — I10 ESSENTIAL HYPERTENSION: ICD-10-CM

## 2024-12-10 DIAGNOSIS — E78.00 HYPERCHOLESTEROLEMIA: ICD-10-CM

## 2024-12-10 DIAGNOSIS — Z79.4 TYPE 2 DIABETES MELLITUS WITH DIABETIC POLYNEUROPATHY, WITH LONG-TERM CURRENT USE OF INSULIN (H): Primary | ICD-10-CM

## 2024-12-10 DIAGNOSIS — E11.42 TYPE 2 DIABETES MELLITUS WITH DIABETIC POLYNEUROPATHY, WITH LONG-TERM CURRENT USE OF INSULIN (H): Primary | ICD-10-CM

## 2024-12-10 PROCEDURE — 99214 OFFICE O/P EST MOD 30 MIN: CPT | Performed by: FAMILY MEDICINE

## 2024-12-10 PROCEDURE — G2211 COMPLEX E/M VISIT ADD ON: HCPCS | Performed by: FAMILY MEDICINE

## 2024-12-10 ASSESSMENT — PAIN SCALES - GENERAL: PAINLEVEL_OUTOF10: NO PAIN (0)

## 2024-12-10 NOTE — PROGRESS NOTES
"  Assessment & Plan     Type 2 diabetes mellitus with diabetic polyneuropathy, with long-term current use of insulin (H)  Improving.  Patient will continue working on lifestyle modification.  Patient will follow-up after her 3-month stay in [place] to have labs and visit.  Based on A1c in April, we may consider changing her from Lantus to Ozempic.  - Comprehensive metabolic panel; Future  - Hemoglobin A1c; Future  - Lipid Profile; Future  - Albumin Random Urine Quantitative with Creat Ratio; Future    Hypercholesterolemia  Controlled.  Continue vegan diet.  Continue atorvastatin and Zetia.  Follow-up with labs in about 4 months.    Essential hypertension  Controlled.  Continue current medications.      The longitudinal plan of care for the diagnosis(es)/condition(s) as documented were addressed during this visit. Due to the added complexity in care, I will continue to support Ciarra in the subsequent management and with ongoing continuity of care.        BMI  Estimated body mass index is 26.52 kg/m  as calculated from the following:    Height as of this encounter: 1.575 m (5' 2\").    Weight as of this encounter: 65.8 kg (145 lb).   Weight management plan: Discussed healthy diet and exercise guidelines          Subjective   Ciarra is a 74 year old, presenting for the following health issues:  Follow Up (Patient is here for a follow up. Not fasting. )        12/10/2024     1:57 PM   Additional Questions   Roomed by christiano bravo cma   Accompanied by self     History of Present Illness       Reason for visit:  Followup      HTN: She is on losartan 100 mg daily, hydrochlorothiazide 12.5 mg daily, imdur 60 mg daily and sotalol 80 mg daily.    DM2: Her A1c 7.9, down from 8.4 done 1 month ago, she is on lantus 28 units daily. She does eat chocolate. She is participating in a clinical trial involving intensive vegan diet for diabetics.  HLD: She is on atorvastatin 80 mg daily and Zetia 10 mg daily.            Review of " "Systems  Constitutional, HEENT, cardiovascular, pulmonary, gi and gu systems are negative, except as otherwise noted.      Objective    /78 (BP Location: Left arm, Patient Position: Sitting, Cuff Size: Adult Regular)   Pulse 54   Temp 98.1  F (36.7  C) (Temporal)   Resp 16   Ht 1.575 m (5' 2\")   Wt 65.8 kg (145 lb)   LMP  (LMP Unknown)   SpO2 99%   BMI 26.52 kg/m    Body mass index is 26.52 kg/m .  Physical Exam   GENERAL: alert and no distress  PSYCH: mentation appears normal, affect normal/bright            Signed Electronically by: Zaheer Sandra MD    "

## 2024-12-16 ENCOUNTER — TELEPHONE (OUTPATIENT)
Dept: CARDIOLOGY | Facility: CLINIC | Age: 74
End: 2024-12-16
Payer: COMMERCIAL

## 2024-12-16 NOTE — TELEPHONE ENCOUNTER
DRU left for writer from patient reporting concerning side effects on Plavix, including significant increase in bleeding/nosebleeds since transitioning to Plavix. Phone call to patient, she was previously on Eliquis 5 mg BID. She was told previously she could not take sotalol with Plavix? She is leaving for [place] in a few days. Nosebleeds just started recently, not heavy at all. She has bad psoriasis which is leading to bleeding lesions. Encouraged her to keep nasal passages well moistened, avoid itching as best as possible via psoriasis itching. Encouraged her to maintain DAPT, as alternative would be returning to OAC + ASA, which she reports she did not care for warfarin and Eliquis was expensive. She will call back if any further questions or concerns. Thanked her for her time. MYRA

## 2025-01-09 ENCOUNTER — PATIENT OUTREACH (OUTPATIENT)
Dept: CARE COORDINATION | Facility: CLINIC | Age: 75
End: 2025-01-09
Payer: COMMERCIAL

## 2025-01-16 ENCOUNTER — PATIENT OUTREACH (OUTPATIENT)
Dept: CARE COORDINATION | Facility: CLINIC | Age: 75
End: 2025-01-16
Payer: COMMERCIAL

## 2025-01-16 DIAGNOSIS — I25.708 CORONARY ARTERY DISEASE OF BYPASS GRAFT OF NATIVE HEART WITH STABLE ANGINA PECTORIS: ICD-10-CM

## 2025-01-16 DIAGNOSIS — E11.42 TYPE 2 DIABETES MELLITUS WITH DIABETIC POLYNEUROPATHY, WITH LONG-TERM CURRENT USE OF INSULIN (H): Primary | ICD-10-CM

## 2025-01-16 DIAGNOSIS — Z79.4 TYPE 2 DIABETES MELLITUS WITH DIABETIC POLYNEUROPATHY, WITH LONG-TERM CURRENT USE OF INSULIN (H): Primary | ICD-10-CM

## 2025-01-16 NOTE — PROGRESS NOTES
Clinical Product Navigator RN reviewed chart; patient on payer product coverage.  Review results:   CPN Initial Information Gathering  Referral Source: Health Plan  Referrals Places: Care Coordination, MTM    Met referral criteria for Care Coordinator and MTM; referral to be sent.    Patient identified by their health plan for care coordination.  Referral Reasons:  Condition - Multiple major comorbidities;   IP Risk - Emerging: Moderate IP stay probability;   Utilization - Multiple specialties - 5 or more distinct specialties seen in last 12 mos;   No follow-up after IP, ER or OBS discharge;   No specialty consult when indicated;   SDOH - Health Behaviors; SDoH Access to care concerns or transportation insecurity  ; Pt(s) w/ CKD and proteinuria not on an ACE-inhib or AT(2) receptor antagonist. ; Pt(s) w/ CrCl < 30 ml/min, serum Cr >= 2.0 mg/dL for women or serum Cr >= 2.5 mg/dL for men, w/o neph eval in last 12 rpt mos. ;   Pt(s) w/o at least 2 HbA1c tests in last 12 rpt mos. ;   Pt(s) w/o at least one HbA1c test in the last 6 rpt mos. ;   Pt(s) w/ most recent HbA1c result > 7.0%. ;   Pt(s) 18 - 85 yrs w/ diabetes w/o a kidney health eval in last 12 rpt mos (HEDIS: KED). ;   Pt(s) 75 - 85 yrs w/ diabetes w/o a kidney health eval in last 12 rpt mos (HEDIS: KED). ;   Pt(s) ages 18 - 75 yrs w/ diabetes missing annual retinal eye exam (HEDIS: EED)  Not on appropriate medications: Cardiac; Not on appropriate medications: CKD; Not on appropriate medications: Diabetes; Frailty - Skin issues and Mobility; Chronic condition and BH condition;   Pt(s) w/ dx of diabetic nephrop, proteinuria, or CRF not on ACE-inhib or ARB  Pt(s) w/ CAD currently not taking an ACE-inhib or ARB (14 day condition confirmation). ;   Pt(s) w/ DM and CAD who are currently not taking a statin. ;   Pt(s) w/ CAD currently not taking a statin. ;   Pt(s) >= 50 yrs of age currently w/CKD not taking a statin. ;

## 2025-01-20 ENCOUNTER — PATIENT OUTREACH (OUTPATIENT)
Dept: CARE COORDINATION | Facility: CLINIC | Age: 75
End: 2025-01-20
Payer: COMMERCIAL

## 2025-01-20 NOTE — PROGRESS NOTES
Clinic Care Coordination Contact  Community Health Worker Initial Outreach    Patient accepts CC: No, patient is in [place] for the winter. No needs at this time. Patient will be sent Care Coordination introduction letter for future reference.     Christelle Anderson  Community Health Worker  [facility]  Clinic Care Coordination   [address] Espitia, [place], [place], [facility]  Office: [phone]

## 2025-01-20 NOTE — LETTER
[facility] CARE COORDINATION  [address]  [facility] [address]     January 20, 2025    Kemi Soto  [address] [place] [id number]      Dear Ciarra,    I am a clinic community health worker who works with Zaheer Sandra MD with the [facility]. I wanted to thank you for spending the time to talk with me.  Below is a description of clinic care coordination and how I can further assist you.       The clinic care coordination team is made up of a registered nurse, , financial resource worker and community health worker who understand the health care system. The goal of clinic care coordination is to help you manage your health and improve access to the health care system. Our team works alongside your provider to assist you in determining your health and social needs. We can help you obtain health care and community resources, providing you with necessary information and education. We can work with you through any barriers and develop a care plan that helps coordinate and strengthen the communication between you and your care team.  Our services are voluntary and are offered without charge to you personally.    Please feel free to contact me with any questions or concerns regarding care coordination and what we can offer.      We are focused on providing you with the highest-quality healthcare experience possible.    Sincerely,     Christelle Anderson  Community Health Worker  [facility] Care Coordination   [address] Esp[address]ia, [place], [place], [facility]  Office: [phone]

## 2025-02-25 NOTE — TELEPHONE ENCOUNTER
Patient states she is having a hard time with her home monitor. Patient reports she does not feel that it is easy to use, and does not want to continue. Patient is in [place], [place] until April 2022. Patient is wondering what her other options are, patient reports she did not like going through [facility].    Transitional Care Management Visit         Admission Date: 02/11/2025  Discharge Date: 02/13/2025   This Visit: 02/26/2025 is 13 days after discharge.    Dann Mera is a 33 year old with past medical history of Mixon syndrome, and spontaneous pneumothorax here for Office Visit, Hospital F/U, and Establish Care    The discharge summary was reviewed. It documents that the patient was hospitalized for spontaneous pneumothorax.    She was previously following with Dr. Mora.  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    Grew up in [place], and lives in [place].     She originally presented to urgent care with chest pain without improvement for x 3 days.  Chest x-ray obtained at urgent care showed small left pneumothorax.  Workup in the ER was negative with exception of pneumothorax.  She was evaluated by interventional radiology and pulmonary medicine.  She was not a candidate for chest tube due to small apical pneumothorax.  Repeat chest x-ray on 2/13/2025 showed stable findings.    She since followed up with pulmonary medicine on 2/24/2025.    Toe nail concern - she states that she has noticed that her middle toe on the left foot felt more sensitive along the nail bed. After soaking it, it felt better.     Family history - mother with history of osteoporosis     Medication list changes include: no changes.  Pertinent hospital labs and tests: were reviewed.  Durable Medical Equipment/Assistive devices ordered: None       Constitutional: normal  Eyes: normal  HENT: normal  Respiratory: normal  Cardiovascular: normal  Gastrointestinal: normal  Genitourinary: normal  Neurologic: normal  Musculoskeletal: normal  Lymphatics: normal    Advance care planning documents on file - no    Objective   Vitals:    02/26/25 1007   BP: 122/80   Pulse: 89   Resp: 16   Temp: 98.3 °F (36.8 °C)   TempSrc: Temporal   SpO2: 99%   Weight: 59.2 kg (130 lb 8 oz)   Height: 5' 1\" (1.549 m)   BMI (Calculated): 24.66     Physical  Exam  Constitutional:       General: She is not in acute distress.     Appearance: Normal appearance. She is well-developed. She is not ill-appearing.   HENT:      Head: Normocephalic and atraumatic.      Right Ear: Tympanic membrane normal. There is no impacted cerumen.      Left Ear: Tympanic membrane normal. There is no impacted cerumen.      Nose: Nose normal. No congestion.      Mouth/Throat:      Mouth: Mucous membranes are moist.      Pharynx: No oropharyngeal exudate.      Neck: Normal range of motion and neck supple.   Eyes:      General: No scleral icterus.     Extraocular Movements: Extraocular movements intact.      Pupils: Pupils are equal, round, and reactive to light.   Cardiovascular:      Rate and Rhythm: Normal rate and regular rhythm.      Pulses: Normal pulses.      Heart sounds: Normal heart sounds. No murmur heard.     No friction rub. No gallop.   Pulmonary:      Effort: Pulmonary effort is normal. No respiratory distress.      Breath sounds: Normal breath sounds. No stridor. No wheezing.   Abdominal:      General: Abdomen is flat. There is no distension.      Palpations: Abdomen is soft.   Musculoskeletal:         General: No deformity. Normal range of motion.   Skin:     General: Skin is warm and dry.   Neurological:      General: No focal deficit present.      Mental Status: She is alert.      Cranial Nerves: No cranial nerve deficit.      Motor: No weakness.   Psychiatric:         Mood and Affect: Mood normal.         Behavior: Behavior normal.            ASSESSMENT AND PLAN            1. Hospital discharge follow-up  As above, presenting today for hospital discharge follow-up.  Prior to admission she describes feeling sense of indigestion along left upper chest, which prompted further evaluation from urgent care.  Chest x-ray during that visit revealed small left apical pneumothorax.  She was then directed to ER for further evaluation, and had hospital mission for monitoring.  No further  intervention was conducted after meeting with pulmonary medicine.  She was discharged with instructions to follow-up with pulmonary medicine as an outpatient.  She continues to remain entirely asymptomatic.  She denies any shortness of breath, chest pain, chest tightness.  Otherwise, quite well-appearing.    Vitals today are reassuring.  - Okay to continue monitoring, follow-up precautions discussed    2. Need for vaccination  On further review, up-to-date with immunizations    3. Encounter to establish care  Very pleasant individual presenting today to establish care    4. Thickened nails  As above, she has noticed along her third left toe that the nail has lifted from the nailbed, she was able to trim the toenail down.  On exam, the toenail was trimmed down to the wick, the base of does look slightly thickened nail.  Based on how she is currently nail thickening over time, I suspect that this may represent onychomycosis.  Recommending trial of Penlac topical nail lacquer.  Follow-up if any worsening symptoms.  -     ciclopirox (PENLAC) 8 % topical solution; Apply 1 Application topically nightly. Apply to affected area(s) nightly until 1 week after clinical resolution    5. Nasal congestion  Longstanding history of nasal congestion.  She has previously trialed nasal lavage, and intranasal steroid spray.  Recommend trial of antihistamine nasal spray.  -     azelastine (ASTELIN) 0.1 % nasal spray; Spray 1 spray in each nostril in the morning and 1 spray in the evening. Use in each nostril as directed    6. Primary spontaneous pneumothorax      Discharge medication were reviewed and updated with patient/family: fully compliant with the medication regimen prescribed at the time of discharge     Adherence to discharge treatment plan was assessed: fully adherent with the entire discharge treatment plan.    Return in about 9 months (around 11/26/2025) for physical.     Future Appointments          OCT 14    2025   10:20  AM - Office Visit  Endocrinology, [facility] - Dami Steele MD

## 2025-03-04 ENCOUNTER — MYC MEDICAL ADVICE (OUTPATIENT)
Dept: FAMILY MEDICINE | Facility: CLINIC | Age: 75
End: 2025-03-04
Payer: COMMERCIAL

## 2025-03-04 DIAGNOSIS — F41.1 ANXIETY, GENERALIZED: ICD-10-CM

## 2025-03-04 DIAGNOSIS — I48.3 TYPICAL ATRIAL FLUTTER (H): ICD-10-CM

## 2025-03-05 RX ORDER — SERTRALINE HYDROCHLORIDE 100 MG/1
100 TABLET, FILM COATED ORAL DAILY
Qty: 90 TABLET | Refills: 3 | Status: SHIPPED | OUTPATIENT
Start: 2025-03-05

## 2025-03-05 RX ORDER — SOTALOL HYDROCHLORIDE 80 MG/1
40 TABLET ORAL 2 TIMES DAILY
Qty: 90 TABLET | Refills: 3 | Status: SHIPPED | OUTPATIENT
Start: 2025-03-05

## 2025-03-24 DIAGNOSIS — I10 ESSENTIAL HYPERTENSION: ICD-10-CM

## 2025-03-25 RX ORDER — HYDROCHLOROTHIAZIDE 12.5 MG/1
12.5 TABLET ORAL DAILY
Qty: 90 TABLET | Refills: 3 | OUTPATIENT
Start: 2025-03-25

## 2025-03-27 ENCOUNTER — OFFICE VISIT (OUTPATIENT)
Dept: FAMILY MEDICINE | Facility: CLINIC | Age: 75
End: 2025-03-27
Payer: COMMERCIAL

## 2025-03-27 ENCOUNTER — ANCILLARY PROCEDURE (OUTPATIENT)
Dept: GENERAL RADIOLOGY | Facility: CLINIC | Age: 75
End: 2025-03-27
Attending: FAMILY MEDICINE
Payer: COMMERCIAL

## 2025-03-27 VITALS
BODY MASS INDEX: 27.09 KG/M2 | RESPIRATION RATE: 16 BRPM | SYSTOLIC BLOOD PRESSURE: 171 MMHG | WEIGHT: 147.2 LBS | DIASTOLIC BLOOD PRESSURE: 63 MMHG | OXYGEN SATURATION: 100 % | TEMPERATURE: 97.4 F | HEART RATE: 63 BPM | HEIGHT: 62 IN

## 2025-03-27 DIAGNOSIS — I10 ESSENTIAL HYPERTENSION: ICD-10-CM

## 2025-03-27 DIAGNOSIS — M25.551 HIP PAIN, RIGHT: Primary | ICD-10-CM

## 2025-03-27 DIAGNOSIS — F10.21 ALCOHOL DEPENDENCE IN REMISSION (H): ICD-10-CM

## 2025-03-27 DIAGNOSIS — M25.551 HIP PAIN, RIGHT: ICD-10-CM

## 2025-03-27 DIAGNOSIS — Z79.4 TYPE 2 DIABETES MELLITUS WITH DIABETIC POLYNEUROPATHY, WITH LONG-TERM CURRENT USE OF INSULIN (H): ICD-10-CM

## 2025-03-27 DIAGNOSIS — I48.0 PAROXYSMAL ATRIAL FIBRILLATION (H): ICD-10-CM

## 2025-03-27 DIAGNOSIS — E11.42 TYPE 2 DIABETES MELLITUS WITH DIABETIC POLYNEUROPATHY, WITH LONG-TERM CURRENT USE OF INSULIN (H): ICD-10-CM

## 2025-03-27 LAB
ALBUMIN SERPL BCG-MCNC: 4 G/DL (ref 3.5–5.2)
ALP SERPL-CCNC: 82 U/L (ref 40–150)
ALT SERPL W P-5'-P-CCNC: 14 U/L (ref 0–50)
ANION GAP SERPL CALCULATED.3IONS-SCNC: 7 MMOL/L (ref 7–15)
AST SERPL W P-5'-P-CCNC: 22 U/L (ref 0–45)
BASOPHILS # BLD AUTO: 0 10E3/UL (ref 0–0.2)
BASOPHILS NFR BLD AUTO: 0 %
BILIRUB SERPL-MCNC: 0.4 MG/DL
BUN SERPL-MCNC: 16.1 MG/DL (ref 8–23)
CALCIUM SERPL-MCNC: 9.4 MG/DL (ref 8.8–10.4)
CHLORIDE SERPL-SCNC: 102 MMOL/L (ref 98–107)
CHOLEST SERPL-MCNC: 125 MG/DL
CREAT SERPL-MCNC: 0.69 MG/DL (ref 0.51–0.95)
CREAT UR-MCNC: 60.1 MG/DL
EGFRCR SERPLBLD CKD-EPI 2021: 89 ML/MIN/1.73M2
EOSINOPHIL # BLD AUTO: 0.1 10E3/UL (ref 0–0.7)
EOSINOPHIL NFR BLD AUTO: 1 %
ERYTHROCYTE [DISTWIDTH] IN BLOOD BY AUTOMATED COUNT: 12.3 % (ref 10–15)
EST. AVERAGE GLUCOSE BLD GHB EST-MCNC: 166 MG/DL
FASTING STATUS PATIENT QL REPORTED: NO
FASTING STATUS PATIENT QL REPORTED: NO
GLUCOSE SERPL-MCNC: 101 MG/DL (ref 70–99)
HBA1C MFR BLD: 7.4 % (ref 0–5.6)
HCO3 SERPL-SCNC: 28 MMOL/L (ref 22–29)
HCT VFR BLD AUTO: 37.2 % (ref 35–47)
HDLC SERPL-MCNC: 39 MG/DL
HGB BLD-MCNC: 12.1 G/DL (ref 11.7–15.7)
IMM GRANULOCYTES # BLD: 0 10E3/UL
IMM GRANULOCYTES NFR BLD: 0 %
LDLC SERPL CALC-MCNC: 64 MG/DL
LYMPHOCYTES # BLD AUTO: 1.3 10E3/UL (ref 0.8–5.3)
LYMPHOCYTES NFR BLD AUTO: 13 %
MCH RBC QN AUTO: 30.2 PG (ref 26.5–33)
MCHC RBC AUTO-ENTMCNC: 32.5 G/DL (ref 31.5–36.5)
MCV RBC AUTO: 93 FL (ref 78–100)
MICROALBUMIN UR-MCNC: 155 MG/L
MICROALBUMIN/CREAT UR: 257.9 MG/G CR (ref 0–25)
MONOCYTES # BLD AUTO: 0.4 10E3/UL (ref 0–1.3)
MONOCYTES NFR BLD AUTO: 4 %
NEUTROPHILS # BLD AUTO: 8.8 10E3/UL (ref 1.6–8.3)
NEUTROPHILS NFR BLD AUTO: 82 %
NONHDLC SERPL-MCNC: 86 MG/DL
PLATELET # BLD AUTO: 345 10E3/UL (ref 150–450)
POTASSIUM SERPL-SCNC: 4.9 MMOL/L (ref 3.4–5.3)
PROT SERPL-MCNC: 6.6 G/DL (ref 6.4–8.3)
RBC # BLD AUTO: 4.01 10E6/UL (ref 3.8–5.2)
SODIUM SERPL-SCNC: 137 MMOL/L (ref 135–145)
TRIGL SERPL-MCNC: 108 MG/DL
WBC # BLD AUTO: 10.6 10E3/UL (ref 4–11)

## 2025-03-27 PROCEDURE — 73502 X-RAY EXAM HIP UNI 2-3 VIEWS: CPT | Mod: TC | Performed by: INTERNAL MEDICINE

## 2025-03-27 RX ORDER — LIDOCAINE 50 MG/G
1 PATCH TOPICAL EVERY 24 HOURS
Qty: 30 PATCH | Refills: 0 | Status: SHIPPED | OUTPATIENT
Start: 2025-03-27

## 2025-03-27 RX ORDER — HYDROCHLOROTHIAZIDE 12.5 MG/1
12.5 TABLET ORAL DAILY
Qty: 90 TABLET | Refills: 3 | Status: SHIPPED | OUTPATIENT
Start: 2025-03-27

## 2025-03-27 NOTE — PROGRESS NOTES
Patient is in the process of moving to [place], closer to her daughter.    Assessment & Plan     Hip pain, right  Most likely contusion from fall.  We will check a chest x-ray to rule out fracture.  Advised heat.  Prescription for lidocaine patches, which have helped, but she is run out.  - XR Hip Right 2-3 Views; Future  - lidocaine (LIDODERM) 5 % patch; Place 1 patch over 12 hours onto the skin every 24 hours. To prevent lidocaine toxicity, patient should be patch free for 12 hrs daily.    Type 2 diabetes mellitus with diabetic polyneuropathy, with long-term current use of insulin (H)  Controlled.  Continue Lantus.  Check labs today and notify with results.  - Comprehensive metabolic panel; Future  - CBC with Platelets & Differential; Future  - Hemoglobin A1c; Future  - Lipid Profile; Future  - Albumin Random Urine Quantitative with Creat Ratio; Future  - Comprehensive metabolic panel  - CBC with Platelets & Differential  - Hemoglobin A1c  - Lipid Profile  - Albumin Random Urine Quantitative with Creat Ratio    Essential hypertension  Elevated systolic blood pressure today, but she is in pain.  Continue current medications and recheck when she is not in pain any longer.  - hydroCHLOROthiazide 12.5 MG tablet; Take 1 tablet (12.5 mg) by mouth daily.    Paroxysmal atrial fibrillation (H)  Controlled.  Continue aspirin and Plavix and follow-up with cardiology for watchman monitoring.    Alcohol dependence in remission (H)  No recurrence.  Continue to monitor.      The longitudinal plan of care for the diagnosis(es)/condition(s) as documented were addressed during this visit. Due to the added complexity in care, I will continue to support Ciarra in the subsequent management and with ongoing continuity of care.              Keith Lafleur is a 75 year old, presenting for the following health issues:  Diabetes (Patient is here for a follow up. Not fasting. ), Balance/ Vestibular, and Pain (Patient is having pain in  "back, and both hips that has progressed in the last three weeks. )        3/27/2025    10:14 AM   Additional Questions   Roomed by christiano bravo cma   Accompanied by self     History of Present Illness       Diabetes:   She presents for follow up of diabetes.  She is checking home blood glucose a few times a month.   She checks blood glucose after meals.  Blood glucose is sometimes over 200 and sometimes under 70. She is aware of hypoglycemia symptoms including weakness and other.    She has no concerns regarding her diabetes at this time.  She is having numbness in feet.  The patient has not had a diabetic eye exam in the last 12 months.          She eats 4 or more servings of fruits and vegetables daily.She consumes 0 sweetened beverage(s) daily.She exercises with enough effort to increase her heart rate 9 or less minutes per day.  She exercises with enough effort to increase her heart rate 7 days per week.   She is taking medications regularly.    DM2: She is taking Lantus 28 units daily. She follows vegan diet.  Patient is taking aspirin, atorvastatin and losartan.  Last A1c 7.9 about 4 months ago.    Right hip pain: She had a ground level fall on wooden floor 1 week ago that has caused bruising. She is now having severe pain and difficulty walking. She has been using ice and heat to help with pain.    A. Fib: She had Watchman placed in October 2024.  She is following up with cardiology.    Hypertension: Patient is in pain today.  She is currently on hydrochlorothiazide 12.5 mg daily, losartan 100 mg daily and sotalol 80 mg daily.  She denies any side effects.              Review of Systems  No fever.        Objective    BP (!) 171/63 (BP Location: Left arm, Patient Position: Sitting, Cuff Size: Adult Regular)   Pulse 63   Temp 97.4  F (36.3  C) (Temporal)   Resp 16   Ht 1.575 m (5' 2\")   Wt 66.8 kg (147 lb 3.2 oz)   LMP  (LMP Unknown)   SpO2 100%   BMI 26.92 kg/m    Body mass index is 26.92 " kg/m .  Physical Exam   GENERAL: alert and no distress.  Patient has a 4 pronged cane today.  RIGHT HIP: Full ROM without anterior tenderness to palpation. + Ecchymosis and hematoma on right lateral posterior hip over gluteal muscle with mild-moderate tenderness on palpation.  PSYCH: mentation appears normal, affect normal/bright            Signed Electronically by: Zaheer Sandra MD

## 2025-03-31 ENCOUNTER — TELEPHONE (OUTPATIENT)
Dept: PHYSICAL MEDICINE AND REHAB | Facility: CLINIC | Age: 75
End: 2025-03-31

## 2025-03-31 NOTE — TELEPHONE ENCOUNTER
Unfortunately has been close to a year since her last visit with Chula.  I am not exactly sure what her diagnosis is with her current pain level I would recommend that she see us in follow-up.    Chula may have further recommendation upon her return to clinic.

## 2025-03-31 NOTE — TELEPHONE ENCOUNTER
Called and relayed information to pt. Pt expressed frustrations. Explained Chula can discuss this further at follow-up appt scheduled for tomorrow.

## 2025-03-31 NOTE — TELEPHONE ENCOUNTER
"Pt transferred from . Pt very upset due to cancelled appt with [facility]ula today, 3/31.   Pt reports B/L LBP and hip pain which are the same symptoms she was last seen for although symptoms have worsened since then she states.     Pt inquiring about referral to treatment modalities such as acupuncture, chiropractic care, pool therapy, etc close to her home in [place]. She asked if we had a list of services offered within [facility] and I explained we do not. \"I remember [facility] used to have all kinds of stuff like hydrotherapy\".   She asks that covering provider for Chula be asked today if they have any recommendations and she be called back.     Did advise pt to reschedule her follow-up appoitnment     "

## 2025-04-01 ENCOUNTER — TELEPHONE (OUTPATIENT)
Dept: FAMILY MEDICINE | Facility: CLINIC | Age: 75
End: 2025-04-01

## 2025-04-01 DIAGNOSIS — M25.551 HIP PAIN, RIGHT: Primary | ICD-10-CM

## 2025-04-01 DIAGNOSIS — M54.16 LUMBAR RADICULOPATHY: ICD-10-CM

## 2025-04-01 NOTE — TELEPHONE ENCOUNTER
Patient calling in for referral for acupuncture to be sent to the [facility].     Please have Primary Care pool fax referral once signed.    Patient has appt on April 7th.    Pended    Thelma Preston Ruiz  Acupuncture  [phone] fax  [phone] phone      EAMON Duncan  [facility]  [phone]    [facility]   Monday  - Thursday 7 AM - 6 PM    Friday  7 AM - 5 PM     -Please call your clinic for assistance from a nurse after hours.

## 2025-04-07 ENCOUNTER — HOSPITAL ENCOUNTER (OUTPATIENT)
Dept: CT IMAGING | Facility: CLINIC | Age: 75
Discharge: HOME | End: 2025-04-07
Attending: PHYSICIAN ASSISTANT | Admitting: PHYSICIAN ASSISTANT
Payer: COMMERCIAL

## 2025-04-07 ENCOUNTER — ANCILLARY ORDERS (OUTPATIENT)
Dept: CARDIOLOGY | Facility: CLINIC | Age: 75
End: 2025-04-07

## 2025-04-07 DIAGNOSIS — I48.0 PAROXYSMAL ATRIAL FIBRILLATION (H): Primary | ICD-10-CM

## 2025-04-07 DIAGNOSIS — I48.0 PAROXYSMAL ATRIAL FIBRILLATION (H): ICD-10-CM

## 2025-04-07 LAB — BSA FOR ECHO PROCEDURE: 0 M2

## 2025-04-07 PROCEDURE — 75572 CT HRT W/3D IMAGE: CPT

## 2025-04-07 PROCEDURE — 75572 CT HRT W/3D IMAGE: CPT | Mod: 26 | Performed by: INTERNAL MEDICINE

## 2025-04-07 PROCEDURE — 250N000011 HC RX IP 250 OP 636: Performed by: PHYSICIAN ASSISTANT

## 2025-04-07 RX ORDER — IOPAMIDOL 755 MG/ML
120 INJECTION, SOLUTION INTRAVASCULAR ONCE
Status: COMPLETED | OUTPATIENT
Start: 2025-04-07 | End: 2025-04-07

## 2025-04-07 RX ADMIN — IOPAMIDOL 120 ML: 755 INJECTION, SOLUTION INTRAVENOUS at 13:05

## 2025-04-08 ENCOUNTER — TELEPHONE (OUTPATIENT)
Dept: CARDIOLOGY | Facility: CLINIC | Age: 75
End: 2025-04-08
Payer: COMMERCIAL

## 2025-04-08 NOTE — TELEPHONE ENCOUNTER
"LVM to call... -[facility]    Patient s/p LAAC BSCI - WM FLX, 10/31/24. Per post-LAAC medication protocol, patient to remain on once daily 81 mg ASA for life and once daily 75 mg Plavix until six months post-LAAC, which will be 5/01/25. Imaging interpretation summary shows:    \"Interpretation Summary    Watchman device seats well.  No thrombus is identified on the surface of Watchman device.  There is no blood flow communication between left atrium and left atrial appendage.  Coronary artery disease status post CABG patent WILLAMS to LAD.  This is not a typical study for evaluation of her coronary arteries.  Normal size of visualized aortic segments.  No pericardial effusion or calcified pericardium.\"    Phone call to patient, discussed results. Post-LAAC Imaging shows no concern for DRT or leak around edges of device. Patient stated they will remain on medications as prescribed, otherwise patient will be seen at 6 month agustín to discuss medication changes. Patient appreciative and has writer's direct office number for questions or concerns. Call transferred to scheduling to arrange 6 month follow-up. No further questions at this time.  "

## 2025-04-24 ENCOUNTER — TELEPHONE (OUTPATIENT)
Dept: FAMILY MEDICINE | Facility: CLINIC | Age: 75
End: 2025-04-24
Payer: COMMERCIAL

## 2025-04-24 DIAGNOSIS — M25.551 HIP PAIN, RIGHT: Primary | ICD-10-CM

## 2025-04-24 NOTE — TELEPHONE ENCOUNTER
Order/Referral Request    Who is requesting: Ciarra    Orders being requested: MRI/ CT of hip    Reason service is needed/diagnosis: Right hip pain that is worsening.  Had XR on 3/27/25    When are orders needed by: ASAP    Has this been discussed with Provider: No- but was assessed at [facility] 3/27/25    Does patient have a preference on a Group/Provider/Facility? no    Does patient have an appointment scheduled?: No    Where to send orders: Place orders within Epic    Could we send this information to you in [facility] or would you prefer to receive a phone call?:   Patient would prefer a phone call   Okay to leave a detailed message?: Yes at Cell number on file:    Telephone Information:   Mobile [phone]

## 2025-04-24 NOTE — TELEPHONE ENCOUNTER
Provider Recommendation Follow Up:   Reached patient/caregiver. Informed of provider's recommendations. Patient verbalized understanding and agrees with the plan.     Anusha Herzog RN

## (undated) DEVICE — TRANSDUCER TRAY ARTERIAL 42646-06

## (undated) DEVICE — INTRO SHEATH 4FRX10CM PINNACLE RSS402

## (undated) DEVICE — CATH ANGIO INFINITI IM 4FRX100CM 538460

## (undated) DEVICE — ELECTRODE DEFIB CADENCE 22550R

## (undated) DEVICE — SYR ANGIOGRAPHY MULTIUSE KIT ACIST 014612

## (undated) DEVICE — SHEATH WITH DILATOR ACCESS SYSTEM FXD CRV DBL M635TU80020

## (undated) DEVICE — CUSTOM PACK CORONARY SAN5BCRHEA

## (undated) DEVICE — SHEATH GUIDING VERSACROSS D1 CURVE L85 CM L180 CBL VXAK0003

## (undated) DEVICE — INTRO MICRO MINI STICK 5FR STIFF NITINOL

## (undated) DEVICE — KIT HAND CONTROL ACIST 014644 AR-P54

## (undated) DEVICE — CATH ANGIO INFINITI JL4 4FRX100CM 538420

## (undated) DEVICE — MANIFOLD KIT ANGIO AUTOMATED 014613

## (undated) DEVICE — CATH ANGIO INFINITI PIGTAIL 155 6 SH 6FRX110CM 534654S

## (undated) DEVICE — INTRODUCER CHECK FLO 16FRX30CM .038

## (undated) DEVICE — EXCHANGE WIRE .035 260 STAR/JFC/035/260/ M001491681

## (undated) DEVICE — CATH ANGIO INFINITI JR4 4FRX100CM 538421

## (undated) RX ORDER — HYDRALAZINE HYDROCHLORIDE 20 MG/ML
INJECTION INTRAMUSCULAR; INTRAVENOUS
Status: DISPENSED
Start: 2023-10-11

## (undated) RX ORDER — LIDOCAINE HYDROCHLORIDE 10 MG/ML
INJECTION, SOLUTION EPIDURAL; INFILTRATION; INTRACAUDAL; PERINEURAL
Status: DISPENSED
Start: 2024-10-31

## (undated) RX ORDER — CEFAZOLIN SODIUM 1 G/3ML
INJECTION, POWDER, FOR SOLUTION INTRAMUSCULAR; INTRAVENOUS
Status: DISPENSED
Start: 2024-10-31

## (undated) RX ORDER — PROTAMINE SULFATE 10 MG/ML
INJECTION, SOLUTION INTRAVENOUS
Status: DISPENSED
Start: 2024-10-31

## (undated) RX ORDER — ALBUTEROL SULFATE 90 UG/1
INHALANT RESPIRATORY (INHALATION)
Status: DISPENSED
Start: 2024-10-31

## (undated) RX ORDER — FENTANYL CITRATE 50 UG/ML
INJECTION, SOLUTION INTRAMUSCULAR; INTRAVENOUS
Status: DISPENSED
Start: 2023-10-11

## (undated) RX ORDER — FENTANYL CITRATE 50 UG/ML
INJECTION, SOLUTION INTRAMUSCULAR; INTRAVENOUS
Status: DISPENSED
Start: 2024-10-31

## (undated) RX ORDER — ONDANSETRON 2 MG/ML
INJECTION INTRAMUSCULAR; INTRAVENOUS
Status: DISPENSED
Start: 2024-10-31

## (undated) RX ORDER — PROPOFOL 10 MG/ML
INJECTION, EMULSION INTRAVENOUS
Status: DISPENSED
Start: 2024-10-31

## (undated) RX ORDER — ASPIRIN 81 MG/1
TABLET ORAL
Status: DISPENSED
Start: 2024-10-31